# Patient Record
Sex: FEMALE | Race: WHITE | Employment: OTHER | URBAN - METROPOLITAN AREA
[De-identification: names, ages, dates, MRNs, and addresses within clinical notes are randomized per-mention and may not be internally consistent; named-entity substitution may affect disease eponyms.]

---

## 2017-01-09 ENCOUNTER — TELEPHONE (OUTPATIENT)
Dept: INTERNAL MEDICINE | Age: 67
End: 2017-01-09

## 2017-01-09 RX ORDER — AMOXICILLIN AND CLAVULANATE POTASSIUM 875; 125 MG/1; MG/1
1 TABLET, FILM COATED ORAL 2 TIMES DAILY
Qty: 14 TABLET | Refills: 0 | Status: SHIPPED | OUTPATIENT
Start: 2017-01-09 | End: 2017-01-16

## 2017-01-25 ENCOUNTER — TELEPHONE (OUTPATIENT)
Dept: INTERNAL MEDICINE CLINIC | Age: 67
End: 2017-01-25

## 2017-01-25 ENCOUNTER — TELEPHONE (OUTPATIENT)
Dept: INTERNAL MEDICINE | Age: 67
End: 2017-01-25

## 2017-01-25 DIAGNOSIS — I82.402 DEEP VEIN THROMBOSIS (DVT) OF LEFT LOWER EXTREMITY, UNSPECIFIED CHRONICITY, UNSPECIFIED VEIN (HCC): Primary | ICD-10-CM

## 2017-01-27 ENCOUNTER — TELEPHONE (OUTPATIENT)
Dept: INTERNAL MEDICINE | Age: 67
End: 2017-01-27

## 2017-01-27 RX ORDER — WARFARIN SODIUM 5 MG/1
TABLET ORAL
Qty: 30 TABLET | Refills: 3 | Status: SHIPPED | OUTPATIENT
Start: 2017-01-27 | End: 2017-08-17

## 2017-02-01 ENCOUNTER — OFFICE VISIT (OUTPATIENT)
Dept: INTERNAL MEDICINE | Age: 67
End: 2017-02-01

## 2017-02-01 ENCOUNTER — CARE COORDINATOR VISIT (OUTPATIENT)
Dept: INTERNAL MEDICINE | Age: 67
End: 2017-02-01

## 2017-02-01 VITALS
BODY MASS INDEX: 27.63 KG/M2 | SYSTOLIC BLOOD PRESSURE: 116 MMHG | DIASTOLIC BLOOD PRESSURE: 78 MMHG | WEIGHT: 193 LBS | HEIGHT: 70 IN

## 2017-02-01 DIAGNOSIS — D64.9 ANEMIA, UNSPECIFIED TYPE: ICD-10-CM

## 2017-02-01 DIAGNOSIS — I82.4Z1 DEEP VEIN THROMBOSIS (DVT) OF DISTAL VEIN OF RIGHT LOWER EXTREMITY, UNSPECIFIED CHRONICITY (HCC): ICD-10-CM

## 2017-02-01 DIAGNOSIS — D64.9 ANEMIA, UNSPECIFIED TYPE: Primary | ICD-10-CM

## 2017-02-01 DIAGNOSIS — Z96.651 STATUS POST TOTAL RIGHT KNEE REPLACEMENT: ICD-10-CM

## 2017-02-01 LAB
BASOPHILS ABSOLUTE: 0 K/UL (ref 0–0.2)
BASOPHILS RELATIVE PERCENT: 0.4 %
EOSINOPHILS ABSOLUTE: 0.1 K/UL (ref 0–0.6)
EOSINOPHILS RELATIVE PERCENT: 1.4 %
FERRITIN: 422.8 NG/ML (ref 15–150)
HCT VFR BLD CALC: 36.4 % (ref 36–48)
HEMOGLOBIN: 12.1 G/DL (ref 12–16)
INR BLD: 3.64 (ref 0.85–1.16)
IRON SATURATION: 19 % (ref 15–50)
IRON: 76 UG/DL (ref 37–145)
LYMPHOCYTES ABSOLUTE: 1.5 K/UL (ref 1–5.1)
LYMPHOCYTES RELATIVE PERCENT: 21.2 %
MCH RBC QN AUTO: 31.5 PG (ref 26–34)
MCHC RBC AUTO-ENTMCNC: 33.2 G/DL (ref 31–36)
MCV RBC AUTO: 95.1 FL (ref 80–100)
MONOCYTES ABSOLUTE: 0.6 K/UL (ref 0–1.3)
MONOCYTES RELATIVE PERCENT: 7.8 %
NEUTROPHILS ABSOLUTE: 4.9 K/UL (ref 1.7–7.7)
NEUTROPHILS RELATIVE PERCENT: 69.2 %
PDW BLD-RTO: 14.6 % (ref 12.4–15.4)
PLATELET # BLD: 671 K/UL (ref 135–450)
PMV BLD AUTO: 6.5 FL (ref 5–10.5)
PROTHROMBIN TIME: 42.6 SEC (ref 9.8–13)
RBC # BLD: 3.82 M/UL (ref 4–5.2)
TOTAL IRON BINDING CAPACITY: 402 UG/DL (ref 260–445)
WBC # BLD: 7.1 K/UL (ref 4–11)

## 2017-02-01 PROCEDURE — G8400 PT W/DXA NO RESULTS DOC: HCPCS | Performed by: INTERNAL MEDICINE

## 2017-02-01 PROCEDURE — 1036F TOBACCO NON-USER: CPT | Performed by: INTERNAL MEDICINE

## 2017-02-01 PROCEDURE — G8484 FLU IMMUNIZE NO ADMIN: HCPCS | Performed by: INTERNAL MEDICINE

## 2017-02-01 PROCEDURE — 99214 OFFICE O/P EST MOD 30 MIN: CPT | Performed by: INTERNAL MEDICINE

## 2017-02-01 PROCEDURE — 4040F PNEUMOC VAC/ADMIN/RCVD: CPT | Performed by: INTERNAL MEDICINE

## 2017-02-01 PROCEDURE — 1123F ACP DISCUSS/DSCN MKR DOCD: CPT | Performed by: INTERNAL MEDICINE

## 2017-02-01 PROCEDURE — G8428 CUR MEDS NOT DOCUMENT: HCPCS | Performed by: INTERNAL MEDICINE

## 2017-02-01 PROCEDURE — G8420 CALC BMI NORM PARAMETERS: HCPCS | Performed by: INTERNAL MEDICINE

## 2017-02-01 PROCEDURE — 3014F SCREEN MAMMO DOC REV: CPT | Performed by: INTERNAL MEDICINE

## 2017-02-01 PROCEDURE — 1090F PRES/ABSN URINE INCON ASSESS: CPT | Performed by: INTERNAL MEDICINE

## 2017-02-01 PROCEDURE — 3017F COLORECTAL CA SCREEN DOC REV: CPT | Performed by: INTERNAL MEDICINE

## 2017-02-01 RX ORDER — CELECOXIB 200 MG/1
200 CAPSULE ORAL DAILY
Qty: 30 CAPSULE | Refills: 1 | Status: SHIPPED | OUTPATIENT
Start: 2017-02-01 | End: 2017-03-06 | Stop reason: ALTCHOICE

## 2017-02-01 RX ORDER — HYDROCODONE BITARTRATE AND ACETAMINOPHEN 5; 325 MG/1; MG/1
TABLET ORAL
Qty: 60 TABLET | Refills: 0 | Status: SHIPPED | OUTPATIENT
Start: 2017-02-01 | End: 2017-03-06 | Stop reason: ALTCHOICE

## 2017-02-01 ASSESSMENT — ENCOUNTER SYMPTOMS
GASTROINTESTINAL NEGATIVE: 1
RESPIRATORY NEGATIVE: 1

## 2017-02-02 ENCOUNTER — ANTI-COAG VISIT (OUTPATIENT)
Dept: INTERNAL MEDICINE | Age: 67
End: 2017-02-02

## 2017-02-02 ENCOUNTER — CARE COORDINATION (OUTPATIENT)
Dept: INTERNAL MEDICINE | Age: 67
End: 2017-02-02

## 2017-02-02 DIAGNOSIS — I82.4Z1 DEEP VEIN THROMBOSIS (DVT) OF DISTAL VEIN OF RIGHT LOWER EXTREMITY, UNSPECIFIED CHRONICITY (HCC): Primary | ICD-10-CM

## 2017-02-07 ENCOUNTER — CARE COORDINATION (OUTPATIENT)
Dept: INTERNAL MEDICINE | Age: 67
End: 2017-02-07

## 2017-02-08 RX ORDER — MIRTAZAPINE 7.5 MG/1
7.5 TABLET, FILM COATED ORAL NIGHTLY
Qty: 30 TABLET | Refills: 0 | Status: SHIPPED | OUTPATIENT
Start: 2017-02-08 | End: 2017-05-23

## 2017-02-13 ENCOUNTER — CARE COORDINATION (OUTPATIENT)
Dept: INTERNAL MEDICINE | Age: 67
End: 2017-02-13

## 2017-02-17 ENCOUNTER — CARE COORDINATION (OUTPATIENT)
Dept: INTERNAL MEDICINE | Age: 67
End: 2017-02-17

## 2017-02-20 ENCOUNTER — CARE COORDINATION (OUTPATIENT)
Dept: INTERNAL MEDICINE | Age: 67
End: 2017-02-20

## 2017-02-24 ENCOUNTER — TELEPHONE (OUTPATIENT)
Dept: PHARMACY | Age: 67
End: 2017-02-24

## 2017-02-24 ENCOUNTER — CARE COORDINATION (OUTPATIENT)
Dept: INTERNAL MEDICINE | Age: 67
End: 2017-02-24

## 2017-02-27 ENCOUNTER — CARE COORDINATION (OUTPATIENT)
Dept: INTERNAL MEDICINE | Age: 67
End: 2017-02-27

## 2017-03-01 ENCOUNTER — HOSPITAL ENCOUNTER (OUTPATIENT)
Dept: OTHER | Age: 67
Discharge: HOME | End: 2017-03-31
Attending: INTERNAL MEDICINE | Admitting: INTERNAL MEDICINE

## 2017-03-01 ENCOUNTER — HOSPITAL ENCOUNTER (OUTPATIENT)
Dept: PHYSICAL THERAPY | Age: 67
Discharge: HOME | End: 2017-03-31
Attending: ORTHOPAEDIC SURGERY | Admitting: ORTHOPAEDIC SURGERY

## 2017-03-01 ASSESSMENT — PAIN DESCRIPTION - PAIN TYPE: TYPE: SURGICAL PAIN

## 2017-03-01 ASSESSMENT — PAIN SCALES - GENERAL: PAINLEVEL_OUTOF10: 4

## 2017-03-01 ASSESSMENT — PAIN DESCRIPTION - LOCATION: LOCATION: KNEE

## 2017-03-01 ASSESSMENT — PAIN DESCRIPTION - ORIENTATION: ORIENTATION: RIGHT

## 2017-03-06 ENCOUNTER — ANTI-COAG VISIT (OUTPATIENT)
Dept: PHARMACY | Age: 67
End: 2017-03-06

## 2017-03-06 DIAGNOSIS — I82.4Z1 DEEP VEIN THROMBOSIS (DVT) OF DISTAL VEIN OF RIGHT LOWER EXTREMITY, UNSPECIFIED CHRONICITY (HCC): Primary | ICD-10-CM

## 2017-03-06 LAB
INR BLD: 4.7
PROTIME: 56.2 SECONDS

## 2017-03-06 RX ORDER — PHENOL 1.4 %
2 AEROSOL, SPRAY (ML) MUCOUS MEMBRANE NIGHTLY
COMMUNITY
End: 2017-12-12

## 2017-03-06 RX ORDER — ACETAMINOPHEN 160 MG
1 TABLET,DISINTEGRATING ORAL DAILY
COMMUNITY
End: 2017-05-23

## 2017-03-06 RX ORDER — DIPHENHYDRAMINE HCL 25 MG
25 CAPSULE ORAL EVERY 6 HOURS PRN
COMMUNITY
End: 2017-05-23

## 2017-03-06 RX ORDER — ACETAMINOPHEN 500 MG
1000 TABLET ORAL DAILY PRN
COMMUNITY
End: 2017-05-23

## 2017-03-13 ENCOUNTER — ANTI-COAG VISIT (OUTPATIENT)
Dept: PHARMACY | Age: 67
End: 2017-03-13

## 2017-03-13 DIAGNOSIS — I82.4Z1 DEEP VEIN THROMBOSIS (DVT) OF DISTAL VEIN OF RIGHT LOWER EXTREMITY, UNSPECIFIED CHRONICITY (HCC): ICD-10-CM

## 2017-03-13 LAB
INR BLD: 2.2
PROTIME: 26.7 SECONDS

## 2017-03-21 ENCOUNTER — TELEPHONE (OUTPATIENT)
Dept: INTERNAL MEDICINE | Age: 67
End: 2017-03-21

## 2017-03-21 ENCOUNTER — TELEPHONE (OUTPATIENT)
Dept: PHARMACY | Age: 67
End: 2017-03-21

## 2017-03-21 DIAGNOSIS — I82.4Z1 DEEP VEIN THROMBOSIS (DVT) OF DISTAL VEIN OF RIGHT LOWER EXTREMITY, UNSPECIFIED CHRONICITY (HCC): Primary | ICD-10-CM

## 2017-03-22 ENCOUNTER — HOSPITAL ENCOUNTER (OUTPATIENT)
Dept: OTHER | Age: 67
Discharge: HOME | End: 2017-03-22
Attending: INTERNAL MEDICINE | Admitting: INTERNAL MEDICINE

## 2017-03-22 ENCOUNTER — CARE COORDINATION (OUTPATIENT)
Dept: INTERNAL MEDICINE | Age: 67
End: 2017-03-22

## 2017-03-22 ENCOUNTER — ANTI-COAG VISIT (OUTPATIENT)
Dept: PHARMACY | Age: 67
End: 2017-03-22

## 2017-03-22 DIAGNOSIS — I82.4Z1 DEEP VEIN THROMBOSIS (DVT) OF DISTAL VEIN OF RIGHT LOWER EXTREMITY, UNSPECIFIED CHRONICITY (HCC): ICD-10-CM

## 2017-03-22 LAB
INR BLD: 3.6
PROTIME: 42.7 SECONDS

## 2017-03-23 ENCOUNTER — CARE COORDINATION (OUTPATIENT)
Dept: INTERNAL MEDICINE | Age: 67
End: 2017-03-23

## 2017-03-27 ENCOUNTER — CARE COORDINATION (OUTPATIENT)
Dept: INTERNAL MEDICINE | Age: 67
End: 2017-03-27

## 2017-03-31 ENCOUNTER — ANTI-COAG VISIT (OUTPATIENT)
Dept: PHARMACY | Age: 67
End: 2017-03-31

## 2017-03-31 DIAGNOSIS — I82.4Z1 DEEP VEIN THROMBOSIS (DVT) OF DISTAL VEIN OF RIGHT LOWER EXTREMITY, UNSPECIFIED CHRONICITY (HCC): ICD-10-CM

## 2017-03-31 LAB — INR BLD: 2.7

## 2017-04-01 ENCOUNTER — HOSPITAL ENCOUNTER (OUTPATIENT)
Dept: OTHER | Age: 67
Discharge: HOME | End: 2017-04-30
Attending: ORTHOPAEDIC SURGERY | Admitting: ORTHOPAEDIC SURGERY

## 2017-04-07 ENCOUNTER — ANTI-COAG VISIT (OUTPATIENT)
Dept: PHARMACY | Age: 67
End: 2017-04-07

## 2017-04-07 DIAGNOSIS — I82.4Z1 DEEP VEIN THROMBOSIS (DVT) OF DISTAL VEIN OF RIGHT LOWER EXTREMITY, UNSPECIFIED CHRONICITY (HCC): ICD-10-CM

## 2017-04-07 LAB — INR BLD: 1.8

## 2017-04-17 ENCOUNTER — ANTI-COAG VISIT (OUTPATIENT)
Dept: PHARMACY | Age: 67
End: 2017-04-17

## 2017-04-17 DIAGNOSIS — I82.4Z1 DEEP VEIN THROMBOSIS (DVT) OF DISTAL VEIN OF RIGHT LOWER EXTREMITY, UNSPECIFIED CHRONICITY (HCC): ICD-10-CM

## 2017-04-17 LAB — INR BLD: 2.8

## 2017-04-19 ENCOUNTER — CARE COORDINATION (OUTPATIENT)
Dept: INTERNAL MEDICINE | Age: 67
End: 2017-04-19

## 2017-05-01 ENCOUNTER — HOSPITAL ENCOUNTER (OUTPATIENT)
Dept: OTHER | Age: 67
Discharge: HOME | End: 2017-05-31
Attending: ORTHOPAEDIC SURGERY | Admitting: ORTHOPAEDIC SURGERY

## 2017-05-05 ENCOUNTER — ANTI-COAG VISIT (OUTPATIENT)
Dept: PHARMACY | Age: 67
End: 2017-05-05

## 2017-05-05 DIAGNOSIS — I82.4Z1 DEEP VEIN THROMBOSIS (DVT) OF DISTAL VEIN OF RIGHT LOWER EXTREMITY, UNSPECIFIED CHRONICITY (HCC): ICD-10-CM

## 2017-05-05 LAB — INR BLD: 2.3

## 2017-05-08 ENCOUNTER — CARE COORDINATION (OUTPATIENT)
Dept: INTERNAL MEDICINE | Age: 67
End: 2017-05-08

## 2017-05-08 RX ORDER — CELECOXIB 200 MG/1
CAPSULE ORAL
Qty: 30 CAPSULE | Refills: 1 | Status: SHIPPED | OUTPATIENT
Start: 2017-05-08 | End: 2017-12-12

## 2017-05-16 ENCOUNTER — CARE COORDINATION (OUTPATIENT)
Dept: INTERNAL MEDICINE | Age: 67
End: 2017-05-16

## 2017-05-19 ENCOUNTER — TELEPHONE (OUTPATIENT)
Dept: INTERNAL MEDICINE | Age: 67
End: 2017-05-19

## 2017-05-23 ENCOUNTER — OFFICE VISIT (OUTPATIENT)
Dept: INTERNAL MEDICINE | Age: 67
End: 2017-05-23

## 2017-05-23 VITALS
HEIGHT: 70 IN | DIASTOLIC BLOOD PRESSURE: 80 MMHG | SYSTOLIC BLOOD PRESSURE: 148 MMHG | BODY MASS INDEX: 27.49 KG/M2 | WEIGHT: 192 LBS | HEART RATE: 88 BPM

## 2017-05-23 DIAGNOSIS — I10 ESSENTIAL HYPERTENSION: ICD-10-CM

## 2017-05-23 DIAGNOSIS — F32.A ANXIETY AND DEPRESSION: ICD-10-CM

## 2017-05-23 DIAGNOSIS — F41.9 ANXIETY AND DEPRESSION: ICD-10-CM

## 2017-05-23 PROCEDURE — 4040F PNEUMOC VAC/ADMIN/RCVD: CPT | Performed by: INTERNAL MEDICINE

## 2017-05-23 PROCEDURE — 99213 OFFICE O/P EST LOW 20 MIN: CPT | Performed by: INTERNAL MEDICINE

## 2017-05-23 PROCEDURE — G8427 DOCREV CUR MEDS BY ELIG CLIN: HCPCS | Performed by: INTERNAL MEDICINE

## 2017-05-23 PROCEDURE — 3014F SCREEN MAMMO DOC REV: CPT | Performed by: INTERNAL MEDICINE

## 2017-05-23 PROCEDURE — 1036F TOBACCO NON-USER: CPT | Performed by: INTERNAL MEDICINE

## 2017-05-23 PROCEDURE — 3017F COLORECTAL CA SCREEN DOC REV: CPT | Performed by: INTERNAL MEDICINE

## 2017-05-23 PROCEDURE — G8420 CALC BMI NORM PARAMETERS: HCPCS | Performed by: INTERNAL MEDICINE

## 2017-05-23 PROCEDURE — 1123F ACP DISCUSS/DSCN MKR DOCD: CPT | Performed by: INTERNAL MEDICINE

## 2017-05-23 PROCEDURE — G8400 PT W/DXA NO RESULTS DOC: HCPCS | Performed by: INTERNAL MEDICINE

## 2017-05-23 PROCEDURE — 1090F PRES/ABSN URINE INCON ASSESS: CPT | Performed by: INTERNAL MEDICINE

## 2017-05-23 RX ORDER — TRAZODONE HYDROCHLORIDE 100 MG/1
100 TABLET ORAL NIGHTLY
Qty: 30 TABLET | Refills: 5 | Status: SHIPPED | OUTPATIENT
Start: 2017-05-23 | End: 2017-12-12

## 2017-05-23 ASSESSMENT — ENCOUNTER SYMPTOMS: RESPIRATORY NEGATIVE: 1

## 2017-06-02 ENCOUNTER — ANTI-COAG VISIT (OUTPATIENT)
Dept: PHARMACY | Age: 67
End: 2017-06-02

## 2017-06-02 DIAGNOSIS — I82.4Z1 DEEP VEIN THROMBOSIS (DVT) OF DISTAL VEIN OF RIGHT LOWER EXTREMITY, UNSPECIFIED CHRONICITY (HCC): ICD-10-CM

## 2017-06-02 LAB — INR BLD: 2.1

## 2017-06-06 ENCOUNTER — CARE COORDINATION (OUTPATIENT)
Dept: INTERNAL MEDICINE | Age: 67
End: 2017-06-06

## 2017-06-23 ENCOUNTER — ANTI-COAG VISIT (OUTPATIENT)
Dept: PHARMACY | Age: 67
End: 2017-06-23

## 2017-06-23 DIAGNOSIS — I82.4Z1 DEEP VEIN THROMBOSIS (DVT) OF DISTAL VEIN OF RIGHT LOWER EXTREMITY, UNSPECIFIED CHRONICITY (HCC): ICD-10-CM

## 2017-06-23 LAB — INR BLD: 1.8

## 2017-06-26 ENCOUNTER — CARE COORDINATION (OUTPATIENT)
Dept: INTERNAL MEDICINE | Age: 67
End: 2017-06-26

## 2017-06-27 ENCOUNTER — TELEPHONE (OUTPATIENT)
Dept: PHARMACY | Age: 67
End: 2017-06-27

## 2017-06-27 ENCOUNTER — ANTI-COAG VISIT (OUTPATIENT)
Dept: PHARMACY | Age: 67
End: 2017-06-27

## 2017-06-27 DIAGNOSIS — Z79.899 DRUG THERAPY DISCONTINUED: Primary | ICD-10-CM

## 2017-06-27 PROBLEM — I82.4Z1 DEEP VEIN THROMBOSIS (DVT) OF DISTAL VEIN OF RIGHT LOWER EXTREMITY (HCC): Status: RESOLVED | Noted: 2017-02-01 | Resolved: 2017-06-27

## 2017-07-12 ENCOUNTER — CARE COORDINATION (OUTPATIENT)
Dept: FAMILY MEDICINE CLINIC | Age: 67
End: 2017-07-12

## 2017-07-25 ENCOUNTER — TELEPHONE (OUTPATIENT)
Dept: INTERNAL MEDICINE | Age: 67
End: 2017-07-25

## 2017-08-01 ENCOUNTER — CARE COORDINATION (OUTPATIENT)
Dept: INTERNAL MEDICINE | Age: 67
End: 2017-08-01

## 2017-08-17 ENCOUNTER — CARE COORDINATION (OUTPATIENT)
Dept: INTERNAL MEDICINE | Age: 67
End: 2017-08-17

## 2017-08-17 ENCOUNTER — OFFICE VISIT (OUTPATIENT)
Dept: DERMATOLOGY | Age: 67
End: 2017-08-17

## 2017-08-17 DIAGNOSIS — L81.4 SOLAR LENTIGO: ICD-10-CM

## 2017-08-17 DIAGNOSIS — D69.2 SOLAR PURPURA (HCC): Primary | ICD-10-CM

## 2017-08-17 DIAGNOSIS — S51.812A SKIN TEAR OF LEFT FOREARM WITHOUT COMPLICATION, INITIAL ENCOUNTER: ICD-10-CM

## 2017-08-17 DIAGNOSIS — L57.0 AK (ACTINIC KERATOSIS): ICD-10-CM

## 2017-08-17 DIAGNOSIS — L82.1 SK (SEBORRHEIC KERATOSIS): ICD-10-CM

## 2017-08-17 PROCEDURE — 3014F SCREEN MAMMO DOC REV: CPT | Performed by: DERMATOLOGY

## 2017-08-17 PROCEDURE — 99213 OFFICE O/P EST LOW 20 MIN: CPT | Performed by: DERMATOLOGY

## 2017-08-17 PROCEDURE — G8400 PT W/DXA NO RESULTS DOC: HCPCS | Performed by: DERMATOLOGY

## 2017-08-17 PROCEDURE — 1090F PRES/ABSN URINE INCON ASSESS: CPT | Performed by: DERMATOLOGY

## 2017-08-17 PROCEDURE — 1123F ACP DISCUSS/DSCN MKR DOCD: CPT | Performed by: DERMATOLOGY

## 2017-08-17 PROCEDURE — 3017F COLORECTAL CA SCREEN DOC REV: CPT | Performed by: DERMATOLOGY

## 2017-08-17 PROCEDURE — 1036F TOBACCO NON-USER: CPT | Performed by: DERMATOLOGY

## 2017-08-17 PROCEDURE — G8427 DOCREV CUR MEDS BY ELIG CLIN: HCPCS | Performed by: DERMATOLOGY

## 2017-08-17 PROCEDURE — G8419 CALC BMI OUT NRM PARAM NOF/U: HCPCS | Performed by: DERMATOLOGY

## 2017-08-17 PROCEDURE — 4040F PNEUMOC VAC/ADMIN/RCVD: CPT | Performed by: DERMATOLOGY

## 2017-08-17 RX ORDER — ACETAMINOPHEN 160 MG
TABLET,DISINTEGRATING ORAL
COMMUNITY
End: 2018-03-27

## 2017-12-12 ENCOUNTER — OFFICE VISIT (OUTPATIENT)
Dept: INTERNAL MEDICINE | Age: 67
End: 2017-12-12

## 2017-12-12 VITALS
BODY MASS INDEX: 28.06 KG/M2 | DIASTOLIC BLOOD PRESSURE: 84 MMHG | HEIGHT: 70 IN | WEIGHT: 196 LBS | TEMPERATURE: 98.5 F | HEART RATE: 84 BPM | SYSTOLIC BLOOD PRESSURE: 148 MMHG

## 2017-12-12 DIAGNOSIS — F41.9 ANXIETY AND DEPRESSION: ICD-10-CM

## 2017-12-12 DIAGNOSIS — J45.909 REACTIVE AIRWAY DISEASE WITHOUT COMPLICATION, UNSPECIFIED ASTHMA SEVERITY, UNSPECIFIED WHETHER PERSISTENT: ICD-10-CM

## 2017-12-12 DIAGNOSIS — M79.10 MYALGIA: ICD-10-CM

## 2017-12-12 DIAGNOSIS — Z23 NEED FOR INFLUENZA VACCINATION: ICD-10-CM

## 2017-12-12 DIAGNOSIS — R60.9 EDEMA, UNSPECIFIED TYPE: ICD-10-CM

## 2017-12-12 DIAGNOSIS — R91.1 PULMONARY NODULE: ICD-10-CM

## 2017-12-12 DIAGNOSIS — E78.5 HYPERLIPIDEMIA, UNSPECIFIED HYPERLIPIDEMIA TYPE: Chronic | ICD-10-CM

## 2017-12-12 DIAGNOSIS — Z00.00 PREVENTATIVE HEALTH CARE: ICD-10-CM

## 2017-12-12 DIAGNOSIS — I10 ESSENTIAL HYPERTENSION: ICD-10-CM

## 2017-12-12 DIAGNOSIS — Z00.00 PREVENTATIVE HEALTH CARE: Primary | ICD-10-CM

## 2017-12-12 DIAGNOSIS — F32.A ANXIETY AND DEPRESSION: ICD-10-CM

## 2017-12-12 PROBLEM — Z96.651 STATUS POST TOTAL RIGHT KNEE REPLACEMENT: Status: RESOLVED | Noted: 2017-02-01 | Resolved: 2017-12-12

## 2017-12-12 LAB
A/G RATIO: 1.9 (ref 1.1–2.2)
ALBUMIN SERPL-MCNC: 4.6 G/DL (ref 3.4–5)
ALP BLD-CCNC: 85 U/L (ref 40–129)
ALT SERPL-CCNC: 23 U/L (ref 10–40)
ANION GAP SERPL CALCULATED.3IONS-SCNC: 16 MMOL/L (ref 3–16)
AST SERPL-CCNC: 23 U/L (ref 15–37)
BASOPHILS ABSOLUTE: 0 K/UL (ref 0–0.2)
BASOPHILS RELATIVE PERCENT: 0.3 %
BILIRUB SERPL-MCNC: 0.5 MG/DL (ref 0–1)
BILIRUBIN URINE: NEGATIVE
BLOOD, URINE: NEGATIVE
BUN BLDV-MCNC: 12 MG/DL (ref 7–20)
CALCIUM SERPL-MCNC: 9.6 MG/DL (ref 8.3–10.6)
CHLORIDE BLD-SCNC: 100 MMOL/L (ref 99–110)
CHOLESTEROL, TOTAL: 260 MG/DL (ref 0–199)
CLARITY: CLEAR
CO2: 25 MMOL/L (ref 21–32)
COLOR: YELLOW
CREAT SERPL-MCNC: 0.6 MG/DL (ref 0.6–1.2)
EOSINOPHILS ABSOLUTE: 0 K/UL (ref 0–0.6)
EOSINOPHILS RELATIVE PERCENT: 0.6 %
GFR AFRICAN AMERICAN: >60
GFR NON-AFRICAN AMERICAN: >60
GLOBULIN: 2.4 G/DL
GLUCOSE BLD-MCNC: 103 MG/DL (ref 70–99)
GLUCOSE URINE: NEGATIVE MG/DL
HCT VFR BLD CALC: 44.2 % (ref 36–48)
HDLC SERPL-MCNC: 77 MG/DL (ref 40–60)
HEMOGLOBIN: 15 G/DL (ref 12–16)
KETONES, URINE: NEGATIVE MG/DL
LDL CHOLESTEROL CALCULATED: 151 MG/DL
LEUKOCYTE ESTERASE, URINE: NEGATIVE
LYMPHOCYTES ABSOLUTE: 1.3 K/UL (ref 1–5.1)
LYMPHOCYTES RELATIVE PERCENT: 19.1 %
MCH RBC QN AUTO: 33.5 PG (ref 26–34)
MCHC RBC AUTO-ENTMCNC: 34 G/DL (ref 31–36)
MCV RBC AUTO: 98.3 FL (ref 80–100)
MICROSCOPIC EXAMINATION: NORMAL
MONOCYTES ABSOLUTE: 0.5 K/UL (ref 0–1.3)
MONOCYTES RELATIVE PERCENT: 7.4 %
NEUTROPHILS ABSOLUTE: 5 K/UL (ref 1.7–7.7)
NEUTROPHILS RELATIVE PERCENT: 72.6 %
NITRITE, URINE: NEGATIVE
PDW BLD-RTO: 13.9 % (ref 12.4–15.4)
PH UA: 6
PLATELET # BLD: 348 K/UL (ref 135–450)
PMV BLD AUTO: 7.1 FL (ref 5–10.5)
POTASSIUM SERPL-SCNC: 4.5 MMOL/L (ref 3.5–5.1)
PROTEIN UA: NEGATIVE MG/DL
RBC # BLD: 4.5 M/UL (ref 4–5.2)
SEDIMENTATION RATE, ERYTHROCYTE: 8 MM/HR (ref 0–30)
SODIUM BLD-SCNC: 141 MMOL/L (ref 136–145)
SPECIFIC GRAVITY UA: 1.01
TOTAL CK: 55 U/L (ref 26–192)
TOTAL PROTEIN: 7 G/DL (ref 6.4–8.2)
TRIGL SERPL-MCNC: 159 MG/DL (ref 0–150)
TSH REFLEX FT4: 1.96 UIU/ML (ref 0.27–4.2)
URINE TYPE: NORMAL
UROBILINOGEN, URINE: 0.2 E.U./DL
VLDLC SERPL CALC-MCNC: 32 MG/DL
WBC # BLD: 6.8 K/UL (ref 4–11)

## 2017-12-12 PROCEDURE — G0008 ADMIN INFLUENZA VIRUS VAC: HCPCS | Performed by: INTERNAL MEDICINE

## 2017-12-12 PROCEDURE — 93000 ELECTROCARDIOGRAM COMPLETE: CPT | Performed by: INTERNAL MEDICINE

## 2017-12-12 PROCEDURE — G0439 PPPS, SUBSEQ VISIT: HCPCS | Performed by: INTERNAL MEDICINE

## 2017-12-12 PROCEDURE — 90662 IIV NO PRSV INCREASED AG IM: CPT | Performed by: INTERNAL MEDICINE

## 2017-12-12 RX ORDER — OMEPRAZOLE 20 MG/1
CAPSULE, DELAYED RELEASE ORAL
Qty: 90 CAPSULE | Refills: 3 | Status: SHIPPED | OUTPATIENT
Start: 2017-12-12 | End: 2019-01-16 | Stop reason: SDUPTHER

## 2017-12-12 RX ORDER — LOSARTAN POTASSIUM 100 MG/1
TABLET ORAL
Qty: 90 TABLET | Refills: 3 | Status: ON HOLD | OUTPATIENT
Start: 2017-12-12 | End: 2018-06-05 | Stop reason: HOSPADM

## 2017-12-12 RX ORDER — FLUTICASONE FUROATE AND VILANTEROL 100; 25 UG/1; UG/1
1 POWDER RESPIRATORY (INHALATION) DAILY
Qty: 3 EACH | Refills: 3 | Status: SHIPPED | OUTPATIENT
Start: 2017-12-12 | End: 2019-01-16 | Stop reason: SDUPTHER

## 2017-12-12 RX ORDER — FUROSEMIDE 20 MG/1
TABLET ORAL
Qty: 90 TABLET | Refills: 3 | Status: SHIPPED | OUTPATIENT
Start: 2017-12-12 | End: 2018-01-26 | Stop reason: SDUPTHER

## 2017-12-12 RX ORDER — AMLODIPINE BESYLATE 2.5 MG/1
2.5 TABLET ORAL DAILY
Qty: 90 TABLET | Refills: 3 | Status: SHIPPED | OUTPATIENT
Start: 2017-12-12 | End: 2018-02-13

## 2017-12-12 ASSESSMENT — PATIENT HEALTH QUESTIONNAIRE - PHQ9
1. LITTLE INTEREST OR PLEASURE IN DOING THINGS: 0
SUM OF ALL RESPONSES TO PHQ9 QUESTIONS 1 & 2: 0
SUM OF ALL RESPONSES TO PHQ QUESTIONS 1-9: 0
2. FEELING DOWN, DEPRESSED OR HOPELESS: 0

## 2017-12-12 ASSESSMENT — ENCOUNTER SYMPTOMS
SHORTNESS OF BREATH: 0
CHEST TIGHTNESS: 0
GASTROINTESTINAL NEGATIVE: 1

## 2017-12-12 NOTE — ASSESSMENT & PLAN NOTE
Vague muscle aches and tightness across her shoulders and legs. With or without exertion. Not exacerbated by exertion. No direct chest pain or associated dyspnea or other cardiac symptoms. Could be Lipitor. We'll hold for one month. Check sedimentation rate and CK. Consider polymyalgia.

## 2017-12-12 NOTE — ASSESSMENT & PLAN NOTE
Here for checkup. Stressors include problems within her familyhealth problems with her  and her daughter. She is still working.  See problem list.

## 2017-12-12 NOTE — PROGRESS NOTES
Vaccine Information Sheet, \"Influenza - Inactivated\"  given to [facility], or parent/legal guardian of  [facility] and verbalized understanding. Patient responses:    Have you ever had a reaction to a flu vaccine? No  Are you able to eat eggs without adverse effects? Yes  Do you have any current illness? No  Have you ever had Guillian Grand Chain Syndrome? No    Flu vaccine given per order. Please see immunization tab.

## 2017-12-12 NOTE — PROGRESS NOTES
SUBJECTIVE:    Patient ID: Kanika Murillo is an 79 y.o. female. HPI: Patient here today for the f/u of chronic problems -- see Problem List and associated comments. New issues or complaints include (also see Assessment for more details):  Here for routine checkup. See problem list. May physical issue is the soreness in her muscles. No other cardiovascular or pulmonary symptoms which all seem well controlled. Family stressors continue. Review of Systems   Constitutional: Negative. HENT: Positive for postnasal drip. Eyes: Negative for visual disturbance. Respiratory: Negative for chest tightness and shortness of breath. Cardiovascular: Negative for chest pain and palpitations. Gastrointestinal: Negative. Genitourinary: Negative. Musculoskeletal: Positive for myalgias. Negative for arthralgias. Skin: Negative for wound. Neurological: Negative for weakness and numbness. Hematological: Negative for adenopathy. Psychiatric/Behavioral: Negative for dysphoric mood, sleep disturbance and suicidal ideas. OBJECTIVE:    BP (!) 148/84 (Site: Left Arm, Position: Sitting, Cuff Size: Large Adult)   Pulse 84   Temp 98.5 °F (36.9 °C) (Oral)   Ht 5' 10\" (1.778 m)   Wt 196 lb (88.9 kg)   BMI 28.12 kg/m²      Physical Exam   Constitutional: She is oriented to person, place, and time. She appears well-developed and well-nourished. No distress. HENT:   Head: Normocephalic and atraumatic. Mouth/Throat: Oropharynx is clear and moist. No oropharyngeal exudate. Eyes: EOM are normal. Right eye exhibits no discharge. Left eye exhibits no discharge. No scleral icterus. Neck: Normal range of motion. Neck supple. No JVD present. Carotid bruit is not present. No tracheal deviation present. No thyromegaly present. Cardiovascular: Normal rate, regular rhythm, normal heart sounds and intact distal pulses. Exam reveals no gallop. No murmur heard.   Pulses:       Carotid pulses are 2+ on the With or without exertion. Not exacerbated by exertion. No direct chest pain or associated dyspnea or other cardiac symptoms. Could be Lipitor. We'll hold for one month. Check sedimentation rate and CK. Consider polymyalgia. Hyperlipidemia  Check labs at that he will hold Lipitor due to myalgias    Hypertension  Elevated. Continue losartan and add amlodipine 2.5 mg    Edema  Lasix as needed    Reactive airway disease  Controlled with inhalers    Pulmonary nodule  Recheck CT scan    Anxiety and depression  Overall doing wellmainly stressed out about health problems within her family. PLAN:  See ASSESSMENT for evaluation & PLAN    Orders Placed This Encounter   Procedures    CT Chest WO Contrast     Order Specific Question:   Reason for exam:     Answer:   f/u monitoring    CBC Auto Differential     Standing Status:   Future     Standing Expiration Date:   12/12/2018    Comprehensive Metabolic Panel     Standing Status:   Future     Standing Expiration Date:   12/12/2018    Lipid Panel     Standing Status:   Future     Standing Expiration Date:   12/12/2018     Order Specific Question:   Is Patient Fasting?/# of Hours     Answer:   yes - 8 hours    TSH WITH REFLEX TO FT4     Standing Status:   Future     Standing Expiration Date:   12/12/2018    Urinalysis     Standing Status:   Future     Standing Expiration Date:   12/12/2018     Order Specific Question:   SPECIFY(EX-CATH,MIDSTREAM,CYSTO,ETC)? Answer:   midstream    CK     Standing Status:   Future     Standing Expiration Date:   12/12/2018    Sedimentation rate, automated     Standing Status:   Future     Standing Expiration Date:   12/12/2018    EKG 12 Lead     Order Specific Question:   Reason for Exam?     Answer: Other       PSH, PMH, SH and FH reviewed and noted. Recent and past labs, tests and consults also reviewed. Recent or new meds also reviewed.

## 2017-12-26 ENCOUNTER — HOSPITAL ENCOUNTER (OUTPATIENT)
Dept: CT IMAGING | Age: 67
Discharge: HOME | End: 2017-12-26
Attending: INTERNAL MEDICINE | Admitting: INTERNAL MEDICINE

## 2017-12-26 DIAGNOSIS — R91.1 PULMONARY NODULE: ICD-10-CM

## 2017-12-26 DIAGNOSIS — R91.1 SOLITARY PULMONARY NODULE: ICD-10-CM

## 2018-01-25 ENCOUNTER — TELEPHONE (OUTPATIENT)
Dept: INTERNAL MEDICINE | Age: 68
End: 2018-01-25

## 2018-01-26 RX ORDER — FUROSEMIDE 20 MG/1
TABLET ORAL
Qty: 90 TABLET | Refills: 3 | COMMUNITY
Start: 2018-01-26 | End: 2018-03-16 | Stop reason: SDUPTHER

## 2018-02-13 ENCOUNTER — OFFICE VISIT (OUTPATIENT)
Dept: INTERNAL MEDICINE | Age: 68
End: 2018-02-13

## 2018-02-13 VITALS
DIASTOLIC BLOOD PRESSURE: 78 MMHG | OXYGEN SATURATION: 91 % | HEART RATE: 85 BPM | SYSTOLIC BLOOD PRESSURE: 128 MMHG | HEIGHT: 70 IN | BODY MASS INDEX: 28.35 KG/M2 | WEIGHT: 198 LBS

## 2018-02-13 DIAGNOSIS — E78.5 HYPERLIPIDEMIA, UNSPECIFIED HYPERLIPIDEMIA TYPE: Chronic | ICD-10-CM

## 2018-02-13 DIAGNOSIS — R60.9 EDEMA, UNSPECIFIED TYPE: ICD-10-CM

## 2018-02-13 DIAGNOSIS — R60.9 SWELLING: ICD-10-CM

## 2018-02-13 DIAGNOSIS — F41.9 ANXIETY AND DEPRESSION: ICD-10-CM

## 2018-02-13 DIAGNOSIS — I10 ESSENTIAL HYPERTENSION: ICD-10-CM

## 2018-02-13 DIAGNOSIS — F32.A ANXIETY AND DEPRESSION: ICD-10-CM

## 2018-02-13 PROCEDURE — G8400 PT W/DXA NO RESULTS DOC: HCPCS | Performed by: INTERNAL MEDICINE

## 2018-02-13 PROCEDURE — 4040F PNEUMOC VAC/ADMIN/RCVD: CPT | Performed by: INTERNAL MEDICINE

## 2018-02-13 PROCEDURE — G8419 CALC BMI OUT NRM PARAM NOF/U: HCPCS | Performed by: INTERNAL MEDICINE

## 2018-02-13 PROCEDURE — 1123F ACP DISCUSS/DSCN MKR DOCD: CPT | Performed by: INTERNAL MEDICINE

## 2018-02-13 PROCEDURE — 99214 OFFICE O/P EST MOD 30 MIN: CPT | Performed by: INTERNAL MEDICINE

## 2018-02-13 PROCEDURE — 1090F PRES/ABSN URINE INCON ASSESS: CPT | Performed by: INTERNAL MEDICINE

## 2018-02-13 PROCEDURE — 1036F TOBACCO NON-USER: CPT | Performed by: INTERNAL MEDICINE

## 2018-02-13 PROCEDURE — 3014F SCREEN MAMMO DOC REV: CPT | Performed by: INTERNAL MEDICINE

## 2018-02-13 PROCEDURE — 3017F COLORECTAL CA SCREEN DOC REV: CPT | Performed by: INTERNAL MEDICINE

## 2018-02-13 PROCEDURE — G8484 FLU IMMUNIZE NO ADMIN: HCPCS | Performed by: INTERNAL MEDICINE

## 2018-02-13 PROCEDURE — G8427 DOCREV CUR MEDS BY ELIG CLIN: HCPCS | Performed by: INTERNAL MEDICINE

## 2018-02-13 RX ORDER — ASPIRIN 81 MG/1
81 TABLET ORAL DAILY
COMMUNITY
End: 2018-03-27

## 2018-02-13 ASSESSMENT — ENCOUNTER SYMPTOMS
GASTROINTESTINAL NEGATIVE: 1
RESPIRATORY NEGATIVE: 1

## 2018-02-13 NOTE — ASSESSMENT & PLAN NOTE
Overall doing wellmost of her stress comes from the care involvement with her . 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

## 2018-02-13 NOTE — ASSESSMENT & PLAN NOTE
Approximately 2-3 weeks of noted water retention and swellingparticularly in her hands and feet. Worse as day goes on. Fingers and hands become stiff. No other signs of heart failure. Blood pressures been good. No new medications or treatments. No unusual salt intake. No NSAID use. Last lab showed good renal function. Suspect amlodipine. We'll stop amlodipine and monitor symptoms for couple weeks. Monitor BP.

## 2018-02-13 NOTE — ASSESSMENT & PLAN NOTE
Blood pressure very good but stop amlodipine due to possible water retention. Monitor BP for couple weeks.

## 2018-03-16 ENCOUNTER — OFFICE VISIT (OUTPATIENT)
Dept: INTERNAL MEDICINE | Age: 68
End: 2018-03-16

## 2018-03-16 VITALS
SYSTOLIC BLOOD PRESSURE: 158 MMHG | HEART RATE: 64 BPM | WEIGHT: 200 LBS | HEIGHT: 70 IN | BODY MASS INDEX: 28.63 KG/M2 | DIASTOLIC BLOOD PRESSURE: 82 MMHG | OXYGEN SATURATION: 98 %

## 2018-03-16 DIAGNOSIS — I10 ESSENTIAL HYPERTENSION: ICD-10-CM

## 2018-03-16 DIAGNOSIS — M25.561 CHRONIC PAIN OF RIGHT KNEE: ICD-10-CM

## 2018-03-16 DIAGNOSIS — R60.9 EDEMA, UNSPECIFIED TYPE: ICD-10-CM

## 2018-03-16 DIAGNOSIS — G89.29 CHRONIC PAIN OF RIGHT KNEE: ICD-10-CM

## 2018-03-16 PROCEDURE — G8419 CALC BMI OUT NRM PARAM NOF/U: HCPCS | Performed by: INTERNAL MEDICINE

## 2018-03-16 PROCEDURE — G8400 PT W/DXA NO RESULTS DOC: HCPCS | Performed by: INTERNAL MEDICINE

## 2018-03-16 PROCEDURE — 3017F COLORECTAL CA SCREEN DOC REV: CPT | Performed by: INTERNAL MEDICINE

## 2018-03-16 PROCEDURE — 3014F SCREEN MAMMO DOC REV: CPT | Performed by: INTERNAL MEDICINE

## 2018-03-16 PROCEDURE — G8427 DOCREV CUR MEDS BY ELIG CLIN: HCPCS | Performed by: INTERNAL MEDICINE

## 2018-03-16 PROCEDURE — 1036F TOBACCO NON-USER: CPT | Performed by: INTERNAL MEDICINE

## 2018-03-16 PROCEDURE — 1090F PRES/ABSN URINE INCON ASSESS: CPT | Performed by: INTERNAL MEDICINE

## 2018-03-16 PROCEDURE — 4040F PNEUMOC VAC/ADMIN/RCVD: CPT | Performed by: INTERNAL MEDICINE

## 2018-03-16 PROCEDURE — G8482 FLU IMMUNIZE ORDER/ADMIN: HCPCS | Performed by: INTERNAL MEDICINE

## 2018-03-16 PROCEDURE — 99214 OFFICE O/P EST MOD 30 MIN: CPT | Performed by: INTERNAL MEDICINE

## 2018-03-16 PROCEDURE — 1123F ACP DISCUSS/DSCN MKR DOCD: CPT | Performed by: INTERNAL MEDICINE

## 2018-03-16 RX ORDER — CHLORTHALIDONE 25 MG/1
25 TABLET ORAL DAILY
Qty: 30 TABLET | Refills: 3 | Status: SHIPPED | OUTPATIENT
Start: 2018-03-16 | End: 2018-05-08 | Stop reason: SDUPTHER

## 2018-03-16 RX ORDER — FUROSEMIDE 20 MG/1
TABLET ORAL
Qty: 90 TABLET | Refills: 3 | COMMUNITY
Start: 2018-03-16 | End: 2018-03-27

## 2018-03-16 ASSESSMENT — ENCOUNTER SYMPTOMS: RESPIRATORY NEGATIVE: 1

## 2018-03-16 NOTE — ASSESSMENT & PLAN NOTE
Still on losartan. Edema resolved off amlodipine. BP slightly elevated. Add chlorthalidone 25 mg at one half tablet daily. Use Lasix only when necessary. Follow-up couple weeks.

## 2018-03-27 ENCOUNTER — OFFICE VISIT (OUTPATIENT)
Dept: INTERNAL MEDICINE | Age: 68
End: 2018-03-27

## 2018-03-27 VITALS
HEART RATE: 68 BPM | HEIGHT: 70 IN | WEIGHT: 199 LBS | BODY MASS INDEX: 28.49 KG/M2 | SYSTOLIC BLOOD PRESSURE: 140 MMHG | OXYGEN SATURATION: 98 % | DIASTOLIC BLOOD PRESSURE: 80 MMHG

## 2018-03-27 DIAGNOSIS — Z01.818 PRE-OP TESTING: Primary | ICD-10-CM

## 2018-03-27 DIAGNOSIS — E78.5 HYPERLIPIDEMIA, UNSPECIFIED HYPERLIPIDEMIA TYPE: Chronic | ICD-10-CM

## 2018-03-27 DIAGNOSIS — G56.03 BILATERAL CARPAL TUNNEL SYNDROME: ICD-10-CM

## 2018-03-27 DIAGNOSIS — Z01.818 PREOP EXAMINATION: ICD-10-CM

## 2018-03-27 DIAGNOSIS — R91.1 PULMONARY NODULE: ICD-10-CM

## 2018-03-27 DIAGNOSIS — J45.909 REACTIVE AIRWAY DISEASE WITHOUT COMPLICATION, UNSPECIFIED ASTHMA SEVERITY, UNSPECIFIED WHETHER PERSISTENT: ICD-10-CM

## 2018-03-27 DIAGNOSIS — I10 ESSENTIAL HYPERTENSION: ICD-10-CM

## 2018-03-27 PROBLEM — R60.9 SWELLING: Status: RESOLVED | Noted: 2018-02-13 | Resolved: 2018-03-27

## 2018-03-27 PROCEDURE — G8482 FLU IMMUNIZE ORDER/ADMIN: HCPCS | Performed by: INTERNAL MEDICINE

## 2018-03-27 PROCEDURE — 1036F TOBACCO NON-USER: CPT | Performed by: INTERNAL MEDICINE

## 2018-03-27 PROCEDURE — 93000 ELECTROCARDIOGRAM COMPLETE: CPT | Performed by: INTERNAL MEDICINE

## 2018-03-27 PROCEDURE — 3017F COLORECTAL CA SCREEN DOC REV: CPT | Performed by: INTERNAL MEDICINE

## 2018-03-27 PROCEDURE — 4040F PNEUMOC VAC/ADMIN/RCVD: CPT | Performed by: INTERNAL MEDICINE

## 2018-03-27 PROCEDURE — G8419 CALC BMI OUT NRM PARAM NOF/U: HCPCS | Performed by: INTERNAL MEDICINE

## 2018-03-27 PROCEDURE — 1090F PRES/ABSN URINE INCON ASSESS: CPT | Performed by: INTERNAL MEDICINE

## 2018-03-27 PROCEDURE — 1123F ACP DISCUSS/DSCN MKR DOCD: CPT | Performed by: INTERNAL MEDICINE

## 2018-03-27 PROCEDURE — G8400 PT W/DXA NO RESULTS DOC: HCPCS | Performed by: INTERNAL MEDICINE

## 2018-03-27 PROCEDURE — 3014F SCREEN MAMMO DOC REV: CPT | Performed by: INTERNAL MEDICINE

## 2018-03-27 PROCEDURE — 99215 OFFICE O/P EST HI 40 MIN: CPT | Performed by: INTERNAL MEDICINE

## 2018-03-27 PROCEDURE — G8427 DOCREV CUR MEDS BY ELIG CLIN: HCPCS | Performed by: INTERNAL MEDICINE

## 2018-03-27 ASSESSMENT — ENCOUNTER SYMPTOMS
RESPIRATORY NEGATIVE: 1
TROUBLE SWALLOWING: 0
EYE DISCHARGE: 0
GASTROINTESTINAL NEGATIVE: 1

## 2018-04-02 ENCOUNTER — HOSPITAL ENCOUNTER (OUTPATIENT)
Dept: PREADMISSION TESTING | Age: 68
Discharge: HOME | End: 2018-04-02
Attending: ORTHOPAEDIC SURGERY | Admitting: ORTHOPAEDIC SURGERY

## 2018-04-02 VITALS
OXYGEN SATURATION: 95 % | TEMPERATURE: 96.7 F | DIASTOLIC BLOOD PRESSURE: 80 MMHG | RESPIRATION RATE: 16 BRPM | HEART RATE: 73 BPM | WEIGHT: 201 LBS | BODY MASS INDEX: 28.77 KG/M2 | SYSTOLIC BLOOD PRESSURE: 145 MMHG | HEIGHT: 70 IN

## 2018-04-02 LAB
A/G RATIO: 1.8 (ref 1.1–2.2)
ABO/RH: NORMAL
ALBUMIN SERPL-MCNC: 4.6 G/DL (ref 3.4–5)
ALP BLD-CCNC: 76 U/L (ref 40–129)
ALT SERPL-CCNC: 19 U/L (ref 10–40)
ANION GAP SERPL CALCULATED.3IONS-SCNC: 11 MMOL/L (ref 3–16)
ANTIBODY SCREEN: NORMAL
APTT: 30.5 SEC (ref 24.1–34.9)
AST SERPL-CCNC: 22 U/L (ref 15–37)
BASOPHILS ABSOLUTE: 0 K/UL (ref 0–0.2)
BASOPHILS RELATIVE PERCENT: 0.2 %
BILIRUB SERPL-MCNC: 0.5 MG/DL (ref 0–1)
BUN BLDV-MCNC: 18 MG/DL (ref 7–20)
CALCIUM SERPL-MCNC: 9.8 MG/DL (ref 8.3–10.6)
CHLORIDE BLD-SCNC: 101 MMOL/L (ref 99–110)
CO2: 29 MMOL/L (ref 21–32)
CREAT SERPL-MCNC: 0.6 MG/DL (ref 0.6–1.2)
EOSINOPHILS ABSOLUTE: 0 K/UL (ref 0–0.6)
EOSINOPHILS RELATIVE PERCENT: 0.8 %
GFR AFRICAN AMERICAN: >60
GFR NON-AFRICAN AMERICAN: >60
GLOBULIN: 2.6 G/DL
GLUCOSE BLD-MCNC: 108 MG/DL (ref 70–99)
HCT VFR BLD CALC: 41.1 % (ref 36–48)
HEMOGLOBIN: 13.9 G/DL (ref 12–16)
INR BLD: 0.89 (ref 0.85–1.15)
LYMPHOCYTES ABSOLUTE: 2.3 K/UL (ref 1–5.1)
LYMPHOCYTES RELATIVE PERCENT: 38.7 %
MCH RBC QN AUTO: 33 PG (ref 26–34)
MCHC RBC AUTO-ENTMCNC: 33.8 G/DL (ref 31–36)
MCV RBC AUTO: 97.5 FL (ref 80–100)
MONOCYTES ABSOLUTE: 0.5 K/UL (ref 0–1.3)
MONOCYTES RELATIVE PERCENT: 8.9 %
NEUTROPHILS ABSOLUTE: 3 K/UL (ref 1.7–7.7)
NEUTROPHILS RELATIVE PERCENT: 51.4 %
PDW BLD-RTO: 13.5 % (ref 12.4–15.4)
PLATELET # BLD: 341 K/UL (ref 135–450)
PMV BLD AUTO: 6.7 FL (ref 5–10.5)
POTASSIUM SERPL-SCNC: 4.6 MMOL/L (ref 3.5–5.1)
PROTHROMBIN TIME: 10.1 SEC (ref 9.6–13)
RBC # BLD: 4.21 M/UL (ref 4–5.2)
SODIUM BLD-SCNC: 141 MMOL/L (ref 136–145)
TOTAL PROTEIN: 7.2 G/DL (ref 6.4–8.2)
WBC # BLD: 5.9 K/UL (ref 4–11)

## 2018-04-02 RX ORDER — ACETAMINOPHEN,DIPHENHYDRAMINE HCL 500; 25 MG/1; MG/1
2 TABLET, FILM COATED ORAL NIGHTLY PRN
COMMUNITY
End: 2018-05-18 | Stop reason: CLARIF

## 2018-04-02 RX ORDER — DIPHENHYDRAMINE HCL 50 MG
50 CAPSULE ORAL EVERY 6 HOURS PRN
Status: ON HOLD | COMMUNITY
End: 2018-06-05 | Stop reason: HOSPADM

## 2018-04-02 ASSESSMENT — PAIN SCALES - GENERAL: PAINLEVEL_OUTOF10: 2

## 2018-04-02 ASSESSMENT — KOOS JR
RISING FROM SITTING: 2
HOW SEVERE IS YOUR KNEE STIFFNESS AFTER FIRST WAKING IN MORNING: 2
GOING UP OR DOWN STAIRS: 2
BENDING TO THE FLOOR TO PICK UP OBJECT: 2
TWISING OR PIVOTING ON KNEE: 3
STANDING UPRIGHT: 2
STRAIGHTENING KNEE FULLY: 3

## 2018-04-02 ASSESSMENT — PROMIS GLOBAL HEALTH SCALE
IN GENERAL, WOULD YOU SAY YOUR QUALITY OF LIFE IS...[ON A SCALE OF 1 (POOR) TO 5 (EXCELLENT)]: 3
IN GENERAL, PLEASE RATE HOW WELL YOU CARRY OUT YOUR USUAL SOCIAL ACTIVITIES (INCLUDES ACTIVITIES AT HOME, AT WORK, AND IN YOUR COMMUNITY, AND RESPONSIBILITIES AS A PARENT, CHILD, SPOUSE, EMPLOYEE, FRIEND, ETC) [ON A SCALE OF 1 (POOR) TO 5 (EXCELLENT)]?: 2
IN THE PAST 7 DAYS, HOW WOULD YOU RATE YOUR FATIGUE ON AVERAGE [ON A SCALE FROM 1 (NONE) TO 5 (VERY SEVERE)]?: 4
SUM OF RESPONSES TO QUESTIONS 3, 6, 7, & 8: 14
IN GENERAL, HOW WOULD YOU RATE YOUR MENTAL HEALTH, INCLUDING YOUR MOOD AND YOUR ABILITY TO THINK [ON A SCALE OF 1 (POOR) TO 5 (EXCELLENT)]?: 3
IN THE PAST 7 DAYS, HOW WOULD YOU RATE YOUR PAIN ON AVERAGE [ON A SCALE FROM 0 (NO PAIN) TO 10 (WORST IMAGINABLE PAIN)]?: 5
IN GENERAL, HOW WOULD YOU RATE YOUR SATISFACTION WITH YOUR SOCIAL ACTIVITIES AND RELATIONSHIPS [ON A SCALE OF 1 (POOR) TO 5 (EXCELLENT)]?: 3
SUM OF RESPONSES TO QUESTIONS 2, 4, 5, & 10: 11
IN THE PAST 7 DAYS, HOW OFTEN HAVE YOU BEEN BOTHERED BY EMOTIONAL PROBLEMS, SUCH AS FEELING ANXIOUS, DEPRESSED, OR IRRITABLE [ON A SCALE FROM 1 (NEVER) TO 5 (ALWAYS)]?: 2
TO WHAT EXTENT ARE YOU ABLE TO CARRY OUT YOUR EVERYDAY PHYSICAL ACTIVITIES SUCH AS WALKING, CLIMBING STAIRS, CARRYING GROCERIES, OR MOVING A CHAIR [ON A SCALE OF 1 (NOT AT ALL) TO 5 (COMPLETELY)]?: 2
HOW IS THE PROMIS V1.1 BEING ADMINISTERED?: 0
WHO IS THE PERSON COMPLETING THE PROMIS V1.1 SURVEY?: 0
IN GENERAL, HOW WOULD YOU RATE YOUR PHYSICAL HEALTH [ON A SCALE OF 1 (POOR) TO 5 (EXCELLENT)]?: 3
IN GENERAL, WOULD YOU SAY YOUR HEALTH IS...[ON A SCALE OF 1 (POOR) TO 5 (EXCELLENT)]: 3

## 2018-04-02 ASSESSMENT — PAIN DESCRIPTION - LOCATION: LOCATION: KNEE

## 2018-04-02 ASSESSMENT — PAIN DESCRIPTION - FREQUENCY: FREQUENCY: CONTINUOUS

## 2018-04-02 ASSESSMENT — PAIN DESCRIPTION - DESCRIPTORS: DESCRIPTORS: ACHING;TIGHTNESS

## 2018-04-02 ASSESSMENT — PAIN DESCRIPTION - ORIENTATION: ORIENTATION: RIGHT

## 2018-04-03 LAB — MRSA SCREEN RT-PCR: NORMAL

## 2018-04-11 PROBLEM — Z00.00 PREVENTATIVE HEALTH CARE: Status: RESOLVED | Noted: 2017-12-12 | Resolved: 2018-04-11

## 2018-04-16 PROBLEM — M24.661 ARTHROFIBROSIS OF KNEE JOINT, RIGHT: Status: ACTIVE | Noted: 2018-04-16

## 2018-04-20 ENCOUNTER — CARE COORDINATION (OUTPATIENT)
Dept: CASE MANAGEMENT | Age: 68
End: 2018-04-20

## 2018-04-20 DIAGNOSIS — M24.661 ARTHROFIBROSIS OF KNEE JOINT, RIGHT: Primary | ICD-10-CM

## 2018-04-20 PROCEDURE — 1111F DSCHRG MED/CURRENT MED MERGE: CPT | Performed by: INTERNAL MEDICINE

## 2018-04-26 ENCOUNTER — CARE COORDINATION (OUTPATIENT)
Dept: CASE MANAGEMENT | Age: 68
End: 2018-04-26

## 2018-04-26 PROBLEM — Z01.818 PREOP EXAMINATION: Status: RESOLVED | Noted: 2018-03-27 | Resolved: 2018-04-26

## 2018-05-08 ENCOUNTER — OFFICE VISIT (OUTPATIENT)
Dept: INTERNAL MEDICINE | Age: 68
End: 2018-05-08

## 2018-05-08 VITALS
SYSTOLIC BLOOD PRESSURE: 158 MMHG | BODY MASS INDEX: 29.18 KG/M2 | HEART RATE: 67 BPM | HEIGHT: 69 IN | DIASTOLIC BLOOD PRESSURE: 80 MMHG | WEIGHT: 197 LBS | OXYGEN SATURATION: 95 %

## 2018-05-08 DIAGNOSIS — I10 ESSENTIAL HYPERTENSION: ICD-10-CM

## 2018-05-08 DIAGNOSIS — D64.9 ANEMIA, UNSPECIFIED TYPE: ICD-10-CM

## 2018-05-08 DIAGNOSIS — M25.561 CHRONIC PAIN OF RIGHT KNEE: ICD-10-CM

## 2018-05-08 DIAGNOSIS — G56.03 BILATERAL CARPAL TUNNEL SYNDROME: ICD-10-CM

## 2018-05-08 DIAGNOSIS — R60.9 EDEMA, UNSPECIFIED TYPE: ICD-10-CM

## 2018-05-08 DIAGNOSIS — G89.29 CHRONIC PAIN OF RIGHT KNEE: ICD-10-CM

## 2018-05-08 PROCEDURE — 1111F DSCHRG MED/CURRENT MED MERGE: CPT | Performed by: INTERNAL MEDICINE

## 2018-05-08 PROCEDURE — 4040F PNEUMOC VAC/ADMIN/RCVD: CPT | Performed by: INTERNAL MEDICINE

## 2018-05-08 PROCEDURE — G8400 PT W/DXA NO RESULTS DOC: HCPCS | Performed by: INTERNAL MEDICINE

## 2018-05-08 PROCEDURE — 1090F PRES/ABSN URINE INCON ASSESS: CPT | Performed by: INTERNAL MEDICINE

## 2018-05-08 PROCEDURE — 1123F ACP DISCUSS/DSCN MKR DOCD: CPT | Performed by: INTERNAL MEDICINE

## 2018-05-08 PROCEDURE — 1036F TOBACCO NON-USER: CPT | Performed by: INTERNAL MEDICINE

## 2018-05-08 PROCEDURE — 3017F COLORECTAL CA SCREEN DOC REV: CPT | Performed by: INTERNAL MEDICINE

## 2018-05-08 PROCEDURE — G8417 CALC BMI ABV UP PARAM F/U: HCPCS | Performed by: INTERNAL MEDICINE

## 2018-05-08 PROCEDURE — 99214 OFFICE O/P EST MOD 30 MIN: CPT | Performed by: INTERNAL MEDICINE

## 2018-05-08 PROCEDURE — G8427 DOCREV CUR MEDS BY ELIG CLIN: HCPCS | Performed by: INTERNAL MEDICINE

## 2018-05-08 RX ORDER — CHLORTHALIDONE 25 MG/1
25 TABLET ORAL DAILY
Qty: 90 TABLET | Refills: 3 | Status: SHIPPED | OUTPATIENT
Start: 2018-05-08 | End: 2018-05-18 | Stop reason: CLARIF

## 2018-05-08 ASSESSMENT — ENCOUNTER SYMPTOMS
GASTROINTESTINAL NEGATIVE: 1
RESPIRATORY NEGATIVE: 1

## 2018-05-18 PROBLEM — S72.432P: Status: ACTIVE | Noted: 2018-05-18

## 2018-05-21 PROBLEM — S72.431A: Status: ACTIVE | Noted: 2018-05-21

## 2018-06-07 ENCOUNTER — TELEPHONE (OUTPATIENT)
Dept: INTERNAL MEDICINE | Age: 68
End: 2018-06-07

## 2018-06-07 ENCOUNTER — CARE COORDINATION (OUTPATIENT)
Dept: CASE MANAGEMENT | Age: 68
End: 2018-06-07

## 2018-06-07 DIAGNOSIS — S72.432P: Primary | ICD-10-CM

## 2018-06-07 PROCEDURE — 1111F DSCHRG MED/CURRENT MED MERGE: CPT | Performed by: INTERNAL MEDICINE

## 2018-06-08 ENCOUNTER — TELEPHONE (OUTPATIENT)
Dept: INTERNAL MEDICINE | Age: 68
End: 2018-06-08

## 2018-06-08 RX ORDER — LOSARTAN POTASSIUM 100 MG/1
TABLET ORAL
Qty: 90 TABLET | Refills: 3 | COMMUNITY
Start: 2018-06-08 | End: 2018-11-26 | Stop reason: SDUPTHER

## 2018-06-18 ENCOUNTER — OFFICE VISIT (OUTPATIENT)
Dept: INTERNAL MEDICINE | Age: 68
End: 2018-06-18

## 2018-06-18 ENCOUNTER — HOSPITAL ENCOUNTER (OUTPATIENT)
Dept: MRI IMAGING | Age: 68
Discharge: HOME | End: 2018-06-18
Attending: ORTHOPAEDIC SURGERY | Admitting: ORTHOPAEDIC SURGERY

## 2018-06-18 VITALS — DIASTOLIC BLOOD PRESSURE: 80 MMHG | OXYGEN SATURATION: 96 % | HEART RATE: 73 BPM | SYSTOLIC BLOOD PRESSURE: 146 MMHG

## 2018-06-18 DIAGNOSIS — S72.431G: ICD-10-CM

## 2018-06-18 DIAGNOSIS — Z96.651 PRESENCE OF RIGHT ARTIFICIAL KNEE JOINT: ICD-10-CM

## 2018-06-18 DIAGNOSIS — M25.561 RIGHT KNEE PAIN, UNSPECIFIED CHRONICITY: ICD-10-CM

## 2018-06-18 DIAGNOSIS — G56.03 BILATERAL CARPAL TUNNEL SYNDROME: ICD-10-CM

## 2018-06-18 DIAGNOSIS — M25.561 PAIN IN RIGHT KNEE: ICD-10-CM

## 2018-06-18 DIAGNOSIS — Z09 HOSPITAL DISCHARGE FOLLOW-UP: ICD-10-CM

## 2018-06-18 PROBLEM — S72.432P: Status: RESOLVED | Noted: 2018-05-18 | Resolved: 2018-06-18

## 2018-06-18 PROBLEM — G89.29 CHRONIC PAIN OF RIGHT KNEE: Status: RESOLVED | Noted: 2018-03-16 | Resolved: 2018-06-18

## 2018-06-18 PROBLEM — M24.661 ARTHROFIBROSIS OF KNEE JOINT, RIGHT: Status: RESOLVED | Noted: 2018-04-16 | Resolved: 2018-06-18

## 2018-06-18 PROCEDURE — 1036F TOBACCO NON-USER: CPT | Performed by: INTERNAL MEDICINE

## 2018-06-18 PROCEDURE — 1111F DSCHRG MED/CURRENT MED MERGE: CPT | Performed by: INTERNAL MEDICINE

## 2018-06-18 PROCEDURE — G8427 DOCREV CUR MEDS BY ELIG CLIN: HCPCS | Performed by: INTERNAL MEDICINE

## 2018-06-18 PROCEDURE — 1123F ACP DISCUSS/DSCN MKR DOCD: CPT | Performed by: INTERNAL MEDICINE

## 2018-06-18 PROCEDURE — G8400 PT W/DXA NO RESULTS DOC: HCPCS | Performed by: INTERNAL MEDICINE

## 2018-06-18 PROCEDURE — 3017F COLORECTAL CA SCREEN DOC REV: CPT | Performed by: INTERNAL MEDICINE

## 2018-06-18 PROCEDURE — 4040F PNEUMOC VAC/ADMIN/RCVD: CPT | Performed by: INTERNAL MEDICINE

## 2018-06-18 PROCEDURE — 1090F PRES/ABSN URINE INCON ASSESS: CPT | Performed by: INTERNAL MEDICINE

## 2018-06-18 PROCEDURE — G8417 CALC BMI ABV UP PARAM F/U: HCPCS | Performed by: INTERNAL MEDICINE

## 2018-06-18 PROCEDURE — 99215 OFFICE O/P EST HI 40 MIN: CPT | Performed by: INTERNAL MEDICINE

## 2018-06-18 ASSESSMENT — ENCOUNTER SYMPTOMS
RESPIRATORY NEGATIVE: 1
GASTROINTESTINAL NEGATIVE: 1
TROUBLE SWALLOWING: 0

## 2018-06-18 ASSESSMENT — PATIENT HEALTH QUESTIONNAIRE - PHQ9
SUM OF ALL RESPONSES TO PHQ9 QUESTIONS 1 & 2: 0
1. LITTLE INTEREST OR PLEASURE IN DOING THINGS: 0
2. FEELING DOWN, DEPRESSED OR HOPELESS: 0
SUM OF ALL RESPONSES TO PHQ QUESTIONS 1-9: 0

## 2018-07-09 ENCOUNTER — TELEPHONE (OUTPATIENT)
Dept: INTERNAL MEDICINE | Age: 68
End: 2018-07-09

## 2018-07-09 NOTE — TELEPHONE ENCOUNTER
Finished with meds that DR. Hilda Clay gave -  Going back on Losartan , furosemide, Chlorthalidone 25mg 1/2 (so tiny cannot split)    Is this ok to go back on these meds now?  And really cannot split the chlorthalidone acurately

## 2018-07-09 NOTE — TELEPHONE ENCOUNTER
Pt stated the following:  Pt has questions about medication. Pt stated is now off medication that Surgeon put pt on.   Pt needs to be advised on her medication  Please call

## 2018-07-18 PROBLEM — Z09 HOSPITAL DISCHARGE FOLLOW-UP: Status: RESOLVED | Noted: 2018-06-18 | Resolved: 2018-07-18

## 2018-07-25 ENCOUNTER — OFFICE VISIT (OUTPATIENT)
Dept: ORTHOPEDIC SURGERY | Age: 68
End: 2018-07-25

## 2018-07-25 ENCOUNTER — PRE-EVALUATION (OUTPATIENT)
Dept: ORTHOPEDIC SURGERY | Age: 68
End: 2018-07-25

## 2018-07-25 VITALS
HEART RATE: 88 BPM | DIASTOLIC BLOOD PRESSURE: 102 MMHG | SYSTOLIC BLOOD PRESSURE: 164 MMHG | WEIGHT: 183 LBS | HEIGHT: 69 IN | BODY MASS INDEX: 27.11 KG/M2

## 2018-07-25 DIAGNOSIS — R20.0 HAND NUMBNESS: ICD-10-CM

## 2018-07-25 DIAGNOSIS — G56.03 BILATERAL CARPAL TUNNEL SYNDROME: Primary | ICD-10-CM

## 2018-07-25 PROCEDURE — APPNB30 APP NON BILLABLE TIME 0-30 MINS: Performed by: PHYSICIAN ASSISTANT

## 2018-07-25 PROCEDURE — G8427 DOCREV CUR MEDS BY ELIG CLIN: HCPCS | Performed by: ORTHOPAEDIC SURGERY

## 2018-07-25 PROCEDURE — 3017F COLORECTAL CA SCREEN DOC REV: CPT | Performed by: ORTHOPAEDIC SURGERY

## 2018-07-25 PROCEDURE — G8417 CALC BMI ABV UP PARAM F/U: HCPCS | Performed by: ORTHOPAEDIC SURGERY

## 2018-07-25 PROCEDURE — 1101F PT FALLS ASSESS-DOCD LE1/YR: CPT | Performed by: ORTHOPAEDIC SURGERY

## 2018-07-25 PROCEDURE — 99203 OFFICE O/P NEW LOW 30 MIN: CPT | Performed by: ORTHOPAEDIC SURGERY

## 2018-07-25 PROCEDURE — 1090F PRES/ABSN URINE INCON ASSESS: CPT | Performed by: ORTHOPAEDIC SURGERY

## 2018-07-25 PROCEDURE — 20526 THER INJECTION CARP TUNNEL: CPT | Performed by: ORTHOPAEDIC SURGERY

## 2018-07-25 NOTE — Clinical Note
Dear  Denice Hernandez MD,  Thank you very much for your referral or Ms. Yousuf Patel to me for evaluation and treatment of her Hand & Wrist condition. I appreciate your confidence in me and thank you for allowing me the opportunity to care for your patients. If I can be of any further assistance to you on this or any other patient, please do not hesitate to contact me. Sincerely,  Edward Mathias.  Dariela Sanchez MD

## 2018-07-25 NOTE — PROGRESS NOTES
I have participated in Ms. Kevin Lozano's encounter. I have performed portions of the history and physical examination and have reviewed my findings with Dr. Iqra Chavez. My findings are confirmed by Dr. Iqra Chavez and are incorporated in his documentation for today's office visit.

## 2018-07-25 NOTE — PROGRESS NOTES
mildly positive on the left. The patient displays mild baseline symptoms to potentially confound the exam.  The thenar musculature is not atrophied & weakened. Examination for Stenosing Tenosynovitis demonstrates no evidence of tenderness, thickening or nodularity at the A-1 pulleys of the digits bilaterally. There no palpable triggering or any finger or thumb. Impression:  Ms. Gerald Catalan is showing clinical evidence of Carpal Tunnel Syndrome and presents requesting further treatment. Plan:  After discussion of the treatment options available for her neurologic symptoms and review of Ms. Serafin Lozano's previous treatments, we have selected to proceed with a DIAGNOSTIC INJECTION to the bilateral carpal tunnel(s). I have outlined for her the nature of the injection, and the pre, quinn and post injection considerations and the appropriate expectations for this injection. We discussed appropriate expectations for symptom resolution & likely duration of the relief. I have specifically explained to Ms. Gerald Catalan, that the intent of the injection is to determine if she gains any relief of her presenting symptoms over the next 3-4 weeks. She is advised that any symptoms arising from carpal tunnel syndrome will respond to the injection, and that she is to understand that any symptoms that do not respond to the injection are arising from an alternate etiology. She voiced an understanding of the intent of the injection and had her questions answered fully. She and did wish to proceed. Procedure: right Carpal Tunnel Injection  [first Injection]: After full discussion of the nature of this process and outlining a treatment plan with Ms. Gerald Catalan, we discussed the complications, limitations, expectations, alternatives, and risks of injection to the carpal tunnel. She understood this information well and verbally consented to this treatment.     The skin of the symptomatic extremity was prepped with Isopropyl Alcohol and under aseptic conditions the carpal tunnel was injected with a combination of 1 ml of 0.25% Bupivacaine without Epinephrine and 40 mg of Triamcinolone (40 mg/ml). There was good filling of the carpal tunnel. A  dry, sterile bandage was applied and she tolerated the injection without difficulty. I advised her of the expected response, possible reactions and the instructions for care of the hand. Procedure: left Carpal Tunnel Injection  [first Injection]: After full discussion of the nature of this process and outlining a treatment plan with Ms. Yousuf Patel, we discussed the complications, limitations, expectations, alternatives, and risks of injection to the carpal tunnel. She understood this information well and verbally consented to this treatment. The skin of the symptomatic extremity was prepped with Isopropyl Alcohol and under aseptic conditions the carpal tunnel was injected with a combination of 1 ml of 0.25% Bupivacaine without Epinephrine and 40 mg of Triamcinolone (40 mg/ml). There was good filling of the carpal tunnel. A  dry, sterile bandage was applied and she tolerated the injection without difficulty. I advised her of the expected response, possible reactions and the instructions for care of the hand. I have discussed with Ms. Yousuf Patel that I can not be certain of the diagnosis of carpal tunnel syndrome clinically nor can I exclude another site of nerve compression by my evaluation today. We have discussed the option of pursuing  Electrodiagnostic Studies to more fully evaluate her presenting symptoms and the underlying cause, and a prescription for EMG & Nerve Conduction Studies was offered and declined by the patient. Ms. Yousuf Patel has been given a full verbal list of instructions and precautions related to her present condition. I have asked her to followup with me in the office at the prescribed time.  She is also specifically requested to call or return to the office sooner if her symptoms change or worsen prior to the next scheduled appointment. Pawel Quintanilla

## 2018-09-06 ENCOUNTER — OFFICE VISIT (OUTPATIENT)
Dept: INTERNAL MEDICINE | Age: 68
End: 2018-09-06

## 2018-09-06 VITALS
HEIGHT: 69 IN | WEIGHT: 187 LBS | HEART RATE: 70 BPM | DIASTOLIC BLOOD PRESSURE: 90 MMHG | OXYGEN SATURATION: 97 % | SYSTOLIC BLOOD PRESSURE: 168 MMHG | BODY MASS INDEX: 27.7 KG/M2

## 2018-09-06 DIAGNOSIS — I10 ESSENTIAL HYPERTENSION: ICD-10-CM

## 2018-09-06 DIAGNOSIS — J45.909 REACTIVE AIRWAY DISEASE WITHOUT COMPLICATION, UNSPECIFIED ASTHMA SEVERITY, UNSPECIFIED WHETHER PERSISTENT: ICD-10-CM

## 2018-09-06 DIAGNOSIS — S80.812A ABRASION OF LEFT LOWER LEG, INITIAL ENCOUNTER: ICD-10-CM

## 2018-09-06 DIAGNOSIS — Z01.818 PREOP EXAMINATION: ICD-10-CM

## 2018-09-06 DIAGNOSIS — Z23 NEED FOR INFLUENZA VACCINATION: Primary | ICD-10-CM

## 2018-09-06 PROCEDURE — G8417 CALC BMI ABV UP PARAM F/U: HCPCS | Performed by: INTERNAL MEDICINE

## 2018-09-06 PROCEDURE — 99215 OFFICE O/P EST HI 40 MIN: CPT | Performed by: INTERNAL MEDICINE

## 2018-09-06 PROCEDURE — G0008 ADMIN INFLUENZA VIRUS VAC: HCPCS | Performed by: INTERNAL MEDICINE

## 2018-09-06 PROCEDURE — 4040F PNEUMOC VAC/ADMIN/RCVD: CPT | Performed by: INTERNAL MEDICINE

## 2018-09-06 PROCEDURE — G8400 PT W/DXA NO RESULTS DOC: HCPCS | Performed by: INTERNAL MEDICINE

## 2018-09-06 PROCEDURE — 90662 IIV NO PRSV INCREASED AG IM: CPT | Performed by: INTERNAL MEDICINE

## 2018-09-06 PROCEDURE — 1090F PRES/ABSN URINE INCON ASSESS: CPT | Performed by: INTERNAL MEDICINE

## 2018-09-06 PROCEDURE — 1101F PT FALLS ASSESS-DOCD LE1/YR: CPT | Performed by: INTERNAL MEDICINE

## 2018-09-06 PROCEDURE — G8427 DOCREV CUR MEDS BY ELIG CLIN: HCPCS | Performed by: INTERNAL MEDICINE

## 2018-09-06 PROCEDURE — 1036F TOBACCO NON-USER: CPT | Performed by: INTERNAL MEDICINE

## 2018-09-06 PROCEDURE — 1123F ACP DISCUSS/DSCN MKR DOCD: CPT | Performed by: INTERNAL MEDICINE

## 2018-09-06 PROCEDURE — 3017F COLORECTAL CA SCREEN DOC REV: CPT | Performed by: INTERNAL MEDICINE

## 2018-09-06 RX ORDER — POTASSIUM CHLORIDE 20 MEQ/1
20 TABLET, EXTENDED RELEASE ORAL DAILY
Qty: 90 TABLET | Refills: 3 | Status: SHIPPED | OUTPATIENT
Start: 2018-09-06 | End: 2019-09-30 | Stop reason: SDUPTHER

## 2018-09-06 RX ORDER — CHLORTHALIDONE 25 MG/1
25 TABLET ORAL DAILY
Qty: 90 TABLET | Refills: 3 | Status: SHIPPED | OUTPATIENT
Start: 2018-09-06 | End: 2020-01-17

## 2018-09-06 RX ORDER — FUROSEMIDE 20 MG/1
20 TABLET ORAL DAILY
COMMUNITY
End: 2019-09-04 | Stop reason: SDUPTHER

## 2018-09-06 ASSESSMENT — ENCOUNTER SYMPTOMS
WHEEZING: 0
GASTROINTESTINAL NEGATIVE: 1
TROUBLE SWALLOWING: 0
RESPIRATORY NEGATIVE: 1
EYE DISCHARGE: 0

## 2018-09-06 NOTE — ASSESSMENT & PLAN NOTE
Right knee - revision -- OK FOR SURGERY. No known anesthesia problems in patient or family. Take medications as directed.

## 2018-09-06 NOTE — PROGRESS NOTES
infection. Psychiatric: Her behavior is normal. Judgment and thought content normal.       Assessment / Plan:           Past Medical History:   Diagnosis Date    Arthritis     Compression fracture     back due to fall    Concussion     due to fall    DVT (deep venous thrombosis) ([facility] [address] 01/2017    RLE after TKR    Environmental allergies     Essential hypertension, benign 6/28/2010    GERD (gastroesophageal reflux disease)     History of peptic ulcer     Hyperlipidemia 6/28/2010    Lacunar infarction [facility])     old - per MRI 2015    Restrictive airway disease     allergy induced     Past Surgical History:   Procedure Laterality Date    CHOLECYSTECTOMY      COLONOSCOPY      HYSTERECTOMY      KNEE ARTHROPLASTY Right 04/16/2018     RIGHT REVISION TOTAL KNEE ARTHROPLASTY    KNEE ARTHROSCOPY Left 2003    Dr. Olvin Bruno Right 01/2017     Current Outpatient Prescriptions   Medication Sig Dispense Refill    furosemide (LASIX) 20 MG tablet Take 20 mg by mouth daily as needed      chlorthalidone (HYGROTON) 25 MG tablet Take 1 tablet by mouth daily 90 tablet 3    potassium chloride (KLOR-CON M) 20 MEQ extended release tablet Take 1 tablet by mouth daily 90 tablet 3    losartan (COZAAR) 100 MG tablet TAKE 1 TABLET DAILY 90 tablet 3    Multiple Vitamins-Minerals (MULTIVITAMIN PO) Take by mouth daily Women's Ultra Oscar for Menopause      fluticasone-vilanterol (BREO ELLIPTA) 100-25 MCG/INH AEPB inhaler Inhale 1 puff into the lungs daily 3 each 3    omeprazole (PRILOSEC) 20 MG delayed release capsule TAKE 1 CAPSULE DAILY 90 capsule 3    albuterol sulfate HFA (PROVENTIL HFA) 108 (90 BASE) MCG/ACT inhaler 2 PUFFS EVERY 4 HOURS AS NEEDED WHEEZING 1 Inhaler 5     No current facility-administered medications for this visit.       Allergies   Allergen Reactions    Codeine      Severe headaches    Demerol      Severe headache and over-sedation    Morphine Other (See Comments)     Makes her

## 2018-09-07 ENCOUNTER — TELEPHONE (OUTPATIENT)
Dept: INTERNAL MEDICINE | Age: 68
End: 2018-09-07

## 2018-09-07 DIAGNOSIS — S80.812A ABRASION OF LEFT LOWER LEG, INITIAL ENCOUNTER: Primary | ICD-10-CM

## 2018-09-07 RX ORDER — CEPHALEXIN 500 MG/1
500 CAPSULE ORAL 4 TIMES DAILY
Qty: 40 CAPSULE | Refills: 0 | Status: SHIPPED | OUTPATIENT
Start: 2018-09-07 | End: 2018-09-20

## 2018-09-07 NOTE — TELEPHONE ENCOUNTER
Patient had preop 9/6 and was told if sore on leg became red and warm to touch  to call provider.   Leg is sore and warm please call Abx into:    Please [id number] [place], [address] [place] [address] [phone] [Patient Preferred]   [phone]  AssociateProvidersCurrent Interactions

## 2018-09-18 NOTE — TELEPHONE ENCOUNTER
Use Polysporin. I recommend wound care clinic since the area is open and may need some specialized treatment to help with slowly recover up.

## 2018-09-20 ENCOUNTER — HOSPITAL ENCOUNTER (OUTPATIENT)
Dept: WOUND CARE | Age: 68
Discharge: HOME | End: 2018-09-20
Payer: MEDICARE

## 2018-09-20 VITALS
SYSTOLIC BLOOD PRESSURE: 160 MMHG | DIASTOLIC BLOOD PRESSURE: 89 MMHG | TEMPERATURE: 98.3 F | RESPIRATION RATE: 18 BRPM | HEART RATE: 86 BPM

## 2018-09-20 PROBLEM — S81.802A OPEN WOUND OF LEFT LOWER LEG: Status: ACTIVE | Noted: 2018-09-20

## 2018-09-20 PROCEDURE — 11042 DBRDMT SUBQ TIS 1ST 20SQCM/<: CPT

## 2018-09-20 PROCEDURE — 6370000000 HC RX 637 (ALT 250 FOR IP): Performed by: PODIATRIST

## 2018-09-20 RX ORDER — LIDOCAINE HYDROCHLORIDE 40 MG/ML
2.5 SOLUTION TOPICAL ONCE
Status: COMPLETED | OUTPATIENT
Start: 2018-09-20 | End: 2018-09-20

## 2018-09-20 RX ADMIN — LIDOCAINE HYDROCHLORIDE 2.5 ML: 40 SOLUTION TOPICAL at 11:31

## 2018-09-20 ASSESSMENT — PAIN SCALES - GENERAL: PAINLEVEL_OUTOF10: 2

## 2018-09-20 ASSESSMENT — PAIN DESCRIPTION - DESCRIPTORS: DESCRIPTORS: ACHING

## 2018-09-20 ASSESSMENT — PAIN DESCRIPTION - PAIN TYPE: TYPE: ACUTE PAIN

## 2018-09-20 ASSESSMENT — PAIN DESCRIPTION - LOCATION: LOCATION: KNEE;LEG

## 2018-09-20 ASSESSMENT — PAIN DESCRIPTION - ORIENTATION: ORIENTATION: RIGHT;LEFT

## 2018-09-20 NOTE — PROGRESS NOTES
AM   Jacqueline-wound Assessment Pink 9/20/2018 11:20 AM   Non-staged Wound Description Not applicable 3/04/2467 85:84 AM   Black%Wound Bed 100 9/20/2018 11:20 AM   Op First Treatment Date 09/20/18 9/20/2018 11:20 AM   Number of days: 0       Incision 04/16/18 Knee Right (Active)   Number of days: 156       Percent of Wound(s)/Ulcer(s) Debrided: 100%    Total Surface Area Debrided:  4.56 sq cm       Diabetic/Pressure/Non Pressure Ulcers only:  Ulcer: Non-Pressure ulcer, fat layer exposed      Estimated Blood Loss:  None    Hemostasis Achieved:  not needed    Procedural Pain:  2  / 10     Post Procedural Pain:  1 / 10     Response to treatment:  Well tolerated by patient. We talked about her L shin wound and tx plan; I debrided the ulcer L leg and will start wound care(Medihoney) and compression and see in 1 week- elevate when at rest.    Plan:       Treatment Note please see attached Discharge Instructions    New Medication(s) at this visit:   New Prescriptions    No medications on file       Smoking Cessation: Counseling given: Not Answered        In my professional opinion this patient would benefit from HBO Therapy: No       Patient is to return to wound care center in:  1 week(s)    Written patient dismissal instructions given to patient and signed by patient or POA. Discharge [facility] Physician Orders and Discharge Instructions  [facility] [id number] [place] [address]  Telephone: [phone]    NAME:  Jonnathan Garcia OF BIRTH:  1950  MEDICAL RECORD NUMBER:  [id number]  DATE:  9/20/2018    Wash hands with soap and water prior to and after every dressing change. Wound Cleansing:   · Do not scrub or use excessive force. · With each dressing change, rinse wounds with 0.9% Saline. (May use wound wash or soft contact solution. Both can be purchased at a local drug store).    · If unable to obtain saline, may use a gentle soap and water. · Keep wounds dry in the shower unless otherwise instructed by the physician. Topical Treatments:  Do not apply lotions, creams, or ointments to wound bed unless directed. [] Apply moisturizing lotion to skin surrounding the wound prior to dressing change.  [] Apply antifungal ointment to skin surrounding the wound prior to dressing change.  [] Apply thin film of moisture barrier ointment to skin immediately around wound. [] Other:      Dressings:           Wound Location: Left lower leg wound     [x] Apply Primary Dressing to wound:       [] Foam/Foam with Border(i.e Mepilex) [] Non-adherent (i.e.Mepitel)   [] Alginate with Silver (i.e. Silvercel)   [] Alginate   [] Collagen (i.e. Puracol) [] Collagen with Silver(i.e. Meghana)     [] Hydrocolloid  [] Hydrafera Blue moistened with saline   [x] MediHoney Paste/Gel [] Hydrogel   [] Endoform      [] Santyl covered with gauze moisten with saline    [] Bactroban/Mupirocin [] Polysporin/Neosporin  [] Other:      Pack wound loosely with: [] Iodoform   [] Plain Packing  [] Saline \"wet to dry\"      [x] Cover and Secure with:     [x] Dry Gauze [] ABD [] Cast padding [x] Kerlix or Park rolled gauze   [] Coban [] Ace Wrap  [] Ace Wrap from forefoot to just below the knee  [] Paper Tape  [] Medipore Tape   [] Other:    Avoid contact of tape with skin if possible. [x] Change dressing: [x] Daily    [] Every Other Day [] Three times per week   [] Once a week [] Do Not Change Dressing   [] Other:    Edema Control:  Apply: [] Compression Stocking []Right Leg []Left Leg     [x] Double Layer Medigrip/Single Layer Spandagrip       []Right Leg  [x]Left Leg        []Low compression 5-10 mm/Hg      [x]Medium compression 10-20 mm/Hg     []High compression  20-30 mm/Hg    Apply every morning immediately when getting up. They should be applied to affected leg(s) from mid foot to knee making sure to cover the heel.   Remove every night before going to bed. [x] Elevate leg(s) above the level of the heart for 30 minutes 4-5 times a day and/or when sitting. [x] Avoid prolonged standing in one place. Dietary:  Important dietary reminders:  1. Increase Protein intake (i.e. Lean meats, fish, eggs, legumes, and yogurt)  2. No added salt  3. If diabetic, follow a diabetic diet and check glucose prior to meals or as instructed by your physician. Dietary Supplements:  [] Ensure EnLive [] Sonny [] 30ml ProMod/ProStat   [] Other:   Take supplements twice a day or as directed as followed:     Smoking:  Counseling given: Not Answered    Please talk with your Primary Health Care Provider about assistance to help stop smoking. Please read the additional information attached to these instructions if included. Return Appointment:  [] Wound and dressing supply provider:   [] ECF or Home Healthcare:  [] Nurse visit:    [x] Return Appointment: With Dr. Kike Simon  in  [address] [place] [address])    [] Ordered tests:  [] Blood work [] Vascular Test(arterial/venous)   [] X-ray  [] Wound Culture     Referrals: (see attached referral)    [] Weight Management [] Diabetes Education [] Vascular Surgery      [] Endocrinology  [] Nutrition Counseling  [] Lymphedema Therapy                  [] Plastic & Rescontruction Surgery    Your nurse  is:  Praveena Cheek Dr.     Electronically signed by Shannan Coon RN on 9/20/2018 at 7:55 [address]: Should you experience any significant changes in your wound(s) or have questions about your wound care, please contact the [address] [place] [address] at [phone] Monday-Friday from 8:00 am - 4:30 pm except for Wednesdays which hours are from 8:00 am - 2:00 pm.   If you need help with your wound outside these hours and cannot wait until we are again available, contact your PCP or go to the hospital emergency room.      PLEASE NOTE: IF YOU ARE UNABLE TO OBTAIN WOUND SUPPLIES, CONTINUE TO USE THE SUPPLIES YOU HAVE AVAILABLE UNTIL YOU ARE ABLE TO REACH US. IT IS MOST IMPORTANT TO KEEP THE WOUND COVERED AT ALL TIMES. Physician orders by:     [] Dr Donnis Snellen [] Dr Eda Trinh    [x] Dr Pascual Pierson     [] Dr Alyssa Fields   [] Dr Tawana Yadav  [] Dr Brain Smith  [] Dr Bishnu Bernal       Physician Signature:__________________________________        The information contained in the After Visit Summary has been reviewed with me, the patient and/or responsible adult, by my health care provider(s). I had the opportunity to ask questions regarding this information.   I have elected to receive;      [] Patient unable to sign Discharge Instructions given to ECF/Transportation/POA            Electronically signed by Suresh Benoit DPM on 9/20/2018 at 12:32 PM

## 2018-09-27 ENCOUNTER — ANESTHESIA EVENT (OUTPATIENT)
Dept: OPERATING ROOM | Age: 68
DRG: 464 | End: 2018-09-27
Payer: MEDICARE

## 2018-09-27 ENCOUNTER — HOSPITAL ENCOUNTER (OUTPATIENT)
Dept: WOUND CARE | Age: 68
Discharge: HOME | End: 2018-09-27
Payer: MEDICARE

## 2018-09-27 ENCOUNTER — HOSPITAL ENCOUNTER (OUTPATIENT)
Dept: PREADMISSION TESTING | Age: 68
Discharge: HOME | DRG: 464 | End: 2018-10-01
Payer: MEDICARE

## 2018-09-27 VITALS
SYSTOLIC BLOOD PRESSURE: 139 MMHG | OXYGEN SATURATION: 96 % | DIASTOLIC BLOOD PRESSURE: 85 MMHG | BODY MASS INDEX: 28.14 KG/M2 | HEART RATE: 71 BPM | RESPIRATION RATE: 12 BRPM | WEIGHT: 190 LBS | TEMPERATURE: 97.6 F | HEIGHT: 69 IN

## 2018-09-27 VITALS
RESPIRATION RATE: 18 BRPM | DIASTOLIC BLOOD PRESSURE: 80 MMHG | TEMPERATURE: 98 F | SYSTOLIC BLOOD PRESSURE: 148 MMHG | HEART RATE: 106 BPM

## 2018-09-27 LAB
A/G RATIO: 2 (ref 1.1–2.2)
ABO/RH: NORMAL
ALBUMIN SERPL-MCNC: 4.7 G/DL (ref 3.4–5)
ALP BLD-CCNC: 90 U/L (ref 40–129)
ALT SERPL-CCNC: 19 U/L (ref 10–40)
ANION GAP SERPL CALCULATED.3IONS-SCNC: 12 MMOL/L (ref 3–16)
ANTIBODY SCREEN: NORMAL
APTT: 30 SEC (ref 26–36)
AST SERPL-CCNC: 16 U/L (ref 15–37)
BASOPHILS ABSOLUTE: 0 K/UL (ref 0–0.2)
BASOPHILS RELATIVE PERCENT: 0.4 %
BILIRUB SERPL-MCNC: 0.4 MG/DL (ref 0–1)
BUN BLDV-MCNC: 18 MG/DL (ref 7–20)
C-REACTIVE PROTEIN: 0.8 MG/L (ref 0–5.1)
CALCIUM SERPL-MCNC: 9.4 MG/DL (ref 8.3–10.6)
CHLORIDE BLD-SCNC: 99 MMOL/L (ref 99–110)
CO2: 29 MMOL/L (ref 21–32)
CREAT SERPL-MCNC: 0.5 MG/DL (ref 0.6–1.2)
EKG ATRIAL RATE: 78 BPM
EKG DIAGNOSIS: NORMAL
EKG P AXIS: 27 DEGREES
EKG P-R INTERVAL: 138 MS
EKG Q-T INTERVAL: 390 MS
EKG QRS DURATION: 88 MS
EKG QTC CALCULATION (BAZETT): 444 MS
EKG R AXIS: -39 DEGREES
EKG T AXIS: 26 DEGREES
EKG VENTRICULAR RATE: 78 BPM
EOSINOPHILS ABSOLUTE: 0 K/UL (ref 0–0.6)
EOSINOPHILS RELATIVE PERCENT: 0.8 %
GFR AFRICAN AMERICAN: >60
GFR NON-AFRICAN AMERICAN: >60
GLOBULIN: 2.3 G/DL
GLUCOSE BLD-MCNC: 115 MG/DL (ref 70–99)
HCT VFR BLD CALC: 41.2 % (ref 36–48)
HEMOGLOBIN: 13.9 G/DL (ref 12–16)
INR BLD: 0.94 (ref 0.86–1.14)
LYMPHOCYTES ABSOLUTE: 1.8 K/UL (ref 1–5.1)
LYMPHOCYTES RELATIVE PERCENT: 30.4 %
MCH RBC QN AUTO: 31.1 PG (ref 26–34)
MCHC RBC AUTO-ENTMCNC: 33.8 G/DL (ref 31–36)
MCV RBC AUTO: 91.8 FL (ref 80–100)
MONOCYTES ABSOLUTE: 0.5 K/UL (ref 0–1.3)
MONOCYTES RELATIVE PERCENT: 8.7 %
NEUTROPHILS ABSOLUTE: 3.5 K/UL (ref 1.7–7.7)
NEUTROPHILS RELATIVE PERCENT: 59.7 %
PDW BLD-RTO: 17.8 % (ref 12.4–15.4)
PLATELET # BLD: 415 K/UL (ref 135–450)
PMV BLD AUTO: 6.6 FL (ref 5–10.5)
POTASSIUM SERPL-SCNC: 3.9 MMOL/L (ref 3.5–5.1)
PROTHROMBIN TIME: 10.7 SEC (ref 9.8–13)
RBC # BLD: 4.48 M/UL (ref 4–5.2)
SEDIMENTATION RATE, ERYTHROCYTE: 10 MM/HR (ref 0–30)
SODIUM BLD-SCNC: 140 MMOL/L (ref 136–145)
TOTAL PROTEIN: 7 G/DL (ref 6.4–8.2)
WBC # BLD: 5.8 K/UL (ref 4–11)

## 2018-09-27 PROCEDURE — 85730 THROMBOPLASTIN TIME PARTIAL: CPT

## 2018-09-27 PROCEDURE — 87641 MR-STAPH DNA AMP PROBE: CPT

## 2018-09-27 PROCEDURE — 11042 DBRDMT SUBQ TIS 1ST 20SQCM/<: CPT

## 2018-09-27 PROCEDURE — 85610 PROTHROMBIN TIME: CPT

## 2018-09-27 PROCEDURE — 85025 COMPLETE CBC W/AUTO DIFF WBC: CPT

## 2018-09-27 PROCEDURE — 86901 BLOOD TYPING SEROLOGIC RH(D): CPT

## 2018-09-27 PROCEDURE — 86140 C-REACTIVE PROTEIN: CPT

## 2018-09-27 PROCEDURE — 93010 ELECTROCARDIOGRAM REPORT: CPT | Performed by: INTERNAL MEDICINE

## 2018-09-27 PROCEDURE — 93005 ELECTROCARDIOGRAM TRACING: CPT | Performed by: ORTHOPAEDIC SURGERY

## 2018-09-27 PROCEDURE — 86850 RBC ANTIBODY SCREEN: CPT

## 2018-09-27 PROCEDURE — 80053 COMPREHEN METABOLIC PANEL: CPT

## 2018-09-27 PROCEDURE — 86900 BLOOD TYPING SEROLOGIC ABO: CPT

## 2018-09-27 PROCEDURE — 85652 RBC SED RATE AUTOMATED: CPT

## 2018-09-27 PROCEDURE — 6370000000 HC RX 637 (ALT 250 FOR IP): Performed by: PODIATRIST

## 2018-09-27 RX ORDER — LIDOCAINE HYDROCHLORIDE 40 MG/ML
2.5 SOLUTION TOPICAL ONCE
Status: COMPLETED | OUTPATIENT
Start: 2018-09-27 | End: 2018-09-27

## 2018-09-27 RX ADMIN — LIDOCAINE HYDROCHLORIDE 2.5 ML: 40 SOLUTION TOPICAL at 10:03

## 2018-09-27 ASSESSMENT — PAIN SCALES - GENERAL: PAINLEVEL_OUTOF10: 1

## 2018-09-27 NOTE — PROGRESS NOTES
AFTER MIDNIGHT (exception would be medication instructions below only)     5. MEDICATIONS    Take the following medications with a SMALL sip of water: Take Losartan, Prilosec and meds as directed by Dr. Tha Alas.  Use your usual dose of inhalers the morning of surgery. BRING your rescue inhaler with you to hospital.    Anesthesia does NOT want you to take insulin the morning of surgery. They will control your blood sugar while you are at the hospital. Please contact your ordering physician for instructions regarding your insulin the night before your procedure. If you have an insulin pump, please keep it set on basal rate. 6. Do not swallow water when brushing teeth. No gum, candy, mints or ice chips. Refrain from smoking or at least decrease the amount. 7. Dress in loose, comfortable clothing appropriate for redressing after your procedure. Do not wear jewelry (including body piercings), make-up (especially NO eye make-up), fingernail polish (NO toenail polish if foot/leg surgery), lotion, powders or metal hairclips. 8. Dentures, glasses, or contacts will need to be removed before your procedure. Bring cases for your glasses, contacts, dentures, or hearing aids to protect them while you are in surgery. 9. If you use a CPAP, please bring it with you on the day of your procedure. 10. We recommend that valuable personal  belongings, such as credit cards, cash, cell phones, e-tablets or jewelry, be left at home during your stay. The hospital will not be responsible for valuables that are not secured in the hospital safe. However, if your insurance requires a co-pay, you may want to bring a method of payment, i.e. Check or credit card, if you wish to pay your co-pay the day of surgery. 11. If you are to stay overnight, you may bring a bag with personal items. Please have any large items you may need brought in by your family after your arrival to your hospital room.     12. If you have a Living comfort and safety, arrange to have someone at home with you for the first 24 hours after discharge. 3. You and your family will be given written instructions about your diet, activity, dressing care, medications, and return visits. 4. Once at home, should issues with nausea, pain, or bleeding occur, or should you notice any signs of infection, you should call your surgeon. 5. Always clean your hands before and after caring for your wound. Do not let your family touch your surgery site without cleaning their hands. 6. Narcotic pain medications can cause significant constipation. You may want to add a stool softener to your postoperative medication schedule or speak to your surgeon on how best to manage this SIDE EFFECT. SPECIAL INSTRUCTIONS     Thank you for allowing us to care for you. We strive to exceed your expectations in the overall delivery of care and service provided to you and your family. If you need to contact us for any reason, please call us at [phone]. Instructions reviewed and copy given to patient during preadmission testing visit. Angeli Steel. 9/27/2018 .12:12 PM      ADDITIONAL EDUCATIONAL INFORMATION REVIEWED / PROVIDED TO YOU AND YOUR FAMILY:  Yes Taking Control of Your Pain   Yes FAQs about Surgical Site Infections  Yes Hibiclens® Bathing Instructions  Yes Incentive Spirometer given to patient- PLEASE BRING THIS SPIROMETER BACK WITH YOU ON THE DAY OF YOUR SURGERY  Yes Your Guide to Knee Replacement Surgery. PLEASE BRING THIS BOOKLET BACK ON THE DAY OF YOUR SURGERY. Yes  Reviewed/Given handout for TJ Video/Class-already viewed video.

## 2018-09-28 ENCOUNTER — APPOINTMENT (OUTPATIENT)
Dept: GENERAL RADIOLOGY | Age: 68
DRG: 464 | End: 2018-09-28
Attending: ORTHOPAEDIC SURGERY
Payer: MEDICARE

## 2018-09-28 ENCOUNTER — HOSPITAL ENCOUNTER (INPATIENT)
Age: 68
LOS: 1 days | Discharge: HOME | DRG: 464 | End: 2018-09-29
Attending: ORTHOPAEDIC SURGERY | Admitting: ORTHOPAEDIC SURGERY
Payer: MEDICARE

## 2018-09-28 ENCOUNTER — ANESTHESIA (OUTPATIENT)
Dept: OPERATING ROOM | Age: 68
DRG: 464 | End: 2018-09-28
Payer: MEDICARE

## 2018-09-28 VITALS — DIASTOLIC BLOOD PRESSURE: 59 MMHG | OXYGEN SATURATION: 98 % | TEMPERATURE: 99.3 F | SYSTOLIC BLOOD PRESSURE: 143 MMHG

## 2018-09-28 DIAGNOSIS — M24.661 ARTHROFIBROSIS OF KNEE JOINT, RIGHT: Primary | ICD-10-CM

## 2018-09-28 LAB
ABO/RH: NORMAL
ANTIBODY SCREEN: NORMAL
APPEARANCE FLUID: NORMAL
CELL COUNT FLUID TYPE: NORMAL
CLOT EVALUATION: NORMAL
COLOR FLUID: NORMAL
LYMPHOCYTES, BODY FLUID: 19 %
MESOTHELIAL FLUID: 34 %
MRSA SCREEN RT-PCR: NORMAL
NEUTROPHIL, FLUID: 25 %
NUCLEATED CELLS FLUID: 275 /CUMM
NUMBER OF CELLS COUNTED FLUID: 100
RBC FLUID: NORMAL /CUMM
SYNOVIOCYTE: 14 %

## 2018-09-28 PROCEDURE — 2580000003 HC RX 258: Performed by: ORTHOPAEDIC SURGERY

## 2018-09-28 PROCEDURE — 2580000003 HC RX 258: Performed by: ANESTHESIOLOGY

## 2018-09-28 PROCEDURE — S0028 INJECTION, FAMOTIDINE, 20 MG: HCPCS | Performed by: ANESTHESIOLOGY

## 2018-09-28 PROCEDURE — 6360000002 HC RX W HCPCS: Performed by: ORTHOPAEDIC SURGERY

## 2018-09-28 PROCEDURE — 2500000003 HC RX 250 WO HCPCS: Performed by: NURSE ANESTHETIST, CERTIFIED REGISTERED

## 2018-09-28 PROCEDURE — 94640 AIRWAY INHALATION TREATMENT: CPT

## 2018-09-28 PROCEDURE — 87186 SC STD MICRODIL/AGAR DIL: CPT

## 2018-09-28 PROCEDURE — 2500000003 HC RX 250 WO HCPCS: Performed by: ORTHOPAEDIC SURGERY

## 2018-09-28 PROCEDURE — 3700000001 HC ADD 15 MINUTES (ANESTHESIA): Performed by: ORTHOPAEDIC SURGERY

## 2018-09-28 PROCEDURE — 7100000000 HC PACU RECOVERY - FIRST 15 MIN: Performed by: ORTHOPAEDIC SURGERY

## 2018-09-28 PROCEDURE — 0S9C3ZX DRAINAGE OF RIGHT KNEE JOINT, PERCUTANEOUS APPROACH, DIAGNOSTIC: ICD-10-PCS | Performed by: ORTHOPAEDIC SURGERY

## 2018-09-28 PROCEDURE — 6360000002 HC RX W HCPCS: Performed by: NURSE ANESTHETIST, CERTIFIED REGISTERED

## 2018-09-28 PROCEDURE — 87205 SMEAR GRAM STAIN: CPT

## 2018-09-28 PROCEDURE — 0SBC0ZZ EXCISION OF RIGHT KNEE JOINT, OPEN APPROACH: ICD-10-PCS | Performed by: ORTHOPAEDIC SURGERY

## 2018-09-28 PROCEDURE — 3700000000 HC ANESTHESIA ATTENDED CARE: Performed by: ORTHOPAEDIC SURGERY

## 2018-09-28 PROCEDURE — 86900 BLOOD TYPING SEROLOGIC ABO: CPT

## 2018-09-28 PROCEDURE — 2709999900 HC NON-CHARGEABLE SUPPLY: Performed by: ORTHOPAEDIC SURGERY

## 2018-09-28 PROCEDURE — 3600000015 HC SURGERY LEVEL 5 ADDTL 15MIN: Performed by: ORTHOPAEDIC SURGERY

## 2018-09-28 PROCEDURE — 73560 X-RAY EXAM OF KNEE 1 OR 2: CPT

## 2018-09-28 PROCEDURE — 0SUC09Z SUPPLEMENT RIGHT KNEE JOINT WITH LINER, OPEN APPROACH: ICD-10-PCS | Performed by: ORTHOPAEDIC SURGERY

## 2018-09-28 PROCEDURE — 89051 BODY FLUID CELL COUNT: CPT

## 2018-09-28 PROCEDURE — 3600000005 HC SURGERY LEVEL 5 BASE: Performed by: ORTHOPAEDIC SURGERY

## 2018-09-28 PROCEDURE — 2700000000 HC OXYGEN THERAPY PER DAY

## 2018-09-28 PROCEDURE — 87070 CULTURE OTHR SPECIMN AEROBIC: CPT

## 2018-09-28 PROCEDURE — 6360000002 HC RX W HCPCS

## 2018-09-28 PROCEDURE — 0SPC09Z REMOVAL OF LINER FROM RIGHT KNEE JOINT, OPEN APPROACH: ICD-10-PCS | Performed by: ORTHOPAEDIC SURGERY

## 2018-09-28 PROCEDURE — 6360000002 HC RX W HCPCS: Performed by: ANESTHESIOLOGY

## 2018-09-28 PROCEDURE — 86901 BLOOD TYPING SEROLOGIC RH(D): CPT

## 2018-09-28 PROCEDURE — 1200000000 HC SEMI PRIVATE

## 2018-09-28 PROCEDURE — C1713 ANCHOR/SCREW BN/BN,TIS/BN: HCPCS | Performed by: ORTHOPAEDIC SURGERY

## 2018-09-28 PROCEDURE — 7100000001 HC PACU RECOVERY - ADDTL 15 MIN: Performed by: ORTHOPAEDIC SURGERY

## 2018-09-28 PROCEDURE — C1776 JOINT DEVICE (IMPLANTABLE): HCPCS | Performed by: ORTHOPAEDIC SURGERY

## 2018-09-28 PROCEDURE — 87077 CULTURE AEROBIC IDENTIFY: CPT

## 2018-09-28 PROCEDURE — 94664 DEMO&/EVAL PT USE INHALER: CPT

## 2018-09-28 PROCEDURE — 86850 RBC ANTIBODY SCREEN: CPT

## 2018-09-28 PROCEDURE — 2500000003 HC RX 250 WO HCPCS: Performed by: ANESTHESIOLOGY

## 2018-09-28 PROCEDURE — 2720000010 HC SURG SUPPLY STERILE: Performed by: ORTHOPAEDIC SURGERY

## 2018-09-28 PROCEDURE — 87075 CULTR BACTERIA EXCEPT BLOOD: CPT

## 2018-09-28 DEVICE — IMPLANTABLE DEVICE: Type: IMPLANTABLE DEVICE | Site: KNEE | Status: FUNCTIONAL

## 2018-09-28 DEVICE — CEMENT BNE 20ML 41GM FULL DOSE PMMA W/ TOBRA M VISC RADPQ: Type: IMPLANTABLE DEVICE | Site: KNEE | Status: FUNCTIONAL

## 2018-09-28 RX ORDER — LABETALOL HYDROCHLORIDE 5 MG/ML
5 INJECTION, SOLUTION INTRAVENOUS EVERY 10 MIN PRN
Status: DISCONTINUED | OUTPATIENT
Start: 2018-09-28 | End: 2018-09-28 | Stop reason: HOSPADM

## 2018-09-28 RX ORDER — GLYCOPYRROLATE 1 MG/5 ML
SYRINGE (ML) INTRAVENOUS PRN
Status: DISCONTINUED | OUTPATIENT
Start: 2018-09-28 | End: 2018-09-28 | Stop reason: SDUPTHER

## 2018-09-28 RX ORDER — GLYCOPYRROLATE 0.2 MG/ML
0.1 INJECTION INTRAMUSCULAR; INTRAVENOUS ONCE
Status: COMPLETED | OUTPATIENT
Start: 2018-09-28 | End: 2018-09-28

## 2018-09-28 RX ORDER — SODIUM CHLORIDE 0.9 % (FLUSH) 0.9 %
10 SYRINGE (ML) INJECTION EVERY 12 HOURS SCHEDULED
Status: DISCONTINUED | OUTPATIENT
Start: 2018-09-28 | End: 2018-09-28 | Stop reason: HOSPADM

## 2018-09-28 RX ORDER — POTASSIUM CHLORIDE 20 MEQ/1
20 TABLET, EXTENDED RELEASE ORAL DAILY
Status: DISCONTINUED | OUTPATIENT
Start: 2018-09-28 | End: 2018-09-29 | Stop reason: HOSPADM

## 2018-09-28 RX ORDER — FENTANYL CITRATE 50 UG/ML
INJECTION, SOLUTION INTRAMUSCULAR; INTRAVENOUS PRN
Status: DISCONTINUED | OUTPATIENT
Start: 2018-09-28 | End: 2018-09-28 | Stop reason: SDUPTHER

## 2018-09-28 RX ORDER — LORAZEPAM 2 MG/ML
0.25 INJECTION INTRAMUSCULAR EVERY 6 HOURS PRN
Status: COMPLETED | OUTPATIENT
Start: 2018-09-28 | End: 2018-09-28

## 2018-09-28 RX ORDER — FUROSEMIDE 20 MG/1
20 TABLET ORAL DAILY PRN
Status: DISCONTINUED | OUTPATIENT
Start: 2018-09-28 | End: 2018-09-29 | Stop reason: HOSPADM

## 2018-09-28 RX ORDER — SODIUM CHLORIDE 0.9 % (FLUSH) 0.9 %
10 SYRINGE (ML) INJECTION PRN
Status: DISCONTINUED | OUTPATIENT
Start: 2018-09-28 | End: 2018-09-29 | Stop reason: HOSPADM

## 2018-09-28 RX ORDER — LIDOCAINE HYDROCHLORIDE 10 MG/ML
1 INJECTION, SOLUTION EPIDURAL; INFILTRATION; INTRACAUDAL; PERINEURAL
Status: DISCONTINUED | OUTPATIENT
Start: 2018-09-28 | End: 2018-09-28 | Stop reason: HOSPADM

## 2018-09-28 RX ORDER — HYDROCODONE BITARTRATE AND ACETAMINOPHEN 10; 325 MG/1; MG/1
1 TABLET ORAL EVERY 4 HOURS PRN
Status: DISCONTINUED | OUTPATIENT
Start: 2018-09-28 | End: 2018-09-29 | Stop reason: HOSPADM

## 2018-09-28 RX ORDER — LORAZEPAM 2 MG/ML
INJECTION INTRAMUSCULAR
Status: COMPLETED
Start: 2018-09-28 | End: 2018-09-28

## 2018-09-28 RX ORDER — LOSARTAN POTASSIUM 100 MG/1
100 TABLET ORAL DAILY
Status: DISCONTINUED | OUTPATIENT
Start: 2018-09-29 | End: 2018-09-29 | Stop reason: HOSPADM

## 2018-09-28 RX ORDER — HYDROCODONE BITARTRATE AND ACETAMINOPHEN 10; 325 MG/1; MG/1
1-2 TABLET ORAL
Qty: 60 TABLET | Refills: 0 | Status: SHIPPED | OUTPATIENT
Start: 2018-09-28 | End: 2018-10-05

## 2018-09-28 RX ORDER — HYDRALAZINE HYDROCHLORIDE 20 MG/ML
5 INJECTION INTRAMUSCULAR; INTRAVENOUS EVERY 10 MIN PRN
Status: DISCONTINUED | OUTPATIENT
Start: 2018-09-28 | End: 2018-09-28 | Stop reason: HOSPADM

## 2018-09-28 RX ORDER — DIPHENHYDRAMINE HYDROCHLORIDE 50 MG/ML
12.5 INJECTION INTRAMUSCULAR; INTRAVENOUS
Status: COMPLETED | OUTPATIENT
Start: 2018-09-28 | End: 2018-09-28

## 2018-09-28 RX ORDER — FENTANYL CITRATE 50 UG/ML
25 INJECTION, SOLUTION INTRAMUSCULAR; INTRAVENOUS EVERY 5 MIN PRN
Status: DISCONTINUED | OUTPATIENT
Start: 2018-09-28 | End: 2018-09-28 | Stop reason: HOSPADM

## 2018-09-28 RX ORDER — ONDANSETRON 2 MG/ML
4 INJECTION INTRAMUSCULAR; INTRAVENOUS EVERY 6 HOURS PRN
Status: DISCONTINUED | OUTPATIENT
Start: 2018-09-28 | End: 2018-09-29 | Stop reason: HOSPADM

## 2018-09-28 RX ORDER — PANTOPRAZOLE SODIUM 40 MG/1
40 TABLET, DELAYED RELEASE ORAL
Status: DISCONTINUED | OUTPATIENT
Start: 2018-09-29 | End: 2018-09-29 | Stop reason: HOSPADM

## 2018-09-28 RX ORDER — MIDAZOLAM HYDROCHLORIDE 1 MG/ML
INJECTION INTRAMUSCULAR; INTRAVENOUS PRN
Status: DISCONTINUED | OUTPATIENT
Start: 2018-09-28 | End: 2018-09-28 | Stop reason: SDUPTHER

## 2018-09-28 RX ORDER — PROMETHAZINE HYDROCHLORIDE 25 MG/ML
6.25 INJECTION, SOLUTION INTRAMUSCULAR; INTRAVENOUS
Status: COMPLETED | OUTPATIENT
Start: 2018-09-28 | End: 2018-09-28

## 2018-09-28 RX ORDER — HYDROCODONE BITARTRATE AND ACETAMINOPHEN 10; 325 MG/1; MG/1
2 TABLET ORAL EVERY 4 HOURS PRN
Status: DISCONTINUED | OUTPATIENT
Start: 2018-09-28 | End: 2018-09-29 | Stop reason: HOSPADM

## 2018-09-28 RX ORDER — SODIUM CHLORIDE, SODIUM LACTATE, POTASSIUM CHLORIDE, CALCIUM CHLORIDE 600; 310; 30; 20 MG/100ML; MG/100ML; MG/100ML; MG/100ML
INJECTION, SOLUTION INTRAVENOUS CONTINUOUS
Status: DISCONTINUED | OUTPATIENT
Start: 2018-09-28 | End: 2018-09-28

## 2018-09-28 RX ORDER — ALBUTEROL SULFATE 2.5 MG/3ML
2.5 SOLUTION RESPIRATORY (INHALATION) EVERY 6 HOURS PRN
Status: DISCONTINUED | OUTPATIENT
Start: 2018-09-28 | End: 2018-09-29 | Stop reason: HOSPADM

## 2018-09-28 RX ORDER — PROPOFOL 10 MG/ML
INJECTION, EMULSION INTRAVENOUS PRN
Status: DISCONTINUED | OUTPATIENT
Start: 2018-09-28 | End: 2018-09-28 | Stop reason: SDUPTHER

## 2018-09-28 RX ORDER — SODIUM CHLORIDE 0.9 % (FLUSH) 0.9 %
10 SYRINGE (ML) INJECTION EVERY 12 HOURS SCHEDULED
Status: DISCONTINUED | OUTPATIENT
Start: 2018-09-28 | End: 2018-09-29 | Stop reason: HOSPADM

## 2018-09-28 RX ORDER — HYDROMORPHONE HCL 110MG/55ML
PATIENT CONTROLLED ANALGESIA SYRINGE INTRAVENOUS PRN
Status: DISCONTINUED | OUTPATIENT
Start: 2018-09-28 | End: 2018-09-28 | Stop reason: SDUPTHER

## 2018-09-28 RX ORDER — ONDANSETRON 2 MG/ML
4 INJECTION INTRAMUSCULAR; INTRAVENOUS
Status: COMPLETED | OUTPATIENT
Start: 2018-09-28 | End: 2018-09-28

## 2018-09-28 RX ORDER — MIDAZOLAM HYDROCHLORIDE 1 MG/ML
2 INJECTION INTRAMUSCULAR; INTRAVENOUS ONCE
Status: COMPLETED | OUTPATIENT
Start: 2018-09-28 | End: 2018-09-28

## 2018-09-28 RX ORDER — ACETAMINOPHEN 325 MG/1
650 TABLET ORAL EVERY 4 HOURS PRN
Status: DISCONTINUED | OUTPATIENT
Start: 2018-09-28 | End: 2018-09-29 | Stop reason: HOSPADM

## 2018-09-28 RX ORDER — DOCUSATE SODIUM 100 MG/1
100 CAPSULE, LIQUID FILLED ORAL 2 TIMES DAILY
Status: DISCONTINUED | OUTPATIENT
Start: 2018-09-28 | End: 2018-09-29 | Stop reason: HOSPADM

## 2018-09-28 RX ORDER — BUDESONIDE 0.5 MG/2ML
0.5 INHALANT ORAL 2 TIMES DAILY
Status: DISCONTINUED | OUTPATIENT
Start: 2018-09-28 | End: 2018-09-29 | Stop reason: HOSPADM

## 2018-09-28 RX ORDER — MEPERIDINE HYDROCHLORIDE 25 MG/ML
12.5 INJECTION INTRAMUSCULAR; INTRAVENOUS; SUBCUTANEOUS EVERY 5 MIN PRN
Status: DISCONTINUED | OUTPATIENT
Start: 2018-09-28 | End: 2018-09-28 | Stop reason: HOSPADM

## 2018-09-28 RX ORDER — MIDAZOLAM HYDROCHLORIDE 1 MG/ML
INJECTION INTRAMUSCULAR; INTRAVENOUS
Status: DISCONTINUED
Start: 2018-09-28 | End: 2018-09-28

## 2018-09-28 RX ORDER — SODIUM CHLORIDE 0.9 % (FLUSH) 0.9 %
10 SYRINGE (ML) INJECTION PRN
Status: DISCONTINUED | OUTPATIENT
Start: 2018-09-28 | End: 2018-09-28 | Stop reason: HOSPADM

## 2018-09-28 RX ORDER — OXYCODONE HYDROCHLORIDE AND ACETAMINOPHEN 5; 325 MG/1; MG/1
1 TABLET ORAL ONCE
Status: DISCONTINUED | OUTPATIENT
Start: 2018-09-28 | End: 2018-09-28 | Stop reason: HOSPADM

## 2018-09-28 RX ORDER — HYDROMORPHONE HCL 110MG/55ML
PATIENT CONTROLLED ANALGESIA SYRINGE INTRAVENOUS PRN
Status: DISCONTINUED | OUTPATIENT
Start: 2018-09-28 | End: 2018-09-28

## 2018-09-28 RX ORDER — M-VIT,TX,IRON,MINS/CALC/FOLIC 27MG-0.4MG
TABLET ORAL DAILY
Status: DISCONTINUED | OUTPATIENT
Start: 2018-09-29 | End: 2018-09-29 | Stop reason: HOSPADM

## 2018-09-28 RX ORDER — VANCOMYCIN HYDROCHLORIDE 500 MG/10ML
INJECTION, POWDER, LYOPHILIZED, FOR SOLUTION INTRAVENOUS PRN
Status: DISCONTINUED | OUTPATIENT
Start: 2018-09-28 | End: 2018-09-28 | Stop reason: HOSPADM

## 2018-09-28 RX ORDER — ROCURONIUM BROMIDE 10 MG/ML
INJECTION, SOLUTION INTRAVENOUS PRN
Status: DISCONTINUED | OUTPATIENT
Start: 2018-09-28 | End: 2018-09-28 | Stop reason: SDUPTHER

## 2018-09-28 RX ORDER — CHLORTHALIDONE 25 MG/1
25 TABLET ORAL DAILY
Status: DISCONTINUED | OUTPATIENT
Start: 2018-09-28 | End: 2018-09-29 | Stop reason: HOSPADM

## 2018-09-28 RX ORDER — SODIUM CHLORIDE 9 MG/ML
INJECTION, SOLUTION INTRAVENOUS CONTINUOUS
Status: DISCONTINUED | OUTPATIENT
Start: 2018-09-28 | End: 2018-09-29 | Stop reason: HOSPADM

## 2018-09-28 RX ORDER — EPHEDRINE SULFATE 50 MG/ML
INJECTION, SOLUTION INTRAVENOUS PRN
Status: DISCONTINUED | OUTPATIENT
Start: 2018-09-28 | End: 2018-09-28 | Stop reason: SDUPTHER

## 2018-09-28 RX ORDER — ONDANSETRON 2 MG/ML
INJECTION INTRAMUSCULAR; INTRAVENOUS PRN
Status: DISCONTINUED | OUTPATIENT
Start: 2018-09-28 | End: 2018-09-28 | Stop reason: SDUPTHER

## 2018-09-28 RX ORDER — LIDOCAINE HYDROCHLORIDE 20 MG/ML
INJECTION, SOLUTION EPIDURAL; INFILTRATION; INTRACAUDAL; PERINEURAL PRN
Status: DISCONTINUED | OUTPATIENT
Start: 2018-09-28 | End: 2018-09-28 | Stop reason: SDUPTHER

## 2018-09-28 RX ORDER — DEXAMETHASONE SODIUM PHOSPHATE 4 MG/ML
INJECTION, SOLUTION INTRA-ARTICULAR; INTRALESIONAL; INTRAMUSCULAR; INTRAVENOUS; SOFT TISSUE PRN
Status: DISCONTINUED | OUTPATIENT
Start: 2018-09-28 | End: 2018-09-28 | Stop reason: SDUPTHER

## 2018-09-28 RX ORDER — ALBUTEROL SULFATE 2.5 MG/3ML
2.5 SOLUTION RESPIRATORY (INHALATION) 4 TIMES DAILY
Status: DISCONTINUED | OUTPATIENT
Start: 2018-09-28 | End: 2018-09-29 | Stop reason: HOSPADM

## 2018-09-28 RX ADMIN — HYDROMORPHONE HYDROCHLORIDE 0.5 MG: 1 INJECTION, SOLUTION INTRAMUSCULAR; INTRAVENOUS; SUBCUTANEOUS at 17:48

## 2018-09-28 RX ADMIN — FAMOTIDINE 20 MG: 10 INJECTION, SOLUTION INTRAVENOUS at 11:42

## 2018-09-28 RX ADMIN — ROCURONIUM BROMIDE 20 MG: 10 INJECTION, SOLUTION INTRAVENOUS at 13:52

## 2018-09-28 RX ADMIN — LORAZEPAM 0.25 MG: 2 INJECTION INTRAMUSCULAR at 18:32

## 2018-09-28 RX ADMIN — PROMETHAZINE HYDROCHLORIDE 6.25 MG: 25 INJECTION INTRAMUSCULAR; INTRAVENOUS at 18:35

## 2018-09-28 RX ADMIN — PROPOFOL 150 MG: 10 INJECTION, EMULSION INTRAVENOUS at 12:32

## 2018-09-28 RX ADMIN — SODIUM CHLORIDE, SODIUM LACTATE, POTASSIUM CHLORIDE, AND CALCIUM CHLORIDE: 600; 310; 30; 20 INJECTION, SOLUTION INTRAVENOUS at 12:25

## 2018-09-28 RX ADMIN — SUGAMMADEX 150 MG: 100 INJECTION, SOLUTION INTRAVENOUS at 15:08

## 2018-09-28 RX ADMIN — Medication 0.1 MG: at 13:07

## 2018-09-28 RX ADMIN — ROCURONIUM BROMIDE 10 MG: 10 INJECTION, SOLUTION INTRAVENOUS at 12:53

## 2018-09-28 RX ADMIN — PROPOFOL 50 MG: 10 INJECTION, EMULSION INTRAVENOUS at 15:18

## 2018-09-28 RX ADMIN — PHENYLEPHRINE HYDROCHLORIDE 100 MCG: 10 INJECTION, SOLUTION INTRAMUSCULAR; INTRAVENOUS; SUBCUTANEOUS at 12:48

## 2018-09-28 RX ADMIN — PHENYLEPHRINE HYDROCHLORIDE 100 MCG: 10 INJECTION, SOLUTION INTRAMUSCULAR; INTRAVENOUS; SUBCUTANEOUS at 13:00

## 2018-09-28 RX ADMIN — PROPOFOL 50 MG: 10 INJECTION, EMULSION INTRAVENOUS at 12:53

## 2018-09-28 RX ADMIN — SODIUM CHLORIDE, POTASSIUM CHLORIDE, SODIUM LACTATE AND CALCIUM CHLORIDE: 600; 310; 30; 20 INJECTION, SOLUTION INTRAVENOUS at 11:24

## 2018-09-28 RX ADMIN — Medication 0.2 MG: at 12:48

## 2018-09-28 RX ADMIN — MIDAZOLAM HYDROCHLORIDE 1 MG: 1 INJECTION, SOLUTION INTRAMUSCULAR; INTRAVENOUS at 11:25

## 2018-09-28 RX ADMIN — HYDROMORPHONE HYDROCHLORIDE 0.25 MG: 1 INJECTION, SOLUTION INTRAMUSCULAR; INTRAVENOUS; SUBCUTANEOUS at 18:07

## 2018-09-28 RX ADMIN — HYDROMORPHONE HYDROCHLORIDE 0.25 MG: 1 INJECTION, SOLUTION INTRAMUSCULAR; INTRAVENOUS; SUBCUTANEOUS at 18:17

## 2018-09-28 RX ADMIN — SODIUM CHLORIDE, SODIUM LACTATE, POTASSIUM CHLORIDE, AND CALCIUM CHLORIDE: 600; 310; 30; 20 INJECTION, SOLUTION INTRAVENOUS at 13:15

## 2018-09-28 RX ADMIN — ROCURONIUM BROMIDE 30 MG: 10 INJECTION, SOLUTION INTRAVENOUS at 12:33

## 2018-09-28 RX ADMIN — FENTANYL CITRATE 25 MCG: 50 INJECTION INTRAMUSCULAR; INTRAVENOUS at 12:53

## 2018-09-28 RX ADMIN — DIPHENHYDRAMINE HYDROCHLORIDE 12.5 MG: 50 INJECTION, SOLUTION INTRAMUSCULAR; INTRAVENOUS at 17:08

## 2018-09-28 RX ADMIN — FENTANYL CITRATE 25 MCG: 50 INJECTION INTRAMUSCULAR; INTRAVENOUS at 13:04

## 2018-09-28 RX ADMIN — GLYCOPYRROLATE 0.1 MG: 0.2 INJECTION, SOLUTION INTRAMUSCULAR; INTRAVENOUS at 11:42

## 2018-09-28 RX ADMIN — Medication 2 G: at 12:43

## 2018-09-28 RX ADMIN — ONDANSETRON 4 MG: 2 INJECTION INTRAMUSCULAR; INTRAVENOUS at 17:34

## 2018-09-28 RX ADMIN — TRANEXAMIC ACID 1 G: 1 INJECTION, SOLUTION INTRAVENOUS at 12:51

## 2018-09-28 RX ADMIN — CEFAZOLIN SODIUM 2 G: 10 INJECTION, POWDER, FOR SOLUTION INTRAVENOUS at 20:43

## 2018-09-28 RX ADMIN — DEXAMETHASONE SODIUM PHOSPHATE 8 MG: 4 INJECTION, SOLUTION INTRAMUSCULAR; INTRAVENOUS at 12:48

## 2018-09-28 RX ADMIN — SODIUM CHLORIDE: 9 INJECTION, SOLUTION INTRAVENOUS at 17:44

## 2018-09-28 RX ADMIN — ONDANSETRON 4 MG: 2 INJECTION INTRAMUSCULAR; INTRAVENOUS at 15:05

## 2018-09-28 RX ADMIN — FENTANYL CITRATE 50 MCG: 50 INJECTION INTRAMUSCULAR; INTRAVENOUS at 12:32

## 2018-09-28 RX ADMIN — ALBUTEROL SULFATE 2.5 MG: 2.5 SOLUTION RESPIRATORY (INHALATION) at 20:57

## 2018-09-28 RX ADMIN — BUDESONIDE 500 MCG: 0.5 SUSPENSION RESPIRATORY (INHALATION) at 20:57

## 2018-09-28 RX ADMIN — LORAZEPAM 0.25 MG: 2 INJECTION INTRAMUSCULAR; INTRAVENOUS at 18:32

## 2018-09-28 RX ADMIN — MIDAZOLAM HYDROCHLORIDE 1 MG: 1 INJECTION INTRAMUSCULAR; INTRAVENOUS at 11:25

## 2018-09-28 RX ADMIN — VANCOMYCIN HYDROCHLORIDE 1000 MG: 10 INJECTION, POWDER, LYOPHILIZED, FOR SOLUTION INTRAVENOUS at 11:39

## 2018-09-28 RX ADMIN — MIDAZOLAM HYDROCHLORIDE 1 MG: 1 INJECTION INTRAMUSCULAR; INTRAVENOUS at 12:32

## 2018-09-28 RX ADMIN — HYDROMORPHONE HYDROCHLORIDE 0.5 MG: 1 INJECTION, SOLUTION INTRAMUSCULAR; INTRAVENOUS; SUBCUTANEOUS at 17:40

## 2018-09-28 RX ADMIN — EPHEDRINE SULFATE 5 MG: 50 INJECTION, SOLUTION INTRAMUSCULAR; INTRAVENOUS; SUBCUTANEOUS at 13:26

## 2018-09-28 RX ADMIN — LIDOCAINE HYDROCHLORIDE 100 MG: 20 INJECTION, SOLUTION EPIDURAL; INFILTRATION; INTRACAUDAL; PERINEURAL at 12:32

## 2018-09-28 RX ADMIN — HYDROMORPHONE HYDROCHLORIDE 0.5 MG: 2 INJECTION, SOLUTION INTRAMUSCULAR; INTRAVENOUS; SUBCUTANEOUS at 13:51

## 2018-09-28 RX ADMIN — ROCURONIUM BROMIDE 10 MG: 10 INJECTION, SOLUTION INTRAVENOUS at 12:32

## 2018-09-28 RX ADMIN — HYDROMORPHONE HYDROCHLORIDE 0.5 MG: 2 INJECTION, SOLUTION INTRAMUSCULAR; INTRAVENOUS; SUBCUTANEOUS at 15:24

## 2018-09-28 ASSESSMENT — PULMONARY FUNCTION TESTS
PIF_VALUE: 20
PIF_VALUE: 15
PIF_VALUE: 16
PIF_VALUE: 2
PIF_VALUE: 21
PIF_VALUE: 21
PIF_VALUE: 19
PIF_VALUE: 20
PIF_VALUE: 19
PIF_VALUE: 31
PIF_VALUE: 20
PIF_VALUE: 1
PIF_VALUE: 1
PIF_VALUE: 21
PIF_VALUE: 1
PIF_VALUE: 20
PIF_VALUE: 3
PIF_VALUE: 20
PIF_VALUE: 1
PIF_VALUE: 12
PIF_VALUE: 21
PIF_VALUE: 21
PIF_VALUE: 20
PIF_VALUE: 12
PIF_VALUE: 2
PIF_VALUE: 20
PIF_VALUE: 19
PIF_VALUE: 21
PIF_VALUE: 20
PIF_VALUE: 19
PIF_VALUE: 18
PIF_VALUE: 19
PIF_VALUE: 20
PIF_VALUE: 29
PIF_VALUE: 19
PIF_VALUE: 20
PIF_VALUE: 16
PIF_VALUE: 3
PIF_VALUE: 5
PIF_VALUE: 20
PIF_VALUE: 20
PIF_VALUE: 16
PIF_VALUE: 20
PIF_VALUE: 20
PIF_VALUE: 5
PIF_VALUE: 21
PIF_VALUE: 21
PIF_VALUE: 3
PIF_VALUE: 19
PIF_VALUE: 12
PIF_VALUE: 20
PIF_VALUE: 17
PIF_VALUE: 20
PIF_VALUE: 3
PIF_VALUE: 2
PIF_VALUE: 20
PIF_VALUE: 6
PIF_VALUE: 20
PIF_VALUE: 21
PIF_VALUE: 20
PIF_VALUE: 19
PIF_VALUE: 18
PIF_VALUE: 20
PIF_VALUE: 19
PIF_VALUE: 20
PIF_VALUE: 21
PIF_VALUE: 20
PIF_VALUE: 20
PIF_VALUE: 21
PIF_VALUE: 20
PIF_VALUE: 20
PIF_VALUE: 31
PIF_VALUE: 20
PIF_VALUE: 21
PIF_VALUE: 19
PIF_VALUE: 20
PIF_VALUE: 20
PIF_VALUE: 19
PIF_VALUE: 20
PIF_VALUE: 5
PIF_VALUE: 20
PIF_VALUE: 19
PIF_VALUE: 19
PIF_VALUE: 20
PIF_VALUE: 1
PIF_VALUE: 20
PIF_VALUE: 21
PIF_VALUE: 20
PIF_VALUE: 5
PIF_VALUE: 20
PIF_VALUE: 20
PIF_VALUE: 19
PIF_VALUE: 16
PIF_VALUE: 20
PIF_VALUE: 19
PIF_VALUE: 20
PIF_VALUE: 19
PIF_VALUE: 20
PIF_VALUE: 1
PIF_VALUE: 20
PIF_VALUE: 32
PIF_VALUE: 19
PIF_VALUE: 21
PIF_VALUE: 3
PIF_VALUE: 21
PIF_VALUE: 3
PIF_VALUE: 20
PIF_VALUE: 1
PIF_VALUE: 20
PIF_VALUE: 21
PIF_VALUE: 20
PIF_VALUE: 19
PIF_VALUE: 20
PIF_VALUE: 20
PIF_VALUE: 21
PIF_VALUE: 20
PIF_VALUE: 3
PIF_VALUE: 21
PIF_VALUE: 20
PIF_VALUE: 3
PIF_VALUE: 12
PIF_VALUE: 7
PIF_VALUE: 21
PIF_VALUE: 21
PIF_VALUE: 20
PIF_VALUE: 19
PIF_VALUE: 19
PIF_VALUE: 20
PIF_VALUE: 21
PIF_VALUE: 20

## 2018-09-28 ASSESSMENT — PAIN - FUNCTIONAL ASSESSMENT: PAIN_FUNCTIONAL_ASSESSMENT: 0-10

## 2018-09-28 ASSESSMENT — PAIN SCALES - GENERAL
PAINLEVEL_OUTOF10: 0
PAINLEVEL_OUTOF10: 7
PAINLEVEL_OUTOF10: 5
PAINLEVEL_OUTOF10: 7

## 2018-09-28 ASSESSMENT — LIFESTYLE VARIABLES: SMOKING_STATUS: 0

## 2018-09-28 ASSESSMENT — PAIN DESCRIPTION - DESCRIPTORS: DESCRIPTORS: ACHING

## 2018-09-28 NOTE — ANESTHESIA POSTPROCEDURE EVALUATION
Department of Anesthesiology  Postprocedure Note    Patient: Silva Novak  MRN: [id number]  YOB: 1950  Date of evaluation: 9/28/2018  Time:  4:59 PM     Procedure Summary     Date:  09/28/18 Room / Location:  [facility] OR 11 / [facility] OR    Anesthesia Start:  7918 Anesthesia Stop:  8779    Procedure:  RIGHT REVISION ARTHROSCOPY FEMORAL COMPONENT, SYNOVECTOMY (Right ) Diagnosis:  (HISTORY OF RIGHT TOTAL KNEE ARTHROPLASTY)    Surgeon:  Olga Marino MD Responsible Provider:  Kam Paz MD    Anesthesia Type:  general ASA Status:  3          Anesthesia Type: No value filed. Daniel Phase I: Daniel Score: 8    Daniel Phase II:      Last vitals: Reviewed and per EMR flowsheets.        Anesthesia Post Evaluation    Patient location during evaluation: PACU  Patient participation: complete - patient participated  Level of consciousness: awake and alert  Pain score: 0  Airway patency: patent  Nausea & Vomiting: no nausea and no vomiting  Complications: no  Cardiovascular status: blood pressure returned to baseline  Respiratory status: acceptable  Hydration status: euvolemic

## 2018-09-28 NOTE — BRIEF OP NOTE
Brief Postoperative Note  ______________________________________________________________    Patient: Abhay Campbell  YOB: 1950  MRN: [id number]  Date of Procedure: 9/28/2018    Pre-Op Diagnosis: HISTORY OF RIGHT TOTAL KNEE ARTHROPLASTY    Post-Op Diagnosis: Same       Procedure(s):  RIGHT REVISION ARTHROSCOPY FEMORAL COMPONENT, SYNOVECTOMY    Anesthesia: Anesthesia type not filed in the log. Surgeon(s):  Apollo Crouch MD    Staff:  First Assistant: Abraham Lacy First: Anton Cornejo     Estimated Blood Loss: * No values recorded between 9/28/2018 12:27 PM and 9/28/2018  3:06 PM * 579 mL    Complications: None    Specimens:   ID Type Source Tests Collected by Time Destination   1 : 1.  SYNOVIAL FLUID FOR STAT CELL COUNT  Body Fluid Synovial BODY FLUID CELL COUNT WITH DIFFERENTIAL Apollo Crouch MD 9/28/2018 1312    2 : 2. SUPERFICIAL FLUID, RIGHT KNEE Body Fluid Fluid BODY FLUID CULTURE Apollo Crouch MD 9/28/2018 1317    3 : MEMBRANE UNDER FEMORAL IMPLANT - TISSUE Specimen Joint, Knee SURGICAL CULTURE Apollo Crouch MD 9/28/2018 1355        Implants:  * No implants in log *      Drains:      Findings: none      Apollo Crouch MD  Date: 9/28/2018  Time: 3:06 PM

## 2018-09-28 NOTE — ANESTHESIA PRE PROCEDURE
Intravenous Continuous [place] MD Brad 125 mL/hr at 09/28/18 1124      ceFAZolin (ANCEF) 2 g in dextrose 5 % 50 mL IVPB  2 g Intravenous Once Shaneka Recio MD        ortho mix (with morphine) injection   Injection On Call Shaneka Recio MD        tranexamic acid (CYKLOKAPRON) 1,000 mg in sodium chloride 0.9 % 50 mL IVPB  1,000 mg Intravenous Once Shaneka Recio MD        vancomycin [facility]) 1,000 mg in dextrose 5 % 250 mL IVPB  1,000 mg Intravenous Once Shaneka Recio  mL/hr at 09/28/18 1139 1,000 mg at 09/28/18 1139       Allergies:     Allergies   Allergen Reactions    Codeine      Severe headaches    Demerol      Severe headache and over-sedation    Morphine Other (See Comments)     Makes her crazy    Tape Bethany Ahumada Tape] Other (See Comments)     Tears skin    Cabbage Nausea And Vomiting and Swelling    Erythromycin Nausea And Vomiting and Rash       Problem List:    Patient Active Problem List   Diagnosis Code    Hyperlipidemia E78.5    Hypertension I10    Edema R60.9    CTS (carpal tunnel syndrome) G56.00    Reactive airway disease J45.909    Pulmonary nodule R91.1    Anxiety and depression F41.9, F32.9    Myalgia M79.1    Anemia D64.9    Closed displaced fracture of medial condyle of right femur ([facility] [address] S72.431A    Preop examination Z01.818    Abrasion of left lower leg S80.812A    Open wound of left lower leg S81.802A       Past Medical History:        Diagnosis Date    Arthritis     Compression fracture     back due to fall    Concussion     due to fall    DVT (deep venous thrombosis) ([facility] [address] 01/2017    RLE after TKR    Environmental allergies     Essential hypertension, benign 6/28/2010    GERD (gastroesophageal reflux disease)     History of peptic ulcer     Hyperlipidemia 6/28/2010    Lacunar infarction [facility])     old - per MRI 2015    Restrictive airway disease     allergy induced    Wound, open 09/2018    left shin area, healing well, tx in wound care [place]       Past Surgical History:        Procedure Laterality Date     SECTION      times 2    CHOLECYSTECTOMY      COLONOSCOPY      HYSTERECTOMY      KNEE ARTHROPLASTY Right 2018     RIGHT REVISION TOTAL KNEE ARTHROPLASTY    KNEE ARTHROSCOPY Left     Dr. Linda Diaz ARTHROSCOPY Right     TOTAL KNEE ARTHROPLASTY Right 2017       Social History:    Social History   Substance Use Topics    Smoking status: Former Smoker     Packs/day: 1.00     Years: 35.00     Types: Cigarettes     Quit date: 3/28/2005    Smokeless tobacco: Never Used    Alcohol use 4.2 oz/week     7 Shots of liquor per week                                Counseling given: Not Answered      Vital Signs (Current):   Vitals:    18 0847 18 1125   BP: (!) 150/76    Pulse: 84 80   Resp: 18    Temp: 97.7 °F (36.5 °C)    TempSrc: Oral    SpO2: 93% 94%   Weight: 189 lb (85.7 kg)    Height: 5' 9\" (1.753 m)                                               BP Readings from Last 3 Encounters:   18 (!) 150/76   18 139/85   18 (!) 148/80       NPO Status: Time of last liquid consumption:                         Time of last solid consumption:                         Date of last liquid consumption: 18                        Date of last solid food consumption: 18    BMI:   Wt Readings from Last 3 Encounters:   18 189 lb (85.7 kg)   18 190 lb (86.2 kg)   18 187 lb (84.8 kg)     Body mass index is 27.91 kg/m².     CBC:   Lab Results   Component Value Date    WBC 5.8 2018    RBC 4.48 2018    HGB 13.9 2018    HCT 41.2 2018    MCV 91.8 2018    RDW 17.8 2018     2018       CMP:   Lab Results   Component Value Date     2018    K 3.9 2018    K 4.2 2018    CL 99 2018    CO2 29 2018    BUN 18 2018    CREATININE 0.5 2018    GFRAA >60 2018    GFRAA >60 2013    AGRATIO

## 2018-09-29 VITALS
WEIGHT: 189 LBS | DIASTOLIC BLOOD PRESSURE: 72 MMHG | BODY MASS INDEX: 27.99 KG/M2 | HEART RATE: 89 BPM | OXYGEN SATURATION: 92 % | HEIGHT: 69 IN | TEMPERATURE: 98.4 F | SYSTOLIC BLOOD PRESSURE: 134 MMHG | RESPIRATION RATE: 16 BRPM

## 2018-09-29 LAB
HCT VFR BLD CALC: 34.5 % (ref 36–48)
HEMOGLOBIN: 11.7 G/DL (ref 12–16)

## 2018-09-29 PROCEDURE — 2580000003 HC RX 258: Performed by: ORTHOPAEDIC SURGERY

## 2018-09-29 PROCEDURE — 94150 VITAL CAPACITY TEST: CPT

## 2018-09-29 PROCEDURE — 97110 THERAPEUTIC EXERCISES: CPT

## 2018-09-29 PROCEDURE — 6360000002 HC RX W HCPCS: Performed by: ORTHOPAEDIC SURGERY

## 2018-09-29 PROCEDURE — G8978 MOBILITY CURRENT STATUS: HCPCS

## 2018-09-29 PROCEDURE — 97530 THERAPEUTIC ACTIVITIES: CPT

## 2018-09-29 PROCEDURE — 97166 OT EVAL MOD COMPLEX 45 MIN: CPT

## 2018-09-29 PROCEDURE — 6370000000 HC RX 637 (ALT 250 FOR IP): Performed by: ORTHOPAEDIC SURGERY

## 2018-09-29 PROCEDURE — 36415 COLL VENOUS BLD VENIPUNCTURE: CPT

## 2018-09-29 PROCEDURE — 94761 N-INVAS EAR/PLS OXIMETRY MLT: CPT

## 2018-09-29 PROCEDURE — G8979 MOBILITY GOAL STATUS: HCPCS

## 2018-09-29 PROCEDURE — 99231 SBSQ HOSP IP/OBS SF/LOW 25: CPT | Performed by: HOSPITALIST

## 2018-09-29 PROCEDURE — 97535 SELF CARE MNGMENT TRAINING: CPT

## 2018-09-29 PROCEDURE — 97161 PT EVAL LOW COMPLEX 20 MIN: CPT

## 2018-09-29 PROCEDURE — 51701 INSERT BLADDER CATHETER: CPT

## 2018-09-29 PROCEDURE — 85014 HEMATOCRIT: CPT

## 2018-09-29 PROCEDURE — 94640 AIRWAY INHALATION TREATMENT: CPT

## 2018-09-29 PROCEDURE — 51798 US URINE CAPACITY MEASURE: CPT

## 2018-09-29 PROCEDURE — 97116 GAIT TRAINING THERAPY: CPT

## 2018-09-29 PROCEDURE — G8988 SELF CARE GOAL STATUS: HCPCS

## 2018-09-29 PROCEDURE — 85018 HEMOGLOBIN: CPT

## 2018-09-29 PROCEDURE — G8987 SELF CARE CURRENT STATUS: HCPCS

## 2018-09-29 RX ADMIN — BUDESONIDE 500 MCG: 0.5 SUSPENSION RESPIRATORY (INHALATION) at 08:42

## 2018-09-29 RX ADMIN — LOSARTAN POTASSIUM 100 MG: 100 TABLET ORAL at 09:29

## 2018-09-29 RX ADMIN — HYDROCODONE BITARTRATE AND ACETAMINOPHEN 2 TABLET: 10; 325 TABLET ORAL at 12:50

## 2018-09-29 RX ADMIN — PANTOPRAZOLE SODIUM 40 MG: 40 TABLET, DELAYED RELEASE ORAL at 06:43

## 2018-09-29 RX ADMIN — DOCUSATE SODIUM 100 MG: 100 CAPSULE, LIQUID FILLED ORAL at 09:29

## 2018-09-29 RX ADMIN — HYDROCODONE BITARTRATE AND ACETAMINOPHEN 2 TABLET: 10; 325 TABLET ORAL at 08:05

## 2018-09-29 RX ADMIN — ACETAMINOPHEN 650 MG: 325 TABLET ORAL at 06:04

## 2018-09-29 RX ADMIN — ALBUTEROL SULFATE 2.5 MG: 2.5 SOLUTION RESPIRATORY (INHALATION) at 08:42

## 2018-09-29 RX ADMIN — POTASSIUM CHLORIDE 20 MEQ: 20 TABLET, EXTENDED RELEASE ORAL at 09:29

## 2018-09-29 RX ADMIN — CEFAZOLIN SODIUM 2 G: 10 INJECTION, POWDER, FOR SOLUTION INTRAVENOUS at 05:55

## 2018-09-29 RX ADMIN — APIXABAN 2.5 MG: 2.5 TABLET, FILM COATED ORAL at 09:29

## 2018-09-29 RX ADMIN — Medication 10 ML: at 09:29

## 2018-09-29 RX ADMIN — CHLORTHALIDONE 25 MG: 25 TABLET ORAL at 09:29

## 2018-09-29 ASSESSMENT — PAIN DESCRIPTION - PAIN TYPE
TYPE: SURGICAL PAIN
TYPE: ACUTE PAIN
TYPE: SURGICAL PAIN

## 2018-09-29 ASSESSMENT — PAIN DESCRIPTION - DESCRIPTORS
DESCRIPTORS: HEADACHE
DESCRIPTORS: THROBBING
DESCRIPTORS: THROBBING

## 2018-09-29 ASSESSMENT — PAIN DESCRIPTION - PROGRESSION
CLINICAL_PROGRESSION: NOT CHANGED
CLINICAL_PROGRESSION: GRADUALLY WORSENING
CLINICAL_PROGRESSION: NOT CHANGED

## 2018-09-29 ASSESSMENT — PAIN DESCRIPTION - LOCATION
LOCATION: HEAD
LOCATION: KNEE
LOCATION: KNEE

## 2018-09-29 ASSESSMENT — PAIN DESCRIPTION - FREQUENCY
FREQUENCY: CONTINUOUS

## 2018-09-29 ASSESSMENT — PAIN SCALES - GENERAL
PAINLEVEL_OUTOF10: 2
PAINLEVEL_OUTOF10: 2
PAINLEVEL_OUTOF10: 7
PAINLEVEL_OUTOF10: 3
PAINLEVEL_OUTOF10: 0
PAINLEVEL_OUTOF10: 7

## 2018-09-29 ASSESSMENT — PAIN DESCRIPTION - ORIENTATION
ORIENTATION: RIGHT
ORIENTATION: RIGHT

## 2018-09-29 ASSESSMENT — PAIN DESCRIPTION - ONSET
ONSET: ON-GOING
ONSET: ON-GOING
ONSET: GRADUAL

## 2018-09-29 NOTE — CONSULTS
Environmental allergies     Essential hypertension, benign 2010    GERD (gastroesophageal reflux disease)     History of peptic ulcer     Hyperlipidemia 2010    Lacunar infarction [facility])     old - per MRI     Restrictive airway disease     allergy induced    Wound, open 2018    left shin area, healing well, tx in wound care center       Past Surgical History:   Procedure Laterality Date     SECTION      times 2    CHOLECYSTECTOMY      COLONOSCOPY      HYSTERECTOMY      KNEE ARTHROPLASTY Right 2018     RIGHT REVISION TOTAL KNEE ARTHROPLASTY    KNEE ARTHROSCOPY Left     Dr. Merna Klein ARTHROSCOPY Right     TOTAL KNEE ARTHROPLASTY Right 2017       Social History     Social History    Marital status:      Spouse name: N/A    Number of children: N/A    Years of education: N/A     Occupational History    Not on file.      Social History Main Topics    Smoking status: Former Smoker     Packs/day: 1.00     Years: 35.00     Types: Cigarettes     Quit date: 3/28/2005    Smokeless tobacco: Never Used    Alcohol use 4.2 oz/week     7 Shots of liquor per week    Drug use: No    Sexual activity: Not on file     Other Topics Concern    Not on file     Social History Narrative    No narrative on file       Family History   Problem Relation Age of Onset    Cancer Other     Hypertension Other     Kidney Disease Other                                                       Meds:     Scheduled Meds:   chlorthalidone  25 mg Oral Daily    losartan  100 mg Oral Daily    therapeutic multivitamin-minerals   Oral Daily    pantoprazole  40 mg Oral QAM AC    potassium chloride  20 mEq Oral Daily    sodium chloride flush  10 mL Intravenous 2 times per day    docusate sodium  100 mg Oral BID    apixaban  2.5 mg Oral BID    budesonide  0.5 mg Nebulization BID    And    albuterol  2.5 mg Nebulization 4x daily      Continuous Infusions:   sodium chloride 125 Recent Labs      09/27/18   1134  09/29/18   0600   WBC  5.8   --    HGB  13.9  11.7*   HCT  41.2  34.5*   PLT  415   --      Lab Results   Component Value Date    TSHFT4 1.96 12/12/2017    TSH 1.33 11/03/2015     Lab Results   Component Value Date    IRON 76 02/01/2017    TIBC 402 02/01/2017    FERRITIN 422.8 (H) 02/01/2017     BMP:  Recent Labs      09/27/18   1136   NA  140   K  3.9   CL  99   CO2  29   BUN  18   CREATININE  0.5*   GLUCOSE  115*   CALCIUM  9.4     LFT's:  Recent Labs      09/27/18   1136   AST  16   ALT  19   BILITOT  0.4   ALKPHOS  90     Troponin:  No results for input(s): TROPONINI in the last 72 hours. BNP:  No results for input(s): BNP in the last 72 hours. Lipids:  Lab Results   Component Value Date    CHOL 260 (H) 12/12/2017    HDL 77 (H) 12/12/2017    LDLCALC 151 (H) 12/12/2017    TRIG 159 (H) 12/12/2017     No results found for: LABA1C  ABGs:  No results for input(s): PHART, QEY7JYV, PO2ART in the last 72 hours. INR:  Lab Results   Component Value Date    INR 0.94 09/27/2018    INR 0.96 05/19/2018    INR 0.93 04/16/2018         ASSESSMENT/PLAN:     S/p R TKA  Essential HTN  GERD  Mild acute blood loss anemia ( not unexpected for this type of orthopedic surgery)      Continue current medications; physical and occupational therapy. Continue current pain management. Continue Eliquis 2.5 mg orally twice a day for DVT prophylaxis. Continue losartan 100 mg daily for blood pressure control.       Parisa Lynn M.D.

## 2018-09-29 NOTE — OP NOTE
risks and others, she elected to proceed. OPERATIVE DETAILS:  The patient was greeted in the preoperative holding  area. Her right knee was marked with a marker. She was brought to the  operating room where general anesthesia was obtained. She was placed in  the supine position with all bony prominences well padded. The right lower  extremity was prepped and draped in normal standard sterile surgical  fashion followed by final time-out verifying correct patient, operative  site, and operative plan. The patient did receive preoperative antibiotics  and tranexamic acid prior to surgical incision. The right lower extremity was exsanguinated with an Ace bandage, and  tourniquet was inflated to 350 mmHg. I used her previous anterior based  incision and divided through the subcutaneous tissue exposing the extensor  retinaculum and extensor mechanism, divided full-thickness layers  medially and laterally. At this point, I palpated the quadriceps tendon,  and she had significant atrophy along the quadriceps tendon, however was  completely intact. There was thinning of the tendon and muscle complex. Of note, I had previously obtained an MRI scan which showed atrophy and  edema within the muscle; however, the quadriceps tendon was intact. At  this point, I did perform a medial parapatellar arthrotomy after aspirating  normal-appearing synovial fluid which was sent for stat cell count and  differential.  It returned later negative for signs of infection. After  performing my medial parapatellar arthrotomy, I performed a medial release. I did encounter significant stiffness in the suprapatellar pouch and soft  tissues were stuck down around and throughout the femur. I did elevate the scar  tissue by the patellar tendon staying out of the patellar tendon complex  and removed the anterior tissue as well as in the medial and lateral  gutters. I ran a Frost up the anterior femur freeing the anterior femur. I  took all the tissue from the suprapatellar pouch, mobilizing all this, I  performed a wide synovectomy. The patient had excellent flexion. At this  point, I did remove the polyethylene liner after hyperflexion of the knee. I had limited access to the posterior capsule given her severe extension  contracture. I then placed a retractor around the medial knee, and I can  palpate the medial condyle fracture fragment and visualized that the entire  medial epicondyle had avulsed off. There was no MCL attachment. Once  again, I did not feel this could be fixated. I went ahead and removed with  subperiosteal dissection, the epicondyle fracture fragment. I placed the  knee into flexion, placed a laminar  in the knee and there was a  large amount of gapping medially indicative of medial collateral ligament  insufficiency. At this point, I did use an osteotome on the posterior  aspect of the medial and lateral femur to disengage any adhesions on the  posterior femur. She still had significant stiffness. At this point, I  felt that she had to be converted to a hinged construct because the TC3  constrain construct would not substitute for a complete needle collateral  ligament deficiency. At this point, I used an offset osteotome and reciprocating saw to de-bond  the cement from the femoral component. On radiographs, her sleeve appeared  to be well fixed. I used a tampon and was able to remove the femoral  component en bloc; however, the sleeve was left behind. I evaluated the  sleeve. I sent multiple samples from throughout the knee including behind  the sleeve for routine cultures. The sleeve appeared to be well ingrown. I felt that removing this and trying to rebuild a hinge would likely lead  to severe femoral deficiency and possibly the need for distal femoral  replacement.   At this point, I decided that I could engage a hinged  prosthesis on the existing Panda taper and felt my tibial

## 2018-09-29 NOTE — PLAN OF CARE
Problem: Falls - Risk of:  Goal: Will remain free from falls  Will remain free from falls   Outcome: Met This Shift  Pt has remained free of falls this shift. Pt has a steady gait with a walker . Pt is A/O x4 and uses the call light appropriately for needs. Bed alarm on, wheels locked, bed in lowest position, side rails up 2/4, nonskid socks on, call light and bedside table in reach. Will continue to monitor. Problem: Urinary Retention:  Goal: Urinary elimination within specified parameters  Urinary elimination within specified parameters  Outcome: Ongoing  Pt was not able to void post op this shift and needed to be straight cathed. Will continue to monitor output.

## 2018-09-29 NOTE — PROGRESS NOTES
Occupational Therapy   Occupational Therapy Initial Assessment/Tx Note  Date: 2018   Patient Name: Janny Johns  MRN: [id number]     : 1950    Date of Service: 2018    Discharge Recommendations: Janny Johns scored a 20/24 on the AM-PAC ADL Inpatient form. Current research shows that an AM-PAC score of 18 or greater is typically associated with a discharge to the patient's home setting. Based on the patients AM-PAC score and their current ADL deficits, it is recommended that the patient have 2-3 sessions per week of Occupational Therapy at d/c to increase the patients independence. OT Equipment Recommendations  Equipment Needed: No    Treatment Diagnosis: Decreased activity tolerance, impaired ADLs and mobility      Restrictions  Position Activity Restriction  Other position/activity restrictions: FWBAT on RLE, knee immobilizer, ambulate    Subjective   General  Chart Reviewed: Yes  Patient assessed for rehabilitation services?: Yes  Additional Pertinent Hx: 79 y.o. F admitted  for RIGHT REVISION ARTHROSCOPY FEMORAL COMPONENT, SYNOVECTOMY. PMHx includes compression fx, DVT, lacunar infarction, cholecystectomy, R TKA with revision  Family / Caregiver Present: Yes ()  Referring Practitioner: Rudy Gil  Diagnosis: arthofibrosis R knee  Subjective  Subjective: Pt in bathroom with RN anastasia on entry. Pleasant and cooperative. \"I need to slow down. \"   Pain Assessment  Patient Currently in Pain: Yes (R knee, unrated, requesting pain meds, RN aware, ice packs applied)     Social/Functional History  Social/Functional History  Lives With: Spouse  Type of Home: House  Home Layout: One level  Home Access: Stairs to enter with rails  Entrance Stairs - Number of Steps: 1  Bathroom Shower/Tub: Shower chair with back, Walk-in shower  Bathroom Toilet: Handicap height  Bathroom Equipment: Grab bars in shower  Home Equipment: Rolling walker, Crutches, Cane  ADL Assistance: Needs assistance
Pt arrived calm with no CO pain report from SA of a hinged Knee replacement report received from CRNA
bronchodilator. 2. Discontinue if patient experiences worsened bronchospasm, or secretions have lessened to the point that the patient is able to clear them with a cough. Anti-inflammatory and Combination Medications:    1. If the patient lacks prior history of lung disease, is not using inhaled anti-inflammatory medication at home, and lacks wheezing by examination or by history for at least 24 hours, contact physician for possible discontinuation.
session treatment and documentation only. Hyacinth Florez, PT, DPT [id number]     Assessment   Body structures, Functions, Activity limitations: Decreased functional mobility ; Decreased ROM; Decreased strength;Decreased safe awareness;Decreased endurance;Decreased balance  Assessment: Patient tolerated session well, transferring and ambulating in room and hallways as above with steady gait using RW. Patient ambulates with step to gait pattern and is fatigued at end of ambulation. She reports not able to attempt steps at this time due to weakness attributed to not having breakfast yet. In PM session, pt able to negotiate 2 curb steps with Mod A progressing to Min A. Patient has 24 hour supervision/assistance at home and has had multiple knee surgeries in the past. Pt and family with no concerns about d/c home. Recommend continued skilled PT upon discharge. Patient planning for d/c home with outpatient PT. Will continue to follow. Treatment Diagnosis: impaired mobility associated with TKA  Prognosis: Good  Decision Making: Low Complexity  Patient Education: Educated on role of PT, safety with mobility, use of call light, d/c recommendations; patient verbalized understanding.    REQUIRES PT FOLLOW UP: Yes  Activity Tolerance  Activity Tolerance: Patient Tolerated treatment well;Patient limited by fatigue;Patient limited by endurance  Activity Tolerance: patient reporting feeling too weak to attempt step at this time         Plan   Plan  Times per week: BID  Current Treatment Recommendations: Strengthening, Balance Training, Transfer Training, Gait Training, Stair training, Endurance Training, ROM, Safety Education & Training, Patient/Caregiver Education & Training  Safety Devices  Type of devices: Nurse notified, Chair alarm in place, Call light within reach, Left in chair    G-Code  PT G-Codes  Functional Limitation: Mobility: Walking and moving around  Mobility: Walking and Moving Around Current Status ():

## 2018-10-03 NOTE — PROGRESS NOTES
ARTHROPLASTY Right 01/2017       FAMILY HISTORY    Family History   Problem Relation Age of Onset    Cancer Other     Hypertension Other     Kidney Disease Other        SOCIAL HISTORY    Social History   Substance Use Topics    Smoking status: Former Smoker     Packs/day: 1.00     Years: 35.00     Types: Cigarettes     Quit date: 3/28/2005    Smokeless tobacco: Never Used    Alcohol use 4.2 oz/week     7 Shots of liquor per week       ALLERGIES    Allergies   Allergen Reactions    Codeine      Severe headaches    Demerol      Severe headache and over-sedation    Morphine Other (See Comments)     Makes her crazy    Tape Ayanna Parlor Tape] Other (See Comments)     Tears skin    Cabbage Nausea And Vomiting and Swelling    Erythromycin Nausea And Vomiting and Rash       MEDICATIONS    Current Outpatient Prescriptions on File Prior to Encounter   Medication Sig Dispense Refill    furosemide (LASIX) 20 MG tablet Take 20 mg by mouth daily as needed      chlorthalidone (HYGROTON) 25 MG tablet Take 1 tablet by mouth daily 90 tablet 3    potassium chloride (KLOR-CON M) 20 MEQ extended release tablet Take 1 tablet by mouth daily 90 tablet 3    losartan (COZAAR) 100 MG tablet TAKE 1 TABLET DAILY 90 tablet 3    Multiple Vitamins-Minerals (MULTIVITAMIN PO) Take by mouth daily Women's Ultra Oscar for Menopause      fluticasone-vilanterol (BREO ELLIPTA) 100-25 MCG/INH AEPB inhaler Inhale 1 puff into the lungs daily 3 each 3    omeprazole (PRILOSEC) 20 MG delayed release capsule TAKE 1 CAPSULE DAILY 90 capsule 3    albuterol sulfate HFA (PROVENTIL HFA) 108 (90 BASE) MCG/ACT inhaler 2 PUFFS EVERY 4 HOURS AS NEEDED WHEEZING 1 Inhaler 5     No current facility-administered medications on file prior to encounter. REVIEW OF SYSTEMS    Pertinent items are noted in HPI.     Objective:      BP (!) 148/80   Pulse 106   Temp 98 °F (36.7 °C) (Oral)   Resp 18     Wt Readings from Last 3 Encounters:   09/27/18 190 lb (86.2 kg)   09/28/18 189 lb (85.7 kg)   09/06/18 187 lb (84.8 kg)       PHYSICAL EXAM    General Appearance: alert and oriented to person, place and time, well developed and well- nourished, in no acute distress      Assessment:      Patient Active Problem List   Diagnosis Code    Hyperlipidemia E78.5    Hypertension I10    Edema R60.9    CTS (carpal tunnel syndrome) G56.00    Reactive airway disease J45.909    Pulmonary nodule R91.1    Anxiety and depression F41.9, F32.9    Myalgia M79.1    Anemia D64.9    Closed displaced fracture of medial condyle of right femur ([address] S72.431A    Preop examination Z01.818    Abrasion of left lower leg S80.812A    Open wound of left lower leg S81.802A    Arthrofibrosis of knee joint, right M24.661    Acute blood loss anemia D62    Gastroesophageal reflux disease without esophagitis K21.9          Procedure Note  Indications:  Based on my examination of this patient's wound(s)/ulcer(s) today, debridement is required to promote healing and evaluate the wound base. Performed by: Kev Sepulveda DPM    Consent obtained:  Yes    Time out taken:  Yes    Pain Control: Anesthetic  Anesthetic: 4% Lidocaine Liquid Topical       Debridement:Excisional Debridement    Using curette the wound(s)/ulcer(s) was/were sharply debrided down through and including the removal of subcutaneous tissue. Devitalized Tissue Debrided:  necrotic/eschar    Pre Debridement Measurements:  Are located in the Wound/Ulcer Documentation Flow Sheet    Wound/Ulcer #: 1    Post Debridement Measurements:  Wound/Ulcer Descriptions are Pre Debridement except measurements:    Negative Pressure Wound Therapy Knee Right (Active)   Unit Type prevena  9/28/2018  3:33 PM   Dressing Type Other (Comment) 9/29/2018 12:20 PM   Cycle Continuous 9/29/2018 12:20 PM   Target Pressure (mmHg) 125 9/29/2018 12:20 PM   Dressing Status Clean;Dry; Intact 9/29/2018 12:20 PM   Drainage Amount None 9/29/2018 12:20 PM Puracol)   [] Collagen with Silver(i.e. Meghana)     [] Hydrocolloid    [] Hydrafera Blue moistened with saline   [x] MediHoney Paste/Gel   [] Hydrogel    [] Endoform      [] Santyl covered with gauze moisten with saline    [] Bactroban/Mupirocin   [] Polysporin/Neosporin  [] Other:      Pack wound loosely with: [] Iodoform   [] Plain Packing  [] Saline \"wet to dry\"      [x] Cover and Secure with:     [x] Dry Gauze  [] ABD [] Cast padding [x] Kerlix or Park rolled gauze   [] Coban [x] Ace Wrap  [] Ace Wrap from forefoot to just below the knee  [] Paper Tape [] Medipore Tape  [] Other:    Avoid contact of tape with skin if possible. [x] Change dressing: [x] Daily    [] Every Other Day [] Three times per week   [] Once a week [] Do Not Change Dressing   [] Other:    Dietary:  Important dietary reminders:  1. Increase Protein intake (i.e. Lean meats, fish, eggs, legumes, and yogurt)  2. No added salt  3. If diabetic, follow a diabetic diet and check glucose prior to meals or as instructed by your physician. Dietary Supplements:  [] Ensure EnLive [] Sonny [] 30ml ProMod/ProStat   [] Other:   Take supplements twice a day or as directed as followed:     Smoking:  Counseling given: Not Answered    Please talk with your Primary Health Care Provider about assistance to help stop smoking. Please read the additional information attached to these instructions if included.       Return Appointment:  [] Wound and dressing supply provider:   [] ECF or Home Healthcare:  [] Nurse visit:    [x] Return Appointment: With Dr. Destiny White  in  [facility])    [] Ordered tests:  [] Blood work [] Vascular Test(arterial/venous)   [] X-ray  [] Wound Culture   [] Other:    Referrals: (see attached referral)    [] Weight Management [] Diabetes Education [] Vascular Surgery      [] Endocrinology  [] Nutrition Counseling  [] Lymphedema Therapy                  [] Plastic & Rescontruction Surgery    Your nurse  is:  Shanice Acevedo

## 2018-10-05 LAB
BODY FLUID CULTURE, STERILE: ABNORMAL
BODY FLUID CULTURE, STERILE: ABNORMAL
GRAM STAIN RESULT: ABNORMAL
ORGANISM: ABNORMAL

## 2018-10-06 PROBLEM — Z01.818 PREOP EXAMINATION: Status: RESOLVED | Noted: 2018-09-06 | Resolved: 2018-10-06

## 2018-10-11 LAB
ANAEROBIC CULTURE: ABNORMAL
CULTURE SURGICAL: ABNORMAL
GRAM STAIN RESULT: ABNORMAL
ORGANISM: ABNORMAL

## 2018-11-15 ENCOUNTER — HOSPITAL ENCOUNTER (OUTPATIENT)
Dept: MRI IMAGING | Age: 68
Discharge: HOME | End: 2018-11-15
Payer: MEDICARE

## 2018-11-15 DIAGNOSIS — M25.561 RIGHT KNEE PAIN, UNSPECIFIED CHRONICITY: ICD-10-CM

## 2018-11-15 PROCEDURE — 73721 MRI JNT OF LWR EXTRE W/O DYE: CPT

## 2018-11-26 ENCOUNTER — OFFICE VISIT (OUTPATIENT)
Dept: INTERNAL MEDICINE CLINIC | Age: 68
End: 2018-11-26
Payer: MEDICARE

## 2018-11-26 VITALS
HEIGHT: 66 IN | WEIGHT: 204 LBS | DIASTOLIC BLOOD PRESSURE: 78 MMHG | SYSTOLIC BLOOD PRESSURE: 142 MMHG | BODY MASS INDEX: 32.78 KG/M2

## 2018-11-26 DIAGNOSIS — Z91.81 AT HIGH RISK FOR FALLS: ICD-10-CM

## 2018-11-26 DIAGNOSIS — F41.9 ANXIETY AND DEPRESSION: ICD-10-CM

## 2018-11-26 DIAGNOSIS — D62 ACUTE BLOOD LOSS ANEMIA: ICD-10-CM
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
## 2018-11-26 DIAGNOSIS — K21.9 GASTROESOPHAGEAL REFLUX DISEASE WITHOUT ESOPHAGITIS: ICD-10-CM

## 2018-11-26 DIAGNOSIS — F32.A ANXIETY AND DEPRESSION: ICD-10-CM

## 2018-11-26 DIAGNOSIS — Z12.31 ENCOUNTER FOR SCREENING MAMMOGRAM FOR BREAST CANCER: ICD-10-CM

## 2018-11-26 DIAGNOSIS — Z78.0 POST-MENOPAUSAL: ICD-10-CM

## 2018-11-26 DIAGNOSIS — J45.909 REACTIVE AIRWAY DISEASE WITHOUT COMPLICATION, UNSPECIFIED ASTHMA SEVERITY, UNSPECIFIED WHETHER PERSISTENT: ICD-10-CM

## 2018-11-26 DIAGNOSIS — D64.9 ANEMIA, UNSPECIFIED TYPE: ICD-10-CM

## 2018-11-26 DIAGNOSIS — Z13.1 ENCOUNTER FOR SCREENING FOR DIABETES MELLITUS: ICD-10-CM

## 2018-11-26 DIAGNOSIS — I10 ESSENTIAL HYPERTENSION: ICD-10-CM

## 2018-11-26 DIAGNOSIS — I10 ESSENTIAL HYPERTENSION: Primary | ICD-10-CM

## 2018-11-26 DIAGNOSIS — R60.9 EDEMA, UNSPECIFIED TYPE: ICD-10-CM

## 2018-11-26 PROBLEM — R29.6 FALLS: Status: ACTIVE | Noted: 2018-11-26

## 2018-11-26 PROBLEM — M79.10 MYALGIA: Status: RESOLVED | Noted: 2017-12-12 | Resolved: 2018-11-26

## 2018-11-26 PROBLEM — S80.812A ABRASION OF LEFT LOWER LEG: Status: RESOLVED | Noted: 2018-09-06 | Resolved: 2018-11-26

## 2018-11-26 PROBLEM — S81.802A OPEN WOUND OF LEFT LOWER LEG: Status: RESOLVED | Noted: 2018-09-20 | Resolved: 2018-11-26
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
## 2018-11-26 LAB
A/G RATIO: 2.1 (ref 1.1–2.2)
ALBUMIN SERPL-MCNC: 4.9 G/DL (ref 3.4–5)
ALP BLD-CCNC: 105 U/L (ref 40–129)
ALT SERPL-CCNC: 18 U/L (ref 10–40)
ANION GAP SERPL CALCULATED.3IONS-SCNC: 13 MMOL/L (ref 3–16)
AST SERPL-CCNC: 16 U/L (ref 15–37)
BASOPHILS ABSOLUTE: 0 K/UL (ref 0–0.2)
BASOPHILS RELATIVE PERCENT: 0.3 %
BILIRUB SERPL-MCNC: 0.3 MG/DL (ref 0–1)
BUN BLDV-MCNC: 18 MG/DL (ref 7–20)
CALCIUM SERPL-MCNC: 10 MG/DL (ref 8.3–10.6)
CHLORIDE BLD-SCNC: 98 MMOL/L (ref 99–110)
CO2: 27 MMOL/L (ref 21–32)
CREAT SERPL-MCNC: 0.7 MG/DL (ref 0.6–1.2)
EOSINOPHILS ABSOLUTE: 0.1 K/UL (ref 0–0.6)
EOSINOPHILS RELATIVE PERCENT: 1 %
GFR AFRICAN AMERICAN: >60
GFR NON-AFRICAN AMERICAN: >60
GLOBULIN: 2.3 G/DL
GLUCOSE BLD-MCNC: 100 MG/DL (ref 70–99)
HCT VFR BLD CALC: 45.3 % (ref 36–48)
HEMOGLOBIN: 15.2 G/DL (ref 12–16)
LYMPHOCYTES ABSOLUTE: 2.3 K/UL (ref 1–5.1)
LYMPHOCYTES RELATIVE PERCENT: 35.5 %
MCH RBC QN AUTO: 32.4 PG (ref 26–34)
MCHC RBC AUTO-ENTMCNC: 33.6 G/DL (ref 31–36)
MCV RBC AUTO: 96.2 FL (ref 80–100)
MONOCYTES ABSOLUTE: 0.5 K/UL (ref 0–1.3)
MONOCYTES RELATIVE PERCENT: 8.4 %
NEUTROPHILS ABSOLUTE: 3.5 K/UL (ref 1.7–7.7)
NEUTROPHILS RELATIVE PERCENT: 54.8 %
PDW BLD-RTO: 13.4 % (ref 12.4–15.4)
PLATELET # BLD: 428 K/UL (ref 135–450)
PMV BLD AUTO: 7 FL (ref 5–10.5)
POTASSIUM SERPL-SCNC: 4.5 MMOL/L (ref 3.5–5.1)
RBC # BLD: 4.71 M/UL (ref 4–5.2)
SODIUM BLD-SCNC: 138 MMOL/L (ref 136–145)
TOTAL PROTEIN: 7.2 G/DL (ref 6.4–8.2)
TSH REFLEX FT4: 2.29 UIU/ML (ref 0.27–4.2)
WBC # BLD: 6.4 K/UL (ref 4–11)

## 2018-11-26 PROCEDURE — 3017F COLORECTAL CA SCREEN DOC REV: CPT | Performed by: INTERNAL MEDICINE

## 2018-11-26 PROCEDURE — G8417 CALC BMI ABV UP PARAM F/U: HCPCS | Performed by: INTERNAL MEDICINE

## 2018-11-26 PROCEDURE — 1036F TOBACCO NON-USER: CPT | Performed by: INTERNAL MEDICINE

## 2018-11-26 PROCEDURE — 1101F PT FALLS ASSESS-DOCD LE1/YR: CPT | Performed by: INTERNAL MEDICINE

## 2018-11-26 PROCEDURE — 99215 OFFICE O/P EST HI 40 MIN: CPT | Performed by: INTERNAL MEDICINE

## 2018-11-26 PROCEDURE — 1090F PRES/ABSN URINE INCON ASSESS: CPT | Performed by: INTERNAL MEDICINE

## 2018-11-26 PROCEDURE — 1123F ACP DISCUSS/DSCN MKR DOCD: CPT | Performed by: INTERNAL MEDICINE

## 2018-11-26 PROCEDURE — 4040F PNEUMOC VAC/ADMIN/RCVD: CPT | Performed by: INTERNAL MEDICINE

## 2018-11-26 PROCEDURE — G8482 FLU IMMUNIZE ORDER/ADMIN: HCPCS | Performed by: INTERNAL MEDICINE

## 2018-11-26 PROCEDURE — G8427 DOCREV CUR MEDS BY ELIG CLIN: HCPCS | Performed by: INTERNAL MEDICINE

## 2018-11-26 PROCEDURE — G8400 PT W/DXA NO RESULTS DOC: HCPCS | Performed by: INTERNAL MEDICINE

## 2018-11-26 RX ORDER — CYCLOBENZAPRINE HCL 10 MG
10 TABLET ORAL 3 TIMES DAILY PRN
Qty: 30 TABLET | Refills: 0 | Status: ON HOLD | COMMUNITY
Start: 2018-11-26 | End: 2018-12-08 | Stop reason: HOSPADM

## 2018-11-26 RX ORDER — LOSARTAN POTASSIUM 100 MG/1
50 TABLET ORAL DAILY
Qty: 90 TABLET | Refills: 3 | COMMUNITY
Start: 2018-11-26 | End: 2019-04-01

## 2018-11-26 ASSESSMENT — ENCOUNTER SYMPTOMS
GASTROINTESTINAL NEGATIVE: 1
RESPIRATORY NEGATIVE: 1

## 2018-11-26 NOTE — ASSESSMENT & PLAN NOTE
Clinically she is having some reactive depression symptoms to her prolonged situation with her right knee. No Rx at this time.

## 2018-11-27 ENCOUNTER — HOSPITAL ENCOUNTER (OUTPATIENT)
Dept: ULTRASOUND IMAGING | Age: 68
Discharge: HOME | End: 2018-11-27
Payer: MEDICARE

## 2018-11-27 DIAGNOSIS — S62.524A NONDISPLACED FRACTURE OF DISTAL PHALANX OF RIGHT THUMB, INITIAL ENCOUNTER FOR CLOSED FRACTURE: ICD-10-CM

## 2018-11-27 PROCEDURE — 76882 US LMTD JT/FCL EVL NVASC XTR: CPT

## 2018-12-04 NOTE — PROGRESS NOTES
[facility] [employer] / [facility] ([facility]) [address] [facility], [address]    Acknowledgment of Informed Consent for Surgical or Medical Procedure and Sedation  I agree to allow doctor(s) [id number] and his/her associates or assistants, including residents and/or other qualified medical practitioner to perform the following medical treatment or procedure and to administer or direct the administration of sedation as necessary:  Procedure(s): RIGHT KNEE QUADRICEP [address]  My doctor has explained the following regarding the proposed procedure:   the explanation of the procedure   the benefits of the procedure   the potential problems that might occur during recuperation   the risks and side effects of the procedure which could include but are not limited to severe blood loss, infection, stroke or death   the benefits, risks and side effect of alternative procedures including the consequences of declining this procedure or any alternative procedures   the likelihood of achieving satisfactory results. I acknowledge no guarantee or assurance has been made to me regarding the results. I understand that during the course of this treatment/procedure, unforeseen conditions can occur which require an additional or different procedure. I agree to allow my physician or assistants to perform such extension of the original procedure as they may find necessary. I understand that sedation will often result in temporary impairment of memory and fine motor skills and that sedation can occasionally progress to a state of deep sedation or general anesthesia. I understand the risks of anesthesia for surgery include, but are not limited to, sore throat, hoarseness, injury to face, mouth, or teeth; nausea; headache; injury to blood vessels or nerves; death, brain damage, or paralysis.     I understand that if I have a Limitation of Treatment order in effect during my hospitalization, the order may or may not be in effect during this procedure. I give my doctor permission to give me blood or blood products. I understand that there are risks with receiving blood such as hepatitis, AIDS, fever, or allergic reaction. I acknowledge that the risks, benefits, and alternatives of this treatment have been explained to me and that no express or implied warranty has been given by the hospital, any blood bank, or any person or entity as to the blood or blood components transfused. At the discretion of my doctor, I agree to allow observers, equipment/product representatives and allow photographing, and/or televising of the procedure, provided my name or identity is maintained confidentially. I agree the hospital may dispose of or use for scientific or educational purposes any tissue, fluid, or body parts which may be removed.     ________________________________Date________Time______ am/pm  ([facility] [place] [facility])  Patient or Signature of Closest Relative or Legal Guardian    ________________________________Date________Time______am/pm      Page 1 of  1  Witness

## 2018-12-05 ENCOUNTER — HOSPITAL ENCOUNTER (OUTPATIENT)
Dept: PREADMISSION TESTING | Age: 68
Discharge: HOME | DRG: 501 | End: 2018-12-09
Payer: MEDICARE

## 2018-12-05 VITALS
DIASTOLIC BLOOD PRESSURE: 77 MMHG | TEMPERATURE: 97.8 F | SYSTOLIC BLOOD PRESSURE: 145 MMHG | WEIGHT: 192 LBS | OXYGEN SATURATION: 96 % | BODY MASS INDEX: 27.49 KG/M2 | HEIGHT: 70 IN | HEART RATE: 96 BPM | RESPIRATION RATE: 18 BRPM

## 2018-12-05 LAB
ABO/RH: NORMAL
ANTIBODY SCREEN: NORMAL
APTT: 31.4 SEC (ref 26–36)
C-REACTIVE PROTEIN: 300.9 MG/L (ref 0–5.1)
INR BLD: 0.9 (ref 0.86–1.14)
MRSA SCREEN RT-PCR: NORMAL
PROTHROMBIN TIME: 10.3 SEC (ref 9.8–13)
SEDIMENTATION RATE, ERYTHROCYTE: 9 MM/HR (ref 0–30)

## 2018-12-05 PROCEDURE — 85652 RBC SED RATE AUTOMATED: CPT

## 2018-12-05 PROCEDURE — 86140 C-REACTIVE PROTEIN: CPT

## 2018-12-05 PROCEDURE — 86901 BLOOD TYPING SEROLOGIC RH(D): CPT

## 2018-12-05 PROCEDURE — 85610 PROTHROMBIN TIME: CPT

## 2018-12-05 PROCEDURE — 86900 BLOOD TYPING SEROLOGIC ABO: CPT

## 2018-12-05 PROCEDURE — 86850 RBC ANTIBODY SCREEN: CPT

## 2018-12-05 PROCEDURE — 87641 MR-STAPH DNA AMP PROBE: CPT

## 2018-12-05 PROCEDURE — 85730 THROMBOPLASTIN TIME PARTIAL: CPT

## 2018-12-05 RX ORDER — CEFAZOLIN SODIUM 2 G/50ML
2 SOLUTION INTRAVENOUS ONCE
Status: CANCELLED | OUTPATIENT
Start: 2018-12-07 | End: 2018-12-05

## 2018-12-05 RX ORDER — HYDROCODONE BITARTRATE AND ACETAMINOPHEN 5; 325 MG/1; MG/1
1 TABLET ORAL EVERY 6 HOURS PRN
Status: ON HOLD | COMMUNITY
End: 2018-12-07 | Stop reason: HOSPADM

## 2018-12-05 RX ORDER — ASPIRIN 81 MG/1
81 TABLET ORAL DAILY
Status: ON HOLD | COMMUNITY
End: 2018-12-07 | Stop reason: HOSPADM

## 2018-12-05 ASSESSMENT — PAIN SCALES - GENERAL: PAINLEVEL_OUTOF10: 3

## 2018-12-05 ASSESSMENT — PAIN DESCRIPTION - LOCATION: LOCATION: KNEE

## 2018-12-05 ASSESSMENT — PAIN DESCRIPTION - PAIN TYPE: TYPE: CHRONIC PAIN

## 2018-12-05 ASSESSMENT — PAIN DESCRIPTION - DESCRIPTORS: DESCRIPTORS: ACHING

## 2018-12-05 ASSESSMENT — PAIN DESCRIPTION - FREQUENCY: FREQUENCY: CONTINUOUS

## 2018-12-05 ASSESSMENT — PAIN DESCRIPTION - ORIENTATION: ORIENTATION: RIGHT

## 2018-12-05 NOTE — PROGRESS NOTES
Snoring? Do you snore loudly (loud enough to be heard through closed doors, or your bed partner elbows you for snoring at night)? No    Tired? Do you often feel tired, fatigued, or sleepy during the daytime (such as falling asleep during driving)? No    Observed? Has anyone observed you stop breathing or choking/gasping during your sleep? No    Pressure? Do you have or are being treated for high blood pressure? Yes    Neck Size? (measured around Joses apple)  For male, is your shirt collar 17 inches or larger? For female, is your shirt collar 16 inches or larger? Yes    Age older than 48years old? Yes    Gender = Male  No    Body Mass Index more than 35 kg/m2?   No    Risk of KOREY Scoring criteria:    [] Low risk:  Yes to 0 - 2 questions    [x] Intermediate risk:  Yes to 3 - 4 questions    [] High risk:  Yes to 5 - 8 questions

## 2018-12-05 NOTE — PROGRESS NOTES
[address] [facility]                          Date of Procedure 12-7 Time of Procedure 1315    PRIOR TO PROCEDURE DATE:  1. Please follow any guidelines/instructions prior to your procedure as advised by your surgeon. 2. Arrange for someone to drive you home and be with you for the first 24 hours after discharge for your safety after your procedure for which you received sedation. Ensure it is someone we can share information with regarding your discharge. 3. You must contact your surgeon for instructions IF:   You are taking any blood thinners, aspirin, anti-inflammatory or vitamin E.   There is a change in your physical condition such as a cold, fever, rash, cuts, sores or any other infection, especially near your surgical site. 4. Do not drink alcohol the day before or day of your procedure. 5. A Pre-op History and Physical for surgery MUST be completed by your Physician or Urgent Care within 30 days of your procedure date. Please bring a copy with you on the day of your procedure and along with any other testing performed. THE DAY OF YOUR PROCEDURE:  1. Follow instructions for ARRIVAL TIME as DIRECTED BY YOUR SURGEON. If your surgeon does not give you a specific arrival time, please arrive at per Dr. Doyle Moreno. 2. Enter the MAIN entrance from [address] [place] [address] and follow the signs to the free [employer] or "[employer] parking (offered free of charge 6am-5pm). 3. Enter the Main Entrance of the hospital (do NOT enter from the lower level of the parking garage). Upon entrance, check in with the  at the main desk on your left. If no one is available at the desk, proceed into the [facility] Waiting Room and go through the door directly into the [facility]. There is a Check-in desk ACROSS from Room 5 (marked with a sign hanging from the ceiling).  The phone number for the surgery center is [phone].    4. DO NOT EAT ANYTHING eight hours prior to surgery. May have 8 ounces of water 4 hours prior to surgery (exception would be medication instructions below only)     5. MEDICATIONS    Take the following medications with a SMALL sip of water: Losartan, Omeprazole. Use Breo and bring Albuterol   Use your usual dose of inhalers the morning of surgery. BRING your rescue inhaler with you to hospital.    Anesthesia does NOT want you to take insulin the morning of surgery. They will control your blood sugar while you are at the hospital. Please contact your ordering physician for instructions regarding your insulin the night before your procedure. If you have an insulin pump, please keep it set on basal rate. 6. Do not swallow water when brushing teeth. No gum, candy, mints or ice chips. Refrain from smoking or at least decrease the amount. 7. Dress in loose, comfortable clothing appropriate for redressing after your procedure. Do not wear jewelry (including body piercings), make-up (especially NO eye make-up), fingernail polish (NO toenail polish if foot/leg surgery), lotion, powders or metal hairclips. 8. Dentures, glasses, or contacts will need to be removed before your procedure. Bring cases for your glasses, contacts, dentures, or hearing aids to protect them while you are in surgery. 9. If you use a CPAP, please bring it with you on the day of your procedure. 10. We recommend that valuable personal  belongings, such as credit cards, cash, cell phones, e-tablets or jewelry, be left at home during your stay. The hospital will not be responsible for valuables that are not secured in the hospital safe. However, if your insurance requires a co-pay, you may want to bring a method of payment, i.e. Check or credit card, if you wish to pay your co-pay the day of surgery. 11. If you are to stay overnight, you may bring a bag with personal items.  Please have any large items you may need brought in by your family after your arrival to your hospital room. 12. If you have a Living Will or Durable Power of , please bring a copy on the day of your procedure. 15.  With your permission, one family member may accompany you while you are being prepared for surgery. Once you are ready, additional family members may join you. HOW WE KEEP YOU SAFE and WORK TO PREVENT SURGICAL SITE INFECTIONS:  1. Health care workers should always check your ID bracelet to verify your name and birth date. You will be asked many times to state your name, date of birth, and allergies. 2. Health care workers should always clean their hands with soap or alcohol gel before providing care to you. It is okay to ask anyone if they cleaned their hands before they touch you. 3. You will be actively involved in verifying the type of procedure you are having and ensuring the correct surgical site. This will be confirmed multiple times prior to your procedure. Do NOT calli your surgery site UNLESS instructed to by your surgeon. 4. Do not shave or wax for 72 hours prior to procedure near your operative site. Shaving with a razor can irritate your skin and make it easier to develop an infection. On the day of your procedure, any hair that needs to be removed near the surgical site will be clipped by a healthcare worker using a special clippers designed to avoid skin irritation. 5. When you are in the operating room, your surgical site will be cleansed with a special soap, and in most cases, you will be given an antibiotic before the surgery begins. What to expect AFTER YOUR PROCEDURE:  1. Immediately following your procedure, your will be taken to the PACU for the first phase of your recovery. Your nurse will help you recover from any potential side effects of anesthesia, such as extreme drowsiness, changes in your vital signs or breathing patterns. Nausea, headache, muscle aches, or sore throat may also occur after anesthesia.   Your nurse will help you

## 2018-12-06 ENCOUNTER — ANESTHESIA EVENT (OUTPATIENT)
Dept: OPERATING ROOM | Age: 68
DRG: 501 | End: 2018-12-06
Payer: MEDICARE

## 2018-12-07 ENCOUNTER — ANESTHESIA (OUTPATIENT)
Dept: OPERATING ROOM | Age: 68
DRG: 501 | End: 2018-12-07
Payer: MEDICARE

## 2018-12-07 ENCOUNTER — HOSPITAL ENCOUNTER (INPATIENT)
Age: 68
LOS: 4 days | Discharge: HOME HEALTH | DRG: 501 | End: 2018-12-11
Attending: ORTHOPAEDIC SURGERY | Admitting: ORTHOPAEDIC SURGERY
Payer: MEDICARE

## 2018-12-07 VITALS — DIASTOLIC BLOOD PRESSURE: 71 MMHG | OXYGEN SATURATION: 96 % | TEMPERATURE: 57.2 F | SYSTOLIC BLOOD PRESSURE: 146 MMHG

## 2018-12-07 DIAGNOSIS — S76.111S QUADRICEPS TENDON RUPTURE, RIGHT, SEQUELA: Primary | ICD-10-CM

## 2018-12-07 LAB
ABO/RH: NORMAL
ABO/RH: NORMAL
ANTIBODY SCREEN: NORMAL
APPEARANCE FLUID: NORMAL
CELL COUNT FLUID TYPE: NORMAL
CLOT EVALUATION: NORMAL
COLOR FLUID: NORMAL
LYMPHOCYTES, BODY FLUID: 26 %
MACROPHAGE FLUID: 13 %
MONOCYTE, FLUID: 21 %
NEUTROPHIL, FLUID: 40 %
NUCLEATED CELLS FLUID: 236 /CUMM
NUMBER OF CELLS COUNTED FLUID: 100
RBC FLUID: NORMAL /CUMM

## 2018-12-07 PROCEDURE — 87205 SMEAR GRAM STAIN: CPT

## 2018-12-07 PROCEDURE — 94664 DEMO&/EVAL PT USE INHALER: CPT

## 2018-12-07 PROCEDURE — 6360000002 HC RX W HCPCS

## 2018-12-07 PROCEDURE — 86900 BLOOD TYPING SEROLOGIC ABO: CPT

## 2018-12-07 PROCEDURE — 6360000002 HC RX W HCPCS: Performed by: ORTHOPAEDIC SURGERY

## 2018-12-07 PROCEDURE — 3600000004 HC SURGERY LEVEL 4 BASE: Performed by: ORTHOPAEDIC SURGERY

## 2018-12-07 PROCEDURE — 7100000000 HC PACU RECOVERY - FIRST 15 MIN: Performed by: ORTHOPAEDIC SURGERY

## 2018-12-07 PROCEDURE — 87186 SC STD MICRODIL/AGAR DIL: CPT

## 2018-12-07 PROCEDURE — 3700000000 HC ANESTHESIA ATTENDED CARE: Performed by: ORTHOPAEDIC SURGERY

## 2018-12-07 PROCEDURE — 7100000001 HC PACU RECOVERY - ADDTL 15 MIN: Performed by: ORTHOPAEDIC SURGERY

## 2018-12-07 PROCEDURE — 86901 BLOOD TYPING SEROLOGIC RH(D): CPT

## 2018-12-07 PROCEDURE — 87070 CULTURE OTHR SPECIMN AEROBIC: CPT

## 2018-12-07 PROCEDURE — 2580000003 HC RX 258: Performed by: ORTHOPAEDIC SURGERY

## 2018-12-07 PROCEDURE — 3E0T3BZ INTRODUCTION OF ANESTHETIC AGENT INTO PERIPHERAL NERVES AND PLEXI, PERCUTANEOUS APPROACH: ICD-10-PCS | Performed by: ANESTHESIOLOGY

## 2018-12-07 PROCEDURE — 6360000002 HC RX W HCPCS: Performed by: NURSE ANESTHETIST, CERTIFIED REGISTERED

## 2018-12-07 PROCEDURE — 94761 N-INVAS EAR/PLS OXIMETRY MLT: CPT

## 2018-12-07 PROCEDURE — 6370000000 HC RX 637 (ALT 250 FOR IP): Performed by: ORTHOPAEDIC SURGERY

## 2018-12-07 PROCEDURE — 2580000003 HC RX 258: Performed by: NURSE ANESTHETIST, CERTIFIED REGISTERED

## 2018-12-07 PROCEDURE — 87077 CULTURE AEROBIC IDENTIFY: CPT

## 2018-12-07 PROCEDURE — 2720000010 HC SURG SUPPLY STERILE: Performed by: ORTHOPAEDIC SURGERY

## 2018-12-07 PROCEDURE — 89051 BODY FLUID CELL COUNT: CPT

## 2018-12-07 PROCEDURE — 6360000002 HC RX W HCPCS: Performed by: ANESTHESIOLOGY

## 2018-12-07 PROCEDURE — 2580000003 HC RX 258: Performed by: ANESTHESIOLOGY

## 2018-12-07 PROCEDURE — 0LUL07Z SUPPLEMENT RIGHT UPPER LEG TENDON WITH AUTOLOGOUS TISSUE SUBSTITUTE, OPEN APPROACH: ICD-10-PCS | Performed by: ORTHOPAEDIC SURGERY

## 2018-12-07 PROCEDURE — 2700000000 HC OXYGEN THERAPY PER DAY

## 2018-12-07 PROCEDURE — 1200000000 HC SEMI PRIVATE

## 2018-12-07 PROCEDURE — 51798 US URINE CAPACITY MEASURE: CPT

## 2018-12-07 PROCEDURE — 87075 CULTR BACTERIA EXCEPT BLOOD: CPT

## 2018-12-07 PROCEDURE — 94640 AIRWAY INHALATION TREATMENT: CPT

## 2018-12-07 PROCEDURE — 51701 INSERT BLADDER CATHETER: CPT

## 2018-12-07 PROCEDURE — 3600000014 HC SURGERY LEVEL 4 ADDTL 15MIN: Performed by: ORTHOPAEDIC SURGERY

## 2018-12-07 PROCEDURE — 2500000003 HC RX 250 WO HCPCS: Performed by: NURSE ANESTHETIST, CERTIFIED REGISTERED

## 2018-12-07 PROCEDURE — 3700000001 HC ADD 15 MINUTES (ANESTHESIA): Performed by: ORTHOPAEDIC SURGERY

## 2018-12-07 PROCEDURE — C1713 ANCHOR/SCREW BN/BN,TIS/BN: HCPCS | Performed by: ORTHOPAEDIC SURGERY

## 2018-12-07 PROCEDURE — 2709999900 HC NON-CHARGEABLE SUPPLY: Performed by: ORTHOPAEDIC SURGERY

## 2018-12-07 PROCEDURE — 94150 VITAL CAPACITY TEST: CPT

## 2018-12-07 PROCEDURE — 64447 NJX AA&/STRD FEMORAL NRV IMG: CPT | Performed by: ANESTHESIOLOGY

## 2018-12-07 PROCEDURE — 86850 RBC ANTIBODY SCREEN: CPT

## 2018-12-07 DEVICE — GRAFT HUM TISS W10-20MMXL19.5-20CM ACHILLES TEND FRZN: Type: IMPLANTABLE DEVICE | Site: KNEE | Status: FUNCTIONAL

## 2018-12-07 RX ORDER — FUROSEMIDE 20 MG/1
20 TABLET ORAL DAILY PRN
Status: DISCONTINUED | OUTPATIENT
Start: 2018-12-07 | End: 2018-12-11 | Stop reason: HOSPADM

## 2018-12-07 RX ORDER — OXYCODONE HYDROCHLORIDE AND ACETAMINOPHEN 5; 325 MG/1; MG/1
2 TABLET ORAL EVERY 4 HOURS PRN
Status: DISCONTINUED | OUTPATIENT
Start: 2018-12-07 | End: 2018-12-11 | Stop reason: HOSPADM

## 2018-12-07 RX ORDER — ROPIVACAINE HYDROCHLORIDE 5 MG/ML
INJECTION, SOLUTION EPIDURAL; INFILTRATION; PERINEURAL
Status: COMPLETED
Start: 2018-12-07 | End: 2018-12-07

## 2018-12-07 RX ORDER — FENTANYL CITRATE 50 UG/ML
50 INJECTION, SOLUTION INTRAMUSCULAR; INTRAVENOUS ONCE
Status: COMPLETED | OUTPATIENT
Start: 2018-12-07 | End: 2018-12-07

## 2018-12-07 RX ORDER — PROPOFOL 10 MG/ML
INJECTION, EMULSION INTRAVENOUS PRN
Status: DISCONTINUED | OUTPATIENT
Start: 2018-12-07 | End: 2018-12-07 | Stop reason: SDUPTHER

## 2018-12-07 RX ORDER — PANTOPRAZOLE SODIUM 40 MG/1
40 TABLET, DELAYED RELEASE ORAL
Status: DISCONTINUED | OUTPATIENT
Start: 2018-12-08 | End: 2018-12-11 | Stop reason: HOSPADM

## 2018-12-07 RX ORDER — HYDROMORPHONE HCL 110MG/55ML
PATIENT CONTROLLED ANALGESIA SYRINGE INTRAVENOUS PRN
Status: DISCONTINUED | OUTPATIENT
Start: 2018-12-07 | End: 2018-12-07 | Stop reason: SDUPTHER

## 2018-12-07 RX ORDER — POTASSIUM CHLORIDE 20 MEQ/1
20 TABLET, EXTENDED RELEASE ORAL DAILY
Status: DISCONTINUED | OUTPATIENT
Start: 2018-12-07 | End: 2018-12-11 | Stop reason: HOSPADM

## 2018-12-07 RX ORDER — OXYCODONE HYDROCHLORIDE AND ACETAMINOPHEN 5; 325 MG/1; MG/1
1 TABLET ORAL ONCE
Status: DISCONTINUED | OUTPATIENT
Start: 2018-12-07 | End: 2018-12-07 | Stop reason: HOSPADM

## 2018-12-07 RX ORDER — SODIUM CHLORIDE, SODIUM LACTATE, POTASSIUM CHLORIDE, CALCIUM CHLORIDE 600; 310; 30; 20 MG/100ML; MG/100ML; MG/100ML; MG/100ML
INJECTION, SOLUTION INTRAVENOUS CONTINUOUS
Status: DISCONTINUED | OUTPATIENT
Start: 2018-12-07 | End: 2018-12-07

## 2018-12-07 RX ORDER — LABETALOL HYDROCHLORIDE 5 MG/ML
5 INJECTION, SOLUTION INTRAVENOUS EVERY 10 MIN PRN
Status: DISCONTINUED | OUTPATIENT
Start: 2018-12-07 | End: 2018-12-07 | Stop reason: HOSPADM

## 2018-12-07 RX ORDER — ONDANSETRON 2 MG/ML
INJECTION INTRAMUSCULAR; INTRAVENOUS PRN
Status: DISCONTINUED | OUTPATIENT
Start: 2018-12-07 | End: 2018-12-07 | Stop reason: SDUPTHER

## 2018-12-07 RX ORDER — ONDANSETRON 2 MG/ML
4 INJECTION INTRAMUSCULAR; INTRAVENOUS EVERY 6 HOURS PRN
Status: DISCONTINUED | OUTPATIENT
Start: 2018-12-07 | End: 2018-12-11 | Stop reason: HOSPADM

## 2018-12-07 RX ORDER — LIDOCAINE HYDROCHLORIDE 10 MG/ML
1 INJECTION, SOLUTION EPIDURAL; INFILTRATION; INTRACAUDAL; PERINEURAL
Status: DISCONTINUED | OUTPATIENT
Start: 2018-12-07 | End: 2018-12-07 | Stop reason: HOSPADM

## 2018-12-07 RX ORDER — ROPIVACAINE HYDROCHLORIDE 5 MG/ML
30 INJECTION, SOLUTION EPIDURAL; INFILTRATION; PERINEURAL ONCE
Status: DISCONTINUED | OUTPATIENT
Start: 2018-12-07 | End: 2018-12-07 | Stop reason: HOSPADM

## 2018-12-07 RX ORDER — MIDAZOLAM HYDROCHLORIDE 1 MG/ML
INJECTION INTRAMUSCULAR; INTRAVENOUS PRN
Status: DISCONTINUED | OUTPATIENT
Start: 2018-12-07 | End: 2018-12-07 | Stop reason: SDUPTHER

## 2018-12-07 RX ORDER — ROCURONIUM BROMIDE 10 MG/ML
INJECTION, SOLUTION INTRAVENOUS PRN
Status: DISCONTINUED | OUTPATIENT
Start: 2018-12-07 | End: 2018-12-07 | Stop reason: SDUPTHER

## 2018-12-07 RX ORDER — CHLORTHALIDONE 25 MG/1
25 TABLET ORAL DAILY
Status: DISCONTINUED | OUTPATIENT
Start: 2018-12-07 | End: 2018-12-11 | Stop reason: HOSPADM

## 2018-12-07 RX ORDER — KETOROLAC TROMETHAMINE 30 MG/ML
30 INJECTION, SOLUTION INTRAMUSCULAR; INTRAVENOUS EVERY 6 HOURS
Status: COMPLETED | OUTPATIENT
Start: 2018-12-07 | End: 2018-12-09

## 2018-12-07 RX ORDER — BUDESONIDE 0.25 MG/2ML
0.25 INHALANT ORAL 2 TIMES DAILY
Status: DISCONTINUED | OUTPATIENT
Start: 2018-12-07 | End: 2018-12-11 | Stop reason: HOSPADM

## 2018-12-07 RX ORDER — LOSARTAN POTASSIUM 25 MG/1
50 TABLET ORAL DAILY
Status: DISCONTINUED | OUTPATIENT
Start: 2018-12-08 | End: 2018-12-11 | Stop reason: HOSPADM

## 2018-12-07 RX ORDER — ONDANSETRON 2 MG/ML
INJECTION INTRAMUSCULAR; INTRAVENOUS
Status: COMPLETED
Start: 2018-12-07 | End: 2018-12-07

## 2018-12-07 RX ORDER — ROPIVACAINE HYDROCHLORIDE 5 MG/ML
INJECTION, SOLUTION EPIDURAL; INFILTRATION; PERINEURAL PRN
Status: DISCONTINUED | OUTPATIENT
Start: 2018-12-07 | End: 2018-12-07 | Stop reason: HOSPADM

## 2018-12-07 RX ORDER — DIPHENHYDRAMINE HYDROCHLORIDE 50 MG/ML
12.5 INJECTION INTRAMUSCULAR; INTRAVENOUS
Status: DISCONTINUED | OUTPATIENT
Start: 2018-12-07 | End: 2018-12-07 | Stop reason: HOSPADM

## 2018-12-07 RX ORDER — OXYCODONE HYDROCHLORIDE AND ACETAMINOPHEN 5; 325 MG/1; MG/1
1 TABLET ORAL EVERY 6 HOURS PRN
Qty: 28 TABLET | Refills: 0 | Status: SHIPPED | OUTPATIENT
Start: 2018-12-07 | End: 2018-12-14

## 2018-12-07 RX ORDER — SODIUM CHLORIDE 9 MG/ML
INJECTION, SOLUTION INTRAVENOUS CONTINUOUS PRN
Status: DISCONTINUED | OUTPATIENT
Start: 2018-12-07 | End: 2018-12-07 | Stop reason: SDUPTHER

## 2018-12-07 RX ORDER — HYDRALAZINE HYDROCHLORIDE 20 MG/ML
5 INJECTION INTRAMUSCULAR; INTRAVENOUS EVERY 10 MIN PRN
Status: DISCONTINUED | OUTPATIENT
Start: 2018-12-07 | End: 2018-12-07 | Stop reason: HOSPADM

## 2018-12-07 RX ORDER — FENTANYL CITRATE 50 UG/ML
INJECTION, SOLUTION INTRAMUSCULAR; INTRAVENOUS
Status: COMPLETED
Start: 2018-12-07 | End: 2018-12-07

## 2018-12-07 RX ORDER — ALBUTEROL SULFATE 2.5 MG/3ML
2.5 SOLUTION RESPIRATORY (INHALATION) 4 TIMES DAILY
Status: DISCONTINUED | OUTPATIENT
Start: 2018-12-07 | End: 2018-12-11 | Stop reason: HOSPADM

## 2018-12-07 RX ORDER — VANCOMYCIN HYDROCHLORIDE 500 MG/10ML
INJECTION, POWDER, LYOPHILIZED, FOR SOLUTION INTRAVENOUS PRN
Status: DISCONTINUED | OUTPATIENT
Start: 2018-12-07 | End: 2018-12-07 | Stop reason: HOSPADM

## 2018-12-07 RX ORDER — ONDANSETRON 2 MG/ML
4 INJECTION INTRAMUSCULAR; INTRAVENOUS
Status: COMPLETED | OUTPATIENT
Start: 2018-12-07 | End: 2018-12-07

## 2018-12-07 RX ORDER — ACETAMINOPHEN 325 MG/1
650 TABLET ORAL EVERY 4 HOURS PRN
Status: DISCONTINUED | OUTPATIENT
Start: 2018-12-07 | End: 2018-12-11 | Stop reason: HOSPADM

## 2018-12-07 RX ORDER — SUCCINYLCHOLINE/SOD CL,ISO/PF 100 MG/5ML
SYRINGE (ML) INTRAVENOUS PRN
Status: DISCONTINUED | OUTPATIENT
Start: 2018-12-07 | End: 2018-12-07 | Stop reason: SDUPTHER

## 2018-12-07 RX ORDER — SODIUM CHLORIDE 0.9 % (FLUSH) 0.9 %
10 SYRINGE (ML) INJECTION EVERY 12 HOURS SCHEDULED
Status: DISCONTINUED | OUTPATIENT
Start: 2018-12-07 | End: 2018-12-11 | Stop reason: HOSPADM

## 2018-12-07 RX ORDER — SODIUM CHLORIDE 0.9 % (FLUSH) 0.9 %
10 SYRINGE (ML) INJECTION PRN
Status: DISCONTINUED | OUTPATIENT
Start: 2018-12-07 | End: 2018-12-07 | Stop reason: HOSPADM

## 2018-12-07 RX ORDER — FENTANYL CITRATE 50 UG/ML
25 INJECTION, SOLUTION INTRAMUSCULAR; INTRAVENOUS EVERY 5 MIN PRN
Status: DISCONTINUED | OUTPATIENT
Start: 2018-12-07 | End: 2018-12-07 | Stop reason: HOSPADM

## 2018-12-07 RX ORDER — ALBUTEROL SULFATE 2.5 MG/3ML
2.5 SOLUTION RESPIRATORY (INHALATION) EVERY 4 HOURS PRN
Status: DISCONTINUED | OUTPATIENT
Start: 2018-12-07 | End: 2018-12-08

## 2018-12-07 RX ORDER — HYDRALAZINE HYDROCHLORIDE 20 MG/ML
INJECTION INTRAMUSCULAR; INTRAVENOUS PRN
Status: DISCONTINUED | OUTPATIENT
Start: 2018-12-07 | End: 2018-12-07 | Stop reason: SDUPTHER

## 2018-12-07 RX ORDER — FENTANYL CITRATE 50 UG/ML
INJECTION, SOLUTION INTRAMUSCULAR; INTRAVENOUS PRN
Status: DISCONTINUED | OUTPATIENT
Start: 2018-12-07 | End: 2018-12-07 | Stop reason: SDUPTHER

## 2018-12-07 RX ORDER — NEOSTIGMINE METHYLSULFATE 5 MG/5 ML
SYRINGE (ML) INTRAVENOUS PRN
Status: DISCONTINUED | OUTPATIENT
Start: 2018-12-07 | End: 2018-12-07

## 2018-12-07 RX ORDER — CYCLOBENZAPRINE HCL 10 MG
10 TABLET ORAL 3 TIMES DAILY PRN
Status: DISCONTINUED | OUTPATIENT
Start: 2018-12-07 | End: 2018-12-10

## 2018-12-07 RX ORDER — ROPIVACAINE HYDROCHLORIDE 5 MG/ML
INJECTION, SOLUTION EPIDURAL; INFILTRATION; PERINEURAL PRN
Status: DISCONTINUED | OUTPATIENT
Start: 2018-12-07 | End: 2018-12-07 | Stop reason: SDUPTHER

## 2018-12-07 RX ORDER — SODIUM CHLORIDE 9 MG/ML
INJECTION, SOLUTION INTRAVENOUS CONTINUOUS
Status: DISCONTINUED | OUTPATIENT
Start: 2018-12-07 | End: 2018-12-08

## 2018-12-07 RX ORDER — SODIUM CHLORIDE 0.9 % (FLUSH) 0.9 %
10 SYRINGE (ML) INJECTION EVERY 12 HOURS SCHEDULED
Status: DISCONTINUED | OUTPATIENT
Start: 2018-12-07 | End: 2018-12-07 | Stop reason: HOSPADM

## 2018-12-07 RX ORDER — DOCUSATE SODIUM 100 MG/1
100 CAPSULE, LIQUID FILLED ORAL 2 TIMES DAILY
Status: DISCONTINUED | OUTPATIENT
Start: 2018-12-07 | End: 2018-12-11 | Stop reason: HOSPADM

## 2018-12-07 RX ORDER — OXYCODONE HYDROCHLORIDE AND ACETAMINOPHEN 5; 325 MG/1; MG/1
1 TABLET ORAL EVERY 4 HOURS PRN
Status: DISCONTINUED | OUTPATIENT
Start: 2018-12-07 | End: 2018-12-11 | Stop reason: HOSPADM

## 2018-12-07 RX ORDER — PROMETHAZINE HYDROCHLORIDE 25 MG/ML
6.25 INJECTION, SOLUTION INTRAMUSCULAR; INTRAVENOUS
Status: DISCONTINUED | OUTPATIENT
Start: 2018-12-07 | End: 2018-12-07 | Stop reason: HOSPADM

## 2018-12-07 RX ORDER — CEFAZOLIN SODIUM 2 G/50ML
2 SOLUTION INTRAVENOUS ONCE
Status: COMPLETED | OUTPATIENT
Start: 2018-12-07 | End: 2018-12-07

## 2018-12-07 RX ORDER — MIDAZOLAM HYDROCHLORIDE 1 MG/ML
2 INJECTION INTRAMUSCULAR; INTRAVENOUS ONCE
Status: COMPLETED | OUTPATIENT
Start: 2018-12-07 | End: 2018-12-07

## 2018-12-07 RX ORDER — DEXAMETHASONE SODIUM PHOSPHATE 4 MG/ML
INJECTION, SOLUTION INTRA-ARTICULAR; INTRALESIONAL; INTRAMUSCULAR; INTRAVENOUS; SOFT TISSUE PRN
Status: DISCONTINUED | OUTPATIENT
Start: 2018-12-07 | End: 2018-12-07 | Stop reason: SDUPTHER

## 2018-12-07 RX ORDER — GLYCOPYRROLATE 1 MG/5 ML
SYRINGE (ML) INTRAVENOUS PRN
Status: DISCONTINUED | OUTPATIENT
Start: 2018-12-07 | End: 2018-12-07

## 2018-12-07 RX ORDER — SODIUM CHLORIDE 0.9 % (FLUSH) 0.9 %
10 SYRINGE (ML) INJECTION PRN
Status: DISCONTINUED | OUTPATIENT
Start: 2018-12-07 | End: 2018-12-11 | Stop reason: HOSPADM

## 2018-12-07 RX ADMIN — BUDESONIDE 250 MCG: 0.25 SUSPENSION RESPIRATORY (INHALATION) at 21:07

## 2018-12-07 RX ADMIN — CEFAZOLIN SODIUM 2 G: 10 POWDER, FOR SOLUTION INTRAVENOUS at 21:55

## 2018-12-07 RX ADMIN — FENTANYL CITRATE 25 MCG: 50 INJECTION INTRAMUSCULAR; INTRAVENOUS at 16:50

## 2018-12-07 RX ADMIN — ONDANSETRON 4 MG: 2 INJECTION INTRAMUSCULAR; INTRAVENOUS at 16:50

## 2018-12-07 RX ADMIN — SUGAMMADEX 100 MG: 100 INJECTION, SOLUTION INTRAVENOUS at 15:51

## 2018-12-07 RX ADMIN — FENTANYL CITRATE 50 MCG: 50 INJECTION INTRAMUSCULAR; INTRAVENOUS at 15:00

## 2018-12-07 RX ADMIN — CHLORTHALIDONE 25 MG: 25 TABLET ORAL at 19:52

## 2018-12-07 RX ADMIN — HYDROMORPHONE HYDROCHLORIDE 0.8 MG: 2 INJECTION, SOLUTION INTRAMUSCULAR; INTRAVENOUS; SUBCUTANEOUS at 15:53

## 2018-12-07 RX ADMIN — FENTANYL CITRATE 25 MCG: 50 INJECTION INTRAMUSCULAR; INTRAVENOUS at 16:56

## 2018-12-07 RX ADMIN — POTASSIUM CHLORIDE 20 MEQ: 20 TABLET, EXTENDED RELEASE ORAL at 19:53

## 2018-12-07 RX ADMIN — FENTANYL CITRATE: 50 INJECTION INTRAMUSCULAR; INTRAVENOUS at 11:12

## 2018-12-07 RX ADMIN — ROPIVACAINE HYDROCHLORIDE 45 ML: 5 INJECTION, SOLUTION EPIDURAL; INFILTRATION; PERINEURAL at 16:41

## 2018-12-07 RX ADMIN — FENTANYL CITRATE 50 MCG: 50 INJECTION INTRAMUSCULAR; INTRAVENOUS at 14:44

## 2018-12-07 RX ADMIN — MIDAZOLAM 2 MG: 1 INJECTION INTRAMUSCULAR; INTRAVENOUS at 13:24

## 2018-12-07 RX ADMIN — SODIUM CHLORIDE, SODIUM LACTATE, POTASSIUM CHLORIDE, AND CALCIUM CHLORIDE: 600; 310; 30; 20 INJECTION, SOLUTION INTRAVENOUS at 15:30

## 2018-12-07 RX ADMIN — DEXAMETHASONE SODIUM PHOSPHATE 4 MG: 4 INJECTION, SOLUTION INTRAMUSCULAR; INTRAVENOUS at 15:32

## 2018-12-07 RX ADMIN — HYDROMORPHONE HYDROCHLORIDE 0.8 MG: 2 INJECTION, SOLUTION INTRAMUSCULAR; INTRAVENOUS; SUBCUTANEOUS at 16:06

## 2018-12-07 RX ADMIN — HYDRALAZINE HYDROCHLORIDE 10 MG: 20 INJECTION, SOLUTION INTRAMUSCULAR; INTRAVENOUS at 16:07

## 2018-12-07 RX ADMIN — SODIUM CHLORIDE, SODIUM LACTATE, POTASSIUM CHLORIDE, AND CALCIUM CHLORIDE: 600; 310; 30; 20 INJECTION, SOLUTION INTRAVENOUS at 14:35

## 2018-12-07 RX ADMIN — ALBUTEROL SULFATE 2.5 MG: 2.5 SOLUTION RESPIRATORY (INHALATION) at 21:07

## 2018-12-07 RX ADMIN — ROCURONIUM BROMIDE 40 MG: 10 INJECTION, SOLUTION INTRAVENOUS at 14:05

## 2018-12-07 RX ADMIN — FENTANYL CITRATE 100 MCG: 50 INJECTION INTRAMUSCULAR; INTRAVENOUS at 13:47

## 2018-12-07 RX ADMIN — MIDAZOLAM HYDROCHLORIDE 2 MG: 1 INJECTION INTRAMUSCULAR; INTRAVENOUS at 16:16

## 2018-12-07 RX ADMIN — ONDANSETRON 4 MG: 2 INJECTION INTRAMUSCULAR; INTRAVENOUS at 15:32

## 2018-12-07 RX ADMIN — MIDAZOLAM HYDROCHLORIDE 2 MG: 1 INJECTION INTRAMUSCULAR; INTRAVENOUS at 13:39

## 2018-12-07 RX ADMIN — CEFAZOLIN SODIUM 2 G: 2 SOLUTION INTRAVENOUS at 13:47

## 2018-12-07 RX ADMIN — SODIUM CHLORIDE: 9 INJECTION, SOLUTION INTRAVENOUS at 18:33

## 2018-12-07 RX ADMIN — HYDROMORPHONE HYDROCHLORIDE 0.4 MG: 2 INJECTION, SOLUTION INTRAMUSCULAR; INTRAVENOUS; SUBCUTANEOUS at 16:00

## 2018-12-07 RX ADMIN — CYCLOBENZAPRINE HYDROCHLORIDE 10 MG: 10 TABLET, FILM COATED ORAL at 19:52

## 2018-12-07 RX ADMIN — OXYCODONE AND ACETAMINOPHEN 2 TABLET: 5; 325 TABLET ORAL at 21:54

## 2018-12-07 RX ADMIN — Medication 120 MG: at 13:47

## 2018-12-07 RX ADMIN — FENTANYL CITRATE: 50 INJECTION, SOLUTION INTRAMUSCULAR; INTRAVENOUS at 11:12

## 2018-12-07 RX ADMIN — FENTANYL CITRATE 25 MCG: 50 INJECTION INTRAMUSCULAR; INTRAVENOUS at 18:10

## 2018-12-07 RX ADMIN — KETOROLAC TROMETHAMINE 30 MG: 30 INJECTION, SOLUTION INTRAMUSCULAR at 19:52

## 2018-12-07 RX ADMIN — FENTANYL CITRATE 100 MCG: 50 INJECTION INTRAMUSCULAR; INTRAVENOUS at 15:24

## 2018-12-07 RX ADMIN — SODIUM CHLORIDE: 900 INJECTION, SOLUTION INTRAVENOUS at 15:30

## 2018-12-07 RX ADMIN — SODIUM CHLORIDE, SODIUM LACTATE, POTASSIUM CHLORIDE, AND CALCIUM CHLORIDE: 600; 310; 30; 20 INJECTION, SOLUTION INTRAVENOUS at 10:56

## 2018-12-07 RX ADMIN — DOCUSATE SODIUM 100 MG: 100 CAPSULE, LIQUID FILLED ORAL at 19:53

## 2018-12-07 RX ADMIN — PROPOFOL 200 MG: 10 INJECTION, EMULSION INTRAVENOUS at 13:47

## 2018-12-07 RX ADMIN — PHENYLEPHRINE HYDROCHLORIDE 150 MCG: 10 INJECTION, SOLUTION INTRAMUSCULAR; INTRAVENOUS; SUBCUTANEOUS at 13:52

## 2018-12-07 RX ADMIN — SODIUM CHLORIDE, SODIUM LACTATE, POTASSIUM CHLORIDE, AND CALCIUM CHLORIDE: 600; 310; 30; 20 INJECTION, SOLUTION INTRAVENOUS at 13:39

## 2018-12-07 RX ADMIN — VANCOMYCIN HYDROCHLORIDE 1250 MG: 10 INJECTION, POWDER, LYOPHILIZED, FOR SOLUTION INTRAVENOUS at 10:57

## 2018-12-07 ASSESSMENT — PULMONARY FUNCTION TESTS
PIF_VALUE: 15
PIF_VALUE: 18
PIF_VALUE: 18
PIF_VALUE: 21
PIF_VALUE: 19
PIF_VALUE: 20
PIF_VALUE: 1
PIF_VALUE: 15
PIF_VALUE: 19
PIF_VALUE: 19
PIF_VALUE: 20
PIF_VALUE: 17
PIF_VALUE: 21
PIF_VALUE: 17
PIF_VALUE: 15
PIF_VALUE: 18
PIF_VALUE: 3
PIF_VALUE: 15
PIF_VALUE: 21
PIF_VALUE: 20
PIF_VALUE: 1
PIF_VALUE: 19
PIF_VALUE: 22
PIF_VALUE: 19
PIF_VALUE: 20
PIF_VALUE: 20
PIF_VALUE: 19
PIF_VALUE: 13
PIF_VALUE: 20
PIF_VALUE: 19
PIF_VALUE: 19
PIF_VALUE: 20
PIF_VALUE: 0
PIF_VALUE: 7
PIF_VALUE: 18
PIF_VALUE: 21
PIF_VALUE: 21
PIF_VALUE: 19
PIF_VALUE: 20
PIF_VALUE: 15
PIF_VALUE: 19
PIF_VALUE: 17
PIF_VALUE: 21
PIF_VALUE: 15
PIF_VALUE: 19
PIF_VALUE: 15
PIF_VALUE: 11
PIF_VALUE: 15
PIF_VALUE: 20
PIF_VALUE: 1
PIF_VALUE: 16
PIF_VALUE: 18
PIF_VALUE: 17
PIF_VALUE: 18
PIF_VALUE: 19
PIF_VALUE: 18
PIF_VALUE: 19
PIF_VALUE: 20
PIF_VALUE: 19
PIF_VALUE: 17
PIF_VALUE: 15
PIF_VALUE: 6
PIF_VALUE: 19
PIF_VALUE: 15
PIF_VALUE: 16
PIF_VALUE: 17
PIF_VALUE: 19
PIF_VALUE: 20
PIF_VALUE: 11
PIF_VALUE: 15
PIF_VALUE: 20
PIF_VALUE: 19
PIF_VALUE: 20
PIF_VALUE: 17
PIF_VALUE: 20
PIF_VALUE: 17
PIF_VALUE: 19
PIF_VALUE: 15
PIF_VALUE: 17
PIF_VALUE: 18
PIF_VALUE: 20
PIF_VALUE: 1
PIF_VALUE: 16
PIF_VALUE: 19
PIF_VALUE: 1
PIF_VALUE: 13
PIF_VALUE: 17
PIF_VALUE: 20
PIF_VALUE: 1
PIF_VALUE: 19
PIF_VALUE: 19
PIF_VALUE: 20
PIF_VALUE: 20
PIF_VALUE: 19
PIF_VALUE: 15
PIF_VALUE: 16
PIF_VALUE: 19
PIF_VALUE: 22
PIF_VALUE: 19
PIF_VALUE: 18
PIF_VALUE: 19
PIF_VALUE: 21
PIF_VALUE: 19
PIF_VALUE: 0
PIF_VALUE: 17
PIF_VALUE: 19
PIF_VALUE: 20
PIF_VALUE: 0
PIF_VALUE: 19
PIF_VALUE: 18
PIF_VALUE: 18
PIF_VALUE: 20
PIF_VALUE: 26
PIF_VALUE: 18
PIF_VALUE: 18
PIF_VALUE: 21
PIF_VALUE: 19
PIF_VALUE: 17
PIF_VALUE: 20
PIF_VALUE: 15
PIF_VALUE: 18
PIF_VALUE: 19
PIF_VALUE: 21
PIF_VALUE: 19
PIF_VALUE: 20
PIF_VALUE: 15
PIF_VALUE: 20
PIF_VALUE: 15
PIF_VALUE: 25
PIF_VALUE: 19
PIF_VALUE: 18
PIF_VALUE: 8
PIF_VALUE: 16
PIF_VALUE: 16
PIF_VALUE: 19
PIF_VALUE: 13
PIF_VALUE: 18
PIF_VALUE: 19
PIF_VALUE: 20
PIF_VALUE: 19
PIF_VALUE: 18
PIF_VALUE: 19
PIF_VALUE: 21

## 2018-12-07 ASSESSMENT — PAIN SCALES - GENERAL
PAINLEVEL_OUTOF10: 5
PAINLEVEL_OUTOF10: 6
PAINLEVEL_OUTOF10: 8
PAINLEVEL_OUTOF10: 6
PAINLEVEL_OUTOF10: 8
PAINLEVEL_OUTOF10: 5
PAINLEVEL_OUTOF10: 7
PAINLEVEL_OUTOF10: 6
PAINLEVEL_OUTOF10: 10

## 2018-12-07 ASSESSMENT — PAIN DESCRIPTION - ONSET
ONSET: ON-GOING
ONSET: ON-GOING

## 2018-12-07 ASSESSMENT — PAIN DESCRIPTION - ORIENTATION
ORIENTATION: RIGHT
ORIENTATION: RIGHT

## 2018-12-07 ASSESSMENT — PAIN DESCRIPTION - PROGRESSION
CLINICAL_PROGRESSION: GRADUALLY WORSENING
CLINICAL_PROGRESSION: GRADUALLY WORSENING

## 2018-12-07 ASSESSMENT — PAIN DESCRIPTION - DESCRIPTORS
DESCRIPTORS: SPASM;SHARP
DESCRIPTORS: SPASM;SHARP
DESCRIPTORS: CRAMPING;SHARP

## 2018-12-07 ASSESSMENT — PAIN DESCRIPTION - FREQUENCY
FREQUENCY: INTERMITTENT
FREQUENCY: CONTINUOUS

## 2018-12-07 ASSESSMENT — PAIN DESCRIPTION - PAIN TYPE
TYPE: SURGICAL PAIN
TYPE: SURGICAL PAIN

## 2018-12-07 ASSESSMENT — PAIN DESCRIPTION - LOCATION
LOCATION: LEG;KNEE
LOCATION: KNEE

## 2018-12-07 ASSESSMENT — PAIN - FUNCTIONAL ASSESSMENT: PAIN_FUNCTIONAL_ASSESSMENT: 0-10

## 2018-12-07 NOTE — PROGRESS NOTES
RIGHT ANTERIOR SHIN 2CM X 4CM SKIN SHEAR TEAR OBSERVED UPON REMOVAL OF STOCKINETTE AND Aisha [place]. DRESSED WITH ADAPTIC. DR Eloise Howell.

## 2018-12-08 LAB
HCT VFR BLD CALC: 34.6 % (ref 36–48)
HEMOGLOBIN: 11.4 G/DL (ref 12–16)

## 2018-12-08 PROCEDURE — 51701 INSERT BLADDER CATHETER: CPT

## 2018-12-08 PROCEDURE — 2580000003 HC RX 258: Performed by: ORTHOPAEDIC SURGERY

## 2018-12-08 PROCEDURE — 85018 HEMOGLOBIN: CPT

## 2018-12-08 PROCEDURE — 94640 AIRWAY INHALATION TREATMENT: CPT

## 2018-12-08 PROCEDURE — 1200000000 HC SEMI PRIVATE

## 2018-12-08 PROCEDURE — 6360000002 HC RX W HCPCS: Performed by: ORTHOPAEDIC SURGERY

## 2018-12-08 PROCEDURE — 85014 HEMATOCRIT: CPT

## 2018-12-08 PROCEDURE — 51798 US URINE CAPACITY MEASURE: CPT

## 2018-12-08 PROCEDURE — 99221 1ST HOSP IP/OBS SF/LOW 40: CPT | Performed by: HOSPITALIST

## 2018-12-08 PROCEDURE — 6360000002 HC RX W HCPCS: Performed by: PHYSICIAN ASSISTANT

## 2018-12-08 PROCEDURE — 51702 INSERT TEMP BLADDER CATH: CPT

## 2018-12-08 PROCEDURE — 94761 N-INVAS EAR/PLS OXIMETRY MLT: CPT

## 2018-12-08 PROCEDURE — 6370000000 HC RX 637 (ALT 250 FOR IP): Performed by: HOSPITALIST

## 2018-12-08 PROCEDURE — 36415 COLL VENOUS BLD VENIPUNCTURE: CPT

## 2018-12-08 PROCEDURE — 6370000000 HC RX 637 (ALT 250 FOR IP): Performed by: ORTHOPAEDIC SURGERY

## 2018-12-08 PROCEDURE — 6370000000 HC RX 637 (ALT 250 FOR IP): Performed by: PHYSICIAN ASSISTANT

## 2018-12-08 PROCEDURE — 94664 DEMO&/EVAL PT USE INHALER: CPT

## 2018-12-08 RX ORDER — TAMSULOSIN HYDROCHLORIDE 0.4 MG/1
0.4 CAPSULE ORAL DAILY
Status: DISCONTINUED | OUTPATIENT
Start: 2018-12-08 | End: 2018-12-11 | Stop reason: HOSPADM

## 2018-12-08 RX ORDER — ALBUTEROL SULFATE 2.5 MG/3ML
2.5 SOLUTION RESPIRATORY (INHALATION) PRN
Status: DISCONTINUED | OUTPATIENT
Start: 2018-12-08 | End: 2018-12-11 | Stop reason: HOSPADM

## 2018-12-08 RX ORDER — METHOCARBAMOL 750 MG/1
750 TABLET, FILM COATED ORAL EVERY 6 HOURS PRN
Qty: 60 TABLET | Refills: 2 | Status: SHIPPED | OUTPATIENT
Start: 2018-12-08 | End: 2018-12-10 | Stop reason: HOSPADM

## 2018-12-08 RX ORDER — METHOCARBAMOL 750 MG/1
750 TABLET, FILM COATED ORAL 4 TIMES DAILY
Status: DISCONTINUED | OUTPATIENT
Start: 2018-12-08 | End: 2018-12-10

## 2018-12-08 RX ADMIN — ALBUTEROL SULFATE 2.5 MG: 2.5 SOLUTION RESPIRATORY (INHALATION) at 20:43

## 2018-12-08 RX ADMIN — OXYCODONE AND ACETAMINOPHEN 1 TABLET: 5; 325 TABLET ORAL at 07:22

## 2018-12-08 RX ADMIN — DOCUSATE SODIUM 100 MG: 100 CAPSULE, LIQUID FILLED ORAL at 20:25

## 2018-12-08 RX ADMIN — TAMSULOSIN HYDROCHLORIDE 0.4 MG: 0.4 CAPSULE ORAL at 09:50

## 2018-12-08 RX ADMIN — METHOCARBAMOL 750 MG: 750 TABLET ORAL at 08:46

## 2018-12-08 RX ADMIN — KETOROLAC TROMETHAMINE 30 MG: 30 INJECTION, SOLUTION INTRAMUSCULAR at 01:08

## 2018-12-08 RX ADMIN — POTASSIUM CHLORIDE 20 MEQ: 20 TABLET, EXTENDED RELEASE ORAL at 08:46

## 2018-12-08 RX ADMIN — SODIUM CHLORIDE: 9 INJECTION, SOLUTION INTRAVENOUS at 03:04

## 2018-12-08 RX ADMIN — METHOCARBAMOL 750 MG: 750 TABLET ORAL at 20:25

## 2018-12-08 RX ADMIN — CEFAZOLIN SODIUM 2 G: 10 POWDER, FOR SOLUTION INTRAVENOUS at 05:47

## 2018-12-08 RX ADMIN — Medication 10 ML: at 20:26

## 2018-12-08 RX ADMIN — APIXABAN 2.5 MG: 2.5 TABLET, FILM COATED ORAL at 20:25

## 2018-12-08 RX ADMIN — METHOCARBAMOL 750 MG: 750 TABLET ORAL at 16:14

## 2018-12-08 RX ADMIN — HYDROMORPHONE HYDROCHLORIDE 0.5 MG: 1 INJECTION, SOLUTION INTRAMUSCULAR; INTRAVENOUS; SUBCUTANEOUS at 01:16

## 2018-12-08 RX ADMIN — KETOROLAC TROMETHAMINE 30 MG: 30 INJECTION, SOLUTION INTRAMUSCULAR at 17:45

## 2018-12-08 RX ADMIN — CYCLOBENZAPRINE HYDROCHLORIDE 10 MG: 10 TABLET, FILM COATED ORAL at 03:55

## 2018-12-08 RX ADMIN — BUDESONIDE 250 MCG: 0.25 SUSPENSION RESPIRATORY (INHALATION) at 20:45

## 2018-12-08 RX ADMIN — CYCLOBENZAPRINE HYDROCHLORIDE 10 MG: 10 TABLET, FILM COATED ORAL at 14:30

## 2018-12-08 RX ADMIN — PANTOPRAZOLE SODIUM 40 MG: 40 TABLET, DELAYED RELEASE ORAL at 06:37

## 2018-12-08 RX ADMIN — OXYCODONE AND ACETAMINOPHEN 2 TABLET: 5; 325 TABLET ORAL at 11:31

## 2018-12-08 RX ADMIN — DOCUSATE SODIUM 100 MG: 100 CAPSULE, LIQUID FILLED ORAL at 08:46

## 2018-12-08 RX ADMIN — OXYCODONE AND ACETAMINOPHEN 2 TABLET: 5; 325 TABLET ORAL at 15:26

## 2018-12-08 RX ADMIN — Medication 10 ML: at 08:45

## 2018-12-08 RX ADMIN — METHOCARBAMOL 750 MG: 750 TABLET ORAL at 12:00

## 2018-12-08 RX ADMIN — ALBUTEROL SULFATE 2.5 MG: 2.5 SOLUTION RESPIRATORY (INHALATION) at 14:39

## 2018-12-08 RX ADMIN — KETOROLAC TROMETHAMINE 30 MG: 30 INJECTION, SOLUTION INTRAMUSCULAR at 06:37

## 2018-12-08 RX ADMIN — OXYCODONE AND ACETAMINOPHEN 2 TABLET: 5; 325 TABLET ORAL at 20:25

## 2018-12-08 RX ADMIN — APIXABAN 2.5 MG: 2.5 TABLET, FILM COATED ORAL at 08:46

## 2018-12-08 RX ADMIN — KETOROLAC TROMETHAMINE 30 MG: 30 INJECTION, SOLUTION INTRAMUSCULAR at 12:00

## 2018-12-08 ASSESSMENT — PAIN DESCRIPTION - PROGRESSION
CLINICAL_PROGRESSION: GRADUALLY WORSENING
CLINICAL_PROGRESSION: NOT CHANGED

## 2018-12-08 ASSESSMENT — PAIN DESCRIPTION - DESCRIPTORS
DESCRIPTORS: ACHING
DESCRIPTORS: ACHING;CONSTANT
DESCRIPTORS: ACHING
DESCRIPTORS: SHARP;SPASM;SHOOTING
DESCRIPTORS: SPASM;SHARP;SHOOTING

## 2018-12-08 ASSESSMENT — PAIN DESCRIPTION - FREQUENCY
FREQUENCY: CONTINUOUS

## 2018-12-08 ASSESSMENT — PAIN DESCRIPTION - PAIN TYPE
TYPE: SURGICAL PAIN
TYPE: ACUTE PAIN;SURGICAL PAIN
TYPE: SURGICAL PAIN

## 2018-12-08 ASSESSMENT — PAIN DESCRIPTION - ORIENTATION
ORIENTATION: RIGHT

## 2018-12-08 ASSESSMENT — PAIN DESCRIPTION - ONSET
ONSET: ON-GOING

## 2018-12-08 ASSESSMENT — PAIN SCALES - GENERAL
PAINLEVEL_OUTOF10: 10
PAINLEVEL_OUTOF10: 9
PAINLEVEL_OUTOF10: 10
PAINLEVEL_OUTOF10: 5
PAINLEVEL_OUTOF10: 6
PAINLEVEL_OUTOF10: 9
PAINLEVEL_OUTOF10: 8
PAINLEVEL_OUTOF10: 0
PAINLEVEL_OUTOF10: 7
PAINLEVEL_OUTOF10: 6
PAINLEVEL_OUTOF10: 3
PAINLEVEL_OUTOF10: 6
PAINLEVEL_OUTOF10: 7

## 2018-12-08 ASSESSMENT — PAIN DESCRIPTION - LOCATION
LOCATION: KNEE;HIP
LOCATION: LEG;KNEE
LOCATION: LEG;KNEE
LOCATION: KNEE
LOCATION: KNEE

## 2018-12-08 NOTE — PROGRESS NOTES
Patient is a/o x4. Patient denies c/o nausea. Patient has c/o pain, Prn pain medication given. BP soft, otherwise VSS. Non-skid socks on, SCD'S remain in place. Patient x 2 SBA w/gait belt and walker Stand/pivot to BSC, tolerates fairly well. At this time patient has yet to void on own. Fall precautions in place. Bed locked and in lowest position, bedside table and nurse call light within reach. Instructed patient to call out for assistance. Patient remains free from falls at this time, will continue to monitor.

## 2018-12-08 NOTE — OP NOTE
she  re-arthrofibrosed and eventually, I offered her revision to a hinged  prosthesis given the medial collateral ligament attachment. I brought  her back to the operating room once again and was able to convert her to  a hinged prosthesis. With an extensive synovectomy, I was able to  obtain near full motion. Once again, she was recovering well until  another fall. She had an inability to obtain full active extension. I  ordered an MRI, which was equivocal.  I then had an ultrasound performed  by Radiology, Dr. Mandy valentine at [facility] which demonstrated a  rupture of the quadriceps tendon. I recommended surgery. We discussed  the possibility and need for arthrodesis in the future or potentially  amputation given the ongoing status of her knee and her multiple falls. We discussed the standard risk of surgery including but not limited to  scar, pain, bleeding, infection, need for further surgery, fracture,  failure of the quadriceps tendon repair or even loss of life and limb. Despite these risks and others, she did elect to proceed. OPERATIVE PROCEDURE:  The patient was greeted in the preoperative  holding area. Her right knee was marked with a marker. She was brought  to the operating room where general anesthesia was obtained. She was  placed in the supine position with all bony prominences well padded and  tourniquet applied to the thigh. The right lower extremity was prepped  and draped in normal standard sterile surgical fashion followed by a  final time-out, verifying correct patient, operative site, and operative  plan. She did receive preoperative antibiotics prior to surgical  incision. After exsanguination of the leg and inflation of the tourniquet to 300  mmHg, I used our anterior incision, extended it proximally and distally. I immediately encountered a quadriceps defect to the subcutaneous  tissue.   I aspirated synovial fluid, sent it for stat cell count both sides of the Achilles and into the  extensor retinaculum. I then irrigated the wound copiously and then  proceeded with closure and closing the subcutaneous tissue with 0  Vicryl, the subdermal tissue with 2-0 Vicryl and then skin staples, a  negative pressure wound therapy device and then the leg was wrapped with  an Ace bandage. The tourniquet was deflated. The patient was placed in  a knee immobilizer. She will be held in extension for six weeks. The  patient tolerated the procedure well without complication.         Merced Mccollum MD    D: 12/07/2018 15:59:33       T: 12/08/2018 1:00:51     [email or web address]  Job#: [id number]     Doc#: [id number]    CC:

## 2018-12-08 NOTE — PROGRESS NOTES
4 Eyes Admission Assessment     I agree as the admission nurse that 2 RN's have performed a thorough Head to Toe Skin Assessment on the patient. ALL assessment sites listed below have been assessed on admission. Areas assessed by both nurses:   [x]   Head, Face, and Ears   [x]   Shoulders, Back, and Chest  [x]   Arms, Elbows, and Hands   [x]   Coccyx, Sacrum, and Ischum  [x]   Legs, Feet, and Heels        Does the Patient have Skin Breakdown?   No         [place] Prevention initiated:  NA   Wound Care Orders initiated:  NA      WOC nurse consulted for Pressure Injury (Stage 3,4, Unstageable, DTI, NWPT, and Complex wounds):  NA      Nurse 1 eSignature: Electronically signed by Oracio Alanis RN on 12/7/18 at 9:15 PM    **SHARE this note so that the co-signing nurse is able to place an eSignature**    Nurse 2 eSignature: Electronically signed by Martha Nunes RN on 12/8/18 at 6:21 AM

## 2018-12-08 NOTE — PROGRESS NOTES
RESPIRATORY THERAPY ASSESSMENT    [facility] [address] Record Number:  [id number]  Age: 76 y.o. Gender: female  : 1950  Today's Date:  2018  Room:  [facility]5505-    Assessment     Is the patient being admitted for a COPD or Asthma exacerbation? No   (If yes the patient will be seen every 4 hours for the first 24 hours and then reassessed)    Patient Admission Diagnosis: rupture of R quadriceps tendon      Allergies  Allergies   Allergen Reactions    Codeine      Severe headaches    Demerol      Severe headache and over-sedation    Morphine Other (See Comments)     Makes her crazy    Tape Sharonda Gregory Tape] Other (See Comments)     Tears skin    Cabbage Nausea And Vomiting and Swelling    Erythromycin Nausea And Vomiting and Rash       Minimum Predicted Vital Capacity:     986           Actual Vital Capacity:      1000          Pulmonary History:Asthma  Home Oxygen Therapy:  room air  Home Respiratory Therapy:albuterol MDI PRN, Breo Ellipta daily   Current Respiratory Therapy:  HHN albuterol QID and PRN/dyspnea, HHN Pulmicort BID (as substituted by Pharmacy for Charol Iron)  Treatment Type: HHN  Medications: Budesonide    Respiratory Severity Index(RSI)   Patients with orders for inhalation medications, oxygen, or any therapeutic treatment modality will be placed on Respiratory Protocol. They will be assessed with the first treatment and at least every 72 hours thereafter. The following severity scale will be used to determine frequency of treatment intervention.     Smoking History: Pulmonary Disease or Smoking History, Greater than 15 pack year = 2    Social History  Social History   Substance Use Topics    Smoking status: Former Smoker     Packs/day: 1.00     Years: 35.00     Types: Cigarettes     Quit date: 3/28/2005    Smokeless tobacco: Never Used    Alcohol use 4.2 oz/week     7 Shots of liquor per week       Recent Surgical History: Surgery of Extremities = 1  Past Surgical albuterol QID and PRN/dyspnea, HHN Pulmicort BID, O2 protocol. Electronically signed by Jana Yu RCP on 12/8/2018 at 5:19 AM    Respiratory Protocol Guidelines     1. Assessment and treatment by Respiratory Therapy will be initiated for medication and therapeutic interventions upon initiation of aerosolized medication. 2. Physician will be contacted for respiratory rate (RR) greater than 35 breaths per minute. Therapy will be held for heart rate (HR) greater than 140 beats per minute, pending direction from physician. 3. Bronchodilators will be administered via Metered Dose Inhaler (MDI) with spacer when the following criteria are met:  a. Alert and cooperative     b. HR < 140 bpm  c. RR < 30 bpm                d. Can demonstrate a 2-3 second inspiratory hold  4. Bronchodilators will be administered via Hand Held Nebulizer [facility]) to patients when ANY of the following criteria are met  a. Incognizant or uncooperative          b. Patients treated with HHN at Home        c. Unable to demonstrate proper use of MDI with spacer     d. RR > 30 bpm   5. Bronchodilators will be delivered via Metered Dose Inhaler (MDI), HHN, Aerogen to intubated patients on mechanical ventilation. 6. Inhalation medication orders will be delivered and/or substituted as outlined below. Aerosolized Medications Ordering and Administration Guidelines:    1. All Medications will be ordered by a physician, and their frequency and/or modality will be adjusted as defined by the patients Respiratory Severity Index (RSI) score. 2. If the patient does not have documented COPD, consider discontinuing anticholinergics when RSI is less than 9.  3. If the bronchospasm worsens (increased RSI), then the bronchodilator frequency can be increased to a maximum of every 4 hours. If greater than every 4 hours is required, the physician will be contacted.   4. If the bronchospasm improves, the frequency of the bronchodilator can be decreased, based on

## 2018-12-08 NOTE — CONSULTS
Internal Medicine Consult Note    Subjective:     Reason for consult: Post-operative management of medical problems  Requesting physician: Rubi Rodrigues 15 Day: 2  Admit Date: 12/7/2018  PCP: Lorie Oscar MD                                Code:Full Code    HPI: Roger Mercado is a 76 y.o. 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 The patient underwent an original right total knee arthroplasty in January, 2017. Her postoperative period was complicated by distal DVT in the R calf veins. She continued to have pain despite conservative treatment and physical therapy, and underwent revision of her right total knee arthroplasty with extensive synovectomy in April 2018. Unfortunately, she failed in May 2018 and developed mild displaced periprostetic right medial condyle. The decision was made to proceed with conservative management; she was treated for 2 weeks in the acute rehab unit of [facility], [facility]. where she underwent an extensive physical and occupational therapy. Because she continued to have right knee pain and was not able to extend her knee completely decision was made to proceed with an additional surgery. Was undergoing extensive physical therapy after her last right knee surgery. Was able to ambulate and lift her R leg. She developed significant weakness in her right lower extremity about 3 weeks ago. She was unable to ambulate or bear weight on her right lower extremity, had several falls because of the right thigh weakness. MRI of the right knee was preformed on 11/15/18:  Arthroplasty       Resolution of previously described fracture and bone marrow edema of the fibular head       Cortical irregularity at the site of previously described medial epicondylar fracture.  No definite bone marrow edema at this site.       Large joint effusion/hemarthrosis     Right knee ultrasound on

## 2018-12-09 LAB
ALBUMIN SERPL-MCNC: 3.4 G/DL (ref 3.4–5)
ANION GAP SERPL CALCULATED.3IONS-SCNC: 11 MMOL/L (ref 3–16)
BUN BLDV-MCNC: 11 MG/DL (ref 7–20)
CALCIUM SERPL-MCNC: 8.4 MG/DL (ref 8.3–10.6)
CHLORIDE BLD-SCNC: 105 MMOL/L (ref 99–110)
CO2: 25 MMOL/L (ref 21–32)
CREAT SERPL-MCNC: 0.5 MG/DL (ref 0.6–1.2)
GFR AFRICAN AMERICAN: >60
GFR NON-AFRICAN AMERICAN: >60
GLUCOSE BLD-MCNC: 109 MG/DL (ref 70–99)
HCT VFR BLD CALC: 32.7 % (ref 36–48)
HEMOGLOBIN: 11 G/DL (ref 12–16)
MAGNESIUM: 1.9 MG/DL (ref 1.8–2.4)
PHOSPHORUS: 3.1 MG/DL (ref 2.5–4.9)
POTASSIUM SERPL-SCNC: 3.4 MMOL/L (ref 3.5–5.1)
SODIUM BLD-SCNC: 141 MMOL/L (ref 136–145)

## 2018-12-09 PROCEDURE — G8987 SELF CARE CURRENT STATUS: HCPCS

## 2018-12-09 PROCEDURE — 6370000000 HC RX 637 (ALT 250 FOR IP): Performed by: PHYSICIAN ASSISTANT

## 2018-12-09 PROCEDURE — 1200000000 HC SEMI PRIVATE

## 2018-12-09 PROCEDURE — 94664 DEMO&/EVAL PT USE INHALER: CPT

## 2018-12-09 PROCEDURE — 97530 THERAPEUTIC ACTIVITIES: CPT

## 2018-12-09 PROCEDURE — 6370000000 HC RX 637 (ALT 250 FOR IP): Performed by: ORTHOPAEDIC SURGERY

## 2018-12-09 PROCEDURE — G8988 SELF CARE GOAL STATUS: HCPCS

## 2018-12-09 PROCEDURE — 80069 RENAL FUNCTION PANEL: CPT

## 2018-12-09 PROCEDURE — 6370000000 HC RX 637 (ALT 250 FOR IP): Performed by: HOSPITALIST

## 2018-12-09 PROCEDURE — 85018 HEMOGLOBIN: CPT

## 2018-12-09 PROCEDURE — 6360000002 HC RX W HCPCS: Performed by: ORTHOPAEDIC SURGERY

## 2018-12-09 PROCEDURE — 83735 ASSAY OF MAGNESIUM: CPT

## 2018-12-09 PROCEDURE — 97116 GAIT TRAINING THERAPY: CPT

## 2018-12-09 PROCEDURE — 2580000003 HC RX 258: Performed by: ORTHOPAEDIC SURGERY

## 2018-12-09 PROCEDURE — G8978 MOBILITY CURRENT STATUS: HCPCS

## 2018-12-09 PROCEDURE — 94640 AIRWAY INHALATION TREATMENT: CPT

## 2018-12-09 PROCEDURE — 99232 SBSQ HOSP IP/OBS MODERATE 35: CPT | Performed by: HOSPITALIST

## 2018-12-09 PROCEDURE — 6360000002 HC RX W HCPCS: Performed by: PHYSICIAN ASSISTANT

## 2018-12-09 PROCEDURE — 97535 SELF CARE MNGMENT TRAINING: CPT

## 2018-12-09 PROCEDURE — 97161 PT EVAL LOW COMPLEX 20 MIN: CPT

## 2018-12-09 PROCEDURE — 97165 OT EVAL LOW COMPLEX 30 MIN: CPT

## 2018-12-09 PROCEDURE — 94761 N-INVAS EAR/PLS OXIMETRY MLT: CPT

## 2018-12-09 PROCEDURE — 85014 HEMATOCRIT: CPT

## 2018-12-09 PROCEDURE — G8979 MOBILITY GOAL STATUS: HCPCS

## 2018-12-09 PROCEDURE — 36415 COLL VENOUS BLD VENIPUNCTURE: CPT

## 2018-12-09 RX ORDER — DEXAMETHASONE SODIUM PHOSPHATE 4 MG/ML
10 INJECTION, SOLUTION INTRA-ARTICULAR; INTRALESIONAL; INTRAMUSCULAR; INTRAVENOUS; SOFT TISSUE EVERY 6 HOURS
Status: COMPLETED | OUTPATIENT
Start: 2018-12-09 | End: 2018-12-09

## 2018-12-09 RX ORDER — POTASSIUM CHLORIDE 20 MEQ/1
20 TABLET, EXTENDED RELEASE ORAL ONCE
Status: COMPLETED | OUTPATIENT
Start: 2018-12-09 | End: 2018-12-09

## 2018-12-09 RX ORDER — DEXAMETHASONE SODIUM PHOSPHATE 4 MG/ML
5 INJECTION, SOLUTION INTRA-ARTICULAR; INTRALESIONAL; INTRAMUSCULAR; INTRAVENOUS; SOFT TISSUE EVERY 6 HOURS
Status: COMPLETED | OUTPATIENT
Start: 2018-12-09 | End: 2018-12-10

## 2018-12-09 RX ORDER — DIPHENHYDRAMINE HCL 25 MG
50 TABLET ORAL EVERY 8 HOURS PRN
Status: DISCONTINUED | OUTPATIENT
Start: 2018-12-09 | End: 2018-12-11 | Stop reason: HOSPADM

## 2018-12-09 RX ADMIN — OXYCODONE AND ACETAMINOPHEN 2 TABLET: 5; 325 TABLET ORAL at 17:09

## 2018-12-09 RX ADMIN — METHOCARBAMOL 750 MG: 750 TABLET ORAL at 09:04

## 2018-12-09 RX ADMIN — POTASSIUM CHLORIDE 20 MEQ: 20 TABLET, EXTENDED RELEASE ORAL at 09:03

## 2018-12-09 RX ADMIN — DOCUSATE SODIUM 100 MG: 100 CAPSULE, LIQUID FILLED ORAL at 20:32

## 2018-12-09 RX ADMIN — KETOROLAC TROMETHAMINE 30 MG: 30 INJECTION, SOLUTION INTRAMUSCULAR at 07:12

## 2018-12-09 RX ADMIN — Medication 10 ML: at 09:05

## 2018-12-09 RX ADMIN — DOCUSATE SODIUM 100 MG: 100 CAPSULE, LIQUID FILLED ORAL at 09:04

## 2018-12-09 RX ADMIN — POTASSIUM CHLORIDE 20 MEQ: 20 TABLET, EXTENDED RELEASE ORAL at 12:27

## 2018-12-09 RX ADMIN — OXYCODONE AND ACETAMINOPHEN 2 TABLET: 5; 325 TABLET ORAL at 01:10

## 2018-12-09 RX ADMIN — TAMSULOSIN HYDROCHLORIDE 0.4 MG: 0.4 CAPSULE ORAL at 09:03

## 2018-12-09 RX ADMIN — DEXAMETHASONE SODIUM PHOSPHATE 10 MG: 4 INJECTION, SOLUTION INTRAMUSCULAR; INTRAVENOUS at 12:28

## 2018-12-09 RX ADMIN — METHOCARBAMOL 750 MG: 750 TABLET ORAL at 12:27

## 2018-12-09 RX ADMIN — METHOCARBAMOL 750 MG: 750 TABLET ORAL at 20:33

## 2018-12-09 RX ADMIN — METHOCARBAMOL 750 MG: 750 TABLET ORAL at 17:08

## 2018-12-09 RX ADMIN — APIXABAN 2.5 MG: 2.5 TABLET, FILM COATED ORAL at 09:06

## 2018-12-09 RX ADMIN — ALBUTEROL SULFATE 2.5 MG: 2.5 SOLUTION RESPIRATORY (INHALATION) at 19:59

## 2018-12-09 RX ADMIN — ALBUTEROL SULFATE 2.5 MG: 2.5 SOLUTION RESPIRATORY (INHALATION) at 11:07

## 2018-12-09 RX ADMIN — OXYCODONE AND ACETAMINOPHEN 2 TABLET: 5; 325 TABLET ORAL at 05:55

## 2018-12-09 RX ADMIN — CYCLOBENZAPRINE HYDROCHLORIDE 10 MG: 10 TABLET, FILM COATED ORAL at 22:55

## 2018-12-09 RX ADMIN — BUDESONIDE 250 MCG: 0.25 SUSPENSION RESPIRATORY (INHALATION) at 19:59

## 2018-12-09 RX ADMIN — KETOROLAC TROMETHAMINE 30 MG: 30 INJECTION, SOLUTION INTRAMUSCULAR at 12:27

## 2018-12-09 RX ADMIN — DIPHENHYDRAMINE HCL 50 MG: 25 TABLET ORAL at 20:32

## 2018-12-09 RX ADMIN — DEXAMETHASONE SODIUM PHOSPHATE 5 MG: 4 INJECTION, SOLUTION INTRAMUSCULAR; INTRAVENOUS at 20:33

## 2018-12-09 RX ADMIN — DEXAMETHASONE SODIUM PHOSPHATE 10 MG: 4 INJECTION, SOLUTION INTRAMUSCULAR; INTRAVENOUS at 09:04

## 2018-12-09 RX ADMIN — Medication 10 ML: at 20:36

## 2018-12-09 RX ADMIN — LOSARTAN POTASSIUM 50 MG: 25 TABLET ORAL at 09:04

## 2018-12-09 RX ADMIN — DIPHENHYDRAMINE HCL 50 MG: 25 TABLET ORAL at 09:04

## 2018-12-09 RX ADMIN — PANTOPRAZOLE SODIUM 40 MG: 40 TABLET, DELAYED RELEASE ORAL at 07:11

## 2018-12-09 RX ADMIN — APIXABAN 2.5 MG: 2.5 TABLET, FILM COATED ORAL at 20:33

## 2018-12-09 RX ADMIN — KETOROLAC TROMETHAMINE 30 MG: 30 INJECTION, SOLUTION INTRAMUSCULAR at 00:07

## 2018-12-09 RX ADMIN — CHLORTHALIDONE 25 MG: 25 TABLET ORAL at 09:03

## 2018-12-09 ASSESSMENT — PAIN DESCRIPTION - DESCRIPTORS
DESCRIPTORS: ACHING;CONSTANT
DESCRIPTORS: ACHING

## 2018-12-09 ASSESSMENT — PAIN DESCRIPTION - PROGRESSION
CLINICAL_PROGRESSION: GRADUALLY WORSENING
CLINICAL_PROGRESSION: NOT CHANGED

## 2018-12-09 ASSESSMENT — PAIN SCALES - GENERAL
PAINLEVEL_OUTOF10: 10
PAINLEVEL_OUTOF10: 10
PAINLEVEL_OUTOF10: 7
PAINLEVEL_OUTOF10: 6
PAINLEVEL_OUTOF10: 0
PAINLEVEL_OUTOF10: 10
PAINLEVEL_OUTOF10: 0

## 2018-12-09 ASSESSMENT — PAIN DESCRIPTION - ORIENTATION
ORIENTATION: RIGHT
ORIENTATION: RIGHT

## 2018-12-09 ASSESSMENT — PAIN DESCRIPTION - ONSET
ONSET: ON-GOING
ONSET: ON-GOING

## 2018-12-09 ASSESSMENT — PAIN DESCRIPTION - LOCATION
LOCATION: KNEE
LOCATION: KNEE

## 2018-12-09 ASSESSMENT — PAIN DESCRIPTION - PAIN TYPE
TYPE: ACUTE PAIN;SURGICAL PAIN
TYPE: SURGICAL PAIN

## 2018-12-09 ASSESSMENT — PAIN DESCRIPTION - FREQUENCY
FREQUENCY: CONTINUOUS
FREQUENCY: CONTINUOUS

## 2018-12-09 NOTE — CARE COORDINATION
SW following Pt today and aware of tentative DC home later today. [facility] has referral from yesterday and will began start of care tomorrow.      [facility] [place]  Case Management  [phone]

## 2018-12-09 NOTE — PROGRESS NOTES
Reconstructive Orthopedics & Sports Medicine          Progress Note    POST OP DAY: 2  PROCEDURE: R TKA QUAD TENDON REPAIR    Subjective:  Lynn Overall is doing ok. Spasms in hip have slightly improved. Still have sharp pain in the hip that radiates all the way down the leg to the foot. No numbness or tingling. Patient Vitals for the past 24 hrs:   BP Temp Temp src Pulse Resp SpO2   12/09/18 0330 118/70 98.3 °F (36.8 °C) Oral 85 16 -   12/08/18 2245 119/61 98.3 °F (36.8 °C) Oral 84 16 92 %   12/08/18 2043 - - - - 16 92 %   12/08/18 1945 113/67 98.2 °F (36.8 °C) Oral 85 16 92 %   12/08/18 1439 - - - - 16 94 %   12/08/18 1430 137/64 98.1 °F (36.7 °C) Oral 78 16 94 %   12/08/18 1131 131/64 98.5 °F (36.9 °C) Oral 93 16 94 %   12/08/18 0805 - - - - 15 94 %       Physical exam:   Pt is alert and orientated. Dressing Clean, dry, and intact  Calf soft and nontender  Tolerates gentle ROM.  + SLR    Data Review:   CBC: Lab Results   Component Value Date    WBC 6.4 11/26/2018    RBC 4.71 11/26/2018    HGB 11.0 12/09/2018    HCT 32.7 12/09/2018     11/26/2018       Plan:   Continue quad tendon repair PT protocol - PT was ordered yesterday as it was not initially ordered.    TTWB in brace locked at 0  Ordered decadron to help radicular pain patient is having  Ordered benadryl for congestion per patients request  Cont to work to get up and hopefully discharge    Ana Linda, Shahab Ayala MD

## 2018-12-09 NOTE — PROGRESS NOTES
assessed for rehabilitation services?: Yes  Additional Pertinent Hx: Patient is a 75 y/o female admitted 12/7 for Right knee quadriceps tendon repair. Patient with multiple R knee surgeries with most recent being R knee revision in 9/2018. Post operative recovery complicated by fall leading to quad tendon rupture. PMH significant for compression fracture, concussion, DVT, HTN, GERD, HLD, L knee arthroscopy (2003), right shoulder arthroscopy, multiple right knee surgeries. Response To Previous Treatment: Not applicable  Family / Caregiver Present: No  Referring Practitioner: Ivette Chery PA-C  Referral Date : 12/08/18  Diagnosis: rupture of quadricep tendon, right  Follows Commands: Within Functional Limits  General Comment  Comments: Patient supine in bed upon arrival.  Subjective  Subjective: Patient agreeable to PT evaluation, reporting improvement in muscle spasms from yesterday. Pain Screening  Patient Currently in Pain: Yes (pain RLE with mobility, not rated, ice applied at end of session Simultaneous filing.  User may not have seen previous data.)          Orientation  Orientation  Overall Orientation Status: Within Normal Limits  Social/Functional History  Social/Functional History  Lives With: Spouse  Type of Home: House  Home Layout: One level  Home Access: Stairs to enter with rails  Entrance Stairs - Number of Steps: 1  Bathroom Shower/Tub: Shower chair with back, Walk-in shower  Bathroom Toilet: Handicap height  Bathroom Equipment: Grab bars in [address]: Rolling walker, Cane, Crutches  ADL Assistance: Needs assistance (spvn for shower transfer)  Homemaking Assistance: Needs assistance (shares with spouse, assistance with heavy cleaning)  Ambulation Assistance: Independent (with FWW)  Transfer Assistance: Independent  Active : No  Occupation: Full time employment  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

## 2018-12-09 NOTE — PROGRESS NOTES
rupture of quad tendon. Wearing soft brace R hand d/t reported broken wrist       Objective   Orientation  Overall Orientation Status: Within Normal Limits  Balance  Standing Balance: Contact guard assistance  Standing Balance  Sit to stand: Moderate assistance (mod from EOB, min first trial from chair, improving to CGA for second trial)  Stand to sit: Contact guard assistance  Functional Mobility  Functional - Mobility Device: Rolling Walker  Assist Level: Contact guard assistance  Functional Mobility Comments: bed<>chair transfer, good adherence to [facility] Transfers  Toilet Transfers Comments: Simulated with bed<>chair transfer as above; recommend use of BSC transfer with RN until patient able to tolerate distance needed to reach bathroom  ADL  Grooming: Independent (seated at chair level)  LE Dressing: Contact guard assistance (use of reacher to thread RLE, cues for sequencing, steadying assist to pull up over hips. Socks not assessed this date; familiar with use of sock aide from past admissions)  Toileting:  (declined need)  Bed mobility  Supine to Sit: Minimal assistance (assist RLE, HOB elevated, increased time to complete)  Transfers  Stand Step Transfers: Contact guard assistance (from bed<>w/c)  Sit to stand: Moderate assistance (mod from EOB, min first trial from chair, improving to CGA for second trial)  Stand to sit: Contact guard assistance  Transfer Comments: patient declined to complete mobility into bathroom d/t increased fatigue at this time. Cognition  Overall Cognitive Status: WNL    Assessment   Performance deficits / Impairments: Decreased functional mobility ; Decreased ADL status; Decreased safe awareness;Decreased endurance;Decreased balance  Assessment: Patient demonstrate a decline from functional baseline, currently requiring min assist for functional transfers and LB dressing.  Limited by decreased activity tolerance as needed for mobility to reach bathroom, steadying assist for

## 2018-12-10 LAB
ALBUMIN SERPL-MCNC: 3.5 G/DL (ref 3.4–5)
ANION GAP SERPL CALCULATED.3IONS-SCNC: 13 MMOL/L (ref 3–16)
BUN BLDV-MCNC: 16 MG/DL (ref 7–20)
CALCIUM SERPL-MCNC: 8.9 MG/DL (ref 8.3–10.6)
CHLORIDE BLD-SCNC: 101 MMOL/L (ref 99–110)
CO2: 24 MMOL/L (ref 21–32)
CREAT SERPL-MCNC: <0.5 MG/DL (ref 0.6–1.2)
GFR AFRICAN AMERICAN: >60
GFR NON-AFRICAN AMERICAN: >60
GLUCOSE BLD-MCNC: 118 MG/DL (ref 70–99)
PHOSPHORUS: 3.8 MG/DL (ref 2.5–4.9)
POTASSIUM SERPL-SCNC: 3.9 MMOL/L (ref 3.5–5.1)
SODIUM BLD-SCNC: 138 MMOL/L (ref 136–145)

## 2018-12-10 PROCEDURE — 6360000002 HC RX W HCPCS: Performed by: ORTHOPAEDIC SURGERY

## 2018-12-10 PROCEDURE — 80069 RENAL FUNCTION PANEL: CPT

## 2018-12-10 PROCEDURE — 6360000002 HC RX W HCPCS: Performed by: PHYSICIAN ASSISTANT

## 2018-12-10 PROCEDURE — 6360000002 HC RX W HCPCS: Performed by: HOSPITALIST

## 2018-12-10 PROCEDURE — 6370000000 HC RX 637 (ALT 250 FOR IP): Performed by: ORTHOPAEDIC SURGERY

## 2018-12-10 PROCEDURE — 97535 SELF CARE MNGMENT TRAINING: CPT

## 2018-12-10 PROCEDURE — 94761 N-INVAS EAR/PLS OXIMETRY MLT: CPT

## 2018-12-10 PROCEDURE — 97530 THERAPEUTIC ACTIVITIES: CPT

## 2018-12-10 PROCEDURE — 6370000000 HC RX 637 (ALT 250 FOR IP): Performed by: PHYSICIAN ASSISTANT

## 2018-12-10 PROCEDURE — 1200000000 HC SEMI PRIVATE

## 2018-12-10 PROCEDURE — 6370000000 HC RX 637 (ALT 250 FOR IP): Performed by: HOSPITALIST

## 2018-12-10 PROCEDURE — 99232 SBSQ HOSP IP/OBS MODERATE 35: CPT | Performed by: HOSPITALIST

## 2018-12-10 PROCEDURE — 94640 AIRWAY INHALATION TREATMENT: CPT

## 2018-12-10 PROCEDURE — 2580000003 HC RX 258: Performed by: ORTHOPAEDIC SURGERY

## 2018-12-10 PROCEDURE — 36415 COLL VENOUS BLD VENIPUNCTURE: CPT

## 2018-12-10 RX ORDER — CARISOPRODOL 350 MG/1
350 TABLET ORAL 3 TIMES DAILY PRN
Qty: 90 TABLET | Refills: 0 | Status: SHIPPED | OUTPATIENT
Start: 2018-12-10 | End: 2018-12-20

## 2018-12-10 RX ORDER — MAGNESIUM SULFATE 1 G/100ML
1 INJECTION INTRAVENOUS ONCE
Status: COMPLETED | OUTPATIENT
Start: 2018-12-10 | End: 2018-12-11

## 2018-12-10 RX ORDER — CARISOPRODOL 350 MG/1
350 TABLET ORAL 4 TIMES DAILY
Status: DISCONTINUED | OUTPATIENT
Start: 2018-12-10 | End: 2018-12-11 | Stop reason: HOSPADM

## 2018-12-10 RX ORDER — POTASSIUM CHLORIDE 20 MEQ/1
20 TABLET, EXTENDED RELEASE ORAL ONCE
Status: COMPLETED | OUTPATIENT
Start: 2018-12-10 | End: 2018-12-10

## 2018-12-10 RX ADMIN — CARISOPRODOL 350 MG: 350 TABLET ORAL at 11:17

## 2018-12-10 RX ADMIN — Medication 10 ML: at 08:15

## 2018-12-10 RX ADMIN — CARISOPRODOL 350 MG: 350 TABLET ORAL at 16:40

## 2018-12-10 RX ADMIN — ALBUTEROL SULFATE 2.5 MG: 2.5 SOLUTION RESPIRATORY (INHALATION) at 08:31

## 2018-12-10 RX ADMIN — APIXABAN 2.5 MG: 2.5 TABLET, FILM COATED ORAL at 20:55

## 2018-12-10 RX ADMIN — DEXAMETHASONE SODIUM PHOSPHATE 5 MG: 4 INJECTION, SOLUTION INTRAMUSCULAR; INTRAVENOUS at 03:54

## 2018-12-10 RX ADMIN — OXYCODONE AND ACETAMINOPHEN 2 TABLET: 5; 325 TABLET ORAL at 23:11

## 2018-12-10 RX ADMIN — OXYCODONE AND ACETAMINOPHEN 2 TABLET: 5; 325 TABLET ORAL at 04:03

## 2018-12-10 RX ADMIN — OXYCODONE AND ACETAMINOPHEN 2 TABLET: 5; 325 TABLET ORAL at 19:11

## 2018-12-10 RX ADMIN — BUDESONIDE 250 MCG: 0.25 SUSPENSION RESPIRATORY (INHALATION) at 09:00

## 2018-12-10 RX ADMIN — HYDROMORPHONE HYDROCHLORIDE 0.5 MG: 1 INJECTION, SOLUTION INTRAMUSCULAR; INTRAVENOUS; SUBCUTANEOUS at 13:55

## 2018-12-10 RX ADMIN — POTASSIUM CHLORIDE 20 MEQ: 20 TABLET, EXTENDED RELEASE ORAL at 08:12

## 2018-12-10 RX ADMIN — CYCLOBENZAPRINE HYDROCHLORIDE 10 MG: 10 TABLET, FILM COATED ORAL at 08:14

## 2018-12-10 RX ADMIN — TAMSULOSIN HYDROCHLORIDE 0.4 MG: 0.4 CAPSULE ORAL at 08:13

## 2018-12-10 RX ADMIN — APIXABAN 2.5 MG: 2.5 TABLET, FILM COATED ORAL at 08:13

## 2018-12-10 RX ADMIN — METHOCARBAMOL 750 MG: 750 TABLET ORAL at 08:13

## 2018-12-10 RX ADMIN — DOCUSATE SODIUM 100 MG: 100 CAPSULE, LIQUID FILLED ORAL at 20:55

## 2018-12-10 RX ADMIN — OXYCODONE AND ACETAMINOPHEN 2 TABLET: 5; 325 TABLET ORAL at 15:21

## 2018-12-10 RX ADMIN — LOSARTAN POTASSIUM 50 MG: 25 TABLET ORAL at 08:13

## 2018-12-10 RX ADMIN — OXYCODONE AND ACETAMINOPHEN 2 TABLET: 5; 325 TABLET ORAL at 00:02

## 2018-12-10 RX ADMIN — DOCUSATE SODIUM 100 MG: 100 CAPSULE, LIQUID FILLED ORAL at 08:14

## 2018-12-10 RX ADMIN — OXYCODONE AND ACETAMINOPHEN 2 TABLET: 5; 325 TABLET ORAL at 11:18

## 2018-12-10 RX ADMIN — PANTOPRAZOLE SODIUM 40 MG: 40 TABLET, DELAYED RELEASE ORAL at 06:14

## 2018-12-10 RX ADMIN — HYDROMORPHONE HYDROCHLORIDE 0.5 MG: 1 INJECTION, SOLUTION INTRAMUSCULAR; INTRAVENOUS; SUBCUTANEOUS at 02:20

## 2018-12-10 RX ADMIN — CHLORTHALIDONE 25 MG: 25 TABLET ORAL at 08:14

## 2018-12-10 RX ADMIN — POTASSIUM CHLORIDE 20 MEQ: 20 TABLET, EXTENDED RELEASE ORAL at 20:55

## 2018-12-10 RX ADMIN — CARISOPRODOL 350 MG: 350 TABLET ORAL at 20:55

## 2018-12-10 RX ADMIN — MAGNESIUM SULFATE HEPTAHYDRATE 1 G: 1 INJECTION, SOLUTION INTRAVENOUS at 23:37

## 2018-12-10 ASSESSMENT — PAIN SCALES - GENERAL
PAINLEVEL_OUTOF10: 10
PAINLEVEL_OUTOF10: 5
PAINLEVEL_OUTOF10: 6
PAINLEVEL_OUTOF10: 7
PAINLEVEL_OUTOF10: 4
PAINLEVEL_OUTOF10: 7
PAINLEVEL_OUTOF10: 10
PAINLEVEL_OUTOF10: 7
PAINLEVEL_OUTOF10: 7
PAINLEVEL_OUTOF10: 8

## 2018-12-10 ASSESSMENT — PAIN DESCRIPTION - LOCATION
LOCATION: KNEE

## 2018-12-10 ASSESSMENT — PAIN DESCRIPTION - PROGRESSION
CLINICAL_PROGRESSION: NOT CHANGED
CLINICAL_PROGRESSION: NOT CHANGED
CLINICAL_PROGRESSION: GRADUALLY WORSENING
CLINICAL_PROGRESSION: GRADUALLY IMPROVING
CLINICAL_PROGRESSION: NOT CHANGED

## 2018-12-10 ASSESSMENT — PAIN DESCRIPTION - ONSET
ONSET: ON-GOING

## 2018-12-10 ASSESSMENT — PAIN DESCRIPTION - FREQUENCY
FREQUENCY: CONTINUOUS

## 2018-12-10 ASSESSMENT — PAIN DESCRIPTION - ORIENTATION
ORIENTATION: RIGHT

## 2018-12-10 ASSESSMENT — PAIN DESCRIPTION - PAIN TYPE
TYPE: ACUTE PAIN;SURGICAL PAIN
TYPE: SURGICAL PAIN
TYPE: ACUTE PAIN
TYPE: SURGICAL PAIN

## 2018-12-10 ASSESSMENT — PAIN DESCRIPTION - DESCRIPTORS
DESCRIPTORS: ACHING;CONSTANT
DESCRIPTORS: ACHING;CONSTANT;CRAMPING;DISCOMFORT
DESCRIPTORS: ACHING;CONSTANT;CRAMPING;DISCOMFORT
DESCRIPTORS: ACHING;CONSTANT
DESCRIPTORS: ACHING;CONSTANT;SPASM

## 2018-12-10 NOTE — PROGRESS NOTES
Occupational Therapy  Facility/Department: [facility] 5T ORTHO/NEURO  Daily Treatment Note  NAME: Wilfredo Ha  : 1950  MRN: [id number]    Date of Service: 12/10/2018    Discharge Recommendations:  Wilfredo Ha scored a 18/24 on the AM-PAC ADL Inpatient form. Current research shows that an AM-PAC score of 18 or greater is typically associated with a discharge to the patient's home setting. Based on the patients AM-PAC score and their current ADL deficits, it is recommended that the patient have 2-3 sessions per week of Occupational Therapy at d/c to increase the patients independence. HOME HEALTH CARE: LEVEL 1 STANDARD    - Initial home health evaluation to occur within 24-48 hours, in patient home   - Therapy to evaluate with goal of regaining prior level of functioning   - Therapy to evaluate if patient has [address] needs for personal care       Assessment: Pt able to demonstrate functional mobility with RW and CGA with very slow painful gait maintaining TTWB on RLE with min VCs. Pt is limited by fatigue and pain secondary to TTWB precaution. Pt will need 24 hr heavy assist in order to return home safely and continued OT per POC. Pending progress, if adequate assist is not available at home, may need continued inpt therapies before returning home. Restrictions  Position Activity Restriction  Other position/activity restrictions: TTWB RLE, progress ambulation  Subjective   General  Chart Reviewed: Yes  Patient assessed for rehabilitation services?: Yes  Additional Pertinent Hx: 76 y.o. admit  and undergoing R knee quadricep tendon repair and revision R TKA.  PMHx:  R TKA 2018 and revision 2018; R sh arthroscopy, arthritis, HLD, HTN  Response to previous treatment: Patient with no complaints from previous session  Family / Caregiver Present: Yes (family)  Referring Practitioner: Chilo Winters PA-C  Diagnosis: R quadriceps tendon rupture, s/p repair  Subjective  Subjective: Pt met supine

## 2018-12-10 NOTE — PROGRESS NOTES
Pt is alert and oriented x 4. Vitals are stable, BP elevated into the 043'G systolic after ambulating to the commode. Scheduled morning BP medications given. Pt is tolerating general diet with a good appetite, pt denies nausea/vomiting. Silver dressing to R knee is CDI with a moderate amount of swelling to R knee. Immobilizer is in place per orders. Neuro check is WNL. Pt reports R knee pain improved this morning but still experiencing muscle spasms, PRN Flexeril and scheduled Robaxin given. Will continue to monitor.

## 2018-12-10 NOTE — PLAN OF CARE
Problem: Falls - Risk of:  Goal: Will remain free from falls  Will remain free from falls   Pt is free from falls. Fall precautions are in place: bed is locked and in lowest position, side rails are up x 2, bed alarm is on, nonskid socks are on, bedside table and call light are within reach. Will continue to monitor. Problem: Mobility - Impaired:  Goal: Mobility will improve  Mobility will improve   Pt up to the commode with walker and x 2 contact guard assist.   Gait is slightly unsteady. Will continue to monitor.

## 2018-12-10 NOTE — CARE COORDINATION
[address] Transitions Interview     12/10/2018    Patient: Lynn Overall Patient : 1950   MRN: [id number]  Reason for Admission: Quadricepts tendon repair  RARS: Readmission Risk Score: 21       Spoke with: Lynn Overall    Readmission Risk  Patient Active Problem List   Diagnosis    Hyperlipidemia    Hypertension    Edema    CTS (carpal tunnel syndrome)    Reactive airway disease    Pulmonary nodule    Anxiety and depression    Acute pain of right knee    Anemia    Closed displaced fracture of medial condyle of right femur (HCC)    Arthrofibrosis of knee joint, right    Acute blood loss anemia    Gastroesophageal reflux disease without esophagitis    Falls    Quadriceps tendon rupture, right, sequela    Acute urinary retention    Hypokalemia       Inpatient Assessment  Care Transitions Summary    Care Transitions Inpatient Review  Medication Review  Do you have all of your prescriptions and are they filled?:  Yes   Are you able to afford your medications?:  Yes  How often do you have difficulty taking your medications?:  I always take them as prescribed. Housing Review  Who do you live with?:  Partner/Spouse/SO  Are you an active caregiver in your home?:  No  Social Support  Do you have a ?:  No  Do you have a [address] [place] [address]?:  No  Durable Medical Equipment  Patient DME:  Ana tucker  Functional Review  Ability to seek help/take action for Emergent/Urgent situations i.e. fire, crime, inclement weather or health crisis. :  Independent  Ability handle personal hygiene needs (bathing/dressing/grooming): Independent  Ability to manage medications: Independent  Ability to prepare food:  Independent  Ability to maintain home (clean home, laundry):  Needs Assistance  Ability to drive and/or has transportation:  Dependent  Ability to do shopping:  Needs Assistance  Ability to manage finances:   Independent  Is patient able to live

## 2018-12-11 VITALS
RESPIRATION RATE: 17 BRPM | DIASTOLIC BLOOD PRESSURE: 82 MMHG | WEIGHT: 192 LBS | OXYGEN SATURATION: 94 % | HEART RATE: 91 BPM | TEMPERATURE: 97.6 F | BODY MASS INDEX: 27.49 KG/M2 | SYSTOLIC BLOOD PRESSURE: 131 MMHG | HEIGHT: 70 IN

## 2018-12-11 PROCEDURE — 6360000002 HC RX W HCPCS: Performed by: PHYSICIAN ASSISTANT

## 2018-12-11 PROCEDURE — 97116 GAIT TRAINING THERAPY: CPT

## 2018-12-11 PROCEDURE — 6360000002 HC RX W HCPCS: Performed by: ORTHOPAEDIC SURGERY

## 2018-12-11 PROCEDURE — 97530 THERAPEUTIC ACTIVITIES: CPT

## 2018-12-11 PROCEDURE — 6370000000 HC RX 637 (ALT 250 FOR IP): Performed by: PHYSICIAN ASSISTANT

## 2018-12-11 PROCEDURE — 99406 BEHAV CHNG SMOKING 3-10 MIN: CPT

## 2018-12-11 PROCEDURE — 2580000003 HC RX 258: Performed by: ORTHOPAEDIC SURGERY

## 2018-12-11 PROCEDURE — 6370000000 HC RX 637 (ALT 250 FOR IP): Performed by: ORTHOPAEDIC SURGERY

## 2018-12-11 PROCEDURE — 94761 N-INVAS EAR/PLS OXIMETRY MLT: CPT

## 2018-12-11 PROCEDURE — 6370000000 HC RX 637 (ALT 250 FOR IP): Performed by: HOSPITALIST

## 2018-12-11 PROCEDURE — 94640 AIRWAY INHALATION TREATMENT: CPT

## 2018-12-11 RX ADMIN — PANTOPRAZOLE SODIUM 40 MG: 40 TABLET, DELAYED RELEASE ORAL at 06:50

## 2018-12-11 RX ADMIN — CHLORTHALIDONE 25 MG: 25 TABLET ORAL at 08:24

## 2018-12-11 RX ADMIN — HYDROMORPHONE HYDROCHLORIDE 0.5 MG: 1 INJECTION, SOLUTION INTRAMUSCULAR; INTRAVENOUS; SUBCUTANEOUS at 02:15

## 2018-12-11 RX ADMIN — OXYCODONE AND ACETAMINOPHEN 1 TABLET: 5; 325 TABLET ORAL at 06:50

## 2018-12-11 RX ADMIN — Medication 10 ML: at 08:25

## 2018-12-11 RX ADMIN — LOSARTAN POTASSIUM 50 MG: 25 TABLET ORAL at 08:24

## 2018-12-11 RX ADMIN — BUDESONIDE 250 MCG: 0.25 SUSPENSION RESPIRATORY (INHALATION) at 09:47

## 2018-12-11 RX ADMIN — CARISOPRODOL 350 MG: 350 TABLET ORAL at 08:24

## 2018-12-11 RX ADMIN — DOCUSATE SODIUM 100 MG: 100 CAPSULE, LIQUID FILLED ORAL at 08:24

## 2018-12-11 RX ADMIN — TAMSULOSIN HYDROCHLORIDE 0.4 MG: 0.4 CAPSULE ORAL at 08:24

## 2018-12-11 RX ADMIN — POTASSIUM CHLORIDE 20 MEQ: 20 TABLET, EXTENDED RELEASE ORAL at 08:24

## 2018-12-11 RX ADMIN — ALBUTEROL SULFATE 2.5 MG: 2.5 SOLUTION RESPIRATORY (INHALATION) at 09:47

## 2018-12-11 RX ADMIN — APIXABAN 2.5 MG: 2.5 TABLET, FILM COATED ORAL at 08:24

## 2018-12-11 RX ADMIN — OXYCODONE AND ACETAMINOPHEN 2 TABLET: 5; 325 TABLET ORAL at 10:47

## 2018-12-11 ASSESSMENT — PAIN SCALES - GENERAL
PAINLEVEL_OUTOF10: 4
PAINLEVEL_OUTOF10: 7
PAINLEVEL_OUTOF10: 6
PAINLEVEL_OUTOF10: 7

## 2018-12-11 ASSESSMENT — PAIN DESCRIPTION - LOCATION: LOCATION: KNEE

## 2018-12-11 ASSESSMENT — PAIN DESCRIPTION - PAIN TYPE: TYPE: SURGICAL PAIN

## 2018-12-11 NOTE — PROGRESS NOTES
normocephalic, atraumatic. Neck: no adenopathy, no carotid bruit, no JVD, supple, symmetrical, trachea midline and thyroid not enlarged, symmetric, no tenderness/mass/nodules  Lungs: clear to auscultation bilaterally  Heart: regular rate and rhythm, S1, S2 normal, no murmur, click, rub or gallop  Abdomen: soft, non-tender; bowel sounds normal; no masses,  no organomegaly  Extremities: + Surgical dressing over the right knee. No signs of lower extremity DVT. Lymphatic: No significant lymph node enlargement papable  Neurologic: Mental status: Alert, oriented, thought content appropriate      Assessment:   Patient Active Problem List:     Quadriceps tendon rupture, right, sequela     Acute urinary retention, largely resolved     Essential HTN     GERD     Mild acute blood loss anemia ( not unexpected for this type of orthopedic surgery)     S/p R Quadriceps tendon rupture, status post revision  right total knee arthroplasty for arthrofibrosis. Severe muscle spasms    Plan:   1. Continue current management  2. Will give 1 g of Magnesium sulfate IV  3. Will give an additional KCL 2 [place] PO x 1 dose  4. Local dry heat applications  5. See orders  6.  Disposition: inpatient    Discussed the patient's condition with her daughter and MARCIANO Wallace

## 2018-12-11 NOTE — PROGRESS NOTES
morning. \"I have to get better. This is too hard on my . \" Pt tearful at times. Feels ready for D/C. Will have 24 hour assist of family initially. Pain: 7/10 at end of session. RN aware. Ice packs to R knee/thigh after therapy. Objective:    Bed mobility  Supine to sit: SBA, bed flat  Sit to Supine: SBA, bed flat. Effortful and slow. Transfers  Sit to stand: SBA from chair (x 3 trials); SBA from commode with grab bar  Stand to sit: SBA    Ambulation  Assistance Level: CGA initially, progressing to SBA  Assistive device: Wheeled walker  Distance: 20 ft, 4 ft x 2, 12 ft x 2, 15 ft. Quality of gait: Slow, but steady with walker. Able to maintain TTWB R LE. Decreased step length/height. Other: Needing seated rest breaks due to fatigue. Stairs  Up/down curb step x 2 with walker CGA. Able to maintain TTWB R LE. Balance  Sat EOB independently  Sat on commode independently  Static stance with walker Supervision  Ambulation with walker SBA    Patient Education  Reviewed TTWB R LE. Expressed and demonstrated understanding. Curb step with walker. Demonstrated safely. Safety Devices  Pt left with needs in reach. In chair (reclined) with chair alarm on. RN updated.     AM-PAC score  AM-PAC Inpatient Mobility Raw Score : 18  AM-PAC Inpatient T-Scale Score : 43.63  Mobility Inpatient CMS 0-100% Score: 46.58  Mobility Inpatient CMS G-Code Modifier : CK    Goals: (as determined and assessed by primary PT)  Time Frame for Short term goals: discharge  Short term goal 1: patient will perform bed mobility with supervision   Short term goal 2: patient will perform transfers sit<>stand with supervision   Short term goal 3: patient will ambulate 48' with FWW and supervision while maintaining TTWB on RLE  Short term goal 4: patient will ascend/descend curb step x 1 with CGA and FWW     Plan:  Times per week: 7; Times per day: Daily  Current Treatment Recommendations: Strengthening, Balance Training,

## 2018-12-12 ENCOUNTER — CARE COORDINATION (OUTPATIENT)
Dept: CASE MANAGEMENT | Age: 68
End: 2018-12-12

## 2018-12-12 NOTE — CARE COORDINATION
woke during call and rates pain a 11/10 to left leg. She states she last took Percocet at 11 am and Soma at 3 pm.  Pt declined to review meds. [facility] asked if they have Pt's leg elevated above her heart and they stated yes. [facility] offered to contact Dr Nanette Treviño  office for the Pt and Pt is agreeable. [facility] called Dr Nanette Treviño office and spoke with Umberto the PA.  [facility] advised that Pt has left two messages today and is waiting for a call back d/t uncontrolled pain. Umberto stated she has not received any messages from the Pt and then stated a message has just come through. She states Pt has been prescribed pain medication and a muscle relaxer. [facility] advised that per Pt she is taking both and pain is still not controlled rating it a 11/10. Umberto states she will speak with Dr Armaan Lira about the Pt's concerns and have someone give her a call back. [facility] called and spoke with Pt and advised that [facility] has spoke with someone with surgeon's office and advised of Pt c/o uncontrolled pain and that someone from his office should be calling her back today. Pt thanked [facility] for her assistance. Follow Up  No future appointments.     Nya Barr RN

## 2018-12-13 ENCOUNTER — CARE COORDINATION (OUTPATIENT)
Dept: CASE MANAGEMENT | Age: 68
End: 2018-12-13

## 2018-12-31 LAB
CULTURE, JOINT AEROBIC: NORMAL
CULTURE, JOINT ANAEROBIC: NORMAL
GRAM STAIN RESULT: NORMAL

## 2019-01-01 LAB
CULTURE, JOINT AEROBIC: ABNORMAL
CULTURE, JOINT ANAEROBIC: ABNORMAL
GRAM STAIN RESULT: ABNORMAL
ORGANISM: ABNORMAL

## 2019-01-16 RX ORDER — OMEPRAZOLE 20 MG/1
CAPSULE, DELAYED RELEASE ORAL
Qty: 90 CAPSULE | Refills: 3 | Status: SHIPPED | OUTPATIENT
Start: 2019-01-16 | End: 2020-03-17

## 2019-01-16 RX ORDER — FLUTICASONE FUROATE AND VILANTEROL 100; 25 UG/1; UG/1
1 POWDER RESPIRATORY (INHALATION) DAILY
Qty: 3 EACH | Refills: 3 | Status: SHIPPED | OUTPATIENT
Start: 2019-01-16 | End: 2020-03-11

## 2019-01-16 RX ORDER — LOSARTAN POTASSIUM 100 MG/1
TABLET ORAL
Qty: 90 TABLET | Refills: 3 | Status: SHIPPED | OUTPATIENT
Start: 2019-01-16 | End: 2019-03-05

## 2019-03-04 ENCOUNTER — TELEPHONE (OUTPATIENT)
Dept: INTERNAL MEDICINE CLINIC | Age: 69
End: 2019-03-04

## 2019-03-05 RX ORDER — TELMISARTAN 40 MG/1
40 TABLET ORAL DAILY
Qty: 30 TABLET | Refills: 3 | Status: SHIPPED | OUTPATIENT
Start: 2019-03-05 | End: 2019-03-07 | Stop reason: SDUPTHER

## 2019-03-07 RX ORDER — TELMISARTAN 40 MG/1
40 TABLET ORAL DAILY
Qty: 90 TABLET | Refills: 0 | Status: ON HOLD | OUTPATIENT
Start: 2019-03-07 | End: 2019-04-02 | Stop reason: SDUPTHER

## 2019-04-01 ENCOUNTER — APPOINTMENT (OUTPATIENT)
Dept: MRI IMAGING | Age: 69
DRG: 544 | End: 2019-04-01
Payer: MEDICARE

## 2019-04-01 ENCOUNTER — HOSPITAL ENCOUNTER (INPATIENT)
Age: 69
LOS: 4 days | Discharge: NURSING, REHAB OR LONG-TERM CARE | DRG: 544 | End: 2019-04-05
Attending: EMERGENCY MEDICINE | Admitting: HOSPITALIST
Payer: MEDICARE

## 2019-04-01 DIAGNOSIS — M25.552 LEFT HIP PAIN: Primary | ICD-10-CM

## 2019-04-01 DIAGNOSIS — R26.2 INABILITY TO WALK: ICD-10-CM

## 2019-04-01 DIAGNOSIS — R10.32 LEFT GROIN PAIN: ICD-10-CM

## 2019-04-01 LAB
ANION GAP SERPL CALCULATED.3IONS-SCNC: 15 MMOL/L (ref 3–16)
ANION GAP SERPL CALCULATED.3IONS-SCNC: 15 MMOL/L (ref 3–16)
BASOPHILS ABSOLUTE: 0 K/UL (ref 0–0.2)
BASOPHILS RELATIVE PERCENT: 0.4 %
BUN BLDV-MCNC: 23 MG/DL (ref 7–20)
BUN BLDV-MCNC: 29 MG/DL (ref 7–20)
CALCIUM SERPL-MCNC: 9 MG/DL (ref 8.3–10.6)
CALCIUM SERPL-MCNC: 9.4 MG/DL (ref 8.3–10.6)
CHLORIDE BLD-SCNC: 97 MMOL/L (ref 99–110)
CHLORIDE BLD-SCNC: 99 MMOL/L (ref 99–110)
CO2: 23 MMOL/L (ref 21–32)
CO2: 24 MMOL/L (ref 21–32)
CREAT SERPL-MCNC: 0.6 MG/DL (ref 0.6–1.2)
CREAT SERPL-MCNC: 0.7 MG/DL (ref 0.6–1.2)
EOSINOPHILS ABSOLUTE: 0 K/UL (ref 0–0.6)
EOSINOPHILS RELATIVE PERCENT: 0.5 %
GFR AFRICAN AMERICAN: >60
GFR AFRICAN AMERICAN: >60
GFR NON-AFRICAN AMERICAN: >60
GFR NON-AFRICAN AMERICAN: >60
GLUCOSE BLD-MCNC: 100 MG/DL (ref 70–99)
GLUCOSE BLD-MCNC: 110 MG/DL (ref 70–99)
HCT VFR BLD CALC: 41.8 % (ref 36–48)
HEMOGLOBIN: 13.8 G/DL (ref 12–16)
INR BLD: 1 (ref 0.86–1.14)
LYMPHOCYTES ABSOLUTE: 2.6 K/UL (ref 1–5.1)
LYMPHOCYTES RELATIVE PERCENT: 28.7 %
MCH RBC QN AUTO: 30.7 PG (ref 26–34)
MCHC RBC AUTO-ENTMCNC: 33 G/DL (ref 31–36)
MCV RBC AUTO: 93 FL (ref 80–100)
MONOCYTES ABSOLUTE: 0.6 K/UL (ref 0–1.3)
MONOCYTES RELATIVE PERCENT: 6.9 %
NEUTROPHILS ABSOLUTE: 5.7 K/UL (ref 1.7–7.7)
NEUTROPHILS RELATIVE PERCENT: 63.5 %
PDW BLD-RTO: 14.4 % (ref 12.4–15.4)
PLATELET # BLD: 516 K/UL (ref 135–450)
PMV BLD AUTO: 6.7 FL (ref 5–10.5)
POTASSIUM REFLEX MAGNESIUM: 3.7 MMOL/L (ref 3.5–5.1)
POTASSIUM REFLEX MAGNESIUM: 3.9 MMOL/L (ref 3.5–5.1)
PROTHROMBIN TIME: 11.4 SEC (ref 9.8–13)
RBC # BLD: 4.5 M/UL (ref 4–5.2)
SODIUM BLD-SCNC: 136 MMOL/L (ref 136–145)
SODIUM BLD-SCNC: 137 MMOL/L (ref 136–145)
WBC # BLD: 8.9 K/UL (ref 4–11)

## 2019-04-01 PROCEDURE — 6370000000 HC RX 637 (ALT 250 FOR IP): Performed by: EMERGENCY MEDICINE

## 2019-04-01 PROCEDURE — 93005 ELECTROCARDIOGRAM TRACING: CPT | Performed by: STUDENT IN AN ORGANIZED HEALTH CARE EDUCATION/TRAINING PROGRAM

## 2019-04-01 PROCEDURE — 6360000002 HC RX W HCPCS: Performed by: STUDENT IN AN ORGANIZED HEALTH CARE EDUCATION/TRAINING PROGRAM

## 2019-04-01 PROCEDURE — 6360000002 HC RX W HCPCS: Performed by: EMERGENCY MEDICINE

## 2019-04-01 PROCEDURE — 6370000000 HC RX 637 (ALT 250 FOR IP): Performed by: STUDENT IN AN ORGANIZED HEALTH CARE EDUCATION/TRAINING PROGRAM

## 2019-04-01 PROCEDURE — 99222 1ST HOSP IP/OBS MODERATE 55: CPT | Performed by: HOSPITALIST

## 2019-04-01 PROCEDURE — 80048 BASIC METABOLIC PNL TOTAL CA: CPT

## 2019-04-01 PROCEDURE — 85610 PROTHROMBIN TIME: CPT

## 2019-04-01 PROCEDURE — 1200000000 HC SEMI PRIVATE

## 2019-04-01 PROCEDURE — 6360000002 HC RX W HCPCS: Performed by: INTERNAL MEDICINE

## 2019-04-01 PROCEDURE — 94640 AIRWAY INHALATION TREATMENT: CPT

## 2019-04-01 PROCEDURE — 2580000003 HC RX 258: Performed by: STUDENT IN AN ORGANIZED HEALTH CARE EDUCATION/TRAINING PROGRAM

## 2019-04-01 PROCEDURE — 94664 DEMO&/EVAL PT USE INHALER: CPT

## 2019-04-01 PROCEDURE — 85025 COMPLETE CBC W/AUTO DIFF WBC: CPT

## 2019-04-01 PROCEDURE — 96374 THER/PROPH/DIAG INJ IV PUSH: CPT

## 2019-04-01 PROCEDURE — 36415 COLL VENOUS BLD VENIPUNCTURE: CPT

## 2019-04-01 PROCEDURE — 99285 EMERGENCY DEPT VISIT HI MDM: CPT

## 2019-04-01 PROCEDURE — 94761 N-INVAS EAR/PLS OXIMETRY MLT: CPT

## 2019-04-01 RX ORDER — SODIUM CHLORIDE 0.9 % (FLUSH) 0.9 %
10 SYRINGE (ML) INJECTION PRN
Status: DISCONTINUED | OUTPATIENT
Start: 2019-04-01 | End: 2019-04-05 | Stop reason: HOSPADM

## 2019-04-01 RX ORDER — OXYCODONE HYDROCHLORIDE AND ACETAMINOPHEN 5; 325 MG/1; MG/1
2 TABLET ORAL EVERY 4 HOURS PRN
Status: DISCONTINUED | OUTPATIENT
Start: 2019-04-01 | End: 2019-04-05 | Stop reason: HOSPADM

## 2019-04-01 RX ORDER — DEXTROSE MONOHYDRATE 25 G/50ML
12.5 INJECTION, SOLUTION INTRAVENOUS PRN
Status: DISCONTINUED | OUTPATIENT
Start: 2019-04-01 | End: 2019-04-05 | Stop reason: HOSPADM

## 2019-04-01 RX ORDER — DICLOFENAC SODIUM 75 MG/1
TABLET, DELAYED RELEASE ORAL
Refills: 2 | COMMUNITY
Start: 2019-03-28 | End: 2019-07-01 | Stop reason: SDUPTHER

## 2019-04-01 RX ORDER — OXYCODONE HYDROCHLORIDE AND ACETAMINOPHEN 5; 325 MG/1; MG/1
1 TABLET ORAL ONCE
Status: COMPLETED | OUTPATIENT
Start: 2019-04-01 | End: 2019-04-01

## 2019-04-01 RX ORDER — TIZANIDINE 4 MG/1
TABLET ORAL
Refills: 0 | COMMUNITY
Start: 2019-03-28 | End: 2020-01-17

## 2019-04-01 RX ORDER — OXYCODONE HYDROCHLORIDE AND ACETAMINOPHEN 5; 325 MG/1; MG/1
1 TABLET ORAL EVERY 4 HOURS PRN
Status: DISCONTINUED | OUTPATIENT
Start: 2019-04-01 | End: 2019-04-05 | Stop reason: HOSPADM

## 2019-04-01 RX ORDER — DIAZEPAM 5 MG/1
5 TABLET ORAL ONCE
Status: COMPLETED | OUTPATIENT
Start: 2019-04-01 | End: 2019-04-01

## 2019-04-01 RX ORDER — OXYCODONE HYDROCHLORIDE AND ACETAMINOPHEN 5; 325 MG/1; MG/1
TABLET ORAL
Refills: 0 | COMMUNITY
Start: 2019-03-28 | End: 2019-04-01

## 2019-04-01 RX ORDER — PANTOPRAZOLE SODIUM 40 MG/1
40 TABLET, DELAYED RELEASE ORAL
Status: DISCONTINUED | OUTPATIENT
Start: 2019-04-02 | End: 2019-04-05 | Stop reason: HOSPADM

## 2019-04-01 RX ORDER — TIZANIDINE 4 MG/1
4 TABLET ORAL 3 TIMES DAILY PRN
Status: DISCONTINUED | OUTPATIENT
Start: 2019-04-01 | End: 2019-04-05 | Stop reason: HOSPADM

## 2019-04-01 RX ORDER — SODIUM CHLORIDE 0.9 % (FLUSH) 0.9 %
10 SYRINGE (ML) INJECTION EVERY 12 HOURS SCHEDULED
Status: DISCONTINUED | OUTPATIENT
Start: 2019-04-01 | End: 2019-04-05 | Stop reason: HOSPADM

## 2019-04-01 RX ORDER — BUDESONIDE 0.5 MG/2ML
0.5 INHALANT ORAL 2 TIMES DAILY
Status: DISCONTINUED | OUTPATIENT
Start: 2019-04-01 | End: 2019-04-05 | Stop reason: HOSPADM

## 2019-04-01 RX ORDER — FORMOTEROL FUMARATE 20 UG/2ML
20 SOLUTION RESPIRATORY (INHALATION) 2 TIMES DAILY
Status: DISCONTINUED | OUTPATIENT
Start: 2019-04-01 | End: 2019-04-05 | Stop reason: HOSPADM

## 2019-04-01 RX ORDER — KETOROLAC TROMETHAMINE 30 MG/ML
15 INJECTION, SOLUTION INTRAMUSCULAR; INTRAVENOUS EVERY 6 HOURS PRN
Status: DISCONTINUED | OUTPATIENT
Start: 2019-04-01 | End: 2019-04-05 | Stop reason: HOSPADM

## 2019-04-01 RX ORDER — CHLORTHALIDONE 25 MG/1
25 TABLET ORAL DAILY
Status: DISCONTINUED | OUTPATIENT
Start: 2019-04-01 | End: 2019-04-05 | Stop reason: HOSPADM

## 2019-04-01 RX ORDER — DEXTROSE MONOHYDRATE 50 MG/ML
100 INJECTION, SOLUTION INTRAVENOUS PRN
Status: DISCONTINUED | OUTPATIENT
Start: 2019-04-01 | End: 2019-04-05 | Stop reason: HOSPADM

## 2019-04-01 RX ORDER — ALBUTEROL SULFATE 2.5 MG/3ML
2.5 SOLUTION RESPIRATORY (INHALATION) EVERY 4 HOURS PRN
Status: DISCONTINUED | OUTPATIENT
Start: 2019-04-01 | End: 2019-04-05 | Stop reason: HOSPADM

## 2019-04-01 RX ORDER — LOSARTAN POTASSIUM 50 MG/1
50 TABLET ORAL DAILY
Status: DISCONTINUED | OUTPATIENT
Start: 2019-04-02 | End: 2019-04-03

## 2019-04-01 RX ORDER — METHYLPREDNISOLONE 4 MG/1
TABLET ORAL
Refills: 0 | COMMUNITY
Start: 2019-03-28 | End: 2019-04-29

## 2019-04-01 RX ORDER — ONDANSETRON 2 MG/ML
4 INJECTION INTRAMUSCULAR; INTRAVENOUS ONCE
Status: COMPLETED | OUTPATIENT
Start: 2019-04-01 | End: 2019-04-01

## 2019-04-01 RX ORDER — ACETAMINOPHEN 325 MG/1
650 TABLET ORAL EVERY 4 HOURS PRN
Status: DISCONTINUED | OUTPATIENT
Start: 2019-04-01 | End: 2019-04-05 | Stop reason: HOSPADM

## 2019-04-01 RX ORDER — GABAPENTIN 300 MG/1
300 CAPSULE ORAL 3 TIMES DAILY
Status: DISCONTINUED | OUTPATIENT
Start: 2019-04-02 | End: 2019-04-05 | Stop reason: HOSPADM

## 2019-04-01 RX ORDER — GABAPENTIN 300 MG/1
CAPSULE ORAL
Refills: 3 | COMMUNITY
Start: 2019-03-28 | End: 2019-07-01 | Stop reason: SDUPTHER

## 2019-04-01 RX ORDER — NICOTINE POLACRILEX 4 MG
15 LOZENGE BUCCAL PRN
Status: DISCONTINUED | OUTPATIENT
Start: 2019-04-01 | End: 2019-04-05 | Stop reason: HOSPADM

## 2019-04-01 RX ORDER — FUROSEMIDE 20 MG/1
20 TABLET ORAL DAILY
Status: DISCONTINUED | OUTPATIENT
Start: 2019-04-01 | End: 2019-04-05 | Stop reason: HOSPADM

## 2019-04-01 RX ORDER — OXYCODONE HYDROCHLORIDE AND ACETAMINOPHEN 5; 325 MG/1; MG/1
1 TABLET ORAL EVERY 4 HOURS PRN
Status: ON HOLD | COMMUNITY
End: 2019-04-05 | Stop reason: HOSPADM

## 2019-04-01 RX ORDER — ONDANSETRON 2 MG/ML
4 INJECTION INTRAMUSCULAR; INTRAVENOUS EVERY 6 HOURS PRN
Status: DISCONTINUED | OUTPATIENT
Start: 2019-04-01 | End: 2019-04-05 | Stop reason: HOSPADM

## 2019-04-01 RX ADMIN — TIZANIDINE 4 MG: 4 TABLET ORAL at 18:44

## 2019-04-01 RX ADMIN — OXYCODONE HYDROCHLORIDE AND ACETAMINOPHEN 2 TABLET: 5; 325 TABLET ORAL at 22:57

## 2019-04-01 RX ADMIN — DIAZEPAM 5 MG: 5 TABLET ORAL at 13:38

## 2019-04-01 RX ADMIN — OXYCODONE HYDROCHLORIDE AND ACETAMINOPHEN 2 TABLET: 5; 325 TABLET ORAL at 18:44

## 2019-04-01 RX ADMIN — Medication 10 ML: at 20:30

## 2019-04-01 RX ADMIN — FORMOTEROL FUMARATE DIHYDRATE 20 MCG: 20 SOLUTION RESPIRATORY (INHALATION) at 20:19

## 2019-04-01 RX ADMIN — ONDANSETRON 4 MG: 2 INJECTION INTRAMUSCULAR; INTRAVENOUS at 13:38

## 2019-04-01 RX ADMIN — CHLORTHALIDONE 25 MG: 25 TABLET ORAL at 20:30

## 2019-04-01 RX ADMIN — ENOXAPARIN SODIUM 40 MG: 40 INJECTION SUBCUTANEOUS at 20:30

## 2019-04-01 RX ADMIN — KETOROLAC TROMETHAMINE: 30 INJECTION, SOLUTION INTRAMUSCULAR; INTRAVENOUS at 16:58

## 2019-04-01 RX ADMIN — BUDESONIDE 500 MCG: 0.5 SUSPENSION RESPIRATORY (INHALATION) at 20:21

## 2019-04-01 RX ADMIN — OXYCODONE HYDROCHLORIDE AND ACETAMINOPHEN 1 TABLET: 5; 325 TABLET ORAL at 13:38

## 2019-04-01 ASSESSMENT — PAIN DESCRIPTION - DESCRIPTORS
DESCRIPTORS: DISCOMFORT;ACHING;HEAVINESS
DESCRIPTORS: DISCOMFORT;ACHING
DESCRIPTORS: ACHING

## 2019-04-01 ASSESSMENT — PAIN DESCRIPTION - FREQUENCY
FREQUENCY: CONTINUOUS

## 2019-04-01 ASSESSMENT — PAIN DESCRIPTION - ONSET: ONSET: ON-GOING

## 2019-04-01 ASSESSMENT — PAIN SCALES - GENERAL
PAINLEVEL_OUTOF10: 8
PAINLEVEL_OUTOF10: 10
PAINLEVEL_OUTOF10: 8
PAINLEVEL_OUTOF10: 9
PAINLEVEL_OUTOF10: 0

## 2019-04-01 ASSESSMENT — ENCOUNTER SYMPTOMS
CONSTIPATION: 1
NAUSEA: 1
ABDOMINAL PAIN: 0
CHEST TIGHTNESS: 0
SHORTNESS OF BREATH: 0
VOMITING: 0
DIARRHEA: 0

## 2019-04-01 ASSESSMENT — PAIN DESCRIPTION - ORIENTATION
ORIENTATION: LEFT
ORIENTATION: LEFT;RIGHT
ORIENTATION: LEFT;RIGHT

## 2019-04-01 ASSESSMENT — PAIN DESCRIPTION - LOCATION
LOCATION: HIP;KNEE
LOCATION: LEG
LOCATION: HIP;KNEE

## 2019-04-01 ASSESSMENT — PAIN DESCRIPTION - PAIN TYPE: TYPE: ACUTE PAIN

## 2019-04-01 ASSESSMENT — PAIN DESCRIPTION - PROGRESSION: CLINICAL_PROGRESSION: NOT CHANGED

## 2019-04-01 NOTE — PROGRESS NOTES
COLONOSCOPY      HYSTERECTOMY      KNEE ARTHROPLASTY Right 04/16/2018     RIGHT REVISION TOTAL KNEE ARTHROPLASTY    KNEE ARTHROSCOPY Left 2003    Dr. Yeni Sumner Right 9/28/2018    RIGHT REVISION ARTHROSCOPY FEMORAL COMPONENT, SYNOVECTOMY performed by Masoud Morales MD at [address] Right 12/7/2018    RIGHT KNEE QUADRICEP TENDON REPAIR WITH ALLOGRAFT AUGMENTATION performed by Masoud Morales MD at [facility] ARTHROSCOPY Right     TOTAL KNEE ARTHROPLASTY Right 01/2017       Level of Consciousness: Alert, Oriented, and Cooperative = 0    Level of Activity: Walking unassisted = 0    Respiratory Pattern: Regular Pattern; RR 8-20 = 0    Breath Sounds: Clear = 0    Sputum   ,  ,    Cough: Strong, spontaneous, non-productive = 0    Vital Signs   BP (!) 145/69   Pulse 73   Temp 97.6 °F (36.4 °C) (Oral)   Resp 16   SpO2 93%   SPO2 (COPD values may differ): Greater than or equal to 92% on room air = 0    Peak Flow (asthma only): not applicable = 0    RSI: 0-4 = See once and convert to home regimen. Plan       Goals: Medication delivery    Patient/caregiver was educated on the proper method of use for Respiratory Care Devices:  Yes      Level of patient/caregiver understanding able to:   x Verbalize understanding   ? Demonstrate understanding       ? Teach back        ? Needs reinforcement       ? No available caregiver               ? Other:     Response to education:  Excellent     Is patient being placed on Home Treatment Regimen? Yes     Does the patient have everything they need prior to discharge? Yes     Comments: Chart reviewed    Plan of Care: Albuterol, pulmicort and perforomist       Electronically signed by Loyal Aschoff, RCP on 4/1/2019 at 6:57 PM    Respiratory Protocol Guidelines     1.  Assessment and treatment by Respiratory Therapy will be initiated for medication and therapeutic interventions upon initiation of aerosolized medication. 2. Physician will be contacted for respiratory rate (RR) greater than 35 breaths per minute. Therapy will be held for heart rate (HR) greater than 140 beats per minute, pending direction from physician. 3. Bronchodilators will be administered via Metered Dose Inhaler (MDI) with spacer when the following criteria are met:  a. Alert and cooperative     b. HR < 140 bpm  c. RR < 30 bpm                d. Can demonstrate a 2-3 second inspiratory hold  4. Bronchodilators will be administered via Hand Held Nebulizer [facility]) to patients when ANY of the following criteria are met  a. Incognizant or uncooperative          b. Patients treated with HHN at Home        c. Unable to demonstrate proper use of MDI with spacer     d. RR > 30 bpm   5. Bronchodilators will be delivered via Metered Dose Inhaler (MDI), HHN, Aerogen to intubated patients on mechanical ventilation. 6. Inhalation medication orders will be delivered and/or substituted as outlined below. Aerosolized Medications Ordering and Administration Guidelines:    1. All Medications will be ordered by a physician, and their frequency and/or modality will be adjusted as defined by the patients Respiratory Severity Index (RSI) score. 2. If the patient does not have documented COPD, consider discontinuing anticholinergics when RSI is less than 9.  3. If the bronchospasm worsens (increased RSI), then the bronchodilator frequency can be increased to a maximum of every 4 hours. If greater than every 4 hours is required, the physician will be contacted. 4. If the bronchospasm improves, the frequency of the bronchodilator can be decreased, based on the patient's RSI, but not less than home treatment regimen frequency. 5. Bronchodilator(s) will be discontinued if patient has a RSI less than 9 and has received no scheduled or as needed treatment for 72  Hrs. Patients Ordered on a Mucolytic Agent:    1.  Must always be administered with a bronchodilator. 2. Discontinue if patient experiences worsened bronchospasm, or secretions have lessened to the point that the patient is able to clear them with a cough. Anti-inflammatory and Combination Medications:    1. If the patient lacks prior history of lung disease, is not using inhaled anti-inflammatory medication at home, and lacks wheezing by examination or by history for at least 24 hours, contact physician for possible discontinuation.

## 2019-04-01 NOTE — ED TRIAGE NOTES
Pt c/o left knee and hip pain. Pt has had numerous surgeries on the right side, states \"I wonder if I over did it\". Pt seen PCP last week, was prescribed medications, however isn't getting any relief. Does have some nausea on and off.

## 2019-04-01 NOTE — H&P
Internal Medicine PGY-1 Resident History & Physical      PCP: Yobani Albright MD    Date of Admission: 4/1/2019    Date of Service: Pt seen/examined on 4/1/2019 and Admitted to Inpatient with expected LOS greaterthan two midnights due to medical therapy. Chief Complaint:  L groin pain       History Of Present Illness:      76 y.o. female who presented to [facility], [facility]. with PMHx multiple R leg surgeries (most recent 12/2018 for R quads rupture with R knee total arthroplasty), HTN, GERD presents with L groin pain. Started a few weeks ago with gradual onset, constant, sharp and throbbing pain that radiates the the back L hip area. Worsened with any extension, flexion or weight bearing of the L hip. Denies any pain going down her L leg. Has been participating in PT 3x weekly as prescribed without any obvious trauma to the area. Denies fevers, chills, abd pain, urinary or bowel incontinence. Went to see Dr. Alcides Shepard (ortho) on Thursday who performed x-rays of the area that showed only mild arthritis (per ED note) and prescribed Voltaren, Medrol dose pack, Tizanidine, Percocet and Gabapentin. After no relief, patient brought herself to ED. In ED, patient was slightly hypertensive to SBPs of 150s. Was given Percocet, Valium and Zofran. PCP contacted and concerned for need for further imaging so patient was admitted for further workup.      Past Medical History:          Diagnosis Date    Arthritis     Compression fracture     back due to fall    Concussion     due to fall    DVT (deep venous thrombosis) ([facility] [address] 01/2017    RLE after TKR    Environmental allergies     Essential hypertension, benign 6/28/2010    GERD (gastroesophageal reflux disease)     History of peptic ulcer     Hx of blood clots     Hyperlipidemia 6/28/2010    Lacunar infarction     old - per MRI 2015    MRSA (methicillin resistant staph aureus) culture positive 12/07/2018    knee    Restrictive airway disease     allergy induced    Retention of urine     Wound, open 2018    left shin area, healing well, tx in wound care center       Past Surgical History:          Procedure Laterality Date     SECTION      times 2    CHOLECYSTECTOMY      COLONOSCOPY      HYSTERECTOMY      KNEE ARTHROPLASTY Right 2018     RIGHT REVISION TOTAL KNEE ARTHROPLASTY    KNEE ARTHROSCOPY Left     Dr. Payam Bosch Right 2018    RIGHT REVISION ARTHROSCOPY FEMORAL COMPONENT, SYNOVECTOMY performed by Shai Solorio MD at [address] Right 2018    RIGHT KNEE QUADRICEP TENDON REPAIR WITH ALLOGRAFT AUGMENTATION performed by Shai Solorio MD at [facility] ARTHROSCOPY Right     TOTAL KNEE ARTHROPLASTY Right 2017       Medications Prior to Admission:      Prior to Admission medications    Medication Sig Start Date End Date Taking? Authorizing Provider   diclofenac (VOLTAREN) 75 MG EC tablet TK 1 T PO BID PC 3/28/19  Yes Historical Provider, MD   methylPREDNISolone (MEDROL DOSEPACK) 4 MG tablet TK UTD 3/28/19  Yes Historical Provider, MD   tiZANidine (ZANAFLEX) 4 MG tablet TK 1 T PO TID PRF SPASMS 3/28/19  Yes Historical Provider, MD   oxyCODONE-acetaminophen (PERCOCET) 5-325 MG per tablet Take 1 tablet by mouth every 4 hours as needed for Pain.    Yes Historical Provider, MD   telmisartan (MICARDIS) 40 MG tablet TAKE 1 TABLET BY MOUTH DAILY 3/7/19  Yes Marija Denise MD   omeprazole (PRILOSEC) 20 MG delayed release capsule TAKE 1 CAPSULE DAILY 19  Yes Marija Denise MD   fluticasone-vilanterol (BREO ELLIPTA) 100-25 MCG/INH AEPB inhaler Inhale 1 puff into the lungs daily 19  Yes Marija Denise MD   furosemide (LASIX) 20 MG tablet Take 20 mg by mouth daily    Yes Historical Provider, MD   chlorthalidone (HYGROTON) 25 MG tablet Take 1 tablet by mouth daily 18  Yes Marija Denise MD   potassium chloride (KLOR-CON M) 20 MEQ extended release tablet Take 1 tablet by mouth daily 9/6/18  Yes Lyla Ochoa MD   Multiple Vitamins-Minerals (MULTIVITAMIN PO) Take by mouth daily Women's Ultra Oscar for Menopause   Yes Historical Provider, MD   gabapentin (NEURONTIN) 300 MG capsule TK 1 C PO TID 3/28/19   Historical Provider, MD   albuterol sulfate HFA (PROVENTIL HFA) 108 (90 BASE) MCG/ACT inhaler 2 PUFFS EVERY 4 HOURS AS NEEDED WHEEZING 12/19/16   Lyla Ochoa MD       Allergies:  Codeine; Demerol; Morphine; Tape [adhesive tape]; Cabbage; and Erythromycin    Social History:      The patient currently lives at home with . TOBACCO:   reports that she quit smoking about 14 years ago. Her smoking use included cigarettes. She has a 35.00 pack-year smoking history. She has never used smokeless tobacco.  ETOH:   reports that she drank alcohol. Family History:      Reviewed in detail and negative for DM, CAD, Cancer, CVA. Positive as follows:        Problem Relation Age of Onset    Cancer Other     Hypertension Other     Kidney Disease Other        REVIEW OF SYSTEMS: Pertinent positives as noted in the HPI. All other systems reviewed and negative. ROS: Review of Systems   Constitutional: Positive for diaphoresis. Negative for chills and fever. Respiratory: Negative for chest tightness and shortness of breath. Cardiovascular: Positive for leg swelling. Negative for chest pain. Gastrointestinal: Positive for constipation and nausea. Negative for abdominal pain, diarrhea and vomiting. Genitourinary: Negative for difficulty urinating, dysuria, frequency and urgency. Musculoskeletal: Positive for gait problem. Neurological: Negative for dizziness, weakness, light-headedness, numbness and headaches. PHYSICAL EXAM PERFORMED:    BP (!) 145/69   Pulse 73   Temp 97.6 °F (36.4 °C) (Oral)   Resp 16   SpO2 93%     General appearance:  Overweight  F, mod apparent distress, appears stated age and cooperative.   HEENT:  Normal cephalic,atraumatic without obvious deformity. Pupils equal, round, and reactive to light. Extra ocular muscles intact. Conjunctivae/corneas clear. Neck: Supple, with full range of motion. No jugular venous distention. Trachea midline. Respiratory:  Normal respiratory effort. Clear to auscultation, bilaterally without Rales/Wheezes/Rhonchi. Cardiovascular:  Regular rate and rhythm with normal S1/S2 without murmurs, rubs or gallops. Abdomen: Soft, non-tender, non-distended with normal bowel sounds. L groin area is exquisitely TTP with TTP along L iliac crest with some tenderness in the sacral area. No TTP in the lumbar spine or paraspinal area. + bulge in L groin area with Valsalva, no obvious skin changes or ecchymosis in the area or L flank area. Musculoskeletal:  No clubbing, cyanosis. 2+ pitting edema in the BL LE L>R. ROM limited by pain, cannot lift L leg more than 15 degrees  Skin: Skin color, texture, turgor normal.  Norashes or lesions. Neurologic:  Neurovascularly intact without any focal sensory/motor deficits. Cranialnerves: II-XII intact, grossly non-focal.  Psychiatric:  Alert and oriented, thought content appropriate,normal insight  Capillary Refill: Brisk,< 3 seconds   Peripheral Pulses: +2 palpable, equal bilaterally       Labs:     Recent Labs     04/01/19  1334      K 3.9   CL 97*   CO2 24   BUN 23*   CREATININE 0.6   CALCIUM 9.4     Urinalysis:      Lab Results   Component Value Date    NITRU Negative 05/28/2018    WBCUA 3-5 05/28/2018    BACTERIA Rare 05/28/2018    RBCUA 0-2 05/28/2018    BLOODU Negative 05/28/2018    SPECGRAV <=1.005 05/28/2018    GLUCOSEU Negative 05/28/2018       Radiology:     MRI HIP LEFT WO CONTRAST    (Results Pending)   VL Extremity Venous Left    (Results Pending)       ASSESSMENT & PLAN:  Avanell Bence is a 76 y.o. female w/  PMH of HTN, GERD, multiple R leg surgeries (most recent 12/2018). Admitted for intractable L groin pain.      Active Hospital Problems    Diagnosis Date Noted    Left groin pain [R10.32] 04/01/2019     L groin pain   Has had DVT in the past (2017) was on [facility] in 12/2018 but has not been on it since. + bulge w/ Valsalva maneuver   -Ortho consulted  -Doppler LE ordered  -MRI L hip ordered   -Percocet and Toradol ordered   -Tizanidine and Gabapentin ordered    HTN  -cont Chlorthalidone  -cont Cozaar (in place of ARB)    GERD  -cont Protonix     DVT Prophylaxis: Lovenox   Diet: DIET LOW SODIUM 2 GM;  Code Status: Full Code    PT/OT Eval Status: pending     Dispo - [facility]        Omero Soni MD    I will discuss the patient with the senior resident and MD Omero Pimentel M.D. Internal Medicine PGY-1  Pager: [phone]     Addendum to Resident H& P/Progress note:  I have personally seen,examined and evaluated the patient. I have reviewed the current history, physical findings, labs and assessment and plan and agree with note as documented by resident MD ( )  Discussed the patient's condition with her daughter.     Joaquim Dodson MD, FACP

## 2019-04-01 NOTE — ED PROVIDER NOTES
[facility]          ATTENDING PHYSICIAN NOTE       Date of evaluation: 4/1/2019    Chief Complaint     Knee Pain and Hip Pain      History of Present Illness     William Garcia is a 76 y.o. female who presents with complaints of left upper leg pain. The patient has undergone multiple surgeries on her right lower extremity and has been in therapy for some time to strengthen her right leg. She seems to favor the use of her left leg. Now for the last 2-3 weeks the patient has been having increasing pain in the left upper leg both anteriorly and posteriorly just below the inguinal ligament. She states that she is at the point where she can no longer walk because the pain is too severe. She is now dependent on a wheelchair and having difficulties with transfers even to the [facility] at her bedside. She went to see her orthopedic surgeon 5 days ago and he prescribed her oxycodone, tizanidine, steroids, another muscle relaxant, and Voltaren. She has not gotten any better with this medication. She denies any bowel or bladder incontinence or retention. She does not really report back pain. Raising her leg up off the bed as well as weightbearing produces extreme pain for this patient. Review of Systems     Negative for bowel or bladder incontinence, or urinary retention. The patient developed some nausea this morning. She seems to attribute this to the fact that the pain gets very severe and this is what causes her to get nauseous. Negative for paresthesias or weakness. See HPI for further details. Review of systems otherwise negative.     Past Medical, Surgical, Family, and Social History     She has a past medical history of Arthritis, Compression fracture, Concussion, DVT (deep venous thrombosis) ([facility] [address] Environmental allergies, Essential hypertension, benign, GERD (gastroesophageal reflux disease), History of peptic ulcer, Hx of blood clots, Hyperlipidemia, Lacunar infarction, MRSA (methicillin resistant staph aureus) culture positive, Restrictive airway disease, Retention of urine, and Wound, open. She has a past surgical history that includes Knee arthroscopy (Left, ); Cholecystectomy; Hysterectomy; Total knee arthroplasty (Right, 2017); Colonoscopy; Knee Arthroplasty (Right, 2018); Shoulder arthroscopy (Right);  section; pr revise knee joint replace,all parts (Right, 2018); and quadricepsplasty (Right, 2018). Her family history includes Cancer in an other family member; Hypertension in an other family member; Kidney Disease in an other family member. She reports that she quit smoking about 14 years ago. Her smoking use included cigarettes. She has a 35.00 pack-year smoking history. She has never used smokeless tobacco. She reports that she drank alcohol. She reports that she does not use drugs.     Medications     Current Discharge Medication List      CONTINUE these medications which have NOT CHANGED    Details   oxyCODONE-acetaminophen (PERCOCET) 5-325 MG per tablet TK 0.5-1 T PO Q 4-6 H PRN P  Refills: 0      gabapentin (NEURONTIN) 300 MG capsule TK 1 C PO TID  Refills: 3      diclofenac (VOLTAREN) 75 MG EC tablet TK 1 T PO BID PC  Refills: 2      methylPREDNISolone (MEDROL DOSEPACK) 4 MG tablet TK UTD  Refills: 0      tiZANidine (ZANAFLEX) 4 MG tablet TK 1 T PO TID PRF SPASMS  Refills: 0      telmisartan (MICARDIS) 40 MG tablet TAKE 1 TABLET BY MOUTH DAILY  Qty: 90 tablet, Refills: 0    Comments: **Patient requests 90 days supply**      omeprazole (PRILOSEC) 20 MG delayed release capsule TAKE 1 CAPSULE DAILY  Qty: 90 capsule, Refills: 3      fluticasone-vilanterol (BREO ELLIPTA) 100-25 MCG/INH AEPB inhaler Inhale 1 puff into the lungs daily  Qty: 3 each, Refills: 3      apixaban (ELIQUIS) 2.5 MG TABS tablet Take 1 tablet by mouth 2 times daily  Qty: 60 tablet, Refills: 0    Associated Diagnoses: Quadriceps tendon rupture, right, sequela      losartan (COZAAR) 100 MG tablet Take 0.5 tablets by mouth daily TAKE 1 TABLET DAILY  Qty: 90 tablet, Refills: 3      furosemide (LASIX) 20 MG tablet Take 20 mg by mouth daily as needed      chlorthalidone (HYGROTON) 25 MG tablet Take 1 tablet by mouth daily  Qty: 90 tablet, Refills: 3      potassium chloride (KLOR-CON M) 20 MEQ extended release tablet Take 1 tablet by mouth daily  Qty: 90 tablet, Refills: 3      Multiple Vitamins-Minerals (MULTIVITAMIN PO) Take by mouth daily Women's Ultra Oscar for Menopause      albuterol sulfate HFA (PROVENTIL HFA) 108 (90 BASE) MCG/ACT inhaler 2 PUFFS EVERY 4 HOURS AS NEEDED WHEEZING  Qty: 1 Inhaler, Refills: 5             Allergies     She is allergic to codeine; demerol; morphine; tape [adhesive tape]; cabbage; and erythromycin. Physical Exam     INITIAL VITALS: BP: (!) 153/82, Temp: 97.9 °F (36.6 °C), Pulse: 73, Resp: 20, SpO2: 93 %   Constitutional:  Well developed, well nourished, no acute distress, non-toxic appearance  :  No costovertebral angle tenderness   Musculoskeletal:  No edema, no tenderness, no deformities. Straight leg raise positive on the left. The patient has pain with palpation of the left groin area as well as with palpation of the left buttock.   Back:  No midline spine tenderness  Integument:  Well hydrated, no rash, no abrasion or bruising  Neurologic:  Motor, sensory, and reflex exams normal in both lower extremities      Diagnostic Results     RADIOLOGY:  No orders to display       LABS:   Results for orders placed or performed during the hospital encounter of 03/49/26   Basic Metabolic Panel w/ Reflex to MG   Result Value Ref Range    Sodium 136 136 - 145 mmol/L    Potassium reflex Magnesium 3.9 3.5 - 5.1 mmol/L    Chloride 97 (L) 99 - 110 mmol/L    CO2 24 21 - 32 mmol/L    Anion Gap 15 3 - 16    Glucose 100 (H) 70 - 99 mg/dL    BUN 23 (H) 7 - 20 mg/dL    CREATININE 0.6 0.6 - 1.2 mg/dL    GFR Non-African American >60 >60    GFR African American >60 >60    Calcium 9.4 8.3 - 10.6 mg/dL       ED Course     Nursing Notes, Past Medical Hx, Past Surgical Hx, Social Hx, Allergies, and Family Hx were reviewed. The patient was given the following medications:  Orders Placed This Encounter   Medications    diazepam (VALIUM) tablet 5 mg    oxyCODONE-acetaminophen (PERCOCET) 5-325 MG per tablet 1 tablet    ondansetron (ZOFRAN) injection 4 mg       MEDICAL DECISION MAKING / ASSESSMENT / Eduardo Fausto is a 76 y.o. female presenting with left leg and hip pain and inability to walk. The patient had x-rays performed by her with [facility] surgeon last week for this pain and she was noted to have minimal arthritis but not enough that would make the orthopedic surgeon think that arthritis was the cause of her pain. She continues to have worsening pain despite 5 medications he prescribed and now cannot walk and is having difficulties with transfers even to the commode. I spoke with the patient's primary care doctor about potential further workup of this discomfort and at this point we have made the decision to bring the patient into the hospital for advanced imaging in the form of MRI. She will need MRI of the left hip as well as potentially the left lower back is there may be a radicular component to this. He advised speaking with the medical residents for admission to Dr. Danielle Tapia. Clinical Impression     1. Left hip pain    2. Inability to walk        Disposition     PATIENT REFERRED TO:  No follow-up provider specified.     DISCHARGE MEDICATIONS:  Current Discharge Medication List          DISPOSITION Decision To Tracy Balderrama MD  04/01/19 [id number]

## 2019-04-02 ENCOUNTER — APPOINTMENT (OUTPATIENT)
Dept: MRI IMAGING | Age: 69
DRG: 544 | End: 2019-04-02
Payer: MEDICARE

## 2019-04-02 ENCOUNTER — APPOINTMENT (OUTPATIENT)
Dept: VASCULAR LAB | Age: 69
DRG: 544 | End: 2019-04-02
Payer: MEDICARE

## 2019-04-02 LAB
EKG ATRIAL RATE: 80 BPM
EKG DIAGNOSIS: NORMAL
EKG P AXIS: 42 DEGREES
EKG P-R INTERVAL: 144 MS
EKG Q-T INTERVAL: 374 MS
EKG QRS DURATION: 94 MS
EKG QTC CALCULATION (BAZETT): 431 MS
EKG R AXIS: -58 DEGREES
EKG T AXIS: 60 DEGREES
EKG VENTRICULAR RATE: 80 BPM
GLUCOSE BLD-MCNC: 101 MG/DL (ref 70–99)
GLUCOSE BLD-MCNC: 94 MG/DL (ref 70–99)
PERFORMED ON: ABNORMAL
PERFORMED ON: NORMAL

## 2019-04-02 PROCEDURE — 6370000000 HC RX 637 (ALT 250 FOR IP): Performed by: STUDENT IN AN ORGANIZED HEALTH CARE EDUCATION/TRAINING PROGRAM

## 2019-04-02 PROCEDURE — 6360000002 HC RX W HCPCS: Performed by: STUDENT IN AN ORGANIZED HEALTH CARE EDUCATION/TRAINING PROGRAM

## 2019-04-02 PROCEDURE — 94761 N-INVAS EAR/PLS OXIMETRY MLT: CPT

## 2019-04-02 PROCEDURE — 93971 EXTREMITY STUDY: CPT

## 2019-04-02 PROCEDURE — 2580000003 HC RX 258: Performed by: STUDENT IN AN ORGANIZED HEALTH CARE EDUCATION/TRAINING PROGRAM

## 2019-04-02 PROCEDURE — 94640 AIRWAY INHALATION TREATMENT: CPT

## 2019-04-02 PROCEDURE — 93010 ELECTROCARDIOGRAM REPORT: CPT | Performed by: INTERNAL MEDICINE

## 2019-04-02 PROCEDURE — 6360000002 HC RX W HCPCS: Performed by: INTERNAL MEDICINE

## 2019-04-02 PROCEDURE — 6360000002 HC RX W HCPCS: Performed by: HOSPITALIST

## 2019-04-02 PROCEDURE — 90670 PCV13 VACCINE IM: CPT | Performed by: HOSPITALIST

## 2019-04-02 PROCEDURE — 99232 SBSQ HOSP IP/OBS MODERATE 35: CPT | Performed by: HOSPITALIST

## 2019-04-02 PROCEDURE — 73721 MRI JNT OF LWR EXTRE W/O DYE: CPT

## 2019-04-02 PROCEDURE — G0009 ADMIN PNEUMOCOCCAL VACCINE: HCPCS | Performed by: HOSPITALIST

## 2019-04-02 PROCEDURE — 1200000000 HC SEMI PRIVATE

## 2019-04-02 RX ORDER — TELMISARTAN 40 MG/1
40 TABLET ORAL DAILY
Qty: 90 TABLET | Refills: 3 | Status: SHIPPED | OUTPATIENT
Start: 2019-04-02 | End: 2020-01-17

## 2019-04-02 RX ORDER — CYCLOBENZAPRINE HCL 10 MG
10 TABLET ORAL ONCE
Status: COMPLETED | OUTPATIENT
Start: 2019-04-02 | End: 2019-04-02

## 2019-04-02 RX ORDER — LORAZEPAM 2 MG/ML
1 INJECTION INTRAMUSCULAR ONCE
Status: COMPLETED | OUTPATIENT
Start: 2019-04-02 | End: 2019-04-02

## 2019-04-02 RX ADMIN — TIZANIDINE 4 MG: 4 TABLET ORAL at 13:31

## 2019-04-02 RX ADMIN — ENOXAPARIN SODIUM 40 MG: 40 INJECTION SUBCUTANEOUS at 08:28

## 2019-04-02 RX ADMIN — OXYCODONE HYDROCHLORIDE AND ACETAMINOPHEN 2 TABLET: 5; 325 TABLET ORAL at 22:11

## 2019-04-02 RX ADMIN — Medication 10 ML: at 08:28

## 2019-04-02 RX ADMIN — GABAPENTIN 300 MG: 300 CAPSULE ORAL at 08:29

## 2019-04-02 RX ADMIN — GABAPENTIN 300 MG: 300 CAPSULE ORAL at 22:11

## 2019-04-02 RX ADMIN — KETOROLAC TROMETHAMINE 15 MG: 30 INJECTION, SOLUTION INTRAMUSCULAR; INTRAVENOUS at 23:48

## 2019-04-02 RX ADMIN — PNEUMOCOCCAL 13-VALENT CONJUGATE VACCINE 0.5 ML: 2.2; 2.2; 2.2; 2.2; 2.2; 4.4; 2.2; 2.2; 2.2; 2.2; 2.2; 2.2; 2.2 INJECTION, SUSPENSION INTRAMUSCULAR at 08:30

## 2019-04-02 RX ADMIN — PANTOPRAZOLE SODIUM 40 MG: 40 TABLET, DELAYED RELEASE ORAL at 06:35

## 2019-04-02 RX ADMIN — LORAZEPAM 1 MG: 2 INJECTION INTRAMUSCULAR; INTRAVENOUS at 10:43

## 2019-04-02 RX ADMIN — CHLORTHALIDONE 25 MG: 25 TABLET ORAL at 08:29

## 2019-04-02 RX ADMIN — FORMOTEROL FUMARATE DIHYDRATE 20 MCG: 20 SOLUTION RESPIRATORY (INHALATION) at 07:59

## 2019-04-02 RX ADMIN — OXYCODONE HYDROCHLORIDE AND ACETAMINOPHEN 2 TABLET: 5; 325 TABLET ORAL at 06:35

## 2019-04-02 RX ADMIN — KETOROLAC TROMETHAMINE 15 MG: 30 INJECTION, SOLUTION INTRAMUSCULAR; INTRAVENOUS at 04:16

## 2019-04-02 RX ADMIN — CYCLOBENZAPRINE HYDROCHLORIDE 10 MG: 10 TABLET, FILM COATED ORAL at 00:16

## 2019-04-02 RX ADMIN — OXYCODONE HYDROCHLORIDE AND ACETAMINOPHEN 1 TABLET: 5; 325 TABLET ORAL at 10:53

## 2019-04-02 RX ADMIN — GABAPENTIN 300 MG: 300 CAPSULE ORAL at 13:31

## 2019-04-02 RX ADMIN — OXYCODONE HYDROCHLORIDE AND ACETAMINOPHEN 1 TABLET: 5; 325 TABLET ORAL at 16:30

## 2019-04-02 RX ADMIN — Medication 10 ML: at 22:12

## 2019-04-02 RX ADMIN — BUDESONIDE 500 MCG: 0.5 SUSPENSION RESPIRATORY (INHALATION) at 20:18

## 2019-04-02 RX ADMIN — KETOROLAC TROMETHAMINE 15 MG: 30 INJECTION, SOLUTION INTRAMUSCULAR; INTRAVENOUS at 13:31

## 2019-04-02 RX ADMIN — FORMOTEROL FUMARATE DIHYDRATE 20 MCG: 20 SOLUTION RESPIRATORY (INHALATION) at 20:19

## 2019-04-02 RX ADMIN — TIZANIDINE 4 MG: 4 TABLET ORAL at 08:29

## 2019-04-02 RX ADMIN — TIZANIDINE 4 MG: 4 TABLET ORAL at 04:13

## 2019-04-02 RX ADMIN — BUDESONIDE 500 MCG: 0.5 SUSPENSION RESPIRATORY (INHALATION) at 07:59

## 2019-04-02 RX ADMIN — FUROSEMIDE 20 MG: 20 TABLET ORAL at 08:29

## 2019-04-02 ASSESSMENT — PAIN SCALES - GENERAL
PAINLEVEL_OUTOF10: 6
PAINLEVEL_OUTOF10: 8
PAINLEVEL_OUTOF10: 4
PAINLEVEL_OUTOF10: 6
PAINLEVEL_OUTOF10: 2
PAINLEVEL_OUTOF10: 7
PAINLEVEL_OUTOF10: 7
PAINLEVEL_OUTOF10: 3
PAINLEVEL_OUTOF10: 8
PAINLEVEL_OUTOF10: 3
PAINLEVEL_OUTOF10: 10
PAINLEVEL_OUTOF10: 10

## 2019-04-02 ASSESSMENT — PAIN DESCRIPTION - ORIENTATION
ORIENTATION: LEFT
ORIENTATION: LEFT

## 2019-04-02 ASSESSMENT — PAIN DESCRIPTION - LOCATION
LOCATION: LEG

## 2019-04-02 ASSESSMENT — PAIN DESCRIPTION - FREQUENCY: FREQUENCY: CONTINUOUS

## 2019-04-02 ASSESSMENT — PAIN DESCRIPTION - PAIN TYPE
TYPE: ACUTE PAIN;CHRONIC PAIN
TYPE: ACUTE PAIN;CHRONIC PAIN

## 2019-04-02 ASSESSMENT — PAIN DESCRIPTION - PROGRESSION: CLINICAL_PROGRESSION: NOT CHANGED

## 2019-04-02 ASSESSMENT — PAIN DESCRIPTION - ONSET: ONSET: ON-GOING

## 2019-04-02 ASSESSMENT — PAIN DESCRIPTION - DESCRIPTORS: DESCRIPTORS: ACHING;DISCOMFORT

## 2019-04-02 NOTE — PROGRESS NOTES
Occupational Therapy  HOLD  Orders received and chart reviewed. MRI imaging and ortho consult pending. Spoke with RN who agrees to hold off on PT/OT until results come back and reviewed.  Will follow-up again once results come back.      Lynn Owen, OTR/L

## 2019-04-02 NOTE — PROGRESS NOTES
Physical Therapy  Hold  Orders received and chart reviewed. MRI imaging and ortho consult pending. Spoke with RN who agrees to hold off on PT/OT until results come back and reviewed. Will follow-up again once results come back. 1500: MRI L-hip results came back this afternoon -  extensive bone marrow edema involves both sacral alae (suspect underlying insufficiency fractures), both pubic bodies, and distal superior pubic rami - consistent with stress response; a nondisplaced fracture of the left pubic body. Still awaiting Ortho consult note for further instructions. Follow-up tomorrow 4/3.        Barbara Sanches, Student Physical Therapist  Stan Lennon, PT [id number]

## 2019-04-02 NOTE — PROGRESS NOTES
Patient is alert and oriented. Vital signs are stable. Patient refused to get out of bed due to pain. Patient has muscle spasms often in left leg. Bed is in the lowest position. Bed alarm is activated. Call light is within reach. Will continue to monitor and reassess.

## 2019-04-02 NOTE — PLAN OF CARE
Problem: Pain:  Goal: Pain level will decrease  Description  Pain level will decrease  Outcome: Ongoing   RN assesses pain using 0-10 scale. Patient understands how to rate pain using 0-10 scale. Patient is given medication for pain per MAR. RN encourages patient to call out for breakthrough pain. Will continue to monitor and reassess. Problem: Falls - Risk of:  Goal: Will remain free from falls  Description  Will remain free from falls  Outcome: Ongoing    Patient remains free from falls during this shift. Bed is in the lowest position and the bed and chair alarm is activated. Anti-slip socks are on. Call light is within reach. Will continue to monitor and reassess.

## 2019-04-03 LAB
ANION GAP SERPL CALCULATED.3IONS-SCNC: 13 MMOL/L (ref 3–16)
BUN BLDV-MCNC: 27 MG/DL (ref 7–20)
CALCIUM SERPL-MCNC: 9.1 MG/DL (ref 8.3–10.6)
CHLORIDE BLD-SCNC: 93 MMOL/L (ref 99–110)
CO2: 28 MMOL/L (ref 21–32)
CREAT SERPL-MCNC: 0.7 MG/DL (ref 0.6–1.2)
GFR AFRICAN AMERICAN: >60
GFR NON-AFRICAN AMERICAN: >60
GLUCOSE BLD-MCNC: 125 MG/DL (ref 70–99)
GLUCOSE BLD-MCNC: 128 MG/DL (ref 70–99)
GLUCOSE BLD-MCNC: 97 MG/DL (ref 70–99)
PERFORMED ON: ABNORMAL
PERFORMED ON: NORMAL
POTASSIUM REFLEX MAGNESIUM: 4 MMOL/L (ref 3.5–5.1)
SODIUM BLD-SCNC: 134 MMOL/L (ref 136–145)

## 2019-04-03 PROCEDURE — 6370000000 HC RX 637 (ALT 250 FOR IP): Performed by: STUDENT IN AN ORGANIZED HEALTH CARE EDUCATION/TRAINING PROGRAM

## 2019-04-03 PROCEDURE — 97162 PT EVAL MOD COMPLEX 30 MIN: CPT

## 2019-04-03 PROCEDURE — 97535 SELF CARE MNGMENT TRAINING: CPT

## 2019-04-03 PROCEDURE — 97167 OT EVAL HIGH COMPLEX 60 MIN: CPT

## 2019-04-03 PROCEDURE — 6360000002 HC RX W HCPCS: Performed by: STUDENT IN AN ORGANIZED HEALTH CARE EDUCATION/TRAINING PROGRAM

## 2019-04-03 PROCEDURE — 99232 SBSQ HOSP IP/OBS MODERATE 35: CPT | Performed by: HOSPITALIST

## 2019-04-03 PROCEDURE — 2580000003 HC RX 258: Performed by: STUDENT IN AN ORGANIZED HEALTH CARE EDUCATION/TRAINING PROGRAM

## 2019-04-03 PROCEDURE — 36415 COLL VENOUS BLD VENIPUNCTURE: CPT

## 2019-04-03 PROCEDURE — 80048 BASIC METABOLIC PNL TOTAL CA: CPT

## 2019-04-03 PROCEDURE — 97530 THERAPEUTIC ACTIVITIES: CPT

## 2019-04-03 PROCEDURE — 94640 AIRWAY INHALATION TREATMENT: CPT

## 2019-04-03 PROCEDURE — 1200000000 HC SEMI PRIVATE

## 2019-04-03 RX ORDER — LIDOCAINE 4 G/G
1 PATCH TOPICAL DAILY
Status: DISCONTINUED | OUTPATIENT
Start: 2019-04-03 | End: 2019-04-05 | Stop reason: HOSPADM

## 2019-04-03 RX ORDER — TELMISARTAN 40 MG/1
40 TABLET ORAL DAILY
Status: DISCONTINUED | OUTPATIENT
Start: 2019-04-03 | End: 2019-04-05 | Stop reason: HOSPADM

## 2019-04-03 RX ADMIN — GABAPENTIN 300 MG: 300 CAPSULE ORAL at 14:53

## 2019-04-03 RX ADMIN — OXYCODONE HYDROCHLORIDE AND ACETAMINOPHEN 2 TABLET: 5; 325 TABLET ORAL at 15:54

## 2019-04-03 RX ADMIN — Medication 10 ML: at 10:08

## 2019-04-03 RX ADMIN — OXYCODONE HYDROCHLORIDE AND ACETAMINOPHEN 2 TABLET: 5; 325 TABLET ORAL at 04:19

## 2019-04-03 RX ADMIN — GABAPENTIN 300 MG: 300 CAPSULE ORAL at 21:23

## 2019-04-03 RX ADMIN — CHLORTHALIDONE 25 MG: 25 TABLET ORAL at 10:09

## 2019-04-03 RX ADMIN — TIZANIDINE 4 MG: 4 TABLET ORAL at 04:20

## 2019-04-03 RX ADMIN — OXYCODONE HYDROCHLORIDE AND ACETAMINOPHEN 2 TABLET: 5; 325 TABLET ORAL at 19:50

## 2019-04-03 RX ADMIN — FORMOTEROL FUMARATE DIHYDRATE 20 MCG: 20 SOLUTION RESPIRATORY (INHALATION) at 21:37

## 2019-04-03 RX ADMIN — PANTOPRAZOLE SODIUM 40 MG: 40 TABLET, DELAYED RELEASE ORAL at 05:25

## 2019-04-03 RX ADMIN — GABAPENTIN 300 MG: 300 CAPSULE ORAL at 10:06

## 2019-04-03 RX ADMIN — FUROSEMIDE 20 MG: 20 TABLET ORAL at 10:09

## 2019-04-03 RX ADMIN — OXYCODONE HYDROCHLORIDE AND ACETAMINOPHEN 2 TABLET: 5; 325 TABLET ORAL at 11:45

## 2019-04-03 RX ADMIN — BUDESONIDE 500 MCG: 0.5 SUSPENSION RESPIRATORY (INHALATION) at 21:37

## 2019-04-03 RX ADMIN — ENOXAPARIN SODIUM 40 MG: 40 INJECTION SUBCUTANEOUS at 10:10

## 2019-04-03 ASSESSMENT — PAIN DESCRIPTION - ONSET: ONSET: ON-GOING

## 2019-04-03 ASSESSMENT — PAIN SCALES - GENERAL
PAINLEVEL_OUTOF10: 6
PAINLEVEL_OUTOF10: 9
PAINLEVEL_OUTOF10: 8
PAINLEVEL_OUTOF10: 9
PAINLEVEL_OUTOF10: 6
PAINLEVEL_OUTOF10: 6
PAINLEVEL_OUTOF10: 9
PAINLEVEL_OUTOF10: 9
PAINLEVEL_OUTOF10: 10
PAINLEVEL_OUTOF10: 7

## 2019-04-03 ASSESSMENT — PAIN DESCRIPTION - PAIN TYPE: TYPE: ACUTE PAIN

## 2019-04-03 ASSESSMENT — PAIN DESCRIPTION - PROGRESSION: CLINICAL_PROGRESSION: NOT CHANGED

## 2019-04-03 ASSESSMENT — PAIN DESCRIPTION - FREQUENCY: FREQUENCY: CONTINUOUS

## 2019-04-03 ASSESSMENT — PAIN DESCRIPTION - ORIENTATION
ORIENTATION: LEFT
ORIENTATION: LEFT

## 2019-04-03 ASSESSMENT — PAIN DESCRIPTION - LOCATION
LOCATION: HIP
LOCATION: HIP

## 2019-04-03 ASSESSMENT — PAIN DESCRIPTION - DIRECTION: RADIATING_TOWARDS: DOWN LEFT LEG

## 2019-04-03 ASSESSMENT — PAIN DESCRIPTION - DESCRIPTORS: DESCRIPTORS: ACHING

## 2019-04-03 NOTE — PROGRESS NOTES
Patient has been A&Ox4 during shift. Vital signs stable, higher blood pressures in the a.m., resident notified and patient's home BP medication ordered. Patient states her  will bring in BP medication on 4/4 as Pharmacy does not stock the medication. Patient states she takes 40 mg of Telmisartan for her BP at home. Neurochecks indicate weak plantar flexion and dorsi flexion of left leg, Right leg moderate. Pain being managed with PRN pain medications. Will continue to monitor.

## 2019-04-03 NOTE — PROGRESS NOTES
Occupational Therapy   Occupational Therapy Initial Assessment/Tx Note  Date: 4/3/2019   Patient Name: Amanda Land  MRN: [id number]     : 1950    Date of Service: 4/3/2019  Assessment: Functional independence is decreased from baseline. Pt is significantly limited by pain which is located at L buttock, hip, groin but also radiates distally down to foot at times during functional mobility nearly causing falls. Pt requires assist for all mobility and most ADLs due to pain. She will benefit from continued rehabilitation at d/c when pain is better controlled. Discharge Recommendations: Amanda Land scored a 15/24 on the AM-PAC ADL Inpatient form. Current research shows that an AM-PAC score of 17 or less is typically not associated with a discharge to the patient's home setting. Based on the patients AM-PAC score and their current ADL deficits, it is recommended that the patient have 3-5 sessions per week of Occupational Therapy at d/c to increase the patients independence. OT Equipment Recommendations  Equipment Needed: No    Assessment   Performance deficits / Impairments: Decreased functional mobility ; Decreased ADL status; Decreased endurance;Decreased balance;Decreased high-level IADLs  Assessment: Functional independence is decreased from baseline. Pt is significantly limited by pain which is located at L buttock, hip, groin but also radiates distally down to foot at times during functional mobility. Pt requires assist for all mobility and most ADLs. She will benefit from continued rehabilitation at d/c when pain is better controlled.     Treatment Diagnosis: Decreased activity tolerance, impaired ADLs and mobility  Decision Making: High Complexity  Patient Education: Role of OT, d/c planning, activity promotion, log roll, ice, transfers, plan of care - verb understanding  REQUIRES OT FOLLOW UP: Yes  Activity Tolerance  Activity Tolerance: Patient limited by pain  Activity Tolerance: - Technique: Stand step  Equipment Used: Standard bedside commode  Toilet Transfer: Moderate assistance  ADL  Feeding: Independent; Beverage management  Grooming: Independent  LE Dressing: Dependent/Total  Toileting: Moderate assistance(via BSC; ext cath being used in bed due to frequency/inability to get up quickly)        Bed mobility  Rolling to Left: Stand by assistance  Rolling to Right: Stand by assistance  Supine to Sit: Moderate assistance(min cues for log roll technique with HOB partially raised, rail used; increased pain)  Sit to Supine: Maximum assistance(via log roll, assist primarily for LEs)  Scooting: Contact guard assistance(significantly increased pain)  Transfers  Sit to stand: Moderate assistance(from bed, min assist from chair)  Stand to sit: Moderate assistance     Cognition  Overall Cognitive Status: WFL  Arousal/Alertness: Appropriate responses to stimuli  Attention Span: Appears intact  Memory: Appears intact  Safety Judgement: Decreased awareness of need for assistance  Insights: Fully aware of deficits        Sensation  Overall Sensation Status: WFL      LUE Strength  Gross LUE Strength: WFL  RUE Strength  Gross RUE Strength: WFL        Plan  If pt discharges prior to next tx, this note will serve as d/c summary. Continue per POC if pt does not d/c.     Plan  Times per week: 5-7x  Times per day: Daily      AM-PAC Score  AM-PAC Inpatient Daily Activity Raw Score: 15  AM-PAC Inpatient ADL T-Scale Score : 34.69  ADL Inpatient CMS 0-100% Score: 56.46  ADL Inpatient CMS G-Code Modifier : CK    Goals  Short term goals  Time Frame for Short term goals: by D/C  Short term goal 1:  Increase standing tolerance to 4 min with CGA for participation in ADLs - Not met  Short term goal 2: Transfer to/from [facility] with CGA - Not met  Short term goal 3: Complete toileting with CGA - Not met  Patient Goals   Patient goals : decreased pain, increased function       Therapy Time   Individual Concurrent Group Co-treatment   Time In 1440         Time Out 1533         Minutes 53          Timed Code Tx Min: 38  Total Tx Time: [address] [facility], OT

## 2019-04-03 NOTE — CARE COORDINATION
4/3/2019  [facility])  Clinical Case Management Department    Consult received to assist with discharge planning. Pt will need a short SNF stay upon discharge and has asked about [address], [facility] and [facility]. No precert is needed once a facility has been chosen. Pt's daughter Reba Oneill is going to tour the facilities tomorrow and will let me know which one she prefers. [facility] has a private room avail  [facility] has a private room avail    Patient: Yusra Santiago  MRN: [id number] / : 1950  ACCT: [de-identified]          Admission Documentation  Attending Provider: Octavia Castillo MD  Admit date/time: 2019 12:50 PM  Status: Inpatient [101]  Diagnosis: Left groin pain     Readmission within last 30 days:  no     Living Situation  Discharge Planning  Living Arrangements: Spouse/Significant Other  Support Systems: Spouse/Significant Other, Children, Family Members  Potential Assistance Needed: N/A  Type of Home Care Services: None  Patient expects to be discharged to[de-identified] home  Expected Discharge Date: 19    Service Assessment       Values / Beliefs  Do you have any ethnic, cultural, sacramental, or spiritual Yazidi needs you would like us to be aware of while you are in the hospital?: No    Advance Directives (For Healthcare)  Pre-existing DNR Comfort Care/DNR Arrest/DNI Order: No  Healthcare Directive: Yes, patient has an advance directive for healthcare treatment  Type of Healthcare Directive: Durable power of  for health care, Living will  Copy in Chart: No, copy requested from family                        [facility]   deferred    Home Health/Skilled Nursing  Services at Discharge: SNF Physical Therapy, Occupational Therapy and Nursing daily  Home care at home?  No Provider: GERARD Provider Phone: GERARD    Therapy Consults  PT evaluation needed?: Yes (Comment)  OT Evalulation Needed?: Yes

## 2019-04-03 NOTE — PROGRESS NOTES
Within Functional Limits  Orientation Level: Oriented X4  Social/Functional History  Social/Functional History  Lives With: Spouse  Type of Home: House  Home Layout: One level  Home Access: Stairs to enter with rails(1 JOSE with rail on L)  Bathroom Shower/Tub: Walk-in shower  Bathroom Toilet: Handicap height  Bathroom Equipment: Grab bars in shower, Shower chair, Hand-held shower, 3-in-1 commode(x2 in shower)  Home Equipment: Rolling walker, Cane  ADL Assistance: Needs assistance( assists with helping pt in/out of shower, getting dressed, and sometimes going on the toilet. )  Homemaking Assistance: Needs assistance( performs housekeeping duties)  Ambulation Assistance: Independent(with RW in the home; \"I don't like not being able to do things\")  Transfer Assistance: Independent(with RW to [facility])  Active : No(Pt had been trying to drive about 3 weeks ago.)  Additional Comments: Pt denies falling in last 6 months  Cognition   Cognition  Overall Cognitive Status: Exceptions  Arousal/Alertness: Appropriate responses to stimuli  Attention Span: Appears intact  Memory: Appears intact  Safety Judgement: Decreased awareness of need for assistance  Insights: Fully aware of deficits    Objective     Observation/Palpation  Observation: Pt in visible pain and fearful of movement. AROM RLE (degrees)  RLE General AROM: Limited 2/2 pain and discomfort. At least able to move partially through available range. AROM LLE (degrees)  LLE General AROM: Limited 2/2 pain and discomfort. At least able to move partially through available range. Strength RLE  Comment: unable to formally assess. Appears to be at least 3/5  Strength LLE  Comment: unable to formally assess. Appears to be at least 3/5        Bed mobility  Rolling to Left: Minimal assistance(Min Ax1 to take bed pan out; increased pain and visible distress when returning to on back.)  Scooting: Dependent/Total(Max Ax2; 2 trials.  Pt able to assist some with RLE)  Comment: Pt able to move legs in order for purewick to be place but required assistance to move BLE into correct postition. Treatment included: bed mobility, pt education      Plan   Plan  Times per week: 5-7  Times per day: Daily  Current Treatment Recommendations: Strengthening, ADL/Self-care Training, Gait Training, ROM, Stair training, Functional Mobility Training, Transfer Training, Patient/Caregiver Education & Training, Safety Education & Training  Safety Devices  Type of devices: Bed alarm in place, Call light within reach, Gait belt, Left in bed, Nurse notified(All needs met and within reach. RN in room)      AM-PAC Score  AM-PAC Inpatient Mobility Raw Score : 9  AM-PAC Inpatient T-Scale Score : 30.55  Mobility Inpatient CMS 0-100% Score: 81.38  Mobility Inpatient CMS G-Code Modifier : CM          Goals  Short term goals  Time Frame for Short term goals: By DC  Short term goal 1: bed mobility supervision  Short term goal 2: supine <> sit Min assist   Short term goal 3: Sit <> stand Moderate assist       Therapy Time   Individual Concurrent Group Co-treatment   Time In  1126         Time Out  1202         Minutes  36           Timed Code Treatment Minutes: 21       Total Treatment Minutes:  36      If pt d/c'd prior to next treatment, this note serves as a discharge note.       China Luna, Student Physical Therapist

## 2019-04-03 NOTE — CONSULTS
[facility] Consult Note        CHIEF COMPLAINT:  Diffuse pain about the pelvis    Reason for Consult:  Multiple areas of stress reaction seen on MRI of pelvis    HISTORY OF PRESENT ILLNESS:                The patient is a 76 y.o. female who presents due to significant left greater than right lateral hip and buttock pain. She was recently seen in the office last week as an outpatient where x-rays of the hips showed no acute process. Due to the continued severe pain she presented to the ER for further evaluation. The pain is severe and constant. It radiates down the lateral left leg intermittently. It is exacerbated by standing and walking and relieved by rest. It is worsening with time.      Past Medical History:    Past Medical History:   Diagnosis Date    Arthritis     Compression fracture     back due to fall    Concussion     due to fall    DVT (deep venous thrombosis) ([facility] [address] 2017    RLE after TKR    Environmental allergies     Essential hypertension, benign 2010    GERD (gastroesophageal reflux disease)     History of peptic ulcer     Hx of blood clots     Hyperlipidemia 2010    Lacunar infarction     old - per MRI     MRSA (methicillin resistant staph aureus) culture positive 2018    knee    Restrictive airway disease     allergy induced    Retention of urine     Wound, open 2018    left shin area, healing well, tx in wound care center       Past Surgical History:    Past Surgical History:   Procedure Laterality Date     SECTION      times 2    CHOLECYSTECTOMY      COLONOSCOPY      HYSTERECTOMY      KNEE ARTHROPLASTY Right 2018     RIGHT REVISION TOTAL KNEE ARTHROPLASTY    KNEE ARTHROSCOPY Left     Dr. Zahira Kerr Right 2018    RIGHT REVISION ARTHROSCOPY FEMORAL COMPONENT, SYNOVECTOMY performed by Gustavo Benites MD at [address] Right 2018    RIGHT KNEE QUADRICEP TENDON REPAIR WITH ALLOGRAFT AUGMENTATION performed by Sammy Vidal MD at [address] [place] [address] ARTHROSCOPY Right     TOTAL KNEE ARTHROPLASTY Right 2017       Current Medications:   Scheduled Meds:   chlorthalidone  25 mg Oral Daily    furosemide  20 mg Oral Daily    gabapentin  300 mg Oral TID    pantoprazole  40 mg Oral QAM AC    losartan  50 mg Oral Daily    sodium chloride flush  10 mL Intravenous 2 times per day    enoxaparin  40 mg Subcutaneous Daily    budesonide  0.5 mg Nebulization BID    And    formoterol  20 mcg Nebulization BID     Continuous Infusions:   dextrose       PRN Meds:.albuterol, tiZANidine, sodium chloride flush, magnesium hydroxide, ondansetron, acetaminophen, oxyCODONE-acetaminophen **OR** oxyCODONE-acetaminophen, glucose, dextrose, glucagon (rDNA), dextrose, ketorolac    Allergies:  Codeine; Demerol; Morphine; Tape [adhesive tape];  Cabbage; and Erythromycin    Social History:   Social History     Socioeconomic History    Marital status:      Spouse name: Not on file    Number of children: Not on file    Years of education: Not on file    Highest education level: Not on file   Occupational History    Not on file   Social Needs    Financial resource strain: Not on file    Food insecurity:     Worry: Not on file     Inability: Not on file    Transportation needs:     Medical: Not on file     Non-medical: Not on file   Tobacco Use    Smoking status: Former Smoker     Packs/day: 1.00     Years: 35.00     Pack years: 35.00     Types: Cigarettes     Last attempt to quit: 3/28/2005     Years since quittin.0    Smokeless tobacco: Never Used   Substance and Sexual Activity    Alcohol use: Not Currently    Drug use: No    Sexual activity: Not on file   Lifestyle    Physical activity:     Days per week: Not on file     Minutes per session: Not on file    Stress: Not on file   Relationships    Social connections:     Talks on phone: Not on file     Gets together: Not on file likely due to a lumbar radiculopathy. The MRI of the pelvis would not explain radiation of pain past the knee and weakness in the lower lumbar myotomes. Thus, would suggest consideration advanced imaging of lumbar spine per primary team / spine team. She may be a candidate for some type of image guided injection into the lumbar spine if neural stenosis is found on lumbar MRI, but will defer to primary team and spine team regarding further workup in this regard. Cong [facility].  Oziel Moya MD

## 2019-04-04 ENCOUNTER — APPOINTMENT (OUTPATIENT)
Dept: MRI IMAGING | Age: 69
DRG: 544 | End: 2019-04-04
Payer: MEDICARE

## 2019-04-04 PROCEDURE — 97530 THERAPEUTIC ACTIVITIES: CPT

## 2019-04-04 PROCEDURE — 1200000000 HC SEMI PRIVATE

## 2019-04-04 PROCEDURE — 94761 N-INVAS EAR/PLS OXIMETRY MLT: CPT

## 2019-04-04 PROCEDURE — 6370000000 HC RX 637 (ALT 250 FOR IP): Performed by: STUDENT IN AN ORGANIZED HEALTH CARE EDUCATION/TRAINING PROGRAM

## 2019-04-04 PROCEDURE — 97110 THERAPEUTIC EXERCISES: CPT

## 2019-04-04 PROCEDURE — 2580000003 HC RX 258: Performed by: STUDENT IN AN ORGANIZED HEALTH CARE EDUCATION/TRAINING PROGRAM

## 2019-04-04 PROCEDURE — 99232 SBSQ HOSP IP/OBS MODERATE 35: CPT | Performed by: HOSPITALIST

## 2019-04-04 PROCEDURE — 6360000002 HC RX W HCPCS: Performed by: STUDENT IN AN ORGANIZED HEALTH CARE EDUCATION/TRAINING PROGRAM

## 2019-04-04 PROCEDURE — 94640 AIRWAY INHALATION TREATMENT: CPT

## 2019-04-04 PROCEDURE — 97535 SELF CARE MNGMENT TRAINING: CPT

## 2019-04-04 RX ORDER — LORAZEPAM 2 MG/ML
0.5 INJECTION INTRAMUSCULAR ONCE
Status: COMPLETED | OUTPATIENT
Start: 2019-04-04 | End: 2019-04-05

## 2019-04-04 RX ADMIN — OXYCODONE HYDROCHLORIDE AND ACETAMINOPHEN 2 TABLET: 5; 325 TABLET ORAL at 22:08

## 2019-04-04 RX ADMIN — TELMISARTAN 40 MG: 40 TABLET ORAL at 11:45

## 2019-04-04 RX ADMIN — FUROSEMIDE 20 MG: 20 TABLET ORAL at 07:56

## 2019-04-04 RX ADMIN — OXYCODONE HYDROCHLORIDE AND ACETAMINOPHEN 2 TABLET: 5; 325 TABLET ORAL at 07:55

## 2019-04-04 RX ADMIN — OXYCODONE HYDROCHLORIDE AND ACETAMINOPHEN 2 TABLET: 5; 325 TABLET ORAL at 16:59

## 2019-04-04 RX ADMIN — PANTOPRAZOLE SODIUM 40 MG: 40 TABLET, DELAYED RELEASE ORAL at 06:37

## 2019-04-04 RX ADMIN — ENOXAPARIN SODIUM 40 MG: 40 INJECTION SUBCUTANEOUS at 07:57

## 2019-04-04 RX ADMIN — OXYCODONE HYDROCHLORIDE AND ACETAMINOPHEN 2 TABLET: 5; 325 TABLET ORAL at 04:00

## 2019-04-04 RX ADMIN — FORMOTEROL FUMARATE DIHYDRATE 20 MCG: 20 SOLUTION RESPIRATORY (INHALATION) at 09:43

## 2019-04-04 RX ADMIN — OXYCODONE HYDROCHLORIDE AND ACETAMINOPHEN 2 TABLET: 5; 325 TABLET ORAL at 13:04

## 2019-04-04 RX ADMIN — Medication 10 ML: at 10:22

## 2019-04-04 RX ADMIN — CHLORTHALIDONE 25 MG: 25 TABLET ORAL at 07:54

## 2019-04-04 RX ADMIN — Medication 10 ML: at 22:08

## 2019-04-04 RX ADMIN — BUDESONIDE 500 MCG: 0.5 SUSPENSION RESPIRATORY (INHALATION) at 21:23

## 2019-04-04 RX ADMIN — BUDESONIDE 500 MCG: 0.5 SUSPENSION RESPIRATORY (INHALATION) at 09:44

## 2019-04-04 RX ADMIN — GABAPENTIN 300 MG: 300 CAPSULE ORAL at 07:57

## 2019-04-04 RX ADMIN — GABAPENTIN 300 MG: 300 CAPSULE ORAL at 13:04

## 2019-04-04 RX ADMIN — GABAPENTIN 300 MG: 300 CAPSULE ORAL at 22:08

## 2019-04-04 RX ADMIN — TIZANIDINE 4 MG: 4 TABLET ORAL at 13:04

## 2019-04-04 RX ADMIN — TIZANIDINE 4 MG: 4 TABLET ORAL at 22:08

## 2019-04-04 RX ADMIN — FORMOTEROL FUMARATE DIHYDRATE 20 MCG: 20 SOLUTION RESPIRATORY (INHALATION) at 21:23

## 2019-04-04 RX ADMIN — OXYCODONE HYDROCHLORIDE AND ACETAMINOPHEN 2 TABLET: 5; 325 TABLET ORAL at 00:04

## 2019-04-04 ASSESSMENT — PAIN DESCRIPTION - PAIN TYPE
TYPE: ACUTE PAIN

## 2019-04-04 ASSESSMENT — PAIN DESCRIPTION - ONSET
ONSET: ON-GOING

## 2019-04-04 ASSESSMENT — PAIN DESCRIPTION - PROGRESSION
CLINICAL_PROGRESSION: NOT CHANGED
CLINICAL_PROGRESSION: GRADUALLY WORSENING
CLINICAL_PROGRESSION: NOT CHANGED
CLINICAL_PROGRESSION: NOT CHANGED

## 2019-04-04 ASSESSMENT — PAIN DESCRIPTION - FREQUENCY
FREQUENCY: CONTINUOUS

## 2019-04-04 ASSESSMENT — PAIN SCALES - GENERAL
PAINLEVEL_OUTOF10: 0
PAINLEVEL_OUTOF10: 7
PAINLEVEL_OUTOF10: 9
PAINLEVEL_OUTOF10: 10
PAINLEVEL_OUTOF10: 5
PAINLEVEL_OUTOF10: 8
PAINLEVEL_OUTOF10: 7
PAINLEVEL_OUTOF10: 7
PAINLEVEL_OUTOF10: 8

## 2019-04-04 ASSESSMENT — PAIN DESCRIPTION - DESCRIPTORS
DESCRIPTORS: ACHING

## 2019-04-04 ASSESSMENT — PAIN DESCRIPTION - ORIENTATION
ORIENTATION: LEFT
ORIENTATION: RIGHT
ORIENTATION: LEFT
ORIENTATION: LEFT

## 2019-04-04 ASSESSMENT — PAIN DESCRIPTION - LOCATION
LOCATION: HIP
LOCATION: HIP;KNEE

## 2019-04-04 NOTE — PROGRESS NOTES
Physical Therapy  Facility/Department: [facility] 5T ORTHO/NEURO  Daily Treatment Note  NAME: Sharron Espinoza  : 1950  MRN: [id number]    Date of Service: 2019    Discharge Recommendations:    Sharron Espinoza scored a  on the AM-PAC short mobility form. Current research shows that an AM-PAC score of 17 or less is typically not associated with a discharge to the patient's home setting. Based on the patients AM-PAC score and their current functional mobility deficits, it is recommended that the patient have 3-5 sessions per week of Physical Therapy at d/c to increase the patients independence. Assessment: Pt continues to be limited with functional mobility 2/2 increased pain. Moderate to maximal assistance x2 required for ambulation and transfers, however SBA for mobility in bed. Pt is not safe to return home and is a fall risk as she demonstrates decreased stability with ambulation as well as decreased safety awareness with transfers. Rec continued IP PT upon DC and continue to follow. PT Equipment Recommendations  Equipment Needed: No    Patient Diagnosis(es): The primary encounter diagnosis was Left hip pain. A diagnosis of Inability to walk was also pertinent to this visit. has a past medical history of Arthritis, Compression fracture, Concussion, DVT (deep venous thrombosis) ([facility] [address] Environmental allergies, Essential hypertension, benign, GERD (gastroesophageal reflux disease), History of peptic ulcer, Hx of blood clots, Hyperlipidemia, Lacunar infarction, MRSA (methicillin resistant staph aureus) culture positive, Restrictive airway disease, Retention of urine, and Wound, open. has a past surgical history that includes Knee arthroscopy (Left, ); Cholecystectomy; Hysterectomy; Total knee arthroplasty (Right, 2017); Colonoscopy; Knee Arthroplasty (Right, 2018); Shoulder arthroscopy (Right);   section; pr revise knee joint replace,all parts (Right, 2018); and 1  Surface: level tile  Device: Rolling Walker  Assistance: Moderate assistance(Mod Ax2)  Quality of Gait: slowed, antalgic gait. Increased WB through UEs. Decreased stability throughout with legs sometimes buckling. Limited by pain and required several rest breaks with cues to breath. Distance: 3 steps from chair to [facility]; 3 steps bkwd BS to bed        Exercises  Straight Leg Raise: x10 RLE; mod Ax1  Hamstring Sets: x10 RLE; mod Ax1  Quad Sets: x10 RLE; min Ax1  Gluteal Sets: x10 RLE; Independent  Ankle Pumps: x10 RLE; Independent  Comments: All exercises performed in supine                        Assessment   Body structures, Functions, Activity limitations: Decreased functional mobility ; Decreased endurance;Decreased ROM; Decreased strength;Decreased safe awareness; Increased Pain;Decreased balance  Assessment: Pt continues to be limited with functional mobility 2/2 increased pain. Moderate to maximal assistance x2 required for ambulation and transfers, however SBA for mobility in bed. Pt is not safe to return home and is a fall risk as she demonstrates decreased stability with ambulation as well as decreased safety awareness with transfers. Rec continued IP PT upon DC and continue to follow. Treatment Diagnosis: impaired functional mobility 2/2 increased pain and endurance. Patient Education: role of PT, use of call light, dc recs, safety concerns, pain management. Pt and family verbalize understanding.    REQUIRES PT FOLLOW UP: Yes       AM-PAC Score  AM-PAC Inpatient Mobility Raw Score : 12  AM-PAC Inpatient T-Scale Score : 35.33  Mobility Inpatient CMS 0-100% Score: 68.66  Mobility Inpatient CMS G-Code Modifier : CL          Goals  Short term goals  Time Frame for Short term goals: By DC  Short term goal 1: bed mobility supervision; ongoing  Short term goal 2: supine <> sit Min assist; ongoing  Short term goal 3: Sit <> stand Moderate assist.  Ongoing  Short term goal 4:  Pt will amb 10' with LRAD min assist. Plan    Plan  Times per week: 5-7  Times per day: Daily  Current Treatment Recommendations: Strengthening, ADL/Self-care Training, Gait Training, ROM, Stair training, Functional Mobility Training, Transfer Training, Patient/Caregiver Education & Training, Safety Education & Training  Safety Devices  Type of devices: Bed alarm in place, Call light within reach, Gait belt, Left in bed, Nurse notified(All needs met and within reach. RN in room)     Therapy Time   Individual Concurrent Group Co-treatment   Time In 1323         Time Out 1420         Minutes 57           Timed Code Treatment Minutes: 57      Total Treatment Minutes:  57      If pt d/c'd prior to next treatment, this note serves as a discharge note. Brijesh Matute, Student Physical Therapist     Therapist was present, directed the patient's care, made skilled judgement, and was responsible for assessment and treatment of the patient.   Renee Reyes, [address] [place] [address]

## 2019-04-04 NOTE — PLAN OF CARE
Problem: Pain:  Goal: Pain level will decrease  Description  Pain level will decrease  4/4/2019 1407 by Vineet Carranza RN  Outcome: Ongoing  4/4/2019 0030 by Leslee Encarnacion RN  Outcome: Ongoing   Pt. States the lowest her pain has gone down to with addition of pain medications is 4/10. Pt. Taking prn pain meds frequently Q4h. Problem: Falls - Risk of:  Goal: Will remain free from falls  Description  Will remain free from falls  4/4/2019 1407 by Vineet Carranza RN  Outcome: Ongoing  4/4/2019 0030 by Leslee Encarnacion RN  Outcome: Ongoing   Pt. Transfers with assist X2 with aaron. Pt. Is limited d/t pain in left hip. Calls out appropriately. Fall precautions in place.

## 2019-04-04 NOTE — PROGRESS NOTES
Occupational Therapy  Daily Treatment Note  Patient Name: Deangelo Burgos  MRN: [id number]  Assessment: Pt tolerating increased mobility/activity today with use of [place]y [place]boa with nursing staff, but still limited by severe pain not only to pelvis/hip but also radiating down L leg. Pt tolerates transfers and short stands only. ADL performance limited by pain. Pt will need continued inpt therapy at d/c once pain is controlled. Discharge Recommendations: Deangelo Burgos scored a 15/24 on the AM-PAC ADL Inpatient form. Current research shows that an AM-PAC score of 17 or less is typically not associated with a discharge to the patient's home setting. Based on the patients AM-PAC score and their current ADL deficits, it is recommended that the patient have 3-5 sessions per week of Occupational Therapy at d/c to increase the patients independence. Equipment Needs:  No  Chart Reviewed: Yes     Other position/activity restrictions: Up with assist; WBAT   Additional Pertinent Hx: Pt is a 76 y.o. female admitted to [facility] for left groin pain. VL Extremity Venous Left - neg. MRI Hip - Extensive bone marrow edema involving both sacral alae with underlying sacral insufficiency fractures, Stress response involving both pubic bodies and distal superior pubic rami, Left adductor muscle strain. Pertinent PMH: Compression fx, Concussion, DVT (RLE after TKR), Essential HTN, Hyperlipidemia, Lacunar infarction, MRSA, Knee arthroplasty (R 2018, L 2003), Quadricepsplasty (R 2018), Shoulder arthroplasty (R), TKA (R 2017)    Diagnosis: pelvic fx  Treatment Diagnosis: Decreased activity tolerance, impaired ADLs and mobility    Subjective: Pt in chair on entry. Pleasant and cooperative. Reports she has been more mobile today which is an improvement but pain is still very severe.      Pain: Yes, hip/pelvic pain with mobility and at rest + pain down L leg to foot during mobility, RN aware, positioned to comfort after session with ice packs to L hip    Objective:    Cognition/Orientation: WFL    Bed mobility   Rolling: CGA  Sit to Supine: Mod assist (max assist for LEs, positions trunk independently)    Functional Mobility   Sit to Stand: Mod assist x2, min cues (difficulty transitioning hands to walker)  Stand to Sit: Mod assist x1-2, min cues  Commode Transfer (chair to [facility], BSC to bed): Mod assist x2, min cues, walker, primarily min assist 1-2 during transfer but limited during periods of severe pain down L leg  Static stance: CGA to min assist at walker, heavy reliance on walker    ADLs   Grooming: Mod I, set up  Toileting: Mod assist via BSC    Activity Tolerance: Fair/Poor - Functional mobility/standing activity limited by severe pelvic and L leg pain    Patient Education: Activity promotion, transfers, d/c planning - verb understanding     Safety Devices in Place: left in bed, alarm activated, needs in reach, ice packs to L hip, RN aware    Goals:  Short term goals  Time Frame for Short term goals: by D/C  Short term goal 1: Increase standing tolerance to 4 min with CGA for participation in ADLs - Not met  Short term goal 2: Transfer to/from [facility] with CGA - Not met  Short term goal 3: Complete toileting with CGA - Not met         Plan:      Times per week: 5-7x   Times per day: Daily    If patient is discharged prior to next treatment, this note will serve as the discharge summary.     Therapy Time   Individual Concurrent Group Co-treatment   Time In 1335         Time Out 1417         Minutes 42           Timed Code Treatment Minutes: 42  Total Treatment Time: [address] [facility], OT

## 2019-04-04 NOTE — PLAN OF CARE
Problem: Pain:  Goal: Pain level will decrease  Description  Pain level will decrease. Pain is controlled with oral percocet. VSS. Will continue to monitor. Outcome: Ongoing     Problem: Falls - Risk of:  Goal: Will remain free from falls  Description  Will remain free from falls. Patient is a high fall risk. All fall precautions in place: bed alarm on, nonskid socks on pt, call light within reach, bed locked in lowest position. Will continue to monitor pt safety.    4/4/2019 0030 by Bonnie Grover RN  Outcome: Ongoing

## 2019-04-04 NOTE — PROGRESS NOTES
Internal Medicine PGY-1 Resident Progress Note        PCP: Lalo Mathew MD    Date of Admission: 4/1/2019    Chief Complaint: left hip pain    Subjective: Yissel Parmar    Met pt at her chair. She reports the pain medications and lidoderm patch help - but nothing takes the pain away. Last night wasn't a good night. She couldn't get comfortable. This morning she became dizzy when she got out of bed and required a wet washcloth. She also has buzzing in her ears. No fever or chills. No chest pain or chest tightness. No diarrhea. No dysuria (no itching or burning). Overnight hypertensive w/ SBP up to 152 but otherwise VW wnl     LE dopplers negative. MRI L-hip: extensive bone marrow edema involves both sacral alae (suspect underlying insufficiency fractures), both pubic bodies, and distal superior pubic rami - consistent with stress response; a nondisplaced fracture of the left pubic body. SW for SNF placement tomorrow  PT: 9/24   Lidoderm patch  MRI for lumbar spine pending    Hospital Course: 75 yo F w/ PMH multiple R leg surgeries, HTN, GERD presents w few week history of L groin pain radiating to the back L hip reporting worsening w/ movement and wt bearing. No radiation down the leg. No recent trauma. Has been attending PT 3x/weekly. No fevers or chills. No abd pain. No incontinence (urinary or bowel). Recent ortho consult w/ Dr Tan Huerta, Ortho, on 2/28 found mild arthritis on X-ray. Pain refractory to voltaren, medrol, tizanidine, percocet and gabapentin.     In the ED: hypertensive w/ SBP up to 150; VS otherwise wnl; admitted for further workup    Medications:  Reviewed    Infusion Medications    dextrose       Scheduled Medications    LORazepam  0.5 mg Intravenous Once    lidocaine  1 patch Transdermal Daily    telmisartan  40 mg Oral Daily    chlorthalidone  25 mg Oral Daily    furosemide  20 mg Oral Daily    gabapentin  300 mg Oral TID    pantoprazole  40 mg Oral QAM AC    sodium chloride flush  10 mL Intravenous 2 times per day    enoxaparin  40 mg Subcutaneous Daily    budesonide  0.5 mg Nebulization BID    And    formoterol  20 mcg Nebulization BID     PRN Meds: albuterol, tiZANidine, sodium chloride flush, magnesium hydroxide, ondansetron, acetaminophen, oxyCODONE-acetaminophen **OR** oxyCODONE-acetaminophen, glucose, dextrose, glucagon (rDNA), dextrose, ketorolac      Intake/Output Summary (Last 24 hours) at 4/4/2019 1805  Last data filed at 4/4/2019 0028  Gross per 24 hour   Intake --   Output 600 ml   Net -600 ml       Physical Exam Performed:    /71   Pulse 78   Temp 98 °F (36.7 °C) (Oral)   Resp 16   Ht 5' 9\" (1.753 m)   Wt 216 lb 14.9 oz (98.4 kg)   SpO2 92%   BMI 32.04 kg/m²     General appearance:  No apparent distress. HEENT:  Normal cephalic, atraumatic without obvious deformity. PERRL. Extra ocular muscles intact. Conjunctivae/corneas clear. Neck: Supple. No JVD. Trachea midline. Respiratory:  Normal respiratory effort. CTAB w/o r/r/w. Cardiovascular:  RRR; nl S1&S2; w/o m/r/g. Abdomen: Soft, nt/nd w/ normal bowel sounds. Mild tenderness at sacral area L-lateral to the spine and Left groin. LLQ pain on palpation. Musculoskeletal:  No clubbing, cyanosis or edema bilaterally. Flexion at R hip at 15%  Skin: Skin color, texture, turgor normal.  No rashes or lesions. Neurologic:  Neurovascularly intact without any focal sensory/motor deficits. Cranial nerves: II-XII intact, grossly non-focal.  Psychiatric:  Alert and oriented, thought content appropriate, normal insight  Capillary Refill: Brisk,< 3 seconds   Peripheral Pulses: +2 palpable, equal bilaterally     Labs:   No results for input(s): WBC, HGB, HCT, PLT in the last 72 hours.   Recent Labs     04/01/19 2244 04/03/19 2011    134*   K 3.7 4.0   CL 99 93*   CO2 23 28   BUN 29* 27*   CREATININE 0.7 0.7   CALCIUM 9.0 9.1     No results for input(s): AST, ALT, BILIDIR, BILITOT, ALKPHOS in the last 72

## 2019-04-04 NOTE — PROGRESS NOTES
Pt is alert and oriented times 4. VSS. Pain is controlled with Percocet. Pt reports sleeping better this shift. Pt voiding via pure wick for comfort. Denies any needs at this time. Will continue to monitor.

## 2019-04-04 NOTE — CARE COORDINATION
[address] Transitions Interview     2019    Patient: Annette Marroquin Patient : 1950   MRN: [id number]   Reason for Admission: L Groin Pain  RARS: Readmission Risk Score: 21         Spoke with: Annette Marroquin    Readmission Risk  Patient Active Problem List   Diagnosis    Hyperlipidemia    Hypertension    Edema    CTS (carpal tunnel syndrome)    Reactive airway disease    Pulmonary nodule    Anxiety and depression    Acute pain of right knee    Anemia    Closed displaced fracture of medial condyle of right femur (HCC)    Arthrofibrosis of knee joint, right    Acute blood loss anemia    Gastroesophageal reflux disease without esophagitis    Falls    Quadriceps tendon rupture, right, sequela    Acute urinary retention    Hypokalemia    Muscle cramps    Left groin pain    Left hip pain    Multiple closed fractures of pelvis with stable disruption of pelvic [facility])       Inpatient Assessment  Care Transitions Summary    Care Transitions Inpatient Review  Medication Review  Do you have all of your prescriptions and are they filled?:  Yes   Are you able to afford your medications?:  Yes  How often do you have difficulty taking your medications?:  I always take them as prescribed. Housing Review  Who do you live with?:  Partner/Spouse/SO  Are you an active caregiver in your home?:  No  Social Support  Do you have a ?:  No  Do you have a [facility]?:  No  Durable Medical Equipment  Patient DME:  [employer]ienven[employer] cane, Other, Shower chair  Other Patient DME:  [facility], [address]  Functional Review  Ability to seek help/take action for Emergent/Urgent situations i.e. fire, crime, inclement weather or health crisis. :  Independent  Ability handle personal hygiene needs (bathing/dressing/grooming): Independent  Ability to manage medications:   Independent  Ability to prepare food:  Needs Assistance  Ability to maintain home (clean home, laundry): Needs Assistance  Ability to drive and/or has transportation:  Dependent  Ability to do shopping:  Needs Assistance  Ability to manage finances: Independent  Is patient able to live independently?:  Yes  Hearing and Vision  Visual Impairment:  Visual impairment (Glasses/contacts)  Hearing Impairment:  None  Care Transitions Interventions  No Identified Needs       Summary  CTC spoke with patient this afternoon for initial face to face interview. Patient states she lives in 1 level home with spouse, home has 1 step to enter. Patient states prior to admission patient was able to perform all ADL's and IADL's independently. Patient did state she has been unable to ambulate for the past week, using a walker with ambulation up until a day or so. Patient states she is planning to discharge to [facility] once medically stable, CTC explained ongoing follow up CTC calls once discharged, patient is agreeable at this time. Follow Up  No future appointments.     Health Maintenance  Health Maintenance Due   Topic Date Due    DEXA (modify frequency per FRAX score)  11/17/2015    Breast cancer screen  01/20/2017       Angelo Ellis RN

## 2019-04-04 NOTE — CARE COORDINATION
Spoke with YAA, pt's daughter this am to inform her of facilities that have private rooms but they have already chosen [facility]. I called and they hadn't received the referral so this was faxed to them to review her available SNF days. BETSY submitted and arranged for transport via [employer] for 1500.     Electronically signed by Ashley Francois RN on 4/4/2019 at 10:15 AM  [phone]

## 2019-04-04 NOTE — CARE COORDINATION
Cancelled transport to [facility] since pt is not going to be discharged today. [facility] is able to accept tomorrow as that is her potential discharge date. HENS submitted. Will need to arranged transport.      Electronically signed by Bud Reynoso RN on 4/4/2019 at 5:20 PM  [phone]

## 2019-04-05 ENCOUNTER — APPOINTMENT (OUTPATIENT)
Dept: MRI IMAGING | Age: 69
DRG: 544 | End: 2019-04-05
Payer: MEDICARE

## 2019-04-05 VITALS
WEIGHT: 216.93 LBS | HEIGHT: 69 IN | HEART RATE: 83 BPM | DIASTOLIC BLOOD PRESSURE: 70 MMHG | OXYGEN SATURATION: 95 % | SYSTOLIC BLOOD PRESSURE: 134 MMHG | BODY MASS INDEX: 32.13 KG/M2 | RESPIRATION RATE: 16 BRPM | TEMPERATURE: 98.3 F

## 2019-04-05 PROCEDURE — 6360000002 HC RX W HCPCS: Performed by: STUDENT IN AN ORGANIZED HEALTH CARE EDUCATION/TRAINING PROGRAM

## 2019-04-05 PROCEDURE — 99238 HOSP IP/OBS DSCHRG MGMT 30/<: CPT | Performed by: HOSPITALIST

## 2019-04-05 PROCEDURE — 2580000003 HC RX 258: Performed by: STUDENT IN AN ORGANIZED HEALTH CARE EDUCATION/TRAINING PROGRAM

## 2019-04-05 PROCEDURE — 94761 N-INVAS EAR/PLS OXIMETRY MLT: CPT

## 2019-04-05 PROCEDURE — 6370000000 HC RX 637 (ALT 250 FOR IP): Performed by: STUDENT IN AN ORGANIZED HEALTH CARE EDUCATION/TRAINING PROGRAM

## 2019-04-05 PROCEDURE — 94640 AIRWAY INHALATION TREATMENT: CPT

## 2019-04-05 PROCEDURE — 72148 MRI LUMBAR SPINE W/O DYE: CPT

## 2019-04-05 RX ORDER — OXYCODONE HYDROCHLORIDE AND ACETAMINOPHEN 5; 325 MG/1; MG/1
2 TABLET ORAL EVERY 6 HOURS PRN
Qty: 40 TABLET | Refills: 0 | Status: SHIPPED | OUTPATIENT
Start: 2019-04-05 | End: 2019-04-10

## 2019-04-05 RX ADMIN — GABAPENTIN 300 MG: 300 CAPSULE ORAL at 08:05

## 2019-04-05 RX ADMIN — ENOXAPARIN SODIUM 40 MG: 40 INJECTION SUBCUTANEOUS at 10:49

## 2019-04-05 RX ADMIN — OXYCODONE HYDROCHLORIDE AND ACETAMINOPHEN 2 TABLET: 5; 325 TABLET ORAL at 13:06

## 2019-04-05 RX ADMIN — BUDESONIDE 500 MCG: 0.5 SUSPENSION RESPIRATORY (INHALATION) at 07:59

## 2019-04-05 RX ADMIN — OXYCODONE HYDROCHLORIDE AND ACETAMINOPHEN 2 TABLET: 5; 325 TABLET ORAL at 17:06

## 2019-04-05 RX ADMIN — FORMOTEROL FUMARATE DIHYDRATE 20 MCG: 20 SOLUTION RESPIRATORY (INHALATION) at 07:59

## 2019-04-05 RX ADMIN — Medication 10 ML: at 08:08

## 2019-04-05 RX ADMIN — OXYCODONE HYDROCHLORIDE AND ACETAMINOPHEN 2 TABLET: 5; 325 TABLET ORAL at 08:04

## 2019-04-05 RX ADMIN — OXYCODONE HYDROCHLORIDE AND ACETAMINOPHEN 2 TABLET: 5; 325 TABLET ORAL at 03:08

## 2019-04-05 RX ADMIN — GABAPENTIN 300 MG: 300 CAPSULE ORAL at 15:21

## 2019-04-05 RX ADMIN — PANTOPRAZOLE SODIUM 40 MG: 40 TABLET, DELAYED RELEASE ORAL at 08:05

## 2019-04-05 RX ADMIN — FUROSEMIDE 20 MG: 20 TABLET ORAL at 08:05

## 2019-04-05 RX ADMIN — TELMISARTAN 40 MG: 40 TABLET ORAL at 10:55

## 2019-04-05 RX ADMIN — CHLORTHALIDONE 25 MG: 25 TABLET ORAL at 08:06

## 2019-04-05 RX ADMIN — LORAZEPAM 0.5 MG: 2 INJECTION INTRAMUSCULAR; INTRAVENOUS at 08:06

## 2019-04-05 ASSESSMENT — PAIN DESCRIPTION - LOCATION
LOCATION: HIP
LOCATION: HIP

## 2019-04-05 ASSESSMENT — PAIN SCALES - GENERAL
PAINLEVEL_OUTOF10: 4
PAINLEVEL_OUTOF10: 8
PAINLEVEL_OUTOF10: 9
PAINLEVEL_OUTOF10: 8
PAINLEVEL_OUTOF10: 7
PAINLEVEL_OUTOF10: 10
PAINLEVEL_OUTOF10: 0

## 2019-04-05 ASSESSMENT — PAIN DESCRIPTION - ONSET: ONSET: AWAKENED FROM SLEEP

## 2019-04-05 ASSESSMENT — PAIN DESCRIPTION - PAIN TYPE
TYPE: ACUTE PAIN
TYPE: ACUTE PAIN

## 2019-04-05 ASSESSMENT — PAIN DESCRIPTION - PROGRESSION: CLINICAL_PROGRESSION: GRADUALLY WORSENING

## 2019-04-05 ASSESSMENT — PAIN DESCRIPTION - ORIENTATION
ORIENTATION: RIGHT
ORIENTATION: RIGHT

## 2019-04-05 ASSESSMENT — PAIN DESCRIPTION - DESCRIPTORS: DESCRIPTORS: ACHING;SHARP;SHOOTING

## 2019-04-05 ASSESSMENT — PAIN DESCRIPTION - FREQUENCY: FREQUENCY: CONTINUOUS

## 2019-04-05 ASSESSMENT — PAIN DESCRIPTION - DIRECTION: RADIATING_TOWARDS: DOWN RLE

## 2019-04-05 NOTE — PLAN OF CARE
Problem: Falls - Risk of:  Goal: Will remain free from falls  Description  Will remain free from falls  Outcome: Met This Shift  Note:   Patient will remain free from falls throughout the shift. X1 assist with DIEGO steady. Gait is weak and slow. Fall precautions in place. Non-skid socks on. Bed locked and in lowest position with alarm on and 2/4 side rails up. Bedside table and call light within reach. Patient appropriately calls out when needing assistance. Hourly rounding in anticipation of patient's needs. Room door open. Will continue to monitor. Problem: Risk for Impaired Skin Integrity  Goal: Tissue integrity - skin and mucous membranes  Description  Structural intactness and normal physiological function of skin and  mucous membranes. Outcome: Met This Shift  Note:   Patient's tissue integrity will remain at optimum level throughout this shift. Patient is able to turn and reposition self with pillow support but needs reminders every 2 hours. Heels elevated off of foot of bed. Will continue to monitor.

## 2019-04-05 NOTE — CARE COORDINATION
Case Management Discharge Assessment    2019  [facility])  Clinical Case Management Department    Discharge order noted and faxed orders to [facility]. Claudia Goldstein RN is able to call report to [phone] and ask for [facility] unit. Patient: Alfredo Ellington  MRN: [id number] / : 1950  ACCT: [de-identified]          Admission Documentation  Attending Provider: Carmen Denise MD  Admit date/time: 2019 12:50 PM  Status: Inpatient [101]  Diagnosis: Left groin pain     Readmission within last 30 days:  no     Living Situation  Discharge Planning  Living Arrangements: Spouse/Significant Other  Support Systems: Spouse/Significant Other, Children, Family Members  Potential Assistance Needed: N/A  Type of Home Care Services: None  Patient expects to be discharged to[de-identified] home  Expected Discharge Date: 19    Service Assessment:       Values / Beliefs  Do you have any ethnic, cultural, sacramental, or spiritual Mandaen needs you would like us to be aware of while you are in the hospital?: No    Advance Directives (For Healthcare)  Pre-existing DNR Comfort Care/DNR Arrest/DNI Order: No  Healthcare Directive: Yes, patient has an advance directive for healthcare treatment  Type of Healthcare Directive: Durable power of  for health care, Living will  Copy in Chart: No, copy requested from family                        Pharmacy: [facility] Medications:  No  Does patient want to participate in local refill/meds to beds program?:      Notification completed in HENS/PAS? Yes     IMM  Yes       Discharge disposition: SNF:Critical access hospital Phone: [phone] [place] Fax: [phone]  Discharge Event  Discharged with Documented Belongings: Yes  Departure Mode:  In ambulance([facility] )  Mobility at Departure: Wheelchair(uses a [address] steady )  Discharged to: Assisted living  [address], [facility] [address]    Name of [address]  Phone of Facility [phone]  Fax of Facility [phone]    Mode of Transport medical transport  Name of Transport [facility]  Number of Transport [phone]  Time of Transport [place] Luis and her family were provided with choice of provider; she and her family are in agreement with the discharge plan.       Care Transitions patient: Regina Greer RN  [facility]  Case Management Department  Ph: [phone] Fax: [phone]

## 2019-04-05 NOTE — CARE COORDINATION
Potential discharge expected today and arranged for transport for [phone] with [facility]. If pt is not discharged today will need to cancel this transport.      Electronically signed by Steve Lamar RN on 4/5/2019 at 11:35 AM  [phone]

## 2019-04-05 NOTE — DISCHARGE SUMMARY
IV removed. Patient dressed in hospital gown because  took clothes home. Discussed discharge instructions with patient and answered any questions. Patient to [place] via [employer] with belongings and home medications. Called [facility] and gave report to Odell. Call back number is [phone].

## 2019-04-05 NOTE — PLAN OF CARE
Problem: Pain:  Goal: Pain level will decrease  Description  Pain level will decrease  4/5/2019 0053 by Flores Khan RN  Outcome: Ongoing  Note:   Patient has c/o pain, Prn pain medication given, upon reassessment patient verbalized satisfaction. Will continue to monitor. Problem: Falls - Risk of:  Goal: Will remain free from falls  Description  Will remain free from falls  4/5/2019 0053 by Flores Khan RN  Outcome: Ongoing  Note:   Hourly rounding on patient for needs. Non-skid socks on, bed in lowest position and locked. Bedside table, personal belongs, and nurse call light within reach. Instructed patient to use call light for assistance. Bed alarm on. Floor clear of clutter. Patient remains free of falls at this time. Will continue to monitor.

## 2019-04-05 NOTE — DISCHARGE SUMMARY
pain radiating to the back L hip reporting worsening w/ movement and wt bearing. Refractory to PT, voltaren, medrol, tizanidine, percocet and gabapentin. - Per Ortho: appears likely 2/2 lumbar radiculopathy; rec MRI lumbar spine  - LE dopplers negative. - MRI L-hip: extensive bone marrow edema involves both sacral alae (suspect underlying insufficiency fractures), both pubic bodies, and distal superior pubic rami - consistent with stress response; a nondisplaced fracture of the left pubic body. - PRN acetaminophen, ketorolac, percocet, tizanidine, lidoderm patch  - MRI lumbar spine: 1. Bilateral sacral insufficiency fractures and S2 vertebral body fracture. These are amenable to percutaneous sacral plasty/vertebroplasty. 2. L5-S1 left extraforaminal disc herniation w/ compressive Sx of L-L5 exited nerve root & subarticular impingement of the traversing L-S1 nerve root.     HTN  - cont chlorthalidone, telmisartan     GERD  - cont Protonix     Disposition  - SW for SNF placement  - PT/OT for eval and treat    On the day of her discharge, met pt at bedside. She was not complaining of much pain - saying it comes and goes. No complaints of dizziness today. She denied HA or vision changes. No fever or chills. No chest pain or chest tightness. No abd pain. No diarrhea. No dysuria (no itching or burning). Disposition:  Skilled Facility    Physical Exam Performed:     /70   Pulse 83   Temp 98.3 °F (36.8 °C) (Oral)   Resp 16   Ht 5' 9\" (1.753 m)   Wt 216 lb 14.9 oz (98.4 kg)   SpO2 95%   BMI 32.04 kg/m²       General appearance:  No apparent distress, appears stated age and cooperative. HEENT:  Normal cephalic, atraumatic without obvious deformity. Pupils equal, round, and reactive to light. Extra ocular muscles intact. Conjunctivae/corneas clear. Neck: Supple, with full range of motion. No jugular venous distention. Trachea midline. Respiratory:  Normal respiratory effort.  Clear to auscultation, Final Result             Consults:     IP CONSULT TO PRIMARY CARE PROVIDER  IP CONSULT TO SOCIAL WORK  IP CONSULT TO ORTHOPEDIC SURGERY  IP CONSULT TO SOCIAL WORK    Disposition:  SNF     Condition at Discharge: Stable    Discharge Instructions/Follow-up:  Follow up with PCP    Code Status:  Full Code     Activity: activity as tolerated    Diet: low sodium 2 GM      Discharge Medications:     Current Discharge Medication List           Details   oxyCODONE-acetaminophen (PERCOCET) 5-325 MG per tablet Take 2 tablets by mouth every 6 hours as needed for Pain for up to 5 days.   Qty: 40 tablet, Refills: 0    Comments: Reduce doses taken as pain becomes manageable  Associated Diagnoses: Left hip pain; Left groin pain              Details   diclofenac (VOLTAREN) 75 MG EC tablet TK 1 T PO BID PC  Refills: 2      methylPREDNISolone (MEDROL DOSEPACK) 4 MG tablet TK UTD  Refills: 0      tiZANidine (ZANAFLEX) 4 MG tablet TK 1 T PO TID PRF SPASMS  Refills: 0      omeprazole (PRILOSEC) 20 MG delayed release capsule TAKE 1 CAPSULE DAILY  Qty: 90 capsule, Refills: 3      fluticasone-vilanterol (BREO ELLIPTA) 100-25 MCG/INH AEPB inhaler Inhale 1 puff into the lungs daily  Qty: 3 each, Refills: 3      furosemide (LASIX) 20 MG tablet Take 20 mg by mouth daily       chlorthalidone (HYGROTON) 25 MG tablet Take 1 tablet by mouth daily  Qty: 90 tablet, Refills: 3      potassium chloride (KLOR-CON M) 20 MEQ extended release tablet Take 1 tablet by mouth daily  Qty: 90 tablet, Refills: 3      Multiple Vitamins-Minerals (MULTIVITAMIN PO) Take by mouth daily Women's Ultra Oscar for Menopause      telmisartan (MICARDIS) 40 MG tablet Take 1 tablet by mouth daily  Qty: 90 tablet, Refills: 3    Comments: **Patient requests 90 days supply**      gabapentin (NEURONTIN) 300 MG capsule TK 1 C PO TID  Refills: 3      albuterol sulfate HFA (PROVENTIL HFA) 108 (90 BASE) MCG/ACT inhaler 2 PUFFS EVERY 4 HOURS AS NEEDED WHEEZING  Qty: 1 Inhaler, Refills:

## 2019-04-05 NOTE — DISCHARGE INSTR - COC
Continuity of Care Form    Patient Name: Deangelo Burgos   :  1950  MRN:  [id number]    Admit date:  2019  Discharge date:  2019    Code Status Order: Full Code   Advance Directives:   Advance Care Flowsheet Documentation     Date/Time Healthcare Directive Type of Healthcare Directive Copy in [address] Agent's Name Healthcare Agent's Phone Number    19 1631  Yes, patient has an advance directive for healthcare treatment  Durable power of  for health care;Living will  No, copy requested from family  --  --  --          Admitting Physician:  Cole Fischer MD  PCP: Robbin Red MD    Discharging Nurse: [facility] Unit/Room#: 1672/9371-31  Discharging Unit Phone Number: ***    Emergency Contact:   Extended Emergency Contact Information  Primary Emergency Contact: Semaj Shay  Address: [address] [place] [address], [address] [place] [address] Phone: [phone]  Work Phone: [phone]  Mobile Phone: [phone]  Relation: Spouse  Secondary Emergency Contact: Nigel Kan [address] Phone: [phone]  Relation: Child    Past Surgical History:  Past Surgical History:   Procedure Laterality Date     SECTION      times 2    CHOLECYSTECTOMY      COLONOSCOPY      HYSTERECTOMY      KNEE ARTHROPLASTY Right 2018     RIGHT REVISION TOTAL KNEE ARTHROPLASTY    KNEE ARTHROSCOPY Left     Dr. Ed Reynolds Right 2018    RIGHT REVISION ARTHROSCOPY FEMORAL COMPONENT, SYNOVECTOMY performed by Yazmin Zarate MD at [facility] Right 2018    RIGHT KNEE QUADRICEP TENDON REPAIR WITH ALLOGRAFT AUGMENTATION performed by Yazmin Zarate MD at [address] [place] [address] ARTHROSCOPY Right     TOTAL KNEE ARTHROPLASTY Right 2017       Immunization History:   Immunization History   Administered Date(s) Administered   [employer] Influenza Vaccine, unspecified formulation 10/01/2018    Influenza Virus Vaccine 12/09/2011, 01/20/2015    Influenza, High Dose (Fluzone 65 yrs and older) 12/12/2017, 09/06/2018    Influenza, Intradermal, Preservative free 11/09/2015    Pneumococcal 13-valent Conjugate (Uvanvjw29) 04/02/2019    Pneumococcal Polysaccharide (Porsjurgq32) 12/09/2011    Tdap (Boostrix, Adacel) 04/14/2016       Active Problems:  Patient Active Problem List   Diagnosis Code    Hyperlipidemia E78.5    Hypertension I10    Edema R60.9    CTS (carpal tunnel syndrome) G56.00    Reactive airway disease J45.909    Pulmonary nodule R91.1    Anxiety and depression F41.9, F32.9    Acute pain of right knee M25.561    Anemia D64.9    Closed displaced fracture of medial condyle of right femur (HCC) S72.431A    Arthrofibrosis of knee joint, right M24.661    Acute blood loss anemia D62    Gastroesophageal reflux disease without esophagitis K21.9    Falls W19. XXXA    Quadriceps tendon rupture, right, sequela S76.111S    Acute urinary retention R33.8    Hypokalemia E87.6    Muscle cramps R25.2    Left groin pain R10.32    Left hip pain M25.552    Multiple closed fractures of pelvis with stable disruption of pelvic [place] (HCC) S32.810A       Isolation/Infection:   Isolation          Contact        Patient Infection Status     Infection Encounter Level?  Onset Date Added Added By Resolved Resolved By Review Date    MRSA No 12/07/18 01/01/19 Joint Culture             Nurse Assessment:  Last Vital Signs: /86   Pulse 83   Temp 97.9 °F (36.6 °C) (Oral)   Resp 16   Ht 5' 9\" (1.753 m)   Wt 216 lb 14.9 oz (98.4 kg)   SpO2 95%   BMI 32.04 kg/m²     Last documented pain score (0-10 scale): Pain Level: 9  Last Weight:   Wt Readings from Last 1 Encounters:   04/04/19 216 lb 14.9 oz (98.4 kg)     Mental Status:  oriented    IV Access:  - None    Nursing Mobility/ADLs:  Walking   Dependent  Transfer  Assisted  Bathing Assisted  Dressing  Assisted  Toileting  Assisted  Feeding  Independent  Med Admin  Independent  Med Delivery   whole    Wound Care Documentation and Therapy:  Incision 12/07/18 Knee Right (Active)   Number of days: 118       Wound 12/07/18 Leg Anterior; Lower 2CM X 4CM (Active)   Number of days: 118        Elimination:  Continence:   · Bowel: Yes  · Bladder: Yes  Urinary Catheter: None   Colostomy/Ileostomy/Ileal Conduit: No       Date of Last BM: ***    Intake/Output Summary (Last 24 hours) at 4/5/2019 1332  Last data filed at 4/5/2019 0808  Gross per 24 hour   Intake 20 ml   Output --   Net 20 ml     I/O last 3 completed shifts: In: 10 [I.V.:10]  Out: -     Safety Concerns:     History of Falls (last 30 days) and At Risk for Falls    Impairments/Disabilities:      Vision    Nutrition Therapy:  Current Nutrition Therapy:   - Oral Diet:  Low Sodium (2gm)    Routes of Feeding: Oral  Liquids: No Restrictions  Daily Fluid Restriction: no  Last Modified Barium Swallow with Video (Video Swallowing Test): not done    Treatments at the Time of Hospital Discharge:   Respiratory Treatments: RA  Oxygen Therapy:  is not on home oxygen therapy.   Ventilator:    - No ventilator support    Rehab Therapies: Physical Therapy and Occupational Therapy  Weight Bearing Status/Restrictions: No weight bearing restirctions  Other Medical Equipment (for information only, NOT a DME order):  bedside commode  Other Treatments: NA    Patient's personal belongings (please select all that are sent with patient):  Glasses    RN SIGNATURE:  Electronically signed by Aiyana Beach RN on 4/5/19 at 2:59 PM    CASE MANAGEMENT/SOCIAL WORK SECTION    Inpatient Status Date: 04/01/2019    Readmission Risk Assessment Score:  Readmission Risk              Risk of Unplanned Readmission:        20           Discharging to Facility/ Agency   · Name: [facility]  · Address:  · Phone:report- [phone] and ask for the [address]

## 2019-04-08 ENCOUNTER — TELEPHONE (OUTPATIENT)
Dept: INTERNAL MEDICINE CLINIC | Age: 69
End: 2019-04-08

## 2019-04-08 NOTE — TELEPHONE ENCOUNTER
Please call Marilee alfonso daughter. Concerned about the multiple fractures patient has in her hip and what her recovery will be like as well as how to prevent fractures in the future.

## 2019-04-08 NOTE — CARE COORDINATION
[facility] Predict tool recommending patient discharge to SNF, plan is for patient to discharge to [facility] at discharge. [facility] will mail copy of [address] tool to [facility] [employer] [facility].     Note placed on 04/08/2019 at 12:02    Thank Michael Zendejas RN  Care Transition Coordinator  Contact CCZZDX:[phone]

## 2019-04-29 ENCOUNTER — TELEPHONE (OUTPATIENT)
Dept: INTERNAL MEDICINE CLINIC | Age: 69
End: 2019-04-29

## 2019-04-29 ENCOUNTER — OFFICE VISIT (OUTPATIENT)
Dept: INTERNAL MEDICINE CLINIC | Age: 69
End: 2019-04-29
Payer: MEDICARE

## 2019-04-29 VITALS
BODY MASS INDEX: 31.7 KG/M2 | OXYGEN SATURATION: 95 % | HEART RATE: 90 BPM | HEIGHT: 69 IN | WEIGHT: 214 LBS | DIASTOLIC BLOOD PRESSURE: 88 MMHG | SYSTOLIC BLOOD PRESSURE: 130 MMHG

## 2019-04-29 DIAGNOSIS — M80.00XG OSTEOPOROSIS WITH CURRENT PATHOLOGICAL FRACTURE WITH DELAYED HEALING, UNSPECIFIED OSTEOPOROSIS TYPE, SUBSEQUENT ENCOUNTER: ICD-10-CM

## 2019-04-29 DIAGNOSIS — F32.4 MAJOR DEPRESSIVE DISORDER WITH SINGLE EPISODE, IN PARTIAL REMISSION (HCC): ICD-10-CM

## 2019-04-29 DIAGNOSIS — R25.2 MUSCLE CRAMPS: ICD-10-CM

## 2019-04-29 DIAGNOSIS — F32.A ANXIETY AND DEPRESSION: ICD-10-CM

## 2019-04-29 DIAGNOSIS — R60.9 EDEMA, UNSPECIFIED TYPE: ICD-10-CM

## 2019-04-29 DIAGNOSIS — S32.10XG CLOSED FRACTURE OF SACRUM WITH DELAYED HEALING, UNSPECIFIED PORTION OF SACRUM, SUBSEQUENT ENCOUNTER: Primary | ICD-10-CM

## 2019-04-29 DIAGNOSIS — S32.810G MULTIPLE CLOSED FRACTURES OF PELVIS WITH STABLE DISRUPTION OF PELVIC RING WITH DELAYED HEALING, SUBSEQUENT ENCOUNTER: ICD-10-CM

## 2019-04-29 DIAGNOSIS — I10 ESSENTIAL HYPERTENSION: ICD-10-CM

## 2019-04-29 DIAGNOSIS — F41.9 ANXIETY AND DEPRESSION: ICD-10-CM

## 2019-04-29 DIAGNOSIS — S76.111S QUADRICEPS TENDON RUPTURE, RIGHT, SEQUELA: ICD-10-CM

## 2019-04-29 DIAGNOSIS — K21.9 GASTROESOPHAGEAL REFLUX DISEASE WITHOUT ESOPHAGITIS: ICD-10-CM

## 2019-04-29 PROBLEM — S72.431A: Status: RESOLVED | Noted: 2018-05-21 | Resolved: 2019-04-29

## 2019-04-29 PROBLEM — M25.561 ACUTE PAIN OF RIGHT KNEE: Status: RESOLVED | Noted: 2018-03-16 | Resolved: 2019-04-29

## 2019-04-29 PROBLEM — S32.10XA FRACTURE OF SACRUM (HCC): Status: ACTIVE | Noted: 2019-04-29

## 2019-04-29 PROBLEM — M81.0 OSTEOPOROSIS: Status: ACTIVE | Noted: 2019-04-29

## 2019-04-29 PROBLEM — R29.6 FALLS: Status: RESOLVED | Noted: 2018-11-26 | Resolved: 2019-04-29

## 2019-04-29 PROBLEM — M24.661 ARTHROFIBROSIS OF KNEE JOINT, RIGHT: Status: RESOLVED | Noted: 2018-09-28 | Resolved: 2019-04-29

## 2019-04-29 PROBLEM — R10.32 LEFT GROIN PAIN: Status: RESOLVED | Noted: 2019-04-01 | Resolved: 2019-04-29
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
## 2019-04-29 PROCEDURE — 1036F TOBACCO NON-USER: CPT | Performed by: INTERNAL MEDICINE

## 2019-04-29 PROCEDURE — G8427 DOCREV CUR MEDS BY ELIG CLIN: HCPCS | Performed by: INTERNAL MEDICINE

## 2019-04-29 PROCEDURE — 1111F DSCHRG MED/CURRENT MED MERGE: CPT | Performed by: INTERNAL MEDICINE

## 2019-04-29 PROCEDURE — 4040F PNEUMOC VAC/ADMIN/RCVD: CPT | Performed by: INTERNAL MEDICINE

## 2019-04-29 PROCEDURE — G8400 PT W/DXA NO RESULTS DOC: HCPCS | Performed by: INTERNAL MEDICINE

## 2019-04-29 PROCEDURE — G8417 CALC BMI ABV UP PARAM F/U: HCPCS | Performed by: INTERNAL MEDICINE

## 2019-04-29 PROCEDURE — 99215 OFFICE O/P EST HI 40 MIN: CPT | Performed by: INTERNAL MEDICINE

## 2019-04-29 PROCEDURE — 1090F PRES/ABSN URINE INCON ASSESS: CPT | Performed by: INTERNAL MEDICINE

## 2019-04-29 PROCEDURE — 1123F ACP DISCUSS/DSCN MKR DOCD: CPT | Performed by: INTERNAL MEDICINE

## 2019-04-29 PROCEDURE — 3017F COLORECTAL CA SCREEN DOC REV: CPT | Performed by: INTERNAL MEDICINE

## 2019-04-29 RX ORDER — OXYCODONE HYDROCHLORIDE AND ACETAMINOPHEN 5; 325 MG/1; MG/1
TABLET ORAL
COMMUNITY
Start: 2019-04-25 | End: 2019-04-29 | Stop reason: SDUPTHER

## 2019-04-29 RX ORDER — RISEDRONATE SODIUM 35 MG/1
TABLET, FILM COATED ORAL
Qty: 12 TABLET | Refills: 3 | Status: SHIPPED | OUTPATIENT
Start: 2019-04-29 | End: 2020-01-17

## 2019-04-29 RX ORDER — OXYCODONE HYDROCHLORIDE AND ACETAMINOPHEN 5; 325 MG/1; MG/1
1 TABLET ORAL 2 TIMES DAILY PRN
Qty: 60 TABLET | Refills: 0 | Status: SHIPPED | OUTPATIENT
Start: 2019-04-29 | End: 2019-05-17

## 2019-04-29 ASSESSMENT — PATIENT HEALTH QUESTIONNAIRE - PHQ9
SUM OF ALL RESPONSES TO PHQ QUESTIONS 1-9: 0
2. FEELING DOWN, DEPRESSED OR HOPELESS: 0
1. LITTLE INTEREST OR PLEASURE IN DOING THINGS: 0
SUM OF ALL RESPONSES TO PHQ QUESTIONS 1-9: 0
SUM OF ALL RESPONSES TO PHQ9 QUESTIONS 1 & 2: 0

## 2019-04-29 ASSESSMENT — ENCOUNTER SYMPTOMS
SHORTNESS OF BREATH: 0
TROUBLE SWALLOWING: 0
BACK PAIN: 1
ABDOMINAL PAIN: 0

## 2019-04-29 NOTE — PROGRESS NOTES
regular rhythm. Exam reveals no gallop. Pulmonary/Chest: Effort normal and breath sounds normal. No respiratory distress. Abdominal: Soft. Bowel sounds are normal.   Musculoskeletal: She exhibits edema (nonpitting edemabilateral lower extremities below the knee). Neurological: She is alert and oriented to person, place, and time. No cranial nerve deficit. Gait abnormal.   Skin: She is not diaphoretic. No pallor. Psychiatric: Her speech is normal and behavior is normal. Judgment and thought content normal. Her mood appears not anxious. She is not withdrawn. Cognition and memory are normal. She exhibits a depressed mood. ASSESSMENT:       Encounter Diagnoses   Name Primary?  Closed fracture of sacrum with delayed healing, unspecified portion of sacrum, subsequent encounter Yes    Major depressive disorder with single episode, in partial remission ([address]     Osteoporosis with current pathological fracture with delayed healing, unspecified osteoporosis type, subsequent encounter     Multiple closed fractures of pelvis with stable disruption of pelvic ring with delayed healing, subsequent encounter     Muscle cramps     Quadriceps tendon rupture, right, sequela     Gastroesophageal reflux disease without esophagitis     Anxiety and depression     Essential hypertension     Edema, unspecified type        Fracture of sacrum ([address]  Status post hospitalization. Slow improvement with conservative treatment. Still requires pain medication. Monitor report done and refill of her oxycodone today. Multiple closed fractures of pelvis with stable disruption of pelvic [place] (HCC)  Status post hospitalization. Slow improvement with conservative treatment. Still requires pain medication. Monitor report done and refill of her oxycodone today.     Muscle cramps  Okay to use muscle relaxant    Quadriceps tendon rupture, right, sequela  The healing of her right knee including the knee replacement surgery, quadriceps rupture, and arthrofibrosis of the joint have all been delayed because of the recent setback with her pelvic and sacral fractures. She is able to very gingerly weight-bear with a walker. Gastroesophageal reflux disease without esophagitis  Continue omeprazole    Anxiety and depression  More of a reactive depression to her 2 years of orthopedic issues. Hopefully these depression symptoms resolve as her orthopedic issues become more tolerable. Hypertension  BP okay    Edema  Swelling of bilateral lower extremities below the knees. Nonpitting. Mostly exacerbated since she has been limited by any activities since her pelvic fracture. No sign heart failure. Osteoporosis  Noted on recent MRI. There is no previous mention of osteoporosis with her previous orthopedic surgeries. Suspect due to her prolonged illness and immobility that the osteoporosis may be has accelerated. She is now on Actonel. Get DEXA in near future for baseline quantification. Gave patient order. PLAN:See ASSESSMENT for evaluation & PLAN     Orders Placed This Encounter   Procedures    DEXA Bone Density Axial Skeleton     Standing Status:   Future     Standing Expiration Date:   4/29/2020     Order Specific Question:   Reason for exam:     Answer:   osteoporosis       PSH, PMH, SH and FH reviewed and noted. Recent and past labs, tests and consultsalso reviewed. Recent or new meds also reviewed. Lengthy and thorough review of patient's current problem and other potential comorbid issues affecting patient - discussion w/ patient and accompanying family members. Questions and concerns were addressed, as well as any potential treatment options. - . 45 minutes spent with patient and family/caregivers discussing diagnoses and plan; at least 50% of which was for care coordination.

## 2019-04-29 NOTE — ASSESSMENT & PLAN NOTE
Noted on recent MRI. There is no previous mention of osteoporosis with her previous orthopedic surgeries. Suspect due to her prolonged illness and immobility that the osteoporosis may be has accelerated. She is now on Actonel. Get DEXA in near future for baseline quantification. Gave patient order.

## 2019-04-29 NOTE — ASSESSMENT & PLAN NOTE
Status post hospitalization. Slow improvement with conservative treatment. Still requires pain medication. Monitor report done and refill of her oxycodone today.

## 2019-04-29 NOTE — ASSESSMENT & PLAN NOTE
The healing of her right knee including the knee replacement surgery, quadriceps rupture, and arthrofibrosis of the joint have all been delayed because of the recent setback with her pelvic and sacral fractures. She is able to very gingerly weight-bear with a walker.

## 2019-04-29 NOTE — ASSESSMENT & PLAN NOTE
Swelling of bilateral lower extremities below the knees. Nonpitting. Mostly exacerbated since she has been limited by any activities since her pelvic fracture. No sign heart failure.

## 2019-04-29 NOTE — ASSESSMENT & PLAN NOTE
More of a reactive depression to her 2 years of orthopedic issues. Hopefully these depression symptoms resolve as her orthopedic issues become more tolerable.

## 2019-05-01 ENCOUNTER — TELEPHONE (OUTPATIENT)
Dept: INTERNAL MEDICINE CLINIC | Age: 69
End: 2019-05-01

## 2019-05-01 NOTE — TELEPHONE ENCOUNTER
Pharmacy called in stating that Actonel 35 mg is not covered under the patient's insurance.  PA required

## 2019-05-03 ENCOUNTER — TELEPHONE (OUTPATIENT)
Dept: INTERNAL MEDICINE CLINIC | Age: 69
End: 2019-05-03

## 2019-05-03 NOTE — TELEPHONE ENCOUNTER
Pt states she cannot afford the Actonel its over $700. Pt wants to know any alternatives. Please advise.

## 2019-05-06 ENCOUNTER — TELEPHONE (OUTPATIENT)
Dept: INTERNAL MEDICINE CLINIC | Age: 69
End: 2019-05-06

## 2019-05-06 RX ORDER — ALENDRONATE SODIUM 70 MG/1
TABLET ORAL
Qty: 12 TABLET | Refills: 3 | Status: SHIPPED | OUTPATIENT
Start: 2019-05-06 | End: 2019-07-01 | Stop reason: SDUPTHER

## 2019-05-06 NOTE — TELEPHONE ENCOUNTER
Received APPROVAL for Actonel 35MG OR TABS through 05/02/2020. Approval letter attached. Per 5/1/19 Telephone Encounter, doctor changed medication to Fosamax.

## 2019-05-11 ENCOUNTER — HOSPITAL ENCOUNTER (INPATIENT)
Age: 69
LOS: 2 days | Discharge: HOME HEALTH | DRG: 536 | End: 2019-05-13
Attending: EMERGENCY MEDICINE | Admitting: HOSPITALIST
Payer: MEDICARE

## 2019-05-11 ENCOUNTER — APPOINTMENT (OUTPATIENT)
Dept: CT IMAGING | Age: 69
DRG: 536 | End: 2019-05-11
Payer: MEDICARE

## 2019-05-11 ENCOUNTER — APPOINTMENT (OUTPATIENT)
Dept: GENERAL RADIOLOGY | Age: 69
DRG: 536 | End: 2019-05-11
Payer: MEDICARE

## 2019-05-11 DIAGNOSIS — S32.810G: ICD-10-CM

## 2019-05-11 DIAGNOSIS — S32.592A: ICD-10-CM

## 2019-05-11 DIAGNOSIS — R26.2 INABILITY TO AMBULATE DUE TO HIP: ICD-10-CM

## 2019-05-11 DIAGNOSIS — E86.0 DEHYDRATION: Primary | ICD-10-CM

## 2019-05-11 DIAGNOSIS — N12 PYELONEPHRITIS: ICD-10-CM

## 2019-05-11 LAB
ALBUMIN SERPL-MCNC: 4.1 G/DL (ref 3.4–5)
ANION GAP SERPL CALCULATED.3IONS-SCNC: 14 MMOL/L (ref 3–16)
ANION GAP SERPL CALCULATED.3IONS-SCNC: 16 MMOL/L (ref 3–16)
BACTERIA: ABNORMAL /HPF
BASOPHILS ABSOLUTE: 0 K/UL (ref 0–0.2)
BASOPHILS RELATIVE PERCENT: 0.2 %
BILIRUBIN URINE: NEGATIVE
BLOOD, URINE: NEGATIVE
BUN BLDV-MCNC: 23 MG/DL (ref 7–20)
BUN BLDV-MCNC: 24 MG/DL (ref 7–20)
CALCIUM SERPL-MCNC: 9.1 MG/DL (ref 8.3–10.6)
CALCIUM SERPL-MCNC: 9.8 MG/DL (ref 8.3–10.6)
CHLORIDE BLD-SCNC: 100 MMOL/L (ref 99–110)
CHLORIDE BLD-SCNC: 103 MMOL/L (ref 99–110)
CLARITY: CLEAR
CO2: 23 MMOL/L (ref 21–32)
CO2: 24 MMOL/L (ref 21–32)
COLOR: YELLOW
CREAT SERPL-MCNC: 0.5 MG/DL (ref 0.6–1.2)
CREAT SERPL-MCNC: 0.7 MG/DL (ref 0.6–1.2)
EKG ATRIAL RATE: 79 BPM
EKG DIAGNOSIS: NORMAL
EKG P AXIS: 30 DEGREES
EKG P-R INTERVAL: 154 MS
EKG Q-T INTERVAL: 390 MS
EKG QRS DURATION: 88 MS
EKG QTC CALCULATION (BAZETT): 447 MS
EKG R AXIS: -45 DEGREES
EKG T AXIS: 32 DEGREES
EKG VENTRICULAR RATE: 79 BPM
EOSINOPHILS ABSOLUTE: 0 K/UL (ref 0–0.6)
EOSINOPHILS RELATIVE PERCENT: 0.3 %
EPITHELIAL CELLS, UA: ABNORMAL /HPF
GFR AFRICAN AMERICAN: >60
GFR AFRICAN AMERICAN: >60
GFR NON-AFRICAN AMERICAN: >60
GFR NON-AFRICAN AMERICAN: >60
GLUCOSE BLD-MCNC: 101 MG/DL (ref 70–99)
GLUCOSE BLD-MCNC: 116 MG/DL (ref 70–99)
GLUCOSE URINE: NEGATIVE MG/DL
HCT VFR BLD CALC: 43.3 % (ref 36–48)
HEMOGLOBIN: 14.3 G/DL (ref 12–16)
KETONES, URINE: 40 MG/DL
LEUKOCYTE ESTERASE, URINE: NEGATIVE
LYMPHOCYTES ABSOLUTE: 1.7 K/UL (ref 1–5.1)
LYMPHOCYTES RELATIVE PERCENT: 23.5 %
MCH RBC QN AUTO: 30.5 PG (ref 26–34)
MCHC RBC AUTO-ENTMCNC: 33 G/DL (ref 31–36)
MCV RBC AUTO: 92.5 FL (ref 80–100)
MICROSCOPIC EXAMINATION: YES
MONOCYTES ABSOLUTE: 0.4 K/UL (ref 0–1.3)
MONOCYTES RELATIVE PERCENT: 5.5 %
NEUTROPHILS ABSOLUTE: 5 K/UL (ref 1.7–7.7)
NEUTROPHILS RELATIVE PERCENT: 70.5 %
NITRITE, URINE: NEGATIVE
PDW BLD-RTO: 14.2 % (ref 12.4–15.4)
PH UA: 5.5 (ref 5–8)
PHOSPHORUS: 3.8 MG/DL (ref 2.5–4.9)
PLATELET # BLD: 483 K/UL (ref 135–450)
PMV BLD AUTO: 7.3 FL (ref 5–10.5)
POTASSIUM REFLEX MAGNESIUM: 4.3 MMOL/L (ref 3.5–5.1)
POTASSIUM SERPL-SCNC: 4 MMOL/L (ref 3.5–5.1)
PROTEIN UA: ABNORMAL MG/DL
RBC # BLD: 4.68 M/UL (ref 4–5.2)
RBC UA: ABNORMAL /HPF (ref 0–2)
SODIUM BLD-SCNC: 140 MMOL/L (ref 136–145)
SODIUM BLD-SCNC: 140 MMOL/L (ref 136–145)
SPECIFIC GRAVITY UA: >=1.03 (ref 1–1.03)
TROPONIN: <0.01 NG/ML
TROPONIN: <0.01 NG/ML
URINE REFLEX TO CULTURE: YES
URINE TYPE: ABNORMAL
UROBILINOGEN, URINE: 0.2 E.U./DL
WBC # BLD: 7.1 K/UL (ref 4–11)
WBC UA: ABNORMAL /HPF (ref 0–5)

## 2019-05-11 PROCEDURE — 2580000003 HC RX 258: Performed by: NURSE PRACTITIONER

## 2019-05-11 PROCEDURE — 87086 URINE CULTURE/COLONY COUNT: CPT

## 2019-05-11 PROCEDURE — 99285 EMERGENCY DEPT VISIT HI MDM: CPT

## 2019-05-11 PROCEDURE — 87186 SC STD MICRODIL/AGAR DIL: CPT

## 2019-05-11 PROCEDURE — 94664 DEMO&/EVAL PT USE INHALER: CPT

## 2019-05-11 PROCEDURE — 96368 THER/DIAG CONCURRENT INF: CPT

## 2019-05-11 PROCEDURE — 84484 ASSAY OF TROPONIN QUANT: CPT

## 2019-05-11 PROCEDURE — 6360000002 HC RX W HCPCS: Performed by: NURSE PRACTITIONER

## 2019-05-11 PROCEDURE — 6370000000 HC RX 637 (ALT 250 FOR IP): Performed by: STUDENT IN AN ORGANIZED HEALTH CARE EDUCATION/TRAINING PROGRAM

## 2019-05-11 PROCEDURE — 80069 RENAL FUNCTION PANEL: CPT

## 2019-05-11 PROCEDURE — 94640 AIRWAY INHALATION TREATMENT: CPT

## 2019-05-11 PROCEDURE — 81001 URINALYSIS AUTO W/SCOPE: CPT

## 2019-05-11 PROCEDURE — 6360000002 HC RX W HCPCS: Performed by: STUDENT IN AN ORGANIZED HEALTH CARE EDUCATION/TRAINING PROGRAM

## 2019-05-11 PROCEDURE — 87077 CULTURE AEROBIC IDENTIFY: CPT

## 2019-05-11 PROCEDURE — 96366 THER/PROPH/DIAG IV INF ADDON: CPT

## 2019-05-11 PROCEDURE — 72192 CT PELVIS W/O DYE: CPT

## 2019-05-11 PROCEDURE — 2580000003 HC RX 258: Performed by: EMERGENCY MEDICINE

## 2019-05-11 PROCEDURE — 96365 THER/PROPH/DIAG IV INF INIT: CPT

## 2019-05-11 PROCEDURE — 96376 TX/PRO/DX INJ SAME DRUG ADON: CPT

## 2019-05-11 PROCEDURE — 72100 X-RAY EXAM L-S SPINE 2/3 VWS: CPT

## 2019-05-11 PROCEDURE — 96375 TX/PRO/DX INJ NEW DRUG ADDON: CPT

## 2019-05-11 PROCEDURE — 2580000003 HC RX 258: Performed by: STUDENT IN AN ORGANIZED HEALTH CARE EDUCATION/TRAINING PROGRAM

## 2019-05-11 PROCEDURE — 85025 COMPLETE CBC W/AUTO DIFF WBC: CPT

## 2019-05-11 PROCEDURE — 36415 COLL VENOUS BLD VENIPUNCTURE: CPT

## 2019-05-11 PROCEDURE — 1200000000 HC SEMI PRIVATE

## 2019-05-11 PROCEDURE — 6360000002 HC RX W HCPCS: Performed by: EMERGENCY MEDICINE

## 2019-05-11 PROCEDURE — 93005 ELECTROCARDIOGRAM TRACING: CPT | Performed by: EMERGENCY MEDICINE

## 2019-05-11 RX ORDER — CEPHALEXIN 500 MG/1
500 CAPSULE ORAL 4 TIMES DAILY
Qty: 40 CAPSULE | Refills: 0 | Status: SHIPPED | OUTPATIENT
Start: 2019-05-11 | End: 2019-05-11

## 2019-05-11 RX ORDER — HEPARIN SODIUM 5000 [USP'U]/ML
5000 INJECTION, SOLUTION INTRAVENOUS; SUBCUTANEOUS EVERY 8 HOURS SCHEDULED
Status: DISCONTINUED | OUTPATIENT
Start: 2019-05-11 | End: 2019-05-13 | Stop reason: HOSPADM

## 2019-05-11 RX ORDER — SODIUM CHLORIDE 0.9 % (FLUSH) 0.9 %
10 SYRINGE (ML) INJECTION PRN
Status: DISCONTINUED | OUTPATIENT
Start: 2019-05-11 | End: 2019-05-13 | Stop reason: HOSPADM

## 2019-05-11 RX ORDER — ONDANSETRON 2 MG/ML
4 INJECTION INTRAMUSCULAR; INTRAVENOUS EVERY 6 HOURS PRN
Status: DISCONTINUED | OUTPATIENT
Start: 2019-05-11 | End: 2019-05-11 | Stop reason: HOSPADM

## 2019-05-11 RX ORDER — SODIUM CHLORIDE 0.9 % (FLUSH) 0.9 %
10 SYRINGE (ML) INJECTION EVERY 12 HOURS SCHEDULED
Status: DISCONTINUED | OUTPATIENT
Start: 2019-05-11 | End: 2019-05-13 | Stop reason: HOSPADM

## 2019-05-11 RX ORDER — FORMOTEROL FUMARATE 20 UG/2ML
20 SOLUTION RESPIRATORY (INHALATION) 2 TIMES DAILY
Status: DISCONTINUED | OUTPATIENT
Start: 2019-05-11 | End: 2019-05-13 | Stop reason: HOSPADM

## 2019-05-11 RX ORDER — SODIUM CHLORIDE, SODIUM LACTATE, POTASSIUM CHLORIDE, CALCIUM CHLORIDE 600; 310; 30; 20 MG/100ML; MG/100ML; MG/100ML; MG/100ML
1000 INJECTION, SOLUTION INTRAVENOUS ONCE
Status: COMPLETED | OUTPATIENT
Start: 2019-05-11 | End: 2019-05-11

## 2019-05-11 RX ORDER — SODIUM CHLORIDE 9 MG/ML
INJECTION, SOLUTION INTRAVENOUS CONTINUOUS
Status: DISCONTINUED | OUTPATIENT
Start: 2019-05-11 | End: 2019-05-11 | Stop reason: SDUPTHER

## 2019-05-11 RX ORDER — CHLORTHALIDONE 25 MG/1
25 TABLET ORAL DAILY
Status: DISCONTINUED | OUTPATIENT
Start: 2019-05-11 | End: 2019-05-12

## 2019-05-11 RX ORDER — OYSTER SHELL CALCIUM WITH VITAMIN D 500; 200 MG/1; [IU]/1
1 TABLET, FILM COATED ORAL DAILY
Status: DISCONTINUED | OUTPATIENT
Start: 2019-05-11 | End: 2019-05-13 | Stop reason: HOSPADM

## 2019-05-11 RX ORDER — DIPHENHYDRAMINE HYDROCHLORIDE 50 MG/ML
12.5 INJECTION INTRAMUSCULAR; INTRAVENOUS ONCE
Status: COMPLETED | OUTPATIENT
Start: 2019-05-11 | End: 2019-05-11

## 2019-05-11 RX ORDER — KETOROLAC TROMETHAMINE 30 MG/ML
15 INJECTION, SOLUTION INTRAMUSCULAR; INTRAVENOUS ONCE
Status: COMPLETED | OUTPATIENT
Start: 2019-05-11 | End: 2019-05-11

## 2019-05-11 RX ORDER — OXYCODONE HYDROCHLORIDE AND ACETAMINOPHEN 5; 325 MG/1; MG/1
1 TABLET ORAL EVERY 4 HOURS PRN
Status: DISCONTINUED | OUTPATIENT
Start: 2019-05-11 | End: 2019-05-12

## 2019-05-11 RX ORDER — RISEDRONATE SODIUM 35 MG/1
35 TABLET, FILM COATED ORAL WEEKLY
Status: DISCONTINUED | OUTPATIENT
Start: 2019-05-11 | End: 2019-05-11 | Stop reason: SINTOL

## 2019-05-11 RX ORDER — GABAPENTIN 300 MG/1
300 CAPSULE ORAL 3 TIMES DAILY
Status: DISCONTINUED | OUTPATIENT
Start: 2019-05-11 | End: 2019-05-13 | Stop reason: HOSPADM

## 2019-05-11 RX ORDER — ALBUTEROL SULFATE 2.5 MG/3ML
2.5 SOLUTION RESPIRATORY (INHALATION) EVERY 4 HOURS PRN
Status: DISCONTINUED | OUTPATIENT
Start: 2019-05-11 | End: 2019-05-13 | Stop reason: HOSPADM

## 2019-05-11 RX ORDER — ONDANSETRON 2 MG/ML
4 INJECTION INTRAMUSCULAR; INTRAVENOUS EVERY 6 HOURS PRN
Status: DISCONTINUED | OUTPATIENT
Start: 2019-05-11 | End: 2019-05-13 | Stop reason: HOSPADM

## 2019-05-11 RX ORDER — PANTOPRAZOLE SODIUM 40 MG/1
40 TABLET, DELAYED RELEASE ORAL
Status: DISCONTINUED | OUTPATIENT
Start: 2019-05-12 | End: 2019-05-13 | Stop reason: HOSPADM

## 2019-05-11 RX ORDER — DIPHENHYDRAMINE HCL 25 MG
25 TABLET ORAL EVERY 4 HOURS PRN
Status: DISCONTINUED | OUTPATIENT
Start: 2019-05-11 | End: 2019-05-13 | Stop reason: HOSPADM

## 2019-05-11 RX ORDER — ALENDRONATE SODIUM 70 MG/1
70 TABLET ORAL WEEKLY
Status: DISCONTINUED | OUTPATIENT
Start: 2019-05-11 | End: 2019-05-11 | Stop reason: SINTOL

## 2019-05-11 RX ORDER — ACETAMINOPHEN 325 MG/1
650 TABLET ORAL EVERY 4 HOURS PRN
Status: DISCONTINUED | OUTPATIENT
Start: 2019-05-11 | End: 2019-05-13 | Stop reason: HOSPADM

## 2019-05-11 RX ORDER — SODIUM CHLORIDE 9 MG/ML
INJECTION, SOLUTION INTRAVENOUS CONTINUOUS
Status: DISCONTINUED | OUTPATIENT
Start: 2019-05-11 | End: 2019-05-12

## 2019-05-11 RX ORDER — BUDESONIDE 0.25 MG/2ML
0.25 INHALANT ORAL 2 TIMES DAILY
Status: DISCONTINUED | OUTPATIENT
Start: 2019-05-11 | End: 2019-05-13 | Stop reason: HOSPADM

## 2019-05-11 RX ORDER — LOSARTAN POTASSIUM 25 MG/1
50 TABLET ORAL DAILY
Status: DISCONTINUED | OUTPATIENT
Start: 2019-05-11 | End: 2019-05-13 | Stop reason: HOSPADM

## 2019-05-11 RX ORDER — ONDANSETRON 2 MG/ML
4 INJECTION INTRAMUSCULAR; INTRAVENOUS ONCE
Status: COMPLETED | OUTPATIENT
Start: 2019-05-11 | End: 2019-05-11

## 2019-05-11 RX ADMIN — BUDESONIDE 250 MCG: 0.25 SUSPENSION RESPIRATORY (INHALATION) at 22:11

## 2019-05-11 RX ADMIN — OXYCODONE HYDROCHLORIDE AND ACETAMINOPHEN 1 TABLET: 5; 325 TABLET ORAL at 17:30

## 2019-05-11 RX ADMIN — HYDROMORPHONE HYDROCHLORIDE 1 MG: 1 INJECTION, SOLUTION INTRAMUSCULAR; INTRAVENOUS; SUBCUTANEOUS at 11:12

## 2019-05-11 RX ADMIN — FORMOTEROL FUMARATE DIHYDRATE 20 MCG: 20 SOLUTION RESPIRATORY (INHALATION) at 22:06

## 2019-05-11 RX ADMIN — GABAPENTIN 300 MG: 300 CAPSULE ORAL at 21:52

## 2019-05-11 RX ADMIN — HYDROMORPHONE HYDROCHLORIDE 1 MG: 1 INJECTION, SOLUTION INTRAMUSCULAR; INTRAVENOUS; SUBCUTANEOUS at 15:06

## 2019-05-11 RX ADMIN — HEPARIN SODIUM 5000 UNITS: 5000 INJECTION INTRAVENOUS; SUBCUTANEOUS at 21:57

## 2019-05-11 RX ADMIN — HYDROMORPHONE HYDROCHLORIDE 0.5 MG: 1 INJECTION, SOLUTION INTRAMUSCULAR; INTRAVENOUS; SUBCUTANEOUS at 13:30

## 2019-05-11 RX ADMIN — METHOCARBAMOL 500 MG: 100 INJECTION, SOLUTION INTRAMUSCULAR; INTRAVENOUS at 15:59

## 2019-05-11 RX ADMIN — CEFTRIAXONE 1 G: 1 INJECTION, POWDER, FOR SOLUTION INTRAMUSCULAR; INTRAVENOUS at 13:50

## 2019-05-11 RX ADMIN — KETOROLAC TROMETHAMINE 15 MG: 30 INJECTION, SOLUTION INTRAMUSCULAR; INTRAVENOUS at 14:22

## 2019-05-11 RX ADMIN — SODIUM CHLORIDE, POTASSIUM CHLORIDE, SODIUM LACTATE AND CALCIUM CHLORIDE 1000 ML: 600; 310; 30; 20 INJECTION, SOLUTION INTRAVENOUS at 13:29

## 2019-05-11 RX ADMIN — METHOCARBAMOL 500 MG: 100 INJECTION, SOLUTION INTRAMUSCULAR; INTRAVENOUS at 22:20

## 2019-05-11 RX ADMIN — DIPHENHYDRAMINE HYDROCHLORIDE 12.5 MG: 50 INJECTION, SOLUTION INTRAMUSCULAR; INTRAVENOUS at 16:04

## 2019-05-11 RX ADMIN — Medication 10 ML: at 23:58

## 2019-05-11 RX ADMIN — OXYCODONE HYDROCHLORIDE AND ACETAMINOPHEN 1 TABLET: 5; 325 TABLET ORAL at 21:52

## 2019-05-11 RX ADMIN — HYDROMORPHONE HYDROCHLORIDE 0.5 MG: 1 INJECTION, SOLUTION INTRAMUSCULAR; INTRAVENOUS; SUBCUTANEOUS at 23:58

## 2019-05-11 RX ADMIN — ONDANSETRON 4 MG: 2 INJECTION INTRAMUSCULAR; INTRAVENOUS at 13:29

## 2019-05-11 RX ADMIN — Medication 10 ML: at 21:54

## 2019-05-11 RX ADMIN — SODIUM CHLORIDE: 9 INJECTION, SOLUTION INTRAVENOUS at 17:30

## 2019-05-11 RX ADMIN — DIPHENHYDRAMINE HCL 25 MG: 25 TABLET ORAL at 21:52

## 2019-05-11 RX ADMIN — ONDANSETRON 4 MG: 2 INJECTION INTRAMUSCULAR; INTRAVENOUS at 11:12

## 2019-05-11 ASSESSMENT — PAIN SCALES - GENERAL
PAINLEVEL_OUTOF10: 6
PAINLEVEL_OUTOF10: 8
PAINLEVEL_OUTOF10: 7
PAINLEVEL_OUTOF10: 9
PAINLEVEL_OUTOF10: 8
PAINLEVEL_OUTOF10: 9
PAINLEVEL_OUTOF10: 7
PAINLEVEL_OUTOF10: 8
PAINLEVEL_OUTOF10: 9
PAINLEVEL_OUTOF10: 9

## 2019-05-11 ASSESSMENT — PAIN DESCRIPTION - LOCATION
LOCATION: PELVIS
LOCATION: HIP
LOCATION: PELVIS

## 2019-05-11 ASSESSMENT — PAIN DESCRIPTION - ORIENTATION
ORIENTATION: LEFT
ORIENTATION: RIGHT;LEFT

## 2019-05-11 ASSESSMENT — PAIN DESCRIPTION - PAIN TYPE
TYPE: ACUTE PAIN
TYPE: ACUTE PAIN

## 2019-05-11 NOTE — ED PROVIDER NOTES
1 [facility]  EMERGENCY DEPARTMENT ENCOUNTER          NURSE PRACTITIONER NOTE       Date of evaluation: 5/11/2019    Chief Complaint     Hip Pain (L hip pain into pelvis, episode started 2 dyas ago) and Nausea (since last night)      History of Present Illness     Jhonny Brandt is a 76 y.o. female who presents to emergency room complaints of bilateral hip pain with nausea and dry heaving. Patient states she has had 4 surgeries to her right knee over the past 18 months. Last surgery was December 2018. Patient states after going through rehabilitation for her right knee she started developing bilateral hip pain. She had a MRI of her lumbar spine which revealed bilateral sacral insufficiency fractures and S2 vertebral body fracture. MRI of the left hip revealed stress fracture of left pubic body. She had gone to rehabilitation and was discharged about 2 weeks ago. Patient states she has had physical therapy come to her home. Over the past 3 days she is having difficulty using her walker and now to the point she cannot bear weight. Patient is supposed to follow up with orthopedics and eventually get a DEXA scan. She denies new injury or fall. Denies low back pain. Denies fever. Denies urinary signs or symptoms. She does have some tenderness over the lower abd. Review of Systems     As noted above, otherwise negative. Past Medical, Surgical, Family, and Social History     She has a past medical history of Arthritis, Compression fracture, Concussion, DVT (deep venous thrombosis) ([address] Environmental allergies, Essential hypertension, benign, GERD (gastroesophageal reflux disease), History of peptic ulcer, Hx of blood clots, Hyperlipidemia, Lacunar infarction, MRSA (methicillin resistant staph aureus) culture positive, Restrictive airway disease, Retention of urine, and Wound, open. She has a past surgical history that includes Knee arthroscopy (Left, 2003);  Cholecystectomy; Hysterectomy; Total knee arthroplasty (Right, 2017); Colonoscopy; Knee Arthroplasty (Right, 2018); Shoulder arthroscopy (Right);  section; pr revise knee joint replace,all parts (Right, 2018); and quadricepsplasty (Right, 2018). Her family history includes Cancer in an other family member; Hypertension in an other family member; Kidney Disease in an other family member. She reports that she quit smoking about 14 years ago. Her smoking use included cigarettes. She has a 35.00 pack-year smoking history. She has never used smokeless tobacco. She reports that she drank alcohol. She reports that she does not use drugs. Medications     Current Discharge Medication List      CONTINUE these medications which have NOT CHANGED    Details   alendronate (FOSAMAX) 70 MG tablet Take 1 every week  Qty: 12 tablet, Refills: 3      oxyCODONE-acetaminophen (PERCOCET) 5-325 MG per tablet Take 1 tablet by mouth 2 times daily as needed for Pain for up to 30 days. Qty: 60 tablet, Refills: 0    Comments: Reduce doses taken as pain becomes manageable  Associated Diagnoses: Closed fracture of sacrum with delayed healing, unspecified portion of sacrum, subsequent encounter      risedronate (ACTONEL) 35 MG tablet Take once per week in the morning with a full glass of water, on an empty stomach, and do not take anything else by mouth or lie down for the next 30 minutes.   Qty: 12 tablet, Refills: 3      telmisartan (MICARDIS) 40 MG tablet Take 1 tablet by mouth daily  Qty: 90 tablet, Refills: 3    Comments: **Patient requests 90 days supply**      gabapentin (NEURONTIN) 300 MG capsule TK 1 C PO TID  Refills: 3      diclofenac (VOLTAREN) 75 MG EC tablet TK 1 T PO BID PC  Refills: 2      tiZANidine (ZANAFLEX) 4 MG tablet TK 1 T PO TID PRF SPASMS  Refills: 0      omeprazole (PRILOSEC) 20 MG delayed release capsule TAKE 1 CAPSULE DAILY  Qty: 90 capsule, Refills: 3      fluticasone-vilanterol (BREO ELLIPTA) 100-25 MCG/INH AEPB inhaler Inhale 1 puff into the lungs daily  Qty: 3 each, Refills: 3      furosemide (LASIX) 20 MG tablet Take 20 mg by mouth daily       chlorthalidone (HYGROTON) 25 MG tablet Take 1 tablet by mouth daily  Qty: 90 tablet, Refills: 3      potassium chloride (KLOR-CON M) 20 MEQ extended release tablet Take 1 tablet by mouth daily  Qty: 90 tablet, Refills: 3      Multiple Vitamins-Minerals (MULTIVITAMIN PO) Take by mouth daily Women's Ultra Oscar for Menopause      albuterol sulfate HFA (PROVENTIL HFA) 108 (90 BASE) MCG/ACT inhaler 2 PUFFS EVERY 4 HOURS AS NEEDED WHEEZING  Qty: 1 Inhaler, Refills: 5             Allergies     She is allergic to codeine; demerol; morphine; tape [adhesive tape]; cabbage; and erythromycin. Physical Exam     INITIAL VITALS: BP: (!) 150/95, Temp: 97.9 °F (36.6 °C),Pulse: 79, Resp: 18, SpO2: 96 %   Physical Exam    Constitutional: Welldeveloped, Well nourished, uncomfortable appearing, Non-toxic appearance. Cardiovascular: Normal heart rate, Normal rhythm, No murmurs, No rubs, No gallops. Thorax & Lungs: No respiratory distress, Normal breath sounds, No wheezing, No chest tenderness. Abdomen: Large, round, soft. Positive bowel sounds times 4. Tenderness over the suprapubic region. No guarding. Skin: Warm, Dry, No erythema, No rash. Extremities: Intact distal pulses. Musculoskeletal: There is no back pain noted on exam.  Patient does have mild tenderness to palpate left lateral hip. No shortening or external rotation noted of the lower extremities. Patient very apprehensive on hip exam.  Unable to get a good exam secondary to pain. Right knee does appear swollen. Patient states is his normal swelling. There is no calf tenderness or swelling. Peripheral pulses intact. Neurologic: Alert & oriented x 3, Normal motor function, Normal sensory function, No focal deficits noted.      Diagnostic Results     EKG   None    RADIOLOGY:  CT PELVIS WO CONTRAST Additional Glucose 101 (H) 70 - 99 mg/dL    BUN 23 (H) 7 - 20 mg/dL    CREATININE 0.5 (L) 0.6 - 1.2 mg/dL    GFR Non-African American >60 >60    GFR African American >60 >60    Calcium 9.8 8.3 - 10.6 mg/dL   Troponin   Result Value Ref Range    Troponin <0.01 <0.01 ng/mL   Microscopic Urinalysis   Result Value Ref Range    WBC, UA 6-10 (A) 0 - 5 /HPF    RBC, UA 0-2 0 - 2 /HPF    Epi Cells 10-20 /HPF    Bacteria, UA 3+ (A) /HPF   EKG 12 Lead   Result Value Ref Range    Ventricular Rate 79 BPM    Atrial Rate 79 BPM    P-R Interval 154 ms    QRS Duration 88 ms    Q-T Interval 390 ms    QTc Calculation (Bazett) 447 ms    P Axis 30 degrees    R Axis -45 degrees    T Axis 32 degrees    Diagnosis       EKG performed in ER and to be interpreted by ER physician. Confirmed by [facility], [address] [place] [address],Ground Floor ([id number]),  Kirill Bass ([id number]) on 5/11/2019 12:41:09 PM       ED BEDSIDE ULTRASOUND:  None    RECENT VITALS:  BP: 107/78, Temp: 97.9 °F (36.6 °C), Pulse: 77, Resp: 18, SpO2: 93 %     Procedures     None    ED Course     Nursing Notes, Past Medical Hx, Past Surgical Hx, Social Hx, Allergies, and Family Hx were reviewed.     The patient was given the following medications:  Orders Placed This Encounter   Medications    HYDROmorphone (DILAUDID) injection 1 mg    ondansetron (ZOFRAN) injection 4 mg    DISCONTD: cephALEXin (KEFLEX) 500 MG capsule     Sig: Take 1 capsule by mouth 4 times daily for 10 days     Dispense:  40 capsule     Refill:  0    ondansetron (ZOFRAN) injection 4 mg    HYDROmorphone (DILAUDID) injection 0.5 mg    lactated ringers infusion 1,000 mL    cefTRIAXone (ROCEPHIN) 1 g IVPB in 50 mL D5W minibag    ketorolac (TORADOL) injection 15 mg    methocarbamol (ROBAXIN) 500 mg in dextrose 5 % 100 mL IVPB    HYDROmorphone (DILAUDID) injection 1 mg    methocarbamol (ROBAXIN) 500 mg in dextrose 5 % 100 mL IVPB    diphenhydrAMINE (BENADRYL) injection 12.5 mg            CONSULTS:  IP CONSULT TO PRIMARY CARE PROVIDER  IP CONSULT TO [address] [facility] / ASSESSMENT / Carlita Harriet is a 76 y.o. female presents emergency room with complaints of inability to ambulate. Patient had recent MRI of her lumbar spine and left hip which revealed stress fracture of the left pubic body and S2 vertebral body fractures. She denies new injury. She had physical therapy coming to her home. Patient states she was transitioning over to obtain over the past couple days. Today she is unable to ambulate secondary to the pain in her hips. Also complaining of nausea secondary to the pain. Patient does have an appointment with orthopedics this week coming up. She is also supposed to get scheduled for a DEXA scan. On exam alert and oriented ×3. Appears uncomfortable secondary to pain. Patient has moderate tenderness of palpate left hip laterally. Her pelvis is stable. She was given Dilaudid 1 mg IV and Zofran 4 mg IV. With some initial relief. Patient was given repeat Dilaudid and Zofran. CBC and troponin unremarkable. CMP reveals BUN 29, rested BMP unremarkable. Urinalysis reveals 6-10 RBCs microscopically with +3 bacteria. Urine culture is pending. CT pelvis without contrast reveals worsening fracture/displacement of the left word pubic symphysis compared to prior MR on April 2019. .  Lumbar spine x-ray reveals degenerative disc disease. No acute fracture. Patient will be treated for pyelonephritis. Rocephin 1 g was ordered. Plasma patient the hospital for dehydration, pyelonephritis, pelvis fracture, and inability to ambulate. Call has been placed the medicine team. I spoke to the PMD whom asked me to call AOD. I spoke to AOD and they will admit. Patient family aware of admission to the hospital have no questions. Patient has remained hemodynamically stable. This patient was also evaluated by the attending physician.  Complete history and physical was performed by me and Salinas Palomino MD  .Nursing notes, past medical history, surgical history, family history and social history were reviewed and addressed in the HPI. All care plans were discussedand agreed upon. Clinical Impression     1. Dehydration    2. Pyelonephritis    3. Inability to ambulate due to hip    4.  Closed fracture dislocation of left side of symphysis pubis, initial encounter [facility])            Disposition     PATIENT REFERRED TO:  Jonathan Mathias MD  [address]  [place] [id number] [address]            DISCHARGE MEDICATIONS:  Current Discharge Medication List          [address], APRN - CNP  05/11/19 [id number]

## 2019-05-11 NOTE — ED TRIAGE NOTES
Pt. Comes to Ed with c/o increasing pain in L hip pain into pelvis. She has history of osteoporosis and had a flare up starting 2 days ago. She states that the pain has frank so bad that she started dry heaving last night and into today. She states she took an oxy 5/325 this am at 0730.

## 2019-05-11 NOTE — PROGRESS NOTES
Pt arrived to the unit in great discomfort. She is complaining of an insane itching of her feet. Perfectserve sent to MD. Anita Stanley to complete skin assessment at this time due to pain and discomfort level of the patient. She is AO x4.

## 2019-05-11 NOTE — PROGRESS NOTES
Orders for \"Fosamax\" and \"Actonel\" were rejected by pharmacy. Per [facility], oral bisphosphonates are not to be used in the hospital due to risk of adverse events related to adherence to administration guidelines. Patient may resume dosing after discharge. Please call pharmacy with any questions. Thanks!

## 2019-05-11 NOTE — ED PROVIDER NOTES
ED Attending Attestation Note     Date of evaluation: 5/11/2019    Chief Complaint     Hip Pain (L hip pain into pelvis, episode started 2 dyas ago) and Nausea (since last night)        Medical Decision Making      This patient was seen by the advanced practice provider. I have seen and examined the patient, agree with the workup, evaluation, management and diagnosis. The care plan has been discussed. My assessment reveals  40-year-old female history of pelvic and hip fractures with knee surgeries previously presents for evaluation of bilateral hip pain which started a few days ago. Patient also reports nausea which started last night with no vomiting or diarrhea. Patient is afebrile hemodynamically stable though does appear somewhat pain on examination. Limited range of motion secondary to pain. No erythema warmth and drainage. Patient with abdominal tenderness to suprapubic region concerning for cystitis. We'll obtain laboratory studies looking for metabolic derangement or evidence of urinary tract infectious disease. Patient will also need have pain control. We'll obtain imaging to rule out new fracture. Low suspicion for septic joint, atypical ACS. Reevaluation 1343  Workup suggestive of possible pyelonephritis with urinary tract infectious disease especially given patient's persistent nausea despite antiemetic therapy and patient also has evidence of ketosis and urine concerning for possible dehydration  We'll continue fluids antiemetic therapy will begin antibiotics and plan admission for further management      Diagnostic Results     EKG   Performed 1216 interpreted by ER physician  Normal sinus rhythm rate 79  QRS 88 QTc 447 left ventricular hypertrophy  No acute ST change      RADIOLOGY:  CT PELVIS WO CONTRAST Additional Contrast? None   Final Result      Sclerosis of bilateral sacral ala as well as the rightward aspect of the pubic symphysis consistent with insufficiency fractures. Worsening fracture/displacement of the leftward pubic symphysis compared with prior MR from April 2019. Diverticulosis. XR LUMBAR SPINE (2-3 VIEWS)   Final Result      Degenerative disc disease and facet arthropathy. LABS:   Labs Reviewed   URINE RT REFLEX TO CULTURE - Abnormal; Notable for the following components:       Result Value    Ketones, Urine 40 (*)     Protein, UA TRACE (*)     All other components within normal limits    Narrative:     Performed at: [facility] - [place] [address]   Phone [phone]   CBC WITH AUTO DIFFERENTIAL - Abnormal; Notable for the following components:    Platelets 026 (*)     All other components within normal limits    Narrative:     Performed at: [facility] - [place] [address]   Phone [phone]   BASIC METABOLIC PANEL W/ REFLEX TO MG FOR LOW K - Abnormal; Notable for the following components:    Glucose 101 (*)     BUN 23 (*)     CREATININE 0.5 (*)     All other components within normal limits    Narrative:     Performed at: [facility] - [place] [address] [place] [address] [address]   Phone [phone]   MICROSCOPIC URINALYSIS - Abnormal; Notable for the following components:    WBC, UA 6-10 (*)     Bacteria, UA 3+ (*)     All other components within normal limits    Narrative:     Performed at: [facility] - [place] [address] [place] [address] [address]   Phone [phone]   URINE CULTURE   TROPONIN    Narrative:     Performed at: [facility] - [place] [address] [address]   Phone [phone]       RECENT VITALS:    BP: 132/72, Temp: 97.9 °F (36.6 °C), Pulse: 68, Resp: 18       Disposition     CLINICAL IMPRESSION:  1. Dehydration    2. Pyelonephritis    3.  Inability to ambulate due to hip    4.  Closed fracture dislocation of left side of symphysis pubis, initial encounter ([facility] [address]          DISPOSITION:     Admit  DISPOSITION  05/11/2019 02:02:56 PM        (Please note that portions of this note were completed with a voice recognition program.  Efforts were made to edit the dictations but occasionally words are mis-transcribed.)           Enedina Felty, MD  05/11/19 5230

## 2019-05-11 NOTE — PROGRESS NOTES
RESPIRATORY THERAPY ASSESSMENT    Name:  Yoel [address] Record Number:  [id number]  Age: 76 y.o. Gender: female  : 1950  Today's Date:  2019  Room:  5505506-01    Assessment     Is the patient being admitted for a COPD or Asthma exacerbation? No   (If yes the patient will be seen every 4 hours for the first 24 hours and then reassessed)    Patient Admission Diagnosis      Allergies  Allergies   Allergen Reactions    Codeine      Severe headaches    Demerol      Severe headache and over-sedation    Morphine Other (See Comments)     Makes her crazy    Tape Tereasa Pluck Tape] Other (See Comments)     Tears skin    Cabbage Nausea And Vomiting and Swelling    Erythromycin Nausea And Vomiting and Rash           Pulmonary History:COPD  Home Oxygen Therapy:  room air  Home Respiratory Therapy:Vilanterol/Fluticasone Furoate   Current Respiratory Therapy:  Albuterol Q4 PRN, mometasone-formoterol BID              Respiratory Severity Index(RSI)   Patients with orders for inhalation medications, oxygen, or any therapeutic treatment modality will be placed on Respiratory Protocol. They will be assessed with the first treatment and at least every 72 hours thereafter. The following severity scale will be used to determine frequency of treatment intervention.     Smoking History: Pulmonary Disease or Smoking History, Greater than 15 pack year = 2    Social History  Social History     Tobacco Use    Smoking status: Former Smoker     Packs/day: 1.00     Years: 35.00     Pack years: 35.00     Types: Cigarettes     Last attempt to quit: 3/28/2005     Years since quittin.1    Smokeless tobacco: Never Used   Substance Use Topics    Alcohol use: Not Currently    Drug use: No       Recent Surgical History: None = 0  Past Surgical History  Past Surgical History:   Procedure Laterality Date     SECTION      times 2    CHOLECYSTECTOMY      COLONOSCOPY      HYSTERECTOMY      KNEE ARTHROPLASTY Right 04/16/2018     RIGHT REVISION TOTAL KNEE ARTHROPLASTY    KNEE ARTHROSCOPY Left 2003    Dr. Adriana Connor Right 9/28/2018    RIGHT REVISION ARTHROSCOPY FEMORAL COMPONENT, SYNOVECTOMY performed by Venita Zhong MD at [address] Right 12/7/2018    RIGHT KNEE QUADRICEP TENDON REPAIR WITH ALLOGRAFT AUGMENTATION performed by Venita Zhong MD at [facility] [facility] ARTHROSCOPY Right     TOTAL KNEE ARTHROPLASTY Right 01/2017       Level of Consciousness: Alert, Oriented, and Cooperative = 0    Level of Activity: Mostly sedentary, minimal walking = 2    Respiratory Pattern: Regular Pattern; RR 8-20 = 0    Breath Sounds: Clear = 0    Sputum   ,  ,    Cough: Strong, spontaneous, non-productive = 0    Vital Signs   /73   Pulse 65   Temp 98.3 °F (36.8 °C) (Oral)   Resp 18   Ht 5' 9\" (1.753 m)   Wt 185 lb (83.9 kg)   SpO2 95%   BMI 27.32 kg/m²   SPO2 (COPD values may differ): Greater than or equal to 92% on room air = 0    Peak Flow (asthma only): not applicable = 0    RSI: 0-4 = See once and convert to home regimen or discontinue        Plan       Goals: medication delivery    Patient/caregiver was educated on the proper method of use for Respiratory Care Devices:  Yes      Level of patient/caregiver understanding able to:   ? Verbalize understanding   ? Demonstrate understanding       ? Teach back        ? Needs reinforcement       ? No available caregiver               ? Other:     Response to education:  Good     Is patient being placed on Home Treatment Regimen? Yes     Does the patient have everything they need prior to discharge? NA     Comments:     Plan of Care: HHN w Albuterol Q4 W/A, mometasone-formoterol BID    Electronically signed by Mukesh Denise RCP on 5/11/2019 at 6:12 PM    Respiratory Protocol Guidelines     1.  Assessment and treatment by Respiratory Therapy will be initiated for medication and therapeutic interventions upon initiation of aerosolized medication. 2. Physician will be contacted for respiratory rate (RR) greater than 35 breaths per minute. Therapy will be held for heart rate (HR) greater than 140 beats per minute, pending direction from physician. 3. Bronchodilators will be administered via Metered Dose Inhaler (MDI) with spacer when the following criteria are met:  a. Alert and cooperative     b. HR < 140 bpm  c. RR < 30 bpm                d. Can demonstrate a 2-3 second inspiratory hold  4. Bronchodilators will be administered via Hand Held Nebulizer [facility]) to patients when ANY of the following criteria are met  a. Incognizant or uncooperative          b. Patients treated with HHN at Home        c. Unable to demonstrate proper use of MDI with spacer     d. RR > 30 bpm   5. Bronchodilators will be delivered via Metered Dose Inhaler (MDI), HHN, Aerogen to intubated patients on mechanical ventilation. 6. Inhalation medication orders will be delivered and/or substituted as outlined below. Aerosolized Medications Ordering and Administration Guidelines:    1. All Medications will be ordered by a physician, and their frequency and/or modality will be adjusted as defined by the patients Respiratory Severity Index (RSI) score. 2. If the patient does not have documented COPD, consider discontinuing anticholinergics when RSI is less than 9.  3. If the bronchospasm worsens (increased RSI), then the bronchodilator frequency can be increased to a maximum of every 4 hours. If greater than every 4 hours is required, the physician will be contacted. 4. If the bronchospasm improves, the frequency of the bronchodilator can be decreased, based on the patient's RSI, but not less than home treatment regimen frequency. 5. Bronchodilator(s) will be discontinued if patient has a RSI less than 9 and has received no scheduled or as needed treatment for 72  Hrs. Patients Ordered on a Mucolytic Agent:    1.  Must always

## 2019-05-11 NOTE — H&P
History and Physical  PGY1    Patient's PCP: Aleida Cole MD    CC: pelvic pain    HISTORY OF PRESENT ILLNESS:    Patient is a 76 y.o. female with a history of osteoporosis (has not had DEXA scan as of yet), COPD, HTN,  presenting with pelvic pain that started two days ago. MRI late April with sacral insufficiency fracture, MRI left pubic body frax. Pt has been having difficulty with ambulation but has been working with her home PT/OT to help. 2 days ago, woke up from sleep, had excruciating pain to the point that she wasn't tolerating PO and was having significant nausea. No fevers, +chills, +urinary frequency, no hematuria, no hematochezia, no dysuria, no chest pain, no palpitations. In ED:   CT showed worsening frax displacement  Urine showed ketones, neg leuk, neg nitrites, 6-10 WBC, 10-20 epi. Received 1g rocephin, dilaudid in ED as well as 1x dose of IV robaxin.          Past Medical / Surgical History:    Past Medical History:   Diagnosis Date    Arthritis     Compression fracture     back due to fall    Concussion     due to fall    DVT (deep venous thrombosis) ([facility] [address] 2017    RLE after TKR    Environmental allergies     Essential hypertension, benign 2010    GERD (gastroesophageal reflux disease)     History of peptic ulcer     Hx of blood clots     Hyperlipidemia 2010    Lacunar infarction     old - per MRI     MRSA (methicillin resistant staph aureus) culture positive 2018    knee    Restrictive airway disease     allergy induced    Retention of urine     Wound, open 2018    left shin area, healing well, tx in wound care center     Past Surgical History:   Procedure Laterality Date     SECTION      times 2    CHOLECYSTECTOMY      COLONOSCOPY      HYSTERECTOMY      KNEE ARTHROPLASTY Right 2018     RIGHT REVISION TOTAL KNEE ARTHROPLASTY    KNEE ARTHROSCOPY Left     Dr. Okeefe Pain Right 2018 Demerol; Morphine; Tape [adhesive tape]; Cabbage; and Erythromycin    Social History:   TOBACCO:   reports that she quit smoking about 14 years ago. Her smoking use included cigarettes. She has a 35.00 pack-year smoking history. She has never used smokeless tobacco.     ETOH:   reports that she drank alcohol. Patient currently lives home with home health     Family History:       Problem Relation Age of Onset    Cancer Other     Hypertension Other     Kidney Disease Other        ROS: Review of Systems - as per HPI     All other systems reviewed and are negative. PHYSICAL EXAM:  /72   Pulse 68   Temp 97.9 °F (36.6 °C) (Oral)   Resp 18   Ht 5' 9\" (1.753 m)   Wt 185 lb (83.9 kg)   SpO2 94%   BMI 27.32 kg/m²   No results for input(s): POCGLU in the last 72 hours. Physical Exam   Constitutional: She appears well-developed and well-nourished. She appears distressed. HENT:   Head: Normocephalic and atraumatic. Eyes: Pupils are equal, round, and reactive to light. EOM are normal.   Neck: Neck supple. Cardiovascular: Normal rate and regular rhythm. No murmur heard. Pulmonary/Chest: Effort normal and breath sounds normal. She has no wheezes. She has no rales. Abdominal: Soft. Bowel sounds are normal. She exhibits no distension. There is no tenderness. There is no guarding. Musculoskeletal: She exhibits edema. Right hip: She exhibits tenderness. Left hip: She exhibits tenderness. Trace ankle edema    Skin: Skin is warm and dry. Decreased skin turgor   Psychiatric: She has a normal mood and affect. LABS:  Recent Labs     05/11/19  1209   WBC 7.1   HGB 14.3   HCT 43.3   *                                                                  Recent Labs     05/11/19  1209      K 4.3      CO2 24   BUN 23*   CREATININE 0.5*   GLUCOSE 101*     No results for input(s): AST, ALT, ALB, BILITOT, ALKPHOS in the last 72 hours.   Recent Labs     05/11/19  1209 TROPONINI <0.01     No results for input(s): BNP in the last 72 hours. No results found for: PHART, ZIM3LDF, PO2ART  No results for input(s): INR in the last 72 hours. Recent Labs     05/11/19  1236   COLORU Yellow   PHUR 5.5   WBCUA 6-10*   RBCUA 0-2   BACTERIA 3+*   CLARITYU Clear   SPECGRAV >=1.030   LEUKOCYTESUR Negative   UROBILINOGEN 0.2   BILIRUBINUR Negative   BLOODU Negative   GLUCOSEU Negative        IMAGING:  CT PELVIS WO CONTRAST Additional Contrast? None   Final Result      Sclerosis of bilateral sacral ala as well as the rightward aspect of the pubic symphysis consistent with insufficiency fractures. Worsening fracture/displacement of the leftward pubic symphysis compared with prior MR from April 2019. Diverticulosis. XR LUMBAR SPINE (2-3 VIEWS)   Final Result      Degenerative disc disease and facet arthropathy. Assessment & Plan:    iWn Lofton is a 76 y.o. female with PMH of HTN who presented with pelvic pain for last 2 days. Also having increased urinary frequency concerning for UTI. Displaced Pelvic Fracture  Prior admission, pt did not need surgery per ortho rec's, however with displacement and pain, may need surgical intervention  -ortho consult  -dilaudid and percocet for breakthrough pain  -robaxin IV for 3days  -gabapentin 300 TID  -IVF 75 ml/hr    ? Possible UTI  -repeat UA after hydration   -UCX from ED pending   -rocephin 1g q24h    Chronic Medical Problems:  Osteoporosis  -hold home fosamax  -vit D supplement   -calcium supplement  -hold home actonel     HTN  -chlorthalidone  -cozaar (substitute for telmisartan)       COPD  -albuterol   -dulera       FEN: Diet NPO, After Midnight  DIET LOW SODIUM 2 GM;  PPx: heparin sub qh  CODE: Full Code  Dispo: GMF, home with home health   PT/OT: consulted     The patient and / or the family were informed of the results of any tests, a time was given to answer questions, a plan was proposed and they agreed with plan.  Patient discussed with attending Ania Little MD and plan was agreed upon. Murray Eldridge, PGY-1  [employer]   5/11/2019  2:40 PM    Addendum to Resident H& P/Progress note:  I have personally seen,examined and evaluated the patient.  I have reviewed the current history, physical findings, labs and assessment and plan and agree with note as documented by resident MD ( Radha Amaya)      Ania Little MD, [address]

## 2019-05-12 LAB
ANION GAP SERPL CALCULATED.3IONS-SCNC: 10 MMOL/L (ref 3–16)
APTT: 29.6 SEC (ref 26–36)
BASOPHILS ABSOLUTE: 0 K/UL (ref 0–0.2)
BASOPHILS RELATIVE PERCENT: 0.5 %
BILIRUBIN URINE: NEGATIVE
BLOOD, URINE: NEGATIVE
BUN BLDV-MCNC: 22 MG/DL (ref 7–20)
CALCIUM SERPL-MCNC: 8.5 MG/DL (ref 8.3–10.6)
CHLORIDE BLD-SCNC: 105 MMOL/L (ref 99–110)
CLARITY: CLEAR
CO2: 26 MMOL/L (ref 21–32)
COLOR: YELLOW
CREAT SERPL-MCNC: 0.8 MG/DL (ref 0.6–1.2)
EOSINOPHILS ABSOLUTE: 0.1 K/UL (ref 0–0.6)
EOSINOPHILS RELATIVE PERCENT: 1.4 %
EPITHELIAL CELLS, UA: ABNORMAL /HPF
GFR AFRICAN AMERICAN: >60
GFR NON-AFRICAN AMERICAN: >60
GLUCOSE BLD-MCNC: 107 MG/DL (ref 70–99)
GLUCOSE URINE: NEGATIVE MG/DL
HCT VFR BLD CALC: 35.6 % (ref 36–48)
HEMOGLOBIN: 11.9 G/DL (ref 12–16)
INR BLD: 1.03 (ref 0.86–1.14)
KETONES, URINE: NEGATIVE MG/DL
LEUKOCYTE ESTERASE, URINE: NEGATIVE
LYMPHOCYTES ABSOLUTE: 2 K/UL (ref 1–5.1)
LYMPHOCYTES RELATIVE PERCENT: 39.1 %
MCH RBC QN AUTO: 30.9 PG (ref 26–34)
MCHC RBC AUTO-ENTMCNC: 33.3 G/DL (ref 31–36)
MCV RBC AUTO: 92.7 FL (ref 80–100)
MICROSCOPIC EXAMINATION: ABNORMAL
MONOCYTES ABSOLUTE: 0.4 K/UL (ref 0–1.3)
MONOCYTES RELATIVE PERCENT: 8.7 %
NEUTROPHILS ABSOLUTE: 2.6 K/UL (ref 1.7–7.7)
NEUTROPHILS RELATIVE PERCENT: 50.3 %
NITRITE, URINE: NEGATIVE
PDW BLD-RTO: 14 % (ref 12.4–15.4)
PH UA: 5.5 (ref 5–8)
PLATELET # BLD: 375 K/UL (ref 135–450)
PMV BLD AUTO: 6.6 FL (ref 5–10.5)
POTASSIUM REFLEX MAGNESIUM: 3.7 MMOL/L (ref 3.5–5.1)
PROTEIN UA: NEGATIVE MG/DL
PROTHROMBIN TIME: 11.7 SEC (ref 9.8–13)
RBC # BLD: 3.84 M/UL (ref 4–5.2)
RBC UA: ABNORMAL /HPF (ref 0–2)
RENAL EPITHELIAL, UA: ABNORMAL /HPF
SODIUM BLD-SCNC: 141 MMOL/L (ref 136–145)
SPECIFIC GRAVITY UA: >=1.03 (ref 1–1.03)
UROBILINOGEN, URINE: 0.2 E.U./DL
WBC # BLD: 5.1 K/UL (ref 4–11)
WBC UA: ABNORMAL /HPF (ref 0–5)

## 2019-05-12 PROCEDURE — 6360000002 HC RX W HCPCS: Performed by: STUDENT IN AN ORGANIZED HEALTH CARE EDUCATION/TRAINING PROGRAM

## 2019-05-12 PROCEDURE — 94761 N-INVAS EAR/PLS OXIMETRY MLT: CPT

## 2019-05-12 PROCEDURE — 94640 AIRWAY INHALATION TREATMENT: CPT

## 2019-05-12 PROCEDURE — 85025 COMPLETE CBC W/AUTO DIFF WBC: CPT

## 2019-05-12 PROCEDURE — 99222 1ST HOSP IP/OBS MODERATE 55: CPT | Performed by: HOSPITALIST

## 2019-05-12 PROCEDURE — 36415 COLL VENOUS BLD VENIPUNCTURE: CPT

## 2019-05-12 PROCEDURE — 85610 PROTHROMBIN TIME: CPT

## 2019-05-12 PROCEDURE — 87086 URINE CULTURE/COLONY COUNT: CPT

## 2019-05-12 PROCEDURE — 81001 URINALYSIS AUTO W/SCOPE: CPT

## 2019-05-12 PROCEDURE — 6370000000 HC RX 637 (ALT 250 FOR IP): Performed by: STUDENT IN AN ORGANIZED HEALTH CARE EDUCATION/TRAINING PROGRAM

## 2019-05-12 PROCEDURE — 93005 ELECTROCARDIOGRAM TRACING: CPT | Performed by: STUDENT IN AN ORGANIZED HEALTH CARE EDUCATION/TRAINING PROGRAM

## 2019-05-12 PROCEDURE — 1200000000 HC SEMI PRIVATE

## 2019-05-12 PROCEDURE — 94664 DEMO&/EVAL PT USE INHALER: CPT

## 2019-05-12 PROCEDURE — 85730 THROMBOPLASTIN TIME PARTIAL: CPT

## 2019-05-12 PROCEDURE — 80048 BASIC METABOLIC PNL TOTAL CA: CPT

## 2019-05-12 PROCEDURE — 2580000003 HC RX 258: Performed by: STUDENT IN AN ORGANIZED HEALTH CARE EDUCATION/TRAINING PROGRAM

## 2019-05-12 RX ORDER — OXYCODONE HYDROCHLORIDE AND ACETAMINOPHEN 5; 325 MG/1; MG/1
1 TABLET ORAL EVERY 4 HOURS PRN
Status: DISCONTINUED | OUTPATIENT
Start: 2019-05-12 | End: 2019-05-13 | Stop reason: HOSPADM

## 2019-05-12 RX ORDER — PROMETHAZINE HYDROCHLORIDE 25 MG/ML
6.25 INJECTION, SOLUTION INTRAMUSCULAR; INTRAVENOUS ONCE
Status: DISCONTINUED | OUTPATIENT
Start: 2019-05-12 | End: 2019-05-13 | Stop reason: HOSPADM

## 2019-05-12 RX ORDER — OXYCODONE HYDROCHLORIDE AND ACETAMINOPHEN 5; 325 MG/1; MG/1
2 TABLET ORAL EVERY 4 HOURS PRN
Status: DISCONTINUED | OUTPATIENT
Start: 2019-05-12 | End: 2019-05-13 | Stop reason: HOSPADM

## 2019-05-12 RX ADMIN — OXYCODONE HYDROCHLORIDE AND ACETAMINOPHEN 2 TABLET: 5; 325 TABLET ORAL at 06:33

## 2019-05-12 RX ADMIN — CALCIUM CARBONATE-VITAMIN D TAB 500 MG-200 UNIT 1 TABLET: 500-200 TAB at 08:19

## 2019-05-12 RX ADMIN — OXYCODONE HYDROCHLORIDE AND ACETAMINOPHEN 2 TABLET: 5; 325 TABLET ORAL at 21:04

## 2019-05-12 RX ADMIN — OXYCODONE HYDROCHLORIDE AND ACETAMINOPHEN 2 TABLET: 5; 325 TABLET ORAL at 10:50

## 2019-05-12 RX ADMIN — HYDROMORPHONE HYDROCHLORIDE 0.5 MG: 1 INJECTION, SOLUTION INTRAMUSCULAR; INTRAVENOUS; SUBCUTANEOUS at 12:19

## 2019-05-12 RX ADMIN — HYDROMORPHONE HYDROCHLORIDE 0.5 MG: 1 INJECTION, SOLUTION INTRAMUSCULAR; INTRAVENOUS; SUBCUTANEOUS at 14:23

## 2019-05-12 RX ADMIN — Medication 10 ML: at 08:18

## 2019-05-12 RX ADMIN — OXYCODONE HYDROCHLORIDE AND ACETAMINOPHEN 2 TABLET: 5; 325 TABLET ORAL at 15:51

## 2019-05-12 RX ADMIN — GABAPENTIN 300 MG: 300 CAPSULE ORAL at 15:51

## 2019-05-12 RX ADMIN — GABAPENTIN 300 MG: 300 CAPSULE ORAL at 08:19

## 2019-05-12 RX ADMIN — HEPARIN SODIUM 5000 UNITS: 5000 INJECTION INTRAVENOUS; SUBCUTANEOUS at 15:51

## 2019-05-12 RX ADMIN — METHOCARBAMOL 500 MG: 100 INJECTION, SOLUTION INTRAMUSCULAR; INTRAVENOUS at 18:29

## 2019-05-12 RX ADMIN — HYDROMORPHONE HYDROCHLORIDE 0.5 MG: 1 INJECTION, SOLUTION INTRAMUSCULAR; INTRAVENOUS; SUBCUTANEOUS at 18:29

## 2019-05-12 RX ADMIN — METHOCARBAMOL 500 MG: 100 INJECTION, SOLUTION INTRAMUSCULAR; INTRAVENOUS at 22:30

## 2019-05-12 RX ADMIN — FORMOTEROL FUMARATE DIHYDRATE 20 MCG: 20 SOLUTION RESPIRATORY (INHALATION) at 20:10

## 2019-05-12 RX ADMIN — GABAPENTIN 300 MG: 300 CAPSULE ORAL at 21:04

## 2019-05-12 RX ADMIN — METHOCARBAMOL 500 MG: 100 INJECTION, SOLUTION INTRAMUSCULAR; INTRAVENOUS at 05:01

## 2019-05-12 RX ADMIN — LOSARTAN POTASSIUM 50 MG: 25 TABLET ORAL at 08:20

## 2019-05-12 RX ADMIN — HEPARIN SODIUM 5000 UNITS: 5000 INJECTION INTRAVENOUS; SUBCUTANEOUS at 06:35

## 2019-05-12 RX ADMIN — CHLORTHALIDONE 25 MG: 25 TABLET ORAL at 08:19

## 2019-05-12 RX ADMIN — FORMOTEROL FUMARATE DIHYDRATE 20 MCG: 20 SOLUTION RESPIRATORY (INHALATION) at 08:30

## 2019-05-12 RX ADMIN — OXYCODONE HYDROCHLORIDE AND ACETAMINOPHEN 2 TABLET: 5; 325 TABLET ORAL at 02:21

## 2019-05-12 RX ADMIN — METHOCARBAMOL 500 MG: 100 INJECTION, SOLUTION INTRAMUSCULAR; INTRAVENOUS at 12:19

## 2019-05-12 RX ADMIN — BUDESONIDE 250 MCG: 0.25 SUSPENSION RESPIRATORY (INHALATION) at 20:10

## 2019-05-12 RX ADMIN — Medication 10 ML: at 22:30

## 2019-05-12 RX ADMIN — PANTOPRAZOLE SODIUM 40 MG: 40 TABLET, DELAYED RELEASE ORAL at 06:33

## 2019-05-12 RX ADMIN — SODIUM CHLORIDE: 9 INJECTION, SOLUTION INTRAVENOUS at 06:33

## 2019-05-12 RX ADMIN — BUDESONIDE 250 MCG: 0.25 SUSPENSION RESPIRATORY (INHALATION) at 08:31

## 2019-05-12 RX ADMIN — HYDROMORPHONE HYDROCHLORIDE 0.5 MG: 1 INJECTION, SOLUTION INTRAMUSCULAR; INTRAVENOUS; SUBCUTANEOUS at 08:17

## 2019-05-12 RX ADMIN — ONDANSETRON 4 MG: 2 INJECTION INTRAMUSCULAR; INTRAVENOUS at 14:49

## 2019-05-12 RX ADMIN — HEPARIN SODIUM 5000 UNITS: 5000 INJECTION INTRAVENOUS; SUBCUTANEOUS at 22:41

## 2019-05-12 RX ADMIN — HYDROMORPHONE HYDROCHLORIDE 0.5 MG: 1 INJECTION, SOLUTION INTRAMUSCULAR; INTRAVENOUS; SUBCUTANEOUS at 03:57

## 2019-05-12 RX ADMIN — HYDROMORPHONE HYDROCHLORIDE 0.5 MG: 1 INJECTION, SOLUTION INTRAMUSCULAR; INTRAVENOUS; SUBCUTANEOUS at 22:41

## 2019-05-12 ASSESSMENT — PAIN DESCRIPTION - ORIENTATION
ORIENTATION: RIGHT;LEFT

## 2019-05-12 ASSESSMENT — PAIN SCALES - GENERAL
PAINLEVEL_OUTOF10: 7
PAINLEVEL_OUTOF10: 5
PAINLEVEL_OUTOF10: 7
PAINLEVEL_OUTOF10: 4
PAINLEVEL_OUTOF10: 7
PAINLEVEL_OUTOF10: 4
PAINLEVEL_OUTOF10: 7

## 2019-05-12 ASSESSMENT — PAIN DESCRIPTION - PAIN TYPE
TYPE: ACUTE PAIN

## 2019-05-12 ASSESSMENT — PAIN DESCRIPTION - LOCATION
LOCATION: BACK
LOCATION: PELVIS
LOCATION: BACK
LOCATION: BACK;HIP
LOCATION: PELVIS

## 2019-05-12 ASSESSMENT — PAIN DESCRIPTION - DESCRIPTORS
DESCRIPTORS: RADIATING
DESCRIPTORS: RADIATING

## 2019-05-12 NOTE — PROGRESS NOTES
Pt is complaining that she has severe shooting pain down both legs with certain movements. She has stated that this radiating pain is when her pain is the worse and that she feels this also could be contributing the nausea.

## 2019-05-12 NOTE — PROGRESS NOTES
Physical Therapy/Occupational Therapy      Orders received and chart reviewed. Ortho consult pending. Will await ortho clearance to initiate PT/OT services. Pt and nurse notified.     Brooke Shook, [address] [facility],, OTR/L 9060

## 2019-05-12 NOTE — PLAN OF CARE
Problem: Falls - Risk of:  Goal: Will remain free from falls  Description  Will remain free from falls  Outcome: Ongoing    Pt is a high fall risk. High fall precautions are in place: nonskid socks, yellow blanket, fall wristband, call light in reach, bed is locked and in lowest position, bed alarm engaged. Problem: Pain:  Goal: Pain level will decrease  Description  Pain level will decrease  Outcome: Ongoing     Pt reports pain more controlled with increase in pain scale for percocet. Will continue to assess pain on 0-10 scale for appropriate pain management.

## 2019-05-12 NOTE — CONSULTS
Smiley Lewis MD at [address] [place] [address] ARTHROSCOPY Right      TOTAL KNEE ARTHROPLASTY Right 01/2017            Medications Prior to Admission:    No current facility-administered medications on file prior to encounter.              Current Outpatient Medications on File Prior to Encounter   Medication Sig Dispense Refill    alendronate (FOSAMAX) 70 MG tablet Take 1 every week 12 tablet 3    oxyCODONE-acetaminophen (PERCOCET) 5-325 MG per tablet Take 1 tablet by mouth 2 times daily as needed for Pain for up to 30 days. 60 tablet 0    risedronate (ACTONEL) 35 MG tablet Take once per week in the morning with a full glass of water, on an empty stomach, and do not take anything else by mouth or lie down for the next 30 minutes. 12 tablet 3    telmisartan (MICARDIS) 40 MG tablet Take 1 tablet by mouth daily 90 tablet 3    gabapentin (NEURONTIN) 300 MG capsule TK 1 C PO TID   3    diclofenac (VOLTAREN) 75 MG EC tablet TK 1 T PO BID PC   2    tiZANidine (ZANAFLEX) 4 MG tablet TK 1 T PO TID PRF SPASMS   0    omeprazole (PRILOSEC) 20 MG delayed release capsule TAKE 1 CAPSULE DAILY 90 capsule 3    fluticasone-vilanterol (BREO ELLIPTA) 100-25 MCG/INH AEPB inhaler Inhale 1 puff into the lungs daily 3 each 3    furosemide (LASIX) 20 MG tablet Take 20 mg by mouth daily         chlorthalidone (HYGROTON) 25 MG tablet Take 1 tablet by mouth daily 90 tablet 3    potassium chloride (KLOR-CON M) 20 MEQ extended release tablet Take 1 tablet by mouth daily 90 tablet 3    Multiple Vitamins-Minerals (MULTIVITAMIN PO) Take by mouth daily Women's Ultra Oscar for Menopause        albuterol sulfate HFA (PROVENTIL HFA) 108 (90 BASE) MCG/ACT inhaler 2 PUFFS EVERY 4 HOURS AS NEEDED WHEEZING 1 Inhaler 5         Allergies:  Codeine; Demerol; Morphine; Tape [adhesive tape]; Cabbage; and Erythromycin     Social History:   TOBACCO:   reports that she quit smoking about 14 years ago. Her smoking use included cigarettes.  She has a 35.00 pack-year smoking history. She has never used smokeless tobacco.     ETOH:   reports that she drank alcohol. Patient currently lives home with home health      Family History:   Family History             Problem Relation Age of Onset    Cancer Other      Hypertension Other      Kidney Disease Other              ROS: Review of Systems - as per HPI      All other systems reviewed and are negative.     PHYSICAL EXAM:  /72   Pulse 68   Temp 97.9 °F (36.6 °C) (Oral)   Resp 18   Ht 5' 9\" (1.753 m)   Wt 185 lb (83.9 kg)   SpO2 94%   BMI 27.32 kg/m²   No results for input(s): POCGLU in the last 72 hours.     Physical Exam   Constitutional: She appears well-developed and well-nourished. She does not appear distressed. Head: Normocephalic and atraumatic. Musculoskeletal:     Examination of the patient's bilateral shoulders, elbows, wrists, and hands reveals normal range of motion. There is normal strength and tone. Examination of the patient's bilateral knees and ankles reveals normal range of motion. There is normal strength and tone. There is a well-healed surgical incision on the right knee. Examination of the patient's bilateral hips was performed. This reveals pain with attempted range of motion of the hip with pain being more severe on the left than right. There is a negative log roll sign bilaterally. There is mild pain with palpation of the sacrum. There is no tenderness to palpation of the sacroiliac joints. Skin: Skin is warm and dry. Psychiatric: She has a normal mood and affect.        LABS:      Recent Labs     05/11/19  1209   WBC 7.1   HGB 14.3   HCT 43.3   *                                                                      Recent Labs     05/11/19  1209      K 4.3      CO2 24   BUN 23*   CREATININE 0.5*   GLUCOSE 101*      No results for input(s): AST, ALT, ALB, BILITOT, ALKPHOS in the last 72 hours.       Recent Labs     05/11/19  1209   TROPONINI <0.01      No results for input(s): BNP in the last 72 hours. No results found for: PHART, RKU3GWC, PO2ART  No results for input(s): INR in the last 72 hours. Recent Labs     05/11/19  1236   COLORU Yellow   PHUR 5.5   WBCUA 6-10*   RBCUA 0-2   BACTERIA 3+*   CLARITYU Clear   SPECGRAV >=1.030   LEUKOCYTESUR Negative   UROBILINOGEN 0.2   BILIRUBINUR Negative   BLOODU Negative   GLUCOSEU Negative         IMAGING:  CT PELVIS WO CONTRAST Additional Contrast? None   Final Result       Sclerosis of bilateral sacral ala as well as the rightward aspect of the pubic symphysis consistent with insufficiency fractures.       Worsening fracture/displacement of the leftward pubic symphysis compared with prior MR from April 2019.       Diverticulosis.       XR LUMBAR SPINE (2-3 VIEWS)   Final Result       Degenerative disc disease and facet arthropathy.             Assessment & Plan:      Ashish Massey is a 76 y.o. female with PMH of HTN who has new pubic symphysis insufficiency fractures. She may weight bear as tolerated. Begin PT/OT for mobility. We reviewed that she may need to go to a SNF if she is unable to weight bear without assistance. She will need to follow up with either Dr. Kirsten Diaz or Dr. Darvin Durbin in the office in 2 weeks for repeat radiographs.

## 2019-05-12 NOTE — PROGRESS NOTES
Resident Progress Note      Admit Date: 2019    PCP: Eduardo Rodrigez MD                  : 1950  MRN: [id number]    Chief Complaint:  Left Hip Pain    Subjective:   Seen and examined at bedside  No acute events overnight, no apparent distress  Awaiting on Orthopedic Evaluation  Left hip pain 7/10, has pain medication ordered  Discontinued home Chlorthalidone for now, BP is in 857'V systolic  2 BM yesterday  Denied chest pain, SOB, or palpitations  She is complaining of increased urgency, no dysuria/frequency     Diet: Diet NPO, After Midnight      Data:   Scheduled Meds:   gabapentin  300 mg Oral TID    pantoprazole  40 mg Oral QAM AC    losartan  50 mg Oral Daily    sodium chloride flush  10 mL Intravenous 2 times per day    heparin (porcine)  5,000 Units Subcutaneous 3 times per day    methocarbamol IVPB  500 mg Intravenous Q6H    cefTRIAXone (ROCEPHIN) IV  1 g Intravenous Q24H    calcium-vitamin D  1 tablet Oral Daily    budesonide  0.25 mg Nebulization BID    And    formoterol  20 mcg Nebulization BID     Continuous Infusions:   sodium chloride 75 mL/hr at 19 0633     PRN Meds:oxyCODONE-acetaminophen **OR** oxyCODONE-acetaminophen, albuterol, sodium chloride flush, magnesium hydroxide, ondansetron, HYDROmorphone, acetaminophen, diphenhydrAMINE  I/O last 3 completed shifts: In: 1320 [P.O.:1320]  Out: 900 [Urine:900]  No intake/output data recorded.     Intake/Output Summary (Last 24 hours) at 2019 0916  Last data filed at 2019 0651  Gross per 24 hour   Intake 1320 ml   Output 900 ml   Net 420 ml           Objective:     Vitals: /68   Pulse 62   Temp 97.8 °F (36.6 °C) (Oral)   Resp 18   Ht 5' 9\" (1.753 m)   Wt 185 lb (83.9 kg)   SpO2 96%   BMI 27.32 kg/m²     Physical Exam  General appearance: Resting comfortably, no apparent distress  HEENT EOMI, PERRL, no LAD, no oropharyngeal erythema, MMM  Lungs: CTAB, no wheezes, rhonchi or rales  Heart: RRR +S1/S2 without murmurs, rubs or gallops  Abdomen: Soft, NTND, without rigidity or guarding, normal active bowel sounds  Extremities: Right knee swollen compared to left, trochanteric pain, left, on palpation  Skin: No skin rashes  Neurologic: AAOx4, CNs II-XII grossly intact      LABS:    CBC:   Recent Labs     05/11/19  1209 05/12/19  0505   WBC 7.1 5.1   HGB 14.3 11.9*   HCT 43.3 35.6*   MCV 92.5 92.7   * 375                                                                BMP:    Recent Labs     05/11/19  1209 05/11/19  1600 05/12/19  0505    140 141   K 4.3 4.0 3.7    103 105   CO2 24 23 26   BUN 23* 24* 22*   CREATININE 0.5* 0.7 0.8   GLUCOSE 101* 116* 107*       LFT's: No results for input(s): AST, ALT, ALB, BILITOT, ALKPHOS in the last 72 hours. Troponin:   Recent Labs     05/11/19  1209 05/11/19  1600   TROPONINI <0.01 <0.01       BNP: No results for input(s): BNP in the last 72 hours. Lipids: No results for input(s): CHOL, HDL in the last 72 hours. Invalid input(s): LDLCALCU    ABGs: No results found for: PHART, DXN5XPG, PO2ART    INR:   Recent Labs     05/12/19  0505   INR 1.03       U/A:  Recent Labs     05/11/19  1236   COLORU Yellow   PHUR 5.5   WBCUA 6-10*   RBCUA 0-2   BACTERIA 3+*   CLARITYU Clear   SPECGRAV >=1.030   LEUKOCYTESUR Negative   UROBILINOGEN 0.2   BILIRUBINUR Negative   BLOODU Negative   GLUCOSEU Negative      -----------------------------------------------------------------  RAD:   CT PELVIS WO CONTRAST Additional Contrast? None   Final Result      Sclerosis of bilateral sacral ala as well as the rightward aspect of the pubic symphysis consistent with insufficiency fractures. Worsening fracture/displacement of the leftward pubic symphysis compared with prior MR from April 2019. Diverticulosis. XR LUMBAR SPINE (2-3 VIEWS)   Final Result      Degenerative disc disease and facet arthropathy.         Assessment/Plan:     # Displaced Pelvic Pubic Symphysis  Prior MRI (4/2/19)   Stress response involving both pubic bodies and distal superior pubic rami consistent with stress response. Coexisting stress fracture of the left pubic body       Current CT Pelvis (5/12/19)  Worsening fracture/displacement of the leftward pubic symphysis compared with prior MR from April 2019.  - Ortho consulted  - Pain medications provided (Dilaudid and Percocet), unable to take Morphine  - Strict Bed Rest for now      # Increased Urgency, No dysuria/Frequency   Complaining of increased urgency, no dysuria. Most likely atrophic vaginitis. - Will get repeat UA   - Discontinued Ceftriaxone    # Osteoporosis  - Calcium and Vitamin D supplements  - Holding home Fosamax and Actonel     # Essential HTN (Controlled)  - Holding home Chlorthalidone for now, BP well controlled this AM  - Cozaar (substitute for telmisartan)        # COPD (Stable)  - Budesonide and Formterol    Code Status: Full Code  FEN: NPO  PPx: Heparin  Dispo: Trinity Winters MD  5/12/2019  9:16 AM  Pager:  [phone]    Addendum to Resident H& P/Progress note:  I have personally seen,examined and evaluated the patient. I have reviewed the current history, physical findings, labs and assessment and plan and agree with note as documented by resident MD [facility])  Volume depletion has improved with IV fluids. Was evaluated by Dr Nina Desir; no surgery needed.     Octavia Castillo MD, FACP

## 2019-05-12 NOTE — PROGRESS NOTES
MD placed order for one time dose of phenergan, pt has stated nausea is better but not gone, she is requesting the phenergan be held for a few hours to see if the nausea gets worse or better before she uses the dose ordered.

## 2019-05-12 NOTE — PROGRESS NOTES
Pt reported pain more controlled with adjustments and increase in pain scale for percocet this shift. She denies other needs at this time. Call light is in reach. Bed alarm is engaged.

## 2019-05-13 ENCOUNTER — TELEPHONE (OUTPATIENT)
Dept: INTERNAL MEDICINE CLINIC | Age: 69
End: 2019-05-13

## 2019-05-13 VITALS
BODY MASS INDEX: 27.4 KG/M2 | SYSTOLIC BLOOD PRESSURE: 107 MMHG | HEIGHT: 69 IN | HEART RATE: 73 BPM | DIASTOLIC BLOOD PRESSURE: 71 MMHG | WEIGHT: 185 LBS | RESPIRATION RATE: 18 BRPM | OXYGEN SATURATION: 95 % | TEMPERATURE: 98.1 F

## 2019-05-13 LAB
ANION GAP SERPL CALCULATED.3IONS-SCNC: 10 MMOL/L (ref 3–16)
BASOPHILS ABSOLUTE: 0 K/UL (ref 0–0.2)
BASOPHILS RELATIVE PERCENT: 0.5 %
BUN BLDV-MCNC: 18 MG/DL (ref 7–20)
CALCIUM SERPL-MCNC: 8.9 MG/DL (ref 8.3–10.6)
CHLORIDE BLD-SCNC: 105 MMOL/L (ref 99–110)
CO2: 26 MMOL/L (ref 21–32)
CREAT SERPL-MCNC: 0.7 MG/DL (ref 0.6–1.2)
EKG ATRIAL RATE: 65 BPM
EKG DIAGNOSIS: NORMAL
EKG P AXIS: 29 DEGREES
EKG P-R INTERVAL: 150 MS
EKG Q-T INTERVAL: 416 MS
EKG QRS DURATION: 90 MS
EKG QTC CALCULATION (BAZETT): 432 MS
EKG R AXIS: -37 DEGREES
EKG T AXIS: 5 DEGREES
EKG VENTRICULAR RATE: 65 BPM
EOSINOPHILS ABSOLUTE: 0.1 K/UL (ref 0–0.6)
EOSINOPHILS RELATIVE PERCENT: 1.6 %
GFR AFRICAN AMERICAN: >60
GFR NON-AFRICAN AMERICAN: >60
GLUCOSE BLD-MCNC: 115 MG/DL (ref 70–99)
HCT VFR BLD CALC: 35.6 % (ref 36–48)
HEMOGLOBIN: 11.8 G/DL (ref 12–16)
LYMPHOCYTES ABSOLUTE: 1.7 K/UL (ref 1–5.1)
LYMPHOCYTES RELATIVE PERCENT: 35 %
MCH RBC QN AUTO: 31.1 PG (ref 26–34)
MCHC RBC AUTO-ENTMCNC: 33.3 G/DL (ref 31–36)
MCV RBC AUTO: 93.5 FL (ref 80–100)
MONOCYTES ABSOLUTE: 0.4 K/UL (ref 0–1.3)
MONOCYTES RELATIVE PERCENT: 8.9 %
NEUTROPHILS ABSOLUTE: 2.6 K/UL (ref 1.7–7.7)
NEUTROPHILS RELATIVE PERCENT: 54 %
ORGANISM: ABNORMAL
PDW BLD-RTO: 14.1 % (ref 12.4–15.4)
PLATELET # BLD: 364 K/UL (ref 135–450)
PMV BLD AUTO: 6.8 FL (ref 5–10.5)
POTASSIUM REFLEX MAGNESIUM: 4.2 MMOL/L (ref 3.5–5.1)
RBC # BLD: 3.8 M/UL (ref 4–5.2)
SODIUM BLD-SCNC: 141 MMOL/L (ref 136–145)
URINE CULTURE, ROUTINE: ABNORMAL
URINE CULTURE, ROUTINE: ABNORMAL
URINE CULTURE, ROUTINE: NORMAL
WBC # BLD: 4.9 K/UL (ref 4–11)

## 2019-05-13 PROCEDURE — 85025 COMPLETE CBC W/AUTO DIFF WBC: CPT

## 2019-05-13 PROCEDURE — 6360000002 HC RX W HCPCS: Performed by: STUDENT IN AN ORGANIZED HEALTH CARE EDUCATION/TRAINING PROGRAM

## 2019-05-13 PROCEDURE — 93010 ELECTROCARDIOGRAM REPORT: CPT | Performed by: INTERNAL MEDICINE

## 2019-05-13 PROCEDURE — 36415 COLL VENOUS BLD VENIPUNCTURE: CPT

## 2019-05-13 PROCEDURE — 2580000003 HC RX 258: Performed by: STUDENT IN AN ORGANIZED HEALTH CARE EDUCATION/TRAINING PROGRAM

## 2019-05-13 PROCEDURE — 6370000000 HC RX 637 (ALT 250 FOR IP): Performed by: STUDENT IN AN ORGANIZED HEALTH CARE EDUCATION/TRAINING PROGRAM

## 2019-05-13 PROCEDURE — 94640 AIRWAY INHALATION TREATMENT: CPT

## 2019-05-13 PROCEDURE — 80048 BASIC METABOLIC PNL TOTAL CA: CPT

## 2019-05-13 PROCEDURE — 99238 HOSP IP/OBS DSCHRG MGMT 30/<: CPT | Performed by: HOSPITALIST

## 2019-05-13 PROCEDURE — 94761 N-INVAS EAR/PLS OXIMETRY MLT: CPT

## 2019-05-13 RX ORDER — OXYCODONE HYDROCHLORIDE AND ACETAMINOPHEN 5; 325 MG/1; MG/1
1 TABLET ORAL EVERY 6 HOURS PRN
Qty: 20 TABLET | Refills: 0 | Status: SHIPPED | OUTPATIENT
Start: 2019-05-13 | End: 2019-11-26 | Stop reason: SDUPTHER

## 2019-05-13 RX ORDER — BACLOFEN 10 MG/1
10 TABLET ORAL 3 TIMES DAILY
Qty: 15 TABLET | Refills: 0 | Status: CANCELLED | OUTPATIENT
Start: 2019-05-13 | End: 2019-05-18

## 2019-05-13 RX ADMIN — Medication 10 ML: at 08:15

## 2019-05-13 RX ADMIN — PANTOPRAZOLE SODIUM 40 MG: 40 TABLET, DELAYED RELEASE ORAL at 06:26

## 2019-05-13 RX ADMIN — GABAPENTIN 300 MG: 300 CAPSULE ORAL at 08:03

## 2019-05-13 RX ADMIN — FORMOTEROL FUMARATE DIHYDRATE 20 MCG: 20 SOLUTION RESPIRATORY (INHALATION) at 09:38

## 2019-05-13 RX ADMIN — HEPARIN SODIUM 5000 UNITS: 5000 INJECTION INTRAVENOUS; SUBCUTANEOUS at 06:26

## 2019-05-13 RX ADMIN — OXYCODONE HYDROCHLORIDE AND ACETAMINOPHEN 2 TABLET: 5; 325 TABLET ORAL at 08:03

## 2019-05-13 RX ADMIN — OXYCODONE HYDROCHLORIDE AND ACETAMINOPHEN 2 TABLET: 5; 325 TABLET ORAL at 04:13

## 2019-05-13 RX ADMIN — CALCIUM CARBONATE-VITAMIN D TAB 500 MG-200 UNIT 1 TABLET: 500-200 TAB at 08:03

## 2019-05-13 RX ADMIN — METHOCARBAMOL 500 MG: 100 INJECTION, SOLUTION INTRAMUSCULAR; INTRAVENOUS at 04:34

## 2019-05-13 RX ADMIN — BUDESONIDE 250 MCG: 0.25 SUSPENSION RESPIRATORY (INHALATION) at 09:37

## 2019-05-13 RX ADMIN — LOSARTAN POTASSIUM 50 MG: 25 TABLET ORAL at 08:03

## 2019-05-13 RX ADMIN — OXYCODONE HYDROCHLORIDE AND ACETAMINOPHEN 2 TABLET: 5; 325 TABLET ORAL at 12:17

## 2019-05-13 RX ADMIN — HYDROMORPHONE HYDROCHLORIDE 0.5 MG: 1 INJECTION, SOLUTION INTRAMUSCULAR; INTRAVENOUS; SUBCUTANEOUS at 06:30

## 2019-05-13 ASSESSMENT — PAIN SCALES - GENERAL
PAINLEVEL_OUTOF10: 4
PAINLEVEL_OUTOF10: 7
PAINLEVEL_OUTOF10: 7
PAINLEVEL_OUTOF10: 10
PAINLEVEL_OUTOF10: 7

## 2019-05-13 ASSESSMENT — PAIN - FUNCTIONAL ASSESSMENT: PAIN_FUNCTIONAL_ASSESSMENT: PREVENTS OR INTERFERES WITH ALL ACTIVE AND SOME PASSIVE ACTIVITIES

## 2019-05-13 ASSESSMENT — PAIN DESCRIPTION - PAIN TYPE
TYPE: ACUTE PAIN
TYPE: ACUTE PAIN

## 2019-05-13 ASSESSMENT — PAIN DESCRIPTION - FREQUENCY: FREQUENCY: INTERMITTENT

## 2019-05-13 ASSESSMENT — PAIN DESCRIPTION - LOCATION
LOCATION: HIP;BACK
LOCATION: BACK;HIP

## 2019-05-13 ASSESSMENT — PAIN DESCRIPTION - DIRECTION: RADIATING_TOWARDS: DOWN LEGS

## 2019-05-13 ASSESSMENT — PAIN DESCRIPTION - ORIENTATION
ORIENTATION: RIGHT;LEFT
ORIENTATION: RIGHT;LEFT

## 2019-05-13 ASSESSMENT — PAIN DESCRIPTION - ONSET: ONSET: PROGRESSIVE

## 2019-05-13 ASSESSMENT — PAIN DESCRIPTION - PROGRESSION: CLINICAL_PROGRESSION: NOT CHANGED

## 2019-05-13 ASSESSMENT — PAIN DESCRIPTION - DESCRIPTORS: DESCRIPTORS: RADIATING

## 2019-05-13 NOTE — PLAN OF CARE
Problem: Falls - Risk of:  Goal: Will remain free from falls  Description  Will remain free from falls  5/12/2019 2311 by Estefanía Smith RN  Outcome: Ongoing  Fall precautions reviewed with pt. Fall precautions in place: nonskid socks, bed is locked and in lowest position, call light is in reach. Remains free of falls. Problem: Pain:  Goal: Pain level will decrease  Description  Pain level will decrease  5/12/2019 2311 by Estefanía Smith RN  Outcome: Ongoing   Pt reports pain controlled with current pain interventions: PRN dilaudid and PRN percocet. Will continue to assess pain on 0-10 scale for appropriate pain management.

## 2019-05-13 NOTE — DISCHARGE SUMMARY
Hospital Medicine Discharge Summary    Patient: Haroon Chacon     Gender: female  : 1950   Age: 76 y.o. MRN: [id number]    Code Status: Full Code     Primary Care Provider: Lalo Mathew MD    Admit Date: 2019   Discharge Date:   19    Admitting Physician: Jesus Huggins MD  Discharge Physician: Carina Goldsmith     Discharge Diagnoses: Active Hospital Problems    Diagnosis Date Noted    Dehydration [E86.0]     Multiple fractures of pelvis with stable disruption of pelvic ring, subsequent encounter for fracture with delayed healing [S32.810G] 2019   Essential hypertension    HPI on Admission  Patient is a 76 y.o. female with a history of osteoporosis (has not had DEXA scan as of yet), COPD, HTN,  presenting with pelvic pain that started two days ago. MRI late April with sacral insufficiency fracture, MRI left pubic body frax. Pt has been having difficulty with ambulation but has been working with her home PT/OT to help. 2 days ago, woke up from sleep, had excruciating pain to the point that she wasn't tolerating PO and was having significant nausea. No fevers, +chills, +urinary frequency, no hematuria, no hematochezia, no dysuria, no chest pain, no palpitations.      In ED:   CT showed worsening frax displacement  Urine showed ketones, neg leuk, neg nitrites, 6-10 WBC, 10-20 epi. Received 1g rocephin, dilaudid in ED as well as 1x dose of IV robaxin. Hospital Course:     Displaced Pelvic Pubic Symphysis  Prior MRI (19)   Stress response involving both pubic bodies and distal superior pubic rami consistent with stress response.  Coexisting stress fracture of the left pubic body       Current CT Pelvis (19)  Worsening fracture/displacement of the leftward pubic symphysis compared with prior MR from 2019.  - Ortho stated no surgical intervention necessary  - Home PT 3x per week at home  - Percocet 5-325 every 6 hours PRN x 5 days  - Follow up with PCP 5/17/19     Increased Urgency, No dysuria/Frequency   Complaining of increased urgency, no dysuria. Most likely atrophic vaginitis.       Osteoporosis  - Calcium and Vitamin D supplements  - Continue with home Fosamax  - Patient does not take Actonel anymore due to cost issue     Essential HTN (Controlled)  - Continue homoe Chlorthalidone and Telmisartan     COPD (Stable)  - Continue home inhalers          Disposition:  Home    Exam:     /71   Pulse 73   Temp 98.1 °F (36.7 °C) (Oral)   Resp 18   Ht 5' 9\" (1.753 m)   Wt 185 lb (83.9 kg)   SpO2 95%   BMI 27.32 kg/m²     General appearance: Resting comfortably, no apparent distress  HEENT EOMI, PERRL, no LAD, no oropharyngeal erythema, MMM  Lungs: CTAB, no wheezes, rhonchi or rales  Heart: RRR +S1/S2 without murmurs, rubs or gallops  Abdomen: Soft, NTND, without rigidity or guarding, normal active bowel sounds  Extremities: Right knee swollen compared to left, trochanteric pain, left, on palpation  Skin: No skin rashes  Neurologic: AAOx4, CNs II-XII grossly intact    Consults:     IP CONSULT TO PRIMARY CARE PROVIDER  IP CONSULT TO ORTHOPEDIC SURGERY  IP CONSULT TO SOCIAL WORK    Labs:  For convenience and continuity at follow-up the following most recent labs are provided:    Lab Results   Component Value Date    WBC 4.9 05/13/2019    HGB 11.8 05/13/2019    HCT 35.6 05/13/2019    MCV 93.5 05/13/2019     05/13/2019     05/13/2019    K 4.2 05/13/2019     05/13/2019    CO2 26 05/13/2019    BUN 18 05/13/2019    CREATININE 0.7 05/13/2019    CALCIUM 8.9 05/13/2019    PHOS 3.8 05/11/2019    ALKPHOS 105 11/26/2018    ALT 18 11/26/2018    AST 16 11/26/2018    BILITOT 0.3 11/26/2018    LABALBU 4.1 05/11/2019    LDLCALC 151 12/12/2017    TRIG 159 12/12/2017     Lab Results   Component Value Date    INR 1.03 05/12/2019    INR 1.00 04/01/2019    INR 0.90 12/05/2018       Radiology:  CT PELVIS WO CONTRAST Additional Contrast? None   Final Result documented by resident MD [facility])      Carmen Denise MD, [address]

## 2019-05-13 NOTE — TELEPHONE ENCOUNTER
Spoke to pt tried to get what the questions are but she stated just wants to talk to Dr. Nydia Metzger about all the mixed answers she is getting at the hospital. Would like to speak to him before discharge today .

## 2019-05-13 NOTE — PROGRESS NOTES
Physical Therapy/Occupational therapy  Discharge    Orders received, chart reviewed. Pt observed ambulating in halls earlier this AM with walker and steady gait in her own clothes. Spoke with pt who states her plan is to return home. Pt denies concerns regarding functional mobility. \"I know I just need to slow down. \"  Pt states she has 1 JOSE home and does not have concerns with negotiating the step. Will sign off 2* no acute therapy needs.     Pervis Breath, PT, DPT  [id number]  [facility] OTR/L  [id number]

## 2019-05-13 NOTE — PLAN OF CARE
January 21, 2019         Dietra Client, DO  [address] [facility] [address]      Patient: Trinh Casas   YOB: 1964   Date of Visit: 1/21/2019       Dear Dr. Magno Lamas,    I saw your patient, Trinh Casas, on 1/21/ Problem: Falls - Risk of:  Goal: Will remain free from falls  Description  Will remain free from falls  5/13/2019 4647 by Chiqui Esquivel RN  Outcome: Ongoing  5/12/2019 2311 by Karron Galeazzi, RN  Outcome: Ongoing     Problem: Falls - Risk of:  Goal: Absence of physical injury  Description  Absence of physical injury  Outcome: Ongoing     Problem: Pain:  Goal: Pain level will decrease  Description  Pain level will decrease  5/13/2019 0812 by Chiqui Esquivel RN  Outcome: Ongoing  5/12/2019 2311 by Karron Galeazzi, RN  Outcome: Ongoing     Problem: Pain:  Goal: Control of chronic pain  Description  Control of chronic pain  Outcome: Ongoing

## 2019-05-13 NOTE — CARE COORDINATION
Replaced by [facility]    DC order noted, all docs needed have been faxed to [facility] for home care services.       Josh Roberts  Work mobile: [phone]  [facility] office: [phone]

## 2019-05-13 NOTE — PROGRESS NOTES
Discharge instructions reviewed with pt, and family with patients consent, all questions addressed at time, IV removed and dressing applied, copies of discharge instructions provided with follow up instructions, pt was transported to private vehicle by transport in a wheelchair to return to private residence.

## 2019-05-13 NOTE — TELEPHONE ENCOUNTER
Pt is in the hospital on has questions for Dr Nandini Langston about a bladder infection getting different answers

## 2019-05-13 NOTE — PROGRESS NOTES
Anne was discontinued on the Avatar at discharge but pt had not had a anne since this nurse had patient on 5/11/19.

## 2019-05-13 NOTE — CARE COORDINATION
2019  [facility])  Clinical Case Management Department    Patient: Alfredo Ellington  MRN: [id number] / : 1950  ACCT: [de-identified]          Admission Documentation  Attending Provider: Carmen Denise MD  Admit date/time: 2019 10:43 AM  Status: Inpatient [101]  Diagnosis: Multiple fractures of pelvis with stable disruption of pelvic ring, subsequent encounter for fracture with delayed healing     Readmission within last 30 days:  no     Living Situation  Discharge Planning  Living Arrangements: Friends, Family Members  Support Systems: None  Potential Assistance Needed: N/A  Type of Home Care Services: None  Patient expects to be discharged to[de-identified] private residence  Expected Discharge Date: 19    Service Assessment       Values / Beliefs  Do you have any ethnic, cultural, sacramental, or spiritual Druze needs you would like us to be aware of while you are in the hospital?: No    Advance Directives (For Healthcare)  Pre-existing DNR Comfort Care/DNR Arrest/DNI Order: No  Healthcare Directive: No, patient does not have an advance directive for healthcare treatment                        [facility]   No needs    Home Health/Skilled [address] [place] [address] Rd at Discharge: [address] and Nursing 2x week  Home care at home?  Yes Provider: [facility] Provider Phone: [phone]    Therapy Consults  PT evaluation needed?: Yes (Comment)  OT Evalulation Needed?: Yes (Comment)  SLP evaluation needed?: Yes (Comment)    Home Medical Care  PT/SN  Pharmacy: [facility]  Potential Assistance Purchasing Medications:  No  Does patient want to participate in local refill/meds to beds program?: Not Assessed    Goals of Care  Patient expects to be discharged to[de-identified] private residence           Mode of transport from hospital:  will transport to home    Factors facilitating achievement of predicted outcomes: Family support, Cooperative, Pleasant and Sense of humor    Barriers to discharge: none noted     Steve Lamar RN  [facility]  Case Management Department  Ph: [phone] Fax: [phone]

## 2019-05-14 ENCOUNTER — HOSPITAL ENCOUNTER (OUTPATIENT)
Dept: GENERAL RADIOLOGY | Age: 69
Discharge: HOME | End: 2019-05-14
Payer: MEDICARE

## 2019-05-14 ENCOUNTER — CARE COORDINATION (OUTPATIENT)
Dept: CASE MANAGEMENT | Age: 69
End: 2019-05-14

## 2019-05-14 DIAGNOSIS — Z78.0 POST-MENOPAUSAL: ICD-10-CM

## 2019-05-14 PROCEDURE — 77080 DXA BONE DENSITY AXIAL: CPT

## 2019-05-14 RX ORDER — ONDANSETRON 4 MG/1
4 TABLET, FILM COATED ORAL EVERY 6 HOURS PRN
Qty: 30 TABLET | Refills: 0 | Status: SHIPPED | OUTPATIENT
Start: 2019-05-14 | End: 2020-01-17

## 2019-05-14 NOTE — TELEPHONE ENCOUNTER
Pt called in stating she was on medication for nausea    [id number] [place], [address] [address]

## 2019-05-17 ENCOUNTER — OFFICE VISIT (OUTPATIENT)
Dept: INTERNAL MEDICINE CLINIC | Age: 69
End: 2019-05-17
Payer: MEDICARE

## 2019-05-17 VITALS
WEIGHT: 214 LBS | BODY MASS INDEX: 31.7 KG/M2 | HEART RATE: 72 BPM | DIASTOLIC BLOOD PRESSURE: 70 MMHG | HEIGHT: 69 IN | SYSTOLIC BLOOD PRESSURE: 138 MMHG | OXYGEN SATURATION: 98 %

## 2019-05-17 DIAGNOSIS — S32.810G MULTIPLE CLOSED FRACTURES OF PELVIS WITH STABLE DISRUPTION OF PELVIC RING WITH DELAYED HEALING, SUBSEQUENT ENCOUNTER: ICD-10-CM

## 2019-05-17 DIAGNOSIS — S32.10XG CLOSED FRACTURE OF SACRUM WITH DELAYED HEALING, UNSPECIFIED PORTION OF SACRUM, SUBSEQUENT ENCOUNTER: ICD-10-CM

## 2019-05-17 DIAGNOSIS — M51.9 LUMBAR DISC DISEASE: ICD-10-CM

## 2019-05-17 DIAGNOSIS — M80.00XG OSTEOPOROSIS WITH CURRENT PATHOLOGICAL FRACTURE WITH DELAYED HEALING, UNSPECIFIED OSTEOPOROSIS TYPE, SUBSEQUENT ENCOUNTER: ICD-10-CM

## 2019-05-17 DIAGNOSIS — Z09 HOSPITAL DISCHARGE FOLLOW-UP: ICD-10-CM

## 2019-05-17 PROCEDURE — G8417 CALC BMI ABV UP PARAM F/U: HCPCS | Performed by: INTERNAL MEDICINE

## 2019-05-17 PROCEDURE — 1123F ACP DISCUSS/DSCN MKR DOCD: CPT | Performed by: INTERNAL MEDICINE

## 2019-05-17 PROCEDURE — 1090F PRES/ABSN URINE INCON ASSESS: CPT | Performed by: INTERNAL MEDICINE

## 2019-05-17 PROCEDURE — 4040F PNEUMOC VAC/ADMIN/RCVD: CPT | Performed by: INTERNAL MEDICINE

## 2019-05-17 PROCEDURE — 1111F DSCHRG MED/CURRENT MED MERGE: CPT | Performed by: INTERNAL MEDICINE

## 2019-05-17 PROCEDURE — 99215 OFFICE O/P EST HI 40 MIN: CPT | Performed by: INTERNAL MEDICINE

## 2019-05-17 PROCEDURE — 3017F COLORECTAL CA SCREEN DOC REV: CPT | Performed by: INTERNAL MEDICINE

## 2019-05-17 PROCEDURE — 1036F TOBACCO NON-USER: CPT | Performed by: INTERNAL MEDICINE

## 2019-05-17 PROCEDURE — G8399 PT W/DXA RESULTS DOCUMENT: HCPCS | Performed by: INTERNAL MEDICINE

## 2019-05-17 PROCEDURE — G8427 DOCREV CUR MEDS BY ELIG CLIN: HCPCS | Performed by: INTERNAL MEDICINE

## 2019-05-17 ASSESSMENT — PATIENT HEALTH QUESTIONNAIRE - PHQ9
1. LITTLE INTEREST OR PLEASURE IN DOING THINGS: 0
SUM OF ALL RESPONSES TO PHQ QUESTIONS 1-9: 0
SUM OF ALL RESPONSES TO PHQ9 QUESTIONS 1 & 2: 0
2. FEELING DOWN, DEPRESSED OR HOPELESS: 0
SUM OF ALL RESPONSES TO PHQ QUESTIONS 1-9: 0

## 2019-05-17 ASSESSMENT — ENCOUNTER SYMPTOMS: BACK PAIN: 1

## 2019-05-17 NOTE — ASSESSMENT & PLAN NOTE
DEXA compatible with osteopenia. Hold Fosamax for now and will discuss with orthopedist regarding continuation of therapy.

## 2019-05-17 NOTE — PROGRESS NOTES
SUBJECTIVE:  Patient ID: Alfredo Ellington is an 76 y.o. female. HPI: Patient here today for the f/u of chronic problems-- see Problem List and associated comments. New issues or complaints include (alsosee Assessment for more details):  Patient here for follow-up on her hospitalization. Hospital records reviewed. She has sacral insufficiency fractures bilaterally, lumbar disc disease which is pressing on the left L5 nerve root, and left pubic ramus fractures. She still has the healing right knee/femur surgery. She is in considerable pain require significant amounts of pain medication. Recent DEXA scan showed osteopenia. She is able to get by with a walker but her endurance is minimal now. Review of Systems   Constitutional: Positive for activity change. Negative for fever. Genitourinary: Negative for difficulty urinating and dysuria. Musculoskeletal: Positive for arthralgias and back pain. Neurological: Positive for weakness. Negative for numbness. Psychiatric/Behavioral:        Depressed regarding her current situation       OBJECTIVE:    /70 (Site: Left Upper Arm, Position: Sitting, Cuff Size: Large Adult)   Pulse 72   Ht 5' 9\" (1.753 m)   Wt 214 lb (97.1 kg)   SpO2 98%   BMI 31.60 kg/m²      Physical Exam   Constitutional: She is oriented to person, place, and time. Non-toxic appearance. She appears distressed. Walker-difficulty getting out of chair and difficulty ambulating   Eyes: No scleral icterus. Cardiovascular: Normal rate. Pulmonary/Chest: Effort normal. No respiratory distress. Neurological: She is alert and oriented to person, place, and time. No cranial nerve deficit. Skin: No pallor. Psychiatric: Her speech is normal and behavior is normal. Cognition and memory are normal. She exhibits a depressed mood. She expresses no suicidal ideation. ASSESSMENT:       Encounter Diagnoses   Name Primary?    [facility] discharge follow-up     Closed fracture of sacrum with delayed healing, unspecified portion of sacrum, subsequent encounter     Multiple closed fractures of pelvis with stable disruption of pelvic ring with delayed healing, subsequent encounter     Lumbar disc disease     Osteoporosis with current pathological fracture with delayed healing, unspecified osteoporosis type, subsequent encounter        Hospital discharge follow-up  Lower lumbar disc disease, sacral fractures and left pubic ramus fracture. Significant pain bilaterally. Patient follow-up with spine orthopedist next week for opinion. She is on pain medication. Fracture of sacrum (HCC)  Bilateral sacral insufficiency fractures. She has an appointment with spine orthopedist next week. Multiple closed fractures of pelvis with stable disruption of pelvic [place] (HCC)  Left pubic ramus fracture-delayed healing. Pain control. Lumbar disc disease  L5-S1 on left. May be contributing to a significant amount of her back and groin pain. However her pain is bilateral.  MRI is 10weeks old. She is to see spine orthopedist next week. Discussed new MRI but she will wait for consult. Osteoporosis  DEXA compatible with osteopenia. Hold Fosamax for now and will discuss with orthopedist regarding continuation of therapy. PLAN:See ASSESSMENT for evaluation & PLAN     No orders of the defined types were placed in this encounter. PSH, PMH, SH and FH reviewed and noted. Recent and past labs, tests and consultsalso reviewed. Recent or new meds also reviewed. 45 minutes spent with patient and family/caregivers discussing diagnoses and plan; at least 50% of which was for care coordination. Lengthy and thorough review of patient's current problem and other potential comorbid issues affecting patient - discussion w/ patient and accompanying family members. Questions and concerns were addressed, as well as any potential treatment options.

## 2019-05-17 NOTE — ASSESSMENT & PLAN NOTE
Lower lumbar disc disease, sacral fractures and left pubic ramus fracture. Significant pain bilaterally. Patient follow-up with spine orthopedist next week for opinion. She is on pain medication.

## 2019-05-20 ENCOUNTER — CARE COORDINATION (OUTPATIENT)
Dept: CASE MANAGEMENT | Age: 69
End: 2019-05-20

## 2019-05-22 NOTE — CARE COORDINATION
Cathleen 45 Transitions Initial Follow Up Call    Call within 2 business days of discharge: Yes    Patient:  Shauna Hughes   Patient :  1950  MRN:  [id number]     Reason for Admission:  Multi Pelvic Fractures   Discharge Date:  19    RARS:  Edward      3RD  CTC attempt to reach Pt regarding recent hospital discharge. CTC left voice recording with call back number requesting a call back. CTC will close out case. Follow up appointments:    No future appointments.     Thank Ayanna Mcdonald RN  Care Transition Coordinator  Contact [phone]

## 2019-06-16 PROBLEM — Z09 HOSPITAL DISCHARGE FOLLOW-UP: Status: RESOLVED | Noted: 2018-06-18 | Resolved: 2019-06-16

## 2019-07-01 RX ORDER — DICLOFENAC SODIUM 75 MG/1
TABLET, DELAYED RELEASE ORAL
Qty: 180 TABLET | Refills: 3 | Status: SHIPPED | OUTPATIENT
Start: 2019-07-01 | End: 2020-01-17

## 2019-07-01 RX ORDER — GABAPENTIN 300 MG/1
CAPSULE ORAL
Qty: 90 CAPSULE | Refills: 3 | Status: SHIPPED | OUTPATIENT
Start: 2019-07-01 | End: 2020-12-31 | Stop reason: ALTCHOICE

## 2019-07-01 RX ORDER — ALENDRONATE SODIUM 70 MG/1
TABLET ORAL
Qty: 12 TABLET | Refills: 3 | Status: SHIPPED | OUTPATIENT
Start: 2019-07-01 | End: 2020-01-17

## 2019-07-01 NOTE — TELEPHONE ENCOUNTER
90 day supply    alendronate (FOSAMAX) 70 MG tablet     gabapentin (NEURONTIN) 300 MG capsule     diclofenac (VOLTAREN) 75 MG EC tablet

## 2019-08-12 ENCOUNTER — TELEPHONE (OUTPATIENT)
Dept: INTERNAL MEDICINE CLINIC | Age: 69
End: 2019-08-12

## 2019-08-12 RX ORDER — ESCITALOPRAM OXALATE 10 MG/1
10 TABLET ORAL DAILY
Qty: 30 TABLET | Refills: 5 | Status: SHIPPED | OUTPATIENT
Start: 2019-08-12 | End: 2019-08-12 | Stop reason: SDUPTHER

## 2019-08-12 NOTE — TELEPHONE ENCOUNTER
Not sleeping , difficult to eat as sick to stomach all the time. Knows back will take time but  knee not doing good -   frustrated - depressed, cannot see grandchildren at their events, not used to not doing anything. Told patient I would send message back to MD for something for her depression if he agrees. Also asked if CBD Oil would be good for patient?

## 2019-08-13 RX ORDER — ESCITALOPRAM OXALATE 10 MG/1
10 TABLET ORAL DAILY
Qty: 90 TABLET | Refills: 5 | Status: SHIPPED | OUTPATIENT
Start: 2019-08-13 | End: 2020-08-31

## 2019-09-04 RX ORDER — FUROSEMIDE 20 MG/1
20 TABLET ORAL DAILY
Qty: 90 TABLET | Refills: 3 | Status: ON HOLD | OUTPATIENT
Start: 2019-09-04 | End: 2020-11-23 | Stop reason: HOSPADM

## 2019-09-10 ENCOUNTER — OFFICE VISIT (OUTPATIENT)
Dept: PSYCHOLOGY | Age: 69
End: 2019-09-10
Payer: MEDICARE

## 2019-09-10 DIAGNOSIS — F43.23 ADJUSTMENT DISORDER WITH MIXED ANXIETY AND DEPRESSED MOOD: Primary | ICD-10-CM

## 2019-09-10 PROCEDURE — 90791 PSYCH DIAGNOSTIC EVALUATION: CPT | Performed by: PSYCHOLOGIST

## 2019-09-10 ASSESSMENT — PATIENT HEALTH QUESTIONNAIRE - PHQ9
SUM OF ALL RESPONSES TO PHQ9 QUESTIONS 1 & 2: 3
4. FEELING TIRED OR HAVING LITTLE ENERGY: 1
10. IF YOU CHECKED OFF ANY PROBLEMS, HOW DIFFICULT HAVE THESE PROBLEMS MADE IT FOR YOU TO DO YOUR WORK, TAKE CARE OF THINGS AT HOME, OR GET ALONG WITH OTHER PEOPLE: 1
2. FEELING DOWN, DEPRESSED OR HOPELESS: 1
SUM OF ALL RESPONSES TO PHQ QUESTIONS 1-9: 11
9. THOUGHTS THAT YOU WOULD BE BETTER OFF DEAD, OR OF HURTING YOURSELF: 0
8. MOVING OR SPEAKING SO SLOWLY THAT OTHER PEOPLE COULD HAVE NOTICED. OR THE OPPOSITE, BEING SO FIGETY OR RESTLESS THAT YOU HAVE BEEN MOVING AROUND A LOT MORE THAN USUAL: 0
1. LITTLE INTEREST OR PLEASURE IN DOING THINGS: 2
6. FEELING BAD ABOUT YOURSELF - OR THAT YOU ARE A FAILURE OR HAVE LET YOURSELF OR YOUR FAMILY DOWN: 2
SUM OF ALL RESPONSES TO PHQ QUESTIONS 1-9: 11
5. POOR APPETITE OR OVEREATING: 1
7. TROUBLE CONCENTRATING ON THINGS, SUCH AS READING THE NEWSPAPER OR WATCHING TELEVISION: 2
3. TROUBLE FALLING OR STAYING ASLEEP: 2

## 2019-09-10 ASSESSMENT — ANXIETY QUESTIONNAIRES
1. FEELING NERVOUS, ANXIOUS, OR ON EDGE: 2-OVER HALF THE DAYS
2. NOT BEING ABLE TO STOP OR CONTROL WORRYING: 2-OVER HALF THE DAYS
3. WORRYING TOO MUCH ABOUT DIFFERENT THINGS: 2-OVER HALF THE DAYS
5. BEING SO RESTLESS THAT IT IS HARD TO SIT STILL: 0-NOT AT ALL SURE
4. TROUBLE RELAXING: 2-OVER HALF THE DAYS
7. FEELING AFRAID AS IF SOMETHING AWFUL MIGHT HAPPEN: 1-SEVERAL DAYS
GAD7 TOTAL SCORE: 11
6. BECOMING EASILY ANNOYED OR IRRITABLE: 2-OVER HALF THE DAYS

## 2019-09-10 NOTE — PROGRESS NOTES
understand instructions Yes  Ability to respond meaningfully Yes  Suicide Assessment     Denies SI      History:    Medications:   Current Outpatient Medications   Medication Sig Dispense Refill    furosemide (LASIX) 20 MG tablet Take 1 tablet by mouth daily 90 tablet 3    escitalopram (LEXAPRO) 10 MG tablet TAKE 1 TABLET BY MOUTH DAILY 90 tablet 5    alendronate (FOSAMAX) 70 MG tablet Take 1 every week 12 tablet 3    gabapentin (NEURONTIN) 300 MG capsule TK 1 C PO TID 90 capsule 3    diclofenac (VOLTAREN) 75 MG EC tablet TK 1 T PO BID  tablet 3    ondansetron (ZOFRAN) 4 MG tablet Take 1 tablet by mouth every 6 hours as needed for Nausea or Vomiting 30 tablet 0    risedronate (ACTONEL) 35 MG tablet Take once per week in the morning with a full glass of water, on an empty stomach, and do not take anything else by mouth or lie down for the next 30 minutes. 12 tablet 3    telmisartan (MICARDIS) 40 MG tablet Take 1 tablet by mouth daily 90 tablet 3    tiZANidine (ZANAFLEX) 4 MG tablet TK 1 T PO TID PRF SPASMS  0    omeprazole (PRILOSEC) 20 MG delayed release capsule TAKE 1 CAPSULE DAILY 90 capsule 3    fluticasone-vilanterol (BREO ELLIPTA) 100-25 MCG/INH AEPB inhaler Inhale 1 puff into the lungs daily 3 each 3    chlorthalidone (HYGROTON) 25 MG tablet Take 1 tablet by mouth daily 90 tablet 3    potassium chloride (KLOR-CON M) 20 MEQ extended release tablet Take 1 tablet by mouth daily 90 tablet 3    Multiple Vitamins-Minerals (MULTIVITAMIN PO) Take by mouth daily Women's Ultra Oscar for Menopause      albuterol sulfate HFA (PROVENTIL HFA) 108 (90 BASE) MCG/ACT inhaler 2 PUFFS EVERY 4 HOURS AS NEEDED WHEEZING 1 Inhaler 5     No current facility-administered medications for this visit.         Social History:   Social History     Socioeconomic History    Marital status:      Spouse name: Not on file    Number of children: Not on file    Years of education: Not on file    Highest education

## 2019-09-17 ENCOUNTER — OFFICE VISIT (OUTPATIENT)
Dept: PSYCHOLOGY | Age: 69
End: 2019-09-17
Payer: MEDICARE

## 2019-09-17 DIAGNOSIS — F43.23 ADJUSTMENT DISORDER WITH MIXED ANXIETY AND DEPRESSED MOOD: Primary | ICD-10-CM

## 2019-09-17 PROCEDURE — 90832 PSYTX W PT 30 MINUTES: CPT | Performed by: PSYCHOLOGIST

## 2019-09-17 ASSESSMENT — ANXIETY QUESTIONNAIRES
3. WORRYING TOO MUCH ABOUT DIFFERENT THINGS: 2-OVER HALF THE DAYS
4. TROUBLE RELAXING: 2-OVER HALF THE DAYS
6. BECOMING EASILY ANNOYED OR IRRITABLE: 2-OVER HALF THE DAYS
GAD7 TOTAL SCORE: 10
5. BEING SO RESTLESS THAT IT IS HARD TO SIT STILL: 1-SEVERAL DAYS
2. NOT BEING ABLE TO STOP OR CONTROL WORRYING: 1-SEVERAL DAYS
7. FEELING AFRAID AS IF SOMETHING AWFUL MIGHT HAPPEN: 1-SEVERAL DAYS
1. FEELING NERVOUS, ANXIOUS, OR ON EDGE: 1-SEVERAL DAYS

## 2019-09-17 ASSESSMENT — PATIENT HEALTH QUESTIONNAIRE - PHQ9
6. FEELING BAD ABOUT YOURSELF - OR THAT YOU ARE A FAILURE OR HAVE LET YOURSELF OR YOUR FAMILY DOWN: 1
3. TROUBLE FALLING OR STAYING ASLEEP: 1
8. MOVING OR SPEAKING SO SLOWLY THAT OTHER PEOPLE COULD HAVE NOTICED. OR THE OPPOSITE, BEING SO FIGETY OR RESTLESS THAT YOU HAVE BEEN MOVING AROUND A LOT MORE THAN USUAL: 0
1. LITTLE INTEREST OR PLEASURE IN DOING THINGS: 1
10. IF YOU CHECKED OFF ANY PROBLEMS, HOW DIFFICULT HAVE THESE PROBLEMS MADE IT FOR YOU TO DO YOUR WORK, TAKE CARE OF THINGS AT HOME, OR GET ALONG WITH OTHER PEOPLE: 1
4. FEELING TIRED OR HAVING LITTLE ENERGY: 1
SUM OF ALL RESPONSES TO PHQ9 QUESTIONS 1 & 2: 2
SUM OF ALL RESPONSES TO PHQ QUESTIONS 1-9: 6
9. THOUGHTS THAT YOU WOULD BE BETTER OFF DEAD, OR OF HURTING YOURSELF: 0
5. POOR APPETITE OR OVEREATING: 0
2. FEELING DOWN, DEPRESSED OR HOPELESS: 1
7. TROUBLE CONCENTRATING ON THINGS, SUCH AS READING THE NEWSPAPER OR WATCHING TELEVISION: 1
SUM OF ALL RESPONSES TO PHQ QUESTIONS 1-9: 6

## 2019-09-17 NOTE — PROGRESS NOTES
Behavioral Health Consultation  [address] PsyD  Psychologist  9/17/2019  1:05 PM      Time spent with Patient: 30 minutes  This is patient's second [facility] appointment. Reason for Consult:  depression  Referring Provider: Beth Parsons MD  [address], [address]        S:  During the last visit pt set goals to 1) [address] to practice progressive muscle relaxation exercise 2) practice diaphragmatic breathing 2x/day for 10 min when not anxious to work on skill mastery before skill generalization (download the free derrell, [id number], to practice if needed) 3) return for f/u in one week    Pt reports she has had a much better week. Still struggling with diaphragmatic breathing but is working on it. Uses the derrell. Has also tried to do PMR but having a hard time due to the knee and back pain. Feels \"calmer\" the past week. Not snapping as much and getting as frustrated with . Does struggle with  ranting about tings and wants her agreement. Finds it hard and rationalizes his feelings. Wants to respond differently - sometimes gets fed up and ends up leaving.         O:  MSE:    Appearance    alert, cooperative  Sleep disturbance Yes  Fatigue Yes  Loss of pleasure Yes  Impulsive behavior No  Speech    normal rate and normal volume  Mood    \"ok- calmer\"  Affect     Congruent to thought content and mood  Thought Content    intact  Thought Process    linear and goal directed  Associations    logical connections  Insight    Good  Judgment    Intact  Orientation    oriented to person, place, time, and general circumstances  Memory    recent and remote memory intact  Attention/Concentration    intact  Ability to understand instructions Yes  Ability to respond meaningfully Yes  Suicide Assessment     Denies SI      History:    Medications:   Current Outpatient Medications   Medication Sig Dispense Refill    furosemide (LASIX) 20 MG tablet Take 1 tablet by health for scores 10 or greater.     PHQ Scores 9/17/2019 9/10/2019 5/17/2019 4/29/2019 6/18/2018 12/12/2017 12/2/2016   PHQ2 Score 2 3 0 0 0 0 0   PHQ9 Score 6 11 0 0 0 0 0     Interpretation of Total Score Depression Severity: 1-4 = Minimal depression, 5-9 = Mild depression, 10-14 = Moderate depression, 15-19 = Moderately severe depression, 20-27 = Severe depression      Diagnosis:    Adjustment Disorder with Mixed Anxious and Depressed Mood    Plan:  Pt interventions:  [place]-setting to identify pt's primary goals for [facility] visit / overall health and Supportive techniques  taught interpersonal effectiveness skills, reinforced pt's skill use and discussed skill mastery vs skill generalization, treatment planning    Pt Behavioral Change Plan:  Pt set goals to 1) practice making \"when _______, I feel _______\" statements when engaging with  2) practice making validation statements when he is escalated  3) continue to practice diaphragmatic breathing 4) return for f/u in 4 weeks, sooner if needed

## 2019-10-02 RX ORDER — POTASSIUM CHLORIDE 1500 MG/1
TABLET, EXTENDED RELEASE ORAL
Qty: 90 TABLET | Refills: 3 | Status: SHIPPED | OUTPATIENT
Start: 2019-10-02 | End: 2020-10-16

## 2019-11-25 ENCOUNTER — TELEPHONE (OUTPATIENT)
Dept: INTERNAL MEDICINE CLINIC | Age: 69
End: 2019-11-25

## 2019-11-26 DIAGNOSIS — S32.810G: ICD-10-CM

## 2019-11-26 RX ORDER — OXYCODONE HYDROCHLORIDE AND ACETAMINOPHEN 5; 325 MG/1; MG/1
1 TABLET ORAL EVERY 6 HOURS PRN
Qty: 56 TABLET | Refills: 0 | Status: SHIPPED | OUTPATIENT
Start: 2019-11-26 | End: 2019-12-04

## 2019-12-04 DIAGNOSIS — S32.10XG CLOSED FRACTURE OF SACRUM WITH DELAYED HEALING, UNSPECIFIED PORTION OF SACRUM, SUBSEQUENT ENCOUNTER: ICD-10-CM

## 2019-12-04 RX ORDER — OXYCODONE HYDROCHLORIDE AND ACETAMINOPHEN 5; 325 MG/1; MG/1
1 TABLET ORAL 2 TIMES DAILY PRN
Qty: 60 TABLET | Refills: 0 | Status: SHIPPED | OUTPATIENT
Start: 2019-12-04 | End: 2020-06-25 | Stop reason: SDUPTHER

## 2019-12-05 ENCOUNTER — TELEPHONE (OUTPATIENT)
Dept: INTERNAL MEDICINE CLINIC | Age: 69
End: 2019-12-05

## 2020-01-07 ENCOUNTER — TELEPHONE (OUTPATIENT)
Dept: INTERNAL MEDICINE CLINIC | Age: 70
End: 2020-01-07

## 2020-01-17 ENCOUNTER — OFFICE VISIT (OUTPATIENT)
Dept: INTERNAL MEDICINE CLINIC | Age: 70
End: 2020-01-17
Payer: MEDICARE

## 2020-01-17 VITALS — SYSTOLIC BLOOD PRESSURE: 158 MMHG | HEART RATE: 81 BPM | DIASTOLIC BLOOD PRESSURE: 70 MMHG | OXYGEN SATURATION: 98 %

## 2020-01-17 PROBLEM — R35.0 URINARY FREQUENCY: Status: ACTIVE | Noted: 2020-01-17

## 2020-01-17 PROBLEM — R10.9 RIGHT SIDED ABDOMINAL PAIN: Status: ACTIVE | Noted: 2020-01-17

## 2020-01-17 PROCEDURE — 1123F ACP DISCUSS/DSCN MKR DOCD: CPT | Performed by: INTERNAL MEDICINE

## 2020-01-17 PROCEDURE — G8399 PT W/DXA RESULTS DOCUMENT: HCPCS | Performed by: INTERNAL MEDICINE

## 2020-01-17 PROCEDURE — 3017F COLORECTAL CA SCREEN DOC REV: CPT | Performed by: INTERNAL MEDICINE

## 2020-01-17 PROCEDURE — 1090F PRES/ABSN URINE INCON ASSESS: CPT | Performed by: INTERNAL MEDICINE

## 2020-01-17 PROCEDURE — G8427 DOCREV CUR MEDS BY ELIG CLIN: HCPCS | Performed by: INTERNAL MEDICINE

## 2020-01-17 PROCEDURE — 4040F PNEUMOC VAC/ADMIN/RCVD: CPT | Performed by: INTERNAL MEDICINE

## 2020-01-17 PROCEDURE — 1036F TOBACCO NON-USER: CPT | Performed by: INTERNAL MEDICINE

## 2020-01-17 PROCEDURE — 99215 OFFICE O/P EST HI 40 MIN: CPT | Performed by: INTERNAL MEDICINE

## 2020-01-17 PROCEDURE — G8484 FLU IMMUNIZE NO ADMIN: HCPCS | Performed by: INTERNAL MEDICINE

## 2020-01-17 PROCEDURE — G8417 CALC BMI ABV UP PARAM F/U: HCPCS | Performed by: INTERNAL MEDICINE

## 2020-01-17 RX ORDER — QUINAPRIL 20 MG/1
20 TABLET ORAL DAILY
Qty: 90 TABLET | Refills: 3 | Status: SHIPPED | OUTPATIENT
Start: 2020-01-17 | End: 2020-04-16

## 2020-01-17 ASSESSMENT — PATIENT HEALTH QUESTIONNAIRE - PHQ9
SUM OF ALL RESPONSES TO PHQ9 QUESTIONS 1 & 2: 0
SUM OF ALL RESPONSES TO PHQ QUESTIONS 1-9: 0
SUM OF ALL RESPONSES TO PHQ QUESTIONS 1-9: 0
1. LITTLE INTEREST OR PLEASURE IN DOING THINGS: 0
2. FEELING DOWN, DEPRESSED OR HOPELESS: 0

## 2020-01-17 ASSESSMENT — ENCOUNTER SYMPTOMS
BACK PAIN: 1
CONSTIPATION: 0
SHORTNESS OF BREATH: 0

## 2020-01-17 NOTE — ASSESSMENT & PLAN NOTE
Pushpa Griffith was evaluated today and a DME order was entered for a lightweight foldable wheelchair because she requires this to successfully complete daily living tasks of ambulating. A lightweight wheelchair is necessary due to the patient's impaired ambulation and mobility restrictions. The patient would not be able to resolve these daily living tasks using a cane or walker. The patient can self-propel a lightweight wheelchair safely in their home and can maneuver within their home with adequate access. The patient cannot self-propel in a standard wheelchair. The need for this equipment was discussed with the patient and she understands, is in agreement, and has not expressed an unwillingness to use the wheelchair. Wheelchair should be foldable. Patient cannot safely use walker, cane or crutches without significant risk of falling and sustaining further injury.

## 2020-01-17 NOTE — PROGRESS NOTES
these daily living tasks using a cane or walker. The patient can self-propel a lightweight wheelchair safely in their home and can maneuver within their home with adequate access. The patient cannot self-propel in a standard wheelchair. The need for this equipment was discussed with the patient and she understands, is in agreement, and has not expressed an unwillingness to use the wheelchair. Wheelchair should be foldable. Patient cannot safely use walker, cane or crutches without significant risk of falling and sustaining further injury. Quadriceps tendon rupture, right, sequela  Valma Render was evaluated today and a DME order was entered for a lightweight foldable wheelchair because she requires this to successfully complete daily living tasks of ambulating. A lightweight wheelchair is necessary due to the patient's impaired ambulation and mobility restrictions. The patient would not be able to resolve these daily living tasks using a cane or walker. The patient can self-propel a lightweight wheelchair safely in their home and can maneuver within their home with adequate access. The patient cannot self-propel in a standard wheelchair. The need for this equipment was discussed with the patient and she understands, is in agreement, and has not expressed an unwillingness to use the wheelchair. Wheelchair should be foldable. Patient cannot safely use walker, cane or crutches without significant risk of falling and sustaining further injury. Hypertension  Stop chlorthalidone. Monitor BP. Telmisartan very expensive. We will try prescription for Accupril. Right sided abdominal pain  Status post trauma after a fall in the bathroom. Suspect soft tissue injury right flank versus possible rib fracture. Patient willing to monitor for continued progress. Osteoporosis  Now on Forteo daily- at $3400 per month-she gets assistance.     Urinary frequency  Multiple times per day-occasionally every

## 2020-01-17 NOTE — ASSESSMENT & PLAN NOTE
Multiple times per day-occasionally every hour. The volume is appropriate. Discussed options including trial of antimuscarinic agents, or referral to urology. At this point time patient chooses the option to just monitor her symptoms for the time being. If she wants to try a medication such as oxybutynin she will call here.

## 2020-01-17 NOTE — ASSESSMENT & PLAN NOTE
Orville Salazar was evaluated today and a DME order was entered for a lightweight foldable wheelchair because she requires this to successfully complete daily living tasks of ambulating. A lightweight wheelchair is necessary due to the patient's impaired ambulation and mobility restrictions. The patient would not be able to resolve these daily living tasks using a cane or walker. The patient can self-propel a lightweight wheelchair safely in their home and can maneuver within their home with adequate access. The patient cannot self-propel in a standard wheelchair. The need for this equipment was discussed with the patient and she understands, is in agreement, and has not expressed an unwillingness to use the wheelchair. Wheelchair should be foldable. Patient cannot safely use walker, cane or crutches without significant risk of falling and sustaining further injury.

## 2020-01-17 NOTE — ASSESSMENT & PLAN NOTE
Status post trauma after a fall in the bathroom. Suspect soft tissue injury right flank versus possible rib fracture. Patient willing to monitor for continued progress.

## 2020-01-20 ENCOUNTER — TELEPHONE (OUTPATIENT)
Dept: INTERNAL MEDICINE CLINIC | Age: 70
End: 2020-01-20

## 2020-01-20 RX ORDER — OXYBUTYNIN CHLORIDE 5 MG/1
TABLET, EXTENDED RELEASE ORAL
Qty: 30 TABLET | Refills: 0 | Status: SHIPPED | OUTPATIENT
Start: 2020-01-20 | End: 2020-01-21

## 2020-01-21 RX ORDER — OXYBUTYNIN CHLORIDE 5 MG/1
TABLET, EXTENDED RELEASE ORAL
Qty: 90 TABLET | Refills: 1 | Status: SHIPPED | OUTPATIENT
Start: 2020-01-21 | End: 2020-02-10

## 2020-02-10 ENCOUNTER — TELEPHONE (OUTPATIENT)
Dept: INTERNAL MEDICINE CLINIC | Age: 70
End: 2020-02-10

## 2020-02-10 NOTE — TELEPHONE ENCOUNTER
Pelvic ultrasound  Diagnosis-urinary frequency    I wanted her on oxybutynin 5 mg at 1/2 tablet twice daily. I have no idea how the Ditropan XL 5 mg got called in. Get the ultrasound.

## 2020-02-24 ENCOUNTER — TELEPHONE (OUTPATIENT)
Dept: INTERNAL MEDICINE CLINIC | Age: 70
End: 2020-02-24

## 2020-02-24 NOTE — TELEPHONE ENCOUNTER
Per patient fell again.   Therapist feels Aquatic Therapy would help the patient    Order faxed to [phone]

## 2020-02-26 ENCOUNTER — HOSPITAL ENCOUNTER (OUTPATIENT)
Dept: PHYSICAL THERAPY | Age: 70
Setting detail: THERAPIES SERIES
Discharge: HOME | End: 2020-02-26
Payer: MEDICARE

## 2020-02-26 PROCEDURE — 97116 GAIT TRAINING THERAPY: CPT

## 2020-02-26 PROCEDURE — 97162 PT EVAL MOD COMPLEX 30 MIN: CPT

## 2020-02-26 PROCEDURE — 97110 THERAPEUTIC EXERCISES: CPT

## 2020-02-26 NOTE — PLAN OF CARE
and then she had surgery to have scar tissue removed. In December 2018 she ruptured her R quad tendon. She started with PT at [facility] last spring and was then diagnosed with osteopenia. She subsequently fractured her pelvis and vertebrae and she has had cement placed in her pelvis. She can get out of her wheelchair and walk small distances around her house using a cane or rolling walker. She has been using the w/c for the last few weeks mostly for community socialization and MD appointments. Her back pain is severe (9/10) when she walks too much. She has been working on her balance at home at recommendations of last PT but has not been working on steps since last fall. She had previously been working on stairs, she started with a 4 inch step and progressed to a 6 inch step. Her most recent fall was about a week ago, she did not go to get Xrays. Pt states her fall before the most recent one she broke a few ribs. 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 Home set up: [facility], laundry room on main floor. 1.5 steps from garage into house with 1 HR. No HR when going in through front door. Does have a pool at home that she can use during the summer. OBJECTIVE:     Functional Mobility/Transfers: Heavy use of UEs    Prolonged sitting tolerance: reduced to <10 minutes.  Pt had to stand to alleviate back pain and then requested a pillow behind her back while in the wheelchair. Posture: posterior pelvic tilt while sitting in wheelchair. Bandages/Dressings/Incisions: Scarring noted along R knee     Gait: with RW within PT clinic, able to ambulate 23 ft x 2 trials with CGA (gait belt used) and w/c follow. Pt ambulates with R knee flexed, decreased ability to put R heel on ground. Dermatomes Normal Abnormal Comments   Top of head (C1)   *Pt reports no sensation deficits in LEs    Posterior occipital region (C2)      Side of neck (C3)      Top of shoulder (C4)      Lateral deltoid (C5)      Tip of thumb (C6)      Distal middle finger (C7)      Distal fifth finger (C8)      Medial forearm (T1)      inguinal area (L1)       anterior mid-thigh (L2)      distal ant thigh/med knee (L3)      medial lower leg and foot (L4)      lateral lower leg and foot (L5)      posterior calf (S1)      medial calcaneus (S2)           PROM AROM    L R L R          Knee extension  Lacking approximately 20-30 deg   Unable to complete LAQ while in w/c        Joint mobility: not assessed    []Normal    []Hypo   []Hyper    Strength (0-5) Left Right   Hip flexion - seated 3 3+   Hip abd - seated 4+ 4-        Knee flexion 5 4   Knee extension 5 Unable to complete LAQ   Ankle DF 4+ 4+ - pain in knee                               [x] Patient history, allergies, meds reviewed. Medical chart reviewed. See intake form. Review Of Systems (ROS):  [x]Performed Review of systems (Integumentary, CardioPulmonary, Neurological) by intake and observation. Intake form has been scanned into medical record. Patient has been instructed to contact their primary care physician regarding ROS issues if not already being addressed at this time.       Co-morbidities/Complexities (which will affect course of rehabilitation):   []None           Arthritic conditions   []Rheumatoid arthritis (M05.9)  []Osteoarthritis (M19.91)   Cardiovascular conditions   [x]Hypertension (I10)  [x]Hyperlipidemia (E78.5)  []Angina pectoris (I20)  []Atherosclerosis (I70)   Musculoskeletal conditions   []Disc pathology   []Congenital spine pathologies   [x]Prior surgical intervention  [x]Osteoporosis (M81.8)  []Osteopenia (M85.8)   Endocrine conditions   []Hypothyroid (E03.9)  []Hyperthyroid Gastrointestinal conditions   []Constipation (A55.82)   Metabolic conditions   []Morbid obesity (E66.01)  []Diabetes type 1(E10.65) or 2 (E11.65)   []Neuropathy (G60.9)     Pulmonary conditions   []Asthma (J45)  []Coughing   []COPD (J44.9)   Psychological Disorders  []Anxiety (F41.9)  []Depression (F32.9)   []Other:   [x]Other:  Hx of DVT, compression fracture, restrictive airway disease (allergy induced)        Barriers to/and or personal factors that will affect rehab potential:              []Age  []Sex   []Smoker              []Motivation/Lack of Motivation                        [x]Co-Morbidities              []Cognitive Function, education/learning barriers              []Environmental, home barriers              []profession/work barriers  []past PT/medical experience  []other:  Justification: pt with extensive surgical history and co-morbidities     Falls Risk Assessment (30 days): Pt falls frequently   [] Falls Risk assessed and no intervention required. [x] Falls Risk assessed and Patient requires intervention due to being higher risk   TUG score (>12s at risk):     [x] Falls education provided, including using AD,         ASSESSMENT: Pt is a 71year old female referred to physical therapy, specifically aquatic therapy, s/p several R knee surgeries as well as low back pain due to lumbar DDD among other co-morbidities. Pt is presenting today with the above listed deficits in LE strength and ROM, which are impaired her gait and stair climbing ability. Pt also limited in her walking, standing, and sitting tolerance due to high pain levels. Pt to benefit from skilled therapy services to address these deficits and improve functional mobility. tests and measures addressing any of the following: body structures and functions (impairments), activity limitations, and/or participation restrictions;:  [] a total of 1-2 or more elements   [x] a total of 3 or more elements   [] a total of 4 or more elements   [x] A clinical presentation with:  [] stable and/or uncomplicated characteristics   [x] evolving clinical presentation with changing characteristics  [] unstable and unpredictable characteristics;   [x] Clinical decision making of [] low, [x] moderate, [] high complexity using standardized patient assessment instrument and/or measurable assessment of functional outcome. [] EVAL (LOW) 86923 (typically 20 minutes face-to-face)  [x] EVAL (MOD) 41906 (typically 30 minutes face-to-face)  [] EVAL (HIGH) 24306 (typically 45 minutes face-to-face)  [] RE-EVAL     PLAN:   Frequency/Duration:  2 days per week for 6 Weeks:  Interventions:  [x]  Therapeutic exercise including: strength training, ROM, including postural re-education. [x]  NMR activation and proprioception, including postural re-education. [x]  Manual therapy as indicated to include: Dry Needling/IASTM, STM, PROM, Gr I-IV mobilizations, manipulation. [x] Modalities as needed that may include: thermal agents, E-stim, Biofeedback, US, iontophoresis as indicated  [x] Patient education on joint protection, postural re-education, activity modification, progression of HEP. HEP instruction: Written HEP instructions provided and reviewed    GOALS:  Patient stated goal: return to walking and driving    Therapist goals for Patient:   Short Term Goals: To be achieved in: 2 weeks  1. Independent in HEP and progression per patient tolerance, in order to prevent re-injury. 2. Patient will have a decrease in pain to facilitate improvement in movement, function, and ADLs as indicated by Functional Deficits. Long Term Goals: To be achieved in: 6 weeks  1.  Disability index score of 40% or less for the SARIKA to assist with reaching prior level of function. 2. Patient will demonstrate increased AROM to [facility] to allow for proper joint functioning as indicated by patients Functional Deficits. 3. Patient will demonstrate an increase in Strength to at least 4/5 for hip flexion and ability to complete LAQ on R LE  to allow for proper functional mobility as indicated by patients Functional Deficits. 4. Patient will return to functional activities including ambulating up 2-3 steps with 1 HR and AD with supervision. 5. Patient will be able to ambulate at least 75 ft with RW and with improved ability to complete heel strike and leave heel down during stance phase for more normal gait pattern.       Electronically signed by:  TALIA ThompsonT

## 2020-02-26 NOTE — FLOWSHEET NOTE
AquaticTherapy Dates of Service:   Aquatic Visits Exercises/Activities: pt to be 1:1 with therapist, may need belt around waist  61968 and / or 70378   Transfers:          % Immersion:            Ambulation:   UE Exercises:       Forwards    Shoulder Shrugs      Lateral    Shoulder Circles      Retro    Scapular Retraction       Marching   Push Downs       Cariocas   Punching     Jog    Rowing     Multifidi walkouts with paddle   Elbow Flex/Ext       Shldr Flex/Ext       [employer] aBd/aDd    LE Exercises:  Shldr Horiz aBd/aDd    HR/TR  Shldr IR/ER    Marches  Arm Circles    Squats   PNF Diagonals    Hamstring Curls  Wall Push Ups    Hip Flexion (SLR)  Figure 8's    Hip aBduction (SLR)  Bilateral Pull Downs    Hip Extension (SLR)       Hip aDduction (SLR)      Hip Circles  Functional:    Hip IR/Er  Step up forward    TrA Set   Step up lateral     Pelvic Tilts   Step down     Fig 8's   Lunges Forward    LE PNF  Lunges Retro      Lunges Lateral     Balance:   Lower ab curl with noodle     SLS        Tandem Stance       NBOS eyes open  Seated:     NBOS eyes closed  Ankle pumps     Hand to Opposite Knee  Ankle Circles     Fwd Step ups to SLS  Knee Flex/Ext    Lateral Step ups to SLS  Hip aBd/aDd    Stop/Go Gait   Bicycle       Ankle DF/PF      Ankle Inv/Ev    Stretching:       Gastroc/Soleus      Hamstring   Noodle:     Knee Flex Stretch  Leg Press    Piriformis   Noodle Hang at [employer]    Hip Flexor  Noodle Hang Deep Water    SKTC  Noodle Bicycle     DKTC       ITB      Quad  Deep Water:    Mid Back   Jog    UT  Jumping Jacks    Post Shoulder  Heel to Toe    Ladder Pull  Hand to Opposite Knee    Pec Stretch  Rocking Horse      [employer] Skier          Cervical:   Other:     AROM Flexion      AROM Extension      AROM Side Bending      AROM Rotation      Chin Tucks      Chin Nods        Aquatic Abbreviation Key  B= Belt DB= Dumbells T= Theratube   H= Hydrotone N= Noodles W= Weights   P= Paddles S= Speedo equipment update on progress.

## 2020-03-02 ENCOUNTER — HOSPITAL ENCOUNTER (OUTPATIENT)
Dept: PHYSICAL THERAPY | Age: 70
Setting detail: THERAPIES SERIES
Discharge: HOME | End: 2020-03-02
Payer: MEDICARE

## 2020-03-02 PROCEDURE — 97113 AQUATIC THERAPY/EXERCISES: CPT

## 2020-03-02 NOTE — FLOWSHEET NOTE
(82462)                                   Neuromuscular Re-ed (53049)                                                 Manual Intervention (99934)                                                     AquaticTherapy Dates of Service: 3/2,    Aquatic Visits Exercises/Activities: pt to be 1:1 with therapist, may need belt around waist, 2 noodles used when not holding onto wall. [id number] and / or [id number]   Transfers:          % Immersion:            Ambulation:   UE Exercises:       Forwards   Attempted  Shoulder Shrugs      Lateral    Shoulder Circles      Retro    Scapular Retraction       Marching   Push Downs       Cariocas   Punching     Jog    Rowing     Multifidi walkouts with paddle   Elbow Flex/Ext       Shldr Flex/Ext       [employer] aBd/aDd    LE Exercises:  Shldr Horiz aBd/aDd    HR/TR  Shldr IR/ER    Marches  Arm Circles    Squats   PNF Diagonals    Hamstring Curls  Wall Push Ups    Hip Flexion (SLR)  Figure 8's    Hip aBduction (SLR)  Bilateral Pull Downs    Hip Extension (SLR)       Hip aDduction (SLR)      Hip Circles  Functional:    Hip IR/Er  Step up forward    TrA Set   Step up lateral     Pelvic Tilts   Step down     Fig 8's   Lunges Forward    LE PNF  Lunges Retro    TKE with PT assist  X 15 total - see below                    Lunges Lateral     Balance:   Lower ab curl with noodle     SLS        Tandem Stance       NBOS eyes open  Seated:     NBOS eyes closed  Ankle pumps 2 x 10   Hand to Opposite Knee  Ankle Circles     Fwd Step ups to SLS  Knee Flex/Ext 2 x 10    Lateral Step ups to SLS  Hip aBd/aDd    Stop/Go Gait   Bicycle       Ankle DF/PF      Ankle Inv/Ev    Stretching:   Marching  2 x 10    Gastroc/Soleus      Hamstring   Noodle:     Knee Flex Stretch  Leg Press    Piriformis   Noodle Hang at [employer]    Hip Flexor  Noodle Hang Deep Water    SKTC  Noodle Bicycle     DKTC       ITB      Quad  Deep Water:    Mid Back   Jog    UT  Jumping Jacks    Post Shoulder  Heel to Toe    Ladder Pull  Hand to Opposite Knee    Pec Stretch  Rocking Horse      [employer]          Cervical:   Other:     AROM Flexion      AROM Extension      AROM Side Bending      AROM Rotation      Chin Tucks      Chin Nods        Aquatic Abbreviation Key  B= Belt DB= Dumbells T= Theratube   H= Hydrotone N= Noodles W= Weights   P= Paddles S= Speedo equipment K= Kickboard       Pt. Education:  -pt educated on diagnosis, prognosis and expectations for rehab  -all pt questions were answered  -Gave pt tour of pool, explained how to use lockers, what to wear, that it would be in group setting, and showed pt aquatic equipment. HEP instruction:  Pt completing HEP from [address] and [facility]  [] (45120) Provided verbal/tactile cueing for activities related to strengthening, flexibility, endurance, ROM for improvements in  [] LE / Lumbar: LE, proximal hip, and core control with self care, mobility, lifting, ambulation. [] UE / Cervical: cervical, postural, scapular, scapulothoracic and UE control with self care, reaching, carrying, lifting, house/yardwork, driving, computer work.  [] (47765) Provided verbal/tactile cueing for activities related to improving balance, coordination, kinesthetic sense, posture, motor skill, proprioception to assist with   [] LE / lumbar: LE, proximal hip, and core control in self care, mobility, lifting, ambulation and eccentric single leg control. [] UE / cervical: cervical, scapular, scapulothoracic and UE control with self care, reaching, carrying, lifting, house/yardwork, driving, computer work. [x] (29273) Aquatic therapy with therapeutic exercise. Provided verbal and tactile cueing for activities related to strengthening, flexibility, endurance, ROM for improvements in  [x] LE / lumbar: LE, proximal hip, and core control in self care, mobility, lifting, ambulation and eccentric single leg control.    [] UE / cervical: cervical, scapular, scapulothoracic and UE control with self care, index score of 40% or less for the SARIKA to assist with reaching prior level of function. 2. Patient will demonstrate [facility] to allow for proper joint functioning as indicated by patients Functional Deficits. 3. Patient will demonstrate an increase in Strength to at least 4/5 for hip flexion and ability to complete LAQ on R LE  to allow for proper functional mobility as indicated by patients Functional Deficits. 4. Patient will return to functional activities including ambulating up 2-3 steps with 1 HR and AD with supervision. 5. Patient will be able to ambulate at least 75 ft with RW and with improved ability to complete heel strike and leave heel down during stance phase for more normal gait pattern. Overall Progression Towards Functional goals/ Treatment Progress Update:  [] Patient is progressing as expected towards functional goals listed. [] Progression is slowed due to complexities/Impairments listed. [] Progression has been slowed due to co-morbidities. [x] Plan just implemented, too soon to assess goals progression <30days   [] Goals require adjustment due to lack of progress  [] Patient is not progressing as expected and requires additional follow up with physician  [] Other    Persisting Functional Limitations/Impairments:  [x]Sleeping [x]Sitting               [x]Standing []Transfers        [x]Walking []Kneeling               [x]Stairs []Squatting / bending   []ADLs []Reaching  []Lifting  []Housework  []Driving []Job related tasks  []Sports/Recreation []Other:      ASSESSMENT: Significant portion of session spent this date on TKE and trying to get pt's heel on ground while standing upright. Pt unable to ambulate in pool this date. Will progress as able.    Treatment/Activity Tolerance:  [] Patient able to complete tx  [] Patient limited by fatique  [x] Patient limited by pain  [] Patient limited by other medical complications  [] Other:     Prognosis: [] Good [x] Fair  [] Poor    Patient Requires Follow-up: [x] Yes  [] No         Plan for next treatment session: aquatics    PLAN: See eval. PT 2x / week for 6 weeks. [x] Continue per plan of care [] Alter current plan (see comments)  [] Plan of care initiated [] Hold pending MD visit [] Discharge    Electronically signed by: Lindsey Corley PT, DPT    Note: If patient does not return for scheduled/ recommended follow up visits, his note will serve as a discharge from care along with most recent update on progress.

## 2020-03-03 ENCOUNTER — HOSPITAL ENCOUNTER (OUTPATIENT)
Dept: ULTRASOUND IMAGING | Age: 70
Discharge: HOME | End: 2020-03-03
Payer: MEDICARE

## 2020-03-03 ENCOUNTER — TELEPHONE (OUTPATIENT)
Dept: INTERNAL MEDICINE CLINIC | Age: 70
End: 2020-03-03

## 2020-03-03 PROCEDURE — 76775 US EXAM ABDO BACK WALL LIM: CPT

## 2020-03-04 ENCOUNTER — HOSPITAL ENCOUNTER (OUTPATIENT)
Dept: PHYSICAL THERAPY | Age: 70
Setting detail: THERAPIES SERIES
Discharge: HOME | End: 2020-03-04
Payer: MEDICARE

## 2020-03-04 PROCEDURE — 97150 GROUP THERAPEUTIC PROCEDURES: CPT

## 2020-03-04 PROCEDURE — 97113 AQUATIC THERAPY/EXERCISES: CPT

## 2020-03-04 NOTE — FLOWSHEET NOTE
[address] [facility] Physical Therapy  Phone: [phone]   Fax: [phone]    Physical Therapy Daily Treatment Note  Date:  3/4/2020    Patient Name:  Radha Wooten    :  1950  MRN: [id number]  Medical/Treatment Diagnosis Information:  Diagnosis: Lumbar disc disease M51.9  Treatment Diagnosis: Increased low back pain with prolonged positions, decreased LE strength and R knee ROM, impaired gait and balance, difficulty with stairs, impaired balance leading to increased fall risk  Insurance/Certification information:  PT Insurance Information: Primary: Medicare; Secondary: Medical [place] [employer]   Physician Information:  Referring Practitioner: Ruth Ann Kerns MD (MUC:[phone])  Plan of care signed (Y/N): []  Yes [x]  No     Date of Patient follow up with Physician:      Progress Report: []  Yes  [x]  No     Date Range for reporting period:  Beginning 2020  Ending    Progress report due (10 Rx/or 30 days whichever is less):      Recertification due (POC duration/ or 90 days whichever is less):2020     Visit # Insurance Allowable Auth required? Date Range   3/12 Med nec []  Yes  []  No n/a     Latex Allergy:  [x]NO      []YES  Preferred Language for Healthcare:   [x]English       []other:    Functional Scale:        Date assessed:  Oswestry raw score = 32; dysfunction = 64%    2020    Pain level:  3/10     SUBJECTIVE:  Reports being tired from last session.         OBJECTIVE: 3/2, 3/4: Pt arrived in w/c with assistance of        RESTRICTIONS/PRECAUTIONS: extensive surgical history, osteoporosis, high fall risk    Land Based Treatment Dates:   Exercises/Interventions:     Therapeutic Exercises (75833) Resistance / level Sets/sec Reps Notes                                                                         Therapeutic Activities (74454)                                   Neuromuscular Re-ed (55429) Manual Intervention (25194)                                                     AquaticTherapy Dates of Service: 3/2, 3/4   Aquatic Visits Exercises/Activities: pt to be 1:1 with therapist, may need belt around waist, 2 noodles used when not holding onto wall. [id number] and / or 02540   Transfers:          % Immersion:            Ambulation:   UE Exercises:       Forwards   Attempted  Shoulder Shrugs      Lateral    Shoulder Circles      Retro    Scapular Retraction       Marching   Push Downs       Cariocas   Punching     Jog    Rowing     Multifidi walkouts with paddle   Elbow Flex/Ext       Shldr Flex/Ext       [employer] aBd/aDd    LE Exercises:  Shldr Horiz aBd/aDd    HR/TR  Shldr IR/ER    Marches  Arm Circles    Squats   PNF Diagonals    Hamstring Curls  Wall Push Ups    Hip Flexion (SLR)  Figure 8's    Hip aBduction (SLR)  Bilateral Pull Downs    Hip Extension (SLR)       Hip aDduction (SLR)      Hip Circles  Functional:    Hip IR/Er  Step up forward    TrA Set   Step up lateral     Pelvic Tilts   Step down     Fig 8's   Lunges Forward    LE PNF  Lunges Retro    TKE with PT assist  2 X 15 total - see below      TKE - started with pt in a lunge with R LE fwd, and then standing into extension Held for stretch 30 sec with ea rep , 2 sets of 6 reps              Lunges Lateral     Balance:   Lower ab curl with noodle     SLS        Tandem Stance       NBOS eyes open  Seated:     NBOS eyes closed  Ankle pumps 2 x 10 B   Hand to Opposite Knee  Ankle Circles     Fwd Step ups to SLS  Knee Flex/Ext 2 x 10 B   Lateral Step ups to SLS  Hip aBd/aDd    Stop/Go Gait   Bicycle       Ankle DF/PF      Ankle Inv/Ev      Marching  2 x 10 B     Manual OP into knee extension 6 x 30 sec    Stretching:       Gastroc/Soleus      Hamstring   Noodle:     Knee Flex Stretch  Leg Press 2 x 10 R   Piriformis   Noodle Hang at [place]    Hip Flexor  Noodle Hang Deep [place]    SKTC  Noodle Bicycle     DKTC       ITB      Quad  [place]:    Mid Back   Jog    UT  Jumping Jacks    Post Shoulder  Heel to Toe    Ladder Pull  Hand to Opposite Knee    Pec Stretch  Rocking Horse      [employer] Skier          Cervical:   Other:     AROM Flexion      AROM Extension      AROM Side Bending      AROM Rotation      Chin Tucks      Chin Nods        Aquatic Abbreviation Key  B= Belt DB= Dumbells T= Theratube   H= Hydrotone N= Noodles W= Weights   P= Paddles S= Speedo equipment K= Kickboard       Pt. Education:  -pt educated on diagnosis, prognosis and expectations for rehab  -all pt questions were answered  -Gave pt tour of pool, explained how to use lockers, what to wear, that it would be in group setting, and showed pt aquatic equipment. HEP instruction:  Pt completing HEP from [address] and [facility]  [] (29023) Provided verbal/tactile cueing for activities related to strengthening, flexibility, endurance, ROM for improvements in  [] LE / Lumbar: LE, proximal hip, and core control with self care, mobility, lifting, ambulation. [] UE / Cervical: cervical, postural, scapular, scapulothoracic and UE control with self care, reaching, carrying, lifting, house/yardwork, driving, computer work.  [] (84519) Provided verbal/tactile cueing for activities related to improving balance, coordination, kinesthetic sense, posture, motor skill, proprioception to assist with   [] LE / lumbar: LE, proximal hip, and core control in self care, mobility, lifting, ambulation and eccentric single leg control. [] UE / cervical: cervical, scapular, scapulothoracic and UE control with self care, reaching, carrying, lifting, house/yardwork, driving, computer work. [x] (08653) Aquatic therapy with therapeutic exercise.  Provided verbal and tactile cueing for activities related to strengthening, flexibility, endurance, ROM for improvements in  [x] LE / lumbar: LE, proximal hip, and core control in self care, mobility, lifting, ambulation and eccentric single leg indicated by Functional Deficits.     Long Term Goals: To be achieved in: 6 weeks  1. Disability index score of 40% or less for the SARIKA to assist with reaching prior level of function. 2. Patient will demonstrate increased AROM to [facility] to allow for proper joint functioning as indicated by patients Functional Deficits. 3. Patient will demonstrate an increase in Strength to at least 4/5 for hip flexion and ability to complete LAQ on R LE  to allow for proper functional mobility as indicated by patients Functional Deficits. 4. Patient will return to functional activities including ambulating up 2-3 steps with 1 HR and AD with supervision. 5. Patient will be able to ambulate at least 75 ft with RW and with improved ability to complete heel strike and leave heel down during stance phase for more normal gait pattern. Overall Progression Towards Functional goals/ Treatment Progress Update:  [] Patient is progressing as expected towards functional goals listed. [] Progression is slowed due to complexities/Impairments listed. [] Progression has been slowed due to co-morbidities. [x] Plan just implemented, too soon to assess goals progression <30days   [] Goals require adjustment due to lack of progress  [] Patient is not progressing as expected and requires additional follow up with physician  [] Other    Persisting Functional Limitations/Impairments:  [x]Sleeping [x]Sitting               [x]Standing []Transfers        [x]Walking []Kneeling               [x]Stairs []Squatting / bending   []ADLs []Reaching  []Lifting  []Housework  []Driving []Job related tasks  []Sports/Recreation []Other:      ASSESSMENT: Pt again unable to ambulate in the pool this date, but did tolerate TKE slightly better.    Treatment/Activity Tolerance:  [] Patient able to complete tx  [] Patient limited by fatique  [x] Patient limited by pain  [] Patient limited by other medical complications  [] Other:     Prognosis: [] Good [x] Fair  [] Poor    Patient Requires Follow-up: [x] Yes  [] No         Plan for next treatment session: aquatics    PLAN: See eval. PT 2x / week for 6 weeks. [x] Continue per plan of care [] Alter current plan (see comments)  [] Plan of care initiated [] Hold pending MD visit [] Discharge    Electronically signed by: Vivienne Cox PT, DPT    Note: If patient does not return for scheduled/ recommended follow up visits, his note will serve as a discharge from care along with most recent update on progress.

## 2020-03-11 ENCOUNTER — HOSPITAL ENCOUNTER (OUTPATIENT)
Dept: PHYSICAL THERAPY | Age: 70
Setting detail: THERAPIES SERIES
Discharge: HOME | End: 2020-03-11
Payer: MEDICARE

## 2020-03-11 ENCOUNTER — TELEPHONE (OUTPATIENT)
Dept: INTERNAL MEDICINE CLINIC | Age: 70
End: 2020-03-11

## 2020-03-11 PROCEDURE — 97113 AQUATIC THERAPY/EXERCISES: CPT

## 2020-03-11 RX ORDER — DOXYCYCLINE HYCLATE 100 MG
TABLET ORAL
Qty: 20 TABLET | Refills: 0 | Status: SHIPPED | OUTPATIENT
Start: 2020-03-11 | End: 2020-04-08

## 2020-03-11 RX ORDER — PREDNISONE 10 MG/1
TABLET ORAL
Qty: 15 TABLET | Refills: 0 | Status: SHIPPED | OUTPATIENT
Start: 2020-03-11 | End: 2020-04-08

## 2020-03-11 RX ORDER — FLUTICASONE FUROATE AND VILANTEROL TRIFENATATE 100; 25 UG/1; UG/1
POWDER RESPIRATORY (INHALATION)
Qty: 1 EACH | Refills: 3 | Status: SHIPPED | OUTPATIENT
Start: 2020-03-11 | End: 2020-04-02 | Stop reason: SDUPTHER

## 2020-03-11 NOTE — TELEPHONE ENCOUNTER
Pt stated the following  Had a cold for 3 weeks  Clear Mucus  At night cough so bad sometimes will throw up.   No fever  Taking everything over the counter and nothing is working  Pharmacy on file verified  Please call

## 2020-03-11 NOTE — FLOWSHEET NOTE
[address] [facility] Physical Therapy  Phone: [phone]   Fax: [phone]    Physical Therapy Daily Treatment Note  Date:  3/11/2020    Patient Name:  Janna Shepherd    :  1950  MRN: [id number]  Medical/Treatment Diagnosis Information:  Diagnosis: Lumbar disc disease M51.9  Treatment Diagnosis: Increased low back pain with prolonged positions, decreased LE strength and R knee ROM, impaired gait and balance, difficulty with stairs, impaired balance leading to increased fall risk  Insurance/Certification information:  PT Insurance Information: Primary: Medicare; Secondary: Medical [place] [employer]   Physician Information:  Referring Practitioner: Katty Alas MD (PQJ:[phone])  Plan of care signed (Y/N): []  Yes [x]  No     Date of Patient follow up with Physician:      Progress Report: []  Yes  [x]  No     Date Range for reporting period:  Beginning 2020  Ending    Progress report due (10 Rx/or 30 days whichever is less):      Recertification due (POC duration/ or 90 days whichever is less):2020     Visit # Insurance Allowable Auth required? Date Range    Med nec []  Yes  []  No n/a     Latex Allergy:  [x]NO      []YES  Preferred Language for Healthcare:   [x]English       []other:    Functional Scale:        Date assessed:  Oswestry raw score = 32; dysfunction = 64%    2020    Pain level:  2/10 B Knees, 8/10 with weight bearing and transfer     SUBJECTIVE:  Reports being very motivated this date and wants to keep progressing exercises as able. Patient wants to attend [facility] graduation.         OBJECTIVE: 3/2, 3/4: Pt arrived in w/c with assistance of        RESTRICTIONS/PRECAUTIONS: extensive surgical history, osteoporosis, high fall risk    Land Based Treatment Dates:   Exercises/Interventions:     Therapeutic Exercises (96313) Resistance / level Sets/sec Reps Notes Therapeutic Activities (08547)                                   Neuromuscular Re-ed (69102)                                                 Manual Intervention (14724)                                                     AquaticTherapy Dates of Service: 3/2, 3/4, 3/11  Aquatic Visits Exercises/Activities: pt to be 1:1 with therapist, may need belt around waist, 2 noodles used when not holding onto wall. [id number] and / or 52570   Transfers:          % Immersion:            Ambulation:   UE Exercises:       Forwards   Attempted and unable Shoulder Shrugs      Lateral    Shoulder Circles      Retro    Scapular Retraction       Marching   Push Downs       Cariocas   Punching     Jog    Rowing     Multifidi walkouts with paddle   Elbow Flex/Ext       Shldr Flex/Ext       [employer] aBd/aDd    LE Exercises:  Shldr Horiz aBd/aDd    HR/TR  Shldr IR/ER    Marches  Arm Circles    Squats   PNF Diagonals    Hamstring Curls  Wall Push Ups    Hip Flexion (SLR)  Figure 8's    Hip aBduction (SLR)  Bilateral Pull Downs    Hip Extension (SLR)       Hip aDduction (SLR)      Hip Circles  Functional:    Hip IR/Er  Step up forward    TrA Set   Step up lateral     Pelvic Tilts   Step down     Fig 8's   Lunges Forward    LE PNF  Lunges Retro    TKE with PT assist  2 X 15 total - see below      TKE - started with pt in a lunge with R LE fwd, and then standing into extension Held for stretch 30 sec with ea rep , 2 sets of 6 reps (performed at start and end of session)             Lunges Lateral     Balance:   Lower ab curl with noodle     SLS        Tandem Stance       NBOS eyes open  Seated:     NBOS eyes closed  Ankle pumps 2 x 10 B   Hand to Opposite Knee  Ankle Circles     Fwd Step ups to SLS  Knee Flex/Ext 2 x 10 B (AAROM with L LE)   Lateral Step ups to SLS  Hip aBd/aDd    Stop/Go Gait   Bicycle       Ankle DF/PF      Ankle Inv/Ev      Marching  2 x 10 B     Manual OP into knee extension 6 x 30 sec    Stretching:   Gentle carrying, lifting, house/yardwork, driving, computer work. [x] (78079) Aquatic therapy with therapeutic exercise. Provided verbal and tactile cueing for activities related to strengthening, flexibility, endurance, ROM for improvements in  [x] LE / lumbar: LE, proximal hip, and core control in self care, mobility, lifting, ambulation and eccentric single leg control. [] UE / cervical: cervical, scapular, scapulothoracic and UE control with self care, reaching, carrying, lifting, house/yardwork, driving, computer work.   [] (37154) Therapist is in constant attendance of 2 or more patients providing skilled therapy interventions, but not providing any significant amount of measurable one-on-one time to either patient, for improvements in  [] LE / lumbar: LE, proximal hip, and core control in self care, mobility, lifting, ambulation and eccentric single leg control. [] UE / cervical: cervical, scapular, scapulothoracic and UE control with self care, reaching, carrying, lifting, house/yardwork, driving, computer work.      NMR and Therapeutic Activities:    [] (01985 or 66084) Provided verbal/tactile cueing for activities related to improving balance, coordination, kinesthetic sense, posture, motor skill, proprioception and motor activation to allow for proper function of   [] LE: / Lumbar core, proximal hip and LE with self care and ADLs  [] UE / Cervical: cervical, postural, scapular, scapulothoracic and UE control with self care, carrying, lifting, driving, computer work.   [] (89473) Gait Re-education- Provided training and instruction to the patient for proper LE, core and proximal hip recruitment and positioning and eccentric body weight control with ambulation re-education including up and down stairs     Home Exercise Program:    [] (63631) Reviewed/Progressed HEP activities related to strengthening, flexibility, endurance, ROM of   [] LE / Lumbar: core, proximal hip and LE for functional self-care, x 3  [] Other:   [] Group:     GOALS:   Patient stated goal: return to walking and driving     Therapist goals for Patient:   Short Term Goals: To be achieved in: 2 weeks  1. Independent in HEP and progression per patient tolerance, in order to prevent re-injury. 2. Patient will have a decrease in pain to facilitate improvement in movement, function, and ADLs as indicated by Functional Deficits.     Long Term Goals: To be achieved in: 6 weeks  1. Disability index score of 40% or less for the SARIKA to assist with reaching prior level of function. 2. Patient will demonstrate increased AROM to [facility] to allow for proper joint functioning as indicated by patients Functional Deficits. 3. Patient will demonstrate an increase in Strength to at least 4/5 for hip flexion and ability to complete LAQ on R LE  to allow for proper functional mobility as indicated by patients Functional Deficits. 4. Patient will return to functional activities including ambulating up 2-3 steps with 1 HR and AD with supervision. 5. Patient will be able to ambulate at least 75 ft with RW and with improved ability to complete heel strike and leave heel down during stance phase for more normal gait pattern. Overall Progression Towards Functional goals/ Treatment Progress Update:  [] Patient is progressing as expected towards functional goals listed. [] Progression is slowed due to complexities/Impairments listed. [] Progression has been slowed due to co-morbidities.   [x] Plan just implemented, too soon to assess goals progression <30days   [] Goals require adjustment due to lack of progress  [] Patient is not progressing as expected and requires additional follow up with physician  [] Other    Persisting Functional Limitations/Impairments:  [x]Sleeping [x]Sitting               [x]Standing []Transfers        [x]Walking []Kneeling               [x]Stairs []Squatting / bending   []ADLs []Reaching  []Lifting

## 2020-03-13 ENCOUNTER — HOSPITAL ENCOUNTER (OUTPATIENT)
Dept: PHYSICAL THERAPY | Age: 70
Setting detail: THERAPIES SERIES
Discharge: HOME | End: 2020-03-13
Payer: MEDICARE

## 2020-03-13 PROCEDURE — 97113 AQUATIC THERAPY/EXERCISES: CPT

## 2020-03-17 RX ORDER — OMEPRAZOLE 20 MG/1
CAPSULE, DELAYED RELEASE ORAL
Qty: 90 CAPSULE | Refills: 3 | Status: ON HOLD | OUTPATIENT
Start: 2020-03-17 | End: 2020-11-23 | Stop reason: HOSPADM

## 2020-03-18 ENCOUNTER — APPOINTMENT (OUTPATIENT)
Dept: PHYSICAL THERAPY | Age: 70
End: 2020-03-18
Payer: MEDICARE

## 2020-03-19 ENCOUNTER — TELEPHONE (OUTPATIENT)
Dept: INTERNAL MEDICINE CLINIC | Age: 70
End: 2020-03-19

## 2020-03-19 RX ORDER — DOXYCYCLINE HYCLATE 100 MG
100 TABLET ORAL 2 TIMES DAILY
Qty: 14 TABLET | Refills: 0 | Status: SHIPPED | OUTPATIENT
Start: 2020-03-19 | End: 2020-03-26

## 2020-03-19 NOTE — TELEPHONE ENCOUNTER
Given her mother's overall condition I would probably put her on an antibiotic to be safe. Doxycycline 100 mg #14 1 twice daily      If her temperature did go to 101 and she is having respiratory difficulty that she needs to be evaluated at an ER. As long she is not having shortness of breath and she is having a low temperature and there is no immediate reason to send her for coronavirus testing.

## 2020-03-20 ENCOUNTER — HOSPITAL ENCOUNTER (OUTPATIENT)
Dept: PHYSICAL THERAPY | Age: 70
Setting detail: THERAPIES SERIES
Discharge: HOME | End: 2020-03-20
Payer: MEDICARE

## 2020-03-25 ENCOUNTER — APPOINTMENT (OUTPATIENT)
Dept: PHYSICAL THERAPY | Age: 70
End: 2020-03-25
Payer: MEDICARE

## 2020-03-27 ENCOUNTER — APPOINTMENT (OUTPATIENT)
Dept: PHYSICAL THERAPY | Age: 70
End: 2020-03-27
Payer: MEDICARE

## 2020-03-30 ENCOUNTER — APPOINTMENT (OUTPATIENT)
Dept: PHYSICAL THERAPY | Age: 70
End: 2020-03-30
Payer: MEDICARE

## 2020-04-01 ENCOUNTER — APPOINTMENT (OUTPATIENT)
Dept: PHYSICAL THERAPY | Age: 70
End: 2020-04-01
Payer: MEDICARE

## 2020-04-02 RX ORDER — FLUTICASONE FUROATE AND VILANTEROL TRIFENATATE 100; 25 UG/1; UG/1
POWDER RESPIRATORY (INHALATION)
Qty: 3 EACH | Refills: 3 | Status: SHIPPED | OUTPATIENT
Start: 2020-04-02 | End: 2021-05-11

## 2020-04-03 ENCOUNTER — APPOINTMENT (OUTPATIENT)
Dept: PHYSICAL THERAPY | Age: 70
End: 2020-04-03
Payer: MEDICARE

## 2020-04-08 ENCOUNTER — TELEPHONE (OUTPATIENT)
Dept: INTERNAL MEDICINE CLINIC | Age: 70
End: 2020-04-08

## 2020-04-08 RX ORDER — ALBUTEROL SULFATE 90 UG/1
AEROSOL, METERED RESPIRATORY (INHALATION)
Qty: 1 INHALER | Refills: 5 | Status: SHIPPED | OUTPATIENT
Start: 2020-04-08 | End: 2020-04-08 | Stop reason: SDUPTHER

## 2020-04-08 RX ORDER — ALBUTEROL SULFATE 90 UG/1
AEROSOL, METERED RESPIRATORY (INHALATION)
Qty: 1 INHALER | Refills: 5 | Status: CANCELLED | OUTPATIENT
Start: 2020-04-08

## 2020-04-08 RX ORDER — ALBUTEROL SULFATE 90 UG/1
AEROSOL, METERED RESPIRATORY (INHALATION)
Qty: 1 INHALER | Refills: 5 | Status: SHIPPED | OUTPATIENT
Start: 2020-04-08 | End: 2021-05-25 | Stop reason: ALTCHOICE

## 2020-04-08 NOTE — TELEPHONE ENCOUNTER
Patient called stating the medication that she is taking,  HYDROcodone-homatropine (HYDROMET) 5-1.5 MG/5ML syrup [[id number]    She is better, but its not all goneand not having as many severe coughing spells. She also wants to know if you can put her back on rescue inhaler ?       Please call to advise

## 2020-04-14 ENCOUNTER — HOSPITAL ENCOUNTER (OUTPATIENT)
Dept: PHYSICAL THERAPY | Age: 70
Setting detail: THERAPIES SERIES
Discharge: HOME | End: 2020-04-14
Payer: MEDICARE

## 2020-04-14 PROCEDURE — 97110 THERAPEUTIC EXERCISES: CPT

## 2020-04-14 NOTE — FLOWSHEET NOTE
[address] [facility] Physical Therapy  Phone: [phone]   Fax: 01-08-13-99 Visit   Physical Therapy [address] Note/ Remote Session:       Date:  2020    Patient Name:  Gabrielle Angel    :  1950  MRN: [id number]  Medical/Treatment Diagnosis Information:  · Diagnosis: Lumbar disc disease M51.9  · Treatment Diagnosis: Increased low back pain with prolonged positions, decreased LE strength and R knee ROM, impaired gait and balance, difficulty with stairs, impaired balance leading to increased fall risk  Insurance/Certification information:  PT Insurance Information: Primary: Medicare; Secondary: Medical [place] [employer]  Physician Information:  Referring Practitioner: Christiano Ardon MD (PER:[phone])    Justification for [facility] Visit:   Due to the [facility] Emergency patients undergoing [address] were offered non - traditional Tele-Therapy follow up visits with their treating clinician to maintain continuity of care. Pursuant to the emergency declaration under the [address]  and [address] waiver authority and the [employer] and [employer] Act, this Virtual Visit was conducted, with patient's consent, to reduce the patient's risk of exposure to COVID-19 and provide continuity of care for a patient. Services were provided through a video synchronous discussion virtually to substitute for in-person appointment.      Distant Site/ place-of-service:  Patient home    Communication: [] Audio via telephone  [] Patient verbally agreed to telemedicine Informed Consent for Telephonic Rehab Follow-up via telephone        [] Video via Facetime   [x] Patient verbally agreed to telemedicine Informed Consent for Tele-Health Rehab Follow-up via Facetime      Tele Visit #   1     Current Pain Level Reported:  6/10 - B knees     SUBJECTIVE:  Pt reports she was exercising were answered and pt verbalized understanding of HEP. Exercises were practiced by the patient. Will plan to follow up in 1 week. Tele Health:  [x] Patient Progressing with HEP and utilizing [address] without issue   [] Patient noncompliant with [address] [] Patient requires in-person therapy due to not progressing    [] Other:     Virtual Visit: Overall Progression with At Home Instructions:  [x] Review of current status and compliance with recommended home program  [] Review of functional activities and status with ADL   [] Review of patient understanding of injury, goals and expected outcomes   [] Other:    Patient requires further Tele Health intervention to maintain current progress:   [x] Yes [] No    PLAN: Re-check with Patient via Tele health to ensure proper HEP compliance so impairments do not worsen. [x] Continue Tele Health visits [] Discontinue Tele health  [] Patient should resume in-person therapy due to skill required.     [] Hold pending MD visit     Electronically signed by: Federico Zamora, PT DPT

## 2020-04-16 ENCOUNTER — VIRTUAL VISIT (OUTPATIENT)
Dept: INTERNAL MEDICINE CLINIC | Age: 70
End: 2020-04-16
Payer: MEDICARE

## 2020-04-16 VITALS — TEMPERATURE: 95.3 F | BODY MASS INDEX: 31.6 KG/M2 | WEIGHT: 214 LBS

## 2020-04-16 PROBLEM — R05.9 COUGH: Status: ACTIVE | Noted: 2020-04-16

## 2020-04-16 PROCEDURE — G8399 PT W/DXA RESULTS DOCUMENT: HCPCS | Performed by: INTERNAL MEDICINE

## 2020-04-16 PROCEDURE — 99214 OFFICE O/P EST MOD 30 MIN: CPT | Performed by: INTERNAL MEDICINE

## 2020-04-16 PROCEDURE — 4040F PNEUMOC VAC/ADMIN/RCVD: CPT | Performed by: INTERNAL MEDICINE

## 2020-04-16 PROCEDURE — 1123F ACP DISCUSS/DSCN MKR DOCD: CPT | Performed by: INTERNAL MEDICINE

## 2020-04-16 PROCEDURE — 3017F COLORECTAL CA SCREEN DOC REV: CPT | Performed by: INTERNAL MEDICINE

## 2020-04-16 PROCEDURE — 1090F PRES/ABSN URINE INCON ASSESS: CPT | Performed by: INTERNAL MEDICINE

## 2020-04-16 PROCEDURE — G8427 DOCREV CUR MEDS BY ELIG CLIN: HCPCS | Performed by: INTERNAL MEDICINE

## 2020-04-16 RX ORDER — LOSARTAN POTASSIUM 100 MG/1
100 TABLET ORAL DAILY
Qty: 30 TABLET | Refills: 5 | Status: SHIPPED | OUTPATIENT
Start: 2020-04-16 | End: 2020-04-16

## 2020-04-16 RX ORDER — PREDNISONE 10 MG/1
10 TABLET ORAL DAILY
Qty: 10 TABLET | Refills: 0 | Status: SHIPPED | OUTPATIENT
Start: 2020-04-16 | End: 2020-05-15

## 2020-04-16 ASSESSMENT — ENCOUNTER SYMPTOMS
WHEEZING: 0
STRIDOR: 0
RHINORRHEA: 1
SHORTNESS OF BREATH: 1
COUGH: 1

## 2020-04-16 NOTE — ASSESSMENT & PLAN NOTE
Unknown etiology but suspect quinapril. Will stop quinapril. Okay to use inhalers. Give short course of prednisone. Will need further follow-up if no better.

## 2020-04-21 ENCOUNTER — HOSPITAL ENCOUNTER (OUTPATIENT)
Dept: PHYSICAL THERAPY | Age: 70
Setting detail: THERAPIES SERIES
Discharge: HOME | End: 2020-04-21
Payer: MEDICARE

## 2020-04-21 PROCEDURE — 97110 THERAPEUTIC EXERCISES: CPT

## 2020-04-21 NOTE — FLOWSHEET NOTE
able to get into the shower by herself today. Her  was there to hold her walker so it would not move. She has not been able to do the sideways walking. She was also able to use her walker and walk up her ramp at home with her daughter close by. She has been working on that weekly when her daughter visits. Her left leg feels like it is getting worse (incr. Pain). She stated HEP from last week was a little hard but she was able to complete. OBJECTIVE:    Observation via FaceTime:     Reviewed Interventions: Ther Ex (04883-98)  sets/sec reps notes         See below - HEP 4/21                              NMR (45251-24)                         TA (59088-31)                      Patient education:      Home Exercise Program (Darwin Zuleta): emailed to patient  Access Code: [email or web address]   URL: [email or web address]   Date: 04/14/2020   Prepared by: Nicole Forrester     Exercises   Standing Hip Flexion AROM - 10 reps - 3 sets - 1x daily - 7x weekly   Standing Hip Abduction - 10 reps - 3 sets - 1x daily - 7x weekly   Standing Hip Extension - 10 reps - 3 sets - 1x daily - 7x weekly   Standing Hip Flexion - 10 reps - 3 sets - 1x daily - 7x weekly   Sideways Walking - 10 reps - 3 sets - 1x daily - 7x weekly       Access Code: [email or web address]   URL: CodeStreet/   Date: 04/21/2020   Prepared by: Nicole Forrester     Exercises   Gastroc Stretch on Wall - 3 reps - 1 sets - 30 seconds hold - 2x daily - 7x weekly   Standing Hamstring Curl with Chair Support - 10 reps - 2 sets - 1x daily - 3-4x weekly   Seated Heel Slide - 10 reps - 2 sets - 1x daily - 3-4x weekly   Seated Hip External Rotation AROM - 10 reps - 2 sets - 1x daily - 3-4x weekly   Seated Hip Internal Rotation AROM - 10 reps - 2 sets - 1x daily - 3-4x weekly   Clamshell at [address] 10 reps - 2 sets - 1x daily - 3-4x weekly       Home Exercise Program Instruction and Review Completed Remotely:    [x] (55482-93) Reviewed/Progressed HEP activities related

## 2020-05-05 ENCOUNTER — HOSPITAL ENCOUNTER (OUTPATIENT)
Dept: PHYSICAL THERAPY | Age: 70
Setting detail: THERAPIES SERIES
Discharge: HOME | End: 2020-05-05
Payer: MEDICARE

## 2020-05-05 PROCEDURE — 97110 THERAPEUTIC EXERCISES: CPT

## 2020-05-05 NOTE — FLOWSHEET NOTE
daily - 3-4x weekly   Seated Hip External Rotation AROM - 10 reps - 2 sets - 1x daily - 3-4x weekly   Seated Hip Internal Rotation AROM - 10 reps - 2 sets - 1x daily - 3-4x weekly   Clamshell at Wall - 10 reps - 2 sets - 1x daily - 3-4x weekly       Home Exercise Program Instruction and Review Completed Remotely:    [x] (34981-60) Reviewed/Progressed HEP activities related to strengthening, flexibility, endurance, ROM of core, proximal hip and LE for functional self-care, mobility, lifting and ambulation   [] (20115-32) Reviewed/Progressed HEP activities related to improving balance, coordination, kinesthetic sense, posture, motor skill, proprioception of core, proximal hip and LE for self-care, mobility, lifting, and ambulation    [] (53521-63) Reviewed/Progressed HEP functional activities related to improving bending, lifting, carrying, reaching catching and overhead activities to help improve and maintain functional performance needed for work and home related activities. Charges for Tele Visit:  [address] [place] [address] Treatment Minutes: 25         Completed Remotely:   [x] TE 05388-(02) x  2     [] NMR 55162-(02) x        [] TA 73686-(02) x         [] Other:      ASSESSMENT: Pt continues to report difficulty with side stepping, so today's telehealth session was spent focusing on strengthening the glutes and small muscles at the hip. The pt plans to work on these exercises for 1-2 weeks, and then PT and pt will decided need for further telehealth visits at that point.       Tele Health:  [x] Patient Progressing with HEP and utilizing Elephanti without issue   [] Patient noncompliant with Elephanti [] Patient requires in-person therapy due to not progressing    [] Other:     Virtual Visit: Overall Progression with At Home Instructions:  [x] Review of current status and compliance with recommended home program  [] Review of functional activities and status with ADL   [] Review of patient understanding of injury, goals and

## 2020-05-15 ENCOUNTER — VIRTUAL VISIT (OUTPATIENT)
Dept: INTERNAL MEDICINE CLINIC | Age: 70
End: 2020-05-15
Payer: MEDICARE

## 2020-05-15 ENCOUNTER — TELEPHONE (OUTPATIENT)
Dept: INTERNAL MEDICINE CLINIC | Age: 70
End: 2020-05-15

## 2020-05-15 VITALS — TEMPERATURE: 98.5 F | HEART RATE: 82 BPM | SYSTOLIC BLOOD PRESSURE: 154 MMHG | DIASTOLIC BLOOD PRESSURE: 88 MMHG

## 2020-05-15 PROCEDURE — 1123F ACP DISCUSS/DSCN MKR DOCD: CPT | Performed by: INTERNAL MEDICINE

## 2020-05-15 PROCEDURE — 1090F PRES/ABSN URINE INCON ASSESS: CPT | Performed by: INTERNAL MEDICINE

## 2020-05-15 PROCEDURE — G8427 DOCREV CUR MEDS BY ELIG CLIN: HCPCS | Performed by: INTERNAL MEDICINE

## 2020-05-15 PROCEDURE — 3017F COLORECTAL CA SCREEN DOC REV: CPT | Performed by: INTERNAL MEDICINE

## 2020-05-15 PROCEDURE — 99214 OFFICE O/P EST MOD 30 MIN: CPT | Performed by: INTERNAL MEDICINE

## 2020-05-15 PROCEDURE — 4040F PNEUMOC VAC/ADMIN/RCVD: CPT | Performed by: INTERNAL MEDICINE

## 2020-05-15 PROCEDURE — G8399 PT W/DXA RESULTS DOCUMENT: HCPCS | Performed by: INTERNAL MEDICINE

## 2020-05-15 RX ORDER — AMLODIPINE BESYLATE 5 MG/1
TABLET ORAL
Qty: 90 TABLET | Refills: 1 | Status: SHIPPED | OUTPATIENT
Start: 2020-05-15 | End: 2020-10-19

## 2020-05-15 RX ORDER — AMLODIPINE BESYLATE 5 MG/1
5 TABLET ORAL NIGHTLY
Qty: 30 TABLET | Refills: 5 | Status: SHIPPED | OUTPATIENT
Start: 2020-05-15 | End: 2020-05-15

## 2020-05-15 ASSESSMENT — ENCOUNTER SYMPTOMS
SHORTNESS OF BREATH: 0
STRIDOR: 0
GASTROINTESTINAL NEGATIVE: 1
WHEEZING: 0
TROUBLE SWALLOWING: 0
COUGH: 1

## 2020-05-15 NOTE — TELEPHONE ENCOUNTER
The patient is reporting sinus and chest congestion with cough for the past 2 months.   Please advise

## 2020-05-16 PROBLEM — R05.9 COUGH: Status: RESOLVED | Noted: 2020-04-16 | Resolved: 2020-05-16

## 2020-06-02 ENCOUNTER — TELEPHONE (OUTPATIENT)
Dept: INTERNAL MEDICINE CLINIC | Age: 70
End: 2020-06-02

## 2020-06-25 ENCOUNTER — TELEPHONE (OUTPATIENT)
Dept: INTERNAL MEDICINE CLINIC | Age: 70
End: 2020-06-25

## 2020-06-25 RX ORDER — OXYCODONE HYDROCHLORIDE AND ACETAMINOPHEN 5; 325 MG/1; MG/1
1 TABLET ORAL EVERY 6 HOURS PRN
Qty: 20 TABLET | Refills: 0 | Status: SHIPPED | OUTPATIENT
Start: 2020-06-25 | End: 2020-12-31 | Stop reason: SDUPTHER

## 2020-07-13 RX ORDER — DICLOFENAC SODIUM 75 MG/1
TABLET, DELAYED RELEASE ORAL
Qty: 180 TABLET | Refills: 3 | Status: SHIPPED | OUTPATIENT
Start: 2020-07-13 | End: 2021-08-26

## 2020-08-18 ENCOUNTER — TELEPHONE (OUTPATIENT)
Dept: INTERNAL MEDICINE CLINIC | Age: 70
End: 2020-08-18

## 2020-08-18 NOTE — TELEPHONE ENCOUNTER
Okay to send in [facility] for motorized scooter. This usually requires a follow-up appointment in the office called a face-to-face evaluation, just for the scooter alone. These are Medicare guidelines.

## 2020-08-18 NOTE — TELEPHONE ENCOUNTER
The patient is requesting a order be sent to    [facility] on [address]    [phone]  [phone]  Att:Momo    For motorized scooter which she already has on hold.  She is reporting that after her  has sx he will not be able to get her back in the house with out a motorized scooter

## 2020-08-18 NOTE — TELEPHONE ENCOUNTER
Pt requesting to speak to Dr. Evans. Pt has a few questions. Pt states her  is having surgery next week should pt get a covid test. Pt requesting a prescription for a motorized scooter. Pt will be paying out of pocket for the scooter.     Please be advised

## 2020-08-31 RX ORDER — ESCITALOPRAM OXALATE 10 MG/1
TABLET ORAL
Qty: 90 TABLET | Refills: 0 | Status: SHIPPED | OUTPATIENT
Start: 2020-08-31 | End: 2020-10-20 | Stop reason: SDUPTHER

## 2020-10-16 RX ORDER — POTASSIUM CHLORIDE 1500 MG/1
TABLET, EXTENDED RELEASE ORAL
Qty: 90 TABLET | Refills: 3 | Status: SHIPPED | OUTPATIENT
Start: 2020-10-16 | End: 2022-01-17

## 2020-10-19 RX ORDER — AMLODIPINE BESYLATE 5 MG/1
TABLET ORAL
Qty: 90 TABLET | Refills: 1 | Status: SHIPPED | OUTPATIENT
Start: 2020-10-19 | End: 2020-10-20 | Stop reason: SDUPTHER

## 2020-10-20 RX ORDER — AMLODIPINE BESYLATE 5 MG/1
TABLET ORAL
Qty: 90 TABLET | Refills: 1 | Status: ON HOLD | OUTPATIENT
Start: 2020-10-20 | End: 2020-11-23 | Stop reason: HOSPADM

## 2020-10-20 RX ORDER — ESCITALOPRAM OXALATE 10 MG/1
TABLET ORAL
Qty: 90 TABLET | Refills: 1 | Status: SHIPPED | OUTPATIENT
Start: 2020-10-20 | End: 2021-01-12

## 2020-11-20 ENCOUNTER — APPOINTMENT (OUTPATIENT)
Dept: GENERAL RADIOLOGY | Age: 70
DRG: 065 | End: 2020-11-20
Payer: MEDICARE

## 2020-11-20 ENCOUNTER — APPOINTMENT (OUTPATIENT)
Dept: CT IMAGING | Age: 70
DRG: 065 | End: 2020-11-20
Payer: MEDICARE

## 2020-11-20 ENCOUNTER — VIRTUAL VISIT (OUTPATIENT)
Dept: INTERNAL MEDICINE CLINIC | Age: 70
End: 2020-11-20
Payer: MEDICARE

## 2020-11-20 ENCOUNTER — HOSPITAL ENCOUNTER (INPATIENT)
Age: 70
LOS: 4 days | Discharge: NURSING, REHAB OR LONG-TERM CARE | DRG: 065 | End: 2020-11-24
Attending: EMERGENCY MEDICINE | Admitting: INTERNAL MEDICINE
Payer: MEDICARE

## 2020-11-20 PROBLEM — R35.0 URINARY FREQUENCY: Status: RESOLVED | Noted: 2020-01-17 | Resolved: 2020-11-20

## 2020-11-20 PROBLEM — S32.10XA FRACTURE OF SACRUM (HCC): Status: RESOLVED | Noted: 2019-04-29 | Resolved: 2020-11-20

## 2020-11-20 PROBLEM — R10.9 RIGHT SIDED ABDOMINAL PAIN: Status: RESOLVED | Noted: 2020-01-17 | Resolved: 2020-11-20

## 2020-11-20 PROBLEM — R53.1 LEFT-SIDED WEAKNESS: Status: ACTIVE | Noted: 2020-11-20

## 2020-11-20 PROBLEM — R47.1 DYSARTHRIA: Status: ACTIVE | Noted: 2020-11-20

## 2020-11-20 PROBLEM — I63.9 CVA (CEREBRAL VASCULAR ACCIDENT) (HCC): Status: ACTIVE | Noted: 2020-11-20

## 2020-11-20 LAB
A/G RATIO: 1.5 (ref 1.1–2.2)
ALBUMIN SERPL-MCNC: 4.8 G/DL (ref 3.4–5)
ALP BLD-CCNC: 127 U/L (ref 40–129)
ALT SERPL-CCNC: 26 U/L (ref 10–40)
ANION GAP SERPL CALCULATED.3IONS-SCNC: 12 MMOL/L (ref 3–16)
AST SERPL-CCNC: 21 U/L (ref 15–37)
BASOPHILS ABSOLUTE: 0 K/UL (ref 0–0.2)
BASOPHILS RELATIVE PERCENT: 0.5 %
BILIRUB SERPL-MCNC: 0.4 MG/DL (ref 0–1)
BILIRUBIN URINE: NEGATIVE
BLOOD, URINE: NEGATIVE
BUN BLDV-MCNC: 19 MG/DL (ref 7–20)
CALCIUM SERPL-MCNC: 10 MG/DL (ref 8.3–10.6)
CHLORIDE BLD-SCNC: 101 MMOL/L (ref 99–110)
CLARITY: CLEAR
CO2: 25 MMOL/L (ref 21–32)
COLOR: YELLOW
CREAT SERPL-MCNC: 0.6 MG/DL (ref 0.6–1.2)
EKG ATRIAL RATE: 75 BPM
EKG DIAGNOSIS: NORMAL
EKG P AXIS: 23 DEGREES
EKG P-R INTERVAL: 154 MS
EKG Q-T INTERVAL: 402 MS
EKG QRS DURATION: 96 MS
EKG QTC CALCULATION (BAZETT): 448 MS
EKG R AXIS: -45 DEGREES
EKG T AXIS: 27 DEGREES
EKG VENTRICULAR RATE: 75 BPM
EOSINOPHILS ABSOLUTE: 0 K/UL (ref 0–0.6)
EOSINOPHILS RELATIVE PERCENT: 0.6 %
GFR AFRICAN AMERICAN: >60
GFR NON-AFRICAN AMERICAN: >60
GLOBULIN: 3.1 G/DL
GLUCOSE BLD-MCNC: 114 MG/DL (ref 70–99)
GLUCOSE URINE: NEGATIVE MG/DL
HCT VFR BLD CALC: 41.4 % (ref 36–48)
HEMOGLOBIN: 14 G/DL (ref 12–16)
INR BLD: 0.91 (ref 0.86–1.14)
KETONES, URINE: NEGATIVE MG/DL
LEUKOCYTE ESTERASE, URINE: NEGATIVE
LYMPHOCYTES ABSOLUTE: 1.7 K/UL (ref 1–5.1)
LYMPHOCYTES RELATIVE PERCENT: 27.9 %
MCH RBC QN AUTO: 31.3 PG (ref 26–34)
MCHC RBC AUTO-ENTMCNC: 33.8 G/DL (ref 31–36)
MCV RBC AUTO: 92.7 FL (ref 80–100)
MICROSCOPIC EXAMINATION: NORMAL
MONOCYTES ABSOLUTE: 0.3 K/UL (ref 0–1.3)
MONOCYTES RELATIVE PERCENT: 5.9 %
NEUTROPHILS ABSOLUTE: 3.9 K/UL (ref 1.7–7.7)
NEUTROPHILS RELATIVE PERCENT: 65.1 %
NITRITE, URINE: NEGATIVE
PDW BLD-RTO: 13.6 % (ref 12.4–15.4)
PH UA: 6 (ref 5–8)
PLATELET # BLD: 334 K/UL (ref 135–450)
PMV BLD AUTO: 6.9 FL (ref 5–10.5)
POTASSIUM REFLEX MAGNESIUM: 4 MMOL/L (ref 3.5–5.1)
PROTEIN UA: NEGATIVE MG/DL
PROTHROMBIN TIME: 10.5 SEC (ref 10–13.2)
RBC # BLD: 4.47 M/UL (ref 4–5.2)
SODIUM BLD-SCNC: 138 MMOL/L (ref 136–145)
SPECIFIC GRAVITY UA: 1.01 (ref 1–1.03)
TOTAL PROTEIN: 7.9 G/DL (ref 6.4–8.2)
URINE REFLEX TO CULTURE: NORMAL
URINE TYPE: NORMAL
UROBILINOGEN, URINE: 0.2 E.U./DL
WBC # BLD: 5.9 K/UL (ref 4–11)

## 2020-11-20 PROCEDURE — 6370000000 HC RX 637 (ALT 250 FOR IP): Performed by: INTERNAL MEDICINE

## 2020-11-20 PROCEDURE — 2580000003 HC RX 258: Performed by: INTERNAL MEDICINE

## 2020-11-20 PROCEDURE — 4040F PNEUMOC VAC/ADMIN/RCVD: CPT | Performed by: INTERNAL MEDICINE

## 2020-11-20 PROCEDURE — 94761 N-INVAS EAR/PLS OXIMETRY MLT: CPT

## 2020-11-20 PROCEDURE — 3017F COLORECTAL CA SCREEN DOC REV: CPT | Performed by: INTERNAL MEDICINE

## 2020-11-20 PROCEDURE — 71045 X-RAY EXAM CHEST 1 VIEW: CPT

## 2020-11-20 PROCEDURE — 70496 CT ANGIOGRAPHY HEAD: CPT

## 2020-11-20 PROCEDURE — 85025 COMPLETE CBC W/AUTO DIFF WBC: CPT

## 2020-11-20 PROCEDURE — 70450 CT HEAD/BRAIN W/O DYE: CPT

## 2020-11-20 PROCEDURE — 80053 COMPREHEN METABOLIC PANEL: CPT

## 2020-11-20 PROCEDURE — 99283 EMERGENCY DEPT VISIT LOW MDM: CPT

## 2020-11-20 PROCEDURE — 1090F PRES/ABSN URINE INCON ASSESS: CPT | Performed by: INTERNAL MEDICINE

## 2020-11-20 PROCEDURE — 81003 URINALYSIS AUTO W/O SCOPE: CPT

## 2020-11-20 PROCEDURE — 93010 ELECTROCARDIOGRAM REPORT: CPT | Performed by: INTERNAL MEDICINE

## 2020-11-20 PROCEDURE — 1123F ACP DISCUSS/DSCN MKR DOCD: CPT | Performed by: INTERNAL MEDICINE

## 2020-11-20 PROCEDURE — 2060000000 HC ICU INTERMEDIATE R&B

## 2020-11-20 PROCEDURE — 99214 OFFICE O/P EST MOD 30 MIN: CPT | Performed by: INTERNAL MEDICINE

## 2020-11-20 PROCEDURE — 94760 N-INVAS EAR/PLS OXIMETRY 1: CPT

## 2020-11-20 PROCEDURE — 6360000002 HC RX W HCPCS: Performed by: INTERNAL MEDICINE

## 2020-11-20 PROCEDURE — 93005 ELECTROCARDIOGRAM TRACING: CPT | Performed by: EMERGENCY MEDICINE

## 2020-11-20 PROCEDURE — 96374 THER/PROPH/DIAG INJ IV PUSH: CPT

## 2020-11-20 PROCEDURE — 94640 AIRWAY INHALATION TREATMENT: CPT

## 2020-11-20 PROCEDURE — 85610 PROTHROMBIN TIME: CPT

## 2020-11-20 PROCEDURE — G8427 DOCREV CUR MEDS BY ELIG CLIN: HCPCS | Performed by: INTERNAL MEDICINE

## 2020-11-20 PROCEDURE — 2500000003 HC RX 250 WO HCPCS: Performed by: EMERGENCY MEDICINE

## 2020-11-20 PROCEDURE — 6360000004 HC RX CONTRAST MEDICATION: Performed by: EMERGENCY MEDICINE

## 2020-11-20 PROCEDURE — 36415 COLL VENOUS BLD VENIPUNCTURE: CPT

## 2020-11-20 PROCEDURE — G8399 PT W/DXA RESULTS DOCUMENT: HCPCS | Performed by: INTERNAL MEDICINE

## 2020-11-20 RX ORDER — AMLODIPINE BESYLATE 5 MG/1
5 TABLET ORAL NIGHTLY
Status: DISCONTINUED | OUTPATIENT
Start: 2020-11-20 | End: 2020-11-22

## 2020-11-20 RX ORDER — SODIUM CHLORIDE 0.9 % (FLUSH) 0.9 %
10 SYRINGE (ML) INJECTION PRN
Status: DISCONTINUED | OUTPATIENT
Start: 2020-11-20 | End: 2020-11-24 | Stop reason: HOSPADM

## 2020-11-20 RX ORDER — PROMETHAZINE HYDROCHLORIDE 25 MG/ML
12.5 INJECTION, SOLUTION INTRAMUSCULAR; INTRAVENOUS ONCE
Status: COMPLETED | OUTPATIENT
Start: 2020-11-20 | End: 2020-11-20

## 2020-11-20 RX ORDER — SODIUM CHLORIDE 9 MG/ML
INJECTION, SOLUTION INTRAVENOUS CONTINUOUS
Status: DISCONTINUED | OUTPATIENT
Start: 2020-11-20 | End: 2020-11-24 | Stop reason: HOSPADM

## 2020-11-20 RX ORDER — ASPIRIN 81 MG/1
81 TABLET ORAL DAILY
Status: DISCONTINUED | OUTPATIENT
Start: 2020-11-21 | End: 2020-11-24 | Stop reason: HOSPADM

## 2020-11-20 RX ORDER — POLYETHYLENE GLYCOL 3350 17 G/17G
17 POWDER, FOR SOLUTION ORAL DAILY PRN
Status: DISCONTINUED | OUTPATIENT
Start: 2020-11-20 | End: 2020-11-24 | Stop reason: HOSPADM

## 2020-11-20 RX ORDER — PROMETHAZINE HYDROCHLORIDE 25 MG/ML
12.5 INJECTION, SOLUTION INTRAMUSCULAR; INTRAVENOUS ONCE
Status: DISCONTINUED | OUTPATIENT
Start: 2020-11-20 | End: 2020-11-20

## 2020-11-20 RX ORDER — ONDANSETRON 2 MG/ML
4 INJECTION INTRAMUSCULAR; INTRAVENOUS EVERY 6 HOURS PRN
Status: DISCONTINUED | OUTPATIENT
Start: 2020-11-20 | End: 2020-11-20 | Stop reason: SDUPTHER

## 2020-11-20 RX ORDER — LORAZEPAM 2 MG/ML
1 INJECTION INTRAMUSCULAR ONCE
Status: COMPLETED | OUTPATIENT
Start: 2020-11-20 | End: 2020-11-21

## 2020-11-20 RX ORDER — FUROSEMIDE 20 MG/1
20 TABLET ORAL DAILY
Status: DISCONTINUED | OUTPATIENT
Start: 2020-11-21 | End: 2020-11-22

## 2020-11-20 RX ORDER — ALBUTEROL SULFATE 2.5 MG/3ML
2.5 SOLUTION RESPIRATORY (INHALATION) EVERY 4 HOURS PRN
Status: DISCONTINUED | OUTPATIENT
Start: 2020-11-20 | End: 2020-11-24 | Stop reason: HOSPADM

## 2020-11-20 RX ORDER — ACETAMINOPHEN 650 MG/1
650 SUPPOSITORY RECTAL EVERY 6 HOURS PRN
Status: DISCONTINUED | OUTPATIENT
Start: 2020-11-20 | End: 2020-11-24 | Stop reason: HOSPADM

## 2020-11-20 RX ORDER — BUDESONIDE AND FORMOTEROL FUMARATE DIHYDRATE 80; 4.5 UG/1; UG/1
2 AEROSOL RESPIRATORY (INHALATION) 2 TIMES DAILY
Status: DISCONTINUED | OUTPATIENT
Start: 2020-11-20 | End: 2020-11-21 | Stop reason: RX

## 2020-11-20 RX ORDER — POTASSIUM CHLORIDE 7.45 MG/ML
10 INJECTION INTRAVENOUS PRN
Status: DISCONTINUED | OUTPATIENT
Start: 2020-11-20 | End: 2020-11-24 | Stop reason: HOSPADM

## 2020-11-20 RX ORDER — LABETALOL HYDROCHLORIDE 5 MG/ML
10 INJECTION, SOLUTION INTRAVENOUS EVERY 10 MIN PRN
Status: DISCONTINUED | OUTPATIENT
Start: 2020-11-20 | End: 2020-11-24 | Stop reason: HOSPADM

## 2020-11-20 RX ORDER — PROMETHAZINE HYDROCHLORIDE 25 MG/1
12.5 TABLET ORAL EVERY 6 HOURS PRN
Status: DISCONTINUED | OUTPATIENT
Start: 2020-11-20 | End: 2020-11-24 | Stop reason: HOSPADM

## 2020-11-20 RX ORDER — PROMETHAZINE HYDROCHLORIDE 25 MG/ML
12.5 INJECTION, SOLUTION INTRAMUSCULAR; INTRAVENOUS EVERY 4 HOURS PRN
Status: DISCONTINUED | OUTPATIENT
Start: 2020-11-20 | End: 2020-11-24 | Stop reason: HOSPADM

## 2020-11-20 RX ORDER — LABETALOL HYDROCHLORIDE 5 MG/ML
10 INJECTION, SOLUTION INTRAVENOUS ONCE
Status: COMPLETED | OUTPATIENT
Start: 2020-11-20 | End: 2020-11-20

## 2020-11-20 RX ORDER — FLUTICASONE FUROATE AND VILANTEROL 100; 25 UG/1; UG/1
1 POWDER RESPIRATORY (INHALATION) DAILY
Status: DISCONTINUED | OUTPATIENT
Start: 2020-11-20 | End: 2020-11-20 | Stop reason: CLARIF

## 2020-11-20 RX ORDER — ESCITALOPRAM OXALATE 10 MG/1
10 TABLET ORAL DAILY
Status: DISCONTINUED | OUTPATIENT
Start: 2020-11-21 | End: 2020-11-24 | Stop reason: HOSPADM

## 2020-11-20 RX ORDER — POTASSIUM CHLORIDE 20 MEQ/1
20 TABLET, EXTENDED RELEASE ORAL
Status: DISCONTINUED | OUTPATIENT
Start: 2020-11-21 | End: 2020-11-24 | Stop reason: HOSPADM

## 2020-11-20 RX ORDER — PROMETHAZINE HYDROCHLORIDE 25 MG/1
12.5 TABLET ORAL EVERY 6 HOURS PRN
Status: DISCONTINUED | OUTPATIENT
Start: 2020-11-20 | End: 2020-11-20 | Stop reason: SDUPTHER

## 2020-11-20 RX ORDER — SODIUM CHLORIDE 0.9 % (FLUSH) 0.9 %
10 SYRINGE (ML) INJECTION EVERY 12 HOURS SCHEDULED
Status: DISCONTINUED | OUTPATIENT
Start: 2020-11-20 | End: 2020-11-24 | Stop reason: HOSPADM

## 2020-11-20 RX ORDER — ATORVASTATIN CALCIUM 80 MG/1
80 TABLET, FILM COATED ORAL NIGHTLY
Status: DISCONTINUED | OUTPATIENT
Start: 2020-11-20 | End: 2020-11-24 | Stop reason: HOSPADM

## 2020-11-20 RX ORDER — ACETAMINOPHEN 325 MG/1
650 TABLET ORAL EVERY 6 HOURS PRN
Status: DISCONTINUED | OUTPATIENT
Start: 2020-11-20 | End: 2020-11-24 | Stop reason: HOSPADM

## 2020-11-20 RX ORDER — ONDANSETRON 2 MG/ML
4 INJECTION INTRAMUSCULAR; INTRAVENOUS EVERY 6 HOURS PRN
Status: DISCONTINUED | OUTPATIENT
Start: 2020-11-20 | End: 2020-11-24 | Stop reason: HOSPADM

## 2020-11-20 RX ORDER — POTASSIUM CHLORIDE 20 MEQ/1
40 TABLET, EXTENDED RELEASE ORAL PRN
Status: DISCONTINUED | OUTPATIENT
Start: 2020-11-20 | End: 2020-11-24 | Stop reason: HOSPADM

## 2020-11-20 RX ORDER — ASPIRIN 300 MG/1
300 SUPPOSITORY RECTAL DAILY
Status: DISCONTINUED | OUTPATIENT
Start: 2020-11-21 | End: 2020-11-24 | Stop reason: HOSPADM

## 2020-11-20 RX ADMIN — ATORVASTATIN CALCIUM 80 MG: 80 TABLET, FILM COATED ORAL at 20:40

## 2020-11-20 RX ADMIN — PROMETHAZINE HYDROCHLORIDE 12.5 MG: 25 INJECTION, SOLUTION INTRAMUSCULAR; INTRAVENOUS at 15:10

## 2020-11-20 RX ADMIN — LABETALOL HYDROCHLORIDE 10 MG: 5 INJECTION INTRAVENOUS at 09:50

## 2020-11-20 RX ADMIN — Medication 2 PUFF: at 21:48

## 2020-11-20 RX ADMIN — AMLODIPINE BESYLATE 5 MG: 5 TABLET ORAL at 20:40

## 2020-11-20 RX ADMIN — Medication 10 ML: at 20:40

## 2020-11-20 RX ADMIN — ENOXAPARIN SODIUM 40 MG: 40 INJECTION SUBCUTANEOUS at 20:40

## 2020-11-20 RX ADMIN — IOPAMIDOL 75 ML: 755 INJECTION, SOLUTION INTRAVENOUS at 10:52

## 2020-11-20 RX ADMIN — SODIUM CHLORIDE: 9 INJECTION, SOLUTION INTRAVENOUS at 17:30

## 2020-11-20 ASSESSMENT — ENCOUNTER SYMPTOMS
TROUBLE SWALLOWING: 0
SHORTNESS OF BREATH: 0

## 2020-11-20 NOTE — PROGRESS NOTES
Pt transported to the floor via stretcher. Pt came in with Lt sided weakness and dysarthria that began this morning. Pt has Lt facial droop and Lt arm is flaccid. Pt passed swallow screen with no difficulty. Diet has been advanced. Pt had multiple surgery on her Rt knee and presents with a scar. No other skin issues. Pt is A&O X4. Daughter at bedside. Admission completed. Oriented pt to the room. Call light within reach. No other needs at this moment.

## 2020-11-20 NOTE — ED PROVIDER NOTES
[address] DEPARTMENTENCOUNTER      Pt Name: Reji Roldan  MRN: [id number]  [place] 1950  Date ofevaluation: 11/20/2020  Provider: John Jimenez MD    CHIEF COMPLAINT       Chief Complaint   Patient presents with    Facial Droop     PT arrived after virtual visit with PCP, PT with Left facial droop and left arm droop and slurred speech. HPI    HISTORY OF PRESENT ILLNESS   (Location/Symptom, Timing/Onset,Context/Setting, Quality, Duration, Modifying Factors, Severity)  Note limiting factors. Reji Roldan is a 79 y.o. female who presents to the emergency department with left arm weakness. This is a 77-year-old female who states that she started to have some left arm weakness yesterday morning which was over 24 hours prior to arrival.  The patient states the weakness continued and worsened throughout the day and apparently when she woke at 5:00 AM this morning the family noticed some left mouth drooping and some mildly garbled speech as well. The last time the daughter talk to the patient where she sounded normal on the phone was around 8 or 9 PM last evening. She denies any history of strokes in the past.  I immediately contacted the  stroke team upon arrival who did not feel the patient was a candidate for intervention. NursingNotes were reviewed. Review of Systems    REVIEW OF SYSTEMS    (2-9 systems for level 4, 10 or more for level 5)     Review of Systems   Constitutional: Negative for fever. HENT: Negative for rhinorrhea and sore throat. Eyes: Negative for redness. Respiratory: Negative for shortness of breath. Cardiovascular: Negative for chest pain. Gastrointestinal: Negative for abdominal pain. Genitourinary: Negative for flank pain. Neurological: Negative for headaches. Positive for left arm weakness. Hematological: Negative for adenopathy.    Psychiatric/Behavioral: Negative for ELLIPTA) 100-25 MCG/INH AEPB INHALER    USE 1 INHALATION ORALLY    DAILY    FUROSEMIDE (LASIX) 20 MG TABLET    Take 1 tablet by mouth daily    GABAPENTIN (NEURONTIN) 300 MG CAPSULE    TK 1 C PO TID    HYDROCODONE-HOMATROPINE (HYDROMET) 5-1.5 MG/5ML SYRUP    Take 5 mLs by mouth every 6 hours as needed (cough). KLOR-CON M20 20 MEQ EXTENDED RELEASE TABLET    TAKE 1 TABLET DAILY    MULTIPLE VITAMINS-MINERALS (MULTIVITAMIN PO)    Take by mouth daily Women's Ultra Oscar for Menopause    OMEPRAZOLE (PRILOSEC) 20 MG DELAYED RELEASE CAPSULE    TAKE 1 CAPSULE DAILY    OXYCODONE-ACETAMINOPHEN (PERCOCET) 5-325 MG PER TABLET    Take 1 tablet by mouth every 6 hours as needed for Pain. TERIPARATIDE, RECOMBINANT, (FORTEO) 600 MCG/2.4ML SOLN INJECTION    Inject 20 mcg into the skin daily       ALLERGIES     Codeine; Demerol; Morphine; Tape [adhesive tape];  Cabbage; and Erythromycin    FAMILY HISTORY       Family History   Problem Relation Age of Onset    Cancer Other     Hypertension Other     Kidney Disease Other           SOCIAL HISTORY       Social History     Socioeconomic History    Marital status:      Spouse name: None    Number of children: None    Years of education: None    Highest education level: None   Occupational History    None   Social Needs    Financial resource strain: None    Food insecurity     Worry: None     Inability: None    Transportation needs     Medical: None     Non-medical: None   Tobacco Use    Smoking status: Former Smoker     Packs/day: 1.00     Years: 35.00     Pack years: 35.00     Types: Cigarettes     Last attempt to quit: 3/28/2005     Years since quitting: 15.6    Smokeless tobacco: Never Used   Substance and Sexual Activity    Alcohol use: Not Currently    Drug use: No    Sexual activity: None   Lifestyle    Physical activity     Days per week: None     Minutes per session: None    Stress: None   Relationships    Social connections     Talks on phone: None Gets together: None     Attends Sabianist service: None     Active member of club or organization: None     Attends meetings of clubs or organizations: None     Relationship status: None    Intimate partner violence     Fear of current or ex partner: None     Emotionally abused: None     Physically abused: None     Forced sexual activity: None   Other Topics Concern    None   Social History Narrative    None       SCREENINGS   NIH Stroke Scale  NIH Stroke Scale Assessed: Yes  Interval: Baseline  Level of Consciousness (1a. ): Alert  LOC Questions (1b. ): Answers both correctly  LOC Commands (1c. ): Performs both tasks correctly  Best Gaze (2. ): Normal  Visual (3. ): No visual loss  Facial Palsy (4. ): (!) Minor paralysis  Motor Arm, Left (5a. ): Some effort against gravity  Motor Arm, Right (5b. ): No drift  Motor Leg, Left (6a. ): No drift  Motor Leg, Right (6b. ): No drift  Limb Ataxia (7. ): Absent  Sensory (8. ): Normal  Best Language (9. ): No aphasia  Dysarthria (10. ): Mild to moderate dysarthria  Extinction and Inattention (11): No abnormality  Total: 4         PHYSICAL EXAM    (up to 7 for level 4, 8 or more for level 5)     ED Triage Vitals [11/20/20 0907]   BP Temp Temp Source Pulse Resp SpO2 Height Weight   (!) 221/91 98.4 °F (36.9 °C) Infrared 85 18 99 % 5' 7\" (1.702 m) 168 lb (76.2 kg)       Physical Exam:      General Appearance:  Alert, cooperative, appears stated age. Head:  Normocephalic, without obvious abnormality, atraumatic. Eyes:  conjunctiva/corneas clear, EOM's intact. Sclera anicteric. ENT:  Mucous remains moist and pink   Neck: Supple, symmetrical, trachea midline, no adenopathy. No jugular venous distention. Lungs:    Clear to auscultation bilaterally   Chest Wall:   No pain to palpation   Heart:   Genitourinary:  Regular rate and rhythm with no murmurs or gallops  Deferred   Abdomen:    Soft and benign. No guarding or rebound.    Extremities:  No clubbing cyanosis or edema   Pulses:  Good throughout   Skin:  No rashes or lesions to exposed skin. Neurologic: Alert and oriented X 3. She had some left-sided arm weakness. Patient had some mild drooping of the left side of her face. She slurred her speech slightly. DIAGNOSTIC RESULTS     EKG: All EKG's are interpreted by the Emergency Department Physician who either signs or Co-signsthis chart in the absence of a cardiologist.    Sinus rhythm at a rate of 75 beats a minute with no acute ST elevations or depressions or pathologic Q waves. RADIOLOGY:   Non-plain filmimages such as CT, Ultrasound and MRI are read by the radiologist. Plain radiographic images are visualized and preliminarily interpreted by the emergency physician with the below findings:    See below    Interpretation per the Radiologist below, if available at the time ofthis note: All incidental findings were discussed with the patient. CTA HEAD NECK W CONTRAST   Preliminary Result   1. Approximately 40% left cervical internal carotid artery stenosis by NASCET   criteria. 2. Otherwise, no hemodynamically significant stenosis or branch occlusion in   the cervical arterial circulation. 3. No hemodynamically significant stenosis or branch occlusion in the   intracranial arterial circulation. CT Head WO Contrast   Final Result   Slightly asymmetric hypodensity in the right external capsule is nonspecific. Although this is likely related to chronic small vessel ischemic change   superimposed acute ischemia cannot be excluded. If there is high clinical   concern for ischemia in this region further evaluation with MRI would be   helpful.          XR CHEST PORTABLE   Final Result   No active cardiopulmonary disease               ED BEDSIDE ULTRASOUND:   Performed by ED Physician - none    LABS:  Labs Reviewed   COMPREHENSIVE METABOLIC PANEL W/ REFLEX TO MG FOR LOW K - Abnormal; Notable for the following components:       Result Value Glucose 114 (*)     All other components within normal limits    Narrative:     Performed at:  [facility]  [address], [address]   Phone [phone]   CBC WITH AUTO DIFFERENTIAL    Narrative:     Performed at:  [facility]  [address],  [place], [address]   Phone [phone]   URINE RT REFLEX TO CULTURE    Narrative:     Performed at:  [facility]  [address],  [place], [address]   Phone [phone]   PROTIME-INR    Narrative:     Performed at:  [facility]  [address],  [place], [address]   Phone [phone]       All other labs were within normal range or not returned as of this dictation. EMERGENCY DEPARTMENT COURSE and DIFFERENTIAL DIAGNOSIS/MDM:   Vitals:    Vitals:    11/20/20 1150 11/20/20 1200 11/20/20 1210 11/20/20 1220   BP: (!) 172/82 (!) 173/85 (!) 174/90    Pulse: 62 65 72 65   Resp: 15 15 18 17   Temp:       TempSrc:       SpO2: 96% 96% 96% 96%   Weight:       Height:               MDM    The patient has remained stable throughout her hospital course. Initially she was very hypertensive and labetalol was given with good results. Patient had obvious weakness of the left side of her body and arm. Her leg was spared. The patient's work-up today included a CT that shows the possibility of an acute or subacute right external capsule lesion. The patient will be admitted to our hospitalist for further care. She is in stable condition. Her exam has not changed. REASSESSMENT          CRITICAL CARE TIME   Total Critical Care time was 70 minutes, excluding separatelyreportable procedures. There was a high probability ofclinically significant/life threatening deterioration in the patient's condition which required my urgent intervention.       CONSULTS:  IP CONSULT TO HOSPITALIST    PROCEDURES:  Unless otherwise noted below,

## 2020-11-20 NOTE — ASSESSMENT & PLAN NOTE
Highly suspicious for symptoms starting approximately 1 day ago. Left perioral droop, slurring, left arm weakness, and possibly left lower extremity symptoms. Discussed with patient and daughter -they will take patient to the ER right now. [facility] ER called and discussed with PA.

## 2020-11-20 NOTE — H&P
HOSPITALISTS HISTORY AND PHYSICAL    2020 12:39 PM    Patient Information:  Barbie Rivera is a 79 y.o. female [id number]  PCP:  Payam Javed MD (Tel: [phone] )    Chief complaint:    Chief Complaint   Patient presents with    Facial Droop     PT arrived after virtual visit with PCP, PT with Left facial droop and left arm droop and slurred speech. History of Present Illness:  Jewell Wright is a 79 y.o. female with history of HTN, hyperlipidemia, GERD, osteoporosis, wheel chair bound who came to ER with L sided weakness and dysarthria since morning. Had virtual visit with PCP and advised to come to ER. Woke up with symptoms. Has been nauseated. No CP, SOB, HA or fevers. No abdominal pain or diarrhea. Has L facial droop. Daughter at bedside. Found to have SBP > 200 in ED. Otherwise complete ROS is negative unless listed above. REVIEW OF SYSTEMS:   Pertinent positives as noted in HPI. All other systems were reviewed and are negative. Past Medical History:   has a past medical history of Arthritis, Compression fracture, Concussion, DVT (deep venous thrombosis) ([address] Environmental allergies, Essential hypertension, benign, GERD (gastroesophageal reflux disease), History of peptic ulcer, Hx of blood clots, Hyperlipidemia, Lacunar infarction ([address] MRSA (methicillin resistant staph aureus) culture positive, Restrictive airway disease, Retention of urine, and Wound, open. Past Surgical History:   has a past surgical history that includes Knee arthroscopy (Left, ); Cholecystectomy; Hysterectomy; Total knee arthroplasty (Right, 2017); Colonoscopy; Knee Arthroplasty (Right, 2018); Shoulder arthroscopy (Right);  section; pr revise knee joint replace,all parts (Right, 2018); and quadricepsplasty (Right, 2018).      Medications:  No current facility-administered medications on file prior to encounter. Current Outpatient Medications on File Prior to Encounter   Medication Sig Dispense Refill    escitalopram (LEXAPRO) 10 MG tablet TAKE 1 TABLET BY MOUTH EVERY DAY 90 tablet 1    amLODIPine (NORVASC) 5 MG tablet TAKE 1 TABLET BY MOUTH EVERY NIGHT 90 tablet 1    KLOR-CON M20 20 MEQ extended release tablet TAKE 1 TABLET DAILY 90 tablet 3    diclofenac (VOLTAREN) 75 MG EC tablet TAKE 1 TABLET TWICE A DAY  AFTER MEALS 180 tablet 3    oxyCODONE-acetaminophen (PERCOCET) 5-325 MG per tablet Take 1 tablet by mouth every 6 hours as needed for Pain. 20 tablet 0    HYDROcodone-homatropine (HYDROMET) 5-1.5 MG/5ML syrup Take 5 mLs by mouth every 6 hours as needed (cough). 180 mL 0    albuterol sulfate HFA (PROVENTIL HFA) 108 (90 Base) MCG/ACT inhaler 2 PUFFS EVERY 4 HOURS AS NEEDED WHEEZING 1 Inhaler 5    fluticasone-vilanterol (BREO ELLIPTA) 100-25 MCG/INH AEPB inhaler USE 1 INHALATION ORALLY    DAILY 3 each 3    omeprazole (PRILOSEC) 20 MG delayed release capsule TAKE 1 CAPSULE DAILY 90 capsule 3    teriparatide, recombinant, (FORTEO) 600 MCG/2.4ML SOLN injection Inject 20 mcg into the skin daily      furosemide (LASIX) 20 MG tablet Take 1 tablet by mouth daily 90 tablet 3    gabapentin (NEURONTIN) 300 MG capsule TK 1 C PO TID 90 capsule 3    Multiple Vitamins-Minerals (MULTIVITAMIN PO) Take by mouth daily Women's Ultra Oscar for Menopause         Allergies: Allergies   Allergen Reactions    Codeine      Severe headaches    Demerol      Severe headache and over-sedation    Morphine Other (See Comments)     Makes her crazy    Tape Renata  Tape] Other (See Comments)     Tears skin    Cabbage Nausea And Vomiting and Swelling    Erythromycin Nausea And Vomiting and Rash        Social History:  Patient Lives with , uses wheelchair due to R knee OA   reports that she quit smoking about 15 years ago. Her smoking use included cigarettes.  She has a 35.00 pack-year smoking history. She has never used smokeless tobacco. She reports previous alcohol use. She reports that she does not use drugs. Family History:  family history includes Cancer in an other family member; Hypertension in an other family member; Kidney Disease in an other family member. Physical Exam:  BP (!) 174/90   Pulse 65   Temp 98.4 °F (36.9 °C) (Infrared)   Resp 17   Ht 5' 7\" (1.702 m)   Wt 168 lb (76.2 kg)   SpO2 96%   BMI 26.31 kg/m²     General appearance:  Appears comfortable. Well nourished  Eyes: Sclera clear, pupils equal  ENT: Moist mucus membranes, no thrush. Trachea midline. Cardiovascular: Regular rhythm, normal S1, S2. No murmur, gallop, rub. No edema in lower extremities  Respiratory: Clear to auscultation bilaterally, no wheeze, good inspiratory effort  Gastrointestinal: Abdomen soft, non-tender, not distended, normal bowel sounds  Musculoskeletal: No cyanosis in digits, neck supple  Neurology: L nasolabial flattening. Dysarthria. LUE 3+/5, LLE 3/5, RUE/RLE 5/5  Psychiatry: Appropriate affect. Not agitated  Skin: Warm, dry, normal turgor, no rash  Brisk capillary refill, peripheral pulses palpable   Labs:  CBC:   Lab Results   Component Value Date    WBC 5.9 11/20/2020    RBC 4.47 11/20/2020    HGB 14.0 11/20/2020    HCT 41.4 11/20/2020    MCV 92.7 11/20/2020    MCH 31.3 11/20/2020    MCHC 33.8 11/20/2020    RDW 13.6 11/20/2020     11/20/2020    MPV 6.9 11/20/2020     BMP:    Lab Results   Component Value Date     11/20/2020    K 4.0 11/20/2020     11/20/2020    CO2 25 11/20/2020    BUN 19 11/20/2020    CREATININE 0.6 11/20/2020    CALCIUM 10.0 11/20/2020    GFRAA >60 11/20/2020    GFRAA >60 05/06/2013    LABGLOM >60 11/20/2020    LABGLOM 83 07/20/2017    GLUCOSE 114 11/20/2020    GLUCOSE 100 12/22/2011     CTA HEAD NECK W CONTRAST   Preliminary Result   1.  Approximately 40% left cervical internal carotid artery stenosis by NASCET criteria. 2. Otherwise, no hemodynamically significant stenosis or branch occlusion in   the cervical arterial circulation. 3. No hemodynamically significant stenosis or branch occlusion in the   intracranial arterial circulation. CT Head WO Contrast   Final Result   Slightly asymmetric hypodensity in the right external capsule is nonspecific. Although this is likely related to chronic small vessel ischemic change   superimposed acute ischemia cannot be excluded. If there is high clinical   concern for ischemia in this region further evaluation with MRI would be   helpful. XR CHEST PORTABLE   Final Result   No active cardiopulmonary disease             Problem List  Principal Problem:    Acute CVA (cerebrovascular accident) ([address]  Active Problems:    Hyperlipidemia    Hypertension    Chronic pain of right knee    Gastroesophageal reflux disease without esophagitis    Dysarthria    Left-sided weakness  Resolved Problems:    * No resolved hospital problems. *        Assessment/Plan:   1. ASA and Lipitor  2. NPO until SLP eval  3. IVF  4. Check UA  5. Check HgA1C, Lipids  6. Telemetry  7. Neuro checks  8. PT/OT/SLP eval  9. Neuro consult for acute CVA  10. Labetalol PRN, allow permissive HTN  11. Check Echo with bubble  12. Phenergan IV PRN nausea  13. Protonix IV for GERD  14. Ativan IV prior to MRI      DVT prophylaxis Lovenox  Code status Full code  Diet NPO  IV access Peripheral  Meneses Catheter No    Admit as inpatient. I anticipate hospitalization spanning more than two midnights for investigation and treatment of the above medically necessary diagnoses. Discussed with patient and daughter. This looks like acute stroke. Will benefit from ARU if acute stroke.       Deonna Beach MD    11/20/2020 12:39 PM

## 2020-11-20 NOTE — ACP (ADVANCE CARE PLANNING)
Advanced Care Planning Note. Purpose of Encounter: Advanced care planning in light of acute CVA  Parties In Attendance: Patient, daughter  Decisional Capacity: Yes  Subjective: Patient with L weakness  Objective: Cr 0.6  Goals of Care Determination: Patient wants full support (CPR, vent, surgery, HD, trach, PEG)  Plan:  MRI Brain, Echo, Neuro consult, PT/Ot/SLP eval  Code Status: Full code   Time spent on Advanced care Plannin minutes  Advanced Care Planning Documents: Completed advanced directives on chart, daughter is the POA.     Samuel Meyer MD  2020 12:39 PM

## 2020-11-20 NOTE — PROGRESS NOTES
SUBJECTIVE:  Patient ID: Leslye Mckeon is an 79 y.o. female. HPI: Patient here today for the f/u of chronic problems-- see Problem List and associated comments. New issues or complaints include (also see Assessment for more details):  TELEHEALTH EVALUATION -- Audio/Visual (During [id number]-[id number] public health emergency)    Pursuant to the emergency declaration under the [address] [place] [address] authority and the [employer] Act, this Virtual  Visit was conducted, with patient's consent, to reduce the patient's risk of exposure to COVID-19 and provide continuity of care for an established patient. Services were provided through a video synchronous discussion virtually to substitute for in-person clinic visit. Patient on a video visit today with the assistance of her daughter, Nam Nixon. She states that for approximately 24 hours now she has had a weakness to the left upper extremity, some cramping in her left lower extremity, possibly some slurring of words and a left facial droop. She is not able to hold anything in her left arm. She is requiring more assistance. She denies any headache, chest pain or unusual shortness of breath. She does have her chronic arthritic problems, especially in her right lower extremity. There is been no recent change in medications or therapies. She has not had any signs or symptoms of Covid during the pandemic. Review of Systems   Constitutional: Negative for fever. HENT: Negative for trouble swallowing. Respiratory: Negative for shortness of breath. Cardiovascular: Negative for chest pain. Musculoskeletal: Positive for arthralgias. Neurological: Positive for speech difficulty and weakness. Negative for seizures and headaches. Psychiatric/Behavioral: Negative for confusion. OBJECTIVE:    There were no vitals taken for this visit.      Physical Exam  Constitutional: General: She is in acute distress. Eyes:      Extraocular Movements: Extraocular movements intact. Pulmonary:      Effort: No respiratory distress. Neurological:      Mental Status: She is alert. Comments: Difficulty lifting left arm-can only move a couple inches. Left perioral droop noted. Slight slurring of speech on a occasion. Psychiatric:         Attention and Perception: Attention normal.         Speech: Speech is slurred (Few words slurred). Thought Content: Thought content is not delusional.         Cognition and Memory: Cognition normal.         ASSESSMENT:       Encounter Diagnoses   Name Primary?  Essential hypertension     Reactive airway disease without complication, unspecified asthma severity, unspecified whether persistent        CVA (cerebral vascular accident) ([address]  Highly suspicious for symptoms starting approximately 1 day ago. Left perioral droop, slurring, left arm weakness, and possibly left lower extremity symptoms. Discussed with patient and daughter -they will take patient to the ER right now. [facility] called and discussed with PA. Hypertension  He has been doing okay per family    Reactive airway disease  No apparent wheezing or respiratory difficulties        PLAN:See ASSESSMENT for evaluation & PLAN     No orders of the defined types were placed in this encounter.    , PMH, SH and FH reviewed and noted. Recent and past labs, tests and consultsalso reviewed. Recent or new meds also reviewed. Lengthy and thorough review of patient's current problem and other potential comorbid issues affecting patient - discussion w/ patient and accompanying family members. Questions and concerns were addressed, as well as any potential treatment options. Emergency room called and discussed with PA.

## 2020-11-21 ENCOUNTER — APPOINTMENT (OUTPATIENT)
Dept: MRI IMAGING | Age: 70
DRG: 065 | End: 2020-11-21
Payer: MEDICARE

## 2020-11-21 LAB
A/G RATIO: 1.9 (ref 1.1–2.2)
ALBUMIN SERPL-MCNC: 4.1 G/DL (ref 3.4–5)
ALP BLD-CCNC: 107 U/L (ref 40–129)
ALT SERPL-CCNC: 20 U/L (ref 10–40)
ANION GAP SERPL CALCULATED.3IONS-SCNC: 10 MMOL/L (ref 3–16)
AST SERPL-CCNC: 16 U/L (ref 15–37)
BILIRUB SERPL-MCNC: 0.4 MG/DL (ref 0–1)
BUN BLDV-MCNC: 17 MG/DL (ref 7–20)
CALCIUM SERPL-MCNC: 9 MG/DL (ref 8.3–10.6)
CHLORIDE BLD-SCNC: 105 MMOL/L (ref 99–110)
CHOLESTEROL, TOTAL: 248 MG/DL (ref 0–199)
CO2: 26 MMOL/L (ref 21–32)
CREAT SERPL-MCNC: 0.6 MG/DL (ref 0.6–1.2)
ESTIMATED AVERAGE GLUCOSE: 105.4 MG/DL
GFR AFRICAN AMERICAN: >60
GFR NON-AFRICAN AMERICAN: >60
GLOBULIN: 2.2 G/DL
GLUCOSE BLD-MCNC: 105 MG/DL (ref 70–99)
HBA1C MFR BLD: 5.3 %
HCT VFR BLD CALC: 37.5 % (ref 36–48)
HDLC SERPL-MCNC: 41 MG/DL (ref 40–60)
HEMOGLOBIN: 12.7 G/DL (ref 12–16)
LDL CHOLESTEROL CALCULATED: 159 MG/DL
LV EF: 63 %
LVEF MODALITY: NORMAL
MAGNESIUM: 2 MG/DL (ref 1.8–2.4)
MCH RBC QN AUTO: 31.7 PG (ref 26–34)
MCHC RBC AUTO-ENTMCNC: 34 G/DL (ref 31–36)
MCV RBC AUTO: 93.2 FL (ref 80–100)
PDW BLD-RTO: 13.4 % (ref 12.4–15.4)
PLATELET # BLD: 293 K/UL (ref 135–450)
PMV BLD AUTO: 7.1 FL (ref 5–10.5)
POTASSIUM SERPL-SCNC: 3.9 MMOL/L (ref 3.5–5.1)
RBC # BLD: 4.02 M/UL (ref 4–5.2)
SODIUM BLD-SCNC: 141 MMOL/L (ref 136–145)
TOTAL PROTEIN: 6.3 G/DL (ref 6.4–8.2)
TRIGL SERPL-MCNC: 239 MG/DL (ref 0–150)
VLDLC SERPL CALC-MCNC: 48 MG/DL
WBC # BLD: 5.2 K/UL (ref 4–11)

## 2020-11-21 PROCEDURE — 36415 COLL VENOUS BLD VENIPUNCTURE: CPT

## 2020-11-21 PROCEDURE — 97167 OT EVAL HIGH COMPLEX 60 MIN: CPT

## 2020-11-21 PROCEDURE — 97162 PT EVAL MOD COMPLEX 30 MIN: CPT

## 2020-11-21 PROCEDURE — 99223 1ST HOSP IP/OBS HIGH 75: CPT | Performed by: PSYCHIATRY & NEUROLOGY

## 2020-11-21 PROCEDURE — 92526 ORAL FUNCTION THERAPY: CPT

## 2020-11-21 PROCEDURE — 85027 COMPLETE CBC AUTOMATED: CPT

## 2020-11-21 PROCEDURE — 97530 THERAPEUTIC ACTIVITIES: CPT

## 2020-11-21 PROCEDURE — 2580000003 HC RX 258: Performed by: INTERNAL MEDICINE

## 2020-11-21 PROCEDURE — 6360000002 HC RX W HCPCS: Performed by: INTERNAL MEDICINE

## 2020-11-21 PROCEDURE — 93306 TTE W/DOPPLER COMPLETE: CPT

## 2020-11-21 PROCEDURE — 80053 COMPREHEN METABOLIC PANEL: CPT

## 2020-11-21 PROCEDURE — 83735 ASSAY OF MAGNESIUM: CPT

## 2020-11-21 PROCEDURE — 83036 HEMOGLOBIN GLYCOSYLATED A1C: CPT

## 2020-11-21 PROCEDURE — 92610 EVALUATE SWALLOWING FUNCTION: CPT

## 2020-11-21 PROCEDURE — 80061 LIPID PANEL: CPT

## 2020-11-21 PROCEDURE — 2060000000 HC ICU INTERMEDIATE R&B

## 2020-11-21 PROCEDURE — 6370000000 HC RX 637 (ALT 250 FOR IP): Performed by: INTERNAL MEDICINE

## 2020-11-21 PROCEDURE — 70551 MRI BRAIN STEM W/O DYE: CPT

## 2020-11-21 PROCEDURE — 92523 SPEECH SOUND LANG COMPREHEN: CPT

## 2020-11-21 RX ORDER — SUCRALFATE 1 G/1
1 TABLET ORAL EVERY 6 HOURS SCHEDULED
Status: DISCONTINUED | OUTPATIENT
Start: 2020-11-21 | End: 2020-11-24 | Stop reason: HOSPADM

## 2020-11-21 RX ORDER — MAGNESIUM HYDROXIDE/ALUMINUM HYDROXICE/SIMETHICONE 120; 1200; 1200 MG/30ML; MG/30ML; MG/30ML
30 SUSPENSION ORAL EVERY 6 HOURS PRN
Status: DISCONTINUED | OUTPATIENT
Start: 2020-11-21 | End: 2020-11-24 | Stop reason: HOSPADM

## 2020-11-21 RX ORDER — OXYCODONE HYDROCHLORIDE AND ACETAMINOPHEN 5; 325 MG/1; MG/1
1 TABLET ORAL EVERY 4 HOURS PRN
Status: DISCONTINUED | OUTPATIENT
Start: 2020-11-21 | End: 2020-11-24 | Stop reason: HOSPADM

## 2020-11-21 RX ORDER — ORPHENADRINE CITRATE 30 MG/ML
60 INJECTION INTRAMUSCULAR; INTRAVENOUS EVERY 12 HOURS
Status: COMPLETED | OUTPATIENT
Start: 2020-11-21 | End: 2020-11-22

## 2020-11-21 RX ORDER — PANTOPRAZOLE SODIUM 40 MG/1
40 TABLET, DELAYED RELEASE ORAL
Status: DISCONTINUED | OUTPATIENT
Start: 2020-11-21 | End: 2020-11-24 | Stop reason: HOSPADM

## 2020-11-21 RX ORDER — KETOROLAC TROMETHAMINE 30 MG/ML
15 INJECTION, SOLUTION INTRAMUSCULAR; INTRAVENOUS EVERY 6 HOURS
Status: COMPLETED | OUTPATIENT
Start: 2020-11-21 | End: 2020-11-23

## 2020-11-21 RX ORDER — FLUTICASONE FUROATE AND VILANTEROL 100; 25 UG/1; UG/1
1 POWDER RESPIRATORY (INHALATION) DAILY
Status: DISCONTINUED | OUTPATIENT
Start: 2020-11-21 | End: 2020-11-24 | Stop reason: HOSPADM

## 2020-11-21 RX ADMIN — SUCRALFATE 1 G: 1 TABLET ORAL at 18:10

## 2020-11-21 RX ADMIN — KETOROLAC TROMETHAMINE 15 MG: 30 INJECTION, SOLUTION INTRAMUSCULAR at 14:14

## 2020-11-21 RX ADMIN — POTASSIUM CHLORIDE 20 MEQ: 1500 TABLET, EXTENDED RELEASE ORAL at 07:58

## 2020-11-21 RX ADMIN — ATORVASTATIN CALCIUM 80 MG: 80 TABLET, FILM COATED ORAL at 20:35

## 2020-11-21 RX ADMIN — AMLODIPINE BESYLATE 5 MG: 5 TABLET ORAL at 20:35

## 2020-11-21 RX ADMIN — PANTOPRAZOLE SODIUM 40 MG: 40 TABLET, DELAYED RELEASE ORAL at 16:27

## 2020-11-21 RX ADMIN — ESCITALOPRAM OXALATE 10 MG: 10 TABLET ORAL at 08:00

## 2020-11-21 RX ADMIN — PROMETHAZINE HYDROCHLORIDE 12.5 MG: 25 INJECTION INTRAMUSCULAR; INTRAVENOUS at 04:57

## 2020-11-21 RX ADMIN — ONDANSETRON 4 MG: 2 INJECTION INTRAMUSCULAR; INTRAVENOUS at 01:00

## 2020-11-21 RX ADMIN — ORPHENADRINE CITRATE 60 MG: 30 INJECTION INTRAMUSCULAR; INTRAVENOUS at 20:41

## 2020-11-21 RX ADMIN — SUCRALFATE 1 G: 1 TABLET ORAL at 14:14

## 2020-11-21 RX ADMIN — ASPIRIN 81 MG: 81 TABLET, COATED ORAL at 08:01

## 2020-11-21 RX ADMIN — LORAZEPAM 1 MG: 2 INJECTION INTRAMUSCULAR; INTRAVENOUS at 10:11

## 2020-11-21 RX ADMIN — Medication 10 ML: at 07:59

## 2020-11-21 RX ADMIN — ENOXAPARIN SODIUM 40 MG: 40 INJECTION SUBCUTANEOUS at 20:36

## 2020-11-21 RX ADMIN — KETOROLAC TROMETHAMINE 15 MG: 30 INJECTION, SOLUTION INTRAMUSCULAR at 20:35

## 2020-11-21 RX ADMIN — Medication 10 ML: at 20:36

## 2020-11-21 ASSESSMENT — PAIN SCALES - GENERAL: PAINLEVEL_OUTOF10: 8

## 2020-11-21 NOTE — PROGRESS NOTES
Physical Therapy    Facility/Department: [address]  Initial Assessment    NAME: Nelson Bello  : 1950  MRN: [id number]    Date of Service: 2020    Discharge Recommendations:Maryann Lozano scored a  on the AM-PAC short mobility form. Current research shows that an AM-PAC score of 17 or less is typically not associated with a discharge to the patient's home setting. Based on the patient's AM-PAC score and their current functional mobility deficits, it is recommended that the patient have 5-7 sessions per week of Physical Therapy at d/c to increase the patient's independence. At this time, this patient demonstrates the endurance, and/or tolerance for 3 hours of therapy each day, with a treatment frequency of 5-7x/wk. Please see assessment section for further patient specific details. If patient discharges prior to next session this note will serve as a discharge summary. Please see below for the latest assessment towards goals. PT Equipment Recommendations  Equipment Needed: No(has WC)    Assessment   Body structures, Functions, Activity limitations: Decreased functional mobility ; Decreased ADL status; Decreased ROM; Decreased strength;Decreased safe awareness;Decreased endurance;Decreased balance;Decreased posture  Assessment: Pt with decreased mobility, balance, strength, endurance. Pt is functioning well below her normal level following CVA. Pt needing skilled pt services to address these issues. Treatment Diagnosis: weakness  Prognosis: Good  Decision Making: High Complexity  PT Education: PT Role;Plan of Care;Transfer Training; Injury Prevention;General Safety;Equipment; Family Education  Patient Education: Pt verbalized good understanding, needing reinforcement.   REQUIRES PT FOLLOW UP: Yes  Activity Tolerance  Activity Tolerance: Patient Tolerated treatment well       Patient Diagnosis(es): The encounter diagnosis was Cerebrovascular accident (CVA), unspecified mechanism ([facility] [address] has a past medical history of Arthritis, Compression fracture, Concussion, DVT (deep venous thrombosis) ([facility] [address] Environmental allergies, Essential hypertension, benign, GERD (gastroesophageal reflux disease), History of peptic ulcer, Hx of blood clots, Hyperlipidemia, Lacunar infarction ([address] [address] [address] MRSA (methicillin resistant staph aureus) culture positive, Restrictive airway disease, Retention of urine, and Wound, open. has a past surgical history that includes Knee arthroscopy (Left, ); Cholecystectomy; Hysterectomy; Total knee arthroplasty (Right, 2017); Colonoscopy; Knee Arthroplasty (Right, 2018); Shoulder arthroscopy (Right);  section; pr revise knee joint replace,all parts (Right, 2018); and quadricepsplasty (Right, 2018). Restrictions  Restrictions/Precautions  Restrictions/Precautions: Fall Risk(high fall risk)  Required Braces or Orthoses?: No  Position Activity Restriction  Other position/activity restrictions: Estefani Cornejo is a 79 y.o. female who presents to the emergency department with left arm weakness. This is a 51-year-old female who states that she started to have some left arm weakness yesterday morning which was over 24 hours prior to arrival.  The patient states the weakness continued and worsened throughout the day and apparently when she woke at 5:00 AM this morning the family noticed some left mouth drooping and some mildly garbled speech as well. The last time the daughter talk to the patient where she sounded normal on the phone was around 8 or 9 PM last evening.   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  Subjective  Subjective: Pt denies pain but reports \"very uncomfortable\" in bed. Pt restless in bed. Pain Screening  Patient Currently in Pain: Denies  Vital Signs  Patient Currently in Pain: Denies       Orientation  Orientation  Overall Orientation Status: Within Functional Limits  Social/Functional History  Social/Functional History  Lives With: Spouse  Type of Home: House  Home Layout: One level(has basement but does not use.)  Home Access: Ramped entrance  Bathroom Shower/Tub: Walk-in shower  Bathroom Toilet: Bedside commode(BSC over toilet)  Bathroom Equipment: Shower chair  Bathroom Accessibility: Kathrine Bile accessible  Home Equipment: 4 wheeled walker, Electric scooter, [address] Help From: Family  ADL Assistance: Independent  Homemaking Assistance: Needs assistance  Homemaking Responsibilities: No  Ambulation Assistance: Needs assistance  Transfer Assistance: Needs assistance  Active : No  Additional Comments: Pt several times falls in the past 6 mo. Uses WC at baseline.  not in good health, daugther in to help recently. Pt doesnt have WC accessible bathroom and uses 4ww in and out of bathroom \"but it's not pretty\". Many falls recently per dtr. Cognition        Objective     Observation/Palpation  Posture: Poor  Observation: flaccid L UE, contracted R knee at baseline  Edema: L hand swelling. PROM RLE (degrees)  RLE General PROM:  deg R knee ext to flex. TKR dysfunction and contracture. \"DrPipo's say there is nothing they can do for me\". AROM LLE (degrees)  LLE AROM : WFL  Strength RLE  Comment: grossly -3/5 within available ROM. Strength LLE  Comment: grossly -4/5     Sensation  Overall Sensation Status: WFL(LEs WFL)  Bed mobility  Bridging: Contact guard assistance  Rolling to Right: Moderate assistance  Supine to Sit: 2 Person assistance; Moderate assistance  Scootin Person assistance; Moderate assistance  Comment: Pt sat EOB for 5 min with CGA for balance. Pt with post lean. Pt left seated in chair at end of treatment. Transfers  Sit to Stand: (attempted partial sit to stand and only able to push up part way, limited by R knee)  Lateral Transfers: Dependent/Total;Maximum Assistance(pt did 3-4 scoot transfers to drop arm recliner chair.)  Comment: limited by R knee dysfunction  Ambulation  Ambulation?: No     Balance  Posture: Fair  Sitting - Static: Fair  Sitting - Dynamic: Fair  Comments: CGA sitting balance  Exercises  Comments: cervical ROM rotation bilat. Pt and daugther instructed in ther ex for this. Plan   Plan  Times per week: 5-7x/week  Times per day: Daily  Current Treatment Recommendations: Strengthening, ROM, Balance Training, Functional Mobility Training, Transfer Training, ADL/Self-care Training, Endurance Training, Safety Education & Training, Home Exercise Program, Pain Management, Patient/Caregiver Education & Training, Equipment Evaluation, Education, & procurement  Safety Devices  Type of devices: All fall risk precautions in place, Call light within reach, Chair alarm in place, Gait belt, Patient at risk for falls, Left in chair, Nurse notified  Restraints  Initially in place: No    G-Code       OutComes Score                                                  AM-PAC Score  AM-PAC Inpatient Mobility Raw Score : 8 (11/21/20 1348)  AM-PAC Inpatient T-Scale Score : 28.52 (11/21/20 1348)  Mobility Inpatient CMS 0-100% Score: 86.62 (11/21/20 1348)  Mobility Inpatient CMS G-Code Modifier : CM (11/21/20 1348)          Goals  Short term goals  Time Frame for Short term goals: to be met by DC. Short term goal 1: Pt to perform bed mob with min A. Short term goal 2: Pt to perform transfers bed to chair with max A. Short term goal 3: Pt to sit EOB for ADLs with SBA for 5 min .   Long term goals  Time Frame for Long term goals : LTGs=sTGs  Patient Goals   Patient goals : did not state.        Therapy Time   Individual Concurrent Group Co-treatment   Time In 0219         Time Out 1328         Minutes 43         Timed Code Treatment Minutes: [address]  Andrea berg, [id number]

## 2020-11-21 NOTE — CONSULTS
NEUROLOGY CONSULTATION     Patient's Name :   Harmony Scales        YOB: 1950                    Date of Consultation : 11/21/2020 12:47 PM    Referring Physician :  Gordon Spencer MD    Reason for Consultation :  Left-sided weakness    HISTORY OF PRESENT ILLNESS      Bahman Vincent is a 79 y.o. female   History was obtained from patient and from dictations in the chart. Additional history was obtained from the patient's daughter at the bedside. Patient has known hypertension hyperlipidemia and gastroesophageal reflux disease who was admitted with sudden onset of left-sided weakness and speech difficulty. Patient has been wheelchair-bound because of severe arthritis and inability to walk. Patient symptoms were acute in onset and moderately severe without any other aggravating or relieving factors. Patient has never had any similar symptoms in the past.  Since admission her symptoms have remained about the same. REVIEW OF SYSTEMS     Denies any chest pain palpitation breathlessness abdominal pain fever or weight loss. Rest of the 14 system review was reviewed and was negative except for the symptoms noted above.     Past Medical History:   Diagnosis Date    Arthritis     Compression fracture     back due to fall    Concussion     due to fall    DVT (deep venous thrombosis) ([facility] [facility] 01/2017    RLE after TKR    Environmental allergies     Essential hypertension, benign 6/28/2010    GERD (gastroesophageal reflux disease)     History of peptic ulcer     Hx of blood clots     Hyperlipidemia 6/28/2010    Lacunar infarction [facility])     old - per MRI 2015    MRSA (methicillin resistant staph aureus) culture positive 12/07/2018    knee    Restrictive airway disease     allergy induced    Retention of urine     Wound, open 09/2018    left shin area, healing well, tx in wound care center     Family History   Problem Relation Age of Onset    Cancer Other     Hypertension Other     Kidney Disease Other      Social History     Socioeconomic History    Marital status:      Spouse name: None    Number of children: None    Years of education: None    Highest education level: None   Occupational History    None   Social Needs    Financial resource strain: None    Food insecurity     Worry: None     Inability: None    Transportation needs     Medical: None     Non-medical: None   Tobacco Use    Smoking status: Former Smoker     Packs/day: 1.00     Years: 35.00     Pack years: 35.00     Types: Cigarettes     Last attempt to quit: 3/28/2005     Years since quitting: 15.6    Smokeless tobacco: Never Used   Substance and Sexual Activity    Alcohol use: Not Currently    Drug use: No    Sexual activity: None   Lifestyle    Physical activity     Days per week: None     Minutes per session: None    Stress: None   Relationships    Social connections     Talks on phone: None     Gets together: None     Attends [employer] service: None     Active member of club or organization: None     Attends meetings of clubs or organizations: None     Relationship status: None    Intimate partner violence     Fear of current or ex partner: None     Emotionally abused: None     Physically abused: None     Forced sexual activity: None   Other Topics Concern    None   Social History Narrative    None     Current Facility-Administered Medications   Medication Dose Route Frequency Provider Last Rate Last Dose    fluticasone-vilanterol (BREO ELLIPTA) 100-25 MCG/INH inhaler PATIENT SUPPLIED  1 puff Inhalation Daily Josey Medeiros MD        Teriparatide (Recombinant) (FORTEO) injection 20 mcg PATIENT SUPPLIED  20 mcg Subcutaneous Daily Josey Medeiros MD        mirabegron [facility]) extended release tablet 50 mg PATIENT SUPPLIED  50 mg Oral Daily Josey Medeiros MD   50 mg at 11/21/20 1023    albuterol (PROVENTIL) nebulizer solution 2.5 mg  2.5 mg Nebulization Q4H PRN Royal Whitfield MD        amLODIPine (NORVASC) tablet 5 mg  5 mg Oral Nightly Royal Whitfield MD   5 mg at 11/20/20 2040    escitalopram (LEXAPRO) tablet 10 mg  10 mg Oral Daily Royal Whitfield MD   10 mg at 11/21/20 0800    furosemide (LASIX) tablet 20 mg  20 mg Oral Daily Royal Whitfield MD   Stopped at 11/21/20 0801    potassium chloride (KLOR-CON M) extended release tablet 20 mEq  20 mEq Oral Daily with breakfast Royal Whitfield MD   20 mEq at 11/21/20 0758    acetaminophen (TYLENOL) tablet 650 mg  650 mg Oral Q6H PRN Royal Whitfield MD        Or   [place] Self acetaminophen (TYLENOL) suppository 650 mg  650 mg Rectal Q6H PRN Royal Whitfield MD        polyethylene glycol (GLYCOLAX) packet 17 g  17 g Oral Daily PRN Royal Whitfield MD        promethazine (PHENERGAN) tablet 12.5 mg  12.5 mg Oral Q6H PRN Royal Whitfield MD        Or    ondansetron (ZOFRAN) injection 4 mg  4 mg Intravenous Q6H PRN Royal Whitfield MD   4 mg at 11/21/20 0100    enoxaparin (LOVENOX) injection 40 mg  40 mg Subcutaneous Nightly Royal Whitfield MD   40 mg at 11/20/20 2040    aspirin EC tablet 81 mg  81 mg Oral Daily Royal Whitfield MD   81 mg at 11/21/20 0801    Or    aspirin suppository 300 mg  300 mg Rectal Daily Royal Whitfield MD        0.9 % sodium chloride infusion   Intravenous Continuous Royal Whitfield MD 50 mL/hr at 11/20/20 1730      sodium chloride flush 0.9 % injection 10 mL  10 mL Intravenous 2 times per day Royal Whitfield MD   10 mL at 11/21/20 0759    sodium chloride flush 0.9 % injection 10 mL  10 mL Intravenous PRN Royal Whitfield MD        potassium chloride (KLOR-CON M) extended release tablet 40 mEq  40 mEq Oral PRN Royal Whitfield MD        Or   [place] Self potassium bicarb-citric acid (EFFER-K) effervescent tablet 40 mEq  40 mEq Oral PRN Royal Whitfield MD        Or   [place] Self potassium chloride 10 mEq/100 mL IVPB (Peripheral Line)  10 mEq Intravenous PRN Royal Whitfield MD        atorvastatin (LIPITOR) tablet 80 mg  80 mg Oral Nightly Royal Whitfield MD   80 mg at 11/20/20 2040    labetalol (NORMODYNE;TRANDATE) injection 10 mg  10 mg Intravenous Q10 Min PRN Tamea Loss, MD        perflutren lipid microspheres (DEFINITY) injection 1.65 mg  1.5 mL Intravenous ONCE PRN Tamea Loss, MD        promethazine (PHENERGAN) injection 12.5 mg  12.5 mg Intramuscular Q4H PRN Tamea Loss, MD   12.5 mg at 11/21/20 0457    budesonide-formoterol (SYMBICORT) 80-4.5 MCG/ACT inhaler 2 puff  2 puff Inhalation BID Tamea Loss, MD   2 puff at 11/20/20 6141     Allergies   Allergen Reactions    Codeine      Severe headaches    Demerol      Severe headache and over-sedation    Morphine Other (See Comments)     Makes her crazy    Tape Marian Rice Tape] Other (See Comments)     Tears skin    Cabbage Nausea And Vomiting and Swelling    Erythromycin Nausea And Vomiting and Rash       PHYSICAL EXAMINATION:  BP (!) 159/85   Pulse 68   Temp 98.1 °F (36.7 °C) (Oral)   Resp 17   Ht 5' 7\" (1.702 m)   Wt 168 lb (76.2 kg)   SpO2 92%   BMI 26.31 kg/m²   Appearance: Well appearing, obese and in no distress  Mental Status Exam: Patient is alert, oriented to person, place and time. Recent and remote memory is normal  Fund of Knowledge is normal  Attention/concentration is normal.   Speech : Mild dysarthria  Language : No aphasia  Funduscopic Exam: sharp disc margins  Cranial Nerves:   II: Visual fields:  Full to confrontation  III: Pupils:  equal, round, reactive to light  III,IV,VI: Extra Ocular Movements are intact.  No nystagmus  V: Facial sensation is intact to pin prick and light touch  VII: Mild left facial droop  VIII: Hearing:  Intact to finger rub bilaterally  IX: Palate  elevation is symmetric  XI: Shoulder shrug is intact  XII: Tongue movements are normal  Motor:  Muscle tone is decreased on the left side and normal on the right side  Strength is 5/5 on the right side and 3/5 on the left side  Sensory: Intact to light touch and  pin prick in all four extremities  Coordination:  Normal Finger to Nose and Heel to Shin bilaterally    . Reflexes:  DTR 1 and symmetric bilaterally in the upper extremities and absent in the lower extremities  Plantar response: Withdrawal response bilaterally  Gait: Unable to ambulate. Romberg: Could not be tested. Vascular: No carotid bruit bilaterally        DATA:  LABS:  General Labs:    CBC:   Lab Results   Component Value Date    WBC 5.2 11/21/2020    RBC 4.02 11/21/2020    HGB 12.7 11/21/2020    HCT 37.5 11/21/2020    MCV 93.2 11/21/2020    MCH 31.7 11/21/2020    MCHC 34.0 11/21/2020    RDW 13.4 11/21/2020     11/21/2020    MPV 7.1 11/21/2020     BMP:    Lab Results   Component Value Date     11/21/2020    K 3.9 11/21/2020    K 4.0 11/20/2020     11/21/2020    CO2 26 11/21/2020    BUN 17 11/21/2020    LABALBU 4.1 11/21/2020    CREATININE 0.6 11/21/2020    CALCIUM 9.0 11/21/2020    GFRAA >60 11/21/2020    GFRAA >60 05/06/2013    LABGLOM >60 11/21/2020    LABGLOM 83 07/20/2017    GLUCOSE 105 11/21/2020    GLUCOSE 100 12/22/2011     RADIOLOGY REVIEW:  I have reviewed radiology image(s) and reports(s) of: MRI brain and CT angiogram    IMPRESSION :  Acute right hemispheric infarction  MRI images were independently reviewed with the patient and her family at the bedside today  This is most likely a thrombotic infarction from poorly controlled hypertension and other cardiovascular risk factors  Cerebral embolism seems unlikely at this point  Risk factors include hypertension and hyperlipidemia as well as obesity  Left hemiparesis  Dysarthria  Dysphagia  CT angiogram showed only 40% stenosis in the left internal carotid artery.   Echocardiogram is pending at this time  Patient Active Problem List   Diagnosis    Hyperlipidemia    Hypertension    Edema    CTS (carpal tunnel syndrome)    Reactive airway disease    Pulmonary nodule    Chronic pain of right knee    Anxiety and depression    Anemia    Gastroesophageal reflux disease without esophagitis    Quadriceps tendon rupture, right, sequela    Osteoporosis    Multiple fractures of pelvis with stable disruption of pelvic ring, subsequent encounter for fracture with delayed healing    Lumbar disc disease    Acute CVA (cerebrovascular accident) ([address]    Dysarthria    Left-sided weakness     RECOMMENDATIONS ;  Discussed at length with patient and her daughter  Reviewed MRI brain findings  Discussed with Dr. Denise Power antiplatelet therapy with aspirin  Statin therapy  Physical therapy occupational therapy and speech therapy evaluations  Try to lower blood pressure to around 140/90 for the next a couple of days and then we can be more aggressive in controlling her hypertension. Thank you for this consultation. Please note a portion of this chart was generated using dragon dictation software. Although every effort was made to ensure the accuracy of this automated transcription, some errors in transcription may have occurred.

## 2020-11-21 NOTE — PROGRESS NOTES
Pt c/o of muscle cramps. Attempted repositioning pt with pillows and wedges. Pt denies getting any relief. Message sent to MD via [employer]. New orders received.

## 2020-11-21 NOTE — PROGRESS NOTES
Assessment complete, see doc flowsheet. Patient resting in bed, call light in reach, bed in lowest position, brake set. Patient denies any needs at this time. Patient encouraged to call if needs arise.   Jacinda Xiong RN

## 2020-11-21 NOTE — PROGRESS NOTES
Speech Language/Pathology   SPEECH LANGUAGE AND CLINICAL BEDSIDE SWALLOWING EVALUATION   Speech Therapy Department     Patient Name:  Joanna Wallis  :  1950  Pain level: Body hurts all over; I cannot get comfortable. SLP alerted RN  Medical Diagnosis:  Acute CVA (cerebrovascular accident) ([facility] [address] [I63.9]  Acute CVA (cerebrovascular accident) ([facility] [address] [I63.9]      Chart Review  · MD history and Physical documentation revealed:   History of Present Illness:  Joanna Wallis is a 79 y.o. female with history of HTN, hyperlipidemia, GERD, osteoporosis, wheel chair bound who came to ER with L sided weakness and dysarthria since morning. Had virtual visit with PCP and advised to come to ER. Woke up with symptoms. Has been nauseated. No CP, SOB, HA or fevers. No abdominal pain or diarrhea. Has L facial droop. Daughter at bedside. Found to have SBP > 200 in ED. Otherwise complete ROS is negative unless listed above. · 2020 :MRI Brain: Awaiting documented findings  · 2020 CT Head Neck W Contrast noted  · 2020: chest: \"no active cardiopulmonary disease\"    ·  has a past medical history of Arthritis, Compression fracture, Concussion, DVT (deep venous thrombosis) ([facility] [address] (2017), Environmental allergies, Essential hypertension, benign (2010), GERD (gastroesophageal reflux disease), History of peptic ulcer, blood clots, Hyperlipidemia (2010), Lacunar infarction ([facility] [address] MRSA (methicillin resistant staph aureus) culture positive (2018), Restrictive airway disease, Retention of urine, and Wound, open (2018). · Baseline diet: Per pt self report regular but on GERD medicine and avoids certain foods/liquids due to acid reflux    CLINICAL SWALLOW EVALUATION:  Dysphagia Treatment Diagnosis: Oropharyngeal Dysphagia     Impressions:   1. Pt was alert and oriented x 3 but physically restless and distractible due to discomfort.   SLP and RN repositioned pt for assessment; but again physically restless and distractible  · Pre feeding Oral Motor Mechanism Exam revealed: left labial/buccal weakness/reduced rom and sensory deficits; tongue deviates on protrusion/elevation but can achieve moderate rom with reduced coordination; oral reflexes present but diminished; volitional cough was strong/dry; volitional swallow delayed; voice: audible/strong/dysarthric. 2. Oropharyngeal Dysphagia characterized by left oral motor muscular weakness which does impact bolus control; reduced lingual coordination for bolus control/bolus formation/cohesion; delayed initiation of swallow and potential reduced laryngeal elevation  · Pt with inconsistent immediate s/s with thin liquids with concern for penetration/aspiration  · Pt with left anterior loss which is most significant with cup>tsp and straw presentations  · Pt with left oral pocketing with reduced sensation  · Pt appeared to be most optimal with mildly thick liquids; and puree  · Pt with oral pocketing of textured food with reduced sensation; external cues to re-direct and clear oral pocketing  Recommend temporary mildly thick liquids with puree food consistencies. Recommend continued therapy to include oral motor ex; sensory stimulation and laryngeal ex; pt training in compensatory strategies with gradual diet upgrade. Pt self reports GERD history and self reports on meds. 3. Pt was physically restless during this assessment that pt distractible which impacted concern for pt insight/awareness and clearing oral pocketing. This is rationale for further diet downgrade this date/time.  I anticipate prognosis for diet upgrade is good and anticipate ability to upgrade as pt physically is more comfortable    Dietary Recommendations:   · Mildly thick liquids  · Puree  · meds with puree    Strategies: 90 degree positioning with all p.o. intake; small bites/sips; alternate textures through meal; reduce rate of intake; check for oral pocketing; may use straw droop  Left oral motor muscular weakness with moderate reduced volitional rom  Tongue deviates on protrusion/elevation  Reduced lingual coordination and overall rom  Voice audible/dry in quality ; Dysarthria  Volitional cough: strong/dry  volitional swallow: delayed  Oral reflexes (gag and palatal): diminished    Verbal Expression:   [place] language skills appear intact for biographical identifiers; simple questions for concept/event descriptions at multiple word utterance level    Auditory and Visual Language Comprehension:   Pt was able to follow 1-2 step simple commands  Breakdown with 3 step commands but appeared related to distractibility  R/L discrimination with inconsistent left inattention  Reading: mild left inattention which impacted functional reading . Pt was responsive to left visual attention cues for improved functional reading    Cognitive-Linguistic:   Oriented to self; place; date and president. Partially oriented to situations  Attention: mild to marked deficits. Further exacerbated by pt complaints of discomfort and general body aches  Left visual inattentions  Working memory and STM: mild to moderate deficits  Insight and PS/VPS    Plan: Speech therapy 3-5 times a weeks for speech-language treatment, and dysphagia treatment     Discharge Recommendations:  Pt will benefit from continued skilled Speech Therapy for Speech and Dysphagia services, prior to returning home. Prior Status:   Pt reports IND prior to admit  Pt reports on GERD medicine and does avoid certain foods  Pt reports IND communication and participates in home/health management    Speech Language Short-term goals: 1. Pt will improve speech intelligibility in connected speech via graded tasks to 80%   2. Pt will improve orientation and short term recall via graded tasks to 80%  3. Pt will demonstrate improved left visual attention during varied body scheme; visual language tasks with set up and <mild cues  4.  Pt will demonstrate improved VPS/PS and insight for safety while on a structured unit with set up and mild cues    Patient/Family Education:Education, results and recommendations given to the Pt and nurse, who verbalized understanding    Timed Code Treatment: 0    Total Treatment Time: 40     If patient discharges prior to next session this note will serve as a discharge summary. Isha ElderMS,CCC,SLP [id number]  Speech and Language Pathologist  Speech-Language Pathologist

## 2020-11-21 NOTE — ACP (ADVANCE CARE PLANNING)
Advanced Care Planning Note. Purpose of Encounter: Advanced care planning in light of acute CVA  Parties In Attendance: Patient, daughter  Decisional Capacity: Yes  Subjective: Patient with L weakness, dysarthria and dysphagia  Objective: Cr 0.6  Goals of Care Determination: Patient wants full support (CPR, vent, surgery, HD, trach, PEG)  Plan:  PMR consult for ARU  Code Status: Full code   Time spent on Advanced care Plannin minutes  Advanced Care Planning Documents: Completed advanced directives on chart, daughter is the POA.     Dominic Brennan MD  2020 2:11 PM

## 2020-11-21 NOTE — PROGRESS NOTES
tablet 20 mg, Daily  potassium chloride (KLOR-CON M) extended release tablet 20 mEq, Daily with breakfast  acetaminophen (TYLENOL) tablet 650 mg, Q6H PRN    Or  acetaminophen (TYLENOL) suppository 650 mg, Q6H PRN  polyethylene glycol (GLYCOLAX) packet 17 g, Daily PRN  promethazine (PHENERGAN) tablet 12.5 mg, Q6H PRN    Or  ondansetron (ZOFRAN) injection 4 mg, Q6H PRN  enoxaparin (LOVENOX) injection 40 mg, Nightly  aspirin EC tablet 81 mg, Daily    Or  aspirin suppository 300 mg, Daily  0.9 % sodium chloride infusion, Continuous  sodium chloride flush 0.9 % injection 10 mL, 2 times per day  sodium chloride flush 0.9 % injection 10 mL, PRN  potassium chloride (KLOR-CON M) extended release tablet 40 mEq, PRN    Or  potassium bicarb-citric acid (EFFER-K) effervescent tablet 40 mEq, PRN    Or  potassium chloride 10 mEq/100 mL IVPB (Peripheral Line), PRN  atorvastatin (LIPITOR) tablet 80 mg, Nightly  labetalol (NORMODYNE;TRANDATE) injection 10 mg, Q10 Min PRN  perflutren lipid microspheres (DEFINITY) injection 1.65 mg, ONCE PRN  promethazine (PHENERGAN) injection 12.5 mg, Q4H PRN  budesonide-formoterol (SYMBICORT) 80-4.5 MCG/ACT inhaler 2 puff, BID        Objective:  BP (!) 164/61   Pulse 68   Temp 98.8 °F (37.1 °C) (Oral)   Resp 17   Ht 5' 7\" (1.702 m)   Wt 168 lb (76.2 kg)   SpO2 93%   BMI 26.31 kg/m²     Intake/Output Summary (Last 24 hours) at 11/21/2020 1404  Last data filed at 11/21/2020 0508  Gross per 24 hour   Intake 950 ml   Output 800 ml   Net 150 ml      Wt Readings from Last 3 Encounters:   11/20/20 168 lb (76.2 kg)   04/16/20 214 lb (97.1 kg)   05/17/19 214 lb (97.1 kg)       General appearance:  Appears comfortable. Well nourished  Eyes: Sclera clear, pupils equal  ENT: Moist mucus membranes, no thrush. Trachea midline. Cardiovascular: Regular rhythm, normal S1, S2. No murmur, gallop, rub.  No edema in lower extremities  Respiratory: Clear to auscultation bilaterally, no wheeze, good inspiratory effort  Gastrointestinal: Abdomen soft, non-tender, not distended, normal bowel sounds  Musculoskeletal: No cyanosis in digits, neck supple  Neurology: L nasolabial flattening. Dysarthria. LUE 3+/5, LLE 3/5, RUE/RLE 5/5  Psychiatry: Appropriate affect. Not agitated  Skin: Warm, dry, normal turgor, no rash  Brisk capillary refill, peripheral pulses palpable     Labs and Tests:  CBC:   Recent Labs     11/20/20  0954 11/21/20  0516   WBC 5.9 5.2   HGB 14.0 12.7    293     BMP:    Recent Labs     11/20/20  0954 11/21/20  0513    141   K 4.0 3.9    105   CO2 25 26   BUN 19 17   CREATININE 0.6 0.6   GLUCOSE 114* 105*     Hepatic:   Recent Labs     11/20/20  0954 11/21/20  0513   AST 21 16   ALT 26 20   BILITOT 0.4 0.4   ALKPHOS 127 107     CTA HEAD NECK W CONTRAST   Preliminary Result   1. Approximately 40% left cervical internal carotid artery stenosis by NASCET   criteria. 2. Otherwise, no hemodynamically significant stenosis or branch occlusion in   the cervical arterial circulation. 3. No hemodynamically significant stenosis or branch occlusion in the   intracranial arterial circulation. CT Head WO Contrast   Final Result   Slightly asymmetric hypodensity in the right external capsule is nonspecific. Although this is likely related to chronic small vessel ischemic change   superimposed acute ischemia cannot be excluded. If there is high clinical   concern for ischemia in this region further evaluation with MRI would be   helpful. XR CHEST PORTABLE   Final Result   No active cardiopulmonary disease         MRI brain without contrast    (Results Pending)       Problem List  Principal Problem:    Acute CVA (cerebrovascular accident) ([facility] [address]  Active Problems:    Hyperlipidemia    Hypertension    Chronic pain of right knee    Gastroesophageal reflux disease without esophagitis    Dysarthria    Left-sided weakness  Resolved Problems:    * No resolved hospital problems.  * Assessment & Plan:   1. ASA and Lipitor  2. PMR consult for acute stroke, needs ARU  3. Cont IVF  4. Neg UA  5. Normal HgA1C, Lipids abnormal and noted  6. Cont Telemetry  7. Neuro checks completed  8. PT/OT/SLP eval recommend ARU  9.         Neuro consult for acute CVA appreciated  10. Labetalol PRN, allow permissive HTN  11. Protonix PO bid, Carafate qid, Maalox PRN  12. Toradol IV X 8 doses  13. Percocet PRN       IV Access: Peripheral  [place]es[place]: No  Diet: DIET DYSPHAGIA PUREED; Dysphagia Pureed; Mildly Thick (Nectar)  Code:Full Code  DVT PPX Lovenox  Disposition ARU    Discussed with patient, Dr Mark Manning (Neuro), nursing, PT, OT and SLP. D/W daughter. Needs ARU. Medically stable for DC in next 24 hrs.       Lady Mejia MD   11/21/2020 2:04 PM

## 2020-11-21 NOTE — PROGRESS NOTES
well;Treatment limited secondary to agitation  Activity Tolerance: Pt writhing in bed and while seated in chair. Pt appears mildly agitated but also fatigued. RN aware. Safety Devices  Safety Devices in place: Yes  Type of devices: All fall risk precautions in place; Left in chair;Call light within reach;Nurse notified; Chair alarm in place;Gait belt;Patient at risk for falls  Restraints  Initially in place: No           Patient Diagnosis(es): The encounter diagnosis was Cerebrovascular accident (CVA), unspecified mechanism ([facility] [address] has a past medical history of Arthritis, Compression fracture, Concussion, DVT (deep venous thrombosis) ([facility] [address] Environmental allergies, Essential hypertension, benign, GERD (gastroesophageal reflux disease), History of peptic ulcer, Hx of blood clots, Hyperlipidemia, Lacunar infarction ([facility] [address] MRSA (methicillin resistant staph aureus) culture positive, Restrictive airway disease, Retention of urine, and Wound, open. has a past surgical history that includes Knee arthroscopy (Left, ); Cholecystectomy; Hysterectomy; Total knee arthroplasty (Right, 2017); Colonoscopy; Knee Arthroplasty (Right, 2018); Shoulder arthroscopy (Right);  section; pr revise knee joint replace,all parts (Right, 2018); and quadricepsplasty (Right, 2018). Treatment Diagnosis: Decreased mobility, ADL status, ADL transfers, endurance, coordination, posture, AROM and strength in LUE, safety awareness, high level IADL's, cognition and fine motor control associated with s/p CVA      Restrictions  Restrictions/Precautions  Restrictions/Precautions: Fall Risk(high fall risk)  Required Braces or Orthoses?: No  Position Activity Restriction  Other position/activity restrictions: Moises Isaacs is a 79 y.o. female who presents to the emergency department with left arm weakness.   This is a 66-year-old female who states that she started to have some left arm weakness yesterday morning which was over 24 hours prior to arrival.  The patient states the weakness continued and worsened throughout the day and apparently when she woke at 5:00 AM this morning the family noticed some left mouth drooping and some mildly garbled speech as well. The last time the daughter talk to the patient where she sounded normal on the phone was around 8 or 9 PM last evening. Subjective   General  Chart Reviewed: Yes  Patient assessed for rehabilitation services?: Yes  Family / Caregiver Present: Yes(daughter present)  Diagnosis: CVA  Subjective  Subjective: Pt supine in bed on arrival and agreeable for session. Daughter present. Patient Currently in Pain: Denies    Social/Functional History  Social/Functional History  Lives With: Spouse  Type of Home: House  Home Layout: One level(has basement but does not use.)  Home Access: Ramped entrance  Bathroom Shower/Tub: Walk-in shower  Bathroom Toilet: Bedside commode(BSC over toilet)  Bathroom Equipment: Shower chair  Bathroom Accessibility: Walker accessible  Home Equipment: 4 wheeled walker, Electric scooter, [address] Help From: Family  ADL Assistance: Independent  Homemaking Assistance: Needs assistance  Homemaking Responsibilities: No  Ambulation Assistance: Needs assistance  Transfer Assistance: Needs assistance  Active : No  Additional Comments: Pt several times falls in the past 6 mo. Uses WC at baseline.  not in good health, daugther in to help recently. Pt doesnt have WC accessible bathroom and uses 4ww in and out of bathroom \"but it's not pretty\". Many falls recently per dtr. Objective        Orientation  Overall Orientation Status: Within Functional Limits  Observation/Palpation  Posture: Poor  Observation: flaccid L UE, contracted R knee at baseline  Edema: L hand swelling.   Balance  Sitting Balance: Minimal assistance  Standing Balance: Dependent/Total  ADL  Feeding: NPO  Toileting: Dependent/Total(Pure Maida Killer in place)  Tone RUE  RUE Tone: Normotonic  Tone LUE  LUE Tone: Hypotonic  Coordination  Movements Are Fluid And Coordinated: No  Coordination and Movement description: Left UE;Fine motor impairments;Gross motor impairments;Tremors  Quality of Movement Other  Comment: While performing MMT on DARVIN, noted tremors in R hand. Pt reporting this is new onset. Transfers  Sit Pivot Transfers: Dependent/Total  Sit to stand: Dependent/Total  Stand to sit: Dependent/Total  Transfer Comments: Max Assist of 2 scoot/slide pivot transfer from bed to chair towards R direction. Pt performed 1 partial stand from chair to RW with Max Assist of 2 with vc's for hand placement. Pt tolerated stand x >1 minute--chair pad repositioned. Pt able to scoot self backwards in chair with Minimal assistance. Vision - Basic Assessment  Prior Vision: Wears glasses all the time  Visual History: No significant visual history  Patient Visual Report: No visual complaint reported. Vision Comments: VC's required for pt to turn head towards L direction. [facility]. Pt encouraged to continue turning heard R/L. Pt c/o neck stiffness--heated blanket provided.   Cognition  Overall Cognitive Status: Exceptions  Arousal/Alertness: Appropriate responses to stimuli  Following Commands: Inconsistently follows commands  Attention Span: Attends with cues to redirect  Memory: Decreased recall of precautions  Safety Judgement: Decreased awareness of need for safety  Problem Solving: Decreased awareness of errors  Insights: Decreased awareness of deficits  Initiation: Requires cues for some  Sequencing: Requires cues for some  Perception  Overall Perceptual Status: Impaired  Unilateral Attention: Cues to maintain midline in sitting;Cues to attend left visual field;Cues to attend to left side of body  Initiation: Cues to initiate tasks  Motor Planning: Appears intact     Sensation  Overall Sensation Status: WFL(LEs WFL)        LUE PROM (degrees)  LUE PROM: [facility]  Left Hand PROM (degrees)  Left Hand PROM: WFL  RUE AROM (degrees)  RUE AROM : WFL  Right Hand AROM (degrees)  Right Hand AROM: WFL  LUE Strength  Gross LUE Strength: Exceptions to [facility]  L Hand General: 1/5  LUE Strength Comment: Limited strength noted throughout LUE from shoulder to fingers. Full sensation and no edema noted. Trace muscle noted with shoulder extension, elbow extension and hand . No c/o pain or discomfort.   RUE Strength  Gross RUE Strength: WFL  RUE Strength Comment: 4/5 strength through extremity     Hand Dominance  Hand Dominance: Right         Plan   Plan  Times per week: 5-7x/week  Times per day: Daily  Specific instructions for Next Treatment: Cotx with PT to maximize function and safety  Current Treatment Recommendations: Strengthening, Patient/Caregiver Education & Training, Equipment Evaluation, Education, & procurement, Balance Training, Neuromuscular Re-education, Functional Mobility Training, Positioning, Endurance Training, Cognitive/Perceptual Training, Safety Education & Training, Self-Care / ADL      AM-[facility] Score        [facility] Inpatient Daily Activity Raw Score: 12 (11/21/20 [facility])  AM-PAC Inpatient ADL T-Scale Score : 30.6 (11/21/20 [facility])  ADL Inpatient CMS 0-100% Score: 66.57 (11/21/20 [facility])  ADL Inpatient CMS G-Code Modifier : CL (11/21/20 [facility])    Goals  Short term goals  Time Frame for Short term goals: Discharge  Short term goal 1: Mod Assist bed mobility  Short term goal 2: Mod Assist ADL transfers to/fro bed, chair and toilet  Short term goal 3: Mod Assist toileting  Short term goal 4: Mod Assist UB/LB ADL's  Short term goal 5: Supervision sit balance EOB (static and dynamic) for ADL's/functional activity  Long term goals  Time Frame for Long term goals : LTG=STG       Therapy Time   Individual Concurrent Group Co-treatment   Time In 1255         Time Out 1340         Minutes 45               Timed Code Treatment Minutes:  30 minutes  Total Treatment Minutes:  39

## 2020-11-21 NOTE — PROGRESS NOTES
Pt started coughing after taking sips of cranberry juice. Allowed pt to take sips of water and pt started coughing again. Pt passed the swallow screen yesterday and was advanced to a general diet. Pt is now NPO, waiting for SLP to evaluate.

## 2020-11-22 LAB
A/G RATIO: 1.7 (ref 1.1–2.2)
ALBUMIN SERPL-MCNC: 4.1 G/DL (ref 3.4–5)
ALP BLD-CCNC: 109 U/L (ref 40–129)
ALT SERPL-CCNC: 20 U/L (ref 10–40)
ANION GAP SERPL CALCULATED.3IONS-SCNC: 7 MMOL/L (ref 3–16)
AST SERPL-CCNC: 15 U/L (ref 15–37)
BILIRUB SERPL-MCNC: 0.5 MG/DL (ref 0–1)
BUN BLDV-MCNC: 20 MG/DL (ref 7–20)
CALCIUM SERPL-MCNC: 9.7 MG/DL (ref 8.3–10.6)
CHLORIDE BLD-SCNC: 106 MMOL/L (ref 99–110)
CO2: 28 MMOL/L (ref 21–32)
CREAT SERPL-MCNC: 0.7 MG/DL (ref 0.6–1.2)
GFR AFRICAN AMERICAN: >60
GFR NON-AFRICAN AMERICAN: >60
GLOBULIN: 2.4 G/DL
GLUCOSE BLD-MCNC: 103 MG/DL (ref 70–99)
POTASSIUM SERPL-SCNC: 4.1 MMOL/L (ref 3.5–5.1)
SODIUM BLD-SCNC: 141 MMOL/L (ref 136–145)
TOTAL PROTEIN: 6.5 G/DL (ref 6.4–8.2)

## 2020-11-22 PROCEDURE — 6370000000 HC RX 637 (ALT 250 FOR IP): Performed by: INTERNAL MEDICINE

## 2020-11-22 PROCEDURE — 36415 COLL VENOUS BLD VENIPUNCTURE: CPT

## 2020-11-22 PROCEDURE — 97112 NEUROMUSCULAR REEDUCATION: CPT

## 2020-11-22 PROCEDURE — 2580000003 HC RX 258: Performed by: INTERNAL MEDICINE

## 2020-11-22 PROCEDURE — 97530 THERAPEUTIC ACTIVITIES: CPT

## 2020-11-22 PROCEDURE — 6360000002 HC RX W HCPCS: Performed by: INTERNAL MEDICINE

## 2020-11-22 PROCEDURE — 94640 AIRWAY INHALATION TREATMENT: CPT

## 2020-11-22 PROCEDURE — 2060000000 HC ICU INTERMEDIATE R&B

## 2020-11-22 PROCEDURE — 92507 TX SP LANG VOICE COMM INDIV: CPT

## 2020-11-22 PROCEDURE — 92526 ORAL FUNCTION THERAPY: CPT

## 2020-11-22 PROCEDURE — 97535 SELF CARE MNGMENT TRAINING: CPT

## 2020-11-22 PROCEDURE — 94761 N-INVAS EAR/PLS OXIMETRY MLT: CPT

## 2020-11-22 PROCEDURE — 80053 COMPREHEN METABOLIC PANEL: CPT

## 2020-11-22 RX ORDER — LISINOPRIL 10 MG/1
10 TABLET ORAL DAILY
Status: DISCONTINUED | OUTPATIENT
Start: 2020-11-22 | End: 2020-11-24

## 2020-11-22 RX ORDER — AMLODIPINE BESYLATE 5 MG/1
10 TABLET ORAL DAILY
Status: DISCONTINUED | OUTPATIENT
Start: 2020-11-22 | End: 2020-11-23

## 2020-11-22 RX ORDER — CYCLOBENZAPRINE HCL 10 MG
10 TABLET ORAL 3 TIMES DAILY PRN
Status: DISCONTINUED | OUTPATIENT
Start: 2020-11-22 | End: 2020-11-24 | Stop reason: HOSPADM

## 2020-11-22 RX ORDER — CYCLOBENZAPRINE HCL 10 MG
10 TABLET ORAL 3 TIMES DAILY PRN
Status: DISCONTINUED | OUTPATIENT
Start: 2020-11-22 | End: 2020-11-22

## 2020-11-22 RX ADMIN — POTASSIUM CHLORIDE 20 MEQ: 1500 TABLET, EXTENDED RELEASE ORAL at 07:57

## 2020-11-22 RX ADMIN — SUCRALFATE 1 G: 1 TABLET ORAL at 12:57

## 2020-11-22 RX ADMIN — ORPHENADRINE CITRATE 60 MG: 30 INJECTION INTRAMUSCULAR; INTRAVENOUS at 06:33

## 2020-11-22 RX ADMIN — AMLODIPINE BESYLATE 10 MG: 5 TABLET ORAL at 15:58

## 2020-11-22 RX ADMIN — KETOROLAC TROMETHAMINE 15 MG: 30 INJECTION, SOLUTION INTRAMUSCULAR at 02:34

## 2020-11-22 RX ADMIN — ATORVASTATIN CALCIUM 80 MG: 80 TABLET, FILM COATED ORAL at 20:11

## 2020-11-22 RX ADMIN — Medication 10 ML: at 07:59

## 2020-11-22 RX ADMIN — OXYCODONE HYDROCHLORIDE AND ACETAMINOPHEN 1 TABLET: 5; 325 TABLET ORAL at 00:24

## 2020-11-22 RX ADMIN — ASPIRIN 81 MG: 81 TABLET, COATED ORAL at 07:57

## 2020-11-22 RX ADMIN — SUCRALFATE 1 G: 1 TABLET ORAL at 06:33

## 2020-11-22 RX ADMIN — KETOROLAC TROMETHAMINE 15 MG: 30 INJECTION, SOLUTION INTRAMUSCULAR at 07:57

## 2020-11-22 RX ADMIN — PANTOPRAZOLE SODIUM 40 MG: 40 TABLET, DELAYED RELEASE ORAL at 15:59

## 2020-11-22 RX ADMIN — ORPHENADRINE CITRATE 60 MG: 30 INJECTION INTRAMUSCULAR; INTRAVENOUS at 20:11

## 2020-11-22 RX ADMIN — SUCRALFATE 1 G: 1 TABLET ORAL at 17:38

## 2020-11-22 RX ADMIN — SUCRALFATE 1 G: 1 TABLET ORAL at 00:24

## 2020-11-22 RX ADMIN — SODIUM CHLORIDE: 9 INJECTION, SOLUTION INTRAVENOUS at 00:12

## 2020-11-22 RX ADMIN — PANTOPRAZOLE SODIUM 40 MG: 40 TABLET, DELAYED RELEASE ORAL at 06:33

## 2020-11-22 RX ADMIN — ENOXAPARIN SODIUM 40 MG: 40 INJECTION SUBCUTANEOUS at 20:12

## 2020-11-22 RX ADMIN — LISINOPRIL 10 MG: 10 TABLET ORAL at 15:58

## 2020-11-22 RX ADMIN — ESCITALOPRAM OXALATE 10 MG: 10 TABLET ORAL at 07:57

## 2020-11-22 RX ADMIN — KETOROLAC TROMETHAMINE 15 MG: 30 INJECTION, SOLUTION INTRAMUSCULAR at 13:30

## 2020-11-22 RX ADMIN — ONDANSETRON 4 MG: 2 INJECTION INTRAMUSCULAR; INTRAVENOUS at 13:25

## 2020-11-22 RX ADMIN — Medication 10 ML: at 20:11

## 2020-11-22 RX ADMIN — FLUTICASONE FUROATE AND VILANTEROL 1 PUFF: 100; 25 POWDER RESPIRATORY (INHALATION) at 09:45

## 2020-11-22 RX ADMIN — KETOROLAC TROMETHAMINE 15 MG: 30 INJECTION, SOLUTION INTRAMUSCULAR at 20:11

## 2020-11-22 ASSESSMENT — PAIN DESCRIPTION - LOCATION
LOCATION: KNEE;BACK
LOCATION: KNEE
LOCATION: LEG
LOCATION: SHOULDER

## 2020-11-22 ASSESSMENT — PAIN SCALES - GENERAL
PAINLEVEL_OUTOF10: 5
PAINLEVEL_OUTOF10: 2
PAINLEVEL_OUTOF10: 5
PAINLEVEL_OUTOF10: 5
PAINLEVEL_OUTOF10: 4
PAINLEVEL_OUTOF10: 5
PAINLEVEL_OUTOF10: 5

## 2020-11-22 ASSESSMENT — PAIN DESCRIPTION - PAIN TYPE
TYPE: CHRONIC PAIN
TYPE: CHRONIC PAIN
TYPE: ACUTE PAIN
TYPE: CHRONIC PAIN

## 2020-11-22 ASSESSMENT — PAIN DESCRIPTION - ORIENTATION
ORIENTATION: RIGHT
ORIENTATION: LEFT;RIGHT
ORIENTATION: LEFT

## 2020-11-22 ASSESSMENT — PAIN DESCRIPTION - PROGRESSION
CLINICAL_PROGRESSION: NOT CHANGED

## 2020-11-22 ASSESSMENT — PAIN - FUNCTIONAL ASSESSMENT: PAIN_FUNCTIONAL_ASSESSMENT: ACTIVITIES ARE NOT PREVENTED

## 2020-11-22 ASSESSMENT — PAIN SCALES - WONG BAKER: WONGBAKER_NUMERICALRESPONSE: 2

## 2020-11-22 ASSESSMENT — PAIN DESCRIPTION - DESCRIPTORS
DESCRIPTORS: ACHING
DESCRIPTORS: CRAMPING
DESCRIPTORS: ACHING

## 2020-11-22 ASSESSMENT — PAIN DESCRIPTION - ONSET
ONSET: ON-GOING

## 2020-11-22 ASSESSMENT — PAIN DESCRIPTION - FREQUENCY
FREQUENCY: CONTINUOUS
FREQUENCY: INTERMITTENT
FREQUENCY: INTERMITTENT

## 2020-11-22 NOTE — PROGRESS NOTES
Physical Therapy  Facility/Department: [address]  Daily Treatment Note  NAME: Karan Negro  : 1950  MRN: [id number]    Date of Service: 2020    Discharge Recommendations:  Karan Negro scored a 9/24 on the AM-PAC short mobility form. Current research shows that an AM-PAC score of 17 or less is typically not associated with a discharge to the patient's home setting. Based on the patient's AM-PAC score and their current functional mobility deficits, it is recommended that the patient have 5-7 sessions per week of Physical Therapy at d/c to increase the patient's independence. At this time, this patient demonstrates the endurance, and/or tolerance for 3 hours of therapy each day, with a treatment frequency of 5-7x/wk. Please see assessment section for further patient specific details. If patient discharges prior to next session this note will serve as a discharge summary. Please see below for the latest assessment towards goals. 24 hour supervision or assist, 5-7 sessions per week   PT Equipment Recommendations  Equipment Needed: No    Assessment   Body structures, Functions, Activity limitations: Decreased functional mobility ; Decreased ADL status; Decreased ROM; Decreased strength;Decreased safe awareness;Decreased endurance;Decreased balance;Decreased posture  Assessment: Patient demonstrates LUE weakness, poor trunk control/balance, severely limited right knee extension and strength all leading to patient's inability to stand or ambulate and relegated to lateral transfer. Recommend 24 hour assist and continued therapy at d/c. Treatment Diagnosis: weakness  Prognosis: Good  Decision Making: High Complexity  PT Education: PT Role;Plan of Care;Transfer Training; Injury Prevention;General Safety;Equipment; Family Education; Functional Mobility Training  Patient Education: Pt verbalized good understanding, needing reinforcement.   REQUIRES PT FOLLOW UP: Yes  Activity Tolerance  Activity Tolerance: Patient Tolerated treatment well     Patient Diagnosis(es): The encounter diagnosis was Cerebrovascular accident (CVA), unspecified mechanism ([facility] [address] has a past medical history of Arthritis, Compression fracture, Concussion, DVT (deep venous thrombosis) ([facility] [address] Environmental allergies, Essential hypertension, benign, GERD (gastroesophageal reflux disease), History of peptic ulcer, Hx of blood clots, Hyperlipidemia, Lacunar infarction ([facility] [address] MRSA (methicillin resistant staph aureus) culture positive, Restrictive airway disease, Retention of urine, and Wound, open. has a past surgical history that includes Knee arthroscopy (Left, ); Cholecystectomy; Hysterectomy; Total knee arthroplasty (Right, 2017); Colonoscopy; Knee Arthroplasty (Right, 2018); Shoulder arthroscopy (Right);  section; pr revise knee joint replace,all parts (Right, 2018); and quadricepsplasty (Right, 2018). Restrictions  Restrictions/Precautions  Restrictions/Precautions: Fall Risk  Required Braces or Orthoses?: No  Position Activity Restriction  Other position/activity restrictions: Antonio Traylor is a 79 y.o. female who presents to the emergency department with left arm weakness. This is a 77-year-old female who states that she started to have some left arm weakness yesterday morning which was over 24 hours prior to arrival.  The patient states the weakness continued and worsened throughout the day and apparently when she woke at 5:00 AM this morning the family noticed some left mouth drooping and some mildly garbled speech as well. The last time the daughter talk to the patient where she sounded normal on the phone was around 8 or 9 PM last evening. 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 General Comment  Comments: Patient is long sitting in bed w/ IFC on left knee. Pain Screening  Patient Currently in Pain: Yes(Simultaneous filing. User may not have seen previous data.)  Pain Assessment  Pain Assessment: Faces(Simultaneous filing. User may not have seen previous data.)  Mensah-Vazquez Pain Rating: Hurts a little bit(Simultaneous filing. User may not have seen previous data.)  Pain Type: Acute pain(Simultaneous filing. User may not have seen previous data.)  Pain Location: Shoulder(Simultaneous filing. User may not have seen previous data.)  Pain Orientation: Left(Simultaneous filing. User may not have seen previous data.)  Pain Descriptors: Aching  Pain Frequency: Intermittent  Pain Onset: On-going  Clinical Progression: Not changed  Vital Signs  Patient Currently in Pain: Yes(Simultaneous filing. User may not have seen previous data.)       Orientation  Orientation  Overall Orientation Status: Within Functional Limits     Objective   Bed mobility  Supine to Sit: Moderate assistance  Scooting: Maximal assistance  Comment: Patient performed w/ elevated HOBand RUE hand hold on bed rail. Transfers  Lateral Transfers: Moderate Assistance;2 Person Assistance  Comment: Right lateral slide into recliner chair. Ambulation  Ambulation?: No  Stairs/Curb  Stairs?: No     Balance  Posture: Fair  Sitting - Static: Fair  Sitting - Dynamic: Fair      PROM RLE (degrees)  RLE General PROM: Unable to extend knee past 90 degrees flexion. Patient able to flex knee from 90 degrees to approximately 110 degrees. She verbalizes she is looking into an allograft extensor mechanism surgery. AROM LLE (degrees)  LLE AROM : WFL  Strength RLE  Comment: grossly -3/5 within available ROM.   Strength LLE  Comment: grossly -4/5    AM-PAC Score  AM-PAC Inpatient Mobility Raw Score : 9 (11/22/20 1119)  AM-PAC Inpatient T-Scale Score : 30.55 (11/22/20 1119)  Mobility Inpatient CMS 0-100% Score: 81.38 (11/22/20 1119)  Mobility Inpatient CMS G-Code Modifier : CM (11/22/20 1119)     Goals  Short term goals  Time Frame for Short term goals: to be met by DC. Short term goal 1: Pt to perform bed mob with min A.  (Not met)  Short term goal 2: Pt to perform transfers bed to chair with max A.  (Not met)  Short term goal 3: Pt to sit EOB for ADLs with SBA for 5 min. (Not met)  Long term goals  Time Frame for Long term goals : LTGs=sTGs  Patient Goals   Patient goals : did not state. Plan    Plan  Times per week: 5-7x/week  Times per day: Daily  Current Treatment Recommendations: Strengthening, ROM, Balance Training, Functional Mobility Training, Transfer Training, ADL/Self-care Training, Endurance Training, Safety Education & Training, Home Exercise Program, Pain Management, Patient/Caregiver Education & Training, Equipment Evaluation, Education, & procurement  Safety Devices  Type of devices:  All fall risk precautions in place, Call light within reach, Chair alarm in place, Gait belt, Patient at risk for falls, Left in chair, Nurse notified  Restraints  Initially in place: No     Therapy Time   Individual Concurrent Group Co-treatment   Time In       1025   Time Out       1118   Minutes       53   Timed Code Treatment Minutes: [facility], [facility], [facility] [address], ATC-R [id number]

## 2020-11-22 NOTE — PROGRESS NOTES
Occupational Therapy  Facility/Department: [address]  Daily Treatment Note  NAME: Patricia Rocha  : 1950  MRN: [id number]    Date of Service: 2020    Discharge Recommendations:Maryann Lozano scored a 13/24 on the AM-PAC ADL Inpatient form. Current research shows that an AM-PAC score of 17 or less is typically not associated with a discharge to the patient's home setting. Based on the patient's AM-PAC score and their current ADL deficits, it is recommended that the patient have 5-7 sessions per week of Occupational Therapy at d/c to increase the patient's independence. At this time, this patient demonstrates the endurance, and/or tolerance for 3 hours of therapy each day, with a treatment frequency of 5-7x/wk. Please see assessment section for further patient specific details. If patient discharges prior to next session this note will serve as a discharge summary. Please see below for the latest assessment towards goals. OT Equipment Recommendations  Equipment Needed: No    Assessment   Performance deficits / Impairments: Decreased functional mobility ; Decreased endurance;Decreased coordination;Decreased ADL status; Decreased posture;Decreased cognition;Decreased fine motor control;Decreased high-level IADLs;Decreased safe awareness;Decreased strength;Decreased ROM  Assessment: Pt presents with the above deficits impacting daily occupational performance and would benefit from continued skilled OT services.   Treatment Diagnosis: Decreased mobility, ADL status, ADL transfers, endurance, coordination, posture, AROM and strength in LUE, safety awareness, high level IADL's, cognition and fine motor control associated with s/p CVA  Prognosis: Good;Fair  OT Education: Plan of Care;OT Role;Family Education;Equipment;Precautions;Transfer Training  REQUIRES OT FOLLOW UP: Yes  Activity Tolerance  Activity Tolerance: Patient Tolerated treatment well  Safety Devices  Safety Devices in place: Yes  Type of devices: All fall risk precautions in place; Left in chair;Call light within reach;Nurse notified; Chair alarm in place;Gait belt;Patient at risk for falls         Patient Diagnosis(es): The encounter diagnosis was Cerebrovascular accident (CVA), unspecified mechanism ([facility] [address] has a past medical history of Arthritis, Compression fracture, Concussion, DVT (deep venous thrombosis) ([facility] [address] Environmental allergies, Essential hypertension, benign, GERD (gastroesophageal reflux disease), History of peptic ulcer, Hx of blood clots, Hyperlipidemia, Lacunar infarction ([facility] [address] MRSA (methicillin resistant staph aureus) culture positive, Restrictive airway disease, Retention of urine, and Wound, open. has a past surgical history that includes Knee arthroscopy (Left, ); Cholecystectomy; Hysterectomy; Total knee arthroplasty (Right, 2017); Colonoscopy; Knee Arthroplasty (Right, 2018); Shoulder arthroscopy (Right);  section; pr revise knee joint replace,all parts (Right, 2018); and quadricepsplasty (Right, 2018). Restrictions  Restrictions/Precautions  Restrictions/Precautions: Fall Risk  Required Braces or Orthoses?: No  Position Activity Restriction  Other position/activity restrictions: Estefani Cornejo is a 79 y.o. female who presents to the emergency department with left arm weakness. This is a 51-year-old female who states that she started to have some left arm weakness yesterday morning which was over 24 hours prior to arrival.  The patient states the weakness continued and worsened throughout the day and apparently when she woke at 5:00 AM this morning the family noticed some left mouth drooping and some mildly garbled speech as well. The last time the daughter talk to the patient where she sounded normal on the phone was around 8 or 9 PM last evening.   Subjective   General  Chart Reviewed: Yes  Patient assessed for rehabilitation services?: Yes  Family / Caregiver Present: Yes( and daughter)  Diagnosis: CVA  Subjective  Subjective: Pt supine in bed on arrival and agreeable for session. Family present. Orientation  Orientation  Overall Orientation Status: Within Functional Limits  Objective    ADL  Feeding: Setup  Grooming: Setup;Minimal assistance(assist for oral care)  UE Bathing: Maximum assistance  LE Bathing: Maximum assistance  UE Dressing: Moderate assistance  Toileting: Dependent/Total(Pure Dariela Quest in place)  Additional Comments: ADL's completed while pt seated in supported chair with assistance as needed. Balance  Sitting Balance: Minimal assistance  Standing Balance: Unable to assess(comment)     Transfers  Sit Pivot Transfers: Dependent/Total(Mod Assist of 2)  Sit to stand: Unable to assess  Stand to sit: Unable to assess     Vision  Patient Visual Report: No visual complaint reported.   Cognition  Overall Cognitive Status: WFL     Perception  Overall Perceptual Status: Impaired  Unilateral Attention: Cues to maintain midline in sitting;Cues to attend left visual field;Cues to attend to left side of body  Initiation: Cues to initiate tasks  Motor Planning: Appears intact              Plan   Plan  Times per week: 5-7x/week  Times per day: Daily  Specific instructions for Next Treatment: Cotx with PT to maximize function and safety  Current Treatment Recommendations: Strengthening, Patient/Caregiver Education & Training, Equipment Evaluation, Education, & procurement, Balance Training, Neuromuscular Re-education, Functional Mobility Training, Positioning, Endurance Training, Cognitive/Perceptual Training, Safety Education & Training, Self-Care / ADL    AM-PAC Score        AM-[facility] Inpatient Daily Activity Raw Score: 13 (11/22/20 1130)  AM-PAC Inpatient ADL T-Scale Score : 32.03 (11/22/20 1130)  ADL Inpatient CMS 0-100% Score: 63.03 (11/22/20 1130)  ADL Inpatient CMS G-Code Modifier : CL (11/22/20 1130)    Goals  Short term goals  Time Frame for Short term

## 2020-11-22 NOTE — PROGRESS NOTES
[facility] PROGRESS NOTE    11/22/2020 1:56 PM        Name: Marylin Chavez . Admitted: 11/20/2020  Primary Care Provider: Alcides Peter MD (Tel: [phone])    Brief Course:   79 y.o. female with history of HTN, hyperlipidemia, GERD, osteoporosis, wheel chair bound who came to ER with L sided weakness and dysarthria since morning. Admitted as inpatient for acute CVA with L sided weakness, dysarthria and dysphagia. MRI Brain done on 11/21 with \"preliminary report\" of:  Acute infarcts in the right basal ganglia and anterior temporal lobe. Echo with   A bubble study was performed and fails to show evidence of shunting. Left ventricular systolic function is normal with ejection fraction   estimated at 60-65 %. No regional wall motion abnormalities are noted. There is mild concentric left ventricular hypertrophy. There is reversal of E/A inflow velocities across the mitral valve. The ascending aorta is mildly dilated. Aortic valve appears sclerotic but opens adequately. Systolic pulmonary artery pressure (SPAP) is normal and estimated at 19 mmHg   (right atrial pressure 3 mmHg). IVC is normal in size (< 2.1 cm) and collapses > 50% with respiration   consistent with normal RA pressure (3 mmHg). Norvasc increased to 10 mg daily and started on Lisinopril 10 mg daily for HTN. PMR consulted for ARU. CC:  L sided weakness and dysarthrioa    Subjective:  . Patient with LUE>>LLE weakness. Slept last night. Ongoing dysarthria. R knee pain better. No CP, SOB, HA or fevers. Improving GERD.     Reviewed interval ancillary notes    Current Medications  amLODIPine (NORVASC) tablet 10 mg, Daily  lisinopril (PRINIVIL;ZESTRIL) tablet 10 mg, Daily  fluticasone-vilanterol (BREO ELLIPTA) 100-25 MCG/INH inhaler PATIENT SUPPLIED, Daily  Teriparatide (Recombinant) (FORTEO) injection 20 mcg PATIENT SUPPLIED, Daily  mirabegron (MYRBETRIQ) extended release tablet 50 mg PATIENT SUPPLIED, Daily  pantoprazole (PROTONIX) tablet 40 mg, BID AC  sucralfate (CARAFATE) tablet 1 g, 4 times per day  aluminum & magnesium hydroxide-simethicone (MAALOX) 200-200-20 MG/5ML suspension 30 mL, Q6H PRN  ketorolac (TORADOL) injection 15 mg, Q6H  oxyCODONE-acetaminophen (PERCOCET) 5-325 MG per tablet 1 tablet, Q4H PRN  orphenadrine (NORFLEX) injection 60 mg, Q12H  albuterol (PROVENTIL) nebulizer solution 2.5 mg, Q4H PRN  escitalopram (LEXAPRO) tablet 10 mg, Daily  potassium chloride (KLOR-CON M) extended release tablet 20 mEq, Daily with breakfast  acetaminophen (TYLENOL) tablet 650 mg, Q6H PRN    Or  acetaminophen (TYLENOL) suppository 650 mg, Q6H PRN  polyethylene glycol (GLYCOLAX) packet 17 g, Daily PRN  promethazine (PHENERGAN) tablet 12.5 mg, Q6H PRN    Or  ondansetron (ZOFRAN) injection 4 mg, Q6H PRN  enoxaparin (LOVENOX) injection 40 mg, Nightly  aspirin EC tablet 81 mg, Daily    Or  aspirin suppository 300 mg, Daily  0.9 % sodium chloride infusion, Continuous  sodium chloride flush 0.9 % injection 10 mL, 2 times per day  sodium chloride flush 0.9 % injection 10 mL, PRN  potassium chloride (KLOR-CON M) extended release tablet 40 mEq, PRN    Or  potassium bicarb-citric acid (EFFER-K) effervescent tablet 40 mEq, PRN    Or  potassium chloride 10 mEq/100 mL IVPB (Peripheral Line), PRN  atorvastatin (LIPITOR) tablet 80 mg, Nightly  labetalol (NORMODYNE;TRANDATE) injection 10 mg, Q10 Min PRN  perflutren lipid microspheres (DEFINITY) injection 1.65 mg, ONCE PRN  promethazine (PHENERGAN) injection 12.5 mg, Q4H PRN        Objective:  BP (!) 157/80   Pulse 70   Temp 97.6 °F (36.4 °C) (Oral)   Resp 16   Ht 5' 7\" (1.702 m)   Wt 168 lb 3.2 oz (76.3 kg)   SpO2 90%   BMI 26.34 kg/m²     Intake/Output Summary (Last 24 hours) at 11/22/2020 1356  Last data filed at 11/22/2020 0945  Gross per 24 hour   Intake 240 ml   Output 700 ml   Net -460 ml      Wt Readings from Last 3 Encounters:   11/22/20 168 lb 3.2 oz (76.3 kg)   04/16/20 214 lb (97.1 kg)   05/17/19 214 lb (97.1 kg)       General appearance:  Appears comfortable. Well nourished  Eyes: Sclera clear, pupils equal  ENT: Moist mucus membranes, no thrush. Trachea midline. Cardiovascular: Regular rhythm, normal S1, S2. No murmur, gallop, rub. No edema in lower extremities  Respiratory: Clear to auscultation bilaterally, no wheeze, good inspiratory effort  Gastrointestinal: Abdomen soft, non-tender, not distended, normal bowel sounds  Musculoskeletal: No cyanosis in digits, neck supple  Neurology: L nasolabial flattening. Dysarthria. LUE 3+/5, LLE 4+/5, RUE/RLE 5/5  Psychiatry: Appropriate affect. Not agitated  Skin: Warm, dry, normal turgor, no rash  Brisk capillary refill, peripheral pulses palpable     Labs and Tests:  CBC:   Recent Labs     11/20/20  0954 11/21/20  0516   WBC 5.9 5.2   HGB 14.0 12.7    293     BMP:    Recent Labs     11/20/20  0954 11/21/20  0513 11/22/20  0457    141 141   K 4.0 3.9 4.1    105 106   CO2 25 26 28   BUN 19 17 20   CREATININE 0.6 0.6 0.7   GLUCOSE 114* 105* 103*     Hepatic:   Recent Labs     11/20/20  0954 11/21/20  0513 11/22/20  0457   AST 21 16 15   ALT 26 20 20   BILITOT 0.4 0.4 0.5   ALKPHOS 127 107 109     MRI brain without contrast   Preliminary Result   Acute infarcts in the right basal ganglia and anterior temporal lobe. CTA HEAD NECK W CONTRAST   Final Result   1. Approximately 40% left cervical internal carotid artery stenosis by NASCET   criteria. 2. Otherwise, no hemodynamically significant stenosis or branch occlusion in   the cervical arterial circulation. 3. No hemodynamically significant stenosis or branch occlusion in the   intracranial arterial circulation. CT Head WO Contrast   Final Result   Slightly asymmetric hypodensity in the right external capsule is nonspecific.    Although this is likely related to chronic small vessel ischemic change   superimposed acute ischemia cannot be excluded. If there is high clinical   concern for ischemia in this region further evaluation with MRI would be   helpful. XR CHEST PORTABLE   Final Result   No active cardiopulmonary disease             Problem List  Principal Problem:    Acute CVA (cerebrovascular accident) ([facility] [address]  Active Problems:    Hyperlipidemia    Hypertension    Chronic pain of right knee    Gastroesophageal reflux disease without esophagitis    Dysarthria    Left-sided weakness  Resolved Problems:    * No resolved hospital problems. *       Assessment & Plan:   1. ASA and Lipitor  2. PMR consult for acute stroke  3. Stop IVF  4. Neg UA  5. Normal HgA1C, Lipids abnormal and noted  6. Cont Telemetry  7. Neuro checks completed  8. PT/OT/SLP eval recommend ARU  9.         Neuro consult for acute CVA appreciated  10. Increase Norvasc 10 mg daily  11. Protonix PO bid, Carafate qid, Maalox PRN  12. Toradol IV X 8 doses  13. Percocet PRN  14. Add Lisinopril 10 mg daily  15. Flexeril PRN after Norflex completes       IV Access: Peripheral  Meneses: No  Diet: DIET DYSPHAGIA SOFT AND BITE-SIZED; No Drinking Straw  Code:Full Code  DVT PPX Lovenox  Disposition ARU    Discussed with patient and nursing. D/W daughter and . .    Await PMR consult for ARU. Medically stable for DC as soon IP Rehab arranged.       Dario Morton MD   11/22/2020 1:56 PM

## 2020-11-22 NOTE — PLAN OF CARE
Problem: COMMUNICATION IMPAIRMENT  Goal: Ability to express needs and understand communication  Outcome: Ongoing     Problem: Falls - Risk of:  Goal: Will remain free from falls  Description: Will remain free from falls  Outcome: Ongoing     Problem: Skin Integrity:  Goal: Will show no infection signs and symptoms  Description: Will show no infection signs and symptoms  Outcome: Ongoing     Problem: Pain:  Goal: Pain level will decrease  Description: Pain level will decrease  Outcome: Ongoing

## 2020-11-22 NOTE — PROGRESS NOTES
Speech  Language And Dysphagia Treatment Note    Name: Anurag Elon  : 1950  Medical Diagnosis: Acute CVA (cerebrovascular accident) [facility]) [I63.9]  Acute CVA (cerebrovascular accident) ([facility] [I63.9]  Treatment Diagnosis:  Oropharyngeal Dysphagia, Dysarthria  Pain: Pt did not report pain    Patient's response to therapy:  Pt awake, alert and more verbally responsive this date. Dysphagia Treatment:  Current Diet Level:  Dysphagia I Pureed with Mildly Thick (Nectar) Liquids, no straws, meds in puree   Tolerance of Current Diet Level: Per RN report Pt is tolerating current diet with no overt difficulty     Assessment of Texture Tolerance:  -Impressions: Pt was seen sitting upright in bed, she reported dislike of current diet. Pt also verbalized fear of \"choking. \" Trials of thin liquids, nectar thick liquids, purees, and regular solids were provided. Thin liquids via cup revealed suspected premature bolus loss to pharynx with delayed swallow initiation, intermittent \"audible\" swallow was noted. No overt clinical s/s of aspiration/penetration were assessed however silent aspiration cannot be ruled out. Given regular solids, use of small bites was noted with prolonged mastication, adequate oral clearing was achieved given extra time. No anterior bolus loss was noted this date.  Recommend diet advance with close monitoring and a Modified Barium Swallow Study to further assess the pharyngeal phase of swallowing and to assess for aspiration     Diet and Treatment Recommendations:  1.) Advance to Dysphagia III Soft and Bite-Sized with thin liquids, no straws, meds in puree  2.) Recommend Modified Barium Swallow Study to further assess the pharyngeal phase of swallowing and to assess for aspiration     Dysphagia Goals, addressed this date:  1.) Pt will tolerate dysphagia puree with mildly thick liquid diet without s/s of aspiration (ongoing 2020)   2.) If clinical symptoms of penetration/aspiration continue to be noted,Pt will tolerate MBS to r/o aspiration and determine appropriate diet/liquid level. (ongoing 11/22/2020)   3.) Pt will tolerate diet advance to least restricted diet, as clinically indicated, with no signs of aspiration (ongoing 11/22/2020)   4.) Pt will improve oral motor function via bolus control exercises 5/5 (ongoing 11/22/2020)   5.) Pt will demonstrate improved sensation and laryngeal function for improved rate of swallow initiation and improved swallow via sensory stimulation and laryngeal ex 10/10 (ongoing 11/22/2020)     Speech Language Treatment:  Impressions: Pt sitting upright in bed. She continues to present with dysarthric speech, Pt stated at times it seems worse. Education was provided regarding increased dysarthria with fatigue. Speech was 100% intelligible at the conversation level, however reduced articulatory precision is noted. Pt demonstrated good recall of detailed daily events. Recommend ongoing speech therapy. Speech Language STG, addressed this date: 1. Pt will improve speech intelligibility in connected speech via graded tasks to 80% (ongoing 11/22/2020)   2. Pt will improve orientation and short term recall via graded tasks to 80% (ongoing 11/22/2020)   3. Pt will demonstrate improved left visual attention during varied body scheme; visual language tasks with set up and <mild cues (ongoing 11/22/2020)   4. Pt will demonstrate improved VPS/PS and insight for safety while on a structured unit with set up and mild cues (ongoing 11/22/2020)     Patient/Family Education:Education given to the Pt and nurse, who verbalized understanding    Assessment: Patient progressing toward goals    Plan: Continue as per plan of care    Discharge Recommendations: Pt will benefit from continued skilled Speech Therapy for Speech and Dysphagia services, prior to returning home.     Treatment time  Timed Code Treatment Minutes: 0 minutes  Total Treatment time: 24 minutes     If patient discharges prior to next session this note will serve as a discharge summary.       Mann Orozco M.A., [facility]  Speech-Language Pathologist

## 2020-11-22 NOTE — ACP (ADVANCE CARE PLANNING)
Advanced Care Planning Note. Purpose of Encounter: Advanced care planning in light of acute CVA  Parties In Attendance: Patient, daughter,   Decisional Capacity: Yes  Subjective: Patient with LUE weakness and dysarthria  Objective: Cr 0.7  Goals of Care Determination: Patient wants full support (CPR, vent, surgery, HD, trach, PEG)  Plan:  PMR consult for ARU. Neuro consult. Code Status: Full code   Time spent on Advanced care Plannin minutes  Advanced Care Planning Documents: Completed advanced directives on chart, daughter is the POA.     Vandana Valentin MD  2020 2:00 PM

## 2020-11-23 ENCOUNTER — APPOINTMENT (OUTPATIENT)
Dept: CT IMAGING | Age: 70
DRG: 065 | End: 2020-11-23
Payer: MEDICARE

## 2020-11-23 LAB
A/G RATIO: 1.6 (ref 1.1–2.2)
ALBUMIN SERPL-MCNC: 4.1 G/DL (ref 3.4–5)
ALP BLD-CCNC: 108 U/L (ref 40–129)
ALT SERPL-CCNC: 19 U/L (ref 10–40)
ANION GAP SERPL CALCULATED.3IONS-SCNC: 11 MMOL/L (ref 3–16)
AST SERPL-CCNC: 17 U/L (ref 15–37)
BILIRUB SERPL-MCNC: 0.4 MG/DL (ref 0–1)
BUN BLDV-MCNC: 17 MG/DL (ref 7–20)
CALCIUM SERPL-MCNC: 9.7 MG/DL (ref 8.3–10.6)
CHLORIDE BLD-SCNC: 105 MMOL/L (ref 99–110)
CO2: 25 MMOL/L (ref 21–32)
CREAT SERPL-MCNC: 0.6 MG/DL (ref 0.6–1.2)
GFR AFRICAN AMERICAN: >60
GFR NON-AFRICAN AMERICAN: >60
GLOBULIN: 2.5 G/DL
GLUCOSE BLD-MCNC: 112 MG/DL (ref 70–99)
POTASSIUM SERPL-SCNC: 4.1 MMOL/L (ref 3.5–5.1)
SARS-COV-2 ANTIBODY, TOTAL: NEGATIVE
SARS-COV-2, NAAT: NOT DETECTED
SODIUM BLD-SCNC: 141 MMOL/L (ref 136–145)
TOTAL PROTEIN: 6.6 G/DL (ref 6.4–8.2)

## 2020-11-23 PROCEDURE — 97530 THERAPEUTIC ACTIVITIES: CPT

## 2020-11-23 PROCEDURE — APPNB30 APP NON BILLABLE TIME 0-30 MINS: Performed by: NURSE PRACTITIONER

## 2020-11-23 PROCEDURE — 97129 THER IVNTJ 1ST 15 MIN: CPT

## 2020-11-23 PROCEDURE — 6360000004 HC RX CONTRAST MEDICATION

## 2020-11-23 PROCEDURE — 99222 1ST HOSP IP/OBS MODERATE 55: CPT | Performed by: SURGERY

## 2020-11-23 PROCEDURE — 74177 CT ABD & PELVIS W/CONTRAST: CPT

## 2020-11-23 PROCEDURE — 86769 SARS-COV-2 COVID-19 ANTIBODY: CPT

## 2020-11-23 PROCEDURE — U0002 COVID-19 LAB TEST NON-CDC: HCPCS

## 2020-11-23 PROCEDURE — 6360000004 HC RX CONTRAST MEDICATION: Performed by: SURGERY

## 2020-11-23 PROCEDURE — 92526 ORAL FUNCTION THERAPY: CPT

## 2020-11-23 PROCEDURE — 36415 COLL VENOUS BLD VENIPUNCTURE: CPT

## 2020-11-23 PROCEDURE — 80053 COMPREHEN METABOLIC PANEL: CPT

## 2020-11-23 PROCEDURE — 2580000003 HC RX 258: Performed by: INTERNAL MEDICINE

## 2020-11-23 PROCEDURE — 6370000000 HC RX 637 (ALT 250 FOR IP): Performed by: INTERNAL MEDICINE

## 2020-11-23 PROCEDURE — 6360000004 HC RX CONTRAST MEDICATION: Performed by: PHYSICAL MEDICINE & REHABILITATION

## 2020-11-23 PROCEDURE — 6360000002 HC RX W HCPCS: Performed by: INTERNAL MEDICINE

## 2020-11-23 PROCEDURE — 97112 NEUROMUSCULAR REEDUCATION: CPT

## 2020-11-23 PROCEDURE — 99232 SBSQ HOSP IP/OBS MODERATE 35: CPT | Performed by: PSYCHIATRY & NEUROLOGY

## 2020-11-23 PROCEDURE — 2060000000 HC ICU INTERMEDIATE R&B

## 2020-11-23 RX ORDER — CYCLOBENZAPRINE HCL 10 MG
10 TABLET ORAL 3 TIMES DAILY PRN
Qty: 10 TABLET | Refills: 0
Start: 2020-11-23 | End: 2020-11-24

## 2020-11-23 RX ORDER — ATORVASTATIN CALCIUM 80 MG/1
80 TABLET, FILM COATED ORAL NIGHTLY
Qty: 30 TABLET | Refills: 0
Start: 2020-11-23 | End: 2021-01-11 | Stop reason: SDUPTHER

## 2020-11-23 RX ORDER — PANTOPRAZOLE SODIUM 40 MG/1
40 TABLET, DELAYED RELEASE ORAL
Qty: 30 TABLET | Refills: 0
Start: 2020-11-23 | End: 2020-11-24

## 2020-11-23 RX ORDER — ASPIRIN 81 MG/1
81 TABLET ORAL DAILY
Qty: 30 TABLET | Refills: 0
Start: 2020-11-23 | End: 2022-01-04 | Stop reason: ALTCHOICE

## 2020-11-23 RX ORDER — SUCRALFATE 1 G/1
1 TABLET ORAL 4 TIMES DAILY
Qty: 28 TABLET | Refills: 0
Start: 2020-11-23 | End: 2020-11-24

## 2020-11-23 RX ORDER — NIFEDIPINE 30 MG/1
30 TABLET, EXTENDED RELEASE ORAL 2 TIMES DAILY
Status: DISCONTINUED | OUTPATIENT
Start: 2020-11-23 | End: 2020-11-24 | Stop reason: HOSPADM

## 2020-11-23 RX ORDER — LISINOPRIL 10 MG/1
10 TABLET ORAL DAILY
Qty: 30 TABLET | Refills: 0
Start: 2020-11-23 | End: 2020-11-24

## 2020-11-23 RX ORDER — NIFEDIPINE 30 MG/1
30 TABLET, FILM COATED, EXTENDED RELEASE ORAL 2 TIMES DAILY
Qty: 30 TABLET | Refills: 0
Start: 2020-11-23 | End: 2020-11-24

## 2020-11-23 RX ADMIN — KETOROLAC TROMETHAMINE 15 MG: 30 INJECTION, SOLUTION INTRAMUSCULAR at 09:18

## 2020-11-23 RX ADMIN — PANTOPRAZOLE SODIUM 40 MG: 40 TABLET, DELAYED RELEASE ORAL at 06:18

## 2020-11-23 RX ADMIN — NIFEDIPINE 30 MG: 30 TABLET, FILM COATED, EXTENDED RELEASE ORAL at 21:53

## 2020-11-23 RX ADMIN — CYCLOBENZAPRINE 10 MG: 10 TABLET, FILM COATED ORAL at 17:40

## 2020-11-23 RX ADMIN — OXYCODONE HYDROCHLORIDE AND ACETAMINOPHEN 1 TABLET: 5; 325 TABLET ORAL at 17:40

## 2020-11-23 RX ADMIN — NIFEDIPINE 30 MG: 30 TABLET, FILM COATED, EXTENDED RELEASE ORAL at 09:18

## 2020-11-23 RX ADMIN — Medication 10 ML: at 21:55

## 2020-11-23 RX ADMIN — SUCRALFATE 1 G: 1 TABLET ORAL at 06:18

## 2020-11-23 RX ADMIN — ESCITALOPRAM OXALATE 10 MG: 10 TABLET ORAL at 09:18

## 2020-11-23 RX ADMIN — IOHEXOL 50 ML: 240 INJECTION, SOLUTION INTRATHECAL; INTRAVASCULAR; INTRAVENOUS; ORAL at 12:29

## 2020-11-23 RX ADMIN — OXYCODONE HYDROCHLORIDE AND ACETAMINOPHEN 1 TABLET: 5; 325 TABLET ORAL at 06:18

## 2020-11-23 RX ADMIN — ATORVASTATIN CALCIUM 80 MG: 80 TABLET, FILM COATED ORAL at 21:53

## 2020-11-23 RX ADMIN — SUCRALFATE 1 G: 1 TABLET ORAL at 12:29

## 2020-11-23 RX ADMIN — ONDANSETRON 4 MG: 2 INJECTION INTRAMUSCULAR; INTRAVENOUS at 11:26

## 2020-11-23 RX ADMIN — PROMETHAZINE HYDROCHLORIDE 12.5 MG: 25 TABLET ORAL at 14:00

## 2020-11-23 RX ADMIN — PANTOPRAZOLE SODIUM 40 MG: 40 TABLET, DELAYED RELEASE ORAL at 17:42

## 2020-11-23 RX ADMIN — ENOXAPARIN SODIUM 40 MG: 40 INJECTION SUBCUTANEOUS at 21:53

## 2020-11-23 RX ADMIN — LISINOPRIL 10 MG: 10 TABLET ORAL at 09:18

## 2020-11-23 RX ADMIN — OXYCODONE HYDROCHLORIDE AND ACETAMINOPHEN 1 TABLET: 5; 325 TABLET ORAL at 21:53

## 2020-11-23 RX ADMIN — SUCRALFATE 1 G: 1 TABLET ORAL at 00:08

## 2020-11-23 RX ADMIN — POTASSIUM CHLORIDE 20 MEQ: 1500 TABLET, EXTENDED RELEASE ORAL at 06:18

## 2020-11-23 RX ADMIN — Medication 10 ML: at 09:21

## 2020-11-23 RX ADMIN — KETOROLAC TROMETHAMINE 15 MG: 30 INJECTION, SOLUTION INTRAMUSCULAR at 02:34

## 2020-11-23 RX ADMIN — FLUTICASONE FUROATE AND VILANTEROL 1 PUFF: 100; 25 POWDER RESPIRATORY (INHALATION) at 08:54

## 2020-11-23 RX ADMIN — IOHEXOL: 240 INJECTION, SOLUTION INTRATHECAL; INTRAVASCULAR; INTRAVENOUS; ORAL at 15:48

## 2020-11-23 RX ADMIN — ASPIRIN 81 MG: 81 TABLET, COATED ORAL at 09:18

## 2020-11-23 RX ADMIN — IOPAMIDOL 75 ML: 755 INJECTION, SOLUTION INTRAVENOUS at 18:31

## 2020-11-23 ASSESSMENT — PAIN SCALES - GENERAL
PAINLEVEL_OUTOF10: 6
PAINLEVEL_OUTOF10: 5
PAINLEVEL_OUTOF10: 7
PAINLEVEL_OUTOF10: 7
PAINLEVEL_OUTOF10: 4

## 2020-11-23 ASSESSMENT — PAIN DESCRIPTION - ORIENTATION: ORIENTATION: LEFT

## 2020-11-23 ASSESSMENT — PAIN DESCRIPTION - LOCATION: LOCATION: LEG

## 2020-11-23 NOTE — PROGRESS NOTES
Marah Joseph  Neurology Follow-up  [facility] Neurology    Date of Service: 11/23/2020    Subjective:   CC: Follow up today regarding: Acute left-sided weakness and new right ischemic stroke. This is my first encounter with the patient. The patient was seen by Dr. Carol Fabian. I was able to review the patient's record, imaging, notes from different physicians and recent events. The patient is a 79y.o.  years old female with multiple medical problems including known history of left cerebellar hemangioblastoma who was admitted to [facility] with acute left-sided weakness. Degree was severe and persistent before admission. No other triggers. Further work-up with MRI of the brain showed right basal ganglia and temporal stroke. Echocardiogram from 2 days ago normal EF. The patient denies any new symptoms today. Same left-sided weakness. Other review of system was unremarkable. ROS:   A 10-12 system obtained and updated today and is unremarkable except as mentioned in my HPI    family history includes Cancer in an other family member; Hypertension in an other family member; Kidney Disease in an other family member.   Past Medical History:   Diagnosis Date    Arthritis     Compression fracture     back due to fall    Concussion     due to fall    DVT (deep venous thrombosis) ([facility] 01/2017    RLE after TKR    Environmental allergies     Essential hypertension, benign 6/28/2010    GERD (gastroesophageal reflux disease)     History of peptic ulcer     Hx of blood clots     Hyperlipidemia 6/28/2010    Lacunar infarction [facility])     old - per MRI 2015    MRSA (methicillin resistant staph aureus) culture positive 12/07/2018    knee    Restrictive airway disease     allergy induced    Retention of urine     Wound, open 09/2018    left shin area, healing well, tx in wound care center     Current Facility-Administered Medications   Medication Dose Route Frequency Provider Last Rate Last Dose  amLODIPine (NORVASC) tablet 10 mg  10 mg Oral Daily Gordon Spencer MD   10 mg at 11/22/20 1558    lisinopril (PRINIVIL;ZESTRIL) tablet 10 mg  10 mg Oral Daily Gordon Spencer MD   10 mg at 11/22/20 1558    cyclobenzaprine (FLEXERIL) tablet 10 mg  10 mg Oral TID PRN Gordon Spencer MD        fluticasone-vilanterol (BREO ELLIPTA) 100-25 MCG/INH inhaler PATIENT SUPPLIED  1 puff Inhalation Daily Gordon Spencer MD   1 puff at 11/22/20 0945    Teriparatide (Recombinant) (FORTEO) injection 20 mcg PATIENT SUPPLIED  20 mcg Subcutaneous Daily Gordon Spencer MD   20 mcg at 11/22/20 2010    mirabegron (MYRBETRIQ) extended release tablet 50 mg PATIENT SUPPLIED  50 mg Oral Daily Gordon Spencer MD   50 mg at 11/22/20 0759    pantoprazole (PROTONIX) tablet 40 mg  40 mg Oral BID AC Gordon Spencer MD   40 mg at 11/23/20 0618    sucralfate (CARAFATE) tablet 1 g  1 g Oral 4 times per day Gordon Spencer MD   1 g at 11/23/20 0618    aluminum & magnesium hydroxide-simethicone (MAALOX) 200-200-20 MG/5ML suspension 30 mL  30 mL Oral Q6H PRN Gordon Spencer MD        ketorolac (TORADOL) injection 15 mg  15 mg Intravenous Q6H Gordon Spencer MD   15 mg at 11/23/20 0234    oxyCODONE-acetaminophen (PERCOCET) 5-325 MG per tablet 1 tablet  1 tablet Oral Q4H PRN Gordon Spencer MD   1 tablet at 11/23/20 0618    albuterol (PROVENTIL) nebulizer solution 2.5 mg  2.5 mg Nebulization Q4H PRN Gordon Spencer MD        escitalopram (LEXAPRO) tablet 10 mg  10 mg Oral Daily Gordon Spencer MD   10 mg at 11/22/20 0757    potassium chloride (KLOR-CON M) extended release tablet 20 mEq  20 mEq Oral Daily with breakfast Gordon Spencer MD   20 mEq at 11/23/20 0618    acetaminophen (TYLENOL) tablet 650 mg  650 mg Oral Q6H PRN Gordon Spencer MD        Or   [address] [place] [address] acetaminophen (TYLENOL) suppository 650 mg  650 mg Rectal Q6H PRN Gordon Spencer MD        polyethylene glycol (GLYCOLAX) packet 17 g  17 g Oral Daily PRN Gordon Spencer MD        promethazine (PHENERGAN) tablet 12.5 mg  12.5 mg Oral Q6H PRN Dario Morton MD        Or    ondansetron [facility]) injection 4 mg  4 mg Intravenous Q6H PRN Dario Morton MD   4 mg at 11/22/20 1325    enoxaparin (LOVENOX) injection 40 mg  40 mg Subcutaneous Nightly Dario Morton MD   40 mg at 11/22/20 2012    aspirin EC tablet 81 mg  81 mg Oral Daily Dario Morton MD   81 mg at 11/22/20 3217    Or    aspirin suppository 300 mg  300 mg Rectal Daily Dario Morton MD        0.9 % sodium chloride infusion   Intravenous Continuous Dario Morton MD   Stopped at 11/22/20 1450    sodium chloride flush 0.9 % injection 10 mL  10 mL Intravenous 2 times per day Dario Morton MD   10 mL at 11/22/20 2011    sodium chloride flush 0.9 % injection 10 mL  10 mL Intravenous PRN Dario Morton MD        potassium chloride (KLOR-CON M) extended release tablet 40 mEq  40 mEq Oral PRN Dario Morton MD        Or   [address] [place] [address] potassium bicarb-citric acid (EFFER-K) effervescent tablet 40 mEq  40 mEq Oral PRN Dario Morton MD        Or   [address] [place] [address] potassium chloride 10 mEq/100 mL IVPB (Peripheral Line)  10 mEq Intravenous PRN Dario Morton MD        atorvastatin (LIPITOR) tablet 80 mg  80 mg Oral Nightly Dario Morton MD   80 mg at 11/22/20 2011    labetalol (NORMODYNE;TRANDATE) injection 10 mg  10 mg Intravenous Q10 Min PRN Dario Morton MD        perflutren lipid microspheres (DEFINITY) injection 1.65 mg  1.5 mL Intravenous ONCE PRN Dairo Morton MD        promethazine (PHENERGAN) injection 12.5 mg  12.5 mg Intramuscular Q4H PRN Dario Morton MD   12.5 mg at 11/21/20 2517     Allergies   Allergen Reactions    Codeine      Severe headaches    Demerol Other (See Comments)     Severe headache and over-sedation    Morphine Other (See Comments)     Makes her crazy    Tape [facility] Tape] Other (See Comments)     Tears skin    Cabbage Nausea And Vomiting and Swelling    Erythromycin Nausea And Vomiting and Rash      reports that she quit smoking about 15 years ago. Her smoking use included cigarettes.  She has a 35.00 pack-year smoking history. She has never used smokeless tobacco. She reports previous alcohol use. She reports that she does not use drugs. Objective:  Exam:  Constitutional:   Vitals:    11/23/20 0230 11/23/20 0444 11/23/20 0615 11/23/20 0728   BP: (!) 161/76 (!) 145/75 (!) 167/70    Pulse: 61 60 59    Resp: 18 18 19    Temp:  97.3 °F (36.3 °C) 98.1 °F (36.7 °C)    TempSrc:  Oral Oral    SpO2: 92% 92% 92%    Weight:    211 lb 12.8 oz (96.1 kg)   Height:         General appearance:  Normal development and appear in no acute distress. Eye: No icterus. Fundus: Cannot be examined due to recent COVID-19 restrictions  Neck: supple  Cardiovascular:  No lower leg edema with good pulsation. Mental Status:   Oriented to person, place, problem, and time. Memory: Good immediate recall. Intact remote memory  Normal attention span and concentration. Language: intact naming, repeating and fluency   Good fund of Knowledge. Cranial Nerves:   II: Visual fields: Full. Pupils: equal, round, reactive to light  III,IV,VI: Extra Ocular Movements are intact. No nystagmus  V: Facial sensation is intact  VII: Facial strength and movements: intact and symmetric  IX: Palate elevation is symmetric  XI: Shoulder shrug is intact  XII: Tongue movements are normal  Musculoskeletal: 5/5 in the right side. Left side is 3/5 which is about the same as 2 days ago. Tone: Normal tone. Reflexes: Symmetric 2+ in both arms and legs  Coordination: no pronator drift, no dysmetria with FNF. Normal REM on the right. Cannot be tested on the left due to weakness. Sensation: normal to all modalities in both arms and legs. Gait/Posture: Not tested due to weakness.       Data:  LABS:   Lab Results   Component Value Date     11/23/2020    K 4.1 11/23/2020    K 4.0 11/20/2020     11/23/2020    CO2 25 11/23/2020    BUN 17 11/23/2020    CREATININE 0.6 11/23/2020    GFRAA >60 11/23/2020    GFRAA >60 05/06/2013    LABGLOM >60 11/23/2020    LABGLOM 83 07/20/2017    GLUCOSE 112 11/23/2020    GLUCOSE 100 12/22/2011    PHOS 3.8 05/11/2019    MG 2.00 11/21/2020    CALCIUM 9.7 11/23/2020     Lab Results   Component Value Date    WBC 5.2 11/21/2020    RBC 4.02 11/21/2020    HGB 12.7 11/21/2020    HCT 37.5 11/21/2020    MCV 93.2 11/21/2020    RDW 13.4 11/21/2020     11/21/2020     Lab Results   Component Value Date    INR 0.91 11/20/2020    PROTIME 10.5 11/20/2020     Neuroimaging was independently reviewed by me and discussed results with the patient   I reviewed blood testing and other test results and discussed results with the patient  Reviewed notes from different physicians. Impression:  Acute left-sided weakness secondary to new right ischemic MCA stroke which could be thromboembolic from poorly controlled hypertension versus cardioembolic. Hypertension, not controlled  Hyperlipidemia  Carotid artery stenosis, not significant. Recommendation  Aspirin  Statin  DVT and GI prophylaxis  Telemetry  Continue current blood pressure medications  Blood pressure control  PT OT  Speech evaluation  Inpatient rehab consultation  Stroke prevention was discussed with the patient today including risk of recurrence  DC planning when medically stable  No further recommendation from neurology  We will sign off. She Jordan MD   [phone]      This dictation was generated by voice recognition computer software. Although all attempts are made to edit the dictation for accuracy, there may be errors in the transcription that are not intended.

## 2020-11-23 NOTE — CARE COORDINATION
Per Ej Myers RN  ARU:  ARU recommending a SNF. SW met with patient. Patient agreeable to a SNF placement. The Plan for Transition of Care is related to the following treatment goals: To increase patient's independence. The Patient was provided with a choice of provider and agrees   with the discharge plan. [x] Yes [] No    Freedom of choice list was provided with basic dialogue that supports the patient's individualized plan of care/goals, treatment preferences and shares the quality data associated with the providers. [x] Yes [] No    Patient referred to [facility] and [address] [place] [address]. SW/CM will continue to follow progress and update patient's discharge plan as needed.     Electronically signed by GERMAN Gama on 11/23/2020 at 11:35 AM

## 2020-11-23 NOTE — PROGRESS NOTES
Speech  Language And Dysphagia Treatment Note    Name: Laura Schmidt  : 1950  Medical Diagnosis: Acute CVA (cerebrovascular accident) [facility]) [I63.9]  Acute CVA (cerebrovascular accident) ([facility] [I63.9]  Treatment Diagnosis:  Oropharyngeal Dysphagia, Dysarthria  Pain: Pt did not report pain    Patient's response to therapy:  Pt awake, alert and more verbally responsive this date. Dysphagia Treatment:  Current Diet Level:  Dysphagia III Soft and Bite-Sized with thin liquids, no straws, meds in puree  Tolerance of Current Diet Level: Per RN report Pt is tolerating current diet with no overt difficulty     Assessment of Texture Tolerance:  -Impressions: Pt seen sitting upright in bed, alert and cooperative, with spouse at bedside. Pt reported she has dry cough but no increased coughing with food/liquids. Pt reported she has bit her L cheek a few times and occasionally notices liquids spilling out the L side of her mouth. Pt willing to accept bites of regular texture bayron crackers and thin liquids via cup. Pt took small bites independently. Mildly prolonged, but functional, mastication noted. Mildly reduced bolus manipulation and delayed AP propulsion, but pt was able to achieve oral clearance independently. Mildly reduced bolus control with thin liquids. No anterior spillage. Suspect premature spillage to pharynx with mildly delayed swallow initiation, but no overt s/s aspiration. No change in vocal quality. However, due to suspected reduced sensation (pt reports biting on L cheek), silent aspiration cannot conclusively be ruled out at the bedside. Pt may continue to benefit from completion of Modified Barium Swallow to rule out aspiration. Pt declined completion of test this date however, indicating that she has had so many tests and just needs a break. Discussed benefits of assessment; pt v/u and indicated she would be willing to do this test in coming days.     Diet and Treatment

## 2020-11-23 NOTE — CONSULTS
[facility] GENERAL AND LAPAROSCOPIC SURGERY                       PATIENT NAME: Sami Banegas     ADMISSION DATE: 2020  9:11 AM      TODAY'S DATE: 2020    Reason for Consult:  Lower abdominal pain    Requesting Physician:  Dr. Shey Reis:              The patient is a 79 y.o. female who presents with CVA. Pt admitted per [facility] undergoing care in that regard. Issue has come up of lower abd intermittent bulging and pain per the pt. More left than right. No known hernia or GI disorder in the area. Mild, episodic, focal. She has had C sec X 2, a hysterectomy, and cholecystectomy. Stroke has involved left arm, and some speech defect, pt able to communicate well at this time. Pt has hypertension, being treated inpt, and considering therapy options.     Past Medical History:        Diagnosis Date    Arthritis     Compression fracture     back due to fall    Concussion     due to fall    DVT (deep venous thrombosis) ([facility] [address] 2017    RLE after TKR    Environmental allergies     Essential hypertension, benign 2010    GERD (gastroesophageal reflux disease)     History of peptic ulcer     Hx of blood clots     Hyperlipidemia 2010    Lacunar infarction [facility])     old - per MRI     MRSA (methicillin resistant staph aureus) culture positive 2018    knee    Restrictive airway disease     allergy induced    Retention of urine     Wound, open 2018    left shin area, healing well, tx in wound care center       Past Surgical History:        Procedure Laterality Date     SECTION      times 2    CHOLECYSTECTOMY      COLONOSCOPY      HYSTERECTOMY      KNEE ARTHROPLASTY Right 2018     RIGHT REVISION TOTAL KNEE ARTHROPLASTY    KNEE ARTHROSCOPY Left     Dr. Dede Jolly Right 2018    RIGHT REVISION ARTHROSCOPY FEMORAL COMPONENT, SYNOVECTOMY performed by Barb Moran MD at [address] [facility] QUADRICEPSPLASTY Right 12/7/2018    RIGHT KNEE QUADRICEP TENDON REPAIR WITH ALLOGRAFT AUGMENTATION performed by Malcom Ambrosio MD at [address] [place] [address] ARTHROSCOPY Right     TOTAL KNEE ARTHROPLASTY Right 01/2017       Current Medications:   Current Facility-Administered Medications: NIFEdipine (PROCARDIA XL) extended release tablet 30 mg, 30 mg, Oral, BID  lisinopril (PRINIVIL;ZESTRIL) tablet 10 mg, 10 mg, Oral, Daily  cyclobenzaprine (FLEXERIL) tablet 10 mg, 10 mg, Oral, TID PRN  fluticasone-vilanterol (BREO ELLIPTA) 100-25 MCG/INH inhaler PATIENT SUPPLIED, 1 puff, Inhalation, Daily  Teriparatide (Recombinant) (FORTEO) injection 20 mcg PATIENT SUPPLIED, 20 mcg, Subcutaneous, Daily  mirabegron (MYRBETRIQ) extended release tablet 50 mg PATIENT SUPPLIED, 50 mg, Oral, Daily  pantoprazole (PROTONIX) tablet 40 mg, 40 mg, Oral, BID AC  sucralfate (CARAFATE) tablet 1 g, 1 g, Oral, 4 times per day  aluminum & magnesium hydroxide-simethicone (MAALOX) 200-200-20 MG/5ML suspension 30 mL, 30 mL, Oral, Q6H PRN  oxyCODONE-acetaminophen (PERCOCET) 5-325 MG per tablet 1 tablet, 1 tablet, Oral, Q4H PRN  albuterol (PROVENTIL) nebulizer solution 2.5 mg, 2.5 mg, Nebulization, Q4H PRN  escitalopram (LEXAPRO) tablet 10 mg, 10 mg, Oral, Daily  potassium chloride (KLOR-CON M) extended release tablet 20 mEq, 20 mEq, Oral, Daily with breakfast  acetaminophen (TYLENOL) tablet 650 mg, 650 mg, Oral, Q6H PRN **OR** acetaminophen (TYLENOL) suppository 650 mg, 650 mg, Rectal, Q6H PRN  polyethylene glycol (GLYCOLAX) packet 17 g, 17 g, Oral, Daily PRN  promethazine (PHENERGAN) tablet 12.5 mg, 12.5 mg, Oral, Q6H PRN **OR** ondansetron (ZOFRAN) injection 4 mg, 4 mg, Intravenous, Q6H PRN  enoxaparin (LOVENOX) injection 40 mg, 40 mg, Subcutaneous, Nightly  aspirin EC tablet 81 mg, 81 mg, Oral, Daily **OR** aspirin suppository 300 mg, 300 mg, Rectal, Daily  0.9 % sodium chloride infusion, , Intravenous, Continuous  sodium chloride flush 0.9 % injection 10 mL, 10 mL, Intravenous, 2 times per day  sodium chloride flush 0.9 % injection 10 mL, 10 mL, Intravenous, PRN  potassium chloride (KLOR-CON M) extended release tablet 40 mEq, 40 mEq, Oral, PRN **OR** potassium bicarb-citric acid (EFFER-K) effervescent tablet 40 mEq, 40 mEq, Oral, PRN **OR** potassium chloride 10 mEq/100 mL IVPB (Peripheral Line), 10 mEq, Intravenous, PRN  atorvastatin (LIPITOR) tablet 80 mg, 80 mg, Oral, Nightly  labetalol (NORMODYNE;TRANDATE) injection 10 mg, 10 mg, Intravenous, Q10 Min PRN  perflutren lipid microspheres (DEFINITY) injection 1.65 mg, 1.5 mL, Intravenous, ONCE PRN  promethazine (PHENERGAN) injection 12.5 mg, 12.5 mg, Intramuscular, Q4H PRN  Prior to Admission medications    Medication Sig Start Date End Date Taking?  Authorizing Provider   aspirin 81 MG EC tablet Take 1 tablet by mouth daily 11/23/20  Yes aHrrison Wilder MD   atorvastatin (LIPITOR) 80 MG tablet Take 1 tablet by mouth nightly 11/23/20  Yes Harrison Wilder MD   lisinopril (PRINIVIL;ZESTRIL) 10 MG tablet Take 1 tablet by mouth daily 11/23/20  Yes Harrison Wilder MD   NIFEdipine (ADALAT CC) 30 MG extended release tablet Take 1 tablet by mouth 2 times daily 11/23/20  Bandar Wilder MD   cyclobenzaprine (FLEXERIL) 10 MG tablet Take 1 tablet by mouth 3 times daily as needed for Muscle spasms 11/23/20 12/3/20 Bandar Wilder MD   pantoprazole (PROTONIX) 40 MG tablet Take 1 tablet by mouth 2 times daily (before meals) 11/23/20  Bandar Wilder MD   sucralfate (CARAFATE) 1 GM tablet Take 1 tablet by mouth 4 times daily for 7 days 11/23/20 11/30/20 Bandar Wilder MD   mirabegron (MYRBETRIQ) 50 MG TB24 Take 50 mg by mouth daily 11/23/20  Bandar Wilder MD   escitalopram (LEXAPRO) 10 MG tablet TAKE 1 TABLET BY MOUTH EVERY DAY 10/20/20   MD RIO Parry-CON M20 20 MEQ extended release tablet TAKE 1 TABLET DAILY 10/16/20   Jyotsna Schulz MD   diclofenac (VOLTAREN) 75 MG EC tablet TAKE 1 TABLET TWICE A DAY data filed at 11/22/2020 1739  Gross per 24 hour   Intake --   Output 800 ml   Net -800 ml     DRAIN/TUBE OUTPUT:     CONSTITUTIONAL:  alert, mild distress and moderately obese, lying in bed  EYES:  sclera clear  ENT:  normocepalic, without obvious abnormality  NECK:  supple, symmetrical, trachea midline   LUNGS:  clear to auscultation  CARDIOVASCULAR:  regular rate and rhythm  ABDOMEN:  scars noted are healed, normal bowel sounds, soft, non-distended, non-tender, voluntary guarding absent, no masses palpated, no hepatosplenomegally and hernia absent on exam  MUSCULOSKELETAL:  0+ pitting edema lower extremities, poor ROM left arm  NEUROLOGIC:  Mental Status Exam:  Level of Alertness:   awake  Orientation:   person, place, time  SKIN:  no bruising or bleeding, normal skin color, texture, turgor and no redness, warmth, or swelling    DATA:    CBC:   Recent Labs     11/21/20  0516   WBC 5.2   HGB 12.7   HCT 37.5        BMP:    Recent Labs     11/21/20 0513 11/22/20 0457 11/23/20  0559    141 141   K 3.9 4.1 4.1    106 105   CO2 26 28 25   BUN 17 20 17   CREATININE 0.6 0.7 0.6   GLUCOSE 105* 103* 112*     Hepatic:   Recent Labs     11/21/20  0513 11/22/20 0457 11/23/20  0559   AST 16 15 17   ALT 20 20 19   BILITOT 0.4 0.5 0.4   ALKPHOS 107 109 108     Mag:      Recent Labs     11/21/20  0513   MG 2.00      Phos:   No results for input(s): PHOS in the last 72 hours. INR: No results for input(s): INR in the last 72 hours. LIPASE: No results for input(s): LIPASE in the last 72 hours. AMYLASE: No results for input(s): AMYLASE in the last 72 hours. Radiology Review:     5/2019 CT pelvis     INDICATION: Hx bilat hip fx, increasing pain         COMPARISON: MRI of the left hip and MRI of the lumbar spine April 5, 2018         TECHNIQUE: Axial CT imaging obtained through the pelvis.  Axial images and multiplanar reformatted images are provided for review.   Individualized dose optimization technique was used in order to meet ALARA standards for radiation dose reduction.  In    addition to vendor specific dose reduction algorithms, the dose reduction techniques vary based on the specific scanner utilized but frequently include automated exposure control, adjustment of the mA and/or kV according to patient size, and use of    iterative reconstruction technique.         IV Contrast: None    Oral Contrast: None         FINDINGS:         URINARY BLADDER: Decompressed and unremarkable.         REPRODUCTIVE ORGANS: Prior hysterectomy.         BOWEL: Visualized portions of large and small bowel in the pelvis nondilated without obstruction. Diverticulosis of the visualized colon.  Normal appendix.         LYMPH NODES: No abnormally enlarged nodes.         PERITONEUM/RETROPERITONEUM: No ascites or free air.         VESSELS: Atherosclerotic calcification of the visualized abdominal aorta and iliac vasculature without aneurysmal dilatation.         ABDOMINAL WALL: Normal.         BONES: Sclerosis of both the left and right sacral ala consistent with insufficiency fractures as demonstrated on recent MR examination. Associated sclerosis of the rightward aspect of the pubic symphysis again consistent with an insufficiency fracture. There is complete fracture through the leftward pubic symphysis, mildly comminuted and displaced more prominent than prior MR examination. No fracture of the proximal femurs is identified. Degenerative disc disease and facet arthropathy of the lower    lumbar spine.              Impression         Sclerosis of bilateral sacral ala as well as the rightward aspect of the pubic symphysis consistent with insufficiency fractures.         Worsening fracture/displacement of the leftward pubic symphysis compared with prior MR from April 2019.         Diverticulosis.           IMPRESSION/RECOMMENDATIONS:    Lower abdominal intermittent bulging and pain  Possible hernia, although last CT pelvis at time of pelvic fx was negative  No hernia on exam but difficult due to debility and body habitus  Will check new CT scan A/P to further assess  New intraabdominal pathology such as diverticulitis is unlikely as well    Thank you,    Aldair Arora

## 2020-11-23 NOTE — CONSULTS
reports that she quit smoking about 15 years ago. Her smoking use included cigarettes. She has a 35.00 pack-year smoking history. She has never used smokeless tobacco. She reports previous alcohol use. She reports that she does not use drugs. family history includes Cancer in an other family member; Hypertension in an other family member; Kidney Disease in an other family member. REVIEW OF SYSTEMS:   CONSTITUTIONAL: negative for fevers, chills, diaphoresis, appetite change, fatigue, night sweats and unexpected weight change. HEENT: negative for hearing loss, tinnitus, ear drainage, sinus pressure, nasal congestion, epistaxis and snoring. RESPIRATORY: Negative for hemoptysis, cough, sputum production. CARDIOVASCULAR: negative for chest pain, palpitations, exertional chest pressure/discomfort, edema, syncope. GASTROINTESTINAL: negative for nausea, vomiting, diarrhea, constipation, blood in stool and abdominal pain. GENITOURINARY: negative for frequency, dysuria, urinary incontinence, decreased urine volume, and hematuria. HEMATOLOGIC/LYMPHATIC: negative for easy bruising, bleeding and lymphadenopathy. ALLERGIC/IMMUNOLOGIC: negative for recurrent infections, angioedema, anaphylaxis and drug reactions. ENDOCRINE: negative for weight changes and diabetic symptoms including polyuria, polydipsia and polyphagia. MUSCULOSKELETAL: negative for pain, joint swelling, decreased range of motion and muscle weakness. NEUROLOGICAL: negative for headaches, slurred speech. Positive unilateral weakness. PSYCHIATRIC/BEHAVIORAL: negative for hallucinations, behavioral problems, confusion and agitation. All pertinent positives are noted in the HPI.     Physical Examination:  Vitals:   Patient Vitals for the past 24 hrs:   BP Temp Temp src Pulse Resp SpO2 Weight   11/23/20 0915 (!) 140/63 98.2 °F (36.8 °C) Oral 69 18 92 % --   11/23/20 0728 -- -- -- -- -- -- 211 lb 12.8 oz (96.1 kg)   11/23/20 0615 (!) 167/70 98.1 °F (36.7 °C) Oral 59 19 92 % --   11/23/20 0444 (!) 145/75 97.3 °F (36.3 °C) Oral 60 18 92 % --   11/23/20 0230 (!) 161/76 -- -- 61 18 92 % --   11/23/20 0029 (!) 144/71 97.8 °F (36.6 °C) Oral 64 16 93 % --   11/23/20 0005 (!) 143/55 97.8 °F (36.6 °C) Oral 58 16 92 % --   11/22/20 2020 (!) 164/78 -- -- 60 -- -- --   11/22/20 2010 (!) 182/73 98.2 °F (36.8 °C) Oral 60 18 94 % --     Psych: Stable mood, normal judgement, normal affect. Const: No distress  Eyes: Conjunctiva noninjected, no icterus noted; pupils equal, round. HENT: Atraumatic, normocephalic; Oral mucosa moist  Neck: Trachea midline, neck supple. No thyromegaly noted. CV: No audible murmurs  Resp: No increased WOB, no audible wheezing   GI: Nondistended   Neuro: Alert, oriented, appropriate. RUE 5/5 RLE deferred 2/2 contracture, LUE 0/5 SA/EF/EE 2/5 , LLE 2/5 diffusely  Skin: No visible abnormalities  MSK: Right knee contracture   Ext: No significant edema appreciated. No varicosities.     Lab Results   Component Value Date    WBC 5.2 11/21/2020    HGB 12.7 11/21/2020    HCT 37.5 11/21/2020    MCV 93.2 11/21/2020     11/21/2020     Lab Results   Component Value Date    INR 0.91 11/20/2020    INR 1.03 05/12/2019    INR 1.00 04/01/2019    PROTIME 10.5 11/20/2020    PROTIME 11.7 05/12/2019    PROTIME 11.4 04/01/2019     Lab Results   Component Value Date    CREATININE 0.6 11/23/2020    BUN 17 11/23/2020     11/23/2020    K 4.1 11/23/2020     11/23/2020    CO2 25 11/23/2020     Lab Results   Component Value Date    ALT 19 11/23/2020    AST 17 11/23/2020    ALKPHOS 108 11/23/2020    BILITOT 0.4 11/23/2020         Most recent imaging studies revealed   EXAMINATION:    MRI OF THE BRAIN WITHOUT CONTRAST  11/21/2020 11:06 am         TECHNIQUE:    Multiplanar multisequence MRI of the brain was performed without the    administration of intravenous contrast.         COMPARISON:    None.         HISTORY:    ORDERING SYSTEM PROVIDED HISTORY: Dysarthria, L weakness    TECHNOLOGIST PROVIDED HISTORY:    Reason for exam:->Dysarthria, L weakness    Reason for Exam: Facial Droop    Acuity: Unknown    Type of Exam: Ongoing         FINDINGS:    INTRACRANIAL STRUCTURES/VENTRICLES: There are small acute infarcts within the    right putamen and body of the caudate nucleus.  There is also a small infarct    in the right anterior temporal lobe.  There is no herniation or    hydrocephalus.  No acute hemorrhage.         Extensive confluent T2 hyperintense white matter signal abnormality is likely    due to chronic microvascular ischemia.         ORBITS: The visualized portion of the orbits demonstrate no acute abnormality.         SINUSES: The visualized paranasal sinuses and mastoid air cells are well    aerated.         BONES/SOFT TISSUES: The bone marrow signal intensity appears normal. The soft    tissues demonstrate no acute abnormality.              Impression    Acute infarcts in the right basal ganglia and anterior temporal lobe. The above laboratory data have been reviewed. The above imaging data have been reviewed. The above medical testing have been reviewed. Body mass index is 33.17 kg/m². Assessment and Plan:  Acute ischemic stroke: DAPT, statin. PT/OT/SLP  Dysphagia: SLP, dysphagia diet  HTN  HLD    Dispo: Patient is a complicated picture. Due to bed limitations this week and an anticipated prolonged recovery course, we will not be able to accept this patient. She would be appropriate for ARU or SNF but ARU beds are significantly limited currently. She is interested in a SNF stay which I feel would be a more appropriate timeline as she will be questionable to return to home with her new on chronic deficits. Nabil discussed with CM. Thank you for the consultation. Randal Ribera MD 11/23/2020, 5:11 PM     * This document was created using dictation software.   While all precautions were taken to ensure accuracy, errors may have occurred. Please disregard any typographical errors.

## 2020-11-23 NOTE — PROGRESS NOTES
verbalized understanding however would benefit from continued reinforcement for carryover  REQUIRES OT FOLLOW UP: Yes  Activity Tolerance  Activity Tolerance: Patient Tolerated treatment well;Patient limited by fatigue  Safety Devices  Safety Devices in place: Yes  Type of devices: All fall risk precautions in place;Call light within reach; Chair alarm in place;Gait belt;Patient at risk for falls; Left in chair;Nurse notified         Patient Diagnosis(es): The encounter diagnosis was Cerebrovascular accident (CVA), unspecified mechanism ([facility] [address] has a past medical history of Arthritis, Compression fracture, Concussion, DVT (deep venous thrombosis) ([address] Environmental allergies, Essential hypertension, benign, GERD (gastroesophageal reflux disease), History of peptic ulcer, Hx of blood clots, Hyperlipidemia, Lacunar infarction ([address] MRSA (methicillin resistant staph aureus) culture positive, Restrictive airway disease, Retention of urine, and Wound, open. has a past surgical history that includes Knee arthroscopy (Left, ); Cholecystectomy; Hysterectomy; Total knee arthroplasty (Right, 2017); Colonoscopy; Knee Arthroplasty (Right, 2018); Shoulder arthroscopy (Right);  section; pr revise knee joint replace,all parts (Right, 2018); and quadricepsplasty (Right, 2018). Restrictions  Restrictions/Precautions  Restrictions/Precautions: Fall Risk  Required Braces or Orthoses?: No  Position Activity Restriction  Other position/activity restrictions: Chiquis Zepeda is a 79 y.o. female who presents to the emergency department with left arm weakness.   This is a 80-year-old female who states that she started to have some left arm weakness yesterday morning which was over 24 hours prior to arrival.  The patient states the weakness continued and worsened throughout the day and apparently when she woke at 5:00 AM this morning the family noticed some left mouth drooping and some mildly garbled Decreased awareness of errors;Assistance required to correct errors made  Insights: Decreased awareness of deficits  Initiation: Requires cues for some  Sequencing: Requires cues for some     Perception  Overall Perceptual Status: Impaired  Unilateral Attention: Cues to maintain midline in sitting;Cues to attend left visual field;Cues to attend to left side of body  Initiation: Cues to initiate tasks           Upper Extremity Function  NDT Treatment: Upper extremity; Lower extremity; Sitting     Plan   Plan  Times per week: 5-7x/week  Times per day: Daily  Specific instructions for Next Treatment: Cotx with PT to maximize function and safety  Current Treatment Recommendations: Strengthening, Patient/Caregiver Education & Training, Equipment Evaluation, Education, & procurement, Balance Training, Neuromuscular Re-education, Functional Mobility Training, Positioning, Endurance Training, Cognitive/Perceptual Training, Safety Education & Training, Self-Care / ADL    AM-PAC Score        AM-PAC Inpatient Daily Activity Raw Score: 10 (11/23/20 1244)  AM-PAC Inpatient ADL T-Scale Score : 27.31 (11/23/20 1244)  ADL Inpatient CMS 0-100% Score: 74.7 (11/23/20 1244)  ADL Inpatient CMS G-Code Modifier : CL (11/23/20 1244)    Goals  Short term goals  Time Frame for Short term goals: Discharge  Short term goal 1: Mod Assist bed mobility--GOAL MET 11/22  Short term goal 2: Mod Assist ADL transfers to/fro bed, chair and toilet--dependent of jose CORONADO 11/23  Short term goal 3: Mod Assist toileting--dependent 11/23  Short term goal 4: Mod Assist UB/LB ADL's--ongoing 11/23  Short term goal 5: Supervision sit balance EOB (static and dynamic) for ADL's/functional activity--min-mod A 11/23  Long term goals  Time Frame for Long term goals : LTG=STG  Long term goal 1: Updated ST Goal 1: Min Assist bed mobility-mod A of 2 11/23       Therapy Time   Individual Concurrent Group Co-treatment   Time In       1133   Time Out       1212   Minutes 39      Timed Code Treatment Minutes:  39 Minutes  Total Treatment Minutes:  [address], [address]    I have reviewed and am in agreement with this treatment session.     Juventino Lester, MOT OTR/L [id number]

## 2020-11-23 NOTE — PROGRESS NOTES
Physical Therapy  Facility/Department: [address]  Daily Treatment Note  NAME: Harmony Scales  : 1950  MRN: [id number]    Date of Service: 2020    Discharge Recommendations:  Harmony Scales scored a 8/24 on the AM-PAC short mobility form. Current research shows that an AM-PAC score of 17 or less is typically not associated with a discharge to the patient's home setting. Based on the patient's AM-PAC score and their current functional mobility deficits, it is recommended that the patient have 5-7 sessions per week of Physical Therapy at d/c to increase the patient's independence. At this time, this patient demonstrates the endurance, and/or tolerance for 3 hours of therapy each day, with a treatment frequency of 5-7x/wk. Please see assessment section for further patient specific details. If patient discharges prior to next session this note will serve as a discharge summary. Please see below for the latest assessment towards goals. 24 hour supervision or assist, 5-7 sessions per week   PT Equipment Recommendations  Equipment Needed: No    Assessment   Body structures, Functions, Activity limitations: Decreased functional mobility ; Decreased ADL status; Decreased ROM; Decreased strength;Decreased safe awareness;Decreased endurance;Decreased balance;Decreased posture  Assessment: Patient demonstrates LUE weakness, poor trunk control/balance, severely limited right knee extension and strength all leading to patient's inability to stand or ambulate and relegated to lateral transfer. Recommend 24 hour assist and continued therapy at d/c. Treatment Diagnosis: weakness  Prognosis: Good  Decision Making: High Complexity  PT Education: PT Role;Plan of Care;Transfer Training; Injury Prevention;General Safety;Equipment; Family Education; Functional Mobility Training  Patient Education: Pt verbalized good understanding, needing reinforcement.   REQUIRES PT FOLLOW UP: Yes  Activity Tolerance  Activity Tolerance: Patient Tolerated treatment well;Patient limited by fatigue;Patient limited by endurance     Patient Diagnosis(es): The encounter diagnosis was Cerebrovascular accident (CVA), unspecified mechanism ([facility] [address] has a past medical history of Arthritis, Compression fracture, Concussion, DVT (deep venous thrombosis) ([facility] [address] Environmental allergies, Essential hypertension, benign, GERD (gastroesophageal reflux disease), History of peptic ulcer, Hx of blood clots, Hyperlipidemia, Lacunar infarction ([facility] [address] MRSA (methicillin resistant staph aureus) culture positive, Restrictive airway disease, Retention of urine, and Wound, open. has a past surgical history that includes Knee arthroscopy (Left, ); Cholecystectomy; Hysterectomy; Total knee arthroplasty (Right, 2017); Colonoscopy; Knee Arthroplasty (Right, 2018); Shoulder arthroscopy (Right);  section; pr revise knee joint replace,all parts (Right, 2018); and quadricepsplasty (Right, 2018). Restrictions  Restrictions/Precautions  Restrictions/Precautions: Fall Risk  Required Braces or Orthoses?: No  Position Activity Restriction  Other position/activity restrictions: Patricia Rocha is a 79 y.o. female who presents to the emergency department with left arm weakness. This is a 58-year-old female who states that she started to have some left arm weakness yesterday morning which was over 24 hours prior to arrival.  The patient states the weakness continued and worsened throughout the day and apparently when she woke at 5:00 AM this morning the family noticed some left mouth drooping and some mildly garbled speech as well. The last time the daughter talk to the patient where she sounded normal on the phone was around 8 or 9 PM last evening. Subjective   General  Chart Reviewed:  Yes  Additional Pertinent Hx: L side CVA, R LE contracted due to failed TKR - chronic issue  Family / Caregiver Present: Yes  Subjective  Subjective: Patient reports 6/10 pain after tx but naseous prior to tx. Recently had meds. General Comment  Comments: Agreeable to PT. Pain Screening  Patient Currently in Pain: Yes  Vital Signs  Patient Currently in Pain: Yes       Orientation  Orientation  Overall Orientation Status: Within Functional Limits  Cognition      Objective   Bed mobility  Supine to Sit: Dependent/Total(Mod A of 2)  Scooting: Minimal assistance(increased time)  Comment: cueing for B hand placement  Transfers  Sit to Stand: (unable after two attempts; required STEDY Dep of 2)  Stand to sit: (unable after two attempts; required STEDY Dep of 2)  Ambulation  Ambulation?: No  Stairs/Curb  Stairs?: No  Neuromuscular Education  Neuromuscular education: No  Balance  Posture: Fair  Sitting - Static: Fair;+  Sitting - Dynamic: Fair(Min A stting EOB)  Standing - Static: Poor;-(Mod A with Wendy Bury)  Standing - Dynamic: Poor;-(Mod A with Wendy Bury)  Comments: Min A with cueing to sit upright; weight bearing on L UE; sat in Wendy Bury ~5 min before becoming significantly fatigued  Exercises  Straight Leg Raise: minimal rise x8 LLE  Quad Sets: x8 BLE                        G-Code     OutComes Score                                                     AM-PAC Score  AM-PAC Inpatient Mobility Raw Score : 8 (11/23/20 1415)  AM-PAC Inpatient T-Scale Score : 28.52 (11/23/20 1415)  Mobility Inpatient CMS 0-100% Score: 86.62 (11/23/20 1415)  Mobility Inpatient CMS G-Code Modifier : CM (11/23/20 1415)          Goals  Short term goals  Time Frame for Short term goals: to be met by DC. Short term goal 1: Pt to perform bed mob with min A.  (Not met)  Short term goal 2: Pt to perform transfers bed to chair with max A.  (Not met)  Short term goal 3: Pt to sit EOB for ADLs with SBA for 5 min. (Not met)  Long term goals  Time Frame for Long term goals : LTGs=sTGs  Patient Goals   Patient goals : did not state.   NO GOALS MET THIS DATE    Plan Plan  Times per week: 5-7x/week  Times per day: Daily  Current Treatment Recommendations: Strengthening, ROM, Balance Training, Functional Mobility Training, Transfer Training, ADL/Self-care Training, Endurance Training, Safety Education & Training, Home Exercise Program, Pain Management, Patient/Caregiver Education & Training, Equipment Evaluation, Education, & procurement  Safety Devices  Type of devices: All fall risk precautions in place, Call light within reach, Chair alarm in place, Gait belt, Patient at risk for falls, Left in chair, Nurse notified  Restraints  Initially in place: No     Therapy Time   Individual Concurrent Group Co-treatment   Time In       1133   Time Out       1212   Minutes       39   Timed Code Treatment Minutes: [facility], [address]      I agree with the note above. Goals addressed by PT this session.    [address], [place] [address]

## 2020-11-23 NOTE — CARE COORDINATION
SW met with patient and her daughter. Patient's daughter requesting to see SNF list.  Daughter wants to suggest additional SNF choices. SW/CM will continue to follow progress and update patient's discharge plan as needed.     Electronically signed by GERMAN Huerta on 11/23/2020 at 3:47 PM

## 2020-11-23 NOTE — PLAN OF CARE
Problem: HEMODYNAMIC STATUS  Goal: Patient has stable vital signs and fluid balance  Outcome: Ongoing     Vitals:    11/23/20 0029   BP: (!) 144/71   Pulse: 64   Resp: 16   Temp: 97.8 °F (36.6 °C)   SpO2: 93%       Problem: COMMUNICATION IMPAIRMENT  Goal: Ability to express needs and understand communication  11/23/2020 0143 by Destiney Augustin, RN  Outcome: Ongoing   Pt's speech slurred at times but pt able to express needs appropriately. Problem: Falls - Risk of:  Goal: Will remain free from falls  Description: Will remain free from falls  11/23/2020 0143 by Destiney Augustin, RN  Outcome: Ongoing   Call light within reach, bed in lowest position, and pt educated to use call light for assistance.    Problem: Pain:  Goal: Pain level will decrease  Description: Pain level will decrease  11/23/2020 0143 by Destiney Augustin, RN  Outcome: Ongoing   Pt denies any pain at this time

## 2020-11-23 NOTE — DISCHARGE INSTR - COC
Continuity of Care Form    Patient Name: Harmony Scales   :  1950  MRN:  [id number]    Admit date:  2020  Discharge date:  2020    Code Status Order: Full Code   Advance Directives:   [facility] Documentation       Date/Time Healthcare Directive Type of Healthcare Directive Copy in [facility] [address] Agent's Name Healthcare Agent's Phone Number    20 1633  -- -- -- -- -- --            Admitting Physician:  Gordon Spencer MD  PCP: Edmond Asher MD    Discharging Nurse: Ziggy Sh[facility] Unit/Room#: 1LE-4743/8229-29  Discharging Unit Phone Number: [phone]    Emergency Contact:   Extended Emergency Contact Information  Primary Emergency Contact: Jessica Cushing  Address: [address] [facility], [address] [place] [address] Phone: [phone]  Work Phone: [phone]  Mobile Phone: [phone]  Relation: Spouse  Secondary Emergency Contact: Jesus Foot States  [facility] Phone: [phone]  Relation: Child    Past Surgical History:  Past Surgical History:   Procedure Laterality Date     SECTION      times 2    CHOLECYSTECTOMY      COLONOSCOPY      HYSTERECTOMY      KNEE ARTHROPLASTY Right 2018     RIGHT REVISION TOTAL KNEE ARTHROPLASTY    KNEE ARTHROSCOPY Left     Dr. Marychuy Evans Right 2018    RIGHT REVISION ARTHROSCOPY FEMORAL COMPONENT, SYNOVECTOMY performed by Kina Nelson MD at [facility] Right 2018    RIGHT KNEE QUADRICEP TENDON REPAIR WITH ALLOGRAFT AUGMENTATION performed by Kina Nelson MD at [address] [place] [address] ARTHROSCOPY Right     TOTAL KNEE ARTHROPLASTY Right 2017       Immunization History:   Immunization History   Administered Date(s) Administered    Influenza 2011    Influenza Vaccine, unspecified formulation 10/01/2018    Influenza Virus Vaccine 2015  Influenza, High Dose (Fluzone 65 yrs and older) 12/12/2017, 09/06/2018    Influenza, Intradermal, Preservative free 11/09/2015    Pneumococcal Conjugate 13-valent ([id number]) 04/02/2019    Pneumococcal Polysaccharide ([id number]) 12/09/2011    Tdap (Boostrix, Adacel) 04/14/2016       Active Problems:  Patient Active Problem List   Diagnosis Code    Hyperlipidemia E78.5    Hypertension I10    Edema R60.9    CTS (carpal tunnel syndrome) G56.00    Reactive airway disease J45.909    Pulmonary nodule R91.1    Chronic pain of right knee M25.561, G89.29    Anxiety and depression F41.9, F32.9    Anemia D64.9    Gastroesophageal reflux disease without esophagitis K21.9    Quadriceps tendon rupture, right, sequela S76.111S    Osteoporosis M81.0    Multiple fractures of pelvis with stable disruption of pelvic ring, subsequent encounter for fracture with delayed healing S32.810G    Lumbar disc disease M51.9    Acute CVA (cerebrovascular accident) ([facility] [address] I63.9    Dysarthria R47.1    Left-sided weakness R53.1       Isolation/Infection:   Isolation            No Isolation          Patient Infection Status       Infection Onset Added Last Indicated Last Indicated By Review Planned Expiration Resolved Resolved By    None active    Resolved    MRSA 12/07/18 01/01/19 12/07/18 Joint Culture   11/22/20 eLidy Gonzalez, RN            Nurse Assessment:  Last Vital Signs: BP (!) 167/70   Pulse 59   Temp 98.1 °F (36.7 °C) (Oral)   Resp 19   Ht 5' 7\" (1.702 m)   Wt 211 lb 12.8 oz (96.1 kg)   SpO2 92%   BMI 33.17 kg/m²     Last documented pain score (0-10 scale): Pain Level: 7  Last Weight:   Wt Readings from Last 1 Encounters:   11/23/20 211 lb 12.8 oz (96.1 kg)     Mental Status:  oriented, alert, coherent, logical, thought processes intact and able to concentrate and follow conversation    IV Access:  - None    Nursing Mobility/ADLs:  Walking   Dependent  Transfer  Dependent  Bathing  Assisted  Dressing Readmission:        10           Discharging to Facility/ Agency   · Name: [facility]  · Address: [address] [place], [place]  · Phone: [phone]  · Fax: [phone]      / signature: Electronically signed by GERMAN Spring on 11/24/2020 at 2:10 PM    PHYSICIAN SECTION    Prognosis: Good    Condition at Discharge: Stable    Rehab Potential (if transferring to Rehab): Good    Recommended Labs or Other Treatments After Discharge:     Physician Certification: I certify the above information and transfer of Jp Hugo  is necessary for the continuing treatment of the diagnosis listed and that she requires Acute Rehab for less 30 days.      Update Admission H&P: No change in H&P    PHYSICIAN SIGNATURE:  Electronically signed by Cristopher Mahoney MD on 11/23/20 at 7:45 AM EST

## 2020-11-23 NOTE — PROGRESS NOTES
[facility]    11/23/2020 11:07 AM        Name: Deion Schwartz . Admitted: 11/20/2020  Primary Care Provider: Jitendra Harper MD (Tel: [phone])    Brief Course:   79 y.o. female with history of HTN, hyperlipidemia, GERD, osteoporosis, wheel chair bound who came to ER with L sided weakness and dysarthria since morning. Admitted as inpatient for acute CVA with L sided weakness, dysarthria and dysphagia. MRI Brain done on 11/21 with \"preliminary report\" of:  Acute infarcts in the right basal ganglia and anterior temporal lobe. Echo with   A bubble study was performed and fails to show evidence of shunting. Left ventricular systolic function is normal with ejection fraction   estimated at 60-65 %. No regional wall motion abnormalities are noted. There is mild concentric left ventricular hypertrophy. There is reversal of E/A inflow velocities across the mitral valve. The ascending aorta is mildly dilated. Aortic valve appears sclerotic but opens adequately. Systolic pulmonary artery pressure (SPAP) is normal and estimated at 19 mmHg   (right atrial pressure 3 mmHg). IVC is normal in size (< 2.1 cm) and collapses > 50% with respiration   consistent with normal RA pressure (3 mmHg). Norvasc increased to 10 mg daily and started on Lisinopril 10 mg daily for HTN. PMR consulted for ARU. Changed to Nifedipine bid from Norvasc on 11/23. CC:  L sided weakness and dysarthrioa    Subjective:  . Patient with ongoing LUE>>LLE weakness. Still with dysarthria. R knee pain recurred. No CP, SOB, HA or fevers. Improving GERD.     Reviewed interval ancillary notes    Current Medications  NIFEdipine (PROCARDIA XL) extended release tablet 30 mg, BID  lisinopril (PRINIVIL;ZESTRIL) tablet 10 mg, Daily  cyclobenzaprine (FLEXERIL) tablet 10 mg, TID PRN  fluticasone-vilanterol (BREO ELLIPTA) 100-25 MCG/INH inhaler PATIENT SUPPLIED, Daily  Teriparatide (Recombinant) (FORTEO) injection 20 mcg PATIENT SUPPLIED, Daily  mirabegron (MYRBETRIQ) extended release tablet 50 mg PATIENT SUPPLIED, Daily  pantoprazole (PROTONIX) tablet 40 mg, BID AC  sucralfate (CARAFATE) tablet 1 g, 4 times per day  aluminum & magnesium hydroxide-simethicone (MAALOX) 200-200-20 MG/5ML suspension 30 mL, Q6H PRN  oxyCODONE-acetaminophen (PERCOCET) 5-325 MG per tablet 1 tablet, Q4H PRN  albuterol (PROVENTIL) nebulizer solution 2.5 mg, Q4H PRN  escitalopram (LEXAPRO) tablet 10 mg, Daily  potassium chloride (KLOR-CON M) extended release tablet 20 mEq, Daily with breakfast  acetaminophen (TYLENOL) tablet 650 mg, Q6H PRN    Or  acetaminophen (TYLENOL) suppository 650 mg, Q6H PRN  polyethylene glycol (GLYCOLAX) packet 17 g, Daily PRN  promethazine (PHENERGAN) tablet 12.5 mg, Q6H PRN    Or  ondansetron (ZOFRAN) injection 4 mg, Q6H PRN  enoxaparin (LOVENOX) injection 40 mg, Nightly  aspirin EC tablet 81 mg, Daily    Or  aspirin suppository 300 mg, Daily  0.9 % sodium chloride infusion, Continuous  sodium chloride flush 0.9 % injection 10 mL, 2 times per day  sodium chloride flush 0.9 % injection 10 mL, PRN  potassium chloride (KLOR-CON M) extended release tablet 40 mEq, PRN    Or  potassium bicarb-citric acid (EFFER-K) effervescent tablet 40 mEq, PRN    Or  potassium chloride 10 mEq/100 mL IVPB (Peripheral Line), PRN  atorvastatin (LIPITOR) tablet 80 mg, Nightly  labetalol (NORMODYNE;TRANDATE) injection 10 mg, Q10 Min PRN  perflutren lipid microspheres (DEFINITY) injection 1.65 mg, ONCE PRN  promethazine (PHENERGAN) injection 12.5 mg, Q4H PRN        Objective:  BP (!) 140/63   Pulse 69   Temp 98.2 °F (36.8 °C) (Oral)   Resp 18   Ht 5' 7\" (1.702 m)   Wt 211 lb 12.8 oz (96.1 kg)   SpO2 92%   BMI 33.17 kg/m²     Intake/Output Summary (Last 24 hours) at 11/23/2020 1107  Last data filed at 11/22/2020 1739  Gross per 24 hour Intake 100 ml   Output 800 ml   Net -700 ml      Wt Readings from Last 3 Encounters:   11/23/20 211 lb 12.8 oz (96.1 kg)   04/16/20 214 lb (97.1 kg)   05/17/19 214 lb (97.1 kg)       General appearance:  Appears comfortable. Well nourished  Eyes: Sclera clear, pupils equal  ENT: Moist mucus membranes, no thrush. Trachea midline. Cardiovascular: Regular rhythm, normal S1, S2. No murmur, gallop, rub. No edema in lower extremities  Respiratory: Clear to auscultation bilaterally, no wheeze, good inspiratory effort  Gastrointestinal: Abdomen soft, non-tender, not distended, normal bowel sounds  Musculoskeletal: No cyanosis in digits, neck supple  Neurology: L nasolabial flattening. Dysarthria. LUE 3+/5, LLE 4+/5, RUE/RLE 5/5  Psychiatry: Appropriate affect. Not agitated  Skin: Warm, dry, normal turgor, no rash  Brisk capillary refill, peripheral pulses palpable     Labs and Tests:  CBC:   Recent Labs     11/21/20  0516   WBC 5.2   HGB 12.7        BMP:    Recent Labs     11/21/20  0513 11/22/20  0457 11/23/20  0559    141 141   K 3.9 4.1 4.1    106 105   CO2 26 28 25   BUN 17 20 17   CREATININE 0.6 0.7 0.6   GLUCOSE 105* 103* 112*     Hepatic:   Recent Labs     11/21/20  0513 11/22/20  0457 11/23/20  0559   AST 16 15 17   ALT 20 20 19   BILITOT 0.4 0.5 0.4   ALKPHOS 107 109 108     MRI brain without contrast   Final Result   Acute infarcts in the right basal ganglia and anterior temporal lobe. CTA HEAD NECK W CONTRAST   Final Result   1. Approximately 40% left cervical internal carotid artery stenosis by NASCET   criteria. 2. Otherwise, no hemodynamically significant stenosis or branch occlusion in   the cervical arterial circulation. 3. No hemodynamically significant stenosis or branch occlusion in the   intracranial arterial circulation. CT Head WO Contrast   Final Result   Slightly asymmetric hypodensity in the right external capsule is nonspecific.    Although this is likely related to chronic small vessel ischemic change   superimposed acute ischemia cannot be excluded. If there is high clinical   concern for ischemia in this region further evaluation with MRI would be   helpful. XR CHEST PORTABLE   Final Result   No active cardiopulmonary disease             Problem List  Principal Problem:    Acute CVA (cerebrovascular accident) ([facility] [address]  Active Problems:    Hyperlipidemia    Hypertension    Chronic pain of right knee    Gastroesophageal reflux disease without esophagitis    Dysarthria    Left-sided weakness  Resolved Problems:    * No resolved hospital problems. *       Assessment & Plan:   1. ASA and Lipitor  2. PMR consult for acute stroke pending  3. Change Norvasc to Nifedipine for HTN  4.         Neg UA  5. Normal HgA1C, Lipids abnormal and noted  6. Cont Telemetry  7. Neuro checks completed  8. PT/OT/SLP eval recommend ARU  9.         Neuro consult for acute CVA appreciated  10. Cont Lisinopril  11. Protonix PO bid, Carafate qid, Maalox PRN  12. Flexeril PRN  13. Percocet PRN  14. COVID screen for ARU       IV Access: Peripheral  Meneses: No  Diet: DIET DYSPHAGIA SOFT AND BITE-SIZED; No Drinking Straw  Code:Full Code  DVT PPX Lovenox  Disposition ARU    Discussed with patient and nursing. Medically stable for DC to ARU. Awaiting PMR eval and bed arrangement.       Natalia Acuna MD   11/23/2020 11:07 AM

## 2020-11-24 VITALS
WEIGHT: 211.8 LBS | OXYGEN SATURATION: 90 % | TEMPERATURE: 98.2 F | RESPIRATION RATE: 16 BRPM | SYSTOLIC BLOOD PRESSURE: 154 MMHG | HEART RATE: 67 BPM | DIASTOLIC BLOOD PRESSURE: 66 MMHG | HEIGHT: 67 IN | BODY MASS INDEX: 33.24 KG/M2

## 2020-11-24 LAB
A/G RATIO: 1.5 (ref 1.1–2.2)
ALBUMIN SERPL-MCNC: 4 G/DL (ref 3.4–5)
ALP BLD-CCNC: 106 U/L (ref 40–129)
ALT SERPL-CCNC: 25 U/L (ref 10–40)
ANION GAP SERPL CALCULATED.3IONS-SCNC: 10 MMOL/L (ref 3–16)
AST SERPL-CCNC: 24 U/L (ref 15–37)
BILIRUB SERPL-MCNC: 0.5 MG/DL (ref 0–1)
BUN BLDV-MCNC: 17 MG/DL (ref 7–20)
CALCIUM SERPL-MCNC: 9.8 MG/DL (ref 8.3–10.6)
CHLORIDE BLD-SCNC: 104 MMOL/L (ref 99–110)
CO2: 24 MMOL/L (ref 21–32)
CREAT SERPL-MCNC: 0.7 MG/DL (ref 0.6–1.2)
GFR AFRICAN AMERICAN: >60
GFR NON-AFRICAN AMERICAN: >60
GLOBULIN: 2.7 G/DL
GLUCOSE BLD-MCNC: 95 MG/DL (ref 70–99)
POTASSIUM SERPL-SCNC: 4.5 MMOL/L (ref 3.5–5.1)
SODIUM BLD-SCNC: 138 MMOL/L (ref 136–145)
TOTAL PROTEIN: 6.7 G/DL (ref 6.4–8.2)

## 2020-11-24 PROCEDURE — APPSS15 APP SPLIT SHARED TIME 0-15 MINUTES: Performed by: NURSE PRACTITIONER

## 2020-11-24 PROCEDURE — APPNB30 APP NON BILLABLE TIME 0-30 MINS: Performed by: NURSE PRACTITIONER

## 2020-11-24 PROCEDURE — 6370000000 HC RX 637 (ALT 250 FOR IP): Performed by: INTERNAL MEDICINE

## 2020-11-24 PROCEDURE — 6360000002 HC RX W HCPCS: Performed by: INTERNAL MEDICINE

## 2020-11-24 PROCEDURE — 97530 THERAPEUTIC ACTIVITIES: CPT

## 2020-11-24 PROCEDURE — 94761 N-INVAS EAR/PLS OXIMETRY MLT: CPT

## 2020-11-24 PROCEDURE — 94640 AIRWAY INHALATION TREATMENT: CPT

## 2020-11-24 PROCEDURE — 2580000003 HC RX 258: Performed by: INTERNAL MEDICINE

## 2020-11-24 PROCEDURE — 99232 SBSQ HOSP IP/OBS MODERATE 35: CPT | Performed by: SURGERY

## 2020-11-24 PROCEDURE — 80053 COMPREHEN METABOLIC PANEL: CPT

## 2020-11-24 PROCEDURE — 97535 SELF CARE MNGMENT TRAINING: CPT

## 2020-11-24 PROCEDURE — 36415 COLL VENOUS BLD VENIPUNCTURE: CPT

## 2020-11-24 RX ORDER — PANTOPRAZOLE SODIUM 40 MG/1
40 TABLET, DELAYED RELEASE ORAL
Qty: 30 TABLET | Refills: 0 | Status: SHIPPED | OUTPATIENT
Start: 2020-11-24 | End: 2021-05-25 | Stop reason: ALTCHOICE

## 2020-11-24 RX ORDER — NIFEDIPINE 30 MG/1
30 TABLET, FILM COATED, EXTENDED RELEASE ORAL 2 TIMES DAILY
Qty: 30 TABLET | Refills: 0 | Status: SHIPPED | OUTPATIENT
Start: 2020-11-24 | End: 2021-01-11 | Stop reason: SDUPTHER

## 2020-11-24 RX ORDER — HYDRALAZINE HYDROCHLORIDE 25 MG/1
25 TABLET, FILM COATED ORAL EVERY 8 HOURS SCHEDULED
Qty: 90 TABLET | Refills: 3 | Status: SHIPPED | OUTPATIENT
Start: 2020-11-24 | End: 2020-11-24 | Stop reason: HOSPADM

## 2020-11-24 RX ORDER — LISINOPRIL 20 MG/1
20 TABLET ORAL DAILY
Status: DISCONTINUED | OUTPATIENT
Start: 2020-11-24 | End: 2020-11-24 | Stop reason: HOSPADM

## 2020-11-24 RX ORDER — HYDRALAZINE HYDROCHLORIDE 25 MG/1
25 TABLET, FILM COATED ORAL EVERY 8 HOURS SCHEDULED
Qty: 90 TABLET | Refills: 3 | Status: SHIPPED | OUTPATIENT
Start: 2020-11-24 | End: 2021-01-15 | Stop reason: SDUPTHER

## 2020-11-24 RX ORDER — SUCRALFATE 1 G/1
1 TABLET ORAL 4 TIMES DAILY
Qty: 28 TABLET | Refills: 0 | Status: SHIPPED | OUTPATIENT
Start: 2020-11-24 | End: 2021-05-25 | Stop reason: ALTCHOICE

## 2020-11-24 RX ORDER — LISINOPRIL 10 MG/1
10 TABLET ORAL DAILY
Qty: 30 TABLET | Refills: 0 | Status: SHIPPED | OUTPATIENT
Start: 2020-11-24 | End: 2020-12-31 | Stop reason: ALTCHOICE

## 2020-11-24 RX ORDER — HYDRALAZINE HYDROCHLORIDE 25 MG/1
25 TABLET, FILM COATED ORAL EVERY 8 HOURS SCHEDULED
Status: DISCONTINUED | OUTPATIENT
Start: 2020-11-24 | End: 2020-11-24

## 2020-11-24 RX ORDER — HYDROCORTISONE 0.5 %
CREAM (GRAM) TOPICAL 3 TIMES DAILY PRN
Status: DISCONTINUED | OUTPATIENT
Start: 2020-11-24 | End: 2020-11-24 | Stop reason: HOSPADM

## 2020-11-24 RX ORDER — HYDRALAZINE HYDROCHLORIDE 25 MG/1
25 TABLET, FILM COATED ORAL EVERY 8 HOURS SCHEDULED
Status: DISCONTINUED | OUTPATIENT
Start: 2020-11-24 | End: 2020-11-24 | Stop reason: HOSPADM

## 2020-11-24 RX ORDER — HYDRALAZINE HYDROCHLORIDE 25 MG/1
25 TABLET, FILM COATED ORAL ONCE
Status: COMPLETED | OUTPATIENT
Start: 2020-11-24 | End: 2020-11-24

## 2020-11-24 RX ORDER — CYCLOBENZAPRINE HCL 10 MG
10 TABLET ORAL 3 TIMES DAILY PRN
Qty: 10 TABLET | Refills: 0 | Status: SHIPPED | OUTPATIENT
Start: 2020-11-24 | End: 2020-12-04

## 2020-11-24 RX ADMIN — NIFEDIPINE 30 MG: 30 TABLET, FILM COATED, EXTENDED RELEASE ORAL at 09:07

## 2020-11-24 RX ADMIN — ASPIRIN 81 MG: 81 TABLET, COATED ORAL at 09:08

## 2020-11-24 RX ADMIN — ESCITALOPRAM OXALATE 10 MG: 10 TABLET ORAL at 09:07

## 2020-11-24 RX ADMIN — HYDRALAZINE HYDROCHLORIDE 25 MG: 25 TABLET, FILM COATED ORAL at 12:12

## 2020-11-24 RX ADMIN — PANTOPRAZOLE SODIUM 40 MG: 40 TABLET, DELAYED RELEASE ORAL at 16:36

## 2020-11-24 RX ADMIN — SUCRALFATE 1 G: 1 TABLET ORAL at 05:49

## 2020-11-24 RX ADMIN — OXYCODONE HYDROCHLORIDE AND ACETAMINOPHEN 1 TABLET: 5; 325 TABLET ORAL at 14:56

## 2020-11-24 RX ADMIN — OXYCODONE HYDROCHLORIDE AND ACETAMINOPHEN 1 TABLET: 5; 325 TABLET ORAL at 05:49

## 2020-11-24 RX ADMIN — PANTOPRAZOLE SODIUM 40 MG: 40 TABLET, DELAYED RELEASE ORAL at 05:49

## 2020-11-24 RX ADMIN — CYCLOBENZAPRINE 10 MG: 10 TABLET, FILM COATED ORAL at 05:49

## 2020-11-24 RX ADMIN — ONDANSETRON 4 MG: 2 INJECTION INTRAMUSCULAR; INTRAVENOUS at 14:02

## 2020-11-24 RX ADMIN — LISINOPRIL 20 MG: 20 TABLET ORAL at 09:56

## 2020-11-24 RX ADMIN — Medication 10 ML: at 12:12

## 2020-11-24 RX ADMIN — POTASSIUM CHLORIDE 20 MEQ: 1500 TABLET, EXTENDED RELEASE ORAL at 09:08

## 2020-11-24 RX ADMIN — FLUTICASONE FUROATE AND VILANTEROL 1 PUFF: 100; 25 POWDER RESPIRATORY (INHALATION) at 09:14

## 2020-11-24 RX ADMIN — SUCRALFATE 1 G: 1 TABLET ORAL at 12:12

## 2020-11-24 RX ADMIN — MENTHOL, METHYL SALICYLATE: 10; 15 CREAM TOPICAL at 13:58

## 2020-11-24 ASSESSMENT — PAIN - FUNCTIONAL ASSESSMENT: PAIN_FUNCTIONAL_ASSESSMENT: ACTIVITIES ARE NOT PREVENTED

## 2020-11-24 ASSESSMENT — PAIN DESCRIPTION - PAIN TYPE
TYPE: CHRONIC PAIN
TYPE: CHRONIC PAIN

## 2020-11-24 ASSESSMENT — PAIN DESCRIPTION - DESCRIPTORS: DESCRIPTORS: CONSTANT

## 2020-11-24 ASSESSMENT — PAIN DESCRIPTION - ONSET: ONSET: ON-GOING

## 2020-11-24 ASSESSMENT — PAIN SCALES - GENERAL
PAINLEVEL_OUTOF10: 5
PAINLEVEL_OUTOF10: 7
PAINLEVEL_OUTOF10: 5
PAINLEVEL_OUTOF10: 3
PAINLEVEL_OUTOF10: 4
PAINLEVEL_OUTOF10: 7

## 2020-11-24 ASSESSMENT — PAIN DESCRIPTION - ORIENTATION
ORIENTATION: LEFT

## 2020-11-24 ASSESSMENT — PAIN DESCRIPTION - LOCATION
LOCATION: HIP

## 2020-11-24 ASSESSMENT — PAIN DESCRIPTION - PROGRESSION: CLINICAL_PROGRESSION: OTHER (COMMENT)

## 2020-11-24 ASSESSMENT — PAIN DESCRIPTION - FREQUENCY: FREQUENCY: CONTINUOUS

## 2020-11-24 NOTE — PROGRESS NOTES
[facility]        Progress Note    Patient Name: Kathy Lowe  MRN: [id number]  YOB: 1950  Date of Evaluation: 2020    Chief Complaint: Lower abdominal pain    Subjective:  No acute events overnight  No abdominal pain at present  Denies nausea or vomiting, tolerating general diet  Resting in bed at this time      Vital Signs:  Patient Vitals for the past 24 hrs:   BP Temp Temp src Pulse Resp SpO2   20 1100 (!) 166/81 98.1 °F (36.7 °C) Oral 69 15 90 %   20 0956 (!) 159/79 -- -- -- -- --   20 0914 -- -- -- -- 16 92 %   20 0804 (!) 160/83 98.2 °F (36.8 °C) Oral 63 16 93 %   20 0546 (!) 160/88 98.2 °F (36.8 °C) Oral 68 18 90 %   20 0024 (!) 146/87 98.2 °F (36.8 °C) Oral 61 18 90 %   20 2112 139/75 98.2 °F (36.8 °C) Oral 62 18 91 %   20 1900 (!) 151/79 97.5 °F (36.4 °C) Oral 69 18 90 %      TEMPERATURE HISTORY 24H: Temp (24hrs), Av.1 °F (36.7 °C), Min:97.5 °F (36.4 °C), Max:98.2 °F (36.8 °C)    BLOOD PRESSURE HISTORY: Systolic (71EWA), IMP:600 , Min:139 , JSA:325    Diastolic (27ZHM), JKO:34, Min:63, Max:88      Intake/Output:  No intake/output data recorded. I/O this shift:  In: 10 [I.V.:10]  Out: -   Drain/tube Output:       Physical Exam:  General: awake, alert, oriented to  person, place, time  Cardiovascular:  regular rate and rhythm and no murmur noted  Lungs: clear to auscultation  Abdomen: soft, nontender, nondistended, bowel sounds normal     Labs:  CBC:  No results for input(s): WBC, HGB, HCT, PLT in the last 72 hours.   BMP:    Recent Labs     20  0457 20  0559 20  0508    141 138   K 4.1 4.1 4.5    105 104   CO2 28 25 24   BUN 20 17 17   CREATININE 0.7 0.6 0.7   GLUCOSE 103* 112* 95     Hepatic:    Recent Labs     20  0457 20  0559 20  0508   AST 15 17 24   ALT 20 19 25   BILITOT 0.5 0.4 0.5   ALKPHOS 109 108 106     Amylase:  No results found for: AMYLASE  Lipase:  No results found for: LIPASE   Mag:    Lab Results   Component Value Date    MG 2.00 11/21/2020    MG 1.90 12/09/2018     Phos:     Lab Results   Component Value Date    PHOS 3.8 05/11/2019    PHOS 3.8 12/10/2018      Coags:   Lab Results   Component Value Date    PROTIME 10.5 11/20/2020    PROTIME 42.7 03/22/2017    INR 0.91 11/20/2020    APTT 29.6 05/12/2019       Cultures:  Anaerobic culture  Lab Results   Component Value Date    LABANAE No anaerobes isolated 09/28/2018     Fungus stain  No results found for requested labs within last 30 days. Gram stain  No results found for requested labs within last 30 days. Organism  Lab Results   Component Value Date/Time    ORG Escherichia coli (A) 05/11/2019 12:36 PM     Surgical culture  Lab Results   Component Value Date/Time    CXSURG Isolated from Broth 09/28/2018 01:55 PM     Blood culture 1  No results found for requested labs within last 30 days. Blood culture 2  No results found for requested labs within last 30 days. Fecal occult  No results found for requested labs within last 30 days. GI bacterial pathogens by PCR  No results found for requested labs within last 30 days. C. difficile  No results found for requested labs within last 30 days. Urine culture  Lab Results   Component Value Date    LABURIN No growth at 18-36 hours 05/12/2019       Pathology:  No relevant pathology     Imaging:  I have personally reviewed the following films:    Ct Abdomen Pelvis W Iv Contrast Additional Contrast? Oral    Result Date: 11/24/2020  EXAMINATION: CT OF THE ABDOMEN AND PELVIS WITH CONTRAST, 11/23/2020 6:32 pm TECHNIQUE: CT of the abdomen and pelvis was performed with the administration of intravenous contrast. Multiplanar reformatted images are provided for review.  Dose modulation, iterative reconstruction, and/or weight based adjustment of the mA/kV was utilized to reduce the radiation dose to as low as reasonably achievable. COMPARISON: None HISTORY: ORDERING SYSTEM PROVIDED HISTORY: Eval abd pain, possible hernia? TECHNOLOGIST PROVIDED HISTORY: Reason for exam:   Eval abd pain, possible hernia? Additional Contrast?->Oral Reason for Exam: Eval abd pain, possible hernia? Acuity: Acute Type of Exam: Initial FINDINGS: Lower Chest: No acute infiltrate at the lung bases. Focal subsegmental atelectasis in the lingula laterally. There is a poorly defined nodular density in the left lower lobe posteriorly measuring 5 mm, likely infectious or inflammatory. Coronary vascular calcification. Organs: The liver, spleen, pancreas and adrenal glands are unremarkable. Status post cholecystectomy with no significant biliary dilatation. No renal mass or significant hydronephrosis. Punctate nonobstructive lower pole left renal calculus. GI/Bowel: Colonic diverticulosis with no acute features. There is no evidence of appendicitis. No small bowel distension. The stomach and duodenal sweep are intact. Small hiatal hernia. Pelvis: No pelvic mass or free pelvic fluid. Previous hysterectomy. Mild diffuse bladder wall thickening, likely accentuated by incomplete distention. Small bladder diverticulum anteriorly. Peritoneum/Retroperitoneum: The abdominal aorta is normal in caliber with diffuse aortoiliac atherosclerotic plaque. No retroperitoneal adenopathy or upper abdominal ascites. Bones/Soft Tissues: No acute soft tissue abnormality. There is no evidence of an abdominal wall hernia. Methacrylate cement in the sacral ala bilaterally for treatment of bilateral sacral insufficiency fractures noted on previous CT in 2019. 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 1. No acute findings within the abdomen or pelvis to explain the patient's pain. There is no evidence of abdominal wall hernia. Diverticulosis with no acute features. 2. No evidence of obstructive uropathy.   Punctate plan of care and disease process--all questions answered. Plans discussed with patient and nursing. Reviewed and discussed with Dr. Yudith Paiz. Signed:  JAZMIN Diallo - CNP  11/24/2020 1:50 PM      Surg Staff:  Pt seen and examined with NP  See full note above  Pt has ahd lower abd pain, clinically better today  PE remains benign  Pain appears to be MS and maybe related to prior pelvic fracture.    No intervention needed  Do local sx's care  Will see prn, thanks    Rohit Allen

## 2020-11-24 NOTE — PROGRESS NOTES
Pt sitting in bed. Ate % of breakfast. C/o pain in L hip. C/o L leg becoming weaker. Denied any other distress. Call light within reach. Bed locked in lowest position.

## 2020-11-24 NOTE — PROGRESS NOTES
Physical Therapy  Facility/Department: [address]  Daily Treatment Note  NAME: Migel Davies  : 1950  MRN: [id number]    Date of Service: 2020    Discharge Recommendations:  Migel Davies scored a 8 on the AM-PAC short mobility form. Current research shows that an AM-PAC score of 17 or less is typically not associated with a discharge to the patient's home setting. Based on the patient's AM-PAC score and their current functional mobility deficits, it is recommended that the patient have 5-7 sessions per week of Physical Therapy at d/c to increase the patient's independence. At this time, this patient demonstrates the endurance, and/or tolerance for 3 hours of therapy each day, with a treatment frequency of 5-7x/wk. Please see assessment section for further patient specific details. If patient discharges prior to next session this note will serve as a discharge summary. Please see below for the latest assessment towards goals. PT Equipment Recommendations  Equipment Needed: No    Assessment   Body structures, Functions, Activity limitations: Decreased functional mobility ; Decreased ADL status; Decreased ROM; Decreased strength;Decreased safe awareness;Decreased endurance;Decreased balance;Decreased posture  Assessment: Pt presents with decreased functional mobility below baseline at this time. Pt reporting PLOF as w/c bound with SPT prn. Pt currently demonstrates L sided weakness and poor trunk control requiring assist of 2, however pt able to complete SPTs this date without use of Stedy and maxA of 2. Pt most limited secondary to LLE/UE weakness and RLE contracture. Pt is motivated to participate and will benefit from continued skilled therapy to promote increased functional mobility towards baseline. Treatment Diagnosis: weakness  Prognosis: Good  Decision Making: High Complexity  PT Education: PT Role;Plan of Care;Transfer Training; Injury Prevention;General Safety;Equipment; Family Education; Functional Mobility Training  Patient Education: D/C recommendations. Pt and family verbalizing understanding. REQUIRES PT FOLLOW UP: Yes  Activity Tolerance  Activity Tolerance: Patient Tolerated treatment well;Patient limited by fatigue;Patient limited by endurance     Patient Diagnosis(es): The encounter diagnosis was Cerebrovascular accident (CVA), unspecified mechanism ([facility] [address] has a past medical history of Arthritis, Compression fracture, Concussion, DVT (deep venous thrombosis) ([address] Environmental allergies, Essential hypertension, benign, GERD (gastroesophageal reflux disease), History of peptic ulcer, Hx of blood clots, Hyperlipidemia, Lacunar infarction ([address] MRSA (methicillin resistant staph aureus) culture positive, Restrictive airway disease, Retention of urine, and Wound, open. has a past surgical history that includes Knee arthroscopy (Left, ); Cholecystectomy; Hysterectomy; Total knee arthroplasty (Right, 2017); Colonoscopy; Knee Arthroplasty (Right, 2018); Shoulder arthroscopy (Right);  section; pr revise knee joint replace,all parts (Right, 2018); and quadricepsplasty (Right, 2018). Restrictions  Restrictions/Precautions  Restrictions/Precautions: Fall Risk(High Fall Risk)  Required Braces or Orthoses?: No  Position Activity Restriction  Other position/activity restrictions: Vanna Mcardle is a 79 y.o. female who presents to the emergency department with left arm weakness. This is a 80-year-old female who states that she started to have some left arm weakness yesterday morning which was over 24 hours prior to arrival.  The patient states the weakness continued and worsened throughout the day and apparently when she woke at 5:00 AM this morning the family noticed some left mouth drooping and some mildly garbled speech as well.   The last time the daughter talk to the patient where she sounded normal on the phone was around 8 or 9 PM last evening. 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 Reports increased L hip pain with movement. Agreeable to therapy. General Comment  Comments: Pt supine in bed upon arrival. Requesting to get to [facility] in a rush. Pain Screening  Patient Currently in Pain: Denies  Vital Signs  Patient Currently in Pain: Denies       Orientation  Orientation  Overall Orientation Status: Within Functional Limits  Cognition      Objective   Bed mobility  Supine to Sit: Moderate assistance(HOB elevated. Use of bedrail. Assist a trunk.)  Scooting: Contact guard assistance  Transfers  Sit to Stand: 2 Person Assistance;Dependent/Total(maxA of 2 from EOB and BSC x3.)  Stand to sit: 2 Person Assistance;Dependent/Total  Stand Pivot Transfers: Dependent/Total;2 Person Assistance(from EOB<>BSC and BSC<>recliner)  Ambulation  Ambulation?: No  Stairs/Curb  Stairs?: No     Balance  Posture: Fair  Sitting - Static: Fair;+  Sitting - Dynamic: Fair  Standing - Static: Poor  Standing - Dynamic: Poor  Comments: Pt sat on BSC ~10 minutes varying between SBA and Kiara. Seated static, pt only requiring SBA and able to steady herself with RUE. With dynamic seated balance, pt requiring Kiara at times d/t increased L lateral lean and decreased trunk control. Pt stood ~1 minute x2 from [facility] for brief management maxA of 2. Exercises  Heelslides: x5 LLE  Hip Flexion: x5 LLE  Knee Long Arc Quad: x5 LLE  Ankle Pumps: x5 LLE  Comments: All therex completed seated in recliner. Limited reps d/tm increased L hip pain.                         G-Code     OutComes Score                                                     AM-PAC Score  AM-PAC Inpatient Mobility Raw Score : 8 (11/24/20 1255)  AM-PAC Inpatient T-Scale Score : 28.52 (11/24/20 1255)  Mobility Inpatient CMS 0-100% Score: 86.62 (11/24/20 5205)  Mobility Inpatient CMS G-Code Modifier : CM (11/24/20 1255)          Goals  Short term goals  Time Frame for Short term goals: to be met by DC. Short term goal 1: Pt to perform bed mob with min A.  (Not met)  Short term goal 2: Pt to perform transfers bed to chair with max A.  (Not met)  Short term goal 3: Pt to sit EOB for ADLs with SBA for 5 min. (Not met)  Long term goals  Time Frame for Long term goals : LTGs=sTGs  Patient Goals   Patient goals : did not state. **none met    Plan    Plan  Times per week: 5-7x/week  Times per day: Daily  Current Treatment Recommendations: Strengthening, ROM, Balance Training, Functional Mobility Training, Transfer Training, ADL/Self-care Training, Endurance Training, Safety Education & Training, Home Exercise Program, Pain Management, Patient/Caregiver Education & Training, Equipment Evaluation, Education, & procurement  Safety Devices  Type of devices: Left in chair, Call light within reach, Chair alarm in place, Nurse notified  Restraints  Initially in place: No     Therapy Time   Individual Concurrent Group Co-treatment   Time In       1115   Time Out       1200   Minutes       [address]   I agree with the above note and have addressed this patient's goals.   Ethan Munguia, PT, DPT, [id number]

## 2020-11-24 NOTE — PROGRESS NOTES
Speech Language Pathology    Patricia Rocha  1950    Attempted to see patient for speech/dysphagia therapy. Pt is currently working with PT/OT. Will attempt to follow-up as schedule allows.     Roxie Raphael M.A., [address] [place] [address]  Speech-Language Pathologist

## 2020-11-24 NOTE — PROGRESS NOTES
Spoke with transport agency. They stated that there was a problem with a truck and they would send another out. Time has now changed to 1900. Informed Pt.

## 2020-11-24 NOTE — PLAN OF CARE
Problem: HEMODYNAMIC STATUS  Goal: Patient has stable vital signs and fluid balance  11/24/2020 1804 by Vivienne Rodriguez RN  Outcome: Completed     Problem: ACTIVITY INTOLERANCE/IMPAIRED MOBILITY  Goal: Mobility/activity is maintained at optimum level for patient  11/24/2020 1804 by Vivienne Rodriguez RN  Outcome: Completed     Problem: COMMUNICATION IMPAIRMENT  Goal: Ability to express needs and understand communication  11/24/2020 1804 by Vivienne Rodriguez RN  Outcome: Completed     Problem: Falls - Risk of:  Goal: Will remain free from falls  Description: Will remain free from falls  11/24/2020 1804 by Vivienne Rodriguez RN  Outcome: Completed     Problem: Falls - Risk of:  Goal: Absence of physical injury  Description: Absence of physical injury  Outcome: Completed     Problem: Skin Integrity:  Goal: Will show no infection signs and symptoms  Description: Will show no infection signs and symptoms  11/24/2020 1804 by Vivienne Rodriguez RN  Outcome: Completed     Problem: Skin Integrity:  Goal: Absence of new skin breakdown  Description: Absence of new skin breakdown  Outcome: Completed     Problem: Neurological  Goal: Maximum potential motor/sensory/cognitive function  11/24/2020 1804 by Vivienne Rodriguez RN  Outcome: Completed     Problem: Musculor/Skeletal Functional Status  Goal: Highest potential functional level  11/24/2020 1804 by Vivienne Rodriguez RN  Outcome: Completed     Problem: Musculor/Skeletal Functional Status  Goal: Absence of falls  Outcome: Completed     Problem: Pain:  Goal: Pain level will decrease  Description: Pain level will decrease  11/24/2020 1804 by Vivienne Rodriguez RN  Outcome: Completed     Problem: Pain:  Goal: Control of acute pain  Description: Control of acute pain  Outcome: Completed     Problem: Pain:  Goal: Control of chronic pain  Description: Control of chronic pain  Outcome: Completed

## 2020-11-24 NOTE — PROGRESS NOTES
Occupational Therapy  Facility/Department: [address]  Daily Treatment Note  NAME: Laura Schmidt  : 1950  MRN: [id number]    Date of Service: 2020    Discharge Recommendations:    Laura Schmidt scored a  on the AM-PAC ADL Inpatient form. Current research shows that an AM-PAC score of 17 or less is typically not associated with a discharge to the patient's home setting. Based on the patient's AM-PAC score and their current ADL deficits, it is recommended that the patient have 5-7 sessions per week of Occupational Therapy at d/c to increase the patient's independence. At this time, this patient demonstrates the endurance, and/or tolerance for 3 hours of therapy each day, with a treatment frequency of 5-7x/wk. Please see assessment section for further patient specific details. If patient discharges prior to next session this note will serve as a discharge summary. Please see below for the latest assessment towards goals. OT Equipment Recommendations  Equipment Needed: No  Other: defer to next level of care    Assessment   Performance deficits / Impairments: Decreased functional mobility ; Decreased endurance;Decreased coordination;Decreased ADL status; Decreased posture;Decreased cognition;Decreased fine motor control;Decreased high-level IADLs;Decreased safe awareness;Decreased strength;Decreased ROM; Decreased balance  Assessment: Pt presents with the above deficits impacting daily occupational performance and would benefit from continued skilled OT services.   Treatment Diagnosis: Decreased mobility, ADL status, ADL transfers, endurance, coordination, posture, AROM and strength in LUE, safety awareness, high level IADL's, cognition and fine motor control associated with s/p CVA  Prognosis: Good  OT Education: Plan of Care;OT Role;Family Education;Equipment;Precautions;Transfer Training;Orientation  Patient Education: weight bearing techniques, AROM HEP for finger flexion/extension-pt verbalized understanding however would benefit from continued reinforcement for carryover  REQUIRES OT FOLLOW UP: Yes  Activity Tolerance  Activity Tolerance: Patient Tolerated treatment well  Safety Devices  Safety Devices in place: Yes  Type of devices: All fall risk precautions in place;Call light within reach; Chair alarm in place;Gait belt;Patient at risk for falls; Left in chair;Nurse notified  Restraints  Initially in place: No         Patient Diagnosis(es): The encounter diagnosis was Cerebrovascular accident (CVA), unspecified mechanism ([facility] has a past medical history of Arthritis, Compression fracture, Concussion, DVT (deep venous thrombosis) ([address] Environmental allergies, Essential hypertension, benign, GERD (gastroesophageal reflux disease), History of peptic ulcer, Hx of blood clots, Hyperlipidemia, Lacunar infarction ([facility] MRSA (methicillin resistant staph aureus) culture positive, Restrictive airway disease, Retention of urine, and Wound, open. has a past surgical history that includes Knee arthroscopy (Left, ); Cholecystectomy; Hysterectomy; Total knee arthroplasty (Right, 2017); Colonoscopy; Knee Arthroplasty (Right, 2018); Shoulder arthroscopy (Right);  section; pr revise knee joint replace,all parts (Right, 2018); and quadricepsplasty (Right, 2018). Restrictions  Restrictions/Precautions  Restrictions/Precautions: Fall Risk(High Fall Risk)  Required Braces or Orthoses?: No  Position Activity Restriction  Other position/activity restrictions: Marah Joseph is a 79 y.o. female who presents to the emergency department with left arm weakness.   This is a 72-year-old female who states that she started to have some left arm weakness yesterday morning which was over 24 hours prior to arrival.  The patient states the weakness continued and worsened throughout the day and apparently when she woke at 5:00 AM this morning the family noticed some left mouth drooping and some mildly garbled speech as well. The last time the daughter talk to the patient where she sounded normal on the phone was around 8 or 9 PM last evening. Subjective   General  Chart Reviewed: Yes  Patient assessed for rehabilitation services?: Yes  Response to previous treatment: Patient with no complaints from previous session  Family / Caregiver Present: Yes(spouse and two daughters present intermittently throughout the session)  Diagnosis: CVA  Subjective  Subjective: Pt supine in bed upon arrival requesting to toilet, agreeable to OT/PT evaluation  Pain Assessment  Pain Assessment: 0-10  Pain Level: 5  Pain Type: Chronic pain  Pain Location: Hip  Pain Orientation: Left  Pre Treatment Pain Screening  Intervention List: Patient able to continue with treatment  Comments / Details: Ice given to pt at EOS for pain  Vital Signs  Patient Currently in Pain: Yes     Orientation  Orientation  Overall Orientation Status: Within Functional Limits    Objective    ADL  LE Dressing: Dependent/Total(max(A)x2 for brief change this date. max(A)x1 to remain in stance + max(A)x1 to pull briefs over hips)  Toileting: Dependent/Total(completed on BSC; max(A)x2 to come to stand, max(A)x1 standing balance while max(A)x1 to pull briefs over hips)  Additional Comments: pt completed stand pivot transfer from EOB>BSC to complete toileting and stand<>sit transfer x2 from [facility] for quinn care and clothing management  Balance  Sitting Balance: Stand by assistance(SBA-Min(A); min(A) required with more dynamic seated balance tasks)  Standing Balance: Dependent/Total(max(A)x2\)  Standing Balance  Time: 5 minutes total  Activity: sit<>stand x4 for 1 minute each, x2 stand pivot transfer 30 seconds each  Comment: no LOB, no device use, max(A)x2 and verbal cueing needed  Functional Mobility  Functional Mobility Comments: unable to assess d/t safety concerns.  Pt states she ambulated very little at baseline d/t R knee contracture but was able to walk 2-3' to get to bathroom. Toilet Transfers  Toilet - Technique: Stand pivot  Equipment Used: Standard bedside commode  Toilet Transfer: Dependent/Total(max(A)x2)  Toilet Transfers Comments: EOB>BSC + BSC>recliner  Bed mobility  Supine to Sit: Moderate assistance(HOB elevated. Use of bedrail. Assist a trunk.)  Scooting: Contact guard assistance  Transfers  Stand Pivot Transfers: 2 Person assistance;Dependent/Total(max(A)x2)  Sit to stand: Dependent/Total  Stand to sit: Dependent/Total  Transfer Comments: Pt completed x2 stand pivot transfers this date + x4 sit<>stand transfers for ADL completion. Verbal cueing for safety and technique given     Neuromuscular Education  Neuromuscular education: Yes  NDT Treatment: Upper extremity; Lower extremity; Sitting  Weight Bearing  Weight Bearing Technique: Yes  LUE Weight Bearing: Forearm seated     Cognition  Overall Cognitive Status: Exceptions  Arousal/Alertness: Appropriate responses to stimuli  Following Commands: Follows one step commands with repetition; Follows one step commands with increased time  Attention Span: Difficulty dividing attention; Attends with cues to redirect  Memory: Decreased short term memory  Problem Solving: Decreased awareness of errors;Assistance required to correct errors made  Insights: Decreased awareness of deficits  Initiation: Requires cues for some  Sequencing: Requires cues for some     Perception  Overall Perceptual Status: Impaired  Unilateral Attention: Cues to maintain midline in sitting;Cues to attend left visual field;Cues to attend to left side of body  Initiation: Cues to initiate tasks  Motor Planning: Appears intact     Upper Extremity Function  NDT Treatment: Upper extremity; Lower extremity; Sitting  Type of ROM/Therapeutic Exercise  Type of ROM/Therapeutic Exercise: PROM  Exercises  Shoulder Flexion: 1x5  Shoulder Extension: 1x5  Shoulder ABduction: 1x5  Shoulder ADduction: 1x5  Elbow Flexion: 1x5  Elbow Extension: 1x5  Supination: 1x5  Pronation: 1x5  Wrist Flexion: 1x5  Wrist Extension: 1x5  Finger Flexion: Bilateral AROM finger flexion and extension 1x5- pt educated to complete 3x per day on own.  pt and family demo and verbalized understanding        Plan   Plan  Times per week: 5-7x/week  Times per day: Daily  Specific instructions for Next Treatment: Cotx with PT to maximize function and safety  Current Treatment Recommendations: Strengthening, Patient/Caregiver Education & Training, Equipment Evaluation, Education, & procurement, Balance Training, Neuromuscular Re-education, Functional Mobility Training, Positioning, Endurance Training, Cognitive/Perceptual Training, Safety Education & Training, Self-Care / ADL    G-Code     OutComes Score                                                  AM-PAC Score        AM-PAC Inpatient Daily Activity Raw Score: 11 (11/24/20 1251)  AM-PAC Inpatient ADL T-Scale Score : 29.04 (11/24/20 1251)  ADL Inpatient CMS 0-100% Score: 70.42 (11/24/20 1251)  ADL Inpatient CMS G-Code Modifier : CL (11/24/20 1251)    Goals  Short term goals  Time Frame for Short term goals: Discharge  Short term goal 1: Mod Assist bed mobility--GOAL MET 11/22  Short term goal 2: Mod Assist ADL transfers to/fro bed, chair and toilet--max(A)x2 stand>pivot transfer 11/24  Short term goal 3: Mod Assist toileting--dependent 11/24  Short term goal 4: Mod Assist UB/LB ADL's--ongoing 11/24  Short term goal 5: Supervision sit balance EOB (static and dynamic) for ADL's/functional activity--min-mod A 11/24  Long term goals  Time Frame for Long term goals : LTG=STG  Long term goal 1: Updated ST Goal 1: Min Assist bed mobility-mod A of 1 11/24       Therapy Time   Individual Concurrent Group Co-treatment   Time In       1115   Time Out       1200   Minutes       45   Timed Code Treatment Minutes: [address] [facility], [address] [place] [id number]

## 2020-11-24 NOTE — CARE COORDINATION
Discharge Plan:      Patient discharged [facility]  PHONE: [phone]: [phone]  SW/DC Planner faxed, [address] and AVS   Narcotic Prescriptions faxed were: not applicable  RN: Yesika Randle will call report      Medical Transport with: [facility] [phone]   time: 5:30 PM  Family advised of discharge?: Family at patient's bedside. HENS Submitted?:  N/A  All discharge needs met per case management.   GERMAN Petersen, [facility]  [phone]    Electronically signed by GERMAN Gilbert on 11/24/2020 at 2:08 PM

## 2020-11-24 NOTE — CARE COORDINATION
SW spoke with patient's daughters. Patient's daughters requesting that patient be referred to [facility] and [address] [place] [address]. SW contacted [facility] and provided referral information requested and SW faxed referral to [facility]). LEONELA informed patient's RN, Taran Barone. LEONELA/RUDDY will continue to follow progress and update patient's discharge plan as needed.     Electronically signed by GERMAN Pruett on 11/24/2020 at 11:21 AM

## 2020-12-16 ENCOUNTER — TELEPHONE (OUTPATIENT)
Dept: INTERNAL MEDICINE CLINIC | Age: 70
End: 2020-12-16

## 2020-12-16 NOTE — TELEPHONE ENCOUNTER
Blossom with [facility] called and wants to know if you are willing to follow th patient for home care?      Please call to advise

## 2020-12-18 ENCOUNTER — TELEPHONE (OUTPATIENT)
Dept: INTERNAL MEDICINE CLINIC | Age: 70
End: 2020-12-18

## 2020-12-18 NOTE — TELEPHONE ENCOUNTER
Darryl Brought with [facility] called wanting to know if you will sign orders for physical therapy, occupational therapy, speech therapy and nursing? Please call to advise.

## 2020-12-22 ENCOUNTER — TELEPHONE (OUTPATIENT)
Dept: INTERNAL MEDICINE CLINIC | Age: 70
End: 2020-12-22

## 2020-12-22 RX ORDER — TEMAZEPAM 7.5 MG/1
7.5 CAPSULE ORAL NIGHTLY PRN
Qty: 30 CAPSULE | Refills: 0 | Status: SHIPPED | OUTPATIENT
Start: 2020-12-22 | End: 2021-01-11 | Stop reason: SDUPTHER

## 2020-12-22 NOTE — TELEPHONE ENCOUNTER
Cecilia Rollins with [facility] called stating the patient was discharged from the hospital and the medication she had in the hospital was not what she has at home. She needs to go over the patient medication and let you know what she is not on.      Please call to advise

## 2020-12-22 NOTE — TELEPHONE ENCOUNTER
Patient daughter called stating that when patient was at [facility] they ere giving her Temazepam 7.5 mg 1 tablet daily at daily at bedtime. Can you please refill this for her, it helps her sleep? She is aware that Dr. William Garcia is out of the office. Please call to advise.      [facility] [address] [place] [address]

## 2020-12-31 ENCOUNTER — VIRTUAL VISIT (OUTPATIENT)
Dept: INTERNAL MEDICINE CLINIC | Age: 70
End: 2020-12-31
Payer: MEDICARE

## 2020-12-31 PROBLEM — R47.1 DYSARTHRIA: Status: RESOLVED | Noted: 2020-11-20 | Resolved: 2020-12-31

## 2020-12-31 PROBLEM — R53.1 LEFT-SIDED WEAKNESS: Status: RESOLVED | Noted: 2020-11-20 | Resolved: 2020-12-31

## 2020-12-31 PROCEDURE — G8399 PT W/DXA RESULTS DOCUMENT: HCPCS | Performed by: INTERNAL MEDICINE

## 2020-12-31 PROCEDURE — 1123F ACP DISCUSS/DSCN MKR DOCD: CPT | Performed by: INTERNAL MEDICINE

## 2020-12-31 PROCEDURE — 4040F PNEUMOC VAC/ADMIN/RCVD: CPT | Performed by: INTERNAL MEDICINE

## 2020-12-31 PROCEDURE — 3017F COLORECTAL CA SCREEN DOC REV: CPT | Performed by: INTERNAL MEDICINE

## 2020-12-31 PROCEDURE — 99214 OFFICE O/P EST MOD 30 MIN: CPT | Performed by: INTERNAL MEDICINE

## 2020-12-31 PROCEDURE — G8427 DOCREV CUR MEDS BY ELIG CLIN: HCPCS | Performed by: INTERNAL MEDICINE

## 2020-12-31 PROCEDURE — 1090F PRES/ABSN URINE INCON ASSESS: CPT | Performed by: INTERNAL MEDICINE

## 2020-12-31 RX ORDER — OXYCODONE HYDROCHLORIDE AND ACETAMINOPHEN 5; 325 MG/1; MG/1
1 TABLET ORAL EVERY 6 HOURS PRN
Qty: 20 TABLET | Refills: 0 | Status: SHIPPED | OUTPATIENT
Start: 2020-12-31 | End: 2021-05-25

## 2020-12-31 RX ORDER — AMLODIPINE BESYLATE 5 MG/1
5 TABLET ORAL NIGHTLY
COMMUNITY
End: 2020-12-31

## 2020-12-31 NOTE — DISCHARGE SUMMARY
Hospital Medicine Discharge Summary    Patient: Daya Wright     Gender: female  : 1950   Age: 79 y.o. MRN: [id number]    Admitting Physician: Gus Alamo MD  Discharge Physician: Hal Mcallister MD     Code Status: Prior     Admit Date: 2020   Discharge Date: 2020      Disposition:  [facility]      Discharge Diagnoses: Active Hospital Problems    Diagnosis Date Noted    Dyslipidemia [E78.5]     Cerebrovascular accident (CVA) ([address] [I63.9] 2020    Dysarthria [R47.1] 2020    Left-sided weakness [R53.1] 2020    Gastroesophageal reflux disease without esophagitis [K21.9]     Chronic pain of right knee [M25.561, G89.29] 2016    Hyperlipidemia [E78.5] 2010    HTN (hypertension), benign [I10] 2010       Follow-up appointments:  one week    Outpatient to do list: FOLLOW UP    Condition at Discharge:  [address] Course:   79 y.o. female with history of HTN, hyperlipidemia, GERD, osteoporosis, wheel chair bound who came to ER with L sided weakness and dysarthria since morning. Admitted as inpatient for acute CVA with L sided weakness, dysarthria and dysphagia.       MRI Brain done on  with \"preliminary report\" of:  Acute infarcts in the right basal ganglia and anterior temporal lobe.      Echo with   A bubble study was performed and fails to show evidence of shunting.   Left ventricular systolic function is normal with ejection fraction   estimated at 60-65 %.   No regional wall motion abnormalities are noted.   There is mild concentric left ventricular hypertrophy.  Kehinde Contreras is reversal of E/A inflow velocities across the mitral valve.   The ascending aorta is mildly dilated.   Aortic valve appears sclerotic but opens adequately.   Systolic pulmonary artery pressure (SPAP) is normal and estimated at 19 mmHg   (right atrial pressure 3 mmHg).   Brittany Carreon is normal in size (< 2.1 cm) and collapses > 50% with respiration   consistent with normal RA pressure (3 mmHg).     Norvasc increased to 10 mg daily and started on Lisinopril 10 mg daily for HTN. PMR consulted for ARU.       Changed to Nifedipine bid from Norvasc on 11/23.     CC:  L sided weakness and dysarthrioa       Discharge Medications:   Discharge Medication List as of 11/24/2020  6:05 PM      START taking these medications    Details   aspirin 81 MG EC tablet Take 1 tablet by mouth daily, Disp-30 tablet,R-0NO PRINT      atorvastatin (LIPITOR) 80 MG tablet Take 1 tablet by mouth nightly, Disp-30 tablet,R-0NO PRINT           Discharge Medication List as of 11/24/2020  6:05 PM      CONTINUE these medications which have CHANGED    Details   hydrALAZINE (APRESOLINE) 25 MG tablet Take 1 tablet by mouth every 8 hours, Disp-90 tablet,R-3Print      lisinopril (PRINIVIL;ZESTRIL) 10 MG tablet Take 1 tablet by mouth daily, Disp-30 tablet,R-0Print      NIFEdipine (ADALAT CC) 30 MG extended release tablet Take 1 tablet by mouth 2 times daily, Disp-30 tablet,R-0Print      cyclobenzaprine (FLEXERIL) 10 MG tablet Take 1 tablet by mouth 3 times daily as needed for Muscle spasms, Disp-10 tablet,R-0Print      pantoprazole (PROTONIX) 40 MG tablet Take 1 tablet by mouth 2 times daily (before meals), Disp-30 tablet,R-0Print      sucralfate (CARAFATE) 1 GM tablet Take 1 tablet by mouth 4 times daily for 7 days, Disp-28 tablet,R-0Print      mirabegron (MYRBETRIQ) 50 MG TB24 Take 50 mg by mouth daily, Disp-30 tablet,R-0Print           Discharge Medication List as of 11/24/2020  6:05 PM      CONTINUE these medications which have NOT CHANGED    Details   escitalopram (LEXAPRO) 10 MG tablet TAKE 1 TABLET BY MOUTH EVERY DAY, Disp-90 tablet,R-1Normal      KLOR-CON M20 20 MEQ extended release tablet TAKE 1 TABLET DAILY, Disp-90 tablet,R-3Normal      diclofenac (VOLTAREN) 75 MG EC tablet TAKE 1 TABLET TWICE A DAY  AFTER MEALS, Disp-180 tablet,R-3Normal      oxyCODONE-acetaminophen (PERCOCET) 5-325 MG per tablet Take 1 tablet by mouth every 6 hours as needed for Pain., Disp-20 tablet,R-0Normal      HYDROcodone-homatropine (HYDROMET) 5-1.5 MG/5ML syrup Take 5 mLs by mouth every 6 hours as needed (cough). , Disp-180 mL, R-0Normal      albuterol sulfate HFA (PROVENTIL HFA) 108 (90 Base) MCG/ACT inhaler 2 PUFFS EVERY 4 HOURS AS NEEDED WHEEZING, Disp-1 Inhaler, R-5Normal      fluticasone-vilanterol (BREO ELLIPTA) 100-25 MCG/INH AEPB inhaler USE 1 INHALATION ORALLY    DAILY, Disp-3 each, R-3Normal      teriparatide, recombinant, (FORTEO) 600 MCG/2.4ML SOLN injection Inject 20 mcg into the skin dailyHistorical Med      gabapentin (NEURONTIN) 300 MG capsule TK 1 C PO TID, Disp-90 capsule, R-3Normal      Multiple Vitamins-Minerals (MULTIVITAMIN PO) Take by mouth daily Women's Ultra Oscar for MenopauseHistorical Med           Discharge Medication List as of 11/24/2020  6:05 PM      STOP taking these medications       amLODIPine (NORVASC) 5 MG tablet Comments:   Reason for Stopping:         omeprazole (PRILOSEC) 20 MG delayed release capsule Comments:   Reason for Stopping:         furosemide (LASIX) 20 MG tablet Comments:   Reason for Stopping:               Discharge ROS:  A complete review of systems was asked and negative except for      Patient with ongoing LUE>>LLE weakness. Still with dysarthria. R knee pain recurred. No CP, SOB, HA or fevers. Improving GERD. Discharge Exam:    BP (!) 154/66 Comment: spoke with [facility], she is aware  Pulse 67   Temp 98.2 °F (36.8 °C) (Oral)   Resp 16   Ht 5' 7\" (1.702 m)   Wt 211 lb 12.8 oz (96.1 kg)   SpO2 90%   BMI 33.17 kg/m²   General appearance:  Appears comfortable. Well nourished  Eyes: Sclera clear, pupils equal  ENT: Moist mucus membranes, no thrush. Trachea midline. Cardiovascular: Regular rhythm, normal S1, S2. No murmur, gallop, rub.  No edema in lower extremities  Respiratory: Clear to auscultation bilaterally, no wheeze, good inspiratory effort  Gastrointestinal: Abdomen soft, non-tender, not distended, normal bowel sounds  Musculoskeletal: No cyanosis in digits, neck supple  Neurology: L nasolabial flattening.  Dysarthria.  LUE 3+/5, LLE 4+/5, RUE/RLE 5/5  Psychiatry: Appropriate affect. Not agitated  Skin: Warm, dry, normal turgor, no rash  Brisk capillary refill, peripheral pulses palpable     Labs: For convenience and continuity at follow-up the following most recent labs are provided:    Lab Results   Component Value Date    WBC 5.2 11/21/2020    HGB 12.7 11/21/2020    HCT 37.5 11/21/2020    MCV 93.2 11/21/2020     11/21/2020     11/24/2020    K 4.5 11/24/2020    K 4.0 11/20/2020     11/24/2020    CO2 24 11/24/2020    BUN 17 11/24/2020    CREATININE 0.7 11/24/2020    CALCIUM 9.8 11/24/2020    PHOS 3.8 05/11/2019    ALKPHOS 106 11/24/2020    ALT 25 11/24/2020    AST 24 11/24/2020    BILITOT 0.5 11/24/2020    LABALBU 4.0 11/24/2020    LDLCALC 159 11/21/2020    TRIG 239 11/21/2020     Lab Results   Component Value Date    INR 0.91 11/20/2020    INR 1.03 05/12/2019    INR 1.00 04/01/2019       Radiology:  No results found. The patient was seen and examined on day of discharge and this discharge summary is in conjunction with any daily progress note from day of discharge. Time Spent on discharge is 30 minutes  in the examination, evaluation, counseling and review of medications and discharge plan. Note that GREATER  than 30 minutes was spent in preparing discharge papers, discussing discharge with patient, medication review, etc.       Signed:    Corinne Penning, MD   12/31/2020      Thank you Ileana Rodriguez MD for the opportunity to be involved in this patient's care.  If you have any questions or concerns please feel free to contact me at [phone]

## 2020-12-31 NOTE — ASSESSMENT & PLAN NOTE
Per patient her blood pressure systolic is in the 628P. She is on amlodipine and nifedipine. We will stop amlodipine. And continue nifedipine. Currently on 30 mg twice daily. She will call with blood pressure readings in 1 to 2 weeks and we may titrate dose up if necessary.

## 2020-12-31 NOTE — PROGRESS NOTES
SUBJECTIVE:  Patient ID: Leslye Mckeon is an 79 y.o. female. HPI: Patient here today for the f/u of chronic problems-- see Problem List and associated comments. New issues or complaints include (also see Assessment for more details):      TELEHEALTH EVALUATION -- Audio/Visual (During [facility] public health emergency)    Pursuant to the emergency declaration under the [address] [place] [address] [facility] authority and the [employer] Act, this Virtual  Visit was conducted, with patient's consent, to reduce the patient's risk of exposure to COVID-19 and provide continuity of care for an established patient. Services were provided through a video synchronous discussion virtually to substitute for in-person clinic visit. Patient on follow-up after recent hospitalization and she is now in a rehab unit. She had a right cerebral infarct with left hemiparesis. It was felt to be a thrombotic event, possibly due to hypertension. She is doing well at the rehab unit but she still has significant weakness on the left side. One of her main complaints is her frequency of urination, and she has been started on Myrbetriq with significant benefit initially. Hospital records reviewed. Review of Systems   Constitutional: Negative for fever. HENT: Negative for trouble swallowing. Eyes: Negative for visual disturbance. Respiratory: Negative for shortness of breath. Cardiovascular: Negative for chest pain. Gastrointestinal: Negative. Genitourinary: Positive for frequency. Musculoskeletal: Positive for arthralgias. Neurological: Positive for speech difficulty and weakness. Negative for headaches. Psychiatric/Behavioral: Negative for dysphoric mood. OBJECTIVE:    There were no vitals taken for this visit. Physical Exam  Constitutional:       Appearance: She is not ill-appearing or toxic-appearing. Comments: Wheelchair   Eyes:      Extraocular Movements: Extraocular movements intact. Pulmonary:      Effort: Pulmonary effort is normal. No respiratory distress. Musculoskeletal:      Comments: Puffy left hand   Skin:     Coloration: Skin is not pale. Neurological:      Mental Status: She is oriented to person, place, and time. Comments: Minimally dysarthric speech   Psychiatric:         Mood and Affect: Mood normal.         Thought Content: Thought content normal.         Judgment: Judgment normal.         ASSESSMENT:       Encounter Diagnoses   Name Primary?  HTN (hypertension), benign     Hyperlipidemia, unspecified hyperlipidemia type     Closed fracture of sacrum with delayed healing, unspecified portion of sacrum, subsequent encounter     Lumbar disc disease        HTN (hypertension), benign  Per patient her blood pressure systolic is in the 654U. She is on amlodipine and nifedipine. We will stop amlodipine. And continue nifedipine. Currently on 30 mg twice daily. She will call with blood pressure readings in 1 to 2 weeks and we may titrate dose up if necessary. Hyperlipidemia  Lipitor    Cerebrovascular accident (CVA) ([address]  Right basal ganglia with left hemiparesis. She is now in a rehab unit. Continue therapy. We will give order for PT and OT for the CVA, dysphagia and hemiparesis. Lumbar disc disease  Monitor report done. Refill her pain medication. She uses sparingly20 pills have lasted approximately 5 to 6 months. PLAN:See ASSESSMENT for evaluation & PLAN     No orders of the defined types were placed in this encounter.    , PMH, SH and FH reviewed and noted. Recent and past labs, tests and consultsalso reviewed. Recent or new meds also reviewed.

## 2020-12-31 NOTE — ASSESSMENT & PLAN NOTE
Monitor report done. Refill her pain medication. She uses sparingly20 pills have lasted approximately 5 to 6 months.

## 2021-01-07 ENCOUNTER — OFFICE VISIT (OUTPATIENT)
Dept: NEUROLOGY | Age: 71
End: 2021-01-07
Payer: MEDICARE

## 2021-01-07 VITALS — DIASTOLIC BLOOD PRESSURE: 80 MMHG | HEART RATE: 76 BPM | SYSTOLIC BLOOD PRESSURE: 160 MMHG | TEMPERATURE: 97.2 F

## 2021-01-07 DIAGNOSIS — G81.94 LEFT HEMIPARESIS (HCC): Primary | ICD-10-CM

## 2021-01-07 DIAGNOSIS — I63.311 CEREBROVASCULAR ACCIDENT (CVA) DUE TO THROMBOSIS OF RIGHT MIDDLE CEREBRAL ARTERY (HCC): ICD-10-CM

## 2021-01-07 PROCEDURE — 1123F ACP DISCUSS/DSCN MKR DOCD: CPT | Performed by: PSYCHIATRY & NEUROLOGY

## 2021-01-07 PROCEDURE — 99213 OFFICE O/P EST LOW 20 MIN: CPT | Performed by: PSYCHIATRY & NEUROLOGY

## 2021-01-07 PROCEDURE — 3017F COLORECTAL CA SCREEN DOC REV: CPT | Performed by: PSYCHIATRY & NEUROLOGY

## 2021-01-07 PROCEDURE — G8484 FLU IMMUNIZE NO ADMIN: HCPCS | Performed by: PSYCHIATRY & NEUROLOGY

## 2021-01-07 PROCEDURE — 1036F TOBACCO NON-USER: CPT | Performed by: PSYCHIATRY & NEUROLOGY

## 2021-01-07 PROCEDURE — 1090F PRES/ABSN URINE INCON ASSESS: CPT | Performed by: PSYCHIATRY & NEUROLOGY

## 2021-01-07 PROCEDURE — G8427 DOCREV CUR MEDS BY ELIG CLIN: HCPCS | Performed by: PSYCHIATRY & NEUROLOGY

## 2021-01-07 PROCEDURE — G8399 PT W/DXA RESULTS DOCUMENT: HCPCS | Performed by: PSYCHIATRY & NEUROLOGY

## 2021-01-07 PROCEDURE — G8417 CALC BMI ABV UP PARAM F/U: HCPCS | Performed by: PSYCHIATRY & NEUROLOGY

## 2021-01-07 PROCEDURE — 4040F PNEUMOC VAC/ADMIN/RCVD: CPT | Performed by: PSYCHIATRY & NEUROLOGY

## 2021-01-07 NOTE — PROGRESS NOTES
NEUROLOGY CONSULTATION     Chief Complaint   Patient presents with    Follow-Up from Hospital     stroke- not sure if she should be taking a baby aspirin       HISTORY OF PRESENT ILLNESS :    Fito Zavaleta is a 79 y.o. female    History was obtained from patient  Additional history was obtained from her daughter. This was a patient that I had seen in the hospital in over 2020 for evaluation of acute left-sided weakness and speech difficulty. Patient has been wheelchair-bound because of severe arthritis and not able to walk. Patient had a detailed work-up. 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 Patient has known hypertension and at the time of the stroke the blood pressure was not well controlled  CT angiogram only showed 40% stenosis in the left internal carotid artery. Echocardiogram did not show any embolic source.     REVIEW OF SYSTEMS    Constitutional:  []   Chills   []  Fatigue   []  Fevers   []  Malaise   []  Weight loss     [x] Denies all of the above    Eyes:  []  Double vision   [x]  Blurry vision     [] Denies all of the above    Ears, nose, mouth, throat, and face:   [] Hearing loss    []   Hoarseness      []  Snoring    []  Tinnitus       [x] Denies all of the above     Respiratory:   []  Cough    []  Shortness of breath         [x] Denies all of the above     Cardiovascular:   []  Chest pain    []  Exertional chest pressure/discomfort           [] Palpitations    []  Syncope     [x] Denies all of the above    Gastrointestinal:   []  Abdominal pain   []  Constipation    []  Diarrhea    []   Dysphagia                      [x] Denies all of the above    Genitourinary:      []  Frequency   []  Hematuria     []  Urinary incontinence           [x] Denies all of the above     Hematologic/lymphatic:  [x]  Bleeding    [x]  Easy bruising   []  Anemia  [] Denies all of the above     Musculoskeletal: [] Back pain       [x]  Myalgias    []  Neck pain           [] Denies all of the above    Neurological: As noted in HPI    Behavioral/Psych:   [x] Anxiety    []  Depression     []  Mood swings     [] Denies all of the above     Endocrine:   []  Temperature intolerance     [] Fatigue      [x] Denies all of the above     Allergic/Immunologic:   [x] Hay fever    [] Denies all of the above     Past Medical History:   Diagnosis Date    Arthritis     Compression fracture     back due to fall    Concussion     due to fall    DVT (deep venous thrombosis) ([facility] [address] 01/2017    RLE after TKR    Environmental allergies     Essential hypertension, benign 6/28/2010    GERD (gastroesophageal reflux disease)     History of peptic ulcer     Hx of blood clots     Hyperlipidemia 6/28/2010    Lacunar infarction [facility])     old - per MRI 2015    MRSA (methicillin resistant staph aureus) culture positive 12/07/2018    knee    Restrictive airway disease     allergy induced    Retention of urine     Wound, open 09/2018    left shin area, healing well, tx in wound care center     Family History   Problem Relation Age of Onset    Cancer Other     Hypertension Other     Kidney Disease Other      Social History     Socioeconomic History    Marital status:      Spouse name: None    Number of children: None    Years of education: None    Highest education level: None   Occupational History    None   Social Needs    Financial resource strain: None    Food insecurity     Worry: None     Inability: None    Transportation needs     Medical: None     Non-medical: None   Tobacco Use    Smoking status: Former Smoker     Packs/day: 1.00     Years: 35.00     Pack years: 35.00     Types: Cigarettes     Quit date: 3/28/2005     Years since quitting: 15.7    Smokeless tobacco: Never Used   Substance and Sexual Activity    Alcohol use: Yes     Comment: occasional    Drug use: No    Sexual activity: None   Lifestyle  Physical activity     Days per week: None     Minutes per session: None    Stress: None   Relationships    Social connections     Talks on phone: None     Gets together: None     Attends [employer]ism service: None     Active member of club or organization: None     Attends meetings of clubs or organizations: None     Relationship status: None    Intimate partner violence     Fear of current or ex partner: None     Emotionally abused: None     Physically abused: None     Forced sexual activity: None   Other Topics Concern    None   Social History Narrative    None       PHYSICAL EXAMINATION:  BP (!) 160/80   Pulse 76   Temp 97.2 °F (36.2 °C)   Patient is awake and alert. Oriented x3. Speech normal.  Pupils equal round reactive to light. Visual field exam shows mild left homonymous hemianopsia. Face symmetrical.  Tongue midline. Motor exam shows significant left hemiparous cyst.  Coordination is impaired on the left side. Patient is unable to ambulate. No carotid bruit. No neck stiffness.     DATA:  LABS:  General Labs:    CBC:   Lab Results   Component Value Date    WBC 5.2 11/21/2020    RBC 4.02 11/21/2020    HGB 12.7 11/21/2020    HCT 37.5 11/21/2020    MCV 93.2 11/21/2020    MCH 31.7 11/21/2020    MCHC 34.0 11/21/2020    RDW 13.4 11/21/2020     11/21/2020    MPV 7.1 11/21/2020     BMP:    Lab Results   Component Value Date     11/24/2020    K 4.5 11/24/2020    K 4.0 11/20/2020     11/24/2020    CO2 24 11/24/2020    BUN 17 11/24/2020    LABALBU 4.0 11/24/2020    CREATININE 0.7 11/24/2020    CALCIUM 9.8 11/24/2020    GFRAA >60 11/24/2020    GFRAA >60 05/06/2013    LABGLOM >60 11/24/2020    LABGLOM 83 07/20/2017    GLUCOSE 95 11/24/2020    GLUCOSE 100 12/22/2011     RADIOLOGY REVIEW:  I have reviewed radiology report(s) of: MRI brain    IMPRESSION :  Right hemispheric infarction  Left hemiparesis  Hypertension  Ataxia due to severe arthritis  Hyperlipidemia      RECOMMENDATIONS : Discussed with patient and her daughter  Discussed about secondary stroke prevention measures  Continue statin therapy  Patient not able to take aspirin since she needs to be on diclofenac for her arthritis and is on Trental  Continue with good control of hypertension  Continue physical therapy  I will see her back in the office only on a as needed basis        Please note a portion of this chart was generated using dragon dictation software. Although every effort was made to ensure the accuracy of this automated transcription, some errors in transcription may have occurred.

## 2021-01-11 ENCOUNTER — TELEPHONE (OUTPATIENT)
Dept: INTERNAL MEDICINE CLINIC | Age: 71
End: 2021-01-11

## 2021-01-11 DIAGNOSIS — F51.01 PRIMARY INSOMNIA: ICD-10-CM

## 2021-01-11 RX ORDER — ATORVASTATIN CALCIUM 80 MG/1
80 TABLET, FILM COATED ORAL NIGHTLY
Qty: 90 TABLET | Refills: 3 | Status: SHIPPED | OUTPATIENT
Start: 2021-01-11 | End: 2021-07-12 | Stop reason: SDUPTHER

## 2021-01-11 RX ORDER — NIFEDIPINE 30 MG/1
30 TABLET, FILM COATED, EXTENDED RELEASE ORAL 2 TIMES DAILY
Qty: 180 TABLET | Refills: 3 | Status: SHIPPED | OUTPATIENT
Start: 2021-01-11 | End: 2021-01-15 | Stop reason: SDUPTHER

## 2021-01-11 RX ORDER — TRAZODONE HYDROCHLORIDE 50 MG/1
50 TABLET ORAL NIGHTLY
Qty: 90 TABLET | Refills: 3 | Status: SHIPPED | OUTPATIENT
Start: 2021-01-11 | End: 2021-01-12

## 2021-01-11 RX ORDER — TEMAZEPAM 7.5 MG/1
7.5 CAPSULE ORAL NIGHTLY PRN
Qty: 30 CAPSULE | Refills: 2 | Status: SHIPPED | OUTPATIENT
Start: 2021-01-11 | End: 2021-01-15 | Stop reason: SDUPTHER

## 2021-01-11 RX ORDER — ATORVASTATIN CALCIUM 80 MG/1
80 TABLET, FILM COATED ORAL NIGHTLY
Qty: 90 TABLET | Refills: 1 | Status: CANCELLED | OUTPATIENT
Start: 2021-01-11

## 2021-01-11 RX ORDER — TEMAZEPAM 7.5 MG/1
7.5 CAPSULE ORAL NIGHTLY PRN
Qty: 30 CAPSULE | Refills: 2 | Status: CANCELLED | OUTPATIENT
Start: 2021-01-11 | End: 2021-02-10

## 2021-01-11 RX ORDER — NIFEDIPINE 30 MG/1
30 TABLET, FILM COATED, EXTENDED RELEASE ORAL 2 TIMES DAILY
Qty: 120 TABLET | Refills: 1 | Status: CANCELLED | OUTPATIENT
Start: 2021-01-11

## 2021-01-11 RX ORDER — BUSPIRONE HYDROCHLORIDE 5 MG/1
5 TABLET ORAL 3 TIMES DAILY
Qty: 90 TABLET | Refills: 2 | Status: SHIPPED | OUTPATIENT
Start: 2021-01-11 | End: 2021-01-12

## 2021-01-11 NOTE — TELEPHONE ENCOUNTER
Okay to refill the medications, but the nifedipine is 30 mg twice a day, and the Lipitor is 80 mg once daily. Please change in the chart and ask her where the medications are to be sent.

## 2021-01-11 NOTE — TELEPHONE ENCOUNTER
Patient states medications should go to     [address], [address] [place] [address] [address] [phone] - F [phone]    Please advise

## 2021-01-11 NOTE — TELEPHONE ENCOUNTER
Spoke to pt she was put on trazodone 50mg QD, tamazepam 7.5mg QD, nifedipine 20mg 2 tabs once a day, busipone 5mg TID, lipitor 40mg BID. The neurologist told her to stay on these and Dr. Jade Byrd.   Pt wants to know if MD will fill them and if he will be the one to continue to fill them

## 2021-01-11 NOTE — TELEPHONE ENCOUNTER
Please call pt. Concerning she just got out of hospital and was put on medicine, ad the original doctor will not refill it.  Please advise

## 2021-01-12 RX ORDER — TRAZODONE HYDROCHLORIDE 50 MG/1
50 TABLET ORAL NIGHTLY
Qty: 90 TABLET | Refills: 1 | Status: SHIPPED | OUTPATIENT
Start: 2021-01-12 | End: 2021-05-25 | Stop reason: ALTCHOICE

## 2021-01-12 RX ORDER — BUSPIRONE HYDROCHLORIDE 5 MG/1
5 TABLET ORAL 3 TIMES DAILY
Qty: 90 TABLET | Refills: 1 | Status: SHIPPED | OUTPATIENT
Start: 2021-01-12 | End: 2021-02-11

## 2021-01-15 ENCOUNTER — TELEPHONE (OUTPATIENT)
Dept: INTERNAL MEDICINE CLINIC | Age: 71
End: 2021-01-15

## 2021-01-15 DIAGNOSIS — F51.01 PRIMARY INSOMNIA: ICD-10-CM

## 2021-01-15 DIAGNOSIS — I63.9 CEREBROVASCULAR ACCIDENT (CVA), UNSPECIFIED MECHANISM (HCC): Primary | ICD-10-CM

## 2021-01-15 DIAGNOSIS — R53.1 DECREASED STRENGTH: ICD-10-CM

## 2021-01-15 RX ORDER — HYDRALAZINE HYDROCHLORIDE 25 MG/1
25 TABLET, FILM COATED ORAL EVERY 8 HOURS SCHEDULED
Qty: 90 TABLET | Refills: 3 | Status: SHIPPED | OUTPATIENT
Start: 2021-01-15 | End: 2021-05-20

## 2021-01-15 RX ORDER — TEMAZEPAM 7.5 MG/1
7.5 CAPSULE ORAL NIGHTLY PRN
Qty: 30 CAPSULE | Refills: 2 | Status: SHIPPED | OUTPATIENT
Start: 2021-01-15 | End: 2021-05-25 | Stop reason: ALTCHOICE

## 2021-01-15 RX ORDER — NIFEDIPINE 30 MG/1
30 TABLET, FILM COATED, EXTENDED RELEASE ORAL 2 TIMES DAILY
Qty: 180 TABLET | Refills: 3 | Status: SHIPPED | OUTPATIENT
Start: 2021-01-15 | End: 2021-07-12 | Stop reason: SDUPTHER

## 2021-01-15 RX ORDER — TEMAZEPAM 7.5 MG/1
7.5 CAPSULE ORAL NIGHTLY PRN
Qty: 30 CAPSULE | Refills: 2 | Status: CANCELLED | OUTPATIENT
Start: 2021-01-15 | End: 2022-01-15

## 2021-01-15 NOTE — TELEPHONE ENCOUNTER
Patient needs refill on this medication and wants [facility] to call her so she can talk to her about these medications      temazepam (RESTORIL) 7.5 MG capsule     NIFEdipine (ADALAT CC) 30 MG extended release tablet

## 2021-01-15 NOTE — TELEPHONE ENCOUNTER
Patient called to give Dr. Ran Leyva to send over approval for the patient to get therapy. [facility] Fax number [phone]      Patient also wants another medication refilled, hydrALAZINE (APRESOLINE) 25 MG tablet        Please advise.

## 2021-01-20 ENCOUNTER — TELEPHONE (OUTPATIENT)
Dept: INTERNAL MEDICINE CLINIC | Age: 71
End: 2021-01-20

## 2021-01-22 RX ORDER — OMEPRAZOLE 20 MG/1
20 CAPSULE, DELAYED RELEASE ORAL
Qty: 30 CAPSULE | Refills: 5 | Status: SHIPPED | OUTPATIENT
Start: 2021-01-22 | End: 2021-01-25

## 2021-01-22 NOTE — TELEPHONE ENCOUNTER
Amanda from [facility] called and would like to add on additional orders for Nursing, PT (frequency: 3x a week for 4 weeks, 2x a week for 4 weeks) Speech therapy and a order for OT. Dates for pneumonia, flu and tetnus vaccine. Lisinopril 20mg qd, pt states she does not take, order needs to be clarified and sent, refill request for omeprazole. Orders, requests and refills will be faxed to be signed.

## 2021-01-25 RX ORDER — OMEPRAZOLE 20 MG/1
CAPSULE, DELAYED RELEASE ORAL
Qty: 90 CAPSULE | Refills: 1 | Status: SHIPPED | OUTPATIENT
Start: 2021-01-25 | End: 2021-07-22 | Stop reason: SDUPTHER

## 2021-01-27 ENCOUNTER — TELEPHONE (OUTPATIENT)
Dept: INTERNAL MEDICINE CLINIC | Age: 71
End: 2021-01-27

## 2021-01-27 NOTE — TELEPHONE ENCOUNTER
Srinath Keller with [facility] called wanting verbal orders for speech therapy. Please call to advise.

## 2021-01-29 RX ORDER — CEFUROXIME AXETIL 250 MG/1
250 TABLET ORAL 2 TIMES DAILY
Qty: 10 TABLET | Refills: 0 | OUTPATIENT
Start: 2021-01-29 | End: 2021-02-03

## 2021-01-29 NOTE — TELEPHONE ENCOUNTER
Pt calling back stated she has a uti and requesting antibiotics.  She cant sleep nor do anything because she stated she is always in the bathroom and would like something sent over for her so she wont have to deal with this over the weekend      [place] [address] [place] - F [phone]

## 2021-01-29 NOTE — TELEPHONE ENCOUNTER
Pt states [facility] notified pt that she has a UTI and needs Dr. Yoav Doshi to send in something. Pharmacy confirmed as [facility] [address] [facility]

## 2021-02-04 ENCOUNTER — TELEPHONE (OUTPATIENT)
Dept: INTERNAL MEDICINE CLINIC | Age: 71
End: 2021-02-04

## 2021-02-12 ENCOUNTER — TELEPHONE (OUTPATIENT)
Dept: INTERNAL MEDICINE CLINIC | Age: 71
End: 2021-02-12

## 2021-02-12 NOTE — TELEPHONE ENCOUNTER
Toula Gift from [facility] requesting last office notes from pt hosp f/u from 12/31/20 to be faxed to [phone]. Fax sent.

## 2021-02-15 ENCOUNTER — TELEPHONE (OUTPATIENT)
Dept: INTERNAL MEDICINE CLINIC | Age: 71
End: 2021-02-15

## 2021-02-15 NOTE — TELEPHONE ENCOUNTER
FYI  OT nurse will be off this week and  patient did not want another nurse coming in so her OT frequency will not be met this week.

## 2021-03-01 ENCOUNTER — TELEPHONE (OUTPATIENT)
Dept: INTERNAL MEDICINE CLINIC | Age: 71
End: 2021-03-01

## 2021-03-01 NOTE — TELEPHONE ENCOUNTER
Pt recently had a stroke November 20, 2020 and is going to get vaccine is this fine to do please call her and advise

## 2021-03-31 ENCOUNTER — TELEPHONE (OUTPATIENT)
Dept: INTERNAL MEDICINE CLINIC | Age: 71
End: 2021-03-31

## 2021-03-31 DIAGNOSIS — S32.810G: Primary | ICD-10-CM

## 2021-04-02 ENCOUNTER — TELEPHONE (OUTPATIENT)
Dept: INTERNAL MEDICINE CLINIC | Age: 71
End: 2021-04-02

## 2021-04-02 NOTE — TELEPHONE ENCOUNTER
Jenny Palacios with [facility] called stating they started patient back on her home physical therapy today. They are going to see her twice a week for 4 weeks.      Thanks

## 2021-04-15 RX ORDER — SOLIFENACIN SUCCINATE 5 MG/1
5 TABLET, FILM COATED ORAL DAILY
Qty: 30 TABLET | Refills: 5 | Status: SHIPPED | OUTPATIENT
Start: 2021-04-15 | End: 2021-04-16 | Stop reason: ALTCHOICE

## 2021-04-15 NOTE — TELEPHONE ENCOUNTER
Stop the trospium, and see if the following is more affordable:  Vesicare 5 mg     #30         1 daily         refill 5  If it helps some but not enough, we can increase the dose.

## 2021-04-16 ENCOUNTER — TELEPHONE (OUTPATIENT)
Dept: INTERNAL MEDICINE CLINIC | Age: 71
End: 2021-04-16

## 2021-04-16 RX ORDER — OXYBUTYNIN CHLORIDE 5 MG/1
5 TABLET, EXTENDED RELEASE ORAL DAILY
Qty: 30 TABLET | Refills: 5 | Status: SHIPPED | OUTPATIENT
Start: 2021-04-16 | End: 2021-04-29 | Stop reason: SDUPTHER

## 2021-04-16 NOTE — TELEPHONE ENCOUNTER
Spoke to pt she stated the pharmacy told her that vesicare was not covered by ins wants to know if there is anything else that can be sent in

## 2021-04-16 NOTE — TELEPHONE ENCOUNTER
Patient called in requesting call back from Elisabeth Carballo regarding medication that was called in yesterday. She has a few questions regarding her insurance not covering the medication. Patient looking for a call back regarding this matter.

## 2021-04-29 ENCOUNTER — TELEPHONE (OUTPATIENT)
Dept: INTERNAL MEDICINE CLINIC | Age: 71
End: 2021-04-29

## 2021-04-29 RX ORDER — BUSPIRONE HYDROCHLORIDE 5 MG/1
5 TABLET ORAL 2 TIMES DAILY
Qty: 60 TABLET | Refills: 5 | Status: SHIPPED | OUTPATIENT
Start: 2021-04-29 | End: 2021-07-22 | Stop reason: SDUPTHER

## 2021-04-29 RX ORDER — OXYBUTYNIN CHLORIDE 5 MG/1
5 TABLET, EXTENDED RELEASE ORAL 2 TIMES DAILY
Qty: 60 TABLET | Refills: 5 | Status: SHIPPED | OUTPATIENT
Start: 2021-04-29 | End: 2021-05-03 | Stop reason: ALTCHOICE

## 2021-04-29 NOTE — TELEPHONE ENCOUNTER
Iván Alba, physical therapist from the [facility] called in to inform Dr. Meng Randall that they are going to continue the home physical therapy for the patient 2 times a week for 3 weeks. Pls advise.

## 2021-04-30 ENCOUNTER — TELEPHONE (OUTPATIENT)
Dept: INTERNAL MEDICINE CLINIC | Age: 71
End: 2021-04-30

## 2021-04-30 NOTE — TELEPHONE ENCOUNTER
Home Care Nurse calling with FYI:    Wednesday patient slid out of wheel chair and fell. Did have bruising in left hip area. Pt was able to get back into chair.           [facility], [phone] nurse

## 2021-05-03 ENCOUNTER — TELEPHONE (OUTPATIENT)
Dept: INTERNAL MEDICINE CLINIC | Age: 71
End: 2021-05-03

## 2021-05-03 RX ORDER — OXYBUTYNIN CHLORIDE 10 MG/1
10 TABLET, EXTENDED RELEASE ORAL DAILY
Qty: 90 TABLET | Refills: 1 | Status: SHIPPED | OUTPATIENT
Start: 2021-05-03 | End: 2021-08-03 | Stop reason: SDUPTHER

## 2021-05-03 NOTE — TELEPHONE ENCOUNTER
Spoke to pt and she stated that her insurance will not cover oxybutynin 5mg BID. What would you like to do increase to 10mg or PA?

## 2021-05-03 NOTE — TELEPHONE ENCOUNTER
Patient is requesting to have Masoud Hagen call her back asap about an issue with beto and her medication. Please call and advise.

## 2021-05-11 RX ORDER — FLUTICASONE FUROATE AND VILANTEROL TRIFENATATE 100; 25 UG/1; UG/1
POWDER RESPIRATORY (INHALATION)
Qty: 1 EACH | Refills: 3 | Status: SHIPPED | OUTPATIENT
Start: 2021-05-11 | End: 2021-05-25 | Stop reason: ALTCHOICE

## 2021-05-20 RX ORDER — HYDRALAZINE HYDROCHLORIDE 25 MG/1
25 TABLET, FILM COATED ORAL EVERY 8 HOURS SCHEDULED
Qty: 90 TABLET | Refills: 3 | Status: SHIPPED | OUTPATIENT
Start: 2021-05-20 | End: 2021-08-03 | Stop reason: SDUPTHER

## 2021-05-21 ENCOUNTER — TELEPHONE (OUTPATIENT)
Dept: INTERNAL MEDICINE CLINIC | Age: 71
End: 2021-05-21

## 2021-05-21 NOTE — TELEPHONE ENCOUNTER
Discussed with patient and caregiver. She is actually doing well. Her headache is only some tenderness at the site of the fall. She had no loss of consciousness. She is not nauseated now. She is clearheaded without any new neurologic symptoms. She would prefer not to go to the ER given her condition and being wheelchair-bound. She knows to now to go to the ER should there be any change in neurologic symptoms, difficult to arouse, or severe headache. She will schedule appointment for follow-up here next week but if she is back to baseline she can cancel, to the difficulty for transportation.

## 2021-05-25 ENCOUNTER — OFFICE VISIT (OUTPATIENT)
Dept: INTERNAL MEDICINE CLINIC | Age: 71
End: 2021-05-25
Payer: MEDICARE

## 2021-05-25 VITALS — DIASTOLIC BLOOD PRESSURE: 68 MMHG | SYSTOLIC BLOOD PRESSURE: 122 MMHG

## 2021-05-25 DIAGNOSIS — S32.810G: ICD-10-CM
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
## 2021-05-25 DIAGNOSIS — G47.9 SLEEP DIFFICULTIES: ICD-10-CM
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
## 2021-05-25 DIAGNOSIS — S32.10XG CLOSED FRACTURE OF SACRUM WITH DELAYED HEALING, UNSPECIFIED PORTION OF SACRUM, SUBSEQUENT ENCOUNTER: ICD-10-CM

## 2021-05-25 DIAGNOSIS — M51.9 LUMBAR DISC DISEASE: ICD-10-CM

## 2021-05-25 PROCEDURE — 3017F COLORECTAL CA SCREEN DOC REV: CPT | Performed by: INTERNAL MEDICINE

## 2021-05-25 PROCEDURE — G8427 DOCREV CUR MEDS BY ELIG CLIN: HCPCS | Performed by: INTERNAL MEDICINE

## 2021-05-25 PROCEDURE — G8417 CALC BMI ABV UP PARAM F/U: HCPCS | Performed by: INTERNAL MEDICINE

## 2021-05-25 PROCEDURE — 99214 OFFICE O/P EST MOD 30 MIN: CPT | Performed by: INTERNAL MEDICINE

## 2021-05-25 PROCEDURE — 4040F PNEUMOC VAC/ADMIN/RCVD: CPT | Performed by: INTERNAL MEDICINE

## 2021-05-25 PROCEDURE — G8399 PT W/DXA RESULTS DOCUMENT: HCPCS | Performed by: INTERNAL MEDICINE

## 2021-05-25 PROCEDURE — 1090F PRES/ABSN URINE INCON ASSESS: CPT | Performed by: INTERNAL MEDICINE

## 2021-05-25 PROCEDURE — 1036F TOBACCO NON-USER: CPT | Performed by: INTERNAL MEDICINE

## 2021-05-25 PROCEDURE — 1123F ACP DISCUSS/DSCN MKR DOCD: CPT | Performed by: INTERNAL MEDICINE

## 2021-05-25 RX ORDER — ONDANSETRON 4 MG/1
4 TABLET, FILM COATED ORAL 3 TIMES DAILY PRN
Qty: 30 TABLET | Refills: 1 | Status: SHIPPED | OUTPATIENT
Start: 2021-05-25 | End: 2022-05-10 | Stop reason: SDUPTHER

## 2021-05-25 RX ORDER — ESCITALOPRAM OXALATE 10 MG/1
10 TABLET ORAL DAILY
COMMUNITY
End: 2021-07-22

## 2021-05-25 RX ORDER — OXYCODONE HYDROCHLORIDE AND ACETAMINOPHEN 5; 325 MG/1; MG/1
1 TABLET ORAL EVERY 6 HOURS PRN
Qty: 28 TABLET | Refills: 0 | Status: SHIPPED | OUTPATIENT
Start: 2021-05-25 | End: 2022-05-25

## 2021-05-25 RX ORDER — ESZOPICLONE 3 MG/1
3 TABLET, FILM COATED ORAL NIGHTLY PRN
Qty: 15 TABLET | Refills: 1 | Status: SHIPPED | OUTPATIENT
Start: 2021-05-25 | End: 2021-06-21 | Stop reason: SDUPTHER

## 2021-05-25 SDOH — ECONOMIC STABILITY: FOOD INSECURITY: WITHIN THE PAST 12 MONTHS, THE FOOD YOU BOUGHT JUST DIDN'T LAST AND YOU DIDN'T HAVE MONEY TO GET MORE.: NEVER TRUE

## 2021-05-25 ASSESSMENT — ENCOUNTER SYMPTOMS
BACK PAIN: 1
NAUSEA: 1

## 2021-05-25 ASSESSMENT — PATIENT HEALTH QUESTIONNAIRE - PHQ9
SUM OF ALL RESPONSES TO PHQ QUESTIONS 1-9: 0
SUM OF ALL RESPONSES TO PHQ9 QUESTIONS 1 & 2: 0
SUM OF ALL RESPONSES TO PHQ QUESTIONS 1-9: 0

## 2021-05-25 NOTE — ASSESSMENT & PLAN NOTE
fell at home 5 days ago. Fell backwards and hit her head. She was dazed but no LOC. She did have a headache in that area with a large lump. It is improved. Still with occasional mild headache. Still has some occasional nausea as well. Zofran did help. Discussed at length the merits of getting a CT scan, and she would prefer not to get a CT scan at this time. Should symptoms change she will call for evaluation. Her  keeps a close eye on her.

## 2021-05-25 NOTE — PROGRESS NOTES
SUBJECTIVE:  Patient ID: Angélica Caraballo is an 79 y.o. female. HPI: Patient here today for the f/u of chronic problems-- see Problem List and associated comments. New issues or complaints include (also see Assessment for more details): Here for follow-up after a fall last week. Except for localized headache she is doing well. She still gets occasional nausea and Zofran helps. Still having great difficulty falling asleep and staying asleep. Trazodone and Restoril did not work well. She still has some chronic pain and would like a refill of her pain medication which she uses judiciously. Review of Systems   Gastrointestinal: Positive for nausea. Musculoskeletal: Positive for arthralgias and back pain. Neurological: Positive for headaches. Psychiatric/Behavioral: Positive for sleep disturbance. OBJECTIVE:    /68 (Site: Right Upper Arm)      Physical Exam  Constitutional:       Comments: Wheelchair   HENT:      Head:     Cardiovascular:      Rate and Rhythm: Normal rate and regular rhythm. Pulmonary:      Effort: Pulmonary effort is normal. No respiratory distress. Musculoskeletal:      Comments: Difficulty moving right lower extremity   Skin:     Coloration: Skin is not pale. Neurological:      Mental Status: She is oriented to person, place, and time. Cranial Nerves: No cranial nerve deficit. ASSESSMENT:       Encounter Diagnoses   Name Primary?  Fall at home, initial encounter     Sleep difficulties     Multiple fractures of pelvis with stable disruption of pelvic ring, subsequent encounter for fracture with delayed healing     Closed fracture of sacrum with delayed healing, unspecified portion of sacrum, subsequent encounter     Lumbar disc disease        Fall at home, initial encounter    fell at home 5 days ago. Fell backwards and hit her head. She was dazed but no LOC. She did have a headache in that area with a large lump. It is improved.   Still with occasional mild headache. Still has some occasional nausea as well. Zofran did help. Discussed at length the merits of getting a CT scan, and she would prefer not to get a CT scan at this time. Should symptoms change she will call for evaluation. Her  keeps a close eye on her. Sleep difficulties    difficulty falling and staying asleep. Has failed multiple medications including temazepam and trazodone. Also failed OTC. Try Lunesta. Multiple fractures of pelvis with stable disruption of pelvic ring, subsequent encounter for fracture with delayed healing    still with resultant chronic pain. Will refill Percocet for as needed useshe only uses sparingly and no sign of abuse. Monitor report done. PLAN:See ASSESSMENT for evaluation & PLAN     No orders of the defined types were placed in this encounter.    , PMH, SH and FH reviewed and noted. Recent and past labs, tests and consultsalso reviewed. Recent or new meds also reviewed.

## 2021-06-14 ENCOUNTER — TELEPHONE (OUTPATIENT)
Dept: INTERNAL MEDICINE CLINIC | Age: 71
End: 2021-06-14

## 2021-06-14 NOTE — TELEPHONE ENCOUNTER
Patient called stating she would like to speak to Nelsy Meléndez regarding  eszopiclone (ESZOPICLONE) 3 MG TABS [[id number]. She states the pills work great but she can not afford $61.00 every two weeks. Please call to advise.

## 2021-06-21 ENCOUNTER — TELEPHONE (OUTPATIENT)
Dept: INTERNAL MEDICINE CLINIC | Age: 71
End: 2021-06-21

## 2021-06-21 DIAGNOSIS — G47.9 SLEEP DIFFICULTIES: ICD-10-CM

## 2021-06-21 RX ORDER — ESZOPICLONE 3 MG/1
3 TABLET, FILM COATED ORAL NIGHTLY PRN
Qty: 30 TABLET | Refills: 2 | Status: SHIPPED | OUTPATIENT
Start: 2021-06-21 | End: 2021-10-04 | Stop reason: SDUPTHER

## 2021-06-21 NOTE — TELEPHONE ENCOUNTER
Patient called in requesting refill for the following medication:      eszopiclone (ESZOPICLONE) 3 MG TABS       CHERYL DUBON [facility] [address], [place] - [facility] [phone]    Pls advise

## 2021-07-12 RX ORDER — NIFEDIPINE 30 MG/1
30 TABLET, FILM COATED, EXTENDED RELEASE ORAL 2 TIMES DAILY
Qty: 180 TABLET | Refills: 3 | Status: SHIPPED | OUTPATIENT
Start: 2021-07-12 | End: 2021-07-14 | Stop reason: SDUPTHER

## 2021-07-12 RX ORDER — ATORVASTATIN CALCIUM 80 MG/1
80 TABLET, FILM COATED ORAL NIGHTLY
Qty: 90 TABLET | Refills: 3 | Status: SHIPPED | OUTPATIENT
Start: 2021-07-12 | End: 2021-07-14 | Stop reason: SDUPTHER

## 2021-07-12 NOTE — TELEPHONE ENCOUNTER
Last office visit 5/25/2021     Last written 1/15/2021    Next office visit scheduled Visit date not found    Requested Prescriptions     Pending Prescriptions Disp Refills    atorvastatin (LIPITOR) 80 MG tablet 90 tablet 3     Sig: Take 1 tablet by mouth nightly    NIFEdipine (ADALAT CC) 30 MG extended release tablet 180 tablet 3     Sig: Take 1 tablet by mouth 2 times daily

## 2021-07-14 RX ORDER — ATORVASTATIN CALCIUM 80 MG/1
80 TABLET, FILM COATED ORAL NIGHTLY
Qty: 90 TABLET | Refills: 3 | Status: SHIPPED | OUTPATIENT
Start: 2021-07-14 | End: 2022-07-07 | Stop reason: SDUPTHER

## 2021-07-14 RX ORDER — NIFEDIPINE 30 MG/1
30 TABLET, FILM COATED, EXTENDED RELEASE ORAL 2 TIMES DAILY
Qty: 180 TABLET | Refills: 3 | Status: SHIPPED | OUTPATIENT
Start: 2021-07-14 | End: 2022-07-25 | Stop reason: SDUPTHER

## 2021-07-14 NOTE — TELEPHONE ENCOUNTER
Pt called into office to request the following medications to the listed pharmacy . Pt out in a request 7/12 and never got a follow up.       atorvastatin (LIPITOR) 80 MG tablet [[id number]    NIFEdipine (ADALAT CC) 30 MG extended release tablet [[id number]]     [address] [place] [address], [place] - [address] [place] [address] Pkw[address] - P [phone] - F [phone]    Please advise. Please LVM to patient to notify her that it's been done.

## 2021-07-22 RX ORDER — OMEPRAZOLE 20 MG/1
CAPSULE, DELAYED RELEASE ORAL
Qty: 30 CAPSULE | Refills: 5 | Status: SHIPPED | OUTPATIENT
Start: 2021-07-22 | End: 2022-01-24

## 2021-07-22 RX ORDER — BUSPIRONE HYDROCHLORIDE 10 MG/1
10 TABLET ORAL DAILY
Qty: 60 TABLET | Refills: 5 | Status: SHIPPED | OUTPATIENT
Start: 2021-07-22 | End: 2022-05-10 | Stop reason: SDUPTHER

## 2021-07-22 RX ORDER — ESCITALOPRAM OXALATE 10 MG/1
TABLET ORAL
Qty: 90 TABLET | Refills: 1 | Status: SHIPPED | OUTPATIENT
Start: 2021-07-22 | End: 2022-01-27

## 2021-07-22 NOTE — TELEPHONE ENCOUNTER
Pt called stated that she wants the buspar sent to [facility] for 10mg #60 for a coupon that she has and the omeprazole 20mg #30

## 2021-08-03 RX ORDER — OXYBUTYNIN CHLORIDE 10 MG/1
10 TABLET, EXTENDED RELEASE ORAL DAILY
Qty: 90 TABLET | Refills: 1 | Status: SHIPPED | OUTPATIENT
Start: 2021-08-03 | End: 2021-11-02 | Stop reason: SDUPTHER

## 2021-08-03 RX ORDER — HYDRALAZINE HYDROCHLORIDE 25 MG/1
25 TABLET, FILM COATED ORAL EVERY 8 HOURS SCHEDULED
Qty: 90 TABLET | Refills: 3 | Status: SHIPPED | OUTPATIENT
Start: 2021-08-03 | End: 2021-09-07 | Stop reason: SDUPTHER

## 2021-08-03 NOTE — TELEPHONE ENCOUNTER
Pt needs following medications to be refilled to the listed pharmacy. Pt needs a 90 day supply. hydrALAZINE (APRESOLINE) 25 MG tablet     oxybutynin (DITROPAN-XL) 10 MG extended release tablet       [facility] [address], [address] [facility] [phone] - F [phone]    Please advise.

## 2021-08-26 RX ORDER — DICLOFENAC SODIUM 75 MG/1
TABLET, DELAYED RELEASE ORAL
Qty: 180 TABLET | Refills: 3 | Status: SHIPPED | OUTPATIENT
Start: 2021-08-26 | End: 2022-05-10 | Stop reason: CLARIF

## 2021-09-07 ENCOUNTER — TELEPHONE (OUTPATIENT)
Dept: INTERNAL MEDICINE CLINIC | Age: 71
End: 2021-09-07

## 2021-09-07 RX ORDER — HYDRALAZINE HYDROCHLORIDE 25 MG/1
25 TABLET, FILM COATED ORAL EVERY 8 HOURS SCHEDULED
Qty: 90 TABLET | Refills: 3 | Status: SHIPPED | OUTPATIENT
Start: 2021-09-07 | End: 2021-12-06 | Stop reason: SDUPTHER

## 2021-09-07 NOTE — TELEPHONE ENCOUNTER
Medication refill :     hydrALAZINE (APRESOLINE) 25 MG tablet [[id number]    Patient called stating the [facility] does not or can not find the prescription for  This that was sent on 08/03/21.   Can you please resend this to :     [facility] [address], [address] [phone] - F [phone]

## 2021-10-04 ENCOUNTER — OFFICE VISIT (OUTPATIENT)
Dept: INTERNAL MEDICINE CLINIC | Age: 71
End: 2021-10-04
Payer: MEDICARE

## 2021-10-04 VITALS
SYSTOLIC BLOOD PRESSURE: 154 MMHG | HEIGHT: 67 IN | OXYGEN SATURATION: 95 % | HEART RATE: 70 BPM | BODY MASS INDEX: 33.17 KG/M2 | TEMPERATURE: 97.3 F | DIASTOLIC BLOOD PRESSURE: 80 MMHG

## 2021-10-04 DIAGNOSIS — R25.2 NOCTURNAL FOOT CRAMPS: ICD-10-CM

## 2021-10-04 DIAGNOSIS — R29.898 LEFT ARM WEAKNESS: ICD-10-CM

## 2021-10-04 DIAGNOSIS — E78.5 HYPERLIPIDEMIA, UNSPECIFIED HYPERLIPIDEMIA TYPE: ICD-10-CM

## 2021-10-04 DIAGNOSIS — I63.311 CEREBROVASCULAR ACCIDENT (CVA) DUE TO THROMBOSIS OF RIGHT MIDDLE CEREBRAL ARTERY (HCC): ICD-10-CM

## 2021-10-04 DIAGNOSIS — L98.9 SKIN LESION OF RIGHT ARM: ICD-10-CM

## 2021-10-04 DIAGNOSIS — R73.9 HYPERGLYCEMIA: ICD-10-CM

## 2021-10-04 DIAGNOSIS — D64.9 ANEMIA, UNSPECIFIED TYPE: ICD-10-CM

## 2021-10-04 DIAGNOSIS — G47.9 SLEEP DIFFICULTIES: ICD-10-CM

## 2021-10-04 DIAGNOSIS — I10 HTN (HYPERTENSION), BENIGN: Primary | ICD-10-CM

## 2021-10-04 DIAGNOSIS — M25.512 CHRONIC LEFT SHOULDER PAIN: ICD-10-CM

## 2021-10-04 DIAGNOSIS — G89.29 CHRONIC LEFT SHOULDER PAIN: ICD-10-CM
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
## 2021-10-04 PROCEDURE — G0008 ADMIN INFLUENZA VIRUS VAC: HCPCS | Performed by: INTERNAL MEDICINE

## 2021-10-04 PROCEDURE — G8427 DOCREV CUR MEDS BY ELIG CLIN: HCPCS | Performed by: INTERNAL MEDICINE

## 2021-10-04 PROCEDURE — 99214 OFFICE O/P EST MOD 30 MIN: CPT | Performed by: INTERNAL MEDICINE

## 2021-10-04 PROCEDURE — G8399 PT W/DXA RESULTS DOCUMENT: HCPCS | Performed by: INTERNAL MEDICINE

## 2021-10-04 PROCEDURE — 1036F TOBACCO NON-USER: CPT | Performed by: INTERNAL MEDICINE

## 2021-10-04 PROCEDURE — 1123F ACP DISCUSS/DSCN MKR DOCD: CPT | Performed by: INTERNAL MEDICINE

## 2021-10-04 PROCEDURE — G8484 FLU IMMUNIZE NO ADMIN: HCPCS | Performed by: INTERNAL MEDICINE

## 2021-10-04 PROCEDURE — 3017F COLORECTAL CA SCREEN DOC REV: CPT | Performed by: INTERNAL MEDICINE

## 2021-10-04 PROCEDURE — 90694 VACC AIIV4 NO PRSRV 0.5ML IM: CPT | Performed by: INTERNAL MEDICINE

## 2021-10-04 PROCEDURE — 4040F PNEUMOC VAC/ADMIN/RCVD: CPT | Performed by: INTERNAL MEDICINE

## 2021-10-04 PROCEDURE — G8417 CALC BMI ABV UP PARAM F/U: HCPCS | Performed by: INTERNAL MEDICINE

## 2021-10-04 PROCEDURE — 1090F PRES/ABSN URINE INCON ASSESS: CPT | Performed by: INTERNAL MEDICINE

## 2021-10-04 RX ORDER — ESZOPICLONE 3 MG/1
3 TABLET, FILM COATED ORAL NIGHTLY PRN
Qty: 90 TABLET | Refills: 2 | Status: SHIPPED | OUTPATIENT
Start: 2021-10-04 | End: 2022-07-01 | Stop reason: SDUPTHER

## 2021-10-04 NOTE — ASSESSMENT & PLAN NOTE
already using quinine water. Add magnesium in the evening. Check labs. Consider stretching exercises and possibly a small dose of muscle relaxer at bedtime.

## 2021-10-04 NOTE — PROGRESS NOTES
SUBJECTIVE:  Patient ID: Sheldon Lopez is an 79 y.o. female. HPI: Patient here today for the f/u of chronic problems-- see Problem List and associated comments. New issues or complaints include (also see Assessment for more details): She is having some leg cramps in her feet only at night. During the day she is okay. Also some more stiffening will also use for left upper extremity since therapy was completed a few months ago. She has had a very difficult last few years with her lumbar disc disease, right knee surgery and multiple sequelae problems. She is also had a stroke with some left-sided weakness. Hemiparesis - left. Review of Systems   Musculoskeletal: Positive for arthralgias. Neurological: Positive for weakness. OBJECTIVE:    BP (!) 154/80 (Site: Right Upper Arm, Position: Sitting, Cuff Size: Large Adult)   Pulse 70   Temp 97.3 °F (36.3 °C) (Temporal)   Ht 5' 7\" (1.702 m)   SpO2 95%   BMI 33.17 kg/m²      Physical Exam  Constitutional:       Appearance: She is not ill-appearing. Comments: Wheelchair   Cardiovascular:      Rate and Rhythm: Normal rate and regular rhythm. Pulses:           Radial pulses are 2+ on the right side and 2+ on the left side. Dorsalis pedis pulses are 2+ on the right side and 2+ on the left side. Posterior tibial pulses are 2+ on the right side and 2+ on the left side. Pulmonary:      Effort: Pulmonary effort is normal. No respiratory distress. Musculoskeletal:      Right lower leg: Edema (Nonpitting) present. Left lower leg: Edema (Nonpitting) present. Skin:     Coloration: Skin is not pale. Comments: Keratotic skin lesion right forearm   Neurological:      Mental Status: She is oriented to person, place, and time. Comments: Weak abduction left upper extremity         ASSESSMENT:       Encounter Diagnoses   Name Primary?     HTN (hypertension), benign Yes    Sleep difficulties     Hyperlipidemia, unspecified hyperlipidemia type     Anemia, unspecified type     Hyperglycemia     Cerebrovascular accident (CVA) due to thrombosis of right middle cerebral artery (HCC)     Left arm weakness     Nocturnal foot cramps     Skin lesion of right arm        Hyperlipidemia    nonfasting labs today on Lipitor    HTN (hypertension), benign    slightly elevated. She is on some discomfort after manipulating the wheelchair. No change in Rx. Anemia    recheck CBC    Sleep difficulties    monitor report done-refill Lunesta. Doing well with Rx. Cerebrovascular accident (CVA) ([address]    stable neurologically    Left arm weakness    residual after her stroke. It was getting better after therapy but the therapy was completed a few months ago when she is having some recurrent loss of function. Will order PT evaluation and treatment again. Nocturnal foot cramps    already using quinine water. Add magnesium in the evening. Check labs. Consider stretching exercises and possibly a small dose of muscle relaxer at bedtime. Skin lesion of right arm    keratotic lesion. She will schedule appointment with dermatology for follow-up.         PLAN:See ASSESSMENT for evaluation & PLAN     Orders Placed This Encounter   Procedures    INFLUENZA, QUADV, ADJUVANTED, 72 YRS =, IM, PF, PREFILL SYR, 0.5ML (FLUAD)    CBC Auto Differential     Standing Status:   Future     Number of Occurrences:   1     Standing Expiration Date:   10/4/2022    Comprehensive Metabolic Panel     Standing Status:   Future     Number of Occurrences:   1     Standing Expiration Date:   10/4/2022    Lipid Panel     Standing Status:   Future     Number of Occurrences:   1     Standing Expiration Date:   10/4/2022     Order Specific Question:   Is Patient Fasting?/# of Hours     Answer:   yes - 8 hours    Hemoglobin A1C     Standing Status:   Future     Number of Occurrences:   1     Standing Expiration Date:   10/4/2022    TSH WITH REFLEX TO FT4 Standing Status:   Future     Number of Occurrences:   1     Standing Expiration Date:   10/4/2022     , PMH, SH and FH reviewed and noted. Recent and past labs, tests and consultsalso reviewed. Recent or new meds also reviewed.

## 2021-10-05 ENCOUNTER — TELEPHONE (OUTPATIENT)
Dept: INTERNAL MEDICINE CLINIC | Age: 71
End: 2021-10-05

## 2021-10-05 DIAGNOSIS — I63.9 CEREBROVASCULAR ACCIDENT (CVA), UNSPECIFIED MECHANISM (HCC): Primary | ICD-10-CM

## 2021-10-05 RX ORDER — FLUTICASONE FUROATE AND VILANTEROL TRIFENATATE 100; 25 UG/1; UG/1
POWDER RESPIRATORY (INHALATION)
Qty: 1 EACH | Refills: 3 | Status: SHIPPED | OUTPATIENT
Start: 2021-10-05 | End: 2022-05-10 | Stop reason: CLARIF

## 2021-10-05 NOTE — TELEPHONE ENCOUNTER
Valentina Mendez from [facility] called I stating that they did not receive the full order for the referral that was sent over today and would like the full order to be sent over again. Pls call and advise.

## 2021-10-05 NOTE — TELEPHONE ENCOUNTER
Spoke with vanessa from  [facility] 10/4//21 FAXED [phone]   TAKEN CARE OF TODAY

## 2021-10-08 ENCOUNTER — TELEPHONE (OUTPATIENT)
Dept: INTERNAL MEDICINE CLINIC | Age: 71
End: 2021-10-08

## 2021-10-08 NOTE — TELEPHONE ENCOUNTER
PT needs a RX from him for a sparrow for scooter on the back of her car     Can fax to : [phone] José Antonio Stager (gerardo Chavarria)

## 2021-10-08 NOTE — TELEPHONE ENCOUNTER
Brionna From [facility] calling about the note on 10/4/21, asking about the statement  \"stroke with Left side weakness\" asking does he want to adeem it to say [facility] and please refax the note to [phone] to [address].

## 2021-10-18 ENCOUNTER — TELEPHONE (OUTPATIENT)
Dept: INTERNAL MEDICINE CLINIC | Age: 71
End: 2021-10-18

## 2021-10-18 NOTE — TELEPHONE ENCOUNTER
Patient would like for Kayla Gonzalez to call her as soon as possible for Her Prescription for The Scooter sparrow on her car     Please call to advise

## 2021-11-02 RX ORDER — OXYBUTYNIN CHLORIDE 10 MG/1
10 TABLET, EXTENDED RELEASE ORAL DAILY
Qty: 90 TABLET | Refills: 1 | Status: SHIPPED | OUTPATIENT
Start: 2021-11-02 | End: 2022-05-10 | Stop reason: SDUPTHER

## 2021-11-02 NOTE — TELEPHONE ENCOUNTER
Patient called for med refill on oxybutynin (DITROPAN-XL) 10 MG extended release tablet    Please send to [facility] [address], [address] [phone] - F [phone]      Patient also wanted to let the office know that she would like her and  to be NTP with Abdoulaye Lowery     Please advise

## 2021-11-08 ENCOUNTER — TELEPHONE (OUTPATIENT)
Dept: INTERNAL MEDICINE CLINIC | Age: 71
End: 2021-11-08

## 2021-11-08 ENCOUNTER — TELEPHONE (OUTPATIENT)
Dept: NEUROLOGY | Age: 71
End: 2021-11-08

## 2021-11-08 DIAGNOSIS — R29.898 LEFT ARM WEAKNESS: Primary | ICD-10-CM

## 2021-11-08 NOTE — TELEPHONE ENCOUNTER
Called the pt to inform her that she will need to have a referral to see and be advised the Md     The pt was advised to inform her PCP of the mini stroke like activity that she is having     The pt informed me that her PCP is leaving the office that they are at    The pt will need a referral to see the MD     The pt saw the MD 1/2021 as a follow up from ER     Will ask the MD to advise     MD stated that the pt will need to speak to her PCP     Pt made aware

## 2021-11-08 NOTE — TELEPHONE ENCOUNTER
Pt calling and states that her and her physical therapist think that shes had several mini strokes    # [phone]

## 2021-11-08 NOTE — TELEPHONE ENCOUNTER
Called patient at 5 10 in the evening-left a message for her to call me back in the morning or go to the [facility]-can you call the patient in the morning and get some more information.

## 2021-11-08 NOTE — TELEPHONE ENCOUNTER
Patient called in wanting a call back from Dr. Zelalem Walton regarding when she had a stroke last year. Patient has been having some weird symptoms that she would like to talk to Dr. Zelalem Walton about. Pls call and advise.

## 2021-11-09 NOTE — TELEPHONE ENCOUNTER
Every now and then she has a spell. She couldn't talk straight. A few weeks back, she couldn't move her arm. Also would like physical therapy referral to [facility]. She states that she does not know what is going on with her.

## 2021-11-10 RX ORDER — CLOPIDOGREL BISULFATE 75 MG/1
75 TABLET ORAL DAILY
Qty: 30 TABLET | Refills: 2 | Status: SHIPPED | OUTPATIENT
Start: 2021-11-10 | End: 2022-01-27

## 2021-11-22 ENCOUNTER — HOSPITAL ENCOUNTER (OUTPATIENT)
Dept: PHYSICAL THERAPY | Age: 71
Setting detail: THERAPIES SERIES
Discharge: HOME | End: 2021-11-22
Payer: MEDICARE

## 2021-11-22 PROCEDURE — 97161 PT EVAL LOW COMPLEX 20 MIN: CPT

## 2021-11-22 PROCEDURE — 97110 THERAPEUTIC EXERCISES: CPT

## 2021-11-22 NOTE — FLOWSHEET NOTE
[facility] - Outpatient Physical Therapy  Phone: [phone]   Fax: [phone]    Physical Therapy Daily Treatment Note  Date:  2021    Patient Name:  Radha Valles    :  1950  MRN: [id number]  Medical/Treatment Diagnosis Information:  Diagnosis: R29.898 - Left arm weakness  Treatment Diagnosis: Decreased AROM and strength of L UE, decreased functional mobility due to R knee contracture and R LE weakness, decreased functional use of L UE causing difficulty with ADLs  Insurance/Certification information:  PT Insurance Information: Medicare  Physician Information:  Referring Practitioner: Genesis Hickey MD  Plan of care signed (Y/N): []  Yes [x]  No     Date of Patient follow up with Physician:      Progress Report: []  Yes  [x]  No     Date Range for reporting period:  Beginnin/22  Ending:     Progress report due (10 Rx/or 30 days whichever is less): visit #65 or /    Recertification due (POC duration/ or 90 days whichever is less): visit #15 or 22     Visit # Insurance Allowable Auth required?  Date Range   1/15 mn []  Yes  [x]  No n/a         Latex Allergy:  [x]NO      []YES  Preferred Language for Healthcare:   [x]English       []other:    Functional Scale:        Date assessed:  QuickDASH: raw score = 40; dysfunction = 66%  21    Pain level:  0/10     SUBJECTIVE:  See eval    OBJECTIVE: See eval      RESTRICTIONS/PRECAUTIONS: Uses electric scooter for primary mode of transportation, R knee contracture (about 90% flexion), able to transport     Exercises/Interventions:     Therapeutic Exercises (34005) Resistance / level Sets/sec Reps Notes   Scifit StepOne 1 X 5 min                                                              Therapeutic Activities (48771)                                          Neuromuscular Re-ed (67682)                                                 Manual Intervention ([phone]) Modalities:     Pt. Education:  -patient educated on diagnosis, prognosis and expectations for rehab  -all patient questions were answered    Home Exercise Program:      Therapeutic Exercise and NMR EXR  [x] (59864) Provided verbal/tactile cueing for activities related to strengthening, flexibility, endurance, ROM for improvements in  [] LE / Lumbar: LE, proximal hip, and core control with self care, mobility, lifting, ambulation. [x] UE / Cervical: cervical, postural, scapular, scapulothoracic and UE control with self care, reaching, carrying, lifting, house/yardwork, driving, computer work.  [] (37562) Provided verbal/tactile cueing for activities related to improving balance, coordination, kinesthetic sense, posture, motor skill, proprioception to assist with   [] LE / lumbar: LE, proximal hip, and core control in self care, mobility, lifting, ambulation and eccentric single leg control. [] UE / cervical: cervical, scapular, scapulothoracic and UE control with self care, reaching, carrying, lifting, house/yardwork, driving, computer work.   [] (63859) Therapist is in constant attendance of 2 or more patients providing skilled therapy interventions, but not providing any significant amount of measurable one-on-one time to either patient, for improvements in  [] LE / lumbar: LE, proximal hip, and core control in self care, mobility, lifting, ambulation and eccentric single leg control. [] UE / cervical: cervical, scapular, scapulothoracic and UE control with self care, reaching, carrying, lifting, house/yardwork, driving, computer work.      NMR and Therapeutic Activities:    [] (02388 or 48333) Provided verbal/tactile cueing for activities related to improving balance, coordination, kinesthetic sense, posture, motor skill, proprioception and motor activation to allow for proper function of   [] LE: / Lumbar core, proximal hip and LE with self care and ADLs  [] UE / Cervical: cervical, postural, scapular, scapulothoracic and UE control with self care, carrying, lifting, driving, computer work.   [] (40259) Gait Re-education- Provided training and instruction to the patient for proper LE, core and proximal hip recruitment and positioning and eccentric body weight control with ambulation re-education including up and down stairs     Home Management Training / Self Care:  [] (25747) Provided self-care/home management training related to activities of daily living and compensatory training, and/or use of adaptive equipment for improvement with: ADLs and compensatory training, meal preparation, safety procedures and instruction in use of adaptive equipment, including bathing, grooming, dressing, personal hygiene, basic household cleaning and chores.      Home Exercise Program:    [x] (68474) Reviewed/Progressed HEP activities related to strengthening, flexibility, endurance, ROM of   [] LE / Lumbar: core, proximal hip and LE for functional self-care, mobility, lifting and ambulation/stair navigation   [] UE / Cervical: cervical, postural, scapular, scapulothoracic and UE control with self care, reaching, carrying, lifting, house/yardwork, driving, computer work  [] (76420)Reviewed/Progressed HEP activities related to improving balance, coordination, kinesthetic sense, posture, motor skill, proprioception of   [] LE: core, proximal hip and LE for self care, mobility, lifting, and ambulation/stair navigation    [] UE / Cervical: cervical, postural,  scapular, scapulothoracic and UE control with self care, reaching, carrying, lifting, house/yardwork, driving, computer work    Manual Treatments:  PROM / STM / Oscillations-Mobs:  G-I, II, III, IV (PA's, Inf., Post.)  [] (29596) Provided manual therapy to mobilize LE, proximal hip and/or LS spine soft tissue/joints for the purpose of modulating pain, promoting relaxation,  increasing ROM, reducing/eliminating soft tissue swelling/inflammation/restriction, improving soft tissue proper joint functioning as indicated by patients Functional Deficits. []? Progressing: []? Met: []? Not Met: []? Adjusted  3. Patient will demonstrate an increase in Strength to at least 4-/5 for gross L shoulder strength to allow for proper functional mobility as indicated by patients Functional Deficits. []? Progressing: []? Met: []? Not Met: []? Adjusted  4. Patient will return to functional activities including ability to  light weight items and move them with L UE while completing bathing and other ADLs without increased symptoms or restriction. []? Progressing: []? Met: []? Not Met: []? Adjusted  5. Patient will demonstrate ability to use L UE during transfers, while being able to pull and tolerate weight bearing through the L UE.   []? Progressing: []? Met: []? Not Met: []? Adjusted       Overall Progression Towards Functional goals/ Treatment Progress Update:  [] Patient is progressing as expected towards functional goals listed. [] Progression is slowed due to complexities/Impairments listed. [] Progression has been slowed due to co-morbidities.   [x] Plan just implemented, too soon to assess goals progression <30days   [] Goals require adjustment due to lack of progress  [] Patient is not progressing as expected and requires additional follow up with physician  [] Other    Persisting Functional Limitations/Impairments:  []Sleeping []Sitting               []Standing []Transfers        [x]Walking []Kneeling               [x]Stairs [x]Squatting / bending   [x]ADLs [x]Reaching  []Lifting  []Housework  []Driving []Job related tasks  []Sports/Recreation []Other:        ASSESSMENT:  See eval  Treatment/Activity Tolerance:  [] Patient able to complete tx [] Patient limited by fatigue  [] Patient limited by pain  [x] Patient limited by other medical complications  [] Other:     Prognosis: [] Good [x] Fair  [] Poor    Patient Requires Follow-up: [x] Yes  [] No    Plan for next treatment session:    PLAN: See eval. PT 3x / week for 5 weeks. [] Continue per plan of care [] Alter current plan (see comments)  [x] Plan of care initiated [] Hold pending MD visit [] Discharge    Electronically signed by: Beverly Ahumada, PT  DPT    Note: If patient does not return for scheduled/ recommended follow up visits, this note will serve as a discharge from care along with most recent update on progress.

## 2021-11-22 NOTE — PLAN OF CARE
[address] [facility], [address]  Phone: [phone]   Fax: [phone]                                                     Physical Therapy Certification    Dear Referring Practitioner: Funmilayo Sanders MD,    We had the pleasure of evaluating the following patient for physical therapy services at [address] [place] [address]. A summary of our findings can be found in the initial assessment below. This includes our plan of care. If you have any questions or concerns regarding these findings, please do not hesitate to contact me at the office phone number checked above. Thank you for the referral.       Physician Signature:_______________________________Date:__________________  By signing above (or electronic signature), therapists plan is approved by physician      Patient: Inderjit Everett   : 1950   MRN: [id number]  Referring Physician: Referring Practitioner: Funmilayo Sanders MD      Evaluation Date: 2021      Medical Diagnosis Information:  Diagnosis: R29.898 - Left arm weakness   Treatment Diagnosis: Decreased AROM and strength of L UE, decreased functional mobility due to R knee contracture and R LE weakness, decreased functional use of L UE causing difficulty with ADLs                                         Insurance information: PT Insurance Information: Medicare    Precautions/ Contra-indications: Uses electric scooter for primary mode of transportation   Latex Allergy:  [x]NO      []YES  Preferred Language for Healthcare:   [x]English       []Other:    C-SSRS Triggered by Intake questionnaire (Past 2 wk assessment ):   [x] No, Questionnaire did not trigger screening.   [] Yes, Patient intake triggered C-SSRS Screening     [] Completed, no further action required. [] Completed, PCP notified via Epic    SUBJECTIVE: Patient stated complaint: 2020 pt suffered a stroke affecting L side.  Pt had been doing chair yoga exercises at home and couldn't lift her L arm. When she woke up the next day, her face was drooping and her speech was bad. She called her MD and he sent her to the emergency room. She went to [facility] and then to [facility] for the stroke unit. Pt reports she had therapy while in the stroke center at [facility]. After [facility] Henry, pt went to a rehab center and was there ~4-5 weeks. Pt then went home and had in-home therapy. Pt reports she also had another TIA. She does feel like her L LE is doing pretty good. Unable to stand for 3 minutes. Relevant Medical History: See below    Functional Outcome: Quick DASH: raw score = 40; dysfunction = 66%    Pain Scale: 0/10  Easing factors: n/a  Provocative factors: n/a    Type: [x]Constant  - heaviness []Intermittent  []Radiating []Localized []other:     Numbness/Tingling: reports she does have feeling in the L arm, it just feels really heavy    Occupation/School: Retired     Living Status/Prior Level of Function: Prior to this injury / incident, pt was independent with ADLs and IADLs. Pt primary mode of transportation is electric scooter outside of the house and a manual wheelchair inside the home due to long history of post-surgical R knee issues.  helps with getting shower chair into shower, pt can transfer from chair to shower chair.  primarily there for safety. Pt is dressing and bathing mod I but requires increased time and occasional assist to complete. Ramped entrance to home. Cleaning person 1x/month for heavy cleaning. OBJECTIVE:     Hand dominance: R    Functional Mobility/Transfers: Able to complete transfers to/from electric scooter and mat table or nustep with CGA    Posture:  WNL    Dermatomes Normal Abnormal Comments   Top of head (C1)      Posterior occipital region (C2)      Side of neck (C3)      Top of shoulder (C4)      Lateral deltoid (C5) x     Tip of thumb (C6) x     Distal middle finger (C7) x     Distal fifth finger (C8) x Medial forearm (T1) x          PROM AROM    L R L R   Cervical Flexion        Cervical Extension        Cervical Rotation       Cervical Side-bend       Shoulder Flexion  ~90   80 140   Shoulder Abduction  ~ 90   80 160   Shoulder External Rotation    Side of head Top of R shoulder    Shoulder Internal Rotation    L glute Upper lumbar spine   Elbow Flexion    wnl    Elbow Extension    wnl    Pronation    wnl    Supination    wnl    Wrist Flexion    wnl    Wrist Extension    wnl    Radial Deviation    wnl    Ulnar Deviation    wnl         Strength (0-5) Left Right    Shoulder Shrug (C4) 4- 5   Shoulder Flex 2 5   Shoulder Abd (C5) 2 4   Shoulder ER 2 4-   Shoulder IR 2 4+   Biceps (C6) 4- 5   Triceps (C7) 4- 5   Lats     Middle Trap                                 [x] Patient history, allergies, meds reviewed. Medical chart reviewed. See intake form. Review Of Systems (ROS):  [x]Performed Review of systems (Integumentary, CardioPulmonary, Neurological) by intake and observation. Intake form has been scanned into medical record. Patient has been instructed to contact their primary care physician regarding ROS issues if not already being addressed at this time.       Co-morbidities/Complexities (which will affect course of rehabilitation):   []None        []Hx of COVID   Arthritic conditions   []Rheumatoid arthritis (M05.9)  [x]Osteoarthritis (M19.91)  []Gout   Cardiovascular conditions   [x]Hypertension (I10)  [x]Hyperlipidemia (E78.5)  []Angina pectoris (I20)  []Atherosclerosis (I70)  []Pacemaker  []Hx of CABG/stent/  cardiac surgeries   Musculoskeletal conditions   []Disc pathology   []Congenital spine pathologies   []Osteoporosis (M81.8)  []Osteopenia (M85.8)  []Scoliosis       Endocrine conditions   []Hypothyroid (E03.9)  []Hyperthyroid Gastrointestinal conditions   []Constipation (B18.08)   Metabolic conditions   []Morbid obesity (E66.01)  []Diabetes type 1(E10.65) or 2 (E11.65)   []Neuropathy (G60.9) Cardio/Pulmonary conditions   []Asthma (J45)  []Coughing   []COPD (J44.9)  []CHF  []A-fib   Psychological Disorders  []Anxiety (F41.9)  []Depression (F32.9)   []Other:   Developmental Disorders  []Autism (F84.0)  []CP (G80)  []Down Syndrome (Q90.9)  []Developmental delay     Neurological conditions  []Prior Stroke (I69.30)  []Parkinson's (G20)  []Encephalopathy (G93.40)  []MS (G35)  []Post-polio (G14)  []SCI  []TBI  []ALS Other conditions  []Fibromyalgia (M79.7)  []Vertigo  []Syncope  []Kidney Failure  []Cancer      []currently undergoing                treatment  []Pregnancy  []Incontinence   Prior surgeries  []involved limb  []previous spinal surgery  [] section birth  []hysterectomy  []bowel / bladder surgery  []other relevant surgeries   [x]Other: Hx of blood clots, compression fx, R shoulder scope, R TKA 2017 with revision 2018, R knee contracture              Barriers to/and or personal factors that will affect rehab potential:              []Age  []Sex    []Smoker              []Motivation/Lack of Motivation                        [x]Co-Morbidities              []Cognitive Function, education/learning barriers              []Environmental, home barriers              []profession/work barriers  [x]past PT/medical experience  []other:  Justification: pt with extensive medical hx      Falls Risk Assessment (30 days):   [x] Falls Risk assessed and no intervention required. [] Falls Risk assessed and Patient requires intervention due to being higher risk   TUG score (>12s at risk):     [] Falls education provided, including       ASSESSMENT: Raisa Boyd is a 70 y.o. female presenting to physical therapy with decreased use of L UE s/p CVA. Pt demonstrates decreased AROM and strength of L UE, decreased functional mobility due to R knee contracture and R LE weakness, and decreased functional use of L UE causing difficulty with ADLs.  Pt also uses either a w/c or electric scooter as her primary means of transportation due to R LE weakness and R knee contractures. Pt would benefit from skilled PT to improve functional use of L UE. Functional Impairments   [x]Noted spinal or UE joint hypomobility   []Noted spinal or UE joint hypermobility   [x]Decreased UE functional ROM   [x]Decreased UE functional strength   []Abnormal reflexes/sensation/myotomal/dermatomal deficits   [x]Decreased RC/scapular/core strength and neuromuscular control   []other:      Functional Activity Limitations (from functional questionnaire and intake)   []Reduced ability to tolerate prolonged functional positions   [x]Reduced ability or difficulty with changes of positions or transfers between positions   []Reduced ability to maintain good posture and demonstrate good body mechanics with sitting, bending, and lifting   [x] Reduced ability or tolerance with driving and/or computer work   []Reduced ability to sleep   [x]Reduced ability to perform lifting, reaching, carrying tasks   []Reduced ability to tolerate impact through UE   [x]Reduced ability to reach behind back   []Reduced ability to  or hold objects   []Reduced ability to throw or toss an object   [x]other: reduced ability for ambulation     Participation Restrictions   []Reduced participation in self care activities   [x]Reduced participation in home management activities   []Reduced participation in work activities   [x]Reduced participation in social activities. []Reduced participation in sport/recreation activities. Classification:   [x]Signs/symptoms consistent with post-CVA status including decreased ROM, strength and function.   []Signs/symptoms consistent with joint sprain/strain   []Signs/symptoms consistent with shoulder impingement   []Signs/symptoms consistent with shoulder/elbow/wrist tendinopathy   []Signs/symptoms consistent with Rotator cuff tear   []Signs/symptoms consistent with labral tear   []Signs/symptoms consistent with postural dysfunction    []Signs/symptoms consistent with Glenohumeral IR Deficit - <45 degrees   []Signs/symptoms consistent with facet dysfunction of cervical/thoracic spine    []Signs/symptoms consistent with pathology which may benefit from Dry needling     []other:     Prognosis/Rehab Potential:      []Excellent   []Good    [x]Fair   []Poor    Tolerance of evaluation/treatment:    []Excellent   [x]Good    []Fair   []Poor    Physical Therapy Evaluation Complexity Justification  [x] A history of present problem with:  [] no personal factors and/or comorbidities that impact the plan of care;  []1-2 personal factors and/or comorbidities that impact the plan of care  [x]3 personal factors and/or comorbidities that impact the plan of care  [x] An examination of body systems using standardized tests and measures addressing any of the following: body structures and functions (impairments), activity limitations, and/or participation restrictions;:  [] a total of 1-2 or more elements   [] a total of 3 or more elements   [x] a total of 4 or more elements   [x] A clinical presentation with:  [x] stable and/or uncomplicated characteristics   [] evolving clinical presentation with changing characteristics  [] unstable and unpredictable characteristics;   [x] Clinical decision making of [x] low, [] moderate, [] high complexity using standardized patient assessment instrument and/or measurable assessment of functional outcome.     [] EVAL (LOW) 37526 (typically 15 minutes face-to-face)  [] EVAL (MOD) 62938 (typically 30 minutes face-to-face)  [] EVAL (HIGH) 08571 (typically 45 minutes face-to-face)  [] RE-EVAL     PLAN:  Frequency/Duration:  3 days per week for 5 Weeks:  INTERVENTIONS:  [x] Therapeutic exercise including: strength training, ROM, for Upper extremity and core   [x]  NMR activation and proprioception for UE, scap and Core   [x] Manual therapy as indicated for shoulder, scapula and spine to include: Dry Needling/IASTM, STM, PROM, Gr I-IV mobilizations, manipulation. [x] Modalities as needed that may include: thermal agents, E-stim, Biofeedback, US, iontophoresis as indicated  [x] Patient education on joint protection, postural re-education, activity modification, progression of HEP. HEP instruction: Written HEP instructions provided and reviewed    GOALS:  Patient stated goal: to get better use of left arm and leg   [] Progressing: [] Met: [] Not Met: [] Adjusted    Therapist goals for Patient:   Short Term Goals: To be achieved in: 2 weeks  1. Independent in HEP and progression per patient tolerance, in order to prevent re-injury. [] Progressing: [] Met: [] Not Met: [] Adjusted  2. Patient will have a decrease in pain to facilitate improvement in movement, function, and ADLs as indicated by Functional Deficits. [] Progressing: [] Met: [] Not Met: [] Adjusted    Long Term Goals: To be achieved in: 5 weeks  1. Disability index score of 45% or less for the Quick DASH to assist with reaching prior level of function. [] Progressing: [] Met: [] Not Met: [] Adjusted  2. Patient will demonstrate increased L shoulder abduction and flexion AROM to at least 100 deg to allow for proper joint functioning as indicated by patients Functional Deficits. [] Progressing: [] Met: [] Not Met: [] Adjusted  3. Patient will demonstrate an increase in Strength to at least 4-/5 for gross L shoulder strength to allow for proper functional mobility as indicated by patients Functional Deficits. [] Progressing: [] Met: [] Not Met: [] Adjusted  4. Patient will return to functional activities including ability to  light weight items and move them with L UE while completing bathing and other ADLs without increased symptoms or restriction. [] Progressing: [] Met: [] Not Met: [] Adjusted  5.  Patient will demonstrate ability to use L UE during transfers, while being able to pull and tolerate weight bearing through the L UE.   [] Progressing: [] Met: [] Not Met: [] Adjusted     Electronically signed by:  Penelope Park, PT DPT

## 2021-11-29 ENCOUNTER — HOSPITAL ENCOUNTER (OUTPATIENT)
Dept: PHYSICAL THERAPY | Age: 71
Setting detail: THERAPIES SERIES
Discharge: HOME | End: 2021-11-29
Payer: MEDICARE

## 2021-11-29 ENCOUNTER — TELEPHONE (OUTPATIENT)
Dept: INTERNAL MEDICINE CLINIC | Age: 71
End: 2021-11-29

## 2021-11-29 NOTE — FLOWSHEET NOTE
[address] [facility]    Physical Therapy  Cancellation/No-show Note  Patient Name:  Lamont Mendez  :  1950   Date:  2021    Cancelled visits to date: 1  No-shows to date: 0    For today's appointment patient:  [x]  Cancelled   []  Rescheduled appointment  []  No-show     Reason given by patient:  []  Patient ill  []  Conflicting appointment  []  No transportation    []  Conflict with work  []  No reason given  [x]  Other:     Comments:  Pt exposed to QuVIS    Phone call information:   []  Phone call made today to patient at _ time at number provided:      []  Patient answered, conversation as follows:    []  Patient did not answer, message left as follows:  []  Phone call not made today  [x]  Phone call not needed - pt contacted us to cancel and provided reason for cancellation.      Electronically signed by:  Miya Gallegos, PT, DPT

## 2021-11-29 NOTE — TELEPHONE ENCOUNTER
Patient said everyone in family has had their shots, yet her daughter just tested positive for covid. They had been together for three straight days. Since she had a stroke this year, she is wondering if she needs to quarantine or do anything differently. Please call and advise.

## 2021-12-01 ENCOUNTER — HOSPITAL ENCOUNTER (OUTPATIENT)
Dept: PHYSICAL THERAPY | Age: 71
Setting detail: THERAPIES SERIES
Discharge: HOME | End: 2021-12-01
Payer: MEDICARE

## 2021-12-01 PROCEDURE — 97110 THERAPEUTIC EXERCISES: CPT

## 2021-12-01 PROCEDURE — 97140 MANUAL THERAPY 1/> REGIONS: CPT

## 2021-12-01 NOTE — FLOWSHEET NOTE
[address] [facility] Physical Therapy  Phone: [phone]   Fax: [phone]    Physical Therapy Daily Treatment Note  Date:  2021    Patient Name:  Saul Herr    :  1950  MRN: [id number]  Medical/Treatment Diagnosis Information:  Diagnosis: R29.898 - Left arm weakness  Treatment Diagnosis: Decreased AROM and strength of L UE, decreased functional mobility due to R knee contracture and R LE weakness, decreased functional use of L UE causing difficulty with ADLs  Insurance/Certification information:  PT Insurance Information: Medicare  Physician Information:  Referring Practitioner: Burton Izaguirre MD  Plan of care signed (Y/N): []  Yes [x]  No     Date of Patient follow up with Physician:      Progress Report: []  Yes  [x]  No     Date Range for reporting period:  Beginnin/22  Ending:     Progress report due (10 Rx/or 30 days whichever is less): visit #47 or     Recertification due (POC duration/ or 90 days whichever is less): visit #15 or 22     Visit # Insurance Allowable Auth required? Date Range   2/15 mn []  Yes  [x]  No n/a         Latex Allergy:  [x]NO      []YES  Preferred Language for Healthcare:   [x]English       []other:    Functional Scale:        Date assessed:  QuickDASH: raw score = 40; dysfunction = 66%  21    Pain level:  0/10     SUBJECTIVE:  Pt reports her COVID test was negative. Has no pain today. Has been able to increase her reps on HEP from home health to 20x.  She does as much as she can with the R LE.     OBJECTIVE:       RESTRICTIONS/PRECAUTIONS: Uses electric scooter for primary mode of transportation, R knee contracture (about 90% flexion), able to transfer with SBA    Exercises/Interventions:     Therapeutic Exercises (59700) Resistance / level Sets/sec Reps Notes   Scifit StepOne 1 X 7 min   R LE needs yellow strap   Pulleys  3 min  Done in electric scooter          UE [place] on L  - Flexion  - Abduction   - ER 20  20  20 Supine elevated head of table, cued to push to end range of motion and then pause                                       Therapeutic Activities (26651)                                          Neuromuscular Re-ed (93679)                                                 Manual Intervention (67778)       PROM L shoulder - all directions  8 min                                             Modalities:     Pt. Education:  -patient educated on diagnosis, prognosis and expectations for rehab  -all patient questions were answered    Home Exercise Program:      Therapeutic Exercise and NMR EXR  [x] (73435) Provided verbal/tactile cueing for activities related to strengthening, flexibility, endurance, ROM for improvements in  [] LE / Lumbar: LE, proximal hip, and core control with self care, mobility, lifting, ambulation. [x] UE / Cervical: cervical, postural, scapular, scapulothoracic and UE control with self care, reaching, carrying, lifting, house/yardwork, driving, computer work.  [] (82406) Provided verbal/tactile cueing for activities related to improving balance, coordination, kinesthetic sense, posture, motor skill, proprioception to assist with   [] LE / lumbar: LE, proximal hip, and core control in self care, mobility, lifting, ambulation and eccentric single leg control. [] UE / cervical: cervical, scapular, scapulothoracic and UE control with self care, reaching, carrying, lifting, house/yardwork, driving, computer work.   [] (14185) Therapist is in constant attendance of 2 or more patients providing skilled therapy interventions, but not providing any significant amount of measurable one-on-one time to either patient, for improvements in  [] LE / lumbar: LE, proximal hip, and core control in self care, mobility, lifting, ambulation and eccentric single leg control.    [] UE / cervical: cervical, scapular, scapulothoracic and UE control with self care, reaching, carrying, lifting, house/yardwork, driving, computer work. NMR and Therapeutic Activities:    [] (85052 or 15343) Provided verbal/tactile cueing for activities related to improving balance, coordination, kinesthetic sense, posture, motor skill, proprioception and motor activation to allow for proper function of   [] LE: / Lumbar core, proximal hip and LE with self care and ADLs  [] UE / Cervical: cervical, postural, scapular, scapulothoracic and UE control with self care, carrying, lifting, driving, computer work.   [] (94151) Gait Re-education- Provided training and instruction to the patient for proper LE, core and proximal hip recruitment and positioning and eccentric body weight control with ambulation re-education including up and down stairs     Home Management Training / Self Care:  [] (74026) Provided self-care/home management training related to activities of daily living and compensatory training, and/or use of adaptive equipment for improvement with: ADLs and compensatory training, meal preparation, safety procedures and instruction in use of adaptive equipment, including bathing, grooming, dressing, personal hygiene, basic household cleaning and chores.      Home Exercise Program:    [x] (07479) Reviewed/Progressed HEP activities related to strengthening, flexibility, endurance, ROM of   [] LE / Lumbar: core, proximal hip and LE for functional self-care, mobility, lifting and ambulation/stair navigation   [] UE / Cervical: cervical, postural, scapular, scapulothoracic and UE control with self care, reaching, carrying, lifting, house/yardwork, driving, computer work  [] (32721)Reviewed/Progressed HEP activities related to improving balance, coordination, kinesthetic sense, posture, motor skill, proprioception of   [] LE: core, proximal hip and LE for self care, mobility, lifting, and ambulation/stair navigation    [] UE / Cervical: cervical, postural,  scapular, scapulothoracic and UE control with self care, reaching, carrying, lifting, house/yardwork, driving, computer work    Manual Treatments:  PROM / STM / Oscillations-Mobs:  G-I, II, III, IV (PA's, Inf., Post.)  [] (56373) Provided manual therapy to mobilize LE, proximal hip and/or LS spine soft tissue/joints for the purpose of modulating pain, promoting relaxation,  increasing ROM, reducing/eliminating soft tissue swelling/inflammation/restriction, improving soft tissue extensibility and allowing for proper ROM for normal function with   [] LE / lumbar: self care, mobility, lifting and ambulation. [] UE / Cervical: self care, reaching, carrying, lifting, house/yardwork, driving, computer work. Modalities:  [] (20085) Vasopneumatic compression: Utilized vasopneumatic compression to decrease edema / swelling for the purpose of improving mobility and quad tone / recruitment which will allow for increased overall function including but not limited to self-care, transfers, ambulation, and ascending / descending stairs. Charges:  Timed Code Treatment Minutes: 48   Total Treatment Minutes: 48     [] EVAL - LOW (61548)   [] EVAL - MOD (09969)  [] EVAL - HIGH (73877)  [] RE-EVAL (87551)  [x] KS(92482) x 2      [] Ionto  [] NMR (95632) x       [] Vaso  [x] Manual (97104) x 1      [] Ultrasound  [] TA x        [] Mech Traction (79121)  [] Aquatic Therapy x     [] ES (un) (03302):   [] Home Management Training x  [] ES(attended) (65058)   [] Dry Needling 1-2 muscles (15707):  [] Dry Needling 3+ muscles (651211)  [] Group:      [] Other:     GOALS:   Patient stated goal: to get better use of left arm and leg   []? Progressing: []? Met: []? Not Met: []? Adjusted     Therapist goals for Patient:   Short Term Goals: To be achieved in: 2 weeks  1. Independent in HEP and progression per patient tolerance, in order to prevent re-injury. []? Progressing: []? Met: []? Not Met: []? Adjusted  2.  Patient will have a decrease in pain to facilitate improvement in movement, function, and ADLs as indicated by Functional Deficits. []? Progressing: []? Met: []? Not Met: []? Adjusted     Long Term Goals: To be achieved in: 5 weeks  1. Disability index score of 45% or less for the Quick DASH to assist with reaching prior level of function. []? Progressing: []? Met: []? Not Met: []? Adjusted  2. Patient will demonstrate increased L shoulder abduction and flexion AROM to at least 100 deg to allow for proper joint functioning as indicated by patients Functional Deficits. []? Progressing: []? Met: []? Not Met: []? Adjusted  3. Patient will demonstrate an increase in Strength to at least 4-/5 for gross L shoulder strength to allow for proper functional mobility as indicated by patients Functional Deficits. []? Progressing: []? Met: []? Not Met: []? Adjusted  4. Patient will return to functional activities including ability to  light weight items and move them with L UE while completing bathing and other ADLs without increased symptoms or restriction. []? Progressing: []? Met: []? Not Met: []? Adjusted  5. Patient will demonstrate ability to use L UE during transfers, while being able to pull and tolerate weight bearing through the L UE.   []? Progressing: []? Met: []? Not Met: []? Adjusted       Overall Progression Towards Functional goals/ Treatment Progress Update:  [] Patient is progressing as expected towards functional goals listed. [] Progression is slowed due to complexities/Impairments listed. [] Progression has been slowed due to co-morbidities.   [x] Plan just implemented, too soon to assess goals progression <30days   [] Goals require adjustment due to lack of progress  [] Patient is not progressing as expected and requires additional follow up with physician  [] Other    Persisting Functional Limitations/Impairments:  []Sleeping []Sitting               []Standing []Transfers        [x]Walking []Kneeling               [x]Stairs [x]Squatting / bending   [x]ADLs [x]Reaching  []Lifting []Housework  []Driving []Job related tasks  []Sports/Recreation []Other:        ASSESSMENT:  Able to get L shoulder overhead passively and with AAROM with UE ranger. Pt did well with UE ranger because we were able to secure her hand to it. Will continue with ROM and strength as able. Treatment/Activity Tolerance:  [] Patient able to complete tx [] Patient limited by fatigue  [] Patient limited by pain  [x] Patient limited by other medical complications  [] Other:     Prognosis: [] Good [x] Fair  [] Poor    Patient Requires Follow-up: [x] Yes  [] No    Plan for next treatment session:    PLAN: See eval. PT 3x / week for 5 weeks. [x] Continue per plan of care [] Alter current plan (see comments)  [] Plan of care initiated [] Hold pending MD visit [] Discharge    Electronically signed by: Codi Herr PT  DPT    Note: If patient does not return for scheduled/ recommended follow up visits, this note will serve as a discharge from care along with most recent update on progress.

## 2021-12-03 ENCOUNTER — HOSPITAL ENCOUNTER (OUTPATIENT)
Dept: PHYSICAL THERAPY | Age: 71
Setting detail: THERAPIES SERIES
Discharge: HOME | End: 2021-12-03
Payer: MEDICARE

## 2021-12-03 PROCEDURE — 97110 THERAPEUTIC EXERCISES: CPT

## 2021-12-03 NOTE — FLOWSHEET NOTE
[address] [facility] Physical Therapy  Phone: [phone]   Fax: [phone]    Physical Therapy Daily Treatment Note  Date:  12/3/2021    Patient Name:  Ryan Hancock    :  1950  MRN: [id number]  Medical/Treatment Diagnosis Information:  Diagnosis: R29.898 - Left arm weakness  Treatment Diagnosis: Decreased AROM and strength of L UE, decreased functional mobility due to R knee contracture and R LE weakness, decreased functional use of L UE causing difficulty with ADLs  Insurance/Certification information:  PT Insurance Information: Medicare  Physician Information:  Referring Practitioner: Kiera Jones MD  Plan of care signed (Y/N): []  Yes [x]  No     Date of Patient follow up with Physician:      Progress Report: []  Yes  [x]  No     Date Range for reporting period:  Beginnin/22  Ending:     Progress report due (10 Rx/or 30 days whichever is less): visit #13 or     Recertification due (POC duration/ or 90 days whichever is less): visit #15 or 22     Visit # Insurance Allowable Auth required? Date Range   3/15 mn []  Yes  [x]  No n/a         Latex Allergy:  [x]NO      []YES  Preferred Language for Healthcare:   [x]English       []other:    Functional Scale:        Date assessed:  QuickDASH: raw score = 40; dysfunction = 66%  21    Pain level:  0/10     SUBJECTIVE:  Pt got her COVID booster yesterday, reports she is feeling okay. She continues do her HEP at home.      OBJECTIVE:       RESTRICTIONS/PRECAUTIONS: Uses electric scooter for primary mode of transportation, R knee contracture (about 90% flexion), able to transfer with SBA    Exercises/Interventions:     Therapeutic Exercises (66092) Resistance / level Sets/sec Reps Notes   Scifit StepOne 1 X 8 min   R LE needs yellow strap, done for endurance as well as stretching for L UE and R knee    Pulleys  4 min  Done in electric scooter          UE [place] on L  - Flexion  - Abduction   - ER 20  20  20 , cued to push to end range of motion and then pause           Reaching with L UE  - supine, ~60 deg flexion   10x Supine with 2 pillows, bolster + towel roll under R knee                        Therapeutic Activities (97117)                                          Neuromuscular Re-ed (00461)                                                 Manual Intervention (74386)       PROM L shoulder - all directions                                              Modalities:     Pt. Education:  -patient educated on diagnosis, prognosis and expectations for rehab  -all patient questions were answered    Home Exercise Program:      Therapeutic Exercise and NMR EXR  [x] (75142) Provided verbal/tactile cueing for activities related to strengthening, flexibility, endurance, ROM for improvements in  [] LE / Lumbar: LE, proximal hip, and core control with self care, mobility, lifting, ambulation. [x] UE / Cervical: cervical, postural, scapular, scapulothoracic and UE control with self care, reaching, carrying, lifting, house/yardwork, driving, computer work.  [] (36291) Provided verbal/tactile cueing for activities related to improving balance, coordination, kinesthetic sense, posture, motor skill, proprioception to assist with   [] LE / lumbar: LE, proximal hip, and core control in self care, mobility, lifting, ambulation and eccentric single leg control. [] UE / cervical: cervical, scapular, scapulothoracic and UE control with self care, reaching, carrying, lifting, house/yardwork, driving, computer work.   [] (18952) Therapist is in constant attendance of 2 or more patients providing skilled therapy interventions, but not providing any significant amount of measurable one-on-one time to either patient, for improvements in  [] LE / lumbar: LE, proximal hip, and core control in self care, mobility, lifting, ambulation and eccentric single leg control.    [] UE / cervical: cervical, scapular, scapulothoracic and UE control with self care, reaching, carrying, lifting, house/yardwork, driving, computer work. NMR and Therapeutic Activities:    [] (89550 or 48430) Provided verbal/tactile cueing for activities related to improving balance, coordination, kinesthetic sense, posture, motor skill, proprioception and motor activation to allow for proper function of   [] LE: / Lumbar core, proximal hip and LE with self care and ADLs  [] UE / Cervical: cervical, postural, scapular, scapulothoracic and UE control with self care, carrying, lifting, driving, computer work.   [] (22553) Gait Re-education- Provided training and instruction to the patient for proper LE, core and proximal hip recruitment and positioning and eccentric body weight control with ambulation re-education including up and down stairs     Home Management Training / Self Care:  [] (91245) Provided self-care/home management training related to activities of daily living and compensatory training, and/or use of adaptive equipment for improvement with: ADLs and compensatory training, meal preparation, safety procedures and instruction in use of adaptive equipment, including bathing, grooming, dressing, personal hygiene, basic household cleaning and chores.      Home Exercise Program:    [x] (17331) Reviewed/Progressed HEP activities related to strengthening, flexibility, endurance, ROM of   [] LE / Lumbar: core, proximal hip and LE for functional self-care, mobility, lifting and ambulation/stair navigation   [] UE / Cervical: cervical, postural, scapular, scapulothoracic and UE control with self care, reaching, carrying, lifting, house/yardwork, driving, computer work  [] (65023)Reviewed/Progressed HEP activities related to improving balance, coordination, kinesthetic sense, posture, motor skill, proprioception of   [] LE: core, proximal hip and LE for self care, mobility, lifting, and ambulation/stair navigation    [] UE / Cervical: cervical, postural,  scapular, scapulothoracic and UE control with self care, reaching, carrying, lifting, house/yardwork, driving, computer work    Manual Treatments:  PROM / STM / Oscillations-Mobs:  G-I, II, III, IV (PA's, Inf., Post.)  [] (00121) Provided manual therapy to mobilize LE, proximal hip and/or LS spine soft tissue/joints for the purpose of modulating pain, promoting relaxation,  increasing ROM, reducing/eliminating soft tissue swelling/inflammation/restriction, improving soft tissue extensibility and allowing for proper ROM for normal function with   [] LE / lumbar: self care, mobility, lifting and ambulation. [] UE / Cervical: self care, reaching, carrying, lifting, house/yardwork, driving, computer work. Modalities:  [] (13030) Vasopneumatic compression: Utilized vasopneumatic compression to decrease edema / swelling for the purpose of improving mobility and quad tone / recruitment which will allow for increased overall function including but not limited to self-care, transfers, ambulation, and ascending / descending stairs. Charges:  Timed Code Treatment Minutes: 40   Total Treatment Minutes: 40     [] EVAL - LOW (79472)   [] EVAL - MOD (14720)  [] EVAL - HIGH (83001)  [] RE-EVAL (29727)  [x] EO(16605) x 3      [] Ionto  [] NMR (64635) x       [] Vaso  [] Manual (19506) x       [] Ultrasound  [] TA x        [] Mech Traction (65929)  [] Aquatic Therapy x     [] ES (un) (28341):   [] Home Management Training x  [] ES(attended) (82901)   [] Dry Needling 1-2 muscles (58016):  [] Dry Needling 3+ muscles (013495)  [] Group:      [] Other:     GOALS:   Patient stated goal: to get better use of left arm and leg   []? Progressing: []? Met: []? Not Met: []? Adjusted     Therapist goals for Patient:   Short Term Goals: To be achieved in: 2 weeks  1. Independent in HEP and progression per patient tolerance, in order to prevent re-injury. []? Progressing: []? Met: []? Not Met: []? Adjusted  2.  Patient will have a decrease in pain to facilitate improvement in movement, function, and ADLs as indicated by Functional Deficits. []? Progressing: []? Met: []? Not Met: []? Adjusted     Long Term Goals: To be achieved in: 5 weeks  1. Disability index score of 45% or less for the Quick DASH to assist with reaching prior level of function. []? Progressing: []? Met: []? Not Met: []? Adjusted  2. Patient will demonstrate increased L shoulder abduction and flexion AROM to at least 100 deg to allow for proper joint functioning as indicated by patients Functional Deficits. []? Progressing: []? Met: []? Not Met: []? Adjusted  3. Patient will demonstrate an increase in Strength to at least 4-/5 for gross L shoulder strength to allow for proper functional mobility as indicated by patients Functional Deficits. []? Progressing: []? Met: []? Not Met: []? Adjusted  4. Patient will return to functional activities including ability to  light weight items and move them with L UE while completing bathing and other ADLs without increased symptoms or restriction. []? Progressing: []? Met: []? Not Met: []? Adjusted  5. Patient will demonstrate ability to use L UE during transfers, while being able to pull and tolerate weight bearing through the L UE.   []? Progressing: []? Met: []? Not Met: []? Adjusted       Overall Progression Towards Functional goals/ Treatment Progress Update:  [] Patient is progressing as expected towards functional goals listed. [] Progression is slowed due to complexities/Impairments listed. [] Progression has been slowed due to co-morbidities.   [x] Plan just implemented, too soon to assess goals progression <30days   [] Goals require adjustment due to lack of progress  [] Patient is not progressing as expected and requires additional follow up with physician  [] Other    Persisting Functional Limitations/Impairments:  []Sleeping []Sitting               []Standing []Transfers        [x]Walking []Kneeling               [x]Stairs [x]Squatting / bending   [x]ADLs [x]Reaching  []Lifting  []Housework  []Driving []Job related tasks  []Sports/Recreation []Other:        ASSESSMENT: Added supine reaches with L UE this date. Pt required various level of assistance from independent to Kiara. Also discussed with pt laying prone and letting legs hang off edge of bed for LLLD stretch. Will continue with ROM and strengthening as able. Treatment/Activity Tolerance:  [] Patient able to complete tx [] Patient limited by fatigue  [] Patient limited by pain  [x] Patient limited by other medical complications  [] Other:     Prognosis: [] Good [x] Fair  [] Poor    Patient Requires Follow-up: [x] Yes  [] No    Plan for next treatment session:    PLAN: See eval. PT 3x / week for 5 weeks. [x] Continue per plan of care [] Alter current plan (see comments)  [] Plan of care initiated [] Hold pending MD visit [] Discharge    Electronically signed by: Will Michael PT  DPT    Note: If patient does not return for scheduled/ recommended follow up visits, this note will serve as a discharge from care along with most recent update on progress.

## 2021-12-06 ENCOUNTER — HOSPITAL ENCOUNTER (OUTPATIENT)
Dept: PHYSICAL THERAPY | Age: 71
Setting detail: THERAPIES SERIES
Discharge: HOME | End: 2021-12-06
Payer: MEDICARE

## 2021-12-06 PROCEDURE — 97530 THERAPEUTIC ACTIVITIES: CPT

## 2021-12-06 PROCEDURE — 97110 THERAPEUTIC EXERCISES: CPT

## 2021-12-06 RX ORDER — HYDRALAZINE HYDROCHLORIDE 25 MG/1
25 TABLET, FILM COATED ORAL EVERY 8 HOURS SCHEDULED
Qty: 90 TABLET | Refills: 0 | Status: SHIPPED | OUTPATIENT
Start: 2021-12-06 | End: 2022-01-04 | Stop reason: SDUPTHER

## 2021-12-06 NOTE — TELEPHONE ENCOUNTER
Patient asked to have a 90 day prescription of    hydrALAZINE (APRESOLINE) 25 MG tablet       Please send to [address] [place] [address] [address], [address] [place] [phone] Allyson Posadas [phone]    Please call to advise

## 2021-12-06 NOTE — FLOWSHEET NOTE
[facility]  Phone: [phone]   Fax: [phone]    Physical Therapy Daily Treatment Note  Date:  2021    Patient Name:  Celena Stovall    :  1950  MRN: [id number]  Medical/Treatment Diagnosis Information:  Diagnosis: R29.898 - Left arm weakness  Treatment Diagnosis: Decreased AROM and strength of L UE, decreased functional mobility due to R knee contracture and R LE weakness, decreased functional use of L UE causing difficulty with ADLs  Insurance/Certification information:  PT Insurance Information: Medicare  Physician Information:  Referring Practitioner: Nj Arroyo MD  Plan of care signed (Y/N): []  Yes [x]  No     Date of Patient follow up with Physician:      Progress Report: []  Yes  [x]  No     Date Range for reporting period:  Beginnin/22  Ending:     Progress report due (10 Rx/or 30 days whichever is less): visit #47 or     Recertification due (POC duration/ or 90 days whichever is less): visit #15 or 22     Visit # Insurance Allowable Auth required? Date Range   4/15 mn []  Yes  [x]  No n/a         Latex Allergy:  [x]NO      []YES  Preferred Language for Healthcare:   [x]English       []other:    Functional Scale:        Date assessed:  QuickDASH: raw score = 40; dysfunction = 66%  21    Pain level:  0/10     SUBJECTIVE: Pt reports her only pain today is the R knee when she is trying to move it.      OBJECTIVE:       RESTRICTIONS/PRECAUTIONS: Uses electric scooter for primary mode of transportation, R knee contracture (about 90% flexion), able to transfer with SBA    Exercises/Interventions:     Therapeutic Exercises (60226) Resistance / level Sets/sec Reps Notes   Scifit StepOne 1 X 7 min   R LE needs yellow strap, done for endurance as well as stretching for L UE and R knee    Pulleys  4 min  Done in electric scooter          UE [place] on L  - Flexion  - Abduction   - ER      , cued to push to end range of motion and then pause           Reaching with L UE  - supine, ~60 deg flexion    Supine with 2 pillows, bolster + towel roll under R knee                        Therapeutic Activities (69240)              Ballet barre - partial stand from scooter  - requires use of forearms on bar to stand, then once in partial stand can get forearms off of bar. 5 trials Attempted to put 4 in step under R foot, but stair did not have any traction on linoleum floor          Sitting in scooter - using wash cloth along bar   -elbow ext and horiz abd   - flexion/ext     Facing bar  // to bar      10  10             Neuromuscular Re-ed (72685)                                                 Manual Intervention (50993)       PROM L shoulder - all directions                                              Modalities:     Pt. Education:  -patient educated on diagnosis, prognosis and expectations for rehab  -all patient questions were answered    Home Exercise Program:      Therapeutic Exercise and NMR EXR  [x] (92592) Provided verbal/tactile cueing for activities related to strengthening, flexibility, endurance, ROM for improvements in  [] LE / Lumbar: LE, proximal hip, and core control with self care, mobility, lifting, ambulation. [x] UE / Cervical: cervical, postural, scapular, scapulothoracic and UE control with self care, reaching, carrying, lifting, house/yardwork, driving, computer work.  [] (84187) Provided verbal/tactile cueing for activities related to improving balance, coordination, kinesthetic sense, posture, motor skill, proprioception to assist with   [] LE / lumbar: LE, proximal hip, and core control in self care, mobility, lifting, ambulation and eccentric single leg control.    [] UE / cervical: cervical, scapular, scapulothoracic and UE control with self care, reaching, carrying, lifting, house/yardwork, driving, computer work.   [] (68328) Therapist is in constant attendance of 2 or more patients providing skilled therapy interventions, but not providing any significant amount of measurable one-on-one time to either patient, for improvements in  [] LE / lumbar: LE, proximal hip, and core control in self care, mobility, lifting, ambulation and eccentric single leg control. [] UE / cervical: cervical, scapular, scapulothoracic and UE control with self care, reaching, carrying, lifting, house/yardwork, driving, computer work. NMR and Therapeutic Activities:    [] (40639 or 60094) Provided verbal/tactile cueing for activities related to improving balance, coordination, kinesthetic sense, posture, motor skill, proprioception and motor activation to allow for proper function of   [] LE: / Lumbar core, proximal hip and LE with self care and ADLs  [] UE / Cervical: cervical, postural, scapular, scapulothoracic and UE control with self care, carrying, lifting, driving, computer work.   [] (39339) Gait Re-education- Provided training and instruction to the patient for proper LE, core and proximal hip recruitment and positioning and eccentric body weight control with ambulation re-education including up and down stairs     Home Management Training / Self Care:  [] (79710) Provided self-care/home management training related to activities of daily living and compensatory training, and/or use of adaptive equipment for improvement with: ADLs and compensatory training, meal preparation, safety procedures and instruction in use of adaptive equipment, including bathing, grooming, dressing, personal hygiene, basic household cleaning and chores.      Home Exercise Program:    [x] (83591) Reviewed/Progressed HEP activities related to strengthening, flexibility, endurance, ROM of   [] LE / Lumbar: core, proximal hip and LE for functional self-care, mobility, lifting and ambulation/stair navigation   [] UE / Cervical: cervical, postural, scapular, scapulothoracic and UE control with self care, reaching, carrying, lifting, house/yardwork, driving, computer work  [] (72911)Reviewed/Progressed HEP activities related to improving balance, coordination, kinesthetic sense, posture, motor skill, proprioception of   [] LE: core, proximal hip and LE for self care, mobility, lifting, and ambulation/stair navigation    [] UE / Cervical: cervical, postural,  scapular, scapulothoracic and UE control with self care, reaching, carrying, lifting, house/yardwork, driving, computer work    Manual Treatments:  PROM / STM / Oscillations-Mobs:  G-I, II, III, IV (PA's, Inf., Post.)  [] ([id number]) Provided manual therapy to mobilize LE, proximal hip and/or LS spine soft tissue/joints for the purpose of modulating pain, promoting relaxation,  increasing ROM, reducing/eliminating soft tissue swelling/inflammation/restriction, improving soft tissue extensibility and allowing for proper ROM for normal function with   [] LE / lumbar: self care, mobility, lifting and ambulation. [] UE / Cervical: self care, reaching, carrying, lifting, house/yardwork, driving, computer work. Modalities:  [] (90017) Vasopneumatic compression: Utilized vasopneumatic compression to decrease edema / swelling for the purpose of improving mobility and quad tone / recruitment which will allow for increased overall function including but not limited to self-care, transfers, ambulation, and ascending / descending stairs.      Charges:  Timed Code Treatment Minutes: 42   Total Treatment Minutes: 42     [] EVAL - LOW (04813)   [] EVAL - MOD (65682)  [] EVAL - HIGH (22150)  [] RE-EVAL (15339)  [x] OR(20041) x 1      [] Ionto  [] NMR (62926) x       [] Vaso  [] Manual (93899) x       [] Ultrasound  [x] TA x 2       [] Mech Traction (48997)  [] Aquatic Therapy x     [] ES (un) (73638):   [] Home Management Training x  [] ES(attended) (06317)   [] Dry Needling 1-2 muscles (71229):  [] Dry Needling 3+ muscles (187173)  [] Group:      [] Other:     GOALS:   Patient stated goal: to get better use of left arm and leg   []? Progressing: []? Met: []? Not Met: []? Adjusted     Therapist goals for Patient:   Short Term Goals: To be achieved in: 2 weeks  1. Independent in HEP and progression per patient tolerance, in order to prevent re-injury. []? Progressing: []? Met: []? Not Met: []? Adjusted  2. Patient will have a decrease in pain to facilitate improvement in movement, function, and ADLs as indicated by Functional Deficits. []? Progressing: []? Met: []? Not Met: []? Adjusted     Long Term Goals: To be achieved in: 5 weeks  1. Disability index score of 45% or less for the Quick DASH to assist with reaching prior level of function. []? Progressing: []? Met: []? Not Met: []? Adjusted  2. Patient will demonstrate increased L shoulder abduction and flexion AROM to at least 100 deg to allow for proper joint functioning as indicated by patients Functional Deficits. []? Progressing: []? Met: []? Not Met: []? Adjusted  3. Patient will demonstrate an increase in Strength to at least 4-/5 for gross L shoulder strength to allow for proper functional mobility as indicated by patients Functional Deficits. []? Progressing: []? Met: []? Not Met: []? Adjusted  4. Patient will return to functional activities including ability to  light weight items and move them with L UE while completing bathing and other ADLs without increased symptoms or restriction. []? Progressing: []? Met: []? Not Met: []? Adjusted  5. Patient will demonstrate ability to use L UE during transfers, while being able to pull and tolerate weight bearing through the L UE.   []? Progressing: []? Met: []? Not Met: []? Adjusted       Overall Progression Towards Functional goals/ Treatment Progress Update:  [] Patient is progressing as expected towards functional goals listed. [] Progression is slowed due to complexities/Impairments listed. [] Progression has been slowed due to co-morbidities.   [x] Plan just implemented, too soon to

## 2021-12-08 ENCOUNTER — HOSPITAL ENCOUNTER (OUTPATIENT)
Dept: PHYSICAL THERAPY | Age: 71
Setting detail: THERAPIES SERIES
Discharge: HOME | End: 2021-12-08
Payer: MEDICARE

## 2021-12-08 PROCEDURE — 97110 THERAPEUTIC EXERCISES: CPT

## 2021-12-08 PROCEDURE — 97530 THERAPEUTIC ACTIVITIES: CPT

## 2021-12-08 NOTE — FLOWSHEET NOTE
range of motion and then pause    Seated EOB wand lift overhead    10     Reaching with L UE  - supine, ~60 deg flexion, ~75 deg flex, ~90+ deg flexion     10x ea Supine with 2 pillows,    Seated EOB scap squeezes    15    Sitting in scooter - Bicep curls on L arm [place]  10 B           Therapeutic Activities (59347)              Ballet barre - partial stand from scooter  - requires use of forearms on bar to stand, then once in partial stand can get forearms off of bar. Attempted to put 4 in step under R foot, but stair did not have any traction on linoleum floor          Sitting in scooter - using wash cloth along bar   -elbow ext and horiz abd   - flexion/ext     Facing bar  // to bar                   Seated EOB - pushing wand away with resistance from PT  2 10                  Neuromuscular Re-ed (89794)                                                 Manual Intervention (66981)       PROM L shoulder - all directions                                              Modalities:     Pt. Education:  -patient educated on diagnosis, prognosis and expectations for rehab  -all patient questions were answered    Home Exercise Program:      Therapeutic Exercise and NMR EXR  [x] (86171) Provided verbal/tactile cueing for activities related to strengthening, flexibility, endurance, ROM for improvements in  [] LE / Lumbar: LE, proximal hip, and core control with self care, mobility, lifting, ambulation. [x] UE / Cervical: cervical, postural, scapular, scapulothoracic and UE control with self care, reaching, carrying, lifting, house/yardwork, driving, computer work.  [] (93590) Provided verbal/tactile cueing for activities related to improving balance, coordination, kinesthetic sense, posture, motor skill, proprioception to assist with   [] LE / lumbar: LE, proximal hip, and core control in self care, mobility, lifting, ambulation and eccentric single leg control.    [] UE / cervical: cervical, scapular, scapulothoracic and UE control with self care, reaching, carrying, lifting, house/yardwork, driving, computer work.   [] (25989) Therapist is in constant attendance of 2 or more patients providing skilled therapy interventions, but not providing any significant amount of measurable one-on-one time to either patient, for improvements in  [] LE / lumbar: LE, proximal hip, and core control in self care, mobility, lifting, ambulation and eccentric single leg control. [] UE / cervical: cervical, scapular, scapulothoracic and UE control with self care, reaching, carrying, lifting, house/yardwork, driving, computer work. NMR and Therapeutic Activities:    [] (22693 or 55869) Provided verbal/tactile cueing for activities related to improving balance, coordination, kinesthetic sense, posture, motor skill, proprioception and motor activation to allow for proper function of   [] LE: / Lumbar core, proximal hip and LE with self care and ADLs  [] UE / Cervical: cervical, postural, scapular, scapulothoracic and UE control with self care, carrying, lifting, driving, computer work.   [] (99090) Gait Re-education- Provided training and instruction to the patient for proper LE, core and proximal hip recruitment and positioning and eccentric body weight control with ambulation re-education including up and down stairs     Home Management Training / Self Care:  [] (34584) Provided self-care/home management training related to activities of daily living and compensatory training, and/or use of adaptive equipment for improvement with: ADLs and compensatory training, meal preparation, safety procedures and instruction in use of adaptive equipment, including bathing, grooming, dressing, personal hygiene, basic household cleaning and chores.      Home Exercise Program:    [x] (92851) Reviewed/Progressed HEP activities related to strengthening, flexibility, endurance, ROM of   [] LE / Lumbar: core, proximal hip and LE for functional self-care, mobility, lifting and ambulation/stair navigation   [] UE / Cervical: cervical, postural, scapular, scapulothoracic and UE control with self care, reaching, carrying, lifting, house/yardwork, driving, computer work  [] (13341)Reviewed/Progressed HEP activities related to improving balance, coordination, kinesthetic sense, posture, motor skill, proprioception of   [] LE: core, proximal hip and LE for self care, mobility, lifting, and ambulation/stair navigation    [] UE / Cervical: cervical, postural,  scapular, scapulothoracic and UE control with self care, reaching, carrying, lifting, house/yardwork, driving, computer work    Manual Treatments:  PROM / STM / Oscillations-Mobs:  G-I, II, III, IV (PA's, Inf., Post.)  [] (58929) Provided manual therapy to mobilize LE, proximal hip and/or LS spine soft tissue/joints for the purpose of modulating pain, promoting relaxation,  increasing ROM, reducing/eliminating soft tissue swelling/inflammation/restriction, improving soft tissue extensibility and allowing for proper ROM for normal function with   [] LE / lumbar: self care, mobility, lifting and ambulation. [] UE / Cervical: self care, reaching, carrying, lifting, house/yardwork, driving, computer work. Modalities:  [] (26426) Vasopneumatic compression: Utilized vasopneumatic compression to decrease edema / swelling for the purpose of improving mobility and quad tone / recruitment which will allow for increased overall function including but not limited to self-care, transfers, ambulation, and ascending / descending stairs.      Charges:  Timed Code Treatment Minutes: 41   Total Treatment Minutes: 41     [] EVAL - LOW (98919)   [] EVAL - MOD (79291)  [] EVAL - HIGH (75581)  [] RE-EVAL (58381)  [x] ZF(39573) x 2      [] Ionto  [] NMR (68028) x       [] Vaso  [] Manual (46544) x       [] Ultrasound  [x] TA x 1       [] Mech Traction (06964)  [] Aquatic Therapy x     [] ES (un) (96563):   [] Home Management Training x  [] ES(attended) (52813)   [] Dry Needling 1-2 muscles (47594):  [] Dry Needling 3+ muscles (606947)  [] Group:      [] Other:     GOALS:   Patient stated goal: to get better use of left arm and leg   []? Progressing: []? Met: []? Not Met: []? Adjusted     Therapist goals for Patient:   Short Term Goals: To be achieved in: 2 weeks  1. Independent in HEP and progression per patient tolerance, in order to prevent re-injury. []? Progressing: []? Met: []? Not Met: []? Adjusted  2. Patient will have a decrease in pain to facilitate improvement in movement, function, and ADLs as indicated by Functional Deficits. []? Progressing: []? Met: []? Not Met: []? Adjusted     Long Term Goals: To be achieved in: 5 weeks  1. Disability index score of 45% or less for the Quick DASH to assist with reaching prior level of function. []? Progressing: []? Met: []? Not Met: []? Adjusted  2. Patient will demonstrate increased L shoulder abduction and flexion AROM to at least 100 deg to allow for proper joint functioning as indicated by patients Functional Deficits. []? Progressing: []? Met: []? Not Met: []? Adjusted  3. Patient will demonstrate an increase in Strength to at least 4-/5 for gross L shoulder strength to allow for proper functional mobility as indicated by patients Functional Deficits. []? Progressing: []? Met: []? Not Met: []? Adjusted  4. Patient will return to functional activities including ability to  light weight items and move them with L UE while completing bathing and other ADLs without increased symptoms or restriction. []? Progressing: []? Met: []? Not Met: []? Adjusted  5. Patient will demonstrate ability to use L UE during transfers, while being able to pull and tolerate weight bearing through the L UE.   []? Progressing: []? Met: []? Not Met: []?  Adjusted       Overall Progression Towards Functional goals/ Treatment Progress Update:  [] Patient is progressing as expected towards functional goals listed. [] Progression is slowed due to complexities/Impairments listed. [] Progression has been slowed due to co-morbidities. [x] Plan just implemented, too soon to assess goals progression <30days   [] Goals require adjustment due to lack of progress  [] Patient is not progressing as expected and requires additional follow up with physician  [] Other    Persisting Functional Limitations/Impairments:  []Sleeping []Sitting               []Standing []Transfers        [x]Walking []Kneeling               [x]Stairs [x]Squatting / bending   [x]ADLs [x]Reaching  []Lifting  []Housework  []Driving []Job related tasks  []Sports/Recreation []Other:        ASSESSMENT: Performed most of session at mat table to preserve electric scooter battery so pt could make it back home. She performed more AROM this date instead of AAROM/PROM and fatigued. Will continue with ROM and functional strength. Treatment/Activity Tolerance:  [] Patient able to complete tx [] Patient limited by fatigue  [] Patient limited by pain  [x] Patient limited by other medical complications  [] Other:     Prognosis: [] Good [x] Fair  [] Poor    Patient Requires Follow-up: [x] Yes  [] No    Plan for next treatment session:    PLAN: See eval. PT 3x / week for 5 weeks. [x] Continue per plan of care [] Alter current plan (see comments)  [] Plan of care initiated [] Hold pending MD visit [] Discharge    Electronically signed by: Dorothy Patel PT  DPT    Note: If patient does not return for scheduled/ recommended follow up visits, this note will serve as a discharge from care along with most recent update on progress.

## 2021-12-10 ENCOUNTER — HOSPITAL ENCOUNTER (OUTPATIENT)
Dept: PHYSICAL THERAPY | Age: 71
Setting detail: THERAPIES SERIES
Discharge: HOME | End: 2021-12-10
Payer: MEDICARE

## 2021-12-10 PROCEDURE — 97530 THERAPEUTIC ACTIVITIES: CPT

## 2021-12-10 PROCEDURE — 97110 THERAPEUTIC EXERCISES: CPT

## 2021-12-10 NOTE — FLOWSHEET NOTE
[address] [facility] Physical Therapy  Phone: [phone]   Fax: [phone]    Physical Therapy Daily Treatment Note  Date:  12/10/2021    Patient Name:  Luanne Lujan    :  1950  MRN: [id number]  Medical/Treatment Diagnosis Information:  Diagnosis: R29.898 - Left arm weakness  Treatment Diagnosis: Decreased AROM and strength of L UE, decreased functional mobility due to R knee contracture and R LE weakness, decreased functional use of L UE causing difficulty with ADLs  Insurance/Certification information:  PT Insurance Information: Medicare  Physician Information:  Referring Practitioner: Smith Castellanos MD  Plan of care signed (Y/N): []  Yes [x]  No     Date of Patient follow up with Physician:      Progress Report: []  Yes  [x]  No     Date Range for reporting period:  Beginnin/22  Ending:     Progress report due (10 Rx/or 30 days whichever is less): visit #33 or /    Recertification due (POC duration/ or 90 days whichever is less): visit #15 or 22     Visit # Insurance Allowable Auth required? Date Range   6/15 mn []  Yes  [x]  No n/a         Latex Allergy:  [x]NO      []YES  Preferred Language for Healthcare:   [x]English       []other:    Functional Scale:        Date assessed:  QuickDASH: raw score = 40; dysfunction = 66%  21    Pain level:  0/10     SUBJECTIVE: Pt reports that she had to order a new part for her scooter. She has full battery the lights just aren't working. She has been doing her TB in the scooter for bicep curls.      OBJECTIVE:       RESTRICTIONS/PRECAUTIONS: Uses electric scooter for primary mode of transportation, R knee contracture (about 90% flexion), able to transfer with SBA    Exercises/Interventions:     Therapeutic Exercises (63834) Resistance / level Sets/sec Reps Notes   Scifit StepOne 1 X 7 min   R LE needs yellow strap, done for endurance as well as stretching for L UE and R knee    Pulleys  5 min  Done in electric scooter          UE [place] on L  - Flexion  - Abduction   - ER      , cued to push to end range of motion and then pause    Seated EOB wand lift overhead        Reaching with L UE  - supine, ~60 deg flexion, ~75 deg flex, ~90+ deg flexion      Supine with 2 pillows,    Seated EOB scap squeezes       Sitting in scooter - Bicep curls on L arm Lime TB                          Therapeutic Activities (42569)              Ballet barre - partial stand from scooter  - requires use of forearms on bar to stand, then once in partial stand can get forearms off of bar.   ~30-45 sec per trial  6 trials           Sitting in scooter - using wash cloth along bar   -elbow ext and horiz abd   - flexion/ext     Facing bar  // to bar            Stair rail flexion slides   2 10 Slight OP from PT at end range, 2nd set used a washcloth    Seated EOB - pushing wand away with resistance from PT                   Neuromuscular Re-ed (42203)                                                 Manual Intervention (62814)       PROM L shoulder - all directions                                              Modalities:     Pt. Education:  -patient educated on diagnosis, prognosis and expectations for rehab  -all patient questions were answered    Home Exercise Program:      Therapeutic Exercise and NMR EXR  [x] (62631) Provided verbal/tactile cueing for activities related to strengthening, flexibility, endurance, ROM for improvements in  [] LE / Lumbar: LE, proximal hip, and core control with self care, mobility, lifting, ambulation.   [x] UE / Cervical: cervical, postural, scapular, scapulothoracic and UE control with self care, reaching, carrying, lifting, house/yardwork, driving, computer work.  [] (40647) Provided verbal/tactile cueing for activities related to improving balance, coordination, kinesthetic sense, posture, motor skill, proprioception to assist with   [] LE / lumbar: LE, proximal hip, and core control in self care, mobility, lifting, ambulation and eccentric single leg control. [] UE / cervical: cervical, scapular, scapulothoracic and UE control with self care, reaching, carrying, lifting, house/yardwork, driving, computer work.   [] (95472) Therapist is in constant attendance of 2 or more patients providing skilled therapy interventions, but not providing any significant amount of measurable one-on-one time to either patient, for improvements in  [] LE / lumbar: LE, proximal hip, and core control in self care, mobility, lifting, ambulation and eccentric single leg control. [] UE / cervical: cervical, scapular, scapulothoracic and UE control with self care, reaching, carrying, lifting, house/yardwork, driving, computer work. NMR and Therapeutic Activities:    [] (28482 or 16515) Provided verbal/tactile cueing for activities related to improving balance, coordination, kinesthetic sense, posture, motor skill, proprioception and motor activation to allow for proper function of   [] LE: / Lumbar core, proximal hip and LE with self care and ADLs  [] UE / Cervical: cervical, postural, scapular, scapulothoracic and UE control with self care, carrying, lifting, driving, computer work.   [] (01718) Gait Re-education- Provided training and instruction to the patient for proper LE, core and proximal hip recruitment and positioning and eccentric body weight control with ambulation re-education including up and down stairs     Home Management Training / Self Care:  [] (56524) Provided self-care/home management training related to activities of daily living and compensatory training, and/or use of adaptive equipment for improvement with: ADLs and compensatory training, meal preparation, safety procedures and instruction in use of adaptive equipment, including bathing, grooming, dressing, personal hygiene, basic household cleaning and chores.      Home Exercise Program:    [x] (55705) Reviewed/Progressed HEP activities related to strengthening, flexibility, endurance, ROM of   [] LE / Lumbar: core, proximal hip and LE for functional self-care, mobility, lifting and ambulation/stair navigation   [] UE / Cervical: cervical, postural, scapular, scapulothoracic and UE control with self care, reaching, carrying, lifting, house/yardwork, driving, computer work  [] (76569)Reviewed/Progressed HEP activities related to improving balance, coordination, kinesthetic sense, posture, motor skill, proprioception of   [] LE: core, proximal hip and LE for self care, mobility, lifting, and ambulation/stair navigation    [] UE / Cervical: cervical, postural,  scapular, scapulothoracic and UE control with self care, reaching, carrying, lifting, house/yardwork, driving, computer work    Manual Treatments:  PROM / STM / Oscillations-Mobs:  G-I, II, III, IV (PA's, Inf., Post.)  [] (13338) Provided manual therapy to mobilize LE, proximal hip and/or LS spine soft tissue/joints for the purpose of modulating pain, promoting relaxation,  increasing ROM, reducing/eliminating soft tissue swelling/inflammation/restriction, improving soft tissue extensibility and allowing for proper ROM for normal function with   [] LE / lumbar: self care, mobility, lifting and ambulation. [] UE / Cervical: self care, reaching, carrying, lifting, house/yardwork, driving, computer work. Modalities:  [] (95061) Vasopneumatic compression: Utilized vasopneumatic compression to decrease edema / swelling for the purpose of improving mobility and quad tone / recruitment which will allow for increased overall function including but not limited to self-care, transfers, ambulation, and ascending / descending stairs.      Charges:  Timed Code Treatment Minutes: 40   Total Treatment Minutes: 40     [] EVAL - LOW (38675)   [] EVAL - MOD (39533)  [] EVAL - HIGH (13328)  [] RE-EVAL (71913)  [x] QL(77674) x 1      [] Ionto  [] NMR (72260) x       [] Vaso  [] Manual (55315) x       [] Ultrasound  [x] TA x 2 [] [facility] Traction (74342)  [] Aquatic Therapy x     [] ES (un) (02573):   [] Home Management Training x  [] ES(attended) (91378)   [] Dry Needling 1-2 muscles (07311):  [] Dry Needling 3+ muscles (535857)  [] Group:      [] Other:     GOALS:   Patient stated goal: to get better use of left arm and leg   []? Progressing: []? Met: []? Not Met: []? Adjusted     Therapist goals for Patient:   Short Term Goals: To be achieved in: 2 weeks  1. Independent in HEP and progression per patient tolerance, in order to prevent re-injury. []? Progressing: []? Met: []? Not Met: []? Adjusted  2. Patient will have a decrease in pain to facilitate improvement in movement, function, and ADLs as indicated by Functional Deficits. []? Progressing: []? Met: []? Not Met: []? Adjusted     Long Term Goals: To be achieved in: 5 weeks  1. Disability index score of 45% or less for the Quick DASH to assist with reaching prior level of function. []? Progressing: []? Met: []? Not Met: []? Adjusted  2. Patient will demonstrate increased L shoulder abduction and flexion AROM to at least 100 deg to allow for proper joint functioning as indicated by patients Functional Deficits. []? Progressing: []? Met: []? Not Met: []? Adjusted  3. Patient will demonstrate an increase in Strength to at least 4-/5 for gross L shoulder strength to allow for proper functional mobility as indicated by patients Functional Deficits. []? Progressing: []? Met: []? Not Met: []? Adjusted  4. Patient will return to functional activities including ability to  light weight items and move them with L UE while completing bathing and other ADLs without increased symptoms or restriction. []? Progressing: []? Met: []? Not Met: []? Adjusted  5. Patient will demonstrate ability to use L UE during transfers, while being able to pull and tolerate weight bearing through the L UE.   []? Progressing: []? Met: []? Not Met: []?  Adjusted       Overall Progression Towards Functional goals/ Treatment Progress Update:  [] Patient is progressing as expected towards functional goals listed. [] Progression is slowed due to complexities/Impairments listed. [] Progression has been slowed due to co-morbidities. [x] Plan just implemented, too soon to assess goals progression <30days   [] Goals require adjustment due to lack of progress  [] Patient is not progressing as expected and requires additional follow up with physician  [] Other    Persisting Functional Limitations/Impairments:  []Sleeping []Sitting               []Standing []Transfers        [x]Walking []Kneeling               [x]Stairs [x]Squatting / bending   [x]ADLs [x]Reaching  []Lifting  []Housework  []Driving []Job related tasks  []Sports/Recreation []Other:        ASSESSMENT:  Pt fatigued with stair rail slides this date. Required min A to reach end range. Pt also requested to complete partial standing at [employer] bar[employer] again. Less assistance needed this date but again, pt fatigued quickly. Will continue with strengthening and ROM of L UE as tolerated. Treatment/Activity Tolerance:  [] Patient able to complete tx  [] Patient limited by fatigue  [] Patient limited by pain  [x] Patient limited by other medical complications  [] Other:     Prognosis: [] Good [x] Fair  [] Poor    Patient Requires Follow-up: [x] Yes  [] No    Plan for next treatment session:    PLAN: See emily. PT 3x / week for 5 weeks. [x] Continue per plan of care [] Alter current plan (see comments)  [] Plan of care initiated [] Hold pending MD visit [] Discharge    Electronically signed by: Chandrakant Morrow PT  DPT    Note: If patient does not return for scheduled/ recommended follow up visits, this note will serve as a discharge from care along with most recent update on progress.

## 2021-12-13 ENCOUNTER — HOSPITAL ENCOUNTER (OUTPATIENT)
Dept: PHYSICAL THERAPY | Age: 71
Setting detail: THERAPIES SERIES
Discharge: HOME | End: 2021-12-13
Payer: MEDICARE

## 2021-12-13 PROCEDURE — 97140 MANUAL THERAPY 1/> REGIONS: CPT

## 2021-12-13 PROCEDURE — 97110 THERAPEUTIC EXERCISES: CPT

## 2021-12-13 NOTE — FLOWSHEET NOTE
[address] [facility] Physical Therapy  Phone: [phone]   Fax: [phone]    Physical Therapy Daily Treatment Note  Date:  2021    Patient Name:  Beth Romo    :  1950  MRN: [id number]  Medical/Treatment Diagnosis Information:  Diagnosis: R29.898 - Left arm weakness  Treatment Diagnosis: Decreased AROM and strength of L UE, decreased functional mobility due to R knee contracture and R LE weakness, decreased functional use of L UE causing difficulty with ADLs  Insurance/Certification information:  PT Insurance Information: Medicare  Physician Information:  Referring Practitioner: Edd Kurtz MD  Plan of care signed (Y/N): []  Yes [x]  No     Date of Patient follow up with Physician:      Progress Report: []  Yes  [x]  No     Date Range for reporting period:  Beginnin/22  Ending:     Progress report due (10 Rx/or 30 days whichever is less): visit #77 or     Recertification due (POC duration/ or 90 days whichever is less): visit #15 or 22     Visit # Insurance Allowable Auth required? Date Range   7/15 mn []  Yes  [x]  No n/a         Latex Allergy:  [x]NO      []YES  Preferred Language for Healthcare:   [x]English       []other:    Functional Scale:        Date assessed:  QuickDASH: raw score = 40; dysfunction = 66%  21    Pain level:  0/10     SUBJECTIVE: Pt reports her L LE was very swollen and very painful yesterday. When the leg is swollen, she has to lay down.      OBJECTIVE: : AAROM L renetta flex with pulleys: 124 deg       RESTRICTIONS/PRECAUTIONS: Uses electric scooter for primary mode of transportation, R knee contracture (about 90% flexion), able to transfer with SBA    Exercises/Interventions:     Therapeutic Exercises (43991) Resistance / level Sets/sec Reps Notes   Scifit StepOne 1 X 8 min   R LE needs yellow strap, done for endurance as well as stretching for L UE and R knee    Pulleys  5 min  Done in electric scooter eccentric single leg control. [] UE / cervical: cervical, scapular, scapulothoracic and UE control with self care, reaching, carrying, lifting, house/yardwork, driving, computer work.   [] (21058) Therapist is in constant attendance of 2 or more patients providing skilled therapy interventions, but not providing any significant amount of measurable one-on-one time to either patient, for improvements in  [] LE / lumbar: LE, proximal hip, and core control in self care, mobility, lifting, ambulation and eccentric single leg control. [] UE / cervical: cervical, scapular, scapulothoracic and UE control with self care, reaching, carrying, lifting, house/yardwork, driving, computer work. NMR and Therapeutic Activities:    [] (70827 or 50636) Provided verbal/tactile cueing for activities related to improving balance, coordination, kinesthetic sense, posture, motor skill, proprioception and motor activation to allow for proper function of   [] LE: / Lumbar core, proximal hip and LE with self care and ADLs  [] UE / Cervical: cervical, postural, scapular, scapulothoracic and UE control with self care, carrying, lifting, driving, computer work.   [] (56912) Gait Re-education- Provided training and instruction to the patient for proper LE, core and proximal hip recruitment and positioning and eccentric body weight control with ambulation re-education including up and down stairs     Home Management Training / Self Care:  [] (46975) Provided self-care/home management training related to activities of daily living and compensatory training, and/or use of adaptive equipment for improvement with: ADLs and compensatory training, meal preparation, safety procedures and instruction in use of adaptive equipment, including bathing, grooming, dressing, personal hygiene, basic household cleaning and chores.      Home Exercise Program:    [x] (43261) Reviewed/Progressed HEP activities related to strengthening, flexibility, endurance, ROM of   [] LE / Lumbar: core, proximal hip and LE for functional self-care, mobility, lifting and ambulation/stair navigation   [] UE / Cervical: cervical, postural, scapular, scapulothoracic and UE control with self care, reaching, carrying, lifting, house/yardwork, driving, computer work  [] (25946)Reviewed/Progressed HEP activities related to improving balance, coordination, kinesthetic sense, posture, motor skill, proprioception of   [] LE: core, proximal hip and LE for self care, mobility, lifting, and ambulation/stair navigation    [] UE / Cervical: cervical, postural,  scapular, scapulothoracic and UE control with self care, reaching, carrying, lifting, house/yardwork, driving, computer work    Manual Treatments:  PROM / STM / Oscillations-Mobs:  G-I, II, III, IV (PA's, Inf., Post.)  [] (53289) Provided manual therapy to mobilize LE, proximal hip and/or LS spine soft tissue/joints for the purpose of modulating pain, promoting relaxation,  increasing ROM, reducing/eliminating soft tissue swelling/inflammation/restriction, improving soft tissue extensibility and allowing for proper ROM for normal function with   [] LE / lumbar: self care, mobility, lifting and ambulation. [] UE / Cervical: self care, reaching, carrying, lifting, house/yardwork, driving, computer work. Modalities:  [] (55031) Vasopneumatic compression: Utilized vasopneumatic compression to decrease edema / swelling for the purpose of improving mobility and quad tone / recruitment which will allow for increased overall function including but not limited to self-care, transfers, ambulation, and ascending / descending stairs.      Charges:  Timed Code Treatment Minutes: 43   Total Treatment Minutes: 43     [] EVAL - LOW (52637)   [] EVAL - MOD (60942)  [] EVAL - HIGH (99097)  [] RE-EVAL (99685)  [x] JR(47278) x 2      [] Ionto  [] NMR (06793) x       [] Vaso  [x] Manual (70576) x 1      [] Ultrasound  [] TA x        [] Mech Traction (95156)  [] Aquatic Therapy x     [] ES (un) (96173):   [] Home Management Training x  [] ES(attended) (37448)   [] Dry Needling 1-2 muscles (03971):  [] Dry Needling 3+ muscles (178610)  [] Group:      [] Other:     GOALS:   Patient stated goal: to get better use of left arm and leg   []? Progressing: []? Met: []? Not Met: []? Adjusted     Therapist goals for Patient:   Short Term Goals: To be achieved in: 2 weeks  1. Independent in HEP and progression per patient tolerance, in order to prevent re-injury. []? Progressing: []? Met: []? Not Met: []? Adjusted  2. Patient will have a decrease in pain to facilitate improvement in movement, function, and ADLs as indicated by Functional Deficits. []? Progressing: []? Met: []? Not Met: []? Adjusted     Long Term Goals: To be achieved in: 5 weeks  1. Disability index score of 45% or less for the Quick DASH to assist with reaching prior level of function. []? Progressing: []? Met: []? Not Met: []? Adjusted  2. Patient will demonstrate increased L shoulder abduction and flexion AROM to at least 100 deg to allow for proper joint functioning as indicated by patients Functional Deficits. []? Progressing: []? Met: []? Not Met: []? Adjusted  3. Patient will demonstrate an increase in Strength to at least 4-/5 for gross L shoulder strength to allow for proper functional mobility as indicated by patients Functional Deficits. []? Progressing: []? Met: []? Not Met: []? Adjusted  4. Patient will return to functional activities including ability to  light weight items and move them with L UE while completing bathing and other ADLs without increased symptoms or restriction. []? Progressing: []? Met: []? Not Met: []? Adjusted  5. Patient will demonstrate ability to use L UE during transfers, while being able to pull and tolerate weight bearing through the L UE.   []? Progressing: []? Met: []? Not Met: []?  Adjusted       Overall Progression Towards Functional goals/ Treatment Progress Update:  [] Patient is progressing as expected towards functional goals listed. [] Progression is slowed due to complexities/Impairments listed. [] Progression has been slowed due to co-morbidities. [x] Plan just implemented, too soon to assess goals progression <30days   [] Goals require adjustment due to lack of progress  [] Patient is not progressing as expected and requires additional follow up with physician  [] Other    Persisting Functional Limitations/Impairments:  []Sleeping []Sitting               []Standing []Transfers        [x]Walking []Kneeling               [x]Stairs [x]Squatting / bending   [x]ADLs [x]Reaching  []Lifting  []Housework  []Driving []Job related tasks  []Sports/Recreation []Other:        ASSESSMENT:  Pt with improved PROM this session in supine. Requires Tona for sidelying to sitting EOB. Next session will plan to add chest press in supine and assisted bar strengthening. Will continue with strengthening and ROM of L UE as tolerated. Treatment/Activity Tolerance:  [] Patient able to complete tx  [] Patient limited by fatigue  [] Patient limited by pain  [x] Patient limited by other medical complications  [] Other:     Prognosis: [] Good [x] Fair  [] Poor    Patient Requires Follow-up: [x] Yes  [] No    Plan for next treatment session:    PLAN: See eval. PT 3x / week for 5 weeks. [x] Continue per plan of care [] Alter current plan (see comments)  [] Plan of care initiated [] Hold pending MD visit [] Discharge    Electronically signed by: Wayne Pollard PT  DPT    Note: If patient does not return for scheduled/ recommended follow up visits, this note will serve as a discharge from care along with most recent update on progress.

## 2021-12-15 ENCOUNTER — HOSPITAL ENCOUNTER (OUTPATIENT)
Dept: PHYSICAL THERAPY | Age: 71
Setting detail: THERAPIES SERIES
Discharge: HOME | End: 2021-12-15
Payer: MEDICARE

## 2021-12-15 PROCEDURE — 97530 THERAPEUTIC ACTIVITIES: CPT

## 2021-12-15 PROCEDURE — 97110 THERAPEUTIC EXERCISES: CPT

## 2021-12-15 NOTE — FLOWSHEET NOTE
[facility]  Phone: [phone]   Fax: [phone]    Physical Therapy Daily Treatment Note  Date:  12/15/2021    Patient Name:  Lillie Wallace    :  1950  MRN: [id number]  Medical/Treatment Diagnosis Information:  Diagnosis: R29.898 - Left arm weakness  Treatment Diagnosis: Decreased AROM and strength of L UE, decreased functional mobility due to R knee contracture and R LE weakness, decreased functional use of L UE causing difficulty with ADLs  Insurance/Certification information:  PT Insurance Information: Medicare  Physician Information:  Referring Practitioner: Florentin Nance MD  Plan of care signed (Y/N): []  Yes [x]  No     Date of Patient follow up with Physician:      Progress Report: []  Yes  [x]  No     Date Range for reporting period:  Beginnin/22  Ending:     Progress report due (10 Rx/or 30 days whichever is less): visit #15 or     Recertification due (POC duration/ or 90 days whichever is less): visit #15 or 22     Visit # Insurance Allowable Auth required? Date Range   8/15 mn []  Yes  [x]  No n/a         Latex Allergy:  [x]NO      []YES  Preferred Language for Healthcare:   [x]English       []other:    Functional Scale:        Date assessed:  QuickDASH: raw score = 40; dysfunction = 66%  21    Pain level:  0/10     SUBJECTIVE: Pt reports she still has swelling in the left leg but it is not as bad right now.      OBJECTIVE: : AAROM L renteta flex with pulleys: 124 deg       RESTRICTIONS/PRECAUTIONS: Uses electric scooter for primary mode of transportation, R knee contracture (about 90% flexion), able to transfer with SBA    Exercises/Interventions:     Therapeutic Exercises (04176) Resistance / level Sets/sec Reps Notes   Scifit StepOne 1 X 8 min   R LE needs yellow strap, done for endurance as well as stretching for L UE and R knee    Pulleys  5 min  Done in electric scooter          UE Ferguson on L  - Flexion  - Abduction   - ER      , cued to push to end range of motion and then pause    Seated EOB wand lift overhead        Reaching with L UE  - supine, ~40 deg flexion, ~75 deg flex, ~     10x ea Supine with 2 pillows,    Seated EOB scap squeezes   2 10   Sitting in scooter - Bicep curls on L arm Lime TB                          Therapeutic Activities (61635)              Ballet barre - partial stand from scooter  - requires use of forearms on bar to stand, then once in partial stand can get forearms off of bar. Sitting in scooter - using wash cloth along bar   -elbow ext and horiz abd   - flexion/ext     Facing bar  // to bar            Stair rail flexion slides   2 10 Slight OP from PT at end range, 2nd set used a washcloth    Seated EOB - pushing wand away with resistance from PT    Pushing wand away and up   10      10                 Neuromuscular Re-ed (04242)                                                 Manual Intervention (18074)       PROM L shoulder - all directions with distraction  10 min                                             Modalities:     Pt. Education:  -patient educated on diagnosis, prognosis and expectations for rehab  -all patient questions were answered    Home Exercise Program:      Therapeutic Exercise and NMR EXR  [x] (30231) Provided verbal/tactile cueing for activities related to strengthening, flexibility, endurance, ROM for improvements in  [] LE / Lumbar: LE, proximal hip, and core control with self care, mobility, lifting, ambulation.   [x] UE / Cervical: cervical, postural, scapular, scapulothoracic and UE control with self care, reaching, carrying, lifting, house/yardwork, driving, computer work.  [] (74054) Provided verbal/tactile cueing for activities related to improving balance, coordination, kinesthetic sense, posture, motor skill, proprioception to assist with   [] LE / lumbar: LE, proximal hip, and core control in self care, mobility, lifting, ambulation and eccentric single leg control. [] UE / cervical: cervical, scapular, scapulothoracic and UE control with self care, reaching, carrying, lifting, house/yardwork, driving, computer work.   [] (15289) Therapist is in constant attendance of 2 or more patients providing skilled therapy interventions, but not providing any significant amount of measurable one-on-one time to either patient, for improvements in  [] LE / lumbar: LE, proximal hip, and core control in self care, mobility, lifting, ambulation and eccentric single leg control. [] UE / cervical: cervical, scapular, scapulothoracic and UE control with self care, reaching, carrying, lifting, house/yardwork, driving, computer work. NMR and Therapeutic Activities:    [] (07575 or 09949) Provided verbal/tactile cueing for activities related to improving balance, coordination, kinesthetic sense, posture, motor skill, proprioception and motor activation to allow for proper function of   [] LE: / Lumbar core, proximal hip and LE with self care and ADLs  [] UE / Cervical: cervical, postural, scapular, scapulothoracic and UE control with self care, carrying, lifting, driving, computer work.   [] (39998) Gait Re-education- Provided training and instruction to the patient for proper LE, core and proximal hip recruitment and positioning and eccentric body weight control with ambulation re-education including up and down stairs     Home Management Training / Self Care:  [] (42036) Provided self-care/home management training related to activities of daily living and compensatory training, and/or use of adaptive equipment for improvement with: ADLs and compensatory training, meal preparation, safety procedures and instruction in use of adaptive equipment, including bathing, grooming, dressing, personal hygiene, basic household cleaning and chores.      Home Exercise Program:    [x] (41758) Reviewed/Progressed HEP activities related to strengthening, flexibility, endurance, ROM of   [] LE / Lumbar: core, proximal hip and LE for functional self-care, mobility, lifting and ambulation/stair navigation   [] UE / Cervical: cervical, postural, scapular, scapulothoracic and UE control with self care, reaching, carrying, lifting, house/yardwork, driving, computer work  [] (18107)Reviewed/Progressed HEP activities related to improving balance, coordination, kinesthetic sense, posture, motor skill, proprioception of   [] LE: core, proximal hip and LE for self care, mobility, lifting, and ambulation/stair navigation    [] UE / Cervical: cervical, postural,  scapular, scapulothoracic and UE control with self care, reaching, carrying, lifting, house/yardwork, driving, computer work    Manual Treatments:  PROM / STM / Oscillations-Mobs:  G-I, II, III, IV (PA's, Inf., Post.)  [] (34069) Provided manual therapy to mobilize LE, proximal hip and/or LS spine soft tissue/joints for the purpose of modulating pain, promoting relaxation,  increasing ROM, reducing/eliminating soft tissue swelling/inflammation/restriction, improving soft tissue extensibility and allowing for proper ROM for normal function with   [] LE / lumbar: self care, mobility, lifting and ambulation. [] UE / Cervical: self care, reaching, carrying, lifting, house/yardwork, driving, computer work. Modalities:  [] (65721) Vasopneumatic compression: Utilized vasopneumatic compression to decrease edema / swelling for the purpose of improving mobility and quad tone / recruitment which will allow for increased overall function including but not limited to self-care, transfers, ambulation, and ascending / descending stairs.      Charges:  Timed Code Treatment Minutes: 45   Total Treatment Minutes: 45     [] EVAL - LOW (65934)   [] EVAL - MOD (49607)  [] EVAL - HIGH (10703)  [] RE-EVAL (21439)  [x] RW(39789) x 2      [] Ionto  [] NMR (39665) x       [] Vaso  [] Manual (71301) x       [] Ultrasound  [x] TA x 1        [] [facility] Traction (11594)  [] Aquatic Therapy x     [] ES (un) (69720):   [] Home Management Training x  [] ES(attended) (12918)   [] Dry Needling 1-2 muscles (17141):  [] Dry Needling 3+ muscles (350707)  [] Group:      [] Other:     GOALS:   Patient stated goal: to get better use of left arm and leg   []? Progressing: []? Met: []? Not Met: []? Adjusted     Therapist goals for Patient:   Short Term Goals: To be achieved in: 2 weeks  1. Independent in HEP and progression per patient tolerance, in order to prevent re-injury. []? Progressing: []? Met: []? Not Met: []? Adjusted  2. Patient will have a decrease in pain to facilitate improvement in movement, function, and ADLs as indicated by Functional Deficits. []? Progressing: []? Met: []? Not Met: []? Adjusted     Long Term Goals: To be achieved in: 5 weeks  1. Disability index score of 45% or less for the Quick DASH to assist with reaching prior level of function. []? Progressing: []? Met: []? Not Met: []? Adjusted  2. Patient will demonstrate increased L shoulder abduction and flexion AROM to at least 100 deg to allow for proper joint functioning as indicated by patients Functional Deficits. []? Progressing: []? Met: []? Not Met: []? Adjusted  3. Patient will demonstrate an increase in Strength to at least 4-/5 for gross L shoulder strength to allow for proper functional mobility as indicated by patients Functional Deficits. []? Progressing: []? Met: []? Not Met: []? Adjusted  4. Patient will return to functional activities including ability to  light weight items and move them with L UE while completing bathing and other ADLs without increased symptoms or restriction. []? Progressing: []? Met: []? Not Met: []? Adjusted  5. Patient will demonstrate ability to use L UE during transfers, while being able to pull and tolerate weight bearing through the L UE.   []? Progressing: []? Met: []? Not Met: []?  Adjusted       Overall Progression Towards Functional goals/ Treatment Progress Update:  [] Patient is progressing as expected towards functional goals listed. [] Progression is slowed due to complexities/Impairments listed. [] Progression has been slowed due to co-morbidities. [x] Plan just implemented, too soon to assess goals progression <30days   [] Goals require adjustment due to lack of progress  [] Patient is not progressing as expected and requires additional follow up with physician  [] Other    Persisting Functional Limitations/Impairments:  []Sleeping []Sitting               []Standing []Transfers        [x]Walking []Kneeling               [x]Stairs [x]Squatting / bending   [x]ADLs [x]Reaching  []Lifting  []Housework  []Driving []Job related tasks  []Sports/Recreation []Other:        ASSESSMENT:  Continued to work on pt reaching in supine with facilitation at the elbow for full extension. Also added in pushing with the dowel mirza against PT resistance with emphasis on scap retraction between each rep. Will continue with functional movements and AAROM of L UE in hopes to increase L UE strength. Treatment/Activity Tolerance:  [] Patient able to complete tx  [] Patient limited by fatigue  [] Patient limited by pain  [x] Patient limited by other medical complications  [] Other:     Prognosis: [] Good [x] Fair  [] Poor    Patient Requires Follow-up: [x] Yes  [] No    Plan for next treatment session:    PLAN: See eval. PT 3x / week for 5 weeks. [x] Continue per plan of care [] Alter current plan (see comments)  [] Plan of care initiated [] Hold pending MD visit [] Discharge    Electronically signed by: Safia Gregorio PT  DPT    Note: If patient does not return for scheduled/ recommended follow up visits, this note will serve as a discharge from care along with most recent update on progress.

## 2021-12-17 ENCOUNTER — HOSPITAL ENCOUNTER (OUTPATIENT)
Dept: PHYSICAL THERAPY | Age: 71
Setting detail: THERAPIES SERIES
Discharge: HOME | End: 2021-12-17
Payer: MEDICARE

## 2021-12-17 PROCEDURE — 97110 THERAPEUTIC EXERCISES: CPT

## 2021-12-17 PROCEDURE — 97530 THERAPEUTIC ACTIVITIES: CPT

## 2021-12-20 ENCOUNTER — HOSPITAL ENCOUNTER (OUTPATIENT)
Dept: PHYSICAL THERAPY | Age: 71
Setting detail: THERAPIES SERIES
Discharge: HOME | End: 2021-12-20
Payer: MEDICARE

## 2021-12-20 PROCEDURE — 97530 THERAPEUTIC ACTIVITIES: CPT

## 2021-12-20 PROCEDURE — 97110 THERAPEUTIC EXERCISES: CPT

## 2021-12-20 PROCEDURE — 97140 MANUAL THERAPY 1/> REGIONS: CPT

## 2021-12-20 NOTE — FLOWSHEET NOTE
[facility]  Phone: [phone]   Fax: [phone]    Physical Therapy Daily Treatment Note  Date:  2021    Patient Name:  Gloria Murrieta    :  1950  MRN: [id number]  Medical/Treatment Diagnosis Information:  Diagnosis: R29.898 - Left arm weakness  Treatment Diagnosis: Decreased AROM and strength of L UE, decreased functional mobility due to R knee contracture and R LE weakness, decreased functional use of L UE causing difficulty with ADLs  Insurance/Certification information:  PT Insurance Information: Medicare  Physician Information:  Referring Practitioner: Aishwarya Tineo MD  Plan of care signed (Y/N): []  Yes [x]  No     Date of Patient follow up with Physician:      Progress Report: []  Yes  [x]  No     Date Range for reporting period:  Beginnin/22  Ending:     Progress report due (10 Rx/or 30 days whichever is less): visit #78 or     Recertification due (POC duration/ or 90 days whichever is less): visit #15 or 22     Visit # Insurance Allowable Auth required? Date Range   10/15 mn []  Yes  [x]  No n/a         Latex Allergy:  [x]NO      []YES  Preferred Language for Healthcare:   [x]English       []other:    Functional Scale:        Date assessed:  QuickDASH: raw score = 40; dysfunction = 66%  21    Pain level:  0/10 L leg     SUBJECTIVE: Pt reports no leg pain today.      OBJECTIVE: : AAROM L renetta flex with pulleys: 124 deg       RESTRICTIONS/PRECAUTIONS: Uses electric scooter for primary mode of transportation, R knee contracture (about 90% flexion), able to transfer with SBA    Exercises/Interventions:     Therapeutic Exercises (04171) Resistance / level Sets/sec Reps Notes   Scifit StepOne 1 X 10 min  978 steps  R LE needs yellow strap, done for endurance as well as stretching for L UE and R knee    Pulleys  4 min  Done in electric scooter          UE Mongo on L  - Flexion  - Abduction   - ER      , cued to push to end range of motion and then pause    Seated EOB wand lift overhead        Reaching with L UE  - supine, ~40 deg flexion, ~75 deg flex, ~      Supine with 2 pillows,    Seated EOB scap squeezes      Sitting in scooter - Bicep curls on L arm Lime TB                          Therapeutic Activities (86790)              Ballet barre - partial stand from scooter  - requires use of forearms on bar to stand, then once in partial stand can get forearms off of bar. Sitting in scooter - using wash cloth along bar   -elbow ext and horiz abd   - flexion/ext     Facing bar  // to bar       Stair rail flexion slides   2 Slight OP from PT at end range, 2nd set used a washcloth    Seated EOB - pushing wand away with resistance from PT    Pushing wand away and up             Sitting EOB at dry erase easel - working on L shoulder ROM  - tracing shapes/connecting dots  - reaching high with controlled lowering       10 L  10 L   Intermittent assist from PT, yuri at end range                         Neuromuscular Re-ed (68576)                                                 Manual Intervention (62822)       PROM L shoulder - all directions with distraction  8 min                                             Modalities:     Pt. Education:  -patient educated on diagnosis, prognosis and expectations for rehab  -all patient questions were answered    Home Exercise Program:      Therapeutic Exercise and NMR EXR  [x] (24836) Provided verbal/tactile cueing for activities related to strengthening, flexibility, endurance, ROM for improvements in  [] LE / Lumbar: LE, proximal hip, and core control with self care, mobility, lifting, ambulation.   [x] UE / Cervical: cervical, postural, scapular, scapulothoracic and UE control with self care, reaching, carrying, lifting, house/yardwork, driving, computer work.  [] (03496) Provided verbal/tactile cueing for activities related to improving balance, coordination, kinesthetic sense, posture, motor skill, proprioception to assist with   [] LE / lumbar: LE, proximal hip, and core control in self care, mobility, lifting, ambulation and eccentric single leg control. [] UE / cervical: cervical, scapular, scapulothoracic and UE control with self care, reaching, carrying, lifting, house/yardwork, driving, computer work.   [] (50054) Therapist is in constant attendance of 2 or more patients providing skilled therapy interventions, but not providing any significant amount of measurable one-on-one time to either patient, for improvements in  [] LE / lumbar: LE, proximal hip, and core control in self care, mobility, lifting, ambulation and eccentric single leg control. [] UE / cervical: cervical, scapular, scapulothoracic and UE control with self care, reaching, carrying, lifting, house/yardwork, driving, computer work.      NMR and Therapeutic Activities:    [] (64579 or 59762) Provided verbal/tactile cueing for activities related to improving balance, coordination, kinesthetic sense, posture, motor skill, proprioception and motor activation to allow for proper function of   [] LE: / Lumbar core, proximal hip and LE with self care and ADLs  [] UE / Cervical: cervical, postural, scapular, scapulothoracic and UE control with self care, carrying, lifting, driving, computer work.   [] (83291) Gait Re-education- Provided training and instruction to the patient for proper LE, core and proximal hip recruitment and positioning and eccentric body weight control with ambulation re-education including up and down stairs     Home Management Training / Self Care:  [] (16511) Provided self-care/home management training related to activities of daily living and compensatory training, and/or use of adaptive equipment for improvement with: ADLs and compensatory training, meal preparation, safety procedures and instruction in use of adaptive equipment, including bathing, grooming, dressing, personal hygiene, basic household cleaning and chores. Home Exercise Program:    [x] (15370) Reviewed/Progressed HEP activities related to strengthening, flexibility, endurance, ROM of   [] LE / Lumbar: core, proximal hip and LE for functional self-care, mobility, lifting and ambulation/stair navigation   [] UE / Cervical: cervical, postural, scapular, scapulothoracic and UE control with self care, reaching, carrying, lifting, house/yardwork, driving, computer work  [] (38692)Reviewed/Progressed HEP activities related to improving balance, coordination, kinesthetic sense, posture, motor skill, proprioception of   [] LE: core, proximal hip and LE for self care, mobility, lifting, and ambulation/stair navigation    [] UE / Cervical: cervical, postural,  scapular, scapulothoracic and UE control with self care, reaching, carrying, lifting, house/yardwork, driving, computer work    Manual Treatments:  PROM / STM / Oscillations-Mobs:  G-I, II, III, IV (PA's, Inf., Post.)  [] (66901) Provided manual therapy to mobilize LE, proximal hip and/or LS spine soft tissue/joints for the purpose of modulating pain, promoting relaxation,  increasing ROM, reducing/eliminating soft tissue swelling/inflammation/restriction, improving soft tissue extensibility and allowing for proper ROM for normal function with   [] LE / lumbar: self care, mobility, lifting and ambulation. [] UE / Cervical: self care, reaching, carrying, lifting, house/yardwork, driving, computer work. Modalities:  [] (04213) Vasopneumatic compression: Utilized vasopneumatic compression to decrease edema / swelling for the purpose of improving mobility and quad tone / recruitment which will allow for increased overall function including but not limited to self-care, transfers, ambulation, and ascending / descending stairs. Charges:  Timed Code Treatment Minutes:     Total Treatment Minutes:      [] EVAL - LOW (64256)   [] EVAL - MOD (21564)  [] EVAL - HIGH (28764)  [] pull and tolerate weight bearing through the L UE.   []? Progressing: []? Met: []? Not Met: []? Adjusted       Overall Progression Towards Functional goals/ Treatment Progress Update:  [] Patient is progressing as expected towards functional goals listed. [] Progression is slowed due to complexities/Impairments listed. [] Progression has been slowed due to co-morbidities. [x] Plan just implemented, too soon to assess goals progression <30days   [] Goals require adjustment due to lack of progress  [] Patient is not progressing as expected and requires additional follow up with physician  [] Other    Persisting Functional Limitations/Impairments:  []Sleeping []Sitting               []Standing []Transfers        [x]Walking []Kneeling               [x]Stairs [x]Squatting / bending   [x]ADLs [x]Reaching  []Lifting  []Housework  []Driving []Job related tasks  []Sports/Recreation []Other:        ASSESSMENT:  Completed dry erase board tasks for shoulder ROM. Pt did require some assistance to reach end range of motion and to assist with controlled lowering. Pt was fatigued following this task but was able to do without significant compensations. Will continue with ROM and strengthening as able. Treatment/Activity Tolerance:  [] Patient able to complete tx  [] Patient limited by fatigue  [] Patient limited by pain  [x] Patient limited by other medical complications  [] Other:     Prognosis: [] Good [x] Fair  [] Poor    Patient Requires Follow-up: [x] Yes  [] No    Plan for next treatment session:    PLAN: See emily. PT 3x / week for 5 weeks. [x] Continue per plan of care [] Alter current plan (see comments)  [] Plan of care initiated [] Hold pending MD visit [] Discharge    Electronically signed by: Glendora Saint, PT  DPT    Note: If patient does not return for scheduled/ recommended follow up visits, this note will serve as a discharge from care along with most recent update on progress.

## 2021-12-22 ENCOUNTER — HOSPITAL ENCOUNTER (OUTPATIENT)
Dept: PHYSICAL THERAPY | Age: 71
Setting detail: THERAPIES SERIES
Discharge: HOME | End: 2021-12-22
Payer: MEDICARE

## 2021-12-22 PROCEDURE — 97140 MANUAL THERAPY 1/> REGIONS: CPT

## 2021-12-22 PROCEDURE — 97110 THERAPEUTIC EXERCISES: CPT

## 2021-12-22 NOTE — FLOWSHEET NOTE
[facility] - Outpatient Physical Therapy  Phone: [phone]   Fax: [phone]    Physical Therapy Daily Treatment Note  Date:  2021    Patient Name:  Amber Subramanian    :  1950  MRN: [id number]  Medical/Treatment Diagnosis Information:  Diagnosis: R29.898 - Left arm weakness  Treatment Diagnosis: Decreased AROM and strength of L UE, decreased functional mobility due to R knee contracture and R LE weakness, decreased functional use of L UE causing difficulty with ADLs  Insurance/Certification information:  PT Insurance Information: Medicare  Physician Information:  Referring Practitioner: Yas Sheriff MD  Plan of care signed (Y/N): []  Yes [x]  No     Date of Patient follow up with Physician:      Progress Report: [x]  Yes  []  No     Date Range for reporting period:  Beginnin/22  PN:   Ending:     Progress report due (10 Rx/or 30 days whichever is less): visit #76 or     Recertification due (POC duration/ or 90 days whichever is less): visit #15 or 22     Visit # Insurance Allowable Auth required? Date Range   11/15 mn []  Yes  [x]  No n/a         Latex Allergy:  [x]NO      []YES  Preferred Language for Healthcare:   [x]English       []other:    Functional Scale:        Date assessed:  QuickDASH: raw score = 40; dysfunction = 66%  21    Pain level:  0/10 L leg     SUBJECTIVE: Pt reports her R knee is really bothering her today.       OBJECTIVE: : AAROM L renetta flex with pulleys: 124 deg   :    PROM AROM  SEATED AROM SUPINE     L R L R L   Shoulder Flexion  150   95 140 120   Shoulder Abduction  90   70 160 80   Shoulder External Rotation      Able to reach shouder (mostly elbow flexion) Top of R shoulder  26 (GHJ ~35 deg abd)   Shoulder Internal Rotation      Sacrum Upper lumbar spine     AAROM L renetta flex with pulleys: 124 deg         RESTRICTIONS/PRECAUTIONS: Uses electric scooter for primary mode of transportation, R knee activities related to strengthening, flexibility, endurance, ROM for improvements in  [] LE / Lumbar: LE, proximal hip, and core control with self care, mobility, lifting, ambulation. [x] UE / Cervical: cervical, postural, scapular, scapulothoracic and UE control with self care, reaching, carrying, lifting, house/yardwork, driving, computer work.  [] (91642) Provided verbal/tactile cueing for activities related to improving balance, coordination, kinesthetic sense, posture, motor skill, proprioception to assist with   [] LE / lumbar: LE, proximal hip, and core control in self care, mobility, lifting, ambulation and eccentric single leg control. [] UE / cervical: cervical, scapular, scapulothoracic and UE control with self care, reaching, carrying, lifting, house/yardwork, driving, computer work.   [] (44529) Therapist is in constant attendance of 2 or more patients providing skilled therapy interventions, but not providing any significant amount of measurable one-on-one time to either patient, for improvements in  [] LE / lumbar: LE, proximal hip, and core control in self care, mobility, lifting, ambulation and eccentric single leg control. [] UE / cervical: cervical, scapular, scapulothoracic and UE control with self care, reaching, carrying, lifting, house/yardwork, driving, computer work.      NMR and Therapeutic Activities:    [] (10940 or 20424) Provided verbal/tactile cueing for activities related to improving balance, coordination, kinesthetic sense, posture, motor skill, proprioception and motor activation to allow for proper function of   [] LE: / Lumbar core, proximal hip and LE with self care and ADLs  [] UE / Cervical: cervical, postural, scapular, scapulothoracic and UE control with self care, carrying, lifting, driving, computer work.   [] (27883) Gait Re-education- Provided training and instruction to the patient for proper LE, core and proximal hip recruitment and positioning and eccentric body weight control with ambulation re-education including up and down stairs     Home Management Training / Self Care:  [] (62096) Provided self-care/home management training related to activities of daily living and compensatory training, and/or use of adaptive equipment for improvement with: ADLs and compensatory training, meal preparation, safety procedures and instruction in use of adaptive equipment, including bathing, grooming, dressing, personal hygiene, basic household cleaning and chores. Home Exercise Program:    [x] (87193) Reviewed/Progressed HEP activities related to strengthening, flexibility, endurance, ROM of   [] LE / Lumbar: core, proximal hip and LE for functional self-care, mobility, lifting and ambulation/stair navigation   [] UE / Cervical: cervical, postural, scapular, scapulothoracic and UE control with self care, reaching, carrying, lifting, house/yardwork, driving, computer work  [] (44642)Reviewed/Progressed HEP activities related to improving balance, coordination, kinesthetic sense, posture, motor skill, proprioception of   [] LE: core, proximal hip and LE for self care, mobility, lifting, and ambulation/stair navigation    [] UE / Cervical: cervical, postural,  scapular, scapulothoracic and UE control with self care, reaching, carrying, lifting, house/yardwork, driving, computer work    Manual Treatments:  PROM / STM / Oscillations-Mobs:  G-I, II, III, IV (PA's, Inf., Post.)  [] (43293) Provided manual therapy to mobilize LE, proximal hip and/or LS spine soft tissue/joints for the purpose of modulating pain, promoting relaxation,  increasing ROM, reducing/eliminating soft tissue swelling/inflammation/restriction, improving soft tissue extensibility and allowing for proper ROM for normal function with   [] LE / lumbar: self care, mobility, lifting and ambulation. [] UE / Cervical: self care, reaching, carrying, lifting, house/yardwork, driving, computer work.      Modalities:  [] (77864) Vasopneumatic compression: Utilized vasopneumatic compression to decrease edema / swelling for the purpose of improving mobility and quad tone / recruitment which will allow for increased overall function including but not limited to self-care, transfers, ambulation, and ascending / descending stairs. Charges:  Timed Code Treatment Minutes: Total Treatment Minutes:      [] EVAL - LOW (34077)   [] EVAL - MOD (51790)  [] EVAL - HIGH (55293)  [] RE-EVAL (70864)  [x] IZ(59628) x 2     [] Ionto  [] NMR (52151) x       [] Vaso  [x] Manual (20482) x 1      [] Ultrasound  [] TA x         [] Mech Traction (79101)  [] Aquatic Therapy x     [] ES (un) (81058):   [] Home Management Training x  [] ES(attended) (05286)   [] Dry Needling 1-2 muscles (01850):  [] Dry Needling 3+ muscles (438919)  [] Group:      [] Other:     GOALS:   Patient stated goal: to get better use of left arm and leg   []? Progressing: []? Met: []? Not Met: []? Adjusted     Therapist goals for Patient:   Short Term Goals: To be achieved in: 2 weeks  1. Independent in HEP and progression per patient tolerance, in order to prevent re-injury. []? Progressing: []? Met: []? Not Met: []? Adjusted  2. Patient will have a decrease in pain to facilitate improvement in movement, function, and ADLs as indicated by Functional Deficits. []? Progressing: []? Met: []? Not Met: []? Adjusted     Long Term Goals: To be achieved in: 5 weeks  1. Disability index score of 45% or less for the Quick DASH to assist with reaching prior level of function. []? Progressing: []? Met: []? Not Met: []? Adjusted  2. Patient will demonstrate increased L shoulder abduction and flexion AROM to at least 100 deg to allow for proper joint functioning as indicated by patients Functional Deficits. [x]? Progressing: []? Met: []? Not Met: []? Adjusted  3.  Patient will demonstrate an increase in Strength to at least 4-/5 for gross L shoulder strength to allow for proper functional mobility as indicated by patients Functional Deficits. [x]? Progressing: []? Met: []? Not Met: []? Adjusted  4. Patient will return to functional activities including ability to  light weight items and move them with L UE while completing bathing and other ADLs without increased symptoms or restriction. [x]? Progressing: []? Met: []? Not Met: []? Adjusted  5. Patient will demonstrate ability to use L UE during transfers, while being able to pull and tolerate weight bearing through the L UE.   [x]? Progressing: []? Met: []? Not Met: []? Adjusted       Overall Progression Towards Functional goals/ Treatment Progress Update:  [] Patient is progressing as expected towards functional goals listed. [] Progression is slowed due to complexities/Impairments listed. [] Progression has been slowed due to co-morbidities. [x] Plan just implemented, too soon to assess goals progression <30days   [] Goals require adjustment due to lack of progress  [] Patient is not progressing as expected and requires additional follow up with physician  [] Other    Persisting Functional Limitations/Impairments:  []Sleeping []Sitting               []Standing []Transfers        [x]Walking []Kneeling               [x]Stairs [x]Squatting / bending   [x]ADLs [x]Reaching  []Lifting  []Housework  []Driving []Job related tasks  []Sports/Recreation []Other:        ASSESSMENT:  PN completed this date. Pt is better able to perform transfers to and from her electric scooter but still lacking full ROM with her L UE. Will plan to continue with ROM and strengthening activities as able. Also planning on completing aquatic sessions after the beginning of the new year.     Treatment/Activity Tolerance:  [] Patient able to complete tx  [] Patient limited by fatigue  [] Patient limited by pain  [x] Patient limited by other medical complications  [] Other:     Prognosis: [] Good [x] Fair  [] Poor    Patient Requires Follow-up: [x] Yes  [] No    Plan for next treatment session:    PLAN: See eval. PT 3x / week for 5 weeks. [x] Continue per plan of care [] Alter current plan (see comments)  [] Plan of care initiated [] Hold pending MD visit [] Discharge    Electronically signed by: Manish Walton, PT  DPT    Note: If patient does not return for scheduled/ recommended follow up visits, this note will serve as a discharge from care along with most recent update on progress.

## 2021-12-27 ENCOUNTER — HOSPITAL ENCOUNTER (OUTPATIENT)
Dept: PHYSICAL THERAPY | Age: 71
Setting detail: THERAPIES SERIES
Discharge: HOME | End: 2021-12-27
Payer: MEDICARE

## 2021-12-27 PROCEDURE — 97110 THERAPEUTIC EXERCISES: CPT

## 2021-12-27 PROCEDURE — 97530 THERAPEUTIC ACTIVITIES: CPT

## 2021-12-27 NOTE — FLOWSHEET NOTE
[facility]  [address]  [place] [address]               Juanita Lipscomb   1/3/2017  4:58 PM   ED    Description:  Female : 1993   Department:  [facility]           Your Care was Coordinated By:     None      Reason for Visit     Cerclage removal           Diagnoses this Visit        Comments    Cervical cerclage suture present, third trimester    -  Primary       ED Disposition     ED Disposition Condition Comment    Discharge             To Do List           [facility]     [facility] -  Assistance  Registered nurses in the [facility] provide clinical advisement, health education, appointment booking, and other advisory services.  Call for this free service at [phone].             Medications           Message regarding Medications     Verify the changes and/or additions to your medication regime listed below are the same as discussed with your clinician today.  If any of these changes or additions are incorrect, please notify your healthcare provider.             Verify that the below list of medications is an accurate representation of the medications you are currently taking.  If none reported, the list may be blank. If incorrect, please contact your healthcare provider. Carry this list with you in case of emergency.           Current Medications     PRENATAL VIT #76/IRON,CARB/FA (PNV 29-1 ORAL) Take 1 tablet by mouth once daily.    promethazine (PHENERGAN) 25 MG tablet Take 1 tablet (25 mg total) by mouth every 4 (four) hours.           Clinical Reference Information           Your Vitals Were     BP Pulse Temp Resp Weight Last Period    121/82 106 97.9 °F (36.6 °C) (Temporal) 18 73.9 kg (163 lb) 04/15/2016    SpO2 BMI             100% 27.98 kg/m2         Allergies as of 1/3/2017        Reactions    Watermelon Shortness Of Breath, Itching, Swelling, Edema    Chest pain, angioedema, SOB, after  [facility]  Phone: [phone]   Fax: [phone]    Physical Therapy Daily Treatment Note  Date:  2021    Patient Name:  Lillie Wallace    :  1950  MRN: [id number]  Medical/Treatment Diagnosis Information:  Diagnosis: R29.898 - Left arm weakness  Treatment Diagnosis: Decreased AROM and strength of L UE, decreased functional mobility due to R knee contracture and R LE weakness, decreased functional use of L UE causing difficulty with ADLs  Insurance/Certification information:  PT Insurance Information: Medicare  Physician Information:  Referring Practitioner: Florentin Nance MD  Plan of care signed (Y/N): []  Yes [x]  No     Date of Patient follow up with Physician:      Progress Report: [x]  Yes  []  No     Date Range for reporting period:  Beginnin/22  PN:   Ending:     Progress report due (10 Rx/or 30 days whichever is less): visit #02 or     Recertification due (POC duration/ or 90 days whichever is less): visit #15 or 22     Visit # Insurance Allowable Auth required? Date Range   12/15 mn []  Yes  [x]  No n/a         Latex Allergy:  [x]NO      []YES  Preferred Language for Healthcare:   [x]English       []other:    Functional Scale:        Date assessed:  QuickDASH: raw score = 40; dysfunction = 66%  21    Pain level:  0/10     SUBJECTIVE: Pt reports just discomfort in her legs today. She has not been able to use her STIM machine because she had to call and order new leads for it.      OBJECTIVE: : AAROM L renetta flex with pulleys: 124 deg   :    PROM AROM  SEATED AROM SUPINE     L R L R L   Shoulder Flexion  150   95 140 120   Shoulder Abduction  90   70 160 80   Shoulder External Rotation      Able to reach shouder (mostly elbow flexion) Top of R shoulder  26 (GHJ ~35 deg abd)   Shoulder Internal Rotation      Sacrum Upper lumbar spine     AAROM L renetta flex with pulleys: 124 deg RESTRICTIONS/PRECAUTIONS: Uses electric scooter for primary mode of transportation, R knee contracture (about 90 deg flexion), able to transfer with SBA    Exercises/Interventions:     Therapeutic Exercises (92786) Resistance / level Sets/sec Reps Notes   Scifit StepOne 1 X 10 min  1102 steps  R LE needs yellow strap, done for endurance as well as stretching for L UE and R knee    Pulleys  2min  Done in electric scooter          UE [place] on L  - Flexion  - Abduction   - ER      , cued to push to end range of motion and then pause    Seated EOB wand lift overhead        Reaching with L UE  - supine, ~40 deg flexion, ~75 deg flex, ~      Supine with 2 pillows,    Seated EOB scap squeezes      Sitting in scooter - Bicep curls on L arm [place] TB                          Therapeutic Activities (15684)              Ballet barre - partial stand from scooter  - requires use of forearms on bar to stand, then once in partial stand can get forearms off of bar.    Trials:  1'20\"  1'18\"                Sitting in scooter - using wash cloth along bar   -elbow ext and horiz abd   - flexion/ext     Facing bar  // to bar       Stair rail flexion slides   2 Slight OP from PT at end range, 2nd set used a washcloth    Seated EOB - pushing wand away with resistance from PT    Pushing wand away and up             Sitting EOB at dry erase easel - working on L shoulder ROM  - tracing shapes/connecting dots  - reaching high with controlled lowering   10 L  10 L  Intermittent assist from PT, yuri at end range           Transfers to/from electric scooter   X 1 CGA          Neuromuscular Re-ed (66526)                                                 Manual Intervention (64671)       PROM L shoulder - all directions with distraction    Done while assessing ROM                                          Modalities:     Pt. Education:  -patient educated on diagnosis, prognosis and expectations for rehab  -all patient questions were eating watermelon tonight      Immunizations Administered on Date of Encounter - 1/3/2017     None      ED Micro, Lab, POCT     None      ED Imaging Orders     None        Discharge Instructions       The following signs may be a warning that you may need medical care.  · Severe headache not relieved by tylenol.  · Blurry vision or seeing spots.  · Sudden swelling in your face or hands.  · Sudden weight gain in only a few days.  · Severe stomach pains or cramps.  · Vomiting lasting more than 24 hours.  · Fever greater than 100.4 degrees.  · Vaginal bleeding that is more than just spotting.  · Excessive and unusual vaginal discharge.  · A gush or flow of watery fluid from your vagina.  · Decrease or absence of baby's movement (starting at 28 weeks).  ·  (less than 37 weeks) labor: more than 4 contractions in an hour for 2 hours.  · Term (greater than 37 weeks) labor: contractions every 5 minutes for 2 hours.      Your Scheduled Appointments     2017  1:00 PM CST   Routine Prenatal Visit with Summer Núñez MD   [facility]    [address] [place] [id number]   [phone]               [facility] complies with applicable Federal civil rights laws and does not discriminate on the basis of race, color, national origin, age, disability, or sex.        Language Assistance Services     ATTENTION: Language assistance services are available, free of charge. Please call [phone].      ATENCIÓN: Si habla español, tiene a polk disposición servicios gratuitos de asistencia lingüística. Llame al [phone].     CHÚ Ý: N?u b?n nói Ti?ng Vi?t, có các d?ch v? h? tr? ngôn ng? mi?n phí dành cho b?n. G?i s? [phone].         answered    Home Exercise Program:      Therapeutic Exercise and NMR EXR  [x] (46592) Provided verbal/tactile cueing for activities related to strengthening, flexibility, endurance, ROM for improvements in  [] LE / Lumbar: LE, proximal hip, and core control with self care, mobility, lifting, ambulation. [x] UE / Cervical: cervical, postural, scapular, scapulothoracic and UE control with self care, reaching, carrying, lifting, house/yardwork, driving, computer work.  [] (12231) Provided verbal/tactile cueing for activities related to improving balance, coordination, kinesthetic sense, posture, motor skill, proprioception to assist with   [] LE / lumbar: LE, proximal hip, and core control in self care, mobility, lifting, ambulation and eccentric single leg control. [] UE / cervical: cervical, scapular, scapulothoracic and UE control with self care, reaching, carrying, lifting, house/yardwork, driving, computer work.   [] (83891) Therapist is in constant attendance of 2 or more patients providing skilled therapy interventions, but not providing any significant amount of measurable one-on-one time to either patient, for improvements in  [] LE / lumbar: LE, proximal hip, and core control in self care, mobility, lifting, ambulation and eccentric single leg control. [] UE / cervical: cervical, scapular, scapulothoracic and UE control with self care, reaching, carrying, lifting, house/yardwork, driving, computer work.      NMR and Therapeutic Activities:    [] (63772 or 65439) Provided verbal/tactile cueing for activities related to improving balance, coordination, kinesthetic sense, posture, motor skill, proprioception and motor activation to allow for proper function of   [] LE: / Lumbar core, proximal hip and LE with self care and ADLs  [] UE / Cervical: cervical, postural, scapular, scapulothoracic and UE control with self care, carrying, lifting, driving, computer work.   [] (61543) Gait Re-education- Provided training and instruction to the patient for proper LE, core and proximal hip recruitment and positioning and eccentric body weight control with ambulation re-education including up and down stairs     Home Management Training / Self Care:  [] (40354) Provided self-care/home management training related to activities of daily living and compensatory training, and/or use of adaptive equipment for improvement with: ADLs and compensatory training, meal preparation, safety procedures and instruction in use of adaptive equipment, including bathing, grooming, dressing, personal hygiene, basic household cleaning and chores. Home Exercise Program:    [x] (14726) Reviewed/Progressed HEP activities related to strengthening, flexibility, endurance, ROM of   [] LE / Lumbar: core, proximal hip and LE for functional self-care, mobility, lifting and ambulation/stair navigation   [] UE / Cervical: cervical, postural, scapular, scapulothoracic and UE control with self care, reaching, carrying, lifting, house/yardwork, driving, computer work  [] (83322)Reviewed/Progressed HEP activities related to improving balance, coordination, kinesthetic sense, posture, motor skill, proprioception of   [] LE: core, proximal hip and LE for self care, mobility, lifting, and ambulation/stair navigation    [] UE / Cervical: cervical, postural,  scapular, scapulothoracic and UE control with self care, reaching, carrying, lifting, house/yardwork, driving, computer work    Manual Treatments:  PROM / STM / Oscillations-Mobs:  G-I, II, III, IV (PA's, Inf., Post.)  [] (84163) Provided manual therapy to mobilize LE, proximal hip and/or LS spine soft tissue/joints for the purpose of modulating pain, promoting relaxation,  increasing ROM, reducing/eliminating soft tissue swelling/inflammation/restriction, improving soft tissue extensibility and allowing for proper ROM for normal function with   [] LE / lumbar: self care, mobility, lifting and ambulation. [] UE / Cervical: self care, reaching, carrying, lifting, house/yardwork, driving, computer work. Modalities:  [] (62892) Vasopneumatic compression: Utilized vasopneumatic compression to decrease edema / swelling for the purpose of improving mobility and quad tone / recruitment which will allow for increased overall function including but not limited to self-care, transfers, ambulation, and ascending / descending stairs. Charges:  Timed Code Treatment Minutes: 45   Total Treatment Minutes: 45     [] EVAL - LOW (14487)   [] EVAL - MOD (91783)  [] EVAL - HIGH (84460)  [] RE-EVAL (36873)  [x] MT(03461) x 1     [] Ionto  [] NMR (73249) x       [] Vaso  [] Manual (62747) x      [] Ultrasound  [x] TA x 2        [] Mech Traction (98491)  [] Aquatic Therapy x     [] ES (un) (21041):   [] Home Management Training x  [] ES(attended) (52156)   [] Dry Needling 1-2 muscles (72910):  [] Dry Needling 3+ muscles (039424)  [] Group:      [] Other:     GOALS:   Patient stated goal: to get better use of left arm and leg   []? Progressing: []? Met: []? Not Met: []? Adjusted     Therapist goals for Patient:   Short Term Goals: To be achieved in: 2 weeks  1. Independent in HEP and progression per patient tolerance, in order to prevent re-injury. []? Progressing: []? Met: []? Not Met: []? Adjusted  2. Patient will have a decrease in pain to facilitate improvement in movement, function, and ADLs as indicated by Functional Deficits. []? Progressing: []? Met: []? Not Met: []? Adjusted     Long Term Goals: To be achieved in: 5 weeks  1. Disability index score of 45% or less for the Quick DASH to assist with reaching prior level of function. []? Progressing: []? Met: []? Not Met: []? Adjusted  2. Patient will demonstrate increased L shoulder abduction and flexion AROM to at least 100 deg to allow for proper joint functioning as indicated by patients Functional Deficits. [x]? Progressing: []? Met: []? Not Met: []? Adjusted  3. Patient will demonstrate an increase in Strength to at least 4-/5 for gross L shoulder strength to allow for proper functional mobility as indicated by patients Functional Deficits. [x]? Progressing: []? Met: []? Not Met: []? Adjusted  4. Patient will return to functional activities including ability to  light weight items and move them with L UE while completing bathing and other ADLs without increased symptoms or restriction. [x]? Progressing: []? Met: []? Not Met: []? Adjusted  5. Patient will demonstrate ability to use L UE during transfers, while being able to pull and tolerate weight bearing through the L UE.   [x]? Progressing: []? Met: []? Not Met: []? Adjusted       Overall Progression Towards Functional goals/ Treatment Progress Update:  [] Patient is progressing as expected towards functional goals listed. [] Progression is slowed due to complexities/Impairments listed. [] Progression has been slowed due to co-morbidities. [x] Plan just implemented, too soon to assess goals progression <30days   [] Goals require adjustment due to lack of progress  [] Patient is not progressing as expected and requires additional follow up with physician  [] Other    Persisting Functional Limitations/Impairments:  []Sleeping []Sitting               []Standing []Transfers        [x]Walking []Kneeling               [x]Stairs [x]Squatting / bending   [x]ADLs [x]Reaching  []Lifting  []Housework  []Driving []Job related tasks  []Sports/Recreation []Other:        ASSESSMENT: Pt continues to fatigue easily which limits progression of activity. Pt to benefit from continuing to work on reaching for strengthening to improve functional use of UE.    Treatment/Activity Tolerance:  [] Patient able to complete tx  [] Patient limited by fatigue  [] Patient limited by pain  [x] Patient limited by other medical complications  [] Other:     Prognosis: [] Good [x] Fair  [] Poor    Patient Requires Follow-up: [x] Yes  [] No    Plan for next treatment session:    PLAN: See eval. PT 3x / week for 5 weeks. [x] Continue per plan of care [] Alter current plan (see comments)  [] Plan of care initiated [] Hold pending MD visit [] Discharge    Electronically signed by: Safia Gregorio, PT  DPT    Note: If patient does not return for scheduled/ recommended follow up visits, this note will serve as a discharge from care along with most recent update on progress.

## 2021-12-29 ENCOUNTER — HOSPITAL ENCOUNTER (OUTPATIENT)
Dept: PHYSICAL THERAPY | Age: 71
Setting detail: THERAPIES SERIES
Discharge: HOME | End: 2021-12-29
Payer: MEDICARE

## 2021-12-29 PROCEDURE — 97530 THERAPEUTIC ACTIVITIES: CPT

## 2021-12-29 PROCEDURE — 97110 THERAPEUTIC EXERCISES: CPT

## 2021-12-29 NOTE — FLOWSHEET NOTE
90 deg flexion), able to transfer with SBA    Exercises/Interventions:     Therapeutic Exercises (52582) Resistance / level Sets/sec Reps Notes   Scifit StepOne 1 X 10 min  1020 steps  R LE needs yellow strap, done for endurance as well as stretching for L UE and R knee    Pulleys  4min  Done in electric scooter          UE Amistad on L  - Flexion  - Abduction   - ER      , cued to push to end range of motion and then pause    Seated EOB wand lift overhead        Reaching with L UE  - supine, ~40 deg flexion, ~75 deg flex, ~      Supine with 2 pillows,    Seated EOB scap squeezes      Sitting in scooter - Bicep curls on L arm Lime TB                          Therapeutic Activities (90412)              Ballet barre - partial stand from scooter  - requires use of forearms on bar to stand, then once in partial stand can get forearms off of bar.                   Sitting in scooter - using wash cloth along bar   -elbow ext and horiz abd   - flexion/ext     Facing bar  // to bar       Stair rail flexion slides   2 Slight OP from PT at end range, 2nd set used a washcloth    Seated EOB - pushing wand away with resistance from PT    Pushing wand away and up             Sitting EOB at dry erase easel - working on L shoulder ROM  - tracing shapes/connecting dots  - reaching high with controlled lowering  - word search puzzle          X 15 min   Intermittent assist from PT, yuri at end range           Transfers to/from electric scooter   X 1 CGA          Neuromuscular Re-ed (13112)                                                 Manual Intervention (57636)       PROM L shoulder - all directions with distraction    Done while assessing ROM                                          Modalities:     Pt. Education:  -patient educated on diagnosis, prognosis and expectations for rehab  -all patient questions were answered    Home Exercise Program:      Therapeutic Exercise and NMR EXR  [x] (76513) Provided verbal/tactile cueing for activities related to strengthening, flexibility, endurance, ROM for improvements in  [] LE / Lumbar: LE, proximal hip, and core control with self care, mobility, lifting, ambulation. [x] UE / Cervical: cervical, postural, scapular, scapulothoracic and UE control with self care, reaching, carrying, lifting, house/yardwork, driving, computer work.  [] (73849) Provided verbal/tactile cueing for activities related to improving balance, coordination, kinesthetic sense, posture, motor skill, proprioception to assist with   [] LE / lumbar: LE, proximal hip, and core control in self care, mobility, lifting, ambulation and eccentric single leg control. [] UE / cervical: cervical, scapular, scapulothoracic and UE control with self care, reaching, carrying, lifting, house/yardwork, driving, computer work.   [] (30246) Therapist is in constant attendance of 2 or more patients providing skilled therapy interventions, but not providing any significant amount of measurable one-on-one time to either patient, for improvements in  [] LE / lumbar: LE, proximal hip, and core control in self care, mobility, lifting, ambulation and eccentric single leg control. [] UE / cervical: cervical, scapular, scapulothoracic and UE control with self care, reaching, carrying, lifting, house/yardwork, driving, computer work.      NMR and Therapeutic Activities:    [] (99142 or 27065) Provided verbal/tactile cueing for activities related to improving balance, coordination, kinesthetic sense, posture, motor skill, proprioception and motor activation to allow for proper function of   [] LE: / Lumbar core, proximal hip and LE with self care and ADLs  [] UE / Cervical: cervical, postural, scapular, scapulothoracic and UE control with self care, carrying, lifting, driving, computer work.   [] (42345) Gait Re-education- Provided training and instruction to the patient for proper LE, core and proximal hip recruitment and positioning and eccentric body weight control with ambulation re-education including up and down stairs     Home Management Training / Self Care:  [] (66994) Provided self-care/home management training related to activities of daily living and compensatory training, and/or use of adaptive equipment for improvement with: ADLs and compensatory training, meal preparation, safety procedures and instruction in use of adaptive equipment, including bathing, grooming, dressing, personal hygiene, basic household cleaning and chores. Home Exercise Program:    [x] (80077) Reviewed/Progressed HEP activities related to strengthening, flexibility, endurance, ROM of   [] LE / Lumbar: core, proximal hip and LE for functional self-care, mobility, lifting and ambulation/stair navigation   [] UE / Cervical: cervical, postural, scapular, scapulothoracic and UE control with self care, reaching, carrying, lifting, house/yardwork, driving, computer work  [] (06086)Reviewed/Progressed HEP activities related to improving balance, coordination, kinesthetic sense, posture, motor skill, proprioception of   [] LE: core, proximal hip and LE for self care, mobility, lifting, and ambulation/stair navigation    [] UE / Cervical: cervical, postural,  scapular, scapulothoracic and UE control with self care, reaching, carrying, lifting, house/yardwork, driving, computer work    Manual Treatments:  PROM / STM / Oscillations-Mobs:  G-I, II, III, IV (PA's, Inf., Post.)  [] (77184) Provided manual therapy to mobilize LE, proximal hip and/or LS spine soft tissue/joints for the purpose of modulating pain, promoting relaxation,  increasing ROM, reducing/eliminating soft tissue swelling/inflammation/restriction, improving soft tissue extensibility and allowing for proper ROM for normal function with   [] LE / lumbar: self care, mobility, lifting and ambulation. [] UE / Cervical: self care, reaching, carrying, lifting, house/yardwork, driving, computer work.      Modalities:  [] (02438) Vasopneumatic compression: Utilized vasopneumatic compression to decrease edema / swelling for the purpose of improving mobility and quad tone / recruitment which will allow for increased overall function including but not limited to self-care, transfers, ambulation, and ascending / descending stairs. Charges:  Timed Code Treatment Minutes: 40   Total Treatment Minutes: 40     [] EVAL - LOW (07911)   [] EVAL - MOD (06612)  [] EVAL - HIGH (00999)  [] RE-EVAL (01518)  [x] LE(98519) x 2    [] Ionto  [] NMR (44299) x       [] Vaso  [] Manual (65042) x      [] Ultrasound  [x] TA x 1        [] Mech Traction (76477)  [] Aquatic Therapy x     [] ES (un) (64522):   [] Home Management Training x  [] ES(attended) (26125)   [] Dry Needling 1-2 muscles (98778):  [] Dry Needling 3+ muscles (453898)  [] Group:      [] Other:     GOALS:   Patient stated goal: to get better use of left arm and leg   []? Progressing: []? Met: []? Not Met: []? Adjusted     Therapist goals for Patient:   Short Term Goals: To be achieved in: 2 weeks  1. Independent in HEP and progression per patient tolerance, in order to prevent re-injury. []? Progressing: []? Met: []? Not Met: []? Adjusted  2. Patient will have a decrease in pain to facilitate improvement in movement, function, and ADLs as indicated by Functional Deficits. []? Progressing: []? Met: []? Not Met: []? Adjusted     Long Term Goals: To be achieved in: 5 weeks  1. Disability index score of 45% or less for the Quick DASH to assist with reaching prior level of function. []? Progressing: []? Met: []? Not Met: []? Adjusted  2. Patient will demonstrate increased L shoulder abduction and flexion AROM to at least 100 deg to allow for proper joint functioning as indicated by patients Functional Deficits. [x]? Progressing: []? Met: []? Not Met: []? Adjusted  3.  Patient will demonstrate an increase in Strength to at least 4-/5 for gross L shoulder strength to allow for proper functional mobility as indicated by patients Functional Deficits. [x]? Progressing: []? Met: []? Not Met: []? Adjusted  4. Patient will return to functional activities including ability to  light weight items and move them with L UE while completing bathing and other ADLs without increased symptoms or restriction. [x]? Progressing: []? Met: []? Not Met: []? Adjusted  5. Patient will demonstrate ability to use L UE during transfers, while being able to pull and tolerate weight bearing through the L UE.   [x]? Progressing: []? Met: []? Not Met: []? Adjusted       Overall Progression Towards Functional goals/ Treatment Progress Update:  [] Patient is progressing as expected towards functional goals listed. [] Progression is slowed due to complexities/Impairments listed. [] Progression has been slowed due to co-morbidities. [x] Plan just implemented, too soon to assess goals progression <30days   [] Goals require adjustment due to lack of progress  [] Patient is not progressing as expected and requires additional follow up with physician  [] Other    Persisting Functional Limitations/Impairments:  []Sleeping []Sitting               []Standing []Transfers        [x]Walking []Kneeling               [x]Stairs [x]Squatting / bending   [x]ADLs [x]Reaching  []Lifting  []Housework  []Driving []Job related tasks  []Sports/Recreation []Other:        ASSESSMENT: Word search completed this date for patient to work on various shoulder movements as well as controlled eccentric lowering. Discussed with pt performing at home and pt agreeable. Will complete reassessment at upcoming visits and potential add aquatic visits to improve movement.    Treatment/Activity Tolerance:  [] Patient able to complete tx  [] Patient limited by fatigue  [] Patient limited by pain  [x] Patient limited by other medical complications  [] Other:     Prognosis: [] Good [x] Fair  [] Poor    Patient Requires Follow-up: [x] Yes  [] No    Plan for next treatment session:    PLAN: See eval. PT 3x / week for 5 weeks. [x] Continue per plan of care [] Alter current plan (see comments)  [] Plan of care initiated [] Hold pending MD visit [] Discharge    Electronically signed by: Edouard Foster PT  DPT    Note: If patient does not return for scheduled/ recommended follow up visits, this note will serve as a discharge from care along with most recent update on progress.

## 2022-01-04 ENCOUNTER — OFFICE VISIT (OUTPATIENT)
Dept: INTERNAL MEDICINE CLINIC | Age: 72
End: 2022-01-04
Payer: MEDICARE

## 2022-01-04 VITALS — BODY MASS INDEX: 33.17 KG/M2 | SYSTOLIC BLOOD PRESSURE: 171 MMHG | HEIGHT: 67 IN | DIASTOLIC BLOOD PRESSURE: 82 MMHG

## 2022-01-04 DIAGNOSIS — R60.0 BILATERAL LOWER EXTREMITY EDEMA: ICD-10-CM

## 2022-01-04 DIAGNOSIS — G81.94 LEFT HEMIPARESIS (HCC): ICD-10-CM

## 2022-01-04 DIAGNOSIS — F32.4 MAJOR DEPRESSIVE DISORDER WITH SINGLE EPISODE, IN PARTIAL REMISSION (HCC): ICD-10-CM

## 2022-01-04 DIAGNOSIS — I10 ESSENTIAL HYPERTENSION: Primary | ICD-10-CM

## 2022-01-04 DIAGNOSIS — R06.2 WHEEZING: ICD-10-CM

## 2022-01-04 PROCEDURE — G8399 PT W/DXA RESULTS DOCUMENT: HCPCS | Performed by: HOSPITALIST

## 2022-01-04 PROCEDURE — G8484 FLU IMMUNIZE NO ADMIN: HCPCS | Performed by: HOSPITALIST

## 2022-01-04 PROCEDURE — 1090F PRES/ABSN URINE INCON ASSESS: CPT | Performed by: HOSPITALIST

## 2022-01-04 PROCEDURE — 3017F COLORECTAL CA SCREEN DOC REV: CPT | Performed by: HOSPITALIST

## 2022-01-04 PROCEDURE — 99214 OFFICE O/P EST MOD 30 MIN: CPT | Performed by: HOSPITALIST

## 2022-01-04 PROCEDURE — G8417 CALC BMI ABV UP PARAM F/U: HCPCS | Performed by: HOSPITALIST

## 2022-01-04 PROCEDURE — 4040F PNEUMOC VAC/ADMIN/RCVD: CPT | Performed by: HOSPITALIST

## 2022-01-04 PROCEDURE — G8427 DOCREV CUR MEDS BY ELIG CLIN: HCPCS | Performed by: HOSPITALIST

## 2022-01-04 PROCEDURE — 1123F ACP DISCUSS/DSCN MKR DOCD: CPT | Performed by: HOSPITALIST

## 2022-01-04 PROCEDURE — 1036F TOBACCO NON-USER: CPT | Performed by: HOSPITALIST

## 2022-01-04 RX ORDER — HYDRALAZINE HYDROCHLORIDE 25 MG/1
25 TABLET, FILM COATED ORAL EVERY 8 HOURS SCHEDULED
Qty: 270 TABLET | Refills: 1 | Status: SHIPPED | OUTPATIENT
Start: 2022-01-04 | End: 2022-07-01

## 2022-01-04 RX ORDER — CHLORTHALIDONE 25 MG/1
25 TABLET ORAL DAILY
Qty: 30 TABLET | Refills: 3 | Status: SHIPPED | OUTPATIENT
Start: 2022-01-04 | End: 2022-02-10 | Stop reason: SDUPTHER

## 2022-01-04 RX ORDER — ALBUTEROL SULFATE 90 UG/1
2 AEROSOL, METERED RESPIRATORY (INHALATION) 4 TIMES DAILY PRN
Qty: 54 G | Refills: 1 | Status: SHIPPED | OUTPATIENT
Start: 2022-01-04

## 2022-01-04 ASSESSMENT — ENCOUNTER SYMPTOMS
GASTROINTESTINAL NEGATIVE: 1
EYES NEGATIVE: 1
SHORTNESS OF BREATH: 1
WHEEZING: 1

## 2022-01-04 NOTE — PROGRESS NOTES
Outpatient Note for Established Patient Visit    Patient:  Ranulfo Sr                                               : 1950  Age: 70 y.o. MRN: [id number]  Date : 2022    History Obtained From:  patient      OF PRESENT ILLNESS:   The patient is a 70 y.o. female who presents to establish her care. Her previous primary care physician, Dr. Lugo , has recently retired. Had a stroke in 2020 with residual L sided body weakness. Had several episodes of slurred speech and facial droop in 2021. Had CT scan of the head without IV contrast on 2021:  Slightly asymmetric hypodensity in the right external capsule is nonspecific. Although this is likely related to chronic small vessel ischemic change   superimposed acute ischemia cannot be excluded.  If there is high clinical   concern for ischemia in this region further evaluation with MRI would be   helpful. She also had CTA head/neck on 2020:  1. Approximately 40% left cervical internal carotid artery stenosis by NASCET   criteria. 2. Otherwise, no hemodynamically significant stenosis or branch occlusion in   the cervical arterial circulation. 3. No hemodynamically significant stenosis or branch occlusion in the   intracranial arterial circulation. MRI of the brain was performed on 2020:  Acute infarcts in the right basal ganglia and anterior temporal lobe.             Was started on Clopidogrel about 1 month ago by her neurologist, Dr. Timothy Maria.  + chronic tiredness. + wheezing secondary to asthma. Ran out of Albuterl- her rescue inhaler last week. + bilateral foot/ ankle swelling despite wearing compression stockings. Checks her BP about once/ week. Reports bilateral leg swelling which is worse by the end of the day. Tries to wear knee-high compression stockings daily. He had CT scan of the abdomen and pelvis on 2020 to evaluate for lower abdominal pain:  1.  No acute findings within the abdomen or pelvis to explain the patient's   pain.  There is no evidence of abdominal wall hernia.  Diverticulosis with no   acute features. 2. No evidence of obstructive uropathy.  Punctate nonobstructive left   nephrolithiasis. 3. 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  Interval treatment of bilateral sacral insufficiency fractures   with methacrylate cement.          Past Medical History:        Diagnosis Date    Arthritis     Compression fracture     back due to fall    Concussion     due to fall    DVT (deep venous thrombosis) ([address] 2017    RLE after TKR    Environmental allergies     Essential hypertension, benign 2010    GERD (gastroesophageal reflux disease)     History of peptic ulcer     Hx of blood clots     Hyperlipidemia 2010    Lacunar infarction [facility])     old - per MRI     MRSA (methicillin resistant staph aureus) culture positive 2018    knee    Restrictive airway disease     allergy induced    Retention of urine     Wound, open 2018    left shin area, healing well, tx in wound care center       Past Surgical History:        Procedure Laterality Date     SECTION      times 2    CHOLECYSTECTOMY      COLONOSCOPY      HYSTERECTOMY      KNEE ARTHROPLASTY Right 2018     RIGHT REVISION TOTAL KNEE ARTHROPLASTY    KNEE ARTHROSCOPY Left     Dr. Rigoberto Gusman Right 2018    RIGHT REVISION ARTHROSCOPY FEMORAL COMPONENT, SYNOVECTOMY performed by Hyun Hunter MD at [address] Right 2018    RIGHT KNEE QUADRICEP TENDON REPAIR WITH ALLOGRAFT AUGMENTATION performed by Hyun Hunter MD at [address] [place] [address] ARTHROSCOPY Right     TOTAL KNEE ARTHROPLASTY Right 2017       Family History:       Problem Relation Age of Onset    Cancer Other     Hypertension Other     Kidney Disease Other        Social History: TOBACCO:   reports that she quit smoking about 16 years ago. Her smoking use included cigarettes. She has a 35.00 pack-year smoking history. She has never used smokeless tobacco.  ETOH:   reports current alcohol use. OCCUPATION: retired. . Ambulates in wheelchair. Allergies:  Codeine, Demerol, Morphine, Tape [adhesive tape], Cabbage, and Erythromycin    Current Medications:    Prior to Admission medications    Medication Sig Start Date End Date Taking? Authorizing Provider   chlorthalidone (HYGROTON) 25 MG tablet Take 1 tablet by mouth daily 1/4/22  Yes Kelle Burgess MD   hydrALAZINE (APRESOLINE) 25 MG tablet Take 1 tablet by mouth every 8 hours 1/4/22  Yes Kelle Burgess MD   albuterol sulfate HFA (VENTOLIN HFA) 108 (90 Base) MCG/ACT inhaler Inhale 2 puffs into the lungs 4 times daily as needed for Wheezing 1/4/22  Yes Kelle Burgess MD   clopidogrel (PLAVIX) 75 MG tablet Take 1 tablet by mouth daily 11/10/21  Yes Pennie Malhotra MD   oxybutynin (DITROPAN-XL) 10 MG extended release tablet Take 1 tablet by mouth daily 11/2/21  Yes Pennie Malhotra MD   BREO ELLIPTA 100-25 MCG/INH AEPB inhaler USE 1 INHALATION ORALLY    DAILY 10/5/21  Yes Pennie Malhotra MD   eszopiclone (ESZOPICLONE) 3 MG TABS Take 1 tablet by mouth nightly as needed (sleep).  10/4/21 10/4/22 Yes Pennie Malhotra MD   diclofenac (VOLTAREN) 75 MG EC tablet TAKE 1 TABLET TWICE A DAY  AFTER MEALS 8/26/21  Yes Pennie Malhotra MD   omeprazole (PRILOSEC) 20 MG delayed release capsule TAKE 1 CAPSULE BY MOUTH EVERY MORNING BEFORE BREAKFAST 7/22/21  Yes Pennie Malhotra MD   escitalopram (LEXAPRO) 10 MG tablet TAKE 1 TABLET DAILY 7/22/21  Yes Pennie Malhotra MD   atorvastatin (LIPITOR) 80 MG tablet Take 1 tablet by mouth nightly 7/14/21  Yes Pennie Malhotra MD   NIFEdipine (ADALAT CC) 30 MG extended release tablet Take 1 tablet by mouth 2 times daily 7/14/21  Yes Pennie Malhotra MD   oxyCODONE-acetaminophen (PERCOCET) 5-325 MG per tablet Take 1 tablet by mouth every 6 hours as needed for Pain. 5/25/21 5/25/22 Yes Natalie Samson MD   ondansetron [facility]) 4 MG tablet Take 1 tablet by mouth 3 times daily as needed for Nausea or Vomiting 5/25/21  Yes Natalie Samson MD   KLOR-CON M20 20 MEQ extended release tablet TAKE 1 TABLET DAILY 10/16/20  Yes Natalie Samson MD       REVIEW OF SYSTEMS:  Review of Systems   Constitutional: Positive for fatigue. HENT: Positive for congestion and postnasal drip. Eyes: Negative. Respiratory: Positive for shortness of breath (mild) and wheezing. Cardiovascular: Positive for leg swelling. Gastrointestinal: Negative. Endocrine: Negative. Genitourinary: Negative. Musculoskeletal: Positive for arthralgias (feet). Allergic/Immunologic: Positive for food allergies (cabbage). Neurological: Positive for tremors (occasional hand tremor). Psychiatric/Behavioral: Negative. Physical Exam:      Vitals: BP (!) 171/82   Ht 5' 7\" (1.702 m)   BMI 33.17 kg/m²     Body mass index is 33.17 kg/m². Wt Readings from Last 3 Encounters:   11/23/20 211 lb 12.8 oz (96.1 kg)   04/16/20 214 lb (97.1 kg)   05/17/19 214 lb (97.1 kg)     Physical Exam  Vitals and nursing note reviewed. Constitutional:       General: She is not in acute distress. Appearance: Normal appearance. She is well-developed. She is obese. HENT:      Head: Normocephalic and atraumatic. Comments: + mild left facial droop     Right Ear: Tympanic membrane, ear canal and external ear normal. There is no impacted cerumen. Left Ear: Tympanic membrane, ear canal and external ear normal. There is no impacted cerumen. Nose: Nose normal.      Mouth/Throat:      Mouth: Mucous membranes are moist.      Pharynx: Oropharynx is clear. No oropharyngeal exudate or posterior oropharyngeal erythema. Eyes:      General: No scleral icterus. Extraocular Movements: Extraocular movements intact.       Pupils: Pupils are equal, round, and reactive to light.   Neck:      Vascular: No carotid bruit or JVD. Cardiovascular:      Rate and Rhythm: Normal rate and regular rhythm. Pulses: Normal pulses. Heart sounds: Normal heart sounds. No murmur heard. No friction rub. No gallop. Pulmonary:      Effort: Pulmonary effort is normal. No respiratory distress. Breath sounds: Normal breath sounds. No wheezing or rales. Abdominal:      General: Bowel sounds are normal. There is no distension. Palpations: Abdomen is soft. Tenderness: There is no abdominal tenderness. There is no right CVA tenderness or left CVA tenderness. Musculoskeletal:         General: Normal range of motion. Cervical back: Normal range of motion and neck supple. Right lower leg: Edema present. Left lower leg: Edema present. Comments: 1-2+ below the knee bilateral lower extremity edema   Lymphadenopathy:      Cervical: No cervical adenopathy. Skin:     General: Skin is warm and dry. Findings: Bruising (On the dorsal surface of both forearms and hands) present. Neurological:      Mental Status: She is alert and oriented to person, place, and time. Cranial Nerves: No cranial nerve deficit. Comments: There is mild left-sided body weakness. Psychiatric:         Mood and Affect: Mood normal.            Assessment/Plan:   Babatunde Ma was seen today for established new doctor. Diagnoses and all orders for this visit:    Essential hypertension  -     Will add chlorthalidone (HYGROTON) 25 MG tablet; Take 1 tablet by mouth daily  -     RENAL FUNCTION PANEL; Future  -     Magnesium; Future  -    Continue  hydrALAZINE (APRESOLINE) 25 MG tablet;  Take 1 tablet by mouth every 8 hours  -      Continue nifedipine 30 mg daily    Major depressive disorder with single episode, in partial remission (HCC)        -     Stable; continue Lexapro 10 mg daily    Left hemiparesis (HCC)         -    Stable; continue with home exercises         -    Safety tips were provided  Continue clopidogrel; will discontinue aspirin    Wheezing  -     albuterol sulfate HFA (VENTOLIN HFA) 108 (90 Base) MCG/ACT inhaler; Inhale 2 puffs into the lungs 4 times daily as needed for Wheezing    Primary insomnia  -      Continue to use eszopiclone 3 mg nightly as needed    Bilateral lower extremity edema  -      Continue regular use of knee-high compression stockings; keep feet elevated whenever possible  -      Chlorthalidone 25 mg daily          Return in about 1 month (around 2/4/2022) for HTN, wheezing, leg swelling.      Yousuf Buchanan MD, M.D.   1/4/2022, 5:14 PM

## 2022-01-05 ENCOUNTER — HOSPITAL ENCOUNTER (OUTPATIENT)
Dept: PHYSICAL THERAPY | Age: 72
Setting detail: THERAPIES SERIES
Discharge: HOME | End: 2022-01-05
Payer: MEDICARE

## 2022-01-05 PROCEDURE — 97110 THERAPEUTIC EXERCISES: CPT

## 2022-01-05 NOTE — FLOWSHEET NOTE
[facility]  Phone: [phone]   Fax: [phone]  Physical Therapy Re-Certification Plan of Care    Dear   Estrella Kemp MD,      We had the pleasure of treating the following patient for physical therapy services at [facility]. A summary of our findings can be found in the updated assessment below. This includes our plan of care. If you have any questions or concerns regarding these findings, please do not hesitate to contact me at the office phone number checked above. Thank you for the referral.     This patient has previously been seen by Dr Srathak Snell, but plans on continuing care with you now that Dr. Sarthak Snell is retiring. Physician Signature:________________________________Date:__________________  By signing above (or electronic signature), therapist's plan is approved by physician       AROM  SEATED AROM SUPINE     L R (from eval) L  (measured 12/22)   Shoulder Flexion  95 86 140 120   Shoulder Abduction  72 160 80   Shoulder External Rotation  Able to reach ipsilateral side of head  (mostly elbow flexion) Top of R shoulder  26 (GHJ ~35 deg abd)   Shoulder Internal Rotation  Sacrum Upper lumbar spine     AAROM L renetta flex with pulleys: 122 deg     Overall Response to Treatment:   []Patient is responding well to treatment and improvement is noted with regards  to goals   []Patient should continue to improve in reasonable time if they continue HEP   []Patient has plateaued and is no longer responding to skilled PT intervention    []Patient is getting worse and would benefit from return to referring MD   []Patient unable to adhere to initial POC   [x]Other: Pt seen in PT so far to improve R shoulder ROM and strength for improved function.  Pt may benefit from aquatic therapy as pt unable to stand upright on land due to R knee flexion contracture which limits amount of shoulder exercises that can be performed. Will continue for 4 weeks and reassess to see if progress has been made. Date range of Visits: 21 - 22  Total Visits: 14    Recommendation:    [x]Continue PT 2x / wk for 4 weeks. []Hold PT, pending MD visit        Physical Therapy Daily Treatment Note  Date:  2022    Patient Name:  Sal Breen    :  1950  MRN: [id number]  Medical/Treatment Diagnosis Information:  Diagnosis: R29.898 - Left arm weakness  Treatment Diagnosis: Decreased AROM and strength of L UE, decreased functional mobility due to R knee contracture and R LE weakness, decreased functional use of L UE causing difficulty with ADLs  Insurance/Certification information:  PT Insurance Information: Medicare  Physician Information:  Referring Practitioner: Tarun Jones MD ( MD is retiring) - pt will follow up with Dr Harjit Beach who is in the same office   Plan of care signed (Y/N): [x]  Yes []  No     Date of Patient follow up with Physician: 2/10/22     Progress Report: [x]  Yes  []  No     Date Range for reporting period:  Beginnin/22  PN:   POC: 22  Ending:     Progress report due (10 Rx/or 30 days whichever is less): visit #63 or     Recertification due (POC duration/ or 90 days whichever is less): visit #15 or 22     Visit # Insurance Allowable Auth required? Date Range   14/15 + 0/8 2022 visits: 1 []  Yes  [x]  No n/a       Latex Allergy:  [x]NO      []YES  Preferred Language for Healthcare:   [x]English       []other:    Functional Scale:        Date assessed:  QuickDASH: raw score = 40; dysfunction = 66%  21    Pain level:  0/10     SUBJECTIVE: Pt reports she has been getting bored with what she has been doing at home and is hoping there is more stuff she can do.        OBJECTIVE: : AAROM L renetta flex with pulleys: 124 deg   :    PROM AROM  SEATED AROM SUPINE     L R L R L   Shoulder Flexion  150   95 140 120   Shoulder Abduction  90   70 160 80 Shoulder External Rotation      Able to reach shouder (mostly elbow flexion) Top of R shoulder  26 (GHJ ~35 deg abd)   Shoulder Internal Rotation      Sacrum Upper lumbar spine     AAROM L renetta flex with pulleys: 124 deg         RESTRICTIONS/PRECAUTIONS: Uses electric scooter for primary mode of transportation, R knee contracture (about 90 deg flexion), able to transfer with SBA    Exercises/Interventions:     Therapeutic Exercises (27743) Resistance / level Sets/sec Reps Notes   Scifit StepOne 1 X 11 min  1050 steps  R LE needs yellow strap, done for endurance as well as stretching for L UE and R knee    Pulleys  4min  Done in electric scooter          UE [place] on L  - Flexion  - Abduction   - ER      , cued to push to end range of motion and then pause    Seated EOB wand lift overhead        Reaching with L UE  - supine, ~40 deg flexion, ~75 deg flex, ~      Supine with 2 pillows,    Seated EOB scap squeezes      Sitting in scooter - Bicep curls on L arm [place] TB            Assessment of ROM for POC, discussion of continuing POC and adding aquatic visits 1/5/22             Therapeutic Activities (99691)              Ballet barre - partial stand from scooter  - requires use of forearms on bar to stand, then once in partial stand can get forearms off of bar.                   Sitting in scooter - using wash cloth along bar   -elbow ext and horiz abd   - flexion/ext     Facing bar  // to bar       Stair rail flexion slides   2 Slight OP from PT at end range, 2nd set used a washcloth    Seated EOB - pushing wand away with resistance from PT    Pushing wand away and up             Sitting EOB at dry erase easel - working on L shoulder ROM  - tracing shapes/connecting dots  - reaching high with controlled lowering  - word search puzzle             Intermittent assist from PT, yuri at end range           Transfers to/from electric scooter   X 1 CGA          Neuromuscular Re-ed (38292) Manual Intervention ([id number])       PROM L shoulder - all directions with distraction    Done while assessing ROM                                          Modalities:     Pt. Education:  -patient educated on diagnosis, prognosis and expectations for rehab  -all patient questions were answered    Home Exercise Program:      Therapeutic Exercise and NMR EXR  [x] (58293) Provided verbal/tactile cueing for activities related to strengthening, flexibility, endurance, ROM for improvements in  [] LE / Lumbar: LE, proximal hip, and core control with self care, mobility, lifting, ambulation. [x] UE / Cervical: cervical, postural, scapular, scapulothoracic and UE control with self care, reaching, carrying, lifting, house/yardwork, driving, computer work.  [] (26809) Provided verbal/tactile cueing for activities related to improving balance, coordination, kinesthetic sense, posture, motor skill, proprioception to assist with   [] LE / lumbar: LE, proximal hip, and core control in self care, mobility, lifting, ambulation and eccentric single leg control. [] UE / cervical: cervical, scapular, scapulothoracic and UE control with self care, reaching, carrying, lifting, house/yardwork, driving, computer work.   [] (40583) Therapist is in constant attendance of 2 or more patients providing skilled therapy interventions, but not providing any significant amount of measurable one-on-one time to either patient, for improvements in  [] LE / lumbar: LE, proximal hip, and core control in self care, mobility, lifting, ambulation and eccentric single leg control. [] UE / cervical: cervical, scapular, scapulothoracic and UE control with self care, reaching, carrying, lifting, house/yardwork, driving, computer work.      NMR and Therapeutic Activities:    [] (20298 or 36323) Provided verbal/tactile cueing for activities related to improving balance, coordination, kinesthetic sense, posture, motor skill, proprioception and motor activation to allow for proper function of   [] LE: / Lumbar core, proximal hip and LE with self care and ADLs  [] UE / Cervical: cervical, postural, scapular, scapulothoracic and UE control with self care, carrying, lifting, driving, computer work.   [] (25664) Gait Re-education- Provided training and instruction to the patient for proper LE, core and proximal hip recruitment and positioning and eccentric body weight control with ambulation re-education including up and down stairs     Home Management Training / Self Care:  [] (24049) Provided self-care/home management training related to activities of daily living and compensatory training, and/or use of adaptive equipment for improvement with: ADLs and compensatory training, meal preparation, safety procedures and instruction in use of adaptive equipment, including bathing, grooming, dressing, personal hygiene, basic household cleaning and chores.      Home Exercise Program:    [x] (61259) Reviewed/Progressed HEP activities related to strengthening, flexibility, endurance, ROM of   [] LE / Lumbar: core, proximal hip and LE for functional self-care, mobility, lifting and ambulation/stair navigation   [] UE / Cervical: cervical, postural, scapular, scapulothoracic and UE control with self care, reaching, carrying, lifting, house/yardwork, driving, computer work  [] (62222)Reviewed/Progressed HEP activities related to improving balance, coordination, kinesthetic sense, posture, motor skill, proprioception of   [] LE: core, proximal hip and LE for self care, mobility, lifting, and ambulation/stair navigation    [] UE / Cervical: cervical, postural,  scapular, scapulothoracic and UE control with self care, reaching, carrying, lifting, house/yardwork, driving, computer work    Manual Treatments:  PROM / STM / Oscillations-Mobs:  G-I, II, III, IV (PA's, Inf., Post.)  [] (53869) Provided manual therapy to mobilize LE, proximal hip and/or LS spine soft tissue/joints for the purpose of modulating pain, promoting relaxation,  increasing ROM, reducing/eliminating soft tissue swelling/inflammation/restriction, improving soft tissue extensibility and allowing for proper ROM for normal function with   [] LE / lumbar: self care, mobility, lifting and ambulation. [] UE / Cervical: self care, reaching, carrying, lifting, house/yardwork, driving, computer work. Modalities:  [] (35219) Vasopneumatic compression: Utilized vasopneumatic compression to decrease edema / swelling for the purpose of improving mobility and quad tone / recruitment which will allow for increased overall function including but not limited to self-care, transfers, ambulation, and ascending / descending stairs. Charges:  Timed Code Treatment Minutes: 40   Total Treatment Minutes: 40     [] EVAL - LOW (19018)   [] EVAL - MOD (96528)  [] EVAL - HIGH (95590)  [] RE-EVAL (65502)  [x] HL(46220) x 3   [] Ionto  [] NMR (88272) x       [] Vaso  [] Manual (79035) x      [] Ultrasound  [] TA x 1        [] Mech Traction (71745)  [] Aquatic Therapy x     [] ES (un) (41664):   [] Home Management Training x  [] ES(attended) (93045)   [] Dry Needling 1-2 muscles (81239):  [] Dry Needling 3+ muscles (875213)  [] Group:      [] Other:     GOALS:   Patient stated goal: to get better use of left arm and leg   []? Progressing: []? Met: []? Not Met: []? Adjusted     Therapist goals for Patient:   Short Term Goals: To be achieved in: 2 weeks  1. Independent in HEP and progression per patient tolerance, in order to prevent re-injury. []? Progressing: []? Met: []? Not Met: []? Adjusted  2. Patient will have a decrease in pain to facilitate improvement in movement, function, and ADLs as indicated by Functional Deficits. []? Progressing: []? Met: []? Not Met: []? Adjusted     Long Term Goals: To be achieved in: 5 weeks  1. Disability index score of 45% or less for the Quick DASH to assist with reaching prior level of function.    []? Progressing: []? Met: []? Not Met: []? Adjusted  2. Patient will demonstrate increased L shoulder abduction and flexion AROM to at least 100 deg to allow for proper joint functioning as indicated by patients Functional Deficits. [x]? Progressing: []? Met: []? Not Met: []? Adjusted  3. Patient will demonstrate an increase in Strength to at least 4-/5 for gross L shoulder strength to allow for proper functional mobility as indicated by patients Functional Deficits. [x]? Progressing: []? Met: []? Not Met: []? Adjusted  4. Patient will return to functional activities including ability to  light weight items and move them with L UE while completing bathing and other ADLs without increased symptoms or restriction. [x]? Progressing: []? Met: []? Not Met: []? Adjusted  5. Patient will demonstrate ability to use L UE during transfers, while being able to pull and tolerate weight bearing through the L UE.   [x]? Progressing: []? Met: []? Not Met: []? Adjusted       Overall Progression Towards Functional goals/ Treatment Progress Update:  [] Patient is progressing as expected towards functional goals listed. [] Progression is slowed due to complexities/Impairments listed. [] Progression has been slowed due to co-morbidities.   [x] Plan just implemented, too soon to assess goals progression <30days   [] Goals require adjustment due to lack of progress  [] Patient is not progressing as expected and requires additional follow up with physician  [] Other    Persisting Functional Limitations/Impairments:  []Sleeping []Sitting               []Standing []Transfers        [x]Walking []Kneeling               [x]Stairs [x]Squatting / bending   [x]ADLs [x]Reaching  []Lifting  []Housework  []Driving []Job related tasks  []Sports/Recreation []Other:        ASSESSMENT: See above   Treatment/Activity Tolerance:  [] Patient able to complete tx  [] Patient limited by fatigue  [] Patient limited by pain  [x] Patient limited by

## 2022-01-07 ENCOUNTER — HOSPITAL ENCOUNTER (OUTPATIENT)
Dept: PHYSICAL THERAPY | Age: 72
Setting detail: THERAPIES SERIES
Discharge: HOME | End: 2022-01-07
Payer: MEDICARE

## 2022-01-07 PROCEDURE — 97110 THERAPEUTIC EXERCISES: CPT

## 2022-01-07 PROCEDURE — 97530 THERAPEUTIC ACTIVITIES: CPT

## 2022-01-07 NOTE — FLOWSHEET NOTE
[facility] Physical Therapy  Phone: [phone]   Fax: [phone]    Physical Therapy Daily Treatment Note  Date:  2022    Patient Name:  Pramod Craft    :  1950  MRN: [id number]  Medical/Treatment Diagnosis Information:  Diagnosis: R29.898 - Left arm weakness  Treatment Diagnosis: Decreased AROM and strength of L UE, decreased functional mobility due to R knee contracture and R LE weakness, decreased functional use of L UE causing difficulty with ADLs  Insurance/Certification information:  PT Insurance Information: Medicare  Physician Information:  Referring Practitioner: Suzi Pritchard MD ( MD is retiring) - pt will follow up with Dr Estrella Kemp who is in the same office   Plan of care signed (Y/N): [x]  Yes []  No     Date of Patient follow up with Physician: 2/10/22     Progress Report: []  Yes  [x]  No     Date Range for reporting period:  Beginnin/22  PN:   POC: 22  Ending:     Progress report due (10 Rx/or 30 days whichever is less): visit #93 or 71/47    Recertification due (POC duration/ or 90 days whichever is less): visit #15 or 22     Visit # Insurance Allowable Auth required?  Date Range   15/15 + 02022 visits: 2 []  Yes  [x]  No n/a       Latex Allergy:  [x]NO      []YES  Preferred Language for Healthcare:   [x]English       []other:    Functional Scale:        Date assessed:  QuickDASH: raw score = 40; dysfunction = 66%  21    Pain level:  0/10     SUBJECTIVE:Pt has been consistent with her HEP including pulleys, TB exercises, and     OBJECTIVE: : AAROM L renetta flex with pulleys: 124 deg   :    PROM AROM  SEATED AROM SUPINE     L R L R L   Shoulder Flexion  150   95 140 120   Shoulder Abduction  90   70 160 80   Shoulder External Rotation      Able to reach shouder (mostly elbow flexion) Top of R shoulder  26 (GHJ ~35 deg abd)   Shoulder Internal Rotation      Sacrum Upper lumbar spine     AAROM L renetta flex with pulleys: 124 deg     1/5:    AROM  SEATED AROM SUPINE     L R (from eval) L  (measured 12/22)   Shoulder Flexion  95 86 140 120   Shoulder Abduction  72 160 80   Shoulder External Rotation  Able to reach ipsilateral side of head  (mostly elbow flexion) Top of R shoulder  26 (GHJ ~35 deg abd)   Shoulder Internal Rotation  Sacrum Upper lumbar spine     AAROM L renetta flex with pulleys: 122 deg       RESTRICTIONS/PRECAUTIONS: Uses electric scooter for primary mode of transportation, R knee contracture (about 90 deg flexion), able to transfer with SBA    Exercises/Interventions:     Therapeutic Exercises (22700) Resistance / level Sets/sec Reps Notes   Scifit StepOne 1 X 10 min  1167 steps  R LE needs yellow strap, done for endurance as well as stretching for L UE and R knee    Pulleys    Done in electric scooter          UE Shanks on L  - Flexion  - Abduction   - ER      , cued to push to end range of motion and then pause    Seated EOB wand lift overhead        Reaching with L UE  - supine, ~40 deg flexion, ~75 deg flex, ~      Supine with 2 pillows,    Seated EOB scap squeezes      Sitting in scooter - Bicep curls on L arm Lime TB            Assessment of ROM for POC, discussion of continuing POC and adding aquatic visits              Therapeutic Activities (44881)              Ballet barre - partial stand from scooter  - requires use of forearms on bar to stand, then once in partial stand can get forearms off of bar.                   Sitting in scooter - using wash cloth along bar   -elbow ext and horiz abd   - flexion/ext     Facing bar  // to bar       Stair rail flexion slides   2 10 Slight OP from PT at end range   Seated EOB - pushing wand away with resistance from PT    Pushing wand away and up             Sitting EOB at dry erase easel - working on L shoulder ROM  - tracing shapes/connecting dots  - reaching high with controlled lowering  - word search puzzle             Intermittent assist from PT, yuri at end range           Transfers to/from electric scooter   X 1 CGA   Sitting in scooter with bedside table:  - folding pillow cases with L arm     ~10 min                               Neuromuscular Re-ed (93481)                                                 Manual Intervention (53177)       PROM L shoulder - all directions with distraction    Done while assessing ROM                                          Modalities:     Pt. Education:  -patient educated on diagnosis, prognosis and expectations for rehab  -all patient questions were answered    Home Exercise Program:      Therapeutic Exercise and NMR EXR  [x] (04852) Provided verbal/tactile cueing for activities related to strengthening, flexibility, endurance, ROM for improvements in  [] LE / Lumbar: LE, proximal hip, and core control with self care, mobility, lifting, ambulation. [x] UE / Cervical: cervical, postural, scapular, scapulothoracic and UE control with self care, reaching, carrying, lifting, house/yardwork, driving, computer work.  [] (91874) Provided verbal/tactile cueing for activities related to improving balance, coordination, kinesthetic sense, posture, motor skill, proprioception to assist with   [] LE / lumbar: LE, proximal hip, and core control in self care, mobility, lifting, ambulation and eccentric single leg control. [] UE / cervical: cervical, scapular, scapulothoracic and UE control with self care, reaching, carrying, lifting, house/yardwork, driving, computer work.   [] (23488) Therapist is in constant attendance of 2 or more patients providing skilled therapy interventions, but not providing any significant amount of measurable one-on-one time to either patient, for improvements in  [] LE / lumbar: LE, proximal hip, and core control in self care, mobility, lifting, ambulation and eccentric single leg control.    [] UE / cervical: cervical, scapular, scapulothoracic and UE control with self care, reaching, carrying, lifting, house/yardwork, driving, computer work. NMR and Therapeutic Activities:    [] (24148 or 46181) Provided verbal/tactile cueing for activities related to improving balance, coordination, kinesthetic sense, posture, motor skill, proprioception and motor activation to allow for proper function of   [] LE: / Lumbar core, proximal hip and LE with self care and ADLs  [] UE / Cervical: cervical, postural, scapular, scapulothoracic and UE control with self care, carrying, lifting, driving, computer work.   [] (02863) Gait Re-education- Provided training and instruction to the patient for proper LE, core and proximal hip recruitment and positioning and eccentric body weight control with ambulation re-education including up and down stairs     Home Management Training / Self Care:  [] (51289) Provided self-care/home management training related to activities of daily living and compensatory training, and/or use of adaptive equipment for improvement with: ADLs and compensatory training, meal preparation, safety procedures and instruction in use of adaptive equipment, including bathing, grooming, dressing, personal hygiene, basic household cleaning and chores.      Home Exercise Program:    [x] (51318) Reviewed/Progressed HEP activities related to strengthening, flexibility, endurance, ROM of   [] LE / Lumbar: core, proximal hip and LE for functional self-care, mobility, lifting and ambulation/stair navigation   [] UE / Cervical: cervical, postural, scapular, scapulothoracic and UE control with self care, reaching, carrying, lifting, house/yardwork, driving, computer work  [] (94259)Reviewed/Progressed HEP activities related to improving balance, coordination, kinesthetic sense, posture, motor skill, proprioception of   [] LE: core, proximal hip and LE for self care, mobility, lifting, and ambulation/stair navigation    [] UE / Cervical: cervical, postural,  scapular, scapulothoracic and UE control with self care, reaching, carrying, lifting, house/yardwork, driving, computer work    Manual Treatments:  PROM / STM / Oscillations-Mobs:  G-I, II, III, IV (PA's, Inf., Post.)  [] (04118) Provided manual therapy to mobilize LE, proximal hip and/or LS spine soft tissue/joints for the purpose of modulating pain, promoting relaxation,  increasing ROM, reducing/eliminating soft tissue swelling/inflammation/restriction, improving soft tissue extensibility and allowing for proper ROM for normal function with   [] LE / lumbar: self care, mobility, lifting and ambulation. [] UE / Cervical: self care, reaching, carrying, lifting, house/yardwork, driving, computer work. Modalities:  [] (14893) Vasopneumatic compression: Utilized vasopneumatic compression to decrease edema / swelling for the purpose of improving mobility and quad tone / recruitment which will allow for increased overall function including but not limited to self-care, transfers, ambulation, and ascending / descending stairs. Charges:  Timed Code Treatment Minutes: 40   Total Treatment Minutes: 40     [] EVAL - LOW (01770)   [] EVAL - MOD (21976)  [] EVAL - HIGH (95105)  [] RE-EVAL (37775)  [x] TU(46120) x 1   [] Ionto  [] NMR (91455) x       [] Vaso  [] Manual (97762) x      [] Ultrasound  [x] TA x 2        [] Mech Traction (35061)  [] Aquatic Therapy x     [] ES (un) (87376):   [] Home Management Training x  [] ES(attended) (18059)   [] Dry Needling 1-2 muscles (64962):  [] Dry Needling 3+ muscles (179089)  [] Group:      [] Other:     GOALS:   Patient stated goal: to get better use of left arm and leg   []? Progressing: []? Met: []? Not Met: []? Adjusted     Therapist goals for Patient:   Short Term Goals: To be achieved in: 2 weeks  1. Independent in HEP and progression per patient tolerance, in order to prevent re-injury. []? Progressing: []? Met: []? Not Met: []? Adjusted  2.  Patient will have a decrease in pain to facilitate improvement in movement, function, and ADLs as indicated by Functional Deficits. []? Progressing: []? Met: []? Not Met: []? Adjusted     Long Term Goals: To be achieved in: 5 weeks  1. Disability index score of 45% or less for the Quick DASH to assist with reaching prior level of function. []? Progressing: []? Met: []? Not Met: []? Adjusted  2. Patient will demonstrate increased L shoulder abduction and flexion AROM to at least 100 deg to allow for proper joint functioning as indicated by patients Functional Deficits. [x]? Progressing: []? Met: []? Not Met: []? Adjusted  3. Patient will demonstrate an increase in Strength to at least 4-/5 for gross L shoulder strength to allow for proper functional mobility as indicated by patients Functional Deficits. [x]? Progressing: []? Met: []? Not Met: []? Adjusted  4. Patient will return to functional activities including ability to  light weight items and move them with L UE while completing bathing and other ADLs without increased symptoms or restriction. [x]? Progressing: []? Met: []? Not Met: []? Adjusted  5. Patient will demonstrate ability to use L UE during transfers, while being able to pull and tolerate weight bearing through the L UE.   [x]? Progressing: []? Met: []? Not Met: []? Adjusted       Overall Progression Towards Functional goals/ Treatment Progress Update:  [] Patient is progressing as expected towards functional goals listed. [] Progression is slowed due to complexities/Impairments listed. [] Progression has been slowed due to co-morbidities.   [x] Plan just implemented, too soon to assess goals progression <30days   [] Goals require adjustment due to lack of progress  [] Patient is not progressing as expected and requires additional follow up with physician  [] Other    Persisting Functional Limitations/Impairments:  []Sleeping []Sitting               []Standing []Transfers        [x]Walking []Kneeling               [x]Stairs [x]Squatting /

## 2022-01-10 ENCOUNTER — HOSPITAL ENCOUNTER (OUTPATIENT)
Dept: PHYSICAL THERAPY | Age: 72
Setting detail: THERAPIES SERIES
Discharge: HOME | End: 2022-01-10
Payer: MEDICARE

## 2022-01-10 PROCEDURE — 97530 THERAPEUTIC ACTIVITIES: CPT

## 2022-01-10 PROCEDURE — 97110 THERAPEUTIC EXERCISES: CPT

## 2022-01-10 NOTE — FLOWSHEET NOTE
[address] [facility] Physical Therapy  Phone: [phone]   Fax: [phone]    Physical Therapy Daily Treatment Note  Date:  1/10/2022    Patient Name:  Yeny Schwartz    :  1950  MRN: [id number]  Medical/Treatment Diagnosis Information:  Diagnosis: R29.898 - Left arm weakness  Treatment Diagnosis: Decreased AROM and strength of L UE, decreased functional mobility due to R knee contracture and R LE weakness, decreased functional use of L UE causing difficulty with ADLs  Insurance/Certification information:  PT Insurance Information: Medicare  Physician Information:  Referring Practitioner: Clarita Nunes MD ( MD is retiring) - pt will follow up with Dr Geronimo iHnojosa who is in the same office   Plan of care signed (Y/N): [x]  Yes []  No     Date of Patient follow up with Physician: 2/10/22     Progress Report: []  Yes  [x]  No     Date Range for reporting period:  Beginnin/22  PN:   POC: 22  Ending:     Progress report due (10 Rx/or 30 days whichever is less): visit #81 or 74/37    Recertification due (POC duration/ or 90 days whichever is less): visit #15 or 22     Visit # Insurance Allowable Auth required? Date Range   15/15 +  visits: 3 []  Yes  [x]  No n/a       Latex Allergy:  [x]NO      []YES  Preferred Language for Healthcare:   [x]English       []other:    Functional Scale:        Date assessed:  QuickDASH: raw score = 40; dysfunction = 66%  21    Pain level:  0/10     SUBJECTIVE:Pt reports some soreness in the muscles in her R leg. She increased her HEP from 15 reps to 20.      OBJECTIVE: : AAROM L renetta flex with pulleys: 124 deg   :    PROM AROM  SEATED AROM SUPINE     L R L R L   Shoulder Flexion  150   95 140 120   Shoulder Abduction  90   70 160 80   Shoulder External Rotation      Able to reach shouder (mostly elbow flexion) Top of R shoulder  26 (GHJ ~35 deg abd)   Shoulder Internal Rotation      Sacrum Upper lumbar spine     AAROM L renetta flex with pulleys: 124 deg     1/5:    AROM  SEATED AROM SUPINE     L R (from eval) L  (measured 12/22)   Shoulder Flexion  95 86 140 120   Shoulder Abduction  72 160 80   Shoulder External Rotation  Able to reach ipsilateral side of head  (mostly elbow flexion) Top of R shoulder  26 (GHJ ~35 deg abd)   Shoulder Internal Rotation  Sacrum Upper lumbar spine     AAROM L renetta flex with pulleys: 122 deg       RESTRICTIONS/PRECAUTIONS: Uses electric scooter for primary mode of transportation, R knee contracture (about 90 deg flexion), able to transfer with SBA    Exercises/Interventions:     Therapeutic Exercises (44774) Resistance / level Sets/sec Reps Notes   Scifit StepOne 1 X 11 min  1236 steps  R LE needs yellow strap, done for endurance as well as stretching for L UE and R knee    Pulleys    Done in electric scooter          UE War on L  - Flexion  - Abduction   - ER      , cued to push to end range of motion and then pause    Seated EOB wand lift overhead        Reaching with L UE  - supine, ~40 deg flexion, ~75 deg flex, ~      Supine with 2 pillows,    Seated EOB scap squeezes      Sitting in scooter - Bicep curls on L arm Lime TB            Assessment of ROM for POC, discussion of continuing POC and adding aquatic visits              Therapeutic Activities (28944)              Ballet barre - partial stand from scooter  - requires use of forearms on bar to stand, then once in partial stand can get forearms off of bar.    Trials:  30\"  45\"                Sitting in scooter - using wash cloth along bar   -elbow ext and horiz abd   - flexion/ext     Facing bar  // to bar  15  15     Stair rail flexion slides   Slight OP from PT at end range   Seated EOB - pushing wand away with resistance from PT    Pushing wand away and up    10      10 1/10: + 10 reps w/o resistance for warm up          Sitting EOB at dry erase easel - working on L shoulder ROM  - tracing shapes/connecting dots  - Ladder Pull      Pec Stretch            Core: Other:    TrA set      Pelvic Tilts      Multifidi Walk outs c paddle      PNF Chop/Lifts                          Aquatic Abbreviation Key  B= Belt DB= Dumbells T= Theratube   H= Hydrotone N= Noodles W= Weights   P= Paddles S= Speedo equipment K= Kickboard      Pt. Education: Gave pt tour of pool, explained how to use lockers, what to wear, that it would be in group setting, and showed pt aquatic equipment. .  Modalities:     Pt. Education:  -patient educated on diagnosis, prognosis and expectations for rehab  -all patient questions were answered    Home Exercise Program:      Therapeutic Exercise and NMR EXR  [x] (06889) Provided verbal/tactile cueing for activities related to strengthening, flexibility, endurance, ROM for improvements in  [] LE / Lumbar: LE, proximal hip, and core control with self care, mobility, lifting, ambulation. [x] UE / Cervical: cervical, postural, scapular, scapulothoracic and UE control with self care, reaching, carrying, lifting, house/yardwork, driving, computer work.  [] (86534) Provided verbal/tactile cueing for activities related to improving balance, coordination, kinesthetic sense, posture, motor skill, proprioception to assist with   [] LE / lumbar: LE, proximal hip, and core control in self care, mobility, lifting, ambulation and eccentric single leg control. [] UE / cervical: cervical, scapular, scapulothoracic and UE control with self care, reaching, carrying, lifting, house/yardwork, driving, computer work.   [] (12460) Therapist is in constant attendance of 2 or more patients providing skilled therapy interventions, but not providing any significant amount of measurable one-on-one time to either patient, for improvements in  [] LE / lumbar: LE, proximal hip, and core control in self care, mobility, lifting, ambulation and eccentric single leg control.    [] UE / cervical: cervical, scapular, scapulothoracic and UE control with self care, reaching, carrying, lifting, house/yardwork, driving, computer work. NMR and Therapeutic Activities:    [] (11638 or 93008) Provided verbal/tactile cueing for activities related to improving balance, coordination, kinesthetic sense, posture, motor skill, proprioception and motor activation to allow for proper function of   [] LE: / Lumbar core, proximal hip and LE with self care and ADLs  [] UE / Cervical: cervical, postural, scapular, scapulothoracic and UE control with self care, carrying, lifting, driving, computer work.   [] (18658) Gait Re-education- Provided training and instruction to the patient for proper LE, core and proximal hip recruitment and positioning and eccentric body weight control with ambulation re-education including up and down stairs     Home Management Training / Self Care:  [] (61727) Provided self-care/home management training related to activities of daily living and compensatory training, and/or use of adaptive equipment for improvement with: ADLs and compensatory training, meal preparation, safety procedures and instruction in use of adaptive equipment, including bathing, grooming, dressing, personal hygiene, basic household cleaning and chores.      Home Exercise Program:    [x] (97306) Reviewed/Progressed HEP activities related to strengthening, flexibility, endurance, ROM of   [] LE / Lumbar: core, proximal hip and LE for functional self-care, mobility, lifting and ambulation/stair navigation   [] UE / Cervical: cervical, postural, scapular, scapulothoracic and UE control with self care, reaching, carrying, lifting, house/yardwork, driving, computer work  [] (43302)Reviewed/Progressed HEP activities related to improving balance, coordination, kinesthetic sense, posture, motor skill, proprioception of   [] LE: core, proximal hip and LE for self care, mobility, lifting, and ambulation/stair navigation    [] UE / Cervical: cervical, postural,  scapular, scapulothoracic and UE control with self care, reaching, carrying, lifting, house/yardwork, driving, computer work    Manual Treatments:  PROM / STM / Oscillations-Mobs:  G-I, II, III, IV (PA's, Inf., Post.)  [] (23965) Provided manual therapy to mobilize LE, proximal hip and/or LS spine soft tissue/joints for the purpose of modulating pain, promoting relaxation,  increasing ROM, reducing/eliminating soft tissue swelling/inflammation/restriction, improving soft tissue extensibility and allowing for proper ROM for normal function with   [] LE / lumbar: self care, mobility, lifting and ambulation. [] UE / Cervical: self care, reaching, carrying, lifting, house/yardwork, driving, computer work. Modalities:  [] (16714) Vasopneumatic compression: Utilized vasopneumatic compression to decrease edema / swelling for the purpose of improving mobility and quad tone / recruitment which will allow for increased overall function including but not limited to self-care, transfers, ambulation, and ascending / descending stairs. Charges:  Timed Code Treatment Minutes: 43   Total Treatment Minutes: 43     [] EVAL - LOW (15841)   [] EVAL - MOD (15312)  [] EVAL - HIGH (69716)  [] RE-EVAL (03789)  [x] IG(36897) x 1    [] Ionto  [] NMR (43067) x       [] Vaso  [] Manual (48678) x       [] Ultrasound  [x] TA x 2        [] Mech Traction (62657)  [] Aquatic Therapy x      [] ES (un) (49905):   [] Home Management Training x  [] ES(attended) (50486)   [] Dry Needling 1-2 muscles (36254):  [] Dry Needling 3+ muscles (613976)  [] Group:      [] Other:     GOALS:   Patient stated goal: to get better use of left arm and leg   []? Progressing: []? Met: []? Not Met: []? Adjusted     Therapist goals for Patient:   Short Term Goals: To be achieved in: 2 weeks  1. Independent in HEP and progression per patient tolerance, in order to prevent re-injury. []? Progressing: []? Met: []? Not Met: []? Adjusted  2.  Patient will have a decrease in pain to facilitate improvement in movement, function, and ADLs as indicated by Functional Deficits. []? Progressing: []? Met: []? Not Met: []? Adjusted     Long Term Goals: To be achieved in: 5 weeks  1. Disability index score of 45% or less for the Quick DASH to assist with reaching prior level of function. []? Progressing: []? Met: []? Not Met: []? Adjusted  2. Patient will demonstrate increased L shoulder abduction and flexion AROM to at least 100 deg to allow for proper joint functioning as indicated by patients Functional Deficits. [x]? Progressing: []? Met: []? Not Met: []? Adjusted  3. Patient will demonstrate an increase in Strength to at least 4-/5 for gross L shoulder strength to allow for proper functional mobility as indicated by patients Functional Deficits. [x]? Progressing: []? Met: []? Not Met: []? Adjusted  4. Patient will return to functional activities including ability to  light weight items and move them with L UE while completing bathing and other ADLs without increased symptoms or restriction. [x]? Progressing: []? Met: []? Not Met: []? Adjusted  5. Patient will demonstrate ability to use L UE during transfers, while being able to pull and tolerate weight bearing through the L UE.   [x]? Progressing: []? Met: []? Not Met: []? Adjusted       Overall Progression Towards Functional goals/ Treatment Progress Update:  [] Patient is progressing as expected towards functional goals listed. [] Progression is slowed due to complexities/Impairments listed. [] Progression has been slowed due to co-morbidities.   [x] Plan just implemented, too soon to assess goals progression <30days   [] Goals require adjustment due to lack of progress  [] Patient is not progressing as expected and requires additional follow up with physician  [] Other    Persisting Functional Limitations/Impairments:  []Sleeping []Sitting               []Standing []Transfers        [x]Walking []Kneeling               [x]Stairs [x]Squatting / bending   [x]ADLs [x]Reaching  []Lifting  []Housework  []Driving []Job related tasks  []Sports/Recreation []Other:        ASSESSMENT: Pt with more difficulty this date with movement, complaining of increased stiffness in the L shoulder and elbow. Pt has increased her reps for HEP but would like to brainstorm more to do at home with PT next session. Treatment/Activity Tolerance:  [] Patient able to complete tx  [] Patient limited by fatigue  [] Patient limited by pain  [x] Patient limited by other medical complications  [] Other:     Prognosis: [] Good [x] Fair  [] Poor    Patient Requires Follow-up: [x] Yes  [] No    Plan for next treatment session:    PLAN: See emily. PT 3x / week for 5 weeks. [x] Continue per plan of care [] Alter current plan (see comments)  [] Plan of care initiated [] Hold pending MD visit [] Discharge    Electronically signed by: Christine Hayward PT  DPT    Note: If patient does not return for scheduled/ recommended follow up visits, this note will serve as a discharge from care along with most recent update on progress.

## 2022-01-12 ENCOUNTER — HOSPITAL ENCOUNTER (OUTPATIENT)
Dept: PHYSICAL THERAPY | Age: 72
Setting detail: THERAPIES SERIES
Discharge: HOME | End: 2022-01-12
Payer: MEDICARE

## 2022-01-12 PROCEDURE — 97110 THERAPEUTIC EXERCISES: CPT

## 2022-01-12 PROCEDURE — 97530 THERAPEUTIC ACTIVITIES: CPT

## 2022-01-12 NOTE — FLOWSHEET NOTE
[address] [facility] Physical Therapy  Phone: [phone]   Fax: [phone]    Physical Therapy Daily Treatment Note  Date:  2022    Patient Name:  Bentley Mcrae    :  1950  MRN: [id number]  Medical/Treatment Diagnosis Information:  Diagnosis: R29.898 - Left arm weakness  Treatment Diagnosis: Decreased AROM and strength of L UE, decreased functional mobility due to R knee contracture and R LE weakness, decreased functional use of L UE causing difficulty with ADLs  Insurance/Certification information:  PT Insurance Information: Medicare  Physician Information:  Referring Practitioner: Marialuisa Yee MD ( MD is retiring) - pt will follow up with Dr Rosario Subramanian who is in the same office   Plan of care signed (Y/N): [x]  Yes []  No     Date of Patient follow up with Physician: 2/10/22     Progress Report: []  Yes  [x]  No     Date Range for reporting period:  Beginnin/22  PN:   POC: 22  Ending:     Progress report due (10 Rx/or 30 days whichever is less): visit #96 or 15/77    Recertification due (POC duration/ or 90 days whichever is less): visit #15 or 22     Visit # Insurance Allowable Auth required? Date Range   15/15 +  visits: 3 []  Yes  [x]  No n/a       Latex Allergy:  [x]NO      []YES  Preferred Language for Healthcare:   [x]English       []other:    Functional Scale:        Date assessed:  QuickDASH: raw score = 40; dysfunction = 66%  21    Pain level:  0/10     SUBJECTIVE:Pt states her R calf and L groin are very sore today, she thinks she just overdid it with her exercises the other night.      OBJECTIVE: : AAROM L renetta flex with pulleys: 124 deg   :    PROM AROM  SEATED AROM SUPINE     L R L R L   Shoulder Flexion  150   95 140 120   Shoulder Abduction  90   70 160 80   Shoulder External Rotation      Able to reach shouder (mostly elbow flexion) Top of R shoulder  26 (GHJ ~35 deg abd)   Shoulder Internal Rotation      Sacrum Upper lumbar spine     AAROM L renetta flex with pulleys: 124 deg     1/5:    AROM  SEATED AROM SUPINE     L R (from eval) L  (measured 12/22)   Shoulder Flexion  95 86 140 120   Shoulder Abduction  72 160 80   Shoulder External Rotation  Able to reach ipsilateral side of head  (mostly elbow flexion) Top of R shoulder  26 (GHJ ~35 deg abd)   Shoulder Internal Rotation  Sacrum Upper lumbar spine     AAROM L renetta flex with pulleys: 122 deg       RESTRICTIONS/PRECAUTIONS: Uses electric scooter for primary mode of transportation, R knee contracture (about 90 deg flexion), able to transfer with SBA    Exercises/Interventions:     Therapeutic Exercises (83833) Resistance / level Sets/sec Reps Notes   Scifit StepOne 1 X 10 min  755 steps  R LE needs yellow strap, done for endurance as well as stretching for L UE and R knee    Pulleys    Done in electric scooter          UE [place] on L  - Flexion  - Abduction   - ER      , cued to push to end range of motion and then pause    Seated EOB wand lift overhead        Reaching with L UE  - supine, ~40 deg flexion, ~75 deg flex, ~      Supine with 2 pillows,    Seated EOB scap squeezes      Sitting in scooter - Bicep curls on L arm Lime TB            Assessment of ROM for POC, discussion of continuing POC and adding aquatic visits              Therapeutic Activities (36280)              Ballet barre - partial stand from scooter  - requires use of forearms on bar to stand, then once in partial stand can get forearms off of bar.                    Sitting in scooter - using wash cloth along bar   -elbow ext and horiz abd   - flexion/ext     Facing bar  // to bar       Stair rail flexion slides   2 10 Manual assist from PT to reach end range and prevent shoulder hiking   Seated EOB - pushing wand away with resistance from PT    Pushing wand away and up     1/10: + 10 reps w/o resistance for warm up          Sitting EOB at dry erase easel - working on L shoulder ROM  - tracing shapes/connecting dots  - reaching high with controlled lowering  - word search puzzle             Intermittent assist from PT, yuri at end range           Transfers to/from electric scooter   X 1 CGA   Sitting in scooter with bedside table:  - folding pillow cases with L arm               Reaching up to cones placed on stair railing, bringing them down and stacking on stair platform    2 trials 6 cones Min to Mod A    Bean bag toss into bucket  2 trials 10 bags Pt reached slightly OH to get bags from PT before throwing          Neuromuscular Re-ed (59675)                                                 Manual Intervention (14821)       PROM L shoulder - all directions with distraction    Done while assessing ROM                                          AquaticTherapy Dates of Service:   Aquatic Visits Exercises/Activities:   Transfers:  [] Stairs   [] Ramp  [x] Chair Lift   % Immersion:            Ambulation/ Warm up:   UE Exercises:       Forward    Shoulder Shrugs      Lateral    Shoulder Circles  x    Retro    Scapular Retraction   x   [place]    Push Downs   x   Heel/Toe Walking    Punching x      Rowing x      Elbow Flex/Ext x      Shldr Flex/Ext x      Shldr aBd/aDd x   LE Exercises:  Shldr Horiz aBd/aDd x   HR/TR  [employer] IR/ER x   Marches  Arm Circles x   Squats   PNF Diagonals    Hamstring Curls  Wall Push Ups    Hip Flexion (SLR)      Hip aBduction (SLR)      Hip Extension (SLR)       Hip aDduction (SLR)      Hip Circles  Functional:    Hip IR/Er  Step up forward    Hip Hikes  Step up lateral       Step down        Lunges Forward      Lunges Retro      Lunges Lateral     Balance:        SLS        Tandem Stance       NBOS eyes open  Seated:     NBOS eyes closed  Ankle pumps  x   Hand to Opposite Knee  Ankle Circles  x   Fwd Step ups to SLS  Knee Flex/Ext x   Lateral Step ups to SLS  Hip aBd/aDd x   Stop/Go Gait   Bicycle  x     Ankle DF/PF x     Ankle Inv/Ev x   Stretching:       Gastroc/Soleus Hamstring   Deep Water:    Knee Flex Stretch  Jog    Piriformis   Noodle Hang    Hip Flexor  Traction at [facility], [place]      DKTC       ITB  Cool Down:    Quad  Fwd Walking    Mid Back   Lat Walking    UT  Retro Walking    Post Shoulder  Noodle float    Ladder Pull      Pec Stretch            Core: Other:    TrA set      Pelvic Tilts      Multifidi Walk outs c paddle      PNF Chop/Lifts                          Aquatic Abbreviation Key  B= Belt DB= Dumbells T= Theratube   H= Hydrotone N= Noodles W= Weights   P= Paddles S= Speedo equipment K= Kickboard      Pt. Education: Gave pt tour of pool, explained how to use lockers, what to wear, that it would be in group setting, and showed pt aquatic equipment. .  Modalities:     Pt. Education:  -patient educated on diagnosis, prognosis and expectations for rehab  -all patient questions were answered    Home Exercise Program:      Therapeutic Exercise and NMR EXR  [x] (47907) Provided verbal/tactile cueing for activities related to strengthening, flexibility, endurance, ROM for improvements in  [] LE / Lumbar: LE, proximal hip, and core control with self care, mobility, lifting, ambulation. [x] UE / Cervical: cervical, postural, scapular, scapulothoracic and UE control with self care, reaching, carrying, lifting, house/yardwork, driving, computer work.  [] (42357) Provided verbal/tactile cueing for activities related to improving balance, coordination, kinesthetic sense, posture, motor skill, proprioception to assist with   [] LE / lumbar: LE, proximal hip, and core control in self care, mobility, lifting, ambulation and eccentric single leg control.    [] UE / cervical: cervical, scapular, scapulothoracic and UE control with self care, reaching, carrying, lifting, house/yardwork, driving, computer work.   [] (28532) Therapist is in constant attendance of 2 or more patients providing skilled therapy interventions, but not providing any significant amount of measurable one-on-one time to either patient, for improvements in  [] LE / lumbar: LE, proximal hip, and core control in self care, mobility, lifting, ambulation and eccentric single leg control. [] UE / cervical: cervical, scapular, scapulothoracic and UE control with self care, reaching, carrying, lifting, house/yardwork, driving, computer work. NMR and Therapeutic Activities:    [] (65495 or 46526) Provided verbal/tactile cueing for activities related to improving balance, coordination, kinesthetic sense, posture, motor skill, proprioception and motor activation to allow for proper function of   [] LE: / Lumbar core, proximal hip and LE with self care and ADLs  [] UE / Cervical: cervical, postural, scapular, scapulothoracic and UE control with self care, carrying, lifting, driving, computer work.   [] (32070) Gait Re-education- Provided training and instruction to the patient for proper LE, core and proximal hip recruitment and positioning and eccentric body weight control with ambulation re-education including up and down stairs     Home Management Training / Self Care:  [] (59660) Provided self-care/home management training related to activities of daily living and compensatory training, and/or use of adaptive equipment for improvement with: ADLs and compensatory training, meal preparation, safety procedures and instruction in use of adaptive equipment, including bathing, grooming, dressing, personal hygiene, basic household cleaning and chores.      Home Exercise Program:    [x] (52249) Reviewed/Progressed HEP activities related to strengthening, flexibility, endurance, ROM of   [] LE / Lumbar: core, proximal hip and LE for functional self-care, mobility, lifting and ambulation/stair navigation   [] UE / Cervical: cervical, postural, scapular, scapulothoracic and UE control with self care, reaching, carrying, lifting, house/yardwork, driving, computer work  [] (96876)Reviewed/Progressed HEP activities related to improving balance, coordination, kinesthetic sense, posture, motor skill, proprioception of   [] LE: core, proximal hip and LE for self care, mobility, lifting, and ambulation/stair navigation    [] UE / Cervical: cervical, postural,  scapular, scapulothoracic and UE control with self care, reaching, carrying, lifting, house/yardwork, driving, computer work    Manual Treatments:  PROM / STM / Oscillations-Mobs:  G-I, II, III, IV (PA's, Inf., Post.)  [] (30498) Provided manual therapy to mobilize LE, proximal hip and/or LS spine soft tissue/joints for the purpose of modulating pain, promoting relaxation,  increasing ROM, reducing/eliminating soft tissue swelling/inflammation/restriction, improving soft tissue extensibility and allowing for proper ROM for normal function with   [] LE / lumbar: self care, mobility, lifting and ambulation. [] UE / Cervical: self care, reaching, carrying, lifting, house/yardwork, driving, computer work. Modalities:  [] (45338) Vasopneumatic compression: Utilized vasopneumatic compression to decrease edema / swelling for the purpose of improving mobility and quad tone / recruitment which will allow for increased overall function including but not limited to self-care, transfers, ambulation, and ascending / descending stairs. Charges:  Timed Code Treatment Minutes: 40   Total Treatment Minutes: 40     [] EVAL - LOW (70704)   [] EVAL - MOD (03959)  [] EVAL - HIGH (83830)  [] RE-EVAL (17496)  [x] PZ(47707) x 1    [] Ionto  [] NMR (64893) x       [] Vaso  [] Manual (28817) x       [] Ultrasound  [x] TA x 2        [] Mech Traction (57375)  [] Aquatic Therapy x      [] ES (un) (09946):   [] Home Management Training x  [] ES(attended) (90340)   [] Dry Needling 1-2 muscles (37773):  [] Dry Needling 3+ muscles (421714)  [] Group:      [] Other:     GOALS:   Patient stated goal: to get better use of left arm and leg   []? Progressing: []? Met: []? Not Met: []?  Adjusted     Therapist goals for Patient:   Short Term Goals: To be achieved in: 2 weeks  1. Independent in HEP and progression per patient tolerance, in order to prevent re-injury. []? Progressing: []? Met: []? Not Met: []? Adjusted  2. Patient will have a decrease in pain to facilitate improvement in movement, function, and ADLs as indicated by Functional Deficits. []? Progressing: []? Met: []? Not Met: []? Adjusted     Long Term Goals: To be achieved in: 5 weeks  1. Disability index score of 45% or less for the Quick DASH to assist with reaching prior level of function. []? Progressing: []? Met: []? Not Met: []? Adjusted  2. Patient will demonstrate increased L shoulder abduction and flexion AROM to at least 100 deg to allow for proper joint functioning as indicated by patients Functional Deficits. [x]? Progressing: []? Met: []? Not Met: []? Adjusted  3. Patient will demonstrate an increase in Strength to at least 4-/5 for gross L shoulder strength to allow for proper functional mobility as indicated by patients Functional Deficits. [x]? Progressing: []? Met: []? Not Met: []? Adjusted  4. Patient will return to functional activities including ability to  light weight items and move them with L UE while completing bathing and other ADLs without increased symptoms or restriction. [x]? Progressing: []? Met: []? Not Met: []? Adjusted  5. Patient will demonstrate ability to use L UE during transfers, while being able to pull and tolerate weight bearing through the L UE.   [x]? Progressing: []? Met: []? Not Met: []? Adjusted       Overall Progression Towards Functional goals/ Treatment Progress Update:  [] Patient is progressing as expected towards functional goals listed. [] Progression is slowed due to complexities/Impairments listed. [] Progression has been slowed due to co-morbidities.   [x] Plan just implemented, too soon to assess goals progression <30days   [] Goals require adjustment due to lack of progress  []

## 2022-01-17 ENCOUNTER — HOSPITAL ENCOUNTER (OUTPATIENT)
Dept: PHYSICAL THERAPY | Age: 72
Setting detail: THERAPIES SERIES
Discharge: HOME | End: 2022-01-17
Payer: MEDICARE

## 2022-01-17 PROCEDURE — 97113 AQUATIC THERAPY/EXERCISES: CPT

## 2022-01-17 RX ORDER — POTASSIUM CHLORIDE 1500 MG/1
TABLET, EXTENDED RELEASE ORAL
Qty: 90 TABLET | Refills: 3 | Status: SHIPPED | OUTPATIENT
Start: 2022-01-17

## 2022-01-17 NOTE — FLOWSHEET NOTE
[address] [facility] Physical Therapy  Phone: [phone]   Fax: [phone]    Physical Therapy Daily Treatment Note  Date:  2022    Patient Name:  Yeny Schwartz    :  1950  MRN: [id number]  Medical/Treatment Diagnosis Information:  Diagnosis: R29.898 - Left arm weakness  Treatment Diagnosis: Decreased AROM and strength of L UE, decreased functional mobility due to R knee contracture and R LE weakness, decreased functional use of L UE causing difficulty with ADLs  Insurance/Certification information:  PT Insurance Information: Medicare  Physician Information:  Referring Practitioner: Clarita Nunes MD ( MD is retiring) - pt will follow up with Dr Geronmio Hinojosa who is in the same office   Plan of care signed (Y/N): [x]  Yes []  No     Date of Patient follow up with Physician: 2/10/22     Progress Report: []  Yes  [x]  No     Date Range for reporting period:  Beginnin/22  PN:   POC: 22  Ending:     Progress report due (10 Rx/or 30 days whichever is less): visit #55 or     Recertification due (POC duration/ or 90 days whichever is less): visit #15 or 22     Visit # Insurance Allowable Auth required?  Date Range   15/15 + 3/8 mn  2022 visits: 3 []  Yes  [x]  No n/a       Latex Allergy:  [x]NO      []YES  Preferred Language for Healthcare:   [x]English       []other:    Functional Scale:        Date assessed:  QuickDASH: raw score = 40; dysfunction = 66%  21    Pain level:  0/10     SUBJECTIVE:Patient excited to start aquatic PT.    OBJECTIVE: : AAROM L renetta flex with pulleys: 124 deg   :    PROM AROM  SEATED AROM SUPINE     L R L R L   Shoulder Flexion  150   95 140 120   Shoulder Abduction  90   70 160 80   Shoulder External Rotation      Able to reach shouder (mostly elbow flexion) Top of R shoulder  26 (GHJ ~35 deg abd)   Shoulder Internal Rotation      Sacrum Upper lumbar spine     AAROM L renetta flex with pulleys: 124 deg search puzzle             Intermittent assist from PT, yuri at end range           Transfers to/from electric scooter   X 1 CGA   Sitting in scooter with bedside table:  - folding pillow cases with L arm               Reaching up to cones placed on stair railing, bringing them down and stacking on stair platform    2 trials 6 cones Min to Mod A    Bean bag toss into bucket  2 trials 10 bags Pt reached slightly OH to get bags from PT before throwing          Neuromuscular Re-ed (34291)                                                 Manual Intervention (92864)       PROM L shoulder - all directions with distraction    Done while assessing ROM                                          AquaticTherapy Dates of Service: 1/17  Aquatic Visits Exercises/Activities:transfer from scooter to chair lift   Transfers:  [] Stairs   [] Ramp  [x] Chair Lift   % Immersion:            Ambulation/ Warm up:   UE Exercises:   All performed seated on bench with CGA/Min A for trunk balance intermittent    Forward    Shoulder Shrugs      Lateral    Shoulder Circles  x    Retro    Scapular Retraction   x   [place]ari[place]as    Push Downs   x   Heel/Toe Walking    Punching x      Rowing x      Elbow Flex/Ext x12      Shldr Flex/Ext x      Shldr aBd/aDd x12   LE Exercises:  Shldr Horiz aBd/aDd x12   HR/TR  Shldr IR/ER x   Marches  Arm Circles x   Squats   PNF Diagonals    Hamstring Curls  Wall Push Ups    Hip Flexion (SLR)  Pull downs X12   Hip aBduction (SLR)      Hip Extension (SLR)       Hip aDduction (SLR)      Hip Circles  Functional:    Hip IR/Er  Step up forward    Hip Hikes  Step up lateral     Sit to stand from Bnech L LE 3 X5 with L Forearm WB Step down        Lunges Forward      Lunges Retro      Lunges Lateral     Balance:        SLS  L SLS 10\" X3; 3 sets B UE support      Tandem Stance       NBOS eyes open  Seated:     NBOS eyes closed  Ankle pumps  x10 L   Hand to Opposite Knee  Ankle Circles  x10 L    Fwd Step ups to SLS  Knee Flex/Ext 2 X10 L LAQ; 2 X10 resisted  HS curl L   Lateral Step ups to SLS  Hip aBd/aDd x   Stop/Go Gait   Bicycle  x     Ankle DF/PF x10 L     Ankle Inv/Ev x10 L   Stretching:   Manual L Gastroc stretch 20\" X4   Gastroc/Soleus Seated soleus L with compression through knee by PT 20  ' X4 Marches  Hip AB/ADD X12 L  X12 L   Hamstring  Seated on bencj 20\" X4 Deep Water:    Knee Flex Stretch  Jog    Piriformis   Noodle Hang    Hip Flexor  Traction at [address]    SKTC      DKTC       ITB  Cool Down:    Quad  Fwd Walking    Mid Back   Lat Walking    UT  Retro Walking    Post Shoulder  Noodle float    Ladder Pull      Pec Stretch            Core: Other:    TrA set      Pelvic Tilts      Multifidi Walk outs c paddle      PNF Chop/Lifts                          Aquatic Abbreviation Key  B= Belt DB= Dumbells T= Theratube   H= Hydrotone N= Noodles W= Weights   P= Paddles S= Speedo equipment K= Kickboard      Pt. Education: Gave pt tour of pool, explained how to use lockers, what to wear, that it would be in group setting, and showed pt aquatic equipment. .  Modalities:     Pt. Education:  -patient educated on diagnosis, prognosis and expectations for rehab  -all patient questions were answered    Home Exercise Program:      Therapeutic Exercise and NMR EXR  [x] (02876) Provided verbal/tactile cueing for activities related to strengthening, flexibility, endurance, ROM for improvements in  [] LE / Lumbar: LE, proximal hip, and core control with self care, mobility, lifting, ambulation.   [x] UE / Cervical: cervical, postural, scapular, scapulothoracic and UE control with self care, reaching, carrying, lifting, house/yardwork, driving, computer work.  [] (63429) Provided verbal/tactile cueing for activities related to improving balance, coordination, kinesthetic sense, posture, motor skill, proprioception to assist with   [] LE / lumbar: LE, proximal hip, and core control in self care, mobility, lifting, ambulation and eccentric single leg control. [] UE / cervical: cervical, scapular, scapulothoracic and UE control with self care, reaching, carrying, lifting, house/yardwork, driving, computer work.   [] (58232) Therapist is in constant attendance of 2 or more patients providing skilled therapy interventions, but not providing any significant amount of measurable one-on-one time to either patient, for improvements in  [] LE / lumbar: LE, proximal hip, and core control in self care, mobility, lifting, ambulation and eccentric single leg control. [] UE / cervical: cervical, scapular, scapulothoracic and UE control with self care, reaching, carrying, lifting, house/yardwork, driving, computer work. NMR and Therapeutic Activities:    [] (56416 or 59719) Provided verbal/tactile cueing for activities related to improving balance, coordination, kinesthetic sense, posture, motor skill, proprioception and motor activation to allow for proper function of   [] LE: / Lumbar core, proximal hip and LE with self care and ADLs  [] UE / Cervical: cervical, postural, scapular, scapulothoracic and UE control with self care, carrying, lifting, driving, computer work.   [] (39690) Gait Re-education- Provided training and instruction to the patient for proper LE, core and proximal hip recruitment and positioning and eccentric body weight control with ambulation re-education including up and down stairs     Home Management Training / Self Care:  [] (92387) Provided self-care/home management training related to activities of daily living and compensatory training, and/or use of adaptive equipment for improvement with: ADLs and compensatory training, meal preparation, safety procedures and instruction in use of adaptive equipment, including bathing, grooming, dressing, personal hygiene, basic household cleaning and chores.      Home Exercise Program:    [x] (78053) Reviewed/Progressed HEP activities related to strengthening, flexibility, endurance, ROM of   [] LE / Lumbar: core, proximal hip and LE for functional self-care, mobility, lifting and ambulation/stair navigation   [] UE / Cervical: cervical, postural, scapular, scapulothoracic and UE control with self care, reaching, carrying, lifting, house/yardwork, driving, computer work  [] (43153)Reviewed/Progressed HEP activities related to improving balance, coordination, kinesthetic sense, posture, motor skill, proprioception of   [] LE: core, proximal hip and LE for self care, mobility, lifting, and ambulation/stair navigation    [] UE / Cervical: cervical, postural,  scapular, scapulothoracic and UE control with self care, reaching, carrying, lifting, house/yardwork, driving, computer work    Manual Treatments:  PROM / STM / Oscillations-Mobs:  G-I, II, III, IV (PA's, Inf., Post.)  [] (28334) Provided manual therapy to mobilize LE, proximal hip and/or LS spine soft tissue/joints for the purpose of modulating pain, promoting relaxation,  increasing ROM, reducing/eliminating soft tissue swelling/inflammation/restriction, improving soft tissue extensibility and allowing for proper ROM for normal function with   [] LE / lumbar: self care, mobility, lifting and ambulation. [] UE / Cervical: self care, reaching, carrying, lifting, house/yardwork, driving, computer work. Modalities:  [] (91411) Vasopneumatic compression: Utilized vasopneumatic compression to decrease edema / swelling for the purpose of improving mobility and quad tone / recruitment which will allow for increased overall function including but not limited to self-care, transfers, ambulation, and ascending / descending stairs.      Charges:  Timed Code Treatment Minutes: 40   Total Treatment Minutes: 40     [] EVAL - LOW (01218)   [] EVAL - MOD (63424)  [] EVAL - HIGH (65749)  [] RE-EVAL (23680)  [] YG(81968) x 1    [] Ionto  [] NMR (74657) x       [] Vaso  [] Manual (67053) x       [] Ultrasound  [] TA x 2        [] Mech Traction (96150)  [x] Aquatic Therapy x   3   [] ES (un) (01799):   [] Home Management Training x  [] ES(attended) (41938)   [] Dry Needling 1-2 muscles (50403):  [] Dry Needling 3+ muscles (188744)  [] Group:      [] Other:     GOALS:   Patient stated goal: to get better use of left arm and leg   []? Progressing: []? Met: []? Not Met: []? Adjusted     Therapist goals for Patient:   Short Term Goals: To be achieved in: 2 weeks  1. Independent in HEP and progression per patient tolerance, in order to prevent re-injury. []? Progressing: []? Met: []? Not Met: []? Adjusted  2. Patient will have a decrease in pain to facilitate improvement in movement, function, and ADLs as indicated by Functional Deficits. []? Progressing: []? Met: []? Not Met: []? Adjusted     Long Term Goals: To be achieved in: 5 weeks  1. Disability index score of 45% or less for the Quick DASH to assist with reaching prior level of function. []? Progressing: []? Met: []? Not Met: []? Adjusted  2. Patient will demonstrate increased L shoulder abduction and flexion AROM to at least 100 deg to allow for proper joint functioning as indicated by patients Functional Deficits. [x]? Progressing: []? Met: []? Not Met: []? Adjusted  3. Patient will demonstrate an increase in Strength to at least 4-/5 for gross L shoulder strength to allow for proper functional mobility as indicated by patients Functional Deficits. [x]? Progressing: []? Met: []? Not Met: []? Adjusted  4. Patient will return to functional activities including ability to  light weight items and move them with L UE while completing bathing and other ADLs without increased symptoms or restriction. [x]? Progressing: []? Met: []? Not Met: []? Adjusted  5. Patient will demonstrate ability to use L UE during transfers, while being able to pull and tolerate weight bearing through the L UE.   [x]? Progressing: []? Met: []? Not Met: []?  Adjusted       Overall Progression Towards Functional goals/ Treatment Progress Update:  [x] Patient is progressing as expected towards functional goals listed. [] Progression is slowed due to complexities/Impairments listed. [] Progression has been slowed due to co-morbidities. [] Plan just implemented, too soon to assess goals progression <30days   [] Goals require adjustment due to lack of progress  [] Patient is not progressing as expected and requires additional follow up with physician  [] Other    Persisting Functional Limitations/Impairments:  []Sleeping []Sitting               []Standing []Transfers        [x]Walking []Kneeling               [x]Stairs [x]Squatting / bending   [x]ADLs [x]Reaching  []Lifting  []Housework  []Driving []Job related tasks  []Sports/Recreation []Other:        ASSESSMENT: . Pt started aquatic therapy with focus on  UE ROM, standing on L LE., as well as trunk control /recruitment with equal WBing on ischial tuberosity. Patient required tactile cues as well as compression through L iliac crest and L knee to improve l ishial tuberosity WB in sitting due to L trunk weakness. Required moderate A for L single leg sit to stand from bench with L forearm WBing on pool ledge. Gradually progress patient as tolerated with all aquatic exercises within limitations of R severe knee flexion contracture with inability to WB on R LE. Treatment/Activity Tolerance:  [] Patient able to complete tx  [] Patient limited by fatigue  [] Patient limited by pain  [x] Patient limited by other medical complications  [] Other:     Prognosis: [] Good [x] Fair  [] Poor    Patient Requires Follow-up: [x] Yes  [] No    Plan for next treatment session:    PLAN: See eval. PT 3x / week for 5 weeks.    [x] Continue per plan of care [] Alter current plan (see comments)  [] Plan of care initiated [] Hold pending MD visit [] Discharge    Electronically signed by: Maria R Browne, PT  MSPT    Note: If patient does not return for scheduled/ recommended follow up visits, this note will serve as a discharge from care along with most recent update on progress.

## 2022-01-18 ENCOUNTER — TELEPHONE (OUTPATIENT)
Dept: INTERNAL MEDICINE CLINIC | Age: 72
End: 2022-01-18

## 2022-01-18 NOTE — TELEPHONE ENCOUNTER
Pt would like a script for a lift for her pool. Pt has been having difficulty getting in and out of the pool and would need a lift so that she is able to get in and out of her pool. Please fax script once completed. Fax: [phone]    Please advise and call.

## 2022-01-21 ENCOUNTER — HOSPITAL ENCOUNTER (OUTPATIENT)
Dept: PHYSICAL THERAPY | Age: 72
Setting detail: THERAPIES SERIES
Discharge: HOME | End: 2022-01-21
Payer: MEDICARE

## 2022-01-21 PROCEDURE — 97113 AQUATIC THERAPY/EXERCISES: CPT

## 2022-01-21 NOTE — FLOWSHEET NOTE
[facility] Physical Therapy  Phone: [phone]   Fax: [phone]    Physical Therapy Daily Treatment Note  Date:  2022    Patient Name:  Ml Hodge    :  1950  MRN: [id number]  Medical/Treatment Diagnosis Information:  Diagnosis: R29.898 - Left arm weakness  Treatment Diagnosis: Decreased AROM and strength of L UE, decreased functional mobility due to R knee contracture and R LE weakness, decreased functional use of L UE causing difficulty with ADLs  Insurance/Certification information:  PT Insurance Information: Medicare  Physician Information:  Referring Practitioner: Jeffrey Benito MD ( MD is retiring) - pt will follow up with Dr Quyen Palm who is in the same office   Plan of care signed (Y/N): [x]  Yes []  No     Date of Patient follow up with Physician: 2/10/22     Progress Report: []  Yes  [x]  No     Date Range for reporting period:  Beginnin/22  PN:   POC: 22  Ending:     Progress report due (10 Rx/or 30 days whichever is less): visit #15 or 80/65    Recertification due (POC duration/ or 90 days whichever is less): visit #15 or 22     Visit # Insurance Allowable Auth required? Date Range   15/15 +  visits: 6 []  Yes  [x]  No n/a       Latex Allergy:  [x]NO      []YES  Preferred Language for Healthcare:   [x]English       []other:    Functional Scale:        Date assessed:  QuickDASH: raw score = 40; dysfunction = 66%  21    Pain level:  0/10     SUBJECTIVE:   Patient reports her and her  have purchased an electronic lift chair for their pool at home.      OBJECTIVE: : AAROM L renetta flex with pulleys: 124 deg   :    PROM AROM  SEATED AROM SUPINE     L R L R L   Shoulder Flexion  150   95 140 120   Shoulder Abduction  90   70 160 80   Shoulder External Rotation      Able to reach shouder (mostly elbow flexion) Top of R shoulder  26 (GHJ ~35 deg abd)   Shoulder Internal Rotation      Sacrum Upper lumbar spine     AAROM L renetta flex with pulleys: 124 deg     1/5:    AROM  SEATED AROM SUPINE     L R (from eval) L  (measured 12/22)   Shoulder Flexion  95 86 140 120   Shoulder Abduction  72 160 80   Shoulder External Rotation  Able to reach ipsilateral side of head  (mostly elbow flexion) Top of R shoulder  26 (GHJ ~35 deg abd)   Shoulder Internal Rotation  Sacrum Upper lumbar spine     AAROM L renetta flex with pulleys: 122 deg       RESTRICTIONS/PRECAUTIONS: Uses electric scooter for primary mode of transportation, R knee contracture (about 90 deg flexion), able to transfer with SBA    Exercises/Interventions:     Therapeutic Exercises (88790) Resistance / level Sets/sec Reps Notes   Scifit StepOne 1 X 10 min  755 steps  R LE needs yellow strap, done for endurance as well as stretching for L UE and R knee    Pulleys    Done in electric scooter          UE [place] on L  - Flexion  - Abduction   - ER      , cued to push to end range of motion and then pause    Seated EOB wand lift overhead        Reaching with L UE  - supine, ~40 deg flexion, ~75 deg flex, ~      Supine with 2 pillows,    Seated EOB scap squeezes      Sitting in scooter - Bicep curls on L arm Lime TB            Assessment of ROM for POC, discussion of continuing POC and adding aquatic visits              Therapeutic Activities (04163)              Ballet barre - partial stand from scooter  - requires use of forearms on bar to stand, then once in partial stand can get forearms off of bar.                    Sitting in scooter - using wash cloth along bar   -elbow ext and horiz abd   - flexion/ext     Facing bar  // to bar       Stair rail flexion slides   2 10 Manual assist from PT to reach end range and prevent shoulder hiking   Seated EOB - pushing wand away with resistance from PT    Pushing wand away and up     1/10: + 10 reps w/o resistance for warm up          Sitting EOB at dry erase easel - working on L shoulder ROM  - tracing shapes/connecting dots  - reaching high with controlled lowering  - word search puzzle             Intermittent assist from PT, yuri at end range           Transfers to/from electric scooter   X 1 CGA   Sitting in scooter with bedside table:  - folding pillow cases with L arm               Reaching up to cones placed on stair railing, bringing them down and stacking on stair platform    2 trials 6 cones Min to Mod A    Bean bag toss into bucket  2 trials 10 bags Pt reached slightly OH to get bags from PT before throwing          Neuromuscular Re-ed (27757)                                                 Manual Intervention (30446)       PROM L shoulder - all directions with distraction    Done while assessing ROM                                          AquaticTherapy Dates of Service: 1/17, 1/21  Aquatic Visits Exercises/Activities:transfer from scooter to chair lift   Transfers:  [] Stairs   [] Ramp  [x] Chair Lift   % Immersion:            Ambulation/ Warm up:   UE Exercises:   All performed seated on bench with CGA/Min A for trunk balance intermittent    Forward    Shoulder Shrugs      Lateral    Shoulder Circles  x    Retro    Scapular Retraction   x   [place]    Push Downs   x   Heel/Toe Walking    Punching X 10      Rowing x      Elbow Flex/Ext X12, x 10 with P      Shldr Flex/Ext X12, x 10 with P      Shldr aBd/aDd X12, x 10 with P   LE Exercises:  Shldr Horiz aBd/aDd x12 B   HR/TR  Shldr IR/ER x   Marches  Arm Circles X 12 L    Squats - L single leg 2 x 10  PNF Diagonals    Hamstring Curls  Wall Push Ups    Hip Flexion (SLR)  Pull downs X12   Hip aBduction (SLR)      Hip Extension (SLR)       Hip aDduction (SLR)      Hip Circles  Functional:    Hip IR/Er  Step up forward    Hip Hikes  Step up lateral     Step down        Lunges Forward      Lunges Retro      Lunges Lateral     Balance:        SLS  L SLS 10\" X3; 3 sets B UE support      Tandem Stance       NBOS eyes open  Seated:     NBOS eyes closed  Ankle pumps  x10 L   Hand to Opposite Knee  Ankle Circles  x10 L    Fwd Step ups to SLS  Knee Flex/Ext 2 X10 L LAQ; 2 X10 resisted  HS curl L   Lateral Step ups to SLS  Hip aBd/aDd x   Stop/Go Gait   Bicycle      Ankle DF/PF     Ankle Inv/Ev   Stretching:   Manual L Gastroc stretch   Gastroc/Soleus  Marches  Hip AB/ADD   Hamstring   [place]:    Knee Ext stretch with OP - pt seated with ankle propped on PT knees 4x30\" L  Jog    Piriformis   Noodle Hang    Hip Flexor  Traction at [address]    SKTC      DKTC       ITB  Cool Down:    Quad  Fwd Walking    Mid Back   Lat Walking    UT  Retro Walking    Post Shoulder  Noodle float    Ladder Pull      Pec Stretch            Core: Other:    TrA set      Pelvic Tilts      Multifidi Walk outs c paddle      PNF Chop/Lifts                          Aquatic Abbreviation Key  B= Belt DB= Dumbells T= Theratube   H= Hydrotone N= Noodles W= Weights   P= Paddles S= Speedo equipment K= Kickboard      Pt. Education: Gave pt tour of pool, explained how to use lockers, what to wear, that it would be in group setting, and showed pt aquatic equipment. .  Modalities:     Pt. Education:  -patient educated on diagnosis, prognosis and expectations for rehab  -all patient questions were answered    Home Exercise Program:      Therapeutic Exercise and NMR EXR  [x] (26599) Provided verbal/tactile cueing for activities related to strengthening, flexibility, endurance, ROM for improvements in  [] LE / Lumbar: LE, proximal hip, and core control with self care, mobility, lifting, ambulation.   [x] UE / Cervical: cervical, postural, scapular, scapulothoracic and UE control with self care, reaching, carrying, lifting, house/yardwork, driving, computer work.  [] (38233) Provided verbal/tactile cueing for activities related to improving balance, coordination, kinesthetic sense, posture, motor skill, proprioception to assist with   [] LE / lumbar: LE, proximal hip, and core control in self care, mobility, lifting, ambulation and eccentric single leg control. [] UE / cervical: cervical, scapular, scapulothoracic and UE control with self care, reaching, carrying, lifting, house/yardwork, driving, computer work.   [] (07315) Therapist is in constant attendance of 2 or more patients providing skilled therapy interventions, but not providing any significant amount of measurable one-on-one time to either patient, for improvements in  [] LE / lumbar: LE, proximal hip, and core control in self care, mobility, lifting, ambulation and eccentric single leg control. [] UE / cervical: cervical, scapular, scapulothoracic and UE control with self care, reaching, carrying, lifting, house/yardwork, driving, computer work. NMR and Therapeutic Activities:    [] (08621 or 20141) Provided verbal/tactile cueing for activities related to improving balance, coordination, kinesthetic sense, posture, motor skill, proprioception and motor activation to allow for proper function of   [] LE: / Lumbar core, proximal hip and LE with self care and ADLs  [] UE / Cervical: cervical, postural, scapular, scapulothoracic and UE control with self care, carrying, lifting, driving, computer work.   [] (08355) Gait Re-education- Provided training and instruction to the patient for proper LE, core and proximal hip recruitment and positioning and eccentric body weight control with ambulation re-education including up and down stairs     Home Management Training / Self Care:  [] (74269) Provided self-care/home management training related to activities of daily living and compensatory training, and/or use of adaptive equipment for improvement with: ADLs and compensatory training, meal preparation, safety procedures and instruction in use of adaptive equipment, including bathing, grooming, dressing, personal hygiene, basic household cleaning and chores.      Home Exercise Program:    [x] (64081) Reviewed/Progressed HEP activities related to strengthening, flexibility, endurance, ROM of   [] LE / Lumbar: core, proximal hip and LE for functional self-care, mobility, lifting and ambulation/stair navigation   [] UE / Cervical: cervical, postural, scapular, scapulothoracic and UE control with self care, reaching, carrying, lifting, house/yardwork, driving, computer work  [] (90138)Reviewed/Progressed HEP activities related to improving balance, coordination, kinesthetic sense, posture, motor skill, proprioception of   [] LE: core, proximal hip and LE for self care, mobility, lifting, and ambulation/stair navigation    [] UE / Cervical: cervical, postural,  scapular, scapulothoracic and UE control with self care, reaching, carrying, lifting, house/yardwork, driving, computer work    Manual Treatments:  PROM / STM / Oscillations-Mobs:  G-I, II, III, IV (PA's, Inf., Post.)  [] (75952) Provided manual therapy to mobilize LE, proximal hip and/or LS spine soft tissue/joints for the purpose of modulating pain, promoting relaxation,  increasing ROM, reducing/eliminating soft tissue swelling/inflammation/restriction, improving soft tissue extensibility and allowing for proper ROM for normal function with   [] LE / lumbar: self care, mobility, lifting and ambulation. [] UE / Cervical: self care, reaching, carrying, lifting, house/yardwork, driving, computer work. Modalities:  [] (34358) Vasopneumatic compression: Utilized vasopneumatic compression to decrease edema / swelling for the purpose of improving mobility and quad tone / recruitment which will allow for increased overall function including but not limited to self-care, transfers, ambulation, and ascending / descending stairs.      Charges:  Timed Code Treatment Minutes: 43   Total Treatment Minutes: 43     [] EVAL - LOW (62461)   [] EVAL - MOD (69397)  [] EVAL - HIGH (53207)  [] RE-EVAL (67586)  [] MG(34747) x 1    [] Ionto  [] NMR (41995) x       [] Vaso  [] Manual (07545) x       [] Ultrasound  [] TA x 2        [] [facility] Traction (15741)  [x] Aquatic Therapy x   3   [] ES (un) (87341):   [] Home Management Training x  [] ES(attended) (92470)   [] Dry Needling 1-2 muscles (65838):  [] Dry Needling 3+ muscles (773211)  [] Group:      [] Other:     GOALS:   Patient stated goal: to get better use of left arm and leg   []? Progressing: []? Met: []? Not Met: []? Adjusted     Therapist goals for Patient:   Short Term Goals: To be achieved in: 2 weeks  1. Independent in HEP and progression per patient tolerance, in order to prevent re-injury. []? Progressing: []? Met: []? Not Met: []? Adjusted  2. Patient will have a decrease in pain to facilitate improvement in movement, function, and ADLs as indicated by Functional Deficits. []? Progressing: []? Met: []? Not Met: []? Adjusted     Long Term Goals: To be achieved in: 5 weeks  1. Disability index score of 45% or less for the Quick DASH to assist with reaching prior level of function. []? Progressing: []? Met: []? Not Met: []? Adjusted  2. Patient will demonstrate increased L shoulder abduction and flexion AROM to at least 100 deg to allow for proper joint functioning as indicated by patients Functional Deficits. [x]? Progressing: []? Met: []? Not Met: []? Adjusted  3. Patient will demonstrate an increase in Strength to at least 4-/5 for gross L shoulder strength to allow for proper functional mobility as indicated by patients Functional Deficits. [x]? Progressing: []? Met: []? Not Met: []? Adjusted  4. Patient will return to functional activities including ability to  light weight items and move them with L UE while completing bathing and other ADLs without increased symptoms or restriction. [x]? Progressing: []? Met: []? Not Met: []? Adjusted  5. Patient will demonstrate ability to use L UE during transfers, while being able to pull and tolerate weight bearing through the L UE.   [x]? Progressing: []? Met: []? Not Met: []?  Adjusted       Overall Progression Towards Functional goals/ Treatment Progress Update:  [x] Patient is progressing as expected towards functional goals listed. [] Progression is slowed due to complexities/Impairments listed. [] Progression has been slowed due to co-morbidities. [] Plan just implemented, too soon to assess goals progression <30days   [] Goals require adjustment due to lack of progress  [] Patient is not progressing as expected and requires additional follow up with physician  [] Other    Persisting Functional Limitations/Impairments:  []Sleeping []Sitting               []Standing []Transfers        [x]Walking []Kneeling               [x]Stairs [x]Squatting / bending   [x]ADLs [x]Reaching  []Lifting  []Housework  []Driving []Job related tasks  []Sports/Recreation []Other:        ASSESSMENT: . Pt in good spirits today because she has ordered a lift chair which will allow her to get into and use her home pool this summer. Added open paddle with some UE exercises on L side for second set listed above. Pt was also excited for how upright she was able to stand on her L LE. Pt provided with facilitation at L hip and knee to increase knee extension at standing. Will continue with aquatic visits when schedule allows it to improve ROM and strength in the L shoulder. Treatment/Activity Tolerance:  [] Patient able to complete tx  [] Patient limited by fatigue  [] Patient limited by pain  [x] Patient limited by other medical complications  [] Other:     Prognosis: [] Good [x] Fair  [] Poor    Patient Requires Follow-up: [x] Yes  [] No    Plan for next treatment session:    PLAN: See eval. PT 3x / week for 5 weeks.    [x] Continue per plan of care [] Alter current plan (see comments)  [] Plan of care initiated [] Hold pending MD visit [] Discharge    Electronically signed by: Kennie Opitz, PT  DPT    Note: If patient does not return for scheduled/ recommended follow up visits, this note will serve as a discharge from care along with most recent update on progress.

## 2022-01-24 ENCOUNTER — HOSPITAL ENCOUNTER (OUTPATIENT)
Dept: PHYSICAL THERAPY | Age: 72
Setting detail: THERAPIES SERIES
Discharge: HOME | End: 2022-01-24
Payer: MEDICARE

## 2022-01-24 PROCEDURE — 97530 THERAPEUTIC ACTIVITIES: CPT

## 2022-01-24 PROCEDURE — 97110 THERAPEUTIC EXERCISES: CPT

## 2022-01-24 RX ORDER — OMEPRAZOLE 20 MG/1
CAPSULE, DELAYED RELEASE ORAL
Qty: 90 CAPSULE | Refills: 1 | Status: SHIPPED | OUTPATIENT
Start: 2022-01-24 | End: 2022-07-25 | Stop reason: SDUPTHER

## 2022-01-24 NOTE — PROGRESS NOTES
visit        Physical Therapy Daily Treatment Note  Date:  2022    Patient Name:  Michelle Elmore    :  1950  MRN: [id number]  Medical/Treatment Diagnosis Information:  Diagnosis: R29.898 - Left arm weakness  Treatment Diagnosis: Decreased AROM and strength of L UE, decreased functional mobility due to R knee contracture and R LE weakness, decreased functional use of L UE causing difficulty with ADLs  Insurance/Certification information:  PT Insurance Information: Medicare  Physician Information:  Referring Practitioner: Jewels Mojica MD ( MD is retiring) - pt will follow up with Dr Angeles German who is in the same office   Plan of care signed (Y/N): [x]  Yes []  No     Date of Patient follow up with Physician: 2/10/22     Progress Report: []  Yes  [x]  No     Date Range for reporting period:  Beginnin/22  PN:   POC: 22  POC:   Ending:     Progress report due (10 Rx/or 30 days whichever is less): visit #41 or     Recertification due (POC duration/ or 90 days whichever is less): visit #15 or 22     Visit # Insurance Allowable Auth required? Date Range   15/15 +  visits: 7 []  Yes  [x]  No n/a       Latex Allergy:  [x]NO      []YES  Preferred Language for Healthcare:   [x]English       []other:    Functional Scale:        Date assessed:  QuickDASH: raw score = 40; dysfunction = 66%  21    Pain level:  0/10     SUBJECTIVE:   Patient reports that a rep is coming to their house tomorrow to see if it is possible to get a lift chair installed for her pool. Pt's  is present for pt's transfers onto the shower chair, but pt reports she has been completing the bath activities herself.      OBJECTIVE: : AAROM L renetta flex with pulleys: 124 deg   :    PROM AROM  SEATED AROM SUPINE     L R L R L   Shoulder Flexion  150   95 140 120   Shoulder Abduction  90   70 160 80   Shoulder External Rotation      Able to reach shouder (mostly elbow flexion) Top of R shoulder  26 (GHJ ~35 deg abd)   Shoulder Internal Rotation      Sacrum Upper lumbar spine     AAROM L renetta flex with pulleys: 124 deg     1/5:    AROM  SEATED AROM SUPINE     L R (from eval) L  (measured 12/22)   Shoulder Flexion  95 86 140 120   Shoulder Abduction  72 160 80   Shoulder External Rotation  Able to reach ipsilateral side of head  (mostly elbow flexion) Top of R shoulder  26 (GHJ ~35 deg abd)   Shoulder Internal Rotation  Sacrum Upper lumbar spine     AAROM L renetta flex with pulleys: 122 deg       RESTRICTIONS/PRECAUTIONS: Uses electric scooter for primary mode of transportation, R knee contracture (about 90 deg flexion), able to transfer with SBA    Exercises/Interventions:     Therapeutic Exercises (32650) Resistance / level Sets/sec Reps Notes   Scifit StepOne 1 X 10 min  1100 steps  R LE needs yellow strap, done for endurance as well as stretching for L UE and R knee    Pulleys - flex and abd  5\" hold  3min  Done in electric scooter          UE [place] on L  - Flexion  - Abduction   - ER      , cued to push to end range of motion and then pause    Seated EOB wand lift overhead        Reaching with L UE  - supine, ~40 deg flexion, ~75 deg flex, ~      Supine with 2 pillows,    Seated EOB scap squeezes      Sitting in scooter - Bicep curls on L arm Lime TB            Assessment of ROM for POC 1/24             Therapeutic Activities (41425)              Ballet barre - partial stand from scooter  - requires use of forearms on bar to stand, then once in partial stand can get forearms off of bar.                    Sitting in scooter - using wash cloth along bar   -elbow ext and horiz abd   - flexion/ext     Facing bar  // to bar       Stair rail flexion slides   2 10 Manual assist from PT to reach end range and prevent shoulder hiking   Seated EOB - pushing wand away with resistance from PT    Pushing wand away and up     1/10: + 10 reps w/o resistance for warm up          Sitting EOB at dry erase easel - working on L shoulder ROM  - tracing shapes/connecting dots  - reaching high with controlled lowering  - word search puzzle             Intermittent assist from PT, yuri at end range           Transfers to/from electric scooter   X 1 CGA   Sitting in scooter with bedside table:  - folding pillow cases with L arm               Reaching up to cones placed on stair railing, bringing them down and stacking on stair platform    Min to Mod A    Bean bag toss into bucket  2 trials 10 bags Pt reached slightly OH to get bags from PT before throwing   Hitting small nerf ball with pickle ball raquet   20                  Neuromuscular Re-ed (55665)                                                 Manual Intervention (01190)       PROM L shoulder - all directions with distraction    Done while assessing ROM                                          AquaticTherapy Dates of Service: 1/17, 1/21  Aquatic Visits Exercises/Activities:transfer from scooter to chair lift   Transfers:  [] Stairs   [] Ramp  [x] Chair Lift   % Immersion:            Ambulation/ Warm up:   UE Exercises:   All performed seated on bench with CGA/Min A for trunk balance intermittent    Forward    Shoulder Shrugs      Lateral    Shoulder Circles  x    Retro    Scapular Retraction   x   [place]    Push Downs   x   Heel/Toe Walking    Punching X 10      Rowing x      Elbow Flex/Ext X12, x 10 with P      Shldr Flex/Ext X12, x 10 with P      Shldr aBd/aDd X12, x 10 with P   LE Exercises:  Shldr Horiz aBd/aDd x12 B   HR/TR  Shldr IR/ER x   Marches  Arm Circles X 12 L    Squats - L single leg 2 x 10  PNF Diagonals    Hamstring Curls  Wall Push Ups    Hip Flexion (SLR)  Pull downs X12   Hip aBduction (SLR)      Hip Extension (SLR)       Hip aDduction (SLR)      Hip Circles  Functional:    Hip IR/Er  Step up forward    Hip Hikes  Step up lateral     Step down        Lunges Forward      Lunges Retro      Lunges Lateral     Balance:        SLS  L SLS 10\" X3; 3 sets B UE support      Tandem Stance       NBOS eyes open  Seated:     NBOS eyes closed  Ankle pumps  x10 L   Hand to Opposite Knee  Ankle Circles  x10 L    Fwd Step ups to SLS  Knee Flex/Ext 2 X10 L LAQ; 2 X10 resisted  HS curl L   Lateral Step ups to SLS  Hip aBd/aDd x   Stop/Go Gait   Bicycle      Ankle DF/PF     Ankle Inv/Ev   Stretching:   Manual L Gastroc stretch   Gastroc/Soleus  Marches  Hip AB/ADD   Hamstring   [place]:    Knee Ext stretch with OP - pt seated with ankle propped on PT knees 4x30\" L  Jog    Piriformis   Noodle Hang    Hip Flexor  Traction at [facility]       ITB  Cool Down:    Quad  Fwd Walking    Mid Back   Lat Walking    UT  Retro Walking    Post Shoulder  Noodle float    Ladder Pull      Pec Stretch            Core: Other:    TrA set      Pelvic Tilts      Multifidi Walk outs c paddle      PNF Chop/Lifts                          Aquatic Abbreviation Key  B= Belt DB= Dumbells T= Theratube   H= Hydrotone N= Noodles W= Weights   P= Paddles S= Speedo equipment K= Kickboard      Pt. Education: Gave pt tour of pool, explained how to use lockers, what to wear, that it would be in group setting, and showed pt aquatic equipment. .  Modalities:     Pt. Education:  -patient educated on diagnosis, prognosis and expectations for rehab  -all patient questions were answered    Home Exercise Program:      Therapeutic Exercise and NMR EXR  [x] (36718) Provided verbal/tactile cueing for activities related to strengthening, flexibility, endurance, ROM for improvements in  [] LE / Lumbar: LE, proximal hip, and core control with self care, mobility, lifting, ambulation.   [x] UE / Cervical: cervical, postural, scapular, scapulothoracic and UE control with self care, reaching, carrying, lifting, house/yardwork, driving, computer work.  [] (65371) Provided verbal/tactile cueing for activities related to improving balance, coordination, kinesthetic sense, posture, motor skill, proprioception to assist with [] LE / lumbar: LE, proximal hip, and core control in self care, mobility, lifting, ambulation and eccentric single leg control. [] UE / cervical: cervical, scapular, scapulothoracic and UE control with self care, reaching, carrying, lifting, house/yardwork, driving, computer work.   [] (75789) Therapist is in constant attendance of 2 or more patients providing skilled therapy interventions, but not providing any significant amount of measurable one-on-one time to either patient, for improvements in  [] LE / lumbar: LE, proximal hip, and core control in self care, mobility, lifting, ambulation and eccentric single leg control. [] UE / cervical: cervical, scapular, scapulothoracic and UE control with self care, reaching, carrying, lifting, house/yardwork, driving, computer work. NMR and Therapeutic Activities:    [] (61397 or 23682) Provided verbal/tactile cueing for activities related to improving balance, coordination, kinesthetic sense, posture, motor skill, proprioception and motor activation to allow for proper function of   [] LE: / Lumbar core, proximal hip and LE with self care and ADLs  [] UE / Cervical: cervical, postural, scapular, scapulothoracic and UE control with self care, carrying, lifting, driving, computer work.   [] (04345) Gait Re-education- Provided training and instruction to the patient for proper LE, core and proximal hip recruitment and positioning and eccentric body weight control with ambulation re-education including up and down stairs     Home Management Training / Self Care:  [] (52931) Provided self-care/home management training related to activities of daily living and compensatory training, and/or use of adaptive equipment for improvement with: ADLs and compensatory training, meal preparation, safety procedures and instruction in use of adaptive equipment, including bathing, grooming, dressing, personal hygiene, basic household cleaning and chores.      Home Exercise Program:    [x] (54611) Reviewed/Progressed HEP activities related to strengthening, flexibility, endurance, ROM of   [] LE / Lumbar: core, proximal hip and LE for functional self-care, mobility, lifting and ambulation/stair navigation   [] UE / Cervical: cervical, postural, scapular, scapulothoracic and UE control with self care, reaching, carrying, lifting, house/yardwork, driving, computer work  [] (02337)Reviewed/Progressed HEP activities related to improving balance, coordination, kinesthetic sense, posture, motor skill, proprioception of   [] LE: core, proximal hip and LE for self care, mobility, lifting, and ambulation/stair navigation    [] UE / Cervical: cervical, postural,  scapular, scapulothoracic and UE control with self care, reaching, carrying, lifting, house/yardwork, driving, computer work    Manual Treatments:  PROM / STM / Oscillations-Mobs:  G-I, II, III, IV (PA's, Inf., Post.)  [] (86384) Provided manual therapy to mobilize LE, proximal hip and/or LS spine soft tissue/joints for the purpose of modulating pain, promoting relaxation,  increasing ROM, reducing/eliminating soft tissue swelling/inflammation/restriction, improving soft tissue extensibility and allowing for proper ROM for normal function with   [] LE / lumbar: self care, mobility, lifting and ambulation. [] UE / Cervical: self care, reaching, carrying, lifting, house/yardwork, driving, computer work. Modalities:  [] (93598) Vasopneumatic compression: Utilized vasopneumatic compression to decrease edema / swelling for the purpose of improving mobility and quad tone / recruitment which will allow for increased overall function including but not limited to self-care, transfers, ambulation, and ascending / descending stairs.      Charges:  Timed Code Treatment Minutes: 45   Total Treatment Minutes: 45     [] EVAL - LOW (51805)   [] EVAL - MOD (17265)  [] EVAL - HIGH (52390)  [] RE-EVAL (83113)  [x] FQ(95203) x 1    [] Ionto  [] NMR (73564) x       [] Vaso  [] Manual (61328) x       [] Ultrasound  [x] TA x 2        [] Mech Traction (30859)  [] Aquatic Therapy x      [] ES (un) (64652):   [] Home Management Training x  [] ES(attended) (06528)   [] Dry Needling 1-2 muscles (76344):  [] Dry Needling 3+ muscles (685429)  [] Group:      [] Other:     GOALS:   Patient stated goal: to get better use of left arm and leg   []? Progressing: []? Met: []? Not Met: []? Adjusted     Therapist goals for Patient:   Short Term Goals: To be achieved in: 2 weeks  1. Independent in HEP and progression per patient tolerance, in order to prevent re-injury. []? Progressing: []? Met: []? Not Met: []? Adjusted  2. Patient will have a decrease in pain to facilitate improvement in movement, function, and ADLs as indicated by Functional Deficits. []? Progressing: []? Met: []? Not Met: []? Adjusted     Long Term Goals: To be achieved in: 5 weeks  1. Disability index score of 45% or less for the Quick DASH to assist with reaching prior level of function. []? Progressing: []? Met: []? Not Met: []? Adjusted  2. Patient will demonstrate increased L shoulder abduction and flexion AROM to at least 100 deg to allow for proper joint functioning as indicated by patients Functional Deficits. [x]? Progressing: []? Met: []? Not Met: []? Adjusted  3. Patient will demonstrate an increase in Strength to at least 4-/5 for gross L shoulder strength to allow for proper functional mobility as indicated by patients Functional Deficits. [x]? Progressing: []? Met: []? Not Met: []? Adjusted  4. Patient will return to functional activities including ability to  light weight items and move them with L UE while completing bathing and other ADLs without increased symptoms or restriction. [x]? Progressing: []? Met: []? Not Met: []? Adjusted  5. Patient will demonstrate ability to use L UE during transfers, while being able to pull and tolerate weight bearing through the L UE.   [x]? Progressing: []? Met: []? Not Met: []? Adjusted       Overall Progression Towards Functional goals/ Treatment Progress Update:  [x] Patient is progressing as expected towards functional goals listed. [] Progression is slowed due to complexities/Impairments listed. [] Progression has been slowed due to co-morbidities. [] Plan just implemented, too soon to assess goals progression <30days   [] Goals require adjustment due to lack of progress  [] Patient is not progressing as expected and requires additional follow up with physician  [] Other    Persisting Functional Limitations/Impairments:  []Sleeping []Sitting               []Standing []Transfers        [x]Walking []Kneeling               [x]Stairs [x]Squatting / bending   [x]ADLs [x]Reaching  []Lifting  []Housework  []Driving []Job related tasks  []Sports/Recreation []Other:        ASSESSMENT: . See above  Treatment/Activity Tolerance:  [] Patient able to complete tx  [] Patient limited by fatigue  [] Patient limited by pain  [x] Patient limited by other medical complications  [] Other:     Prognosis: [] Good [x] Fair  [] Poor    Patient Requires Follow-up: [x] Yes  [] No    Plan for next treatment session:    PLAN: See emily. PT 3x / week for 5 weeks. [x] Continue per plan of care [] Alter current plan (see comments)  [] Plan of care initiated [] Hold pending MD visit [] Discharge    Electronically signed by: Christopher Bardales PT  DPT    Note: If patient does not return for scheduled/ recommended follow up visits, this note will serve as a discharge from care along with most recent update on progress.

## 2022-01-26 ENCOUNTER — HOSPITAL ENCOUNTER (OUTPATIENT)
Dept: PHYSICAL THERAPY | Age: 72
Setting detail: THERAPIES SERIES
Discharge: HOME | End: 2022-01-26
Payer: MEDICARE

## 2022-01-26 PROCEDURE — 97113 AQUATIC THERAPY/EXERCISES: CPT

## 2022-01-26 NOTE — PROGRESS NOTES
[facility]  Phone: [phone]   Fax: [phone]    Physical Therapy Daily Treatment Note  Date:  2022    Patient Name:  Michelle Elmore    :  1950  MRN: [id number]  Medical/Treatment Diagnosis Information:  Diagnosis: R29.898 - Left arm weakness  Treatment Diagnosis: Decreased AROM and strength of L UE, decreased functional mobility due to R knee contracture and R LE weakness, decreased functional use of L UE causing difficulty with ADLs  Insurance/Certification information:  PT Insurance Information: Medicare  Physician Information:  Referring Practitioner: Jewels Mojica MD ( MD is retiring) - pt will follow up with Dr Angeles German who is in the same office   Plan of care signed (Y/N): [x]  Yes []  No     Date of Patient follow up with Physician: 2/10/22     Progress Report: []  Yes  [x]  No     Date Range for reporting period:  Beginnin/22  PN:   POC: 22  POC:   Ending:     Progress report due (10 Rx/or 30 days whichever is less): visit #75 or     Recertification due (POC duration/ or 90 days whichever is less): visit #15 or 22     Visit # Insurance Allowable Auth required? Date Range   15/15 +  visits: 7 []  Yes  [x]  No n/a       Latex Allergy:  [x]NO      []YES  Preferred Language for Healthcare:   [x]English       []other:    Functional Scale:        Date assessed:  QuickDASH: raw score = 40; dysfunction = 66%  21    Pain level:  0/10     SUBJECTIVE:   Pt reports no pain today. States she felt good after LV and was able to do more at home including stand more.       OBJECTIVE: : AAROM L renetta flex with pulleys: 124 deg   :    PROM AROM  SEATED AROM SUPINE     L R L R L   Shoulder Flexion  150   95 140 120   Shoulder Abduction  90   70 160 80   Shoulder External Rotation      Able to reach shouder (mostly elbow flexion) Top of R shoulder  26 (GHJ ~35 deg abd)   Shoulder Internal Rotation      Sacrum Upper lumbar spine     AAROM L renetta flex with pulleys: 124 deg     1/5:    AROM  SEATED AROM SUPINE     L R (from eval) L  (measured 12/22)   Shoulder Flexion  95 86 140 120   Shoulder Abduction  72 160 80   Shoulder External Rotation  Able to reach ipsilateral side of head  (mostly elbow flexion) Top of R shoulder  26 (GHJ ~35 deg abd)   Shoulder Internal Rotation  Sacrum Upper lumbar spine     AAROM L renetta flex with pulleys: 122 deg       RESTRICTIONS/PRECAUTIONS: Uses electric scooter for primary mode of transportation, R knee contracture (about 90 deg flexion), able to transfer with SBA    Exercises/Interventions:     Therapeutic Exercises (27867) Resistance / level Sets/sec Reps Notes   Scifit StepOne 1 X 10 min  1100 steps  R LE needs yellow strap, done for endurance as well as stretching for L UE and R knee    Pulleys - flex and abd  5\" hold  3min  Done in electric scooter          UE [place] on L  - Flexion  - Abduction   - ER      , cued to push to end range of motion and then pause    Seated EOB wand lift overhead        Reaching with L UE  - supine, ~40 deg flexion, ~75 deg flex, ~      Supine with 2 pillows,    Seated EOB scap squeezes      Sitting in scooter - Bicep curls on L arm Lime TB            Assessment of ROM for POC 1/24             Therapeutic Activities (21076)              Ballet barre - partial stand from scooter  - requires use of forearms on bar to stand, then once in partial stand can get forearms off of bar.                    Sitting in scooter - using wash cloth along bar   -elbow ext and horiz abd   - flexion/ext     Facing bar  // to bar       Stair rail flexion slides   2 10 Manual assist from PT to reach end range and prevent shoulder hiking   Seated EOB - pushing wand away with resistance from PT    Pushing wand away and up     1/10: + 10 reps w/o resistance for warm up          Sitting EOB at dry erase easel - working on L shoulder ROM  - tracing shapes/connecting dots  - reaching high with controlled lowering  - word search puzzle             Intermittent assist from PT, yuri at end range           Transfers to/from electric scooter   X 1 CGA   Sitting in scooter with bedside table:  - folding pillow cases with L arm               Reaching up to cones placed on stair railing, bringing them down and stacking on stair platform    Min to Mod A    Bean bag toss into bucket  2 trials 10 bags Pt reached slightly OH to get bags from PT before throwing   Hitting small nerf ball with pickle ball raquet   20                  Neuromuscular Re-ed (11909)                                                 Manual Intervention (80437)       PROM L shoulder - all directions with distraction    Done while assessing ROM                                          AquaticTherapy Dates of Service: 1/17, 1/21, 1/26  Aquatic Visits Exercises/Activities:transfer from scooter to chair lift   Transfers:  [] Stairs   [] Ramp  [x] Chair Lift   % Immersion:            Ambulation/ Warm up:   UE Exercises:   All performed seated on bench with SBA-CGA    Forward    Shoulder Shrugs      Lateral    Shoulder Circles  x    Retro    Scapular Retraction   x   [place]    Push Downs   x20 with noodle   Heel/Toe Walking    Punching X20 with kickboard       Rowing x      Elbow Flex/Ext x20      Shldr Flex/Ext x 20 with P      Shldr aBd/aDd x 20 with P   LE Exercises:  Shldr Horiz aBd/aDd x20   HR/TR  Shldr IR/ER x   Marches  Arm Circles X 20 CW and CCW L    Squats - L single leg 2 x15 PNF Diagonals    Hamstring Curls  Wall Push Ups    Hip Flexion (SLR)  Pull downs    Hip aBduction (SLR)      Hip Extension (SLR)       Hip aDduction (SLR)      Hip Circles  Functional:    Hip IR/Er  Step up forward    Hip Hikes  Step up lateral     Step down        Lunges Forward      Lunges Retro      Lunges Lateral     Balance:        SLS  L SLS 10\" X3; unilateral UE support - fingertips       Tandem Stance       NBOS eyes open  Seated:     NBOS eyes closed  Ankle pumps  x20 B   Hand to Opposite Knee  Ankle Circles  x20 B   Fwd Step ups to SLS  Knee Flex/Ext 2 X10 L LAQ; X10 resisted  HS curl L   Lateral Step ups to SLS  Hip aBd/aDd x20   Stop/Go Gait   Bicycle  x20    Ankle DF/PF     Ankle Inv/Ev   Stretching:   Manual L Gastroc stretch   Gastroc/Soleus 20 sec x 3 L  Marches  Hip AB/ADD   Hamstring   [place]:    Knee Ext stretch with OP - pt in long sitting on bench  4x30\" L  Jog    Piriformis   Noodle Hang    Hip Flexor  Traction at [facility]       ITB  Cool Down:    Quad  Fwd Walking    Mid Back   Lat Walking    UT  Retro Walking    Post Shoulder  Noodle float    Ladder Pull      Pec Stretch            Core: Other:    TrA set      Pelvic Tilts      Multifidi Walk outs c paddle      PNF Chop/Lifts                          Aquatic Abbreviation Key  B= Belt DB= Dumbells T= Theratube   H= Hydrotone N= Noodles W= Weights   P= Paddles S= Speedo equipment K= Kickboard      Pt. Education: Gave pt tour of pool, explained how to use lockers, what to wear, that it would be in group setting, and showed pt aquatic equipment. .  Modalities:     Pt. Education:  -patient educated on diagnosis, prognosis and expectations for rehab  -all patient questions were answered    Home Exercise Program:      Therapeutic Exercise and NMR EXR  [x] (91039) Provided verbal/tactile cueing for activities related to strengthening, flexibility, endurance, ROM for improvements in  [] LE / Lumbar: LE, proximal hip, and core control with self care, mobility, lifting, ambulation.   [x] UE / Cervical: cervical, postural, scapular, scapulothoracic and UE control with self care, reaching, carrying, lifting, house/yardwork, driving, computer work.  [] (06129) Provided verbal/tactile cueing for activities related to improving balance, coordination, kinesthetic sense, posture, motor skill, proprioception to assist with   [] LE / lumbar: LE, proximal hip, and core control in self care, mobility, lifting, ambulation and eccentric single leg control. [] UE / cervical: cervical, scapular, scapulothoracic and UE control with self care, reaching, carrying, lifting, house/yardwork, driving, computer work.   [] (80394) Therapist is in constant attendance of 2 or more patients providing skilled therapy interventions, but not providing any significant amount of measurable one-on-one time to either patient, for improvements in  [] LE / lumbar: LE, proximal hip, and core control in self care, mobility, lifting, ambulation and eccentric single leg control. [] UE / cervical: cervical, scapular, scapulothoracic and UE control with self care, reaching, carrying, lifting, house/yardwork, driving, computer work. NMR and Therapeutic Activities:    [] (40612 or 63183) Provided verbal/tactile cueing for activities related to improving balance, coordination, kinesthetic sense, posture, motor skill, proprioception and motor activation to allow for proper function of   [] LE: / Lumbar core, proximal hip and LE with self care and ADLs  [] UE / Cervical: cervical, postural, scapular, scapulothoracic and UE control with self care, carrying, lifting, driving, computer work.   [] (86536) Gait Re-education- Provided training and instruction to the patient for proper LE, core and proximal hip recruitment and positioning and eccentric body weight control with ambulation re-education including up and down stairs     Home Management Training / Self Care:  [] (92305) Provided self-care/home management training related to activities of daily living and compensatory training, and/or use of adaptive equipment for improvement with: ADLs and compensatory training, meal preparation, safety procedures and instruction in use of adaptive equipment, including bathing, grooming, dressing, personal hygiene, basic household cleaning and chores.      Home Exercise Program:    [x] (82312) Reviewed/Progressed HEP activities related to strengthening, flexibility, endurance, ROM of   [] LE / Lumbar: core, proximal hip and LE for functional self-care, mobility, lifting and ambulation/stair navigation   [] UE / Cervical: cervical, postural, scapular, scapulothoracic and UE control with self care, reaching, carrying, lifting, house/yardwork, driving, computer work  [] (98374)Reviewed/Progressed HEP activities related to improving balance, coordination, kinesthetic sense, posture, motor skill, proprioception of   [] LE: core, proximal hip and LE for self care, mobility, lifting, and ambulation/stair navigation    [] UE / Cervical: cervical, postural,  scapular, scapulothoracic and UE control with self care, reaching, carrying, lifting, house/yardwork, driving, computer work    Manual Treatments:  PROM / STM / Oscillations-Mobs:  G-I, II, III, IV (PA's, Inf., Post.)  [] (26620) Provided manual therapy to mobilize LE, proximal hip and/or LS spine soft tissue/joints for the purpose of modulating pain, promoting relaxation,  increasing ROM, reducing/eliminating soft tissue swelling/inflammation/restriction, improving soft tissue extensibility and allowing for proper ROM for normal function with   [] LE / lumbar: self care, mobility, lifting and ambulation. [] UE / Cervical: self care, reaching, carrying, lifting, house/yardwork, driving, computer work. Modalities:  [] (94387) Vasopneumatic compression: Utilized vasopneumatic compression to decrease edema / swelling for the purpose of improving mobility and quad tone / recruitment which will allow for increased overall function including but not limited to self-care, transfers, ambulation, and ascending / descending stairs.      Charges:  Timed Code Treatment Minutes: 47   Total Treatment Minutes: 47     [] EVAL - LOW (48615)   [] EVAL - MOD (23890)  [] EVAL - HIGH (77324)  [] RE-EVAL (71229)  [] HU(28274) x 1    [] Ionto  [] NMR (71180) x       [] Vaso  [] Manual (17421) x [] Ultrasound  [] TA x 2        [] [facility] Traction (76474)  [x] Aquatic Therapy x   3   [] ES (un) (45836):   [] Home Management Training x  [] ES(attended) (66309)   [] Dry Needling 1-2 muscles (24260):  [] Dry Needling 3+ muscles (053171)  [] Group:      [] Other:     GOALS:   Patient stated goal: to get better use of left arm and leg   []? Progressing: []? Met: []? Not Met: []? Adjusted     Therapist goals for Patient:   Short Term Goals: To be achieved in: 2 weeks  1. Independent in HEP and progression per patient tolerance, in order to prevent re-injury. []? Progressing: []? Met: []? Not Met: []? Adjusted  2. Patient will have a decrease in pain to facilitate improvement in movement, function, and ADLs as indicated by Functional Deficits. []? Progressing: []? Met: []? Not Met: []? Adjusted     Long Term Goals: To be achieved in: 5 weeks  1. Disability index score of 45% or less for the Quick DASH to assist with reaching prior level of function. []? Progressing: []? Met: []? Not Met: []? Adjusted  2. Patient will demonstrate increased L shoulder abduction and flexion AROM to at least 100 deg to allow for proper joint functioning as indicated by patients Functional Deficits. [x]? Progressing: []? Met: []? Not Met: []? Adjusted  3. Patient will demonstrate an increase in Strength to at least 4-/5 for gross L shoulder strength to allow for proper functional mobility as indicated by patients Functional Deficits. [x]? Progressing: []? Met: []? Not Met: []? Adjusted  4. Patient will return to functional activities including ability to  light weight items and move them with L UE while completing bathing and other ADLs without increased symptoms or restriction. [x]? Progressing: []? Met: []? Not Met: []? Adjusted  5. Patient will demonstrate ability to use L UE during transfers, while being able to pull and tolerate weight bearing through the L UE.   [x]? Progressing: []? Met: []?  Not Met: []? Adjusted       Overall Progression Towards Functional goals/ Treatment Progress Update:  [x] Patient is progressing as expected towards functional goals listed. [] Progression is slowed due to complexities/Impairments listed. [] Progression has been slowed due to co-morbidities. [] Plan just implemented, too soon to assess goals progression <30days   [] Goals require adjustment due to lack of progress  [] Patient is not progressing as expected and requires additional follow up with physician  [] Other    Persisting Functional Limitations/Impairments:  []Sleeping []Sitting               []Standing []Transfers        [x]Walking []Kneeling               [x]Stairs [x]Squatting / bending   [x]ADLs [x]Reaching  []Lifting  []Housework  []Driving []Job related tasks  []Sports/Recreation []Other:        ASSESSMENT:  Increased reps to 20 this date. Pt with increased difficulty with SL balance which could be attributed to fatigue from increased reps of strengthening. Initiated L gastroc stretch this date. Patient to continue with aquatic therapy to improve mobility and standing tolerance. Treatment/Activity Tolerance:  [] Patient able to complete tx  [] Patient limited by fatigue  [] Patient limited by pain  [x] Patient limited by other medical complications  [] Other:     Prognosis: [] Good [x] Fair  [] Poor    Patient Requires Follow-up: [x] Yes  [] No    Plan for next treatment session:    PLAN: See eval. PT 3x / week for 5 weeks. [x] Continue per plan of care [] Alter current plan (see comments)  [] Plan of care initiated [] Hold pending MD visit [] Discharge    Electronically signed by: Minnie General PT DPT   Note: If patient does not return for scheduled/ recommended follow up visits, this note will serve as a discharge from care along with most recent update on progress.

## 2022-01-27 ENCOUNTER — HOSPITAL ENCOUNTER (OUTPATIENT)
Age: 72
Discharge: HOME | End: 2022-01-27
Payer: MEDICARE

## 2022-01-27 DIAGNOSIS — I10 ESSENTIAL HYPERTENSION: ICD-10-CM

## 2022-01-27 LAB
ALBUMIN SERPL-MCNC: 4.6 G/DL (ref 3.4–5)
ANION GAP SERPL CALCULATED.3IONS-SCNC: 15 MMOL/L (ref 3–16)
BUN BLDV-MCNC: 27 MG/DL (ref 7–20)
CALCIUM SERPL-MCNC: 9.4 MG/DL (ref 8.3–10.6)
CHLORIDE BLD-SCNC: 101 MMOL/L (ref 99–110)
CO2: 24 MMOL/L (ref 21–32)
CREAT SERPL-MCNC: 0.6 MG/DL (ref 0.6–1.2)
GFR AFRICAN AMERICAN: >60
GFR NON-AFRICAN AMERICAN: >60
GLUCOSE BLD-MCNC: 108 MG/DL (ref 70–99)
MAGNESIUM: 2.2 MG/DL (ref 1.8–2.4)
PHOSPHORUS: 4.9 MG/DL (ref 2.5–4.9)
POTASSIUM SERPL-SCNC: 4.1 MMOL/L (ref 3.5–5.1)
SODIUM BLD-SCNC: 140 MMOL/L (ref 136–145)

## 2022-01-27 PROCEDURE — 36415 COLL VENOUS BLD VENIPUNCTURE: CPT

## 2022-01-27 PROCEDURE — 83735 ASSAY OF MAGNESIUM: CPT

## 2022-01-27 PROCEDURE — 80069 RENAL FUNCTION PANEL: CPT

## 2022-01-27 RX ORDER — ESCITALOPRAM OXALATE 10 MG/1
TABLET ORAL
Qty: 90 TABLET | Refills: 1 | Status: SHIPPED | OUTPATIENT
Start: 2022-01-27 | End: 2022-07-18

## 2022-01-27 RX ORDER — CLOPIDOGREL BISULFATE 75 MG/1
TABLET ORAL
Qty: 30 TABLET | Refills: 2 | Status: SHIPPED | OUTPATIENT
Start: 2022-01-27 | End: 2022-05-02

## 2022-01-31 ENCOUNTER — HOSPITAL ENCOUNTER (OUTPATIENT)
Dept: PHYSICAL THERAPY | Age: 72
Setting detail: THERAPIES SERIES
Discharge: HOME | End: 2022-01-31
Payer: MEDICARE

## 2022-01-31 ENCOUNTER — APPOINTMENT (OUTPATIENT)
Dept: PHYSICAL THERAPY | Age: 72
End: 2022-01-31
Payer: MEDICARE

## 2022-01-31 PROCEDURE — 97110 THERAPEUTIC EXERCISES: CPT

## 2022-01-31 NOTE — PROGRESS NOTES
[facility] Physical Therapy  Phone: [phone]   Fax: [phone]    Physical Therapy Daily Treatment Note  Date:  2022    Patient Name:  Navdeep Rodriguez    :  1950  MRN: [id number]  Medical/Treatment Diagnosis Information:  Diagnosis: R29.898 - Left arm weakness  Treatment Diagnosis: Decreased AROM and strength of L UE, decreased functional mobility due to R knee contracture and R LE weakness, decreased functional use of L UE causing difficulty with ADLs  Insurance/Certification information:  PT Insurance Information: Medicare  Physician Information:  Referring Practitioner: Rissa Martines MD ( MD is retiring) - pt will follow up with Dr Marquis Barry who is in the same office   Plan of care signed (Y/N): [x]  Yes []  No     Date of Patient follow up with Physician: 2/10/22     Progress Report: []  Yes  [x]  No     Date Range for reporting period:  Beginnin/22  PN:   POC: 22  POC:   Ending:     Progress report due (10 Rx/or 30 days whichever is less): visit #44 or 50/00    Recertification due (POC duration/ or 90 days whichever is less): visit #15 or 22     Visit # Insurance Allowable Auth required? Date Range   15/15 +  visits: 7 []  Yes  [x]  No n/a       Latex Allergy:  [x]NO      []YES  Preferred Language for Healthcare:   [x]English       []other:    Functional Scale:        Date assessed:  QuickDASH: raw score = 40; dysfunction = 66%  21    Pain level:  2-3/10     SUBJECTIVE:   Pt reports she is stiff today.      OBJECTIVE:     : AAROM L renetta flex with pulleys: 124 deg   :    PROM AROM  SEATED AROM SUPINE     L R L R L   Shoulder Flexion  150   95 140 120   Shoulder Abduction  90   70 160 80   Shoulder External Rotation      Able to reach shouder (mostly elbow flexion) Top of R shoulder  26 (GHJ ~35 deg abd)   Shoulder Internal Rotation      Sacrum Upper lumbar spine     AAROM L renetta flex with pulleys: 124 deg     1/5:    AROM  SEATED AROM SUPINE     L R (from eval) L  (measured 12/22)   Shoulder Flexion  95 86 140 120   Shoulder Abduction  72 160 80   Shoulder External Rotation  Able to reach ipsilateral side of head  (mostly elbow flexion) Top of R shoulder  26 (GHJ ~35 deg abd)   Shoulder Internal Rotation  Sacrum Upper lumbar spine     AAROM L renetta flex with pulleys: 122 deg       RESTRICTIONS/PRECAUTIONS: Uses electric scooter for primary mode of transportation, R knee contracture (about 90 deg flexion), able to transfer with SBA    Exercises/Interventions:     Therapeutic Exercises (45401) Resistance / level Sets/sec Reps Notes   Scifit StepOne R LE needs yellow strap, done for endurance as well as stretching for L UE and R knee    Pulleys - flex and abd  Done in electric scooter       UE [place] on L  - Flexion  - Abduction   - ER , cued to push to end range of motion and then pause    Seated EOB wand lift overhead     Reaching with L UE  - supine, ~40 deg flexion, ~75 deg flex, ~ Supine with 2 pillows,    Seated EOB scap squeezes     Sitting in scooter - Bicep curls on L arm        Assessment of ROM for POC        Therapeutic Activities (10117)        Ballet barre - partial stand from scooter  - requires use of forearms on bar to stand, then once in partial stand can get forearms off of bar.          Sitting in scooter - using wash cloth along bar   -elbow ext and horiz abd   - flexion/ext      Stair rail flexion slides  Manual assist from PT to reach end range and prevent shoulder hiking   Seated EOB - pushing wand away with resistance from PT    Pushing wand away and up  1/10: + 10 reps w/o resistance for warm up       Sitting EOB at dry erase easel - working on L shoulder ROM  - tracing shapes/connecting dots  - reaching high with controlled lowering  - word search puzzle   Intermittent assist from PT, yuri at end range        Transfers to/from electric scooter CGA   Sitting in scooter with bedside table:  - folding pillow cases with L arm       Reaching up to cones placed on stair railing, bringing them down and stacking on stair platform   Min to Mod A    Bean bag toss into bucket Pt reached slightly OH to get bags from PT before throwing   Hitting small nerf ball with pickle ball raquet                  Neuromuscular Re-ed (73251)                                                 Manual Intervention (20904)       PROM L shoulder - all directions with distraction    Done while assessing ROM                                          AquaticTherapy Dates of Service: 1/17, 1/21, 1/26, 1/31  Aquatic Visits Exercises/Activities:transfer from scooter to chair lift   Transfers:  [] Stairs   [] Ramp  [x] Chair Lift   % Immersion:            Ambulation/ Warm up:   UE Exercises:   All performed seated on bench with SBA-CGA    Forward    Shoulder Shrugs      Lateral    Shoulder Circles  x    Retro    Scapular Retraction   x   [place]    Push Downs    Heel/Toe Walking    Punching      Rowing x20 CP      Elbow Flex/Ext x20 with CP      Shldr Flex/Ext x 20 with CP      Shldr aBd/aDd x 20 with CP   LE Exercises:  Shldr Horiz aBd/aDd x20 with CP   HR/TR  Shldr IR/ER x   Marches  Arm Circles X 20 CW and CCW L    Squats - L single leg 2 x15 PNF Diagonals    Hamstring Curls  Wall Push Ups    Hip Flexion (SLR)  Pull downs    Hip aBduction (SLR)      Hip Extension (SLR)       Hip aDduction (SLR)      Hip Circles  Functional:    Hip IR/Er  Step up forward    Hip Hikes  Step up lateral     Step down        Lunges Forward      Lunges Retro      Lunges Lateral     Balance:        SLS  L SLS 10\" X3; unilateral UE support - fingertips       Tandem Stance       NBOS eyes open  Seated:     NBOS eyes closed  Ankle pumps  x20 B   Hand to Opposite Knee  Ankle Circles  x20 B   Fwd Step ups to SLS  Knee Flex/Ext 2 X10 L LAQ; X10 resisted  HS curl L   Lateral Step ups to SLS  Hip aBd/aDd x20   Stop/Go Gait   Bicycle  x20    Ankle DF/PF x20 L Ankle Inv/Ev x20 L   Stretching:   Manual L Gastroc stretch   Gastroc/Soleus  Marches  Hip AB/ADD   Hamstring  Seated on bench 20\" X4 [place]:    Knee Ext stretch with OP - pt in long sitting on bench  4x30\" L  Jog    Piriformis   Noodle Hang    Hip Flexor  [facility]       ITB  Cool Down:    Quad  Fwd Walking    Mid Back   Lat Walking    UT  Retro Walking    Post Shoulder  Noodle float    Ladder Pull      Pec Stretch            Core: Other:    TrA set      Pelvic Tilts      Multifidi Walk outs c paddle      PNF Chop/Lifts                          Aquatic Abbreviation Key  B= Belt DB= Dumbells T= Theratube   H= Hydrotone N= Noodles W= Weights   P= Paddles S= Speedo equipment K= Kickboard      Pt. Education: Gave pt tour of pool, explained how to use lockers, what to wear, that it would be in group setting, and showed pt aquatic equipment. .  Modalities:     Pt. Education:  -patient educated on diagnosis, prognosis and expectations for rehab  -all patient questions were answered    Home Exercise Program:      Therapeutic Exercise and NMR EXR  [] (60589) Provided verbal/tactile cueing for activities related to strengthening, flexibility, endurance, ROM for improvements in  [] LE / Lumbar: LE, proximal hip, and core control with self care, mobility, lifting, ambulation. [] UE / Cervical: cervical, postural, scapular, scapulothoracic and UE control with self care, reaching, carrying, lifting, house/yardwork, driving, computer work.  [] (48946) Provided verbal/tactile cueing for activities related to improving balance, coordination, kinesthetic sense, posture, motor skill, proprioception to assist with   [] LE / lumbar: LE, proximal hip, and core control in self care, mobility, lifting, ambulation and eccentric single leg control. [] UE / cervical: cervical, scapular, scapulothoracic and UE control with self care, reaching, carrying, lifting, house/yardwork, driving, computer work.    [x] (82026) Aquatic therapy with therapeutic exercise. Provided verbal and tactile cueing for activities related to strengthening, flexibility, endurance, ROM for improvements in  [x] LE / lumbar: LE, proximal hip, and core control in self care, mobility, lifting, ambulation and eccentric single leg control. [x] UE / cervical: cervical, scapular, scapulothoracic and UE control with self care, reaching, carrying, lifting, house/yardwork, driving, computer work.   [] (79175) Therapist is in constant attendance of 2 or more patients providing skilled therapy interventions, but not providing any significant amount of measurable one-on-one time to either patient, for improvements in  [] LE / lumbar: LE, proximal hip, and core control in self care, mobility, lifting, ambulation and eccentric single leg control. [] UE / cervical: cervical, scapular, scapulothoracic and UE control with self care, reaching, carrying, lifting, house/yardwork, driving, computer work.        NMR and Therapeutic Activities:    [] (53425 or 89386) Provided verbal/tactile cueing for activities related to improving balance, coordination, kinesthetic sense, posture, motor skill, proprioception and motor activation to allow for proper function of   [] LE: / Lumbar core, proximal hip and LE with self care and ADLs  [] UE / Cervical: cervical, postural, scapular, scapulothoracic and UE control with self care, carrying, lifting, driving, computer work.   [] (26904) Gait Re-education- Provided training and instruction to the patient for proper LE, core and proximal hip recruitment and positioning and eccentric body weight control with ambulation re-education including up and down stairs     Home Management Training / Self Care:  [] (22698) Provided self-care/home management training related to activities of daily living and compensatory training, and/or use of adaptive equipment for improvement with: ADLs and compensatory training, meal preparation, safety procedures and instruction in use of adaptive equipment, including bathing, grooming, dressing, personal hygiene, basic household cleaning and chores. Home Exercise Program:    [] (89223) Reviewed/Progressed HEP activities related to strengthening, flexibility, endurance, ROM of   [] LE / Lumbar: core, proximal hip and LE for functional self-care, mobility, lifting and ambulation/stair navigation   [] UE / Cervical: cervical, postural, scapular, scapulothoracic and UE control with self care, reaching, carrying, lifting, house/yardwork, driving, computer work  [] (52887)Reviewed/Progressed HEP activities related to improving balance, coordination, kinesthetic sense, posture, motor skill, proprioception of   [] LE: core, proximal hip and LE for self care, mobility, lifting, and ambulation/stair navigation    [] UE / Cervical: cervical, postural,  scapular, scapulothoracic and UE control with self care, reaching, carrying, lifting, house/yardwork, driving, computer work    Manual Treatments:  PROM / STM / Oscillations-Mobs:  G-I, II, III, IV (PA's, Inf., Post.)  [] (26542) Provided manual therapy to mobilize LE, proximal hip and/or LS spine soft tissue/joints for the purpose of modulating pain, promoting relaxation,  increasing ROM, reducing/eliminating soft tissue swelling/inflammation/restriction, improving soft tissue extensibility and allowing for proper ROM for normal function with   [] LE / lumbar: self care, mobility, lifting and ambulation. [] UE / Cervical: self care, reaching, carrying, lifting, house/yardwork, driving, computer work. Modalities:  [] (17392) Vasopneumatic compression: Utilized vasopneumatic compression to decrease edema / swelling for the purpose of improving mobility and quad tone / recruitment which will allow for increased overall function including but not limited to self-care, transfers, ambulation, and ascending / descending stairs.      Charges:  Timed Code Treatment Minutes: 32   Total Treatment Minutes: 32     [] EVAL - LOW (98024)   [] EVAL - MOD (71956)  [] EVAL - HIGH (77960)  [] RE-EVAL (39342)  [] VJ(32107) x 1    [] Ionto  [] NMR (57557) x       [] Vaso  [] Manual (76093) x       [] Ultrasound  [] TA x 2        [] Mech Traction (53639)  [x] Aquatic Therapy x   2   [] ES (un) (63772):   [] Home Management Training x  [] ES(attended) (67294)   [] Dry Needling 1-2 muscles (47563):  [] Dry Needling 3+ muscles (578998)  [] Group:      [] Other:     GOALS:   Patient stated goal: to get better use of left arm and leg   []? Progressing: []? Met: []? Not Met: []? Adjusted     Therapist goals for Patient:   Short Term Goals: To be achieved in: 2 weeks  1. Independent in HEP and progression per patient tolerance, in order to prevent re-injury. []? Progressing: []? Met: []? Not Met: []? Adjusted  2. Patient will have a decrease in pain to facilitate improvement in movement, function, and ADLs as indicated by Functional Deficits. []? Progressing: []? Met: []? Not Met: []? Adjusted     Long Term Goals: To be achieved in: 5 weeks  1. Disability index score of 45% or less for the Quick DASH to assist with reaching prior level of function. []? Progressing: []? Met: []? Not Met: []? Adjusted  2. Patient will demonstrate increased L shoulder abduction and flexion AROM to at least 100 deg to allow for proper joint functioning as indicated by patients Functional Deficits. [x]? Progressing: []? Met: []? Not Met: []? Adjusted  3. Patient will demonstrate an increase in Strength to at least 4-/5 for gross L shoulder strength to allow for proper functional mobility as indicated by patients Functional Deficits. [x]? Progressing: []? Met: []? Not Met: []? Adjusted  4. Patient will return to functional activities including ability to  light weight items and move them with L UE while completing bathing and other ADLs without increased symptoms or restriction. [x]?  Progressing: []? Met: []? Not Met: []? Adjusted  5. Patient will demonstrate ability to use L UE during transfers, while being able to pull and tolerate weight bearing through the L UE.   [x]? Progressing: []? Met: []? Not Met: []? Adjusted       Overall Progression Towards Functional goals/ Treatment Progress Update:  [x] Patient is progressing as expected towards functional goals listed. [] Progression is slowed due to complexities/Impairments listed. [] Progression has been slowed due to co-morbidities. [] Plan just implemented, too soon to assess goals progression <30days   [] Goals require adjustment due to lack of progress  [] Patient is not progressing as expected and requires additional follow up with physician  [] Other    Persisting Functional Limitations/Impairments:  []Sleeping []Sitting               []Standing []Transfers        [x]Walking []Kneeling               [x]Stairs [x]Squatting / bending   [x]ADLs [x]Reaching  []Lifting  []Housework  []Driving []Job related tasks  []Sports/Recreation []Other:        ASSESSMENT:  Increased reps to 20 this date. Pt with increased difficulty with SL balance which could be attributed to fatigue from increased reps of strengthening. Initiated L gastroc stretch this date. Patient to continue with aquatic therapy to improve mobility and standing tolerance. Treatment/Activity Tolerance:  [] Patient able to complete tx  [] Patient limited by fatigue  [] Patient limited by pain  [x] Patient limited by other medical complications  [] Other:     Prognosis: [] Good [x] Fair  [] Poor    Patient Requires Follow-up: [x] Yes  [] No    Plan for next treatment session:    PLAN: See eval. PT 3x / week for 5 weeks.    [x] Continue per plan of care [] Alter current plan (see comments)  [] Plan of care initiated [] Hold pending MD visit [] Discharge    Electronically signed by: Edward Hadley PT, DPT    Note: If patient does not return for scheduled/ recommended follow up visits, this note will serve as a discharge from care along with most recent update on progress.

## 2022-02-02 ENCOUNTER — HOSPITAL ENCOUNTER (OUTPATIENT)
Dept: PHYSICAL THERAPY | Age: 72
Setting detail: THERAPIES SERIES
Discharge: HOME | End: 2022-02-02
Payer: MEDICARE

## 2022-02-02 NOTE — FLOWSHEET NOTE
Physical Therapy  Cancellation/No-show Note  Patient Name:  Wolf Hernandez  :  1950   Date:  2022  Cancelled visits to date: 0  No-shows to date: 0    Patient status for today's appointment patient:  [x]  Cancelled 22  []  Rescheduled appointment  []  No-show     Reason given by patient:  [x]  Patient ill pt up all night with legs cramping  []  Conflicting appointment  []  No transportation    []  Conflict with work  []  No reason given  []  Other:     Comments:      Phone call information:   []  Phone call made today to patient at _ time at number provided:      []  Patient answered, conversation as follows:    []  Patient did not answer, message left as follows:  []  Phone call not made today  [x]  Phone call not needed - pt contacted us to cancel and provided reason for cancellation.      Electronically signed by:  Eva Bergman, PT

## 2022-02-07 ENCOUNTER — HOSPITAL ENCOUNTER (OUTPATIENT)
Dept: PHYSICAL THERAPY | Age: 72
Setting detail: THERAPIES SERIES
Discharge: HOME | End: 2022-02-07
Payer: MEDICARE

## 2022-02-07 PROCEDURE — 97112 NEUROMUSCULAR REEDUCATION: CPT

## 2022-02-07 PROCEDURE — 97110 THERAPEUTIC EXERCISES: CPT

## 2022-02-07 NOTE — PROGRESS NOTES
[address] [facility] Therapy  Phone: [phone]   Fax: [phone]  Physical Therapy Re-Certification Plan of Care    Dear  Dr. Julianna Grover,      We had the pleasure of treating the following patient for physical therapy services at [facility]. A summary of our findings can be found in the updated assessment below. This includes our plan of care. If you have any questions or concerns regarding these findings, please do not hesitate to contact me at the office phone number checked above. Thank you for the referral.     Physician Signature:________________________________Date:__________________  By signing above (or electronic signature), therapist's plan is approved by physician      Functional Outcome:                                 QuickDASH Total Score: 36  QUICKDASH Disability Index: 40-59%                         AROM  SEATED AROM SUPINE     L R (from eval)     Shoulder Flexion  96 140     Shoulder Abduction  83 160     Shoulder External Rotation  Able to reach top of head Top of R shoulder      Shoulder Internal Rotation  Left glute/flank Upper lumbar spine      AAROM L renetta flex with pulleys: 126 deg        Overall Response to Treatment:   []Patient is responding well to treatment and improvement is noted with regards  to goals   []Patient should continue to improve in reasonable time if they continue HEP   []Patient has plateaued and is no longer responding to skilled PT intervention    []Patient is getting worse and would benefit from return to referring MD   []Patient unable to adhere to initial POC   [x]Other: Pt reports improvements with washing dishes from her wheelchair at home. She is able to use both hands to wash her hair but it is awkward and uncomfortable. Pt has improved with L shoulder ROM since the last assessment, and prior to that had hit a plateau.  Pt seems to have made the most progress with aquatic therapy allowing her to move the arm actively but with some assistance from the water. The plan will be to continue with aquatic therapy until discharge. Date range of Visits: 21 -22  Total Visits: 23    Recommendation:    [x]Continue PT 2x / wk for 5 weeks. []Hold PT, pending MD visit        Physical Therapy Daily Treatment Note  Date:  2022    Patient Name:  Brady Hidalgo    :  1950  MRN: [id number]  Medical/Treatment Diagnosis Information:  Diagnosis: R29.898 - Left arm weakness  Treatment Diagnosis: Decreased AROM and strength of L UE, decreased functional mobility due to R knee contracture and R LE weakness, decreased functional use of L UE causing difficulty with ADLs  Insurance/Certification information:  PT Insurance Information: Medicare  Physician Information:  Referring Practitioner: Paulene Moritz, MD ( MD is retiring) - pt will follow up with Dr Scharlene Holstein who is in the same office   Plan of care signed (Y/N): [x]  Yes []  No     Date of Patient follow up with Physician: 2/10/22     Progress Report: [x]  Yes  []  No     Date Range for reporting period:  Beginnin/22  PN:   POC: 22  POC:   POC:   Ending:     Progress report due (10 Rx/or 30 days whichever is less): visit #27 or     Recertification due (POC duration/ or 90 days whichever is less): visit #15 or 22     Visit # Insurance Allowable Auth required? Date Range   15/15 + 8/8 + 010 2022 visits: 8 []  Yes  [x]  No n/a       Latex Allergy:  [x]NO      []YES  Preferred Language for Healthcare:   [x]English       []other:    Functional Scale:        Date assessed:  QuickDASH: raw score = 40; dysfunction = 66%  21    Pain level:  2-3/10     SUBJECTIVE:   Pt reports she is stiff today.      OBJECTIVE:     : AAROM L renetta flex with pulleys: 124 deg   :    PROM AROM  SEATED AROM SUPINE     L R L R L   Shoulder Flexion  150   95 140 120   Shoulder Abduction  90   70 160 80   Shoulder External Rotation      Able to reach shouder (mostly elbow flexion) Top of R shoulder  26 (GHJ ~35 deg abd)   Shoulder Internal Rotation      Sacrum Upper lumbar spine     AAROM L renetta flex with pulleys: 124 deg     1/5:    AROM  SEATED AROM SUPINE     L R (from eval) L  (measured 12/22)   Shoulder Flexion  95 86 140 120   Shoulder Abduction  72 160 80   Shoulder External Rotation  Able to reach ipsilateral side of head  (mostly elbow flexion) Top of R shoulder  26 (GHJ ~35 deg abd)   Shoulder Internal Rotation  Sacrum Upper lumbar spine     AAROM L renetta flex with pulleys: 122 deg       RESTRICTIONS/PRECAUTIONS: Uses electric scooter for primary mode of transportation, R knee contracture (about 90 deg flexion), able to transfer with SBA    Exercises/Interventions:     Therapeutic Exercises (26338) Resistance / level Sets/sec Reps Notes   Scifit StepOne R LE needs yellow strap, done for endurance as well as stretching for L UE and R knee    Pulleys - flex and abd  5\" hold  5 min Done in electric scooter       UE [place] on L  - Flexion  - Abduction   - ER , cued to push to end range of motion and then pause    Seated EOB wand lift overhead     Reaching with L UE  - supine, ~40 deg flexion, ~75 deg flex, ~ Supine with 2 pillows,    Seated EOB scap squeezes     Sitting in scooter - Bicep curls on L arm        Assessment of ROM for POC 2/7       Therapeutic Activities (46421)        Ballet barre - partial stand from scooter  - requires use of forearms on bar to stand, then once in partial stand can get forearms off of bar.          Sitting in scooter - using wash cloth along bar   -elbow ext and horiz abd   - flexion/ext      Stair rail flexion slides  2 10 Manual assist from PT to reach end range and prevent shoulder hiking   Seated EOB - pushing wand away with resistance from PT    Pushing wand away and up  1/10: + 10 reps w/o resistance for warm up       Sitting EOB at dry erase easel - working on L shoulder ROM  - tracing shapes/connecting dots  - reaching high with controlled lowering  - word search puzzle   Intermittent assist from PT, yuri at end range        Transfers to/from electric scooter CGA   Sitting in scooter with bedside table:  - folding pillow cases with L arm       Reaching up to cones placed on stair railing, bringing them down and stacking on stair platform   Min to Mod A    Bean bag toss into bucket 3 trials 8 bags Pt reached slightly OH to get bags from PT before throwing   Hitting small beach ball with pickle ball raquet 15                 Neuromuscular Re-ed (06197)                                                 Manual Intervention (20205)       PROM L shoulder - all directions with distraction    Done while assessing ROM                                          AquaticTherapy Dates of Service: 1/17, 1/21, 1/26, 1/31  Aquatic Visits Exercises/Activities:transfer from scooter to chair lift   Transfers:  [] Stairs   [] Ramp  [x] Chair Lift   % Immersion:            Ambulation/ Warm up:   UE Exercises:   All performed seated on bench with SBA-CGA    Forward    Shoulder Shrugs      Lateral    Shoulder Circles  x    Retro    Scapular Retraction   x   [place]    Push Downs    Heel/Toe Walking    Punching      Rowing x20 CP      Elbow Flex/Ext x20 with CP      Shldr Flex/Ext x 20 with CP      Shldr aBd/aDd x 20 with CP   LE Exercises:  Shldr Horiz aBd/aDd x20 with CP   HR/TR  Shldr IR/ER x   Marches  Arm Circles X 20 CW and CCW L    Squats - L single leg 2 x15 PNF Diagonals    Hamstring Curls  Wall Push Ups    Hip Flexion (SLR)  Pull downs    Hip aBduction (SLR)      Hip Extension (SLR)       Hip aDduction (SLR)      Hip Circles  Functional:    Hip IR/Er  Step up forward    Hip Hikes  Step up lateral     Step down        Lunges Forward      Lunges Retro      Lunges Lateral     Balance:        SLS  L SLS 10\" X3; unilateral UE support - fingertips       Tandem Stance       NBOS eyes open  Seated: NBOS eyes closed  Ankle pumps  x20 B   Hand to Opposite Knee  Ankle Circles  x20 B   Fwd Step ups to SLS  Knee Flex/Ext 2 X10 L LAQ; X10 resisted  HS curl L   Lateral Step ups to SLS  Hip aBd/aDd x20   Stop/Go Gait   Bicycle  x20    Ankle DF/PF x20 L     Ankle Inv/Ev x20 L   Stretching:   Manual L Gastroc stretch   Gastroc/Soleus  Marches  Hip AB/ADD   Hamstring  Seated on bench 20\" X4 [place]:    Knee Ext stretch with OP - pt in long sitting on bench  4x30\" L  Jog    Piriformis   Noodle Hang    Hip Flexor  Traction at [facility]      DKTC       ITB  Cool Down:    Quad  Fwd Walking    Mid Back   Lat Walking    UT  Retro Walking    Post Shoulder  Noodle float    Ladder Pull      Pec Stretch            Core: Other:    TrA set      Pelvic Tilts      Multifidi Walk outs c paddle      PNF Chop/Lifts                          Aquatic Abbreviation Key  B= Belt DB= Dumbells T= Theratube   H= Hydrotone N= Noodles W= Weights   P= Paddles S= Speedo equipment K= Kickboard      Pt. Education: Gave pt tour of pool, explained how to use lockers, what to wear, that it would be in group setting, and showed pt aquatic equipment. .  Modalities:     Pt. Education:  -patient educated on diagnosis, prognosis and expectations for rehab  -all patient questions were answered    Home Exercise Program:      Therapeutic Exercise and NMR EXR  [] (19665) Provided verbal/tactile cueing for activities related to strengthening, flexibility, endurance, ROM for improvements in  [] LE / Lumbar: LE, proximal hip, and core control with self care, mobility, lifting, ambulation.   [] UE / Cervical: cervical, postural, scapular, scapulothoracic and UE control with self care, reaching, carrying, lifting, house/yardwork, driving, computer work.  [] (25351) Provided verbal/tactile cueing for activities related to improving balance, coordination, kinesthetic sense, posture, motor skill, proprioception to assist with   [] LE / lumbar: LE, proximal hip, and core control in self care, mobility, lifting, ambulation and eccentric single leg control. [] UE / cervical: cervical, scapular, scapulothoracic and UE control with self care, reaching, carrying, lifting, house/yardwork, driving, computer work. [x] (55007) Aquatic therapy with therapeutic exercise. Provided verbal and tactile cueing for activities related to strengthening, flexibility, endurance, ROM for improvements in  [x] LE / lumbar: LE, proximal hip, and core control in self care, mobility, lifting, ambulation and eccentric single leg control. [x] UE / cervical: cervical, scapular, scapulothoracic and UE control with self care, reaching, carrying, lifting, house/yardwork, driving, computer work.   [] (04181) Therapist is in constant attendance of 2 or more patients providing skilled therapy interventions, but not providing any significant amount of measurable one-on-one time to either patient, for improvements in  [] LE / lumbar: LE, proximal hip, and core control in self care, mobility, lifting, ambulation and eccentric single leg control. [] UE / cervical: cervical, scapular, scapulothoracic and UE control with self care, reaching, carrying, lifting, house/yardwork, driving, computer work.        NMR and Therapeutic Activities:    [] (00226 or 86322) Provided verbal/tactile cueing for activities related to improving balance, coordination, kinesthetic sense, posture, motor skill, proprioception and motor activation to allow for proper function of   [] LE: / Lumbar core, proximal hip and LE with self care and ADLs  [] UE / Cervical: cervical, postural, scapular, scapulothoracic and UE control with self care, carrying, lifting, driving, computer work.   [] (83417) Gait Re-education- Provided training and instruction to the patient for proper LE, core and proximal hip recruitment and positioning and eccentric body weight control with ambulation re-education including up and down stairs     Home Management Training / Self Care:  [] (31866) Provided self-care/home management training related to activities of daily living and compensatory training, and/or use of adaptive equipment for improvement with: ADLs and compensatory training, meal preparation, safety procedures and instruction in use of adaptive equipment, including bathing, grooming, dressing, personal hygiene, basic household cleaning and chores. Home Exercise Program:    [] (26684) Reviewed/Progressed HEP activities related to strengthening, flexibility, endurance, ROM of   [] LE / Lumbar: core, proximal hip and LE for functional self-care, mobility, lifting and ambulation/stair navigation   [] UE / Cervical: cervical, postural, scapular, scapulothoracic and UE control with self care, reaching, carrying, lifting, house/yardwork, driving, computer work  [] (36185)Reviewed/Progressed HEP activities related to improving balance, coordination, kinesthetic sense, posture, motor skill, proprioception of   [] LE: core, proximal hip and LE for self care, mobility, lifting, and ambulation/stair navigation    [] UE / Cervical: cervical, postural,  scapular, scapulothoracic and UE control with self care, reaching, carrying, lifting, house/yardwork, driving, computer work    Manual Treatments:  PROM / STM / Oscillations-Mobs:  G-I, II, III, IV (PA's, Inf., Post.)  [] (27956) Provided manual therapy to mobilize LE, proximal hip and/or LS spine soft tissue/joints for the purpose of modulating pain, promoting relaxation,  increasing ROM, reducing/eliminating soft tissue swelling/inflammation/restriction, improving soft tissue extensibility and allowing for proper ROM for normal function with   [] LE / lumbar: self care, mobility, lifting and ambulation. [] UE / Cervical: self care, reaching, carrying, lifting, house/yardwork, driving, computer work.      Modalities:  [] (28736) Vasopneumatic compression: Utilized vasopneumatic compression to decrease edema Met: []? Not Met: []? Adjusted  4. Patient will return to functional activities including ability to  light weight items and move them with L UE while completing bathing and other ADLs without increased symptoms or restriction. [x]? Progressing: []? Met: []? Not Met: []? Adjusted  5. Patient will demonstrate ability to use L UE during transfers, while being able to pull and tolerate weight bearing through the L UE.   [x]? Progressing: []? Met: []? Not Met: []? Adjusted       Overall Progression Towards Functional goals/ Treatment Progress Update:  [x] Patient is progressing as expected towards functional goals listed. [] Progression is slowed due to complexities/Impairments listed. [] Progression has been slowed due to co-morbidities. [] Plan just implemented, too soon to assess goals progression <30days   [] Goals require adjustment due to lack of progress  [] Patient is not progressing as expected and requires additional follow up with physician  [] Other    Persisting Functional Limitations/Impairments:  []Sleeping []Sitting               []Standing []Transfers        [x]Walking []Kneeling               [x]Stairs [x]Squatting / bending   [x]ADLs [x]Reaching  []Lifting  []Housework  []Driving []Job related tasks  []Sports/Recreation []Other:        ASSESSMENT: See above    Treatment/Activity Tolerance:  [] Patient able to complete tx  [] Patient limited by fatigue  [] Patient limited by pain  [x] Patient limited by other medical complications  [] Other:     Prognosis: [] Good [x] Fair  [] Poor    Patient Requires Follow-up: [x] Yes  [] No    Plan for next treatment session:    PLAN: See emily. PT 3x / week for 5 weeks.    [x] Continue per plan of care [] Alter current plan (see comments)  [] Plan of care initiated [] Hold pending MD visit [] Discharge    Electronically signed by: Lorette Aschoff PT, DPT    Note: If patient does not return for scheduled/ recommended follow up visits, this note will serve as a discharge from care along with most recent update on progress.

## 2022-02-09 ENCOUNTER — APPOINTMENT (OUTPATIENT)
Dept: PHYSICAL THERAPY | Age: 72
End: 2022-02-09
Payer: MEDICARE

## 2022-02-10 ENCOUNTER — HOSPITAL ENCOUNTER (OUTPATIENT)
Dept: PHYSICAL THERAPY | Age: 72
Setting detail: THERAPIES SERIES
Discharge: HOME | End: 2022-02-10
Payer: MEDICARE

## 2022-02-10 ENCOUNTER — OFFICE VISIT (OUTPATIENT)
Dept: INTERNAL MEDICINE CLINIC | Age: 72
End: 2022-02-10
Payer: MEDICARE

## 2022-02-10 VITALS — DIASTOLIC BLOOD PRESSURE: 72 MMHG | SYSTOLIC BLOOD PRESSURE: 148 MMHG | HEART RATE: 66 BPM

## 2022-02-10 DIAGNOSIS — I10 ESSENTIAL HYPERTENSION: ICD-10-CM

## 2022-02-10 PROCEDURE — 99213 OFFICE O/P EST LOW 20 MIN: CPT | Performed by: HOSPITALIST

## 2022-02-10 PROCEDURE — 4040F PNEUMOC VAC/ADMIN/RCVD: CPT | Performed by: HOSPITALIST

## 2022-02-10 PROCEDURE — 3017F COLORECTAL CA SCREEN DOC REV: CPT | Performed by: HOSPITALIST

## 2022-02-10 PROCEDURE — G8417 CALC BMI ABV UP PARAM F/U: HCPCS | Performed by: HOSPITALIST

## 2022-02-10 PROCEDURE — 1036F TOBACCO NON-USER: CPT | Performed by: HOSPITALIST

## 2022-02-10 PROCEDURE — 97113 AQUATIC THERAPY/EXERCISES: CPT

## 2022-02-10 PROCEDURE — 1123F ACP DISCUSS/DSCN MKR DOCD: CPT | Performed by: HOSPITALIST

## 2022-02-10 PROCEDURE — G8484 FLU IMMUNIZE NO ADMIN: HCPCS | Performed by: HOSPITALIST

## 2022-02-10 PROCEDURE — G8399 PT W/DXA RESULTS DOCUMENT: HCPCS | Performed by: HOSPITALIST

## 2022-02-10 PROCEDURE — G8427 DOCREV CUR MEDS BY ELIG CLIN: HCPCS | Performed by: HOSPITALIST

## 2022-02-10 PROCEDURE — 1090F PRES/ABSN URINE INCON ASSESS: CPT | Performed by: HOSPITALIST

## 2022-02-10 RX ORDER — CHLORTHALIDONE 25 MG/1
25 TABLET ORAL DAILY
Qty: 90 TABLET | Refills: 3 | Status: SHIPPED | OUTPATIENT
Start: 2022-02-10

## 2022-02-10 NOTE — FLOWSHEET NOTE
[facility]  Phone: [phone]   Fax: [phone]    Physical Therapy Daily Treatment Note  Date:  2/10/2022    Patient Name:  Yeny Schwartz    :  1950  MRN: [id number]  Medical/Treatment Diagnosis Information:  Diagnosis: R29.898 - Left arm weakness  Treatment Diagnosis: Decreased AROM and strength of L UE, decreased functional mobility due to R knee contracture and R LE weakness, decreased functional use of L UE causing difficulty with ADLs  Insurance/Certification information:  PT Insurance Information: Medicare  Physician Information:  Referring Practitioner: Clarita Nunes MD ( MD is retiring) - pt will follow up with Dr Geronimo Hinojosa who is in the same office   Plan of care signed (Y/N): [x]  Yes []  No     Date of Patient follow up with Physician: 2/10/22     Progress Report: []  Yes  [x]  No     Date Range for reporting period:  Beginnin/22  PN:   POC: 22  Ending:     Progress report due (10 Rx/or 30 days whichever is less): visit #50 or 94/41    Recertification due (POC duration/ or 90 days whichever is less): visit #15 or 22     Visit # Insurance Allowable Auth required? Date Range   15/15 +  + 1/10 mn  2022 visits: 9 []  Yes  [x]  No n/a       Latex Allergy:  [x]NO      []YES  Preferred Language for Healthcare:   [x]English       []other:    Functional Scale:        Date assessed:  QuickDASH: raw score = 40; dysfunction = 66%  21    Pain level:  3/10     SUBJECTIVE:  Pt reports her left leg has been weaker today. Pt has had a doctor's appointment and went out to lunch with family. Pt and  purchased a chair lift for their pool at home but it has not been installed yet as they won't open their pool until May.        OBJECTIVE: : AAROM L renetta flex with pulleys: 124 deg   :    PROM AROM  SEATED AROM SUPINE     L R L R L   Shoulder Flexion  150   95 140 120   Shoulder Abduction  90   70 160 80 Shoulder External Rotation      Able to reach shouder (mostly elbow flexion) Top of R shoulder  26 (GHJ ~35 deg abd)   Shoulder Internal Rotation      Sacrum Upper lumbar spine     AAROM L renetta flex with pulleys: 124 deg     1/5:    AROM  SEATED AROM SUPINE     L R (from eval) L  (measured 12/22)   Shoulder Flexion  95 86 140 120   Shoulder Abduction  72 160 80   Shoulder External Rotation  Able to reach ipsilateral side of head  (mostly elbow flexion) Top of R shoulder  26 (GHJ ~35 deg abd)   Shoulder Internal Rotation  Sacrum Upper lumbar spine     AAROM L renetta flex with pulleys: 122 deg       RESTRICTIONS/PRECAUTIONS: Uses electric scooter for primary mode of transportation, R knee contracture (about 90 deg flexion), able to transfer with SBA    Exercises/Interventions:     Therapeutic Exercises (22165) Resistance / level Sets/sec Reps Notes   Scifit StepOne 1 X 10 min  755 steps  R LE needs yellow strap, done for endurance as well as stretching for L UE and R knee    Pulleys    Done in electric scooter          UE [place] on L  - Flexion  - Abduction   - ER      , cued to push to end range of motion and then pause    Seated EOB wand lift overhead        Reaching with L UE  - supine, ~40 deg flexion, ~75 deg flex, ~      Supine with 2 pillows,    Seated EOB scap squeezes      Sitting in scooter - Bicep curls on L arm L[place] TB            Assessment of ROM for POC, discussion of continuing POC and adding aquatic visits              Therapeutic Activities (11237)              Ballet barre - partial stand from scooter  - requires use of forearms on bar to stand, then once in partial stand can get forearms off of bar.                    Sitting in scooter - using wash cloth along bar   -elbow ext and horiz abd   - flexion/ext     Facing bar  // to bar       Stair rail flexion slides   2 10 Manual assist from PT to reach end range and prevent shoulder hiking   Seated EOB - pushing wand away with resistance from PT    Pushing wand away and up     1/10: + 10 reps w/o resistance for warm up          Sitting EOB at dry erase easel - working on L shoulder ROM  - tracing shapes/connecting dots  - reaching high with controlled lowering  - word search puzzle             Intermittent assist from PT, yuri at end range           Transfers to/from electric scooter   X 1 CGA   Sitting in scooter with bedside table:  - folding pillow cases with L arm               Reaching up to cones placed on stair railing, bringing them down and stacking on stair platform    2 trials 6 cones Min to Mod A    Bean bag toss into bucket  2 trials 10 bags Pt reached slightly OH to get bags from PT before throwing          Neuromuscular Re-ed (24961)                                                 Manual Intervention (52202)       PROM L shoulder - all directions with distraction    Done while assessing ROM                                          AquaticTherapy Dates of Service: 1/17, 1/21, 2/10  Aquatic Visits Exercises/Activities:transfer from scooter to chair lift   Transfers:  [] Stairs   [] Ramp  [x] Chair Lift (CGA to/from electric scooter to chair lift)   % Immersion:            Ambulation/ Warm up:   UE Exercises:   All performed seated on bench with CGA/Min A for trunk balance intermittent    Forward    Shoulder Shrugs      Lateral    Shoulder Circles  x    Retro    Scapular Retraction   x 15 with P   [place]    Push Downs   x   Heel/Toe Walking    Punching 2 X 10      Rowing x      Elbow Flex/Ext X12, x 12 with P      Shldr Flex/Ext X12, x 12 with P      Shldr aBd/aDd X12, x 12 with P   LE Exercises:  Shldr Horiz aBd/aDd 2 x 10 B with P   HR/TR  Shldr IR/ER x   Erica  Arm Circles X 12 L    Squats - L single leg 2 x 10  PNF Diagonals    Hamstring Curls  Wall Push Ups    Hip Flexion (SLR)  Pull downs X20   Hip aBduction (SLR)      Hip Extension (SLR)       Hip aDduction (SLR)      Hip Circles  Functional:    Hip IR/Er  Step up forward    Hip Hikes  Step up lateral     Step down        Lunges Forward      Lunges Retro      Lunges Lateral     Balance:        SLS  L SLS 10\" X3; 3 sets B UE support. Max time LLE w/o UE support 5 x ~10s       Tandem Stance       NBOS eyes open  Seated:     NBOS eyes closed  Ankle pumps   Hand to Opposite Knee  Ankle Circles   Fwd Step ups to SLS  Knee Flex/Ext 2 X10 L LAQ; 2 X10 resisted  HS curl L   Lateral Step ups to SLS  Hip aBd/aDd X 20    Stop/Go Gait   Bicycle      Ankle DF/PF     Ankle Inv/Ev   Stretching:   Manual L Gastroc stretch   Gastroc/Soleus  Marches  Hip AB/ADD   Hamstring   [place]:    Knee Ext stretch with OP - pt seated with ankle propped on PT knees 4x30\" L     85* R knee ext contracture Jog    Piriformis   Noodle Hang    Hip Flexor  Traction at [facility]      DKTC       ITB  Cool Down:    Quad  Fwd Walking    Mid Back   Lat Walking    UT  Retro Walking    Post Shoulder  Noodle float    Ladder Pull      Pec Stretch            Core: Other:    TrA set      Pelvic Tilts      Multifidi Walk outs c paddle      PNF Chop/Lifts                          Aquatic Abbreviation Key  B= Belt DB= Dumbells T= Theratube   H= Hydrotone N= Noodles W= Weights   P= Paddles S= Speedo equipment K= Kickboard      Pt. Education: Gave pt tour of pool, explained how to use lockers, what to wear, that it would be in group setting, and showed pt aquatic equipment. .  Modalities:     Pt. Education:  -patient educated on diagnosis, prognosis and expectations for rehab  -all patient questions were answered    Home Exercise Program:      Therapeutic Exercise and NMR EXR  [] (73730) Provided verbal/tactile cueing for activities related to strengthening, flexibility, endurance, ROM for improvements in  [] LE / Lumbar: LE, proximal hip, and core control with self care, mobility, lifting, ambulation.   [] UE / Cervical: cervical, postural, scapular, scapulothoracic and UE control with self care, reaching, carrying, lifting, house/yardwork, driving, computer work.  [] (70781) Provided verbal/tactile cueing for activities related to improving balance, coordination, kinesthetic sense, posture, motor skill, proprioception to assist with   [] LE / lumbar: LE, proximal hip, and core control in self care, mobility, lifting, ambulation and eccentric single leg control. [] UE / cervical: cervical, scapular, scapulothoracic and UE control with self care, reaching, carrying, lifting, house/yardwork, driving, computer work.   [] (58048) Therapist is in constant attendance of 2 or more patients providing skilled therapy interventions, but not providing any significant amount of measurable one-on-one time to either patient, for improvements in  [] LE / lumbar: LE, proximal hip, and core control in self care, mobility, lifting, ambulation and eccentric single leg control. [] UE / cervical: cervical, scapular, scapulothoracic and UE control with self care, reaching, carrying, lifting, house/yardwork, driving, computer work.      NMR and Therapeutic Activities:    [] (21031 or 38455) Provided verbal/tactile cueing for activities related to improving balance, coordination, kinesthetic sense, posture, motor skill, proprioception and motor activation to allow for proper function of   [] LE: / Lumbar core, proximal hip and LE with self care and ADLs  [] UE / Cervical: cervical, postural, scapular, scapulothoracic and UE control with self care, carrying, lifting, driving, computer work.   [] (83753) Gait Re-education- Provided training and instruction to the patient for proper LE, core and proximal hip recruitment and positioning and eccentric body weight control with ambulation re-education including up and down stairs     Home Management Training / Self Care:  [] (49349) Provided self-care/home management training related to activities of daily living and compensatory training, and/or use of adaptive equipment for improvement with: ADLs and compensatory training, meal preparation, safety procedures and instruction in use of adaptive equipment, including bathing, grooming, dressing, personal hygiene, basic household cleaning and chores. Home Exercise Program:    [] (45300) Reviewed/Progressed HEP activities related to strengthening, flexibility, endurance, ROM of   [] LE / Lumbar: core, proximal hip and LE for functional self-care, mobility, lifting and ambulation/stair navigation   [] UE / Cervical: cervical, postural, scapular, scapulothoracic and UE control with self care, reaching, carrying, lifting, house/yardwork, driving, computer work  [] (74025)Reviewed/Progressed HEP activities related to improving balance, coordination, kinesthetic sense, posture, motor skill, proprioception of   [] LE: core, proximal hip and LE for self care, mobility, lifting, and ambulation/stair navigation    [] UE / Cervical: cervical, postural,  scapular, scapulothoracic and UE control with self care, reaching, carrying, lifting, house/yardwork, driving, computer work    Manual Treatments:  PROM / STM / Oscillations-Mobs:  G-I, II, III, IV (PA's, Inf., Post.)  [] (98333) Provided manual therapy to mobilize LE, proximal hip and/or LS spine soft tissue/joints for the purpose of modulating pain, promoting relaxation,  increasing ROM, reducing/eliminating soft tissue swelling/inflammation/restriction, improving soft tissue extensibility and allowing for proper ROM for normal function with   [] LE / lumbar: self care, mobility, lifting and ambulation. [] UE / Cervical: self care, reaching, carrying, lifting, house/yardwork, driving, computer work.      Modalities:  [] (09228) Vasopneumatic compression: Utilized vasopneumatic compression to decrease edema / swelling for the purpose of improving mobility and quad tone / recruitment which will allow for increased overall function including but not limited to self-care, transfers, ambulation, and ascending / descending stairs. [x] (08477) Aquatic therapy with therapeutic exercise. Provided verbal and tactile cueing for activities related to strengthening, flexibility, endurance, ROM for improvements in  [x] LE / lumbar: LE, proximal hip, and core control in self care, mobility, lifting, ambulation and eccentric single leg control. [x] UE / cervical: cervical, scapular, scapulothoracic and UE control with self care, reaching, carrying, lifting, house/yardwork, driving, computer work.   [] (30231) Therapist is in constant attendance of 2 or more patients providing skilled therapy interventions, but not providing any significant amount of measurable one-on-one time to either patient, for improvements in  [] LE / lumbar: LE, proximal hip, and core control in self care, mobility, lifting, ambulation and eccentric single leg control. [] UE / cervical: cervical, scapular, scapulothoracic and UE control with self care, reaching, carrying, lifting, house/yardwork, driving, computer work. Charges:  Timed Code Treatment Minutes: 40   Total Treatment Minutes: 40     [] EVAL - LOW (27424)   [] EVAL - MOD (79295)  [] EVAL - HIGH (00122)  [] RE-EVAL (82940)  [] [place](76557) x 1    [] [place]  [] NMR (21292) x       [] Vaso  [] Manual (83963) x       [] Ultrasound  [] TA x 2        [] Mech Traction (64940)  [x] Aquatic Therapy x   3   [] ES (un) (12164):   [] Home Management Training x  [] ES(attended) (32185)   [] Dry Needling 1-2 muscles (77585):  [] Dry Needling 3+ muscles (719329)  [] Group:      [] Other:     GOALS:   Patient stated goal: to get better use of left arm and leg   []? Progressing: []? Met: []? Not Met: []? Adjusted     Therapist goals for Patient:   Short Term Goals: To be achieved in: 2 weeks  1. Independent in HEP and progression per patient tolerance, in order to prevent re-injury. []? Progressing: []? Met: []? Not Met: []? Adjusted  2.  Patient will have a decrease in pain to facilitate improvement in movement, function, and ADLs as indicated by Functional Deficits. []? Progressing: []? Met: []? Not Met: []? Adjusted     Long Term Goals: To be achieved in: 5 weeks  1. Disability index score of 45% or less for the Quick DASH to assist with reaching prior level of function. []? Progressing: []? Met: []? Not Met: []? Adjusted  2. Patient will demonstrate increased L shoulder abduction and flexion AROM to at least 100 deg to allow for proper joint functioning as indicated by patients Functional Deficits. [x]? Progressing: []? Met: []? Not Met: []? Adjusted  3. Patient will demonstrate an increase in Strength to at least 4-/5 for gross L shoulder strength to allow for proper functional mobility as indicated by patients Functional Deficits. [x]? Progressing: []? Met: []? Not Met: []? Adjusted  4. Patient will return to functional activities including ability to  light weight items and move them with L UE while completing bathing and other ADLs without increased symptoms or restriction. [x]? Progressing: []? Met: []? Not Met: []? Adjusted  5. Patient will demonstrate ability to use L UE during transfers, while being able to pull and tolerate weight bearing through the L UE.   [x]? Progressing: []? Met: []? Not Met: []? Adjusted       Overall Progression Towards Functional goals/ Treatment Progress Update:  [x] Patient is progressing as expected towards functional goals listed. [] Progression is slowed due to complexities/Impairments listed. [] Progression has been slowed due to co-morbidities.   [] Plan just implemented, too soon to assess goals progression <30days   [] Goals require adjustment due to lack of progress  [] Patient is not progressing as expected and requires additional follow up with physician  [] Other    Persisting Functional Limitations/Impairments:  []Sleeping []Sitting               []Standing [x]Transfers (in/out of car)        [x]Walking []Kneeling               [x]Stairs [x]Squatting / bending   [x]ADLs [x]Reaching  []Lifting  []Housework  []Driving []Job related tasks  []Sports/Recreation []Other:        ASSESSMENT:  Pt able to complete session despite increased fatigue from various community activities this date. Pt increased resistance for UE exercises using paddles for most of them. Pt able to increase reps and number of activities this date as noted in bold above w/o increased pain. Pt is highly motivated and enjoys completing aquatic therapy to maintain current functional abilities to improve quality of life. Will continue with aquatic visits when schedule allows it to improve ROM and strength in the L shoulder and BLEs. Treatment/Activity Tolerance:  [x] Patient able to complete tx  [] Patient limited by fatigue  [] Patient limited by pain  [x] Patient limited by other medical complications  [] Other:     Prognosis: [] Good [x] Fair  [] Poor    Patient Requires Follow-up: [x] Yes  [] No    Plan for next treatment session:    PLAN: See eval. PT 3x / week for 5 weeks. [x] Continue per plan of care [] Alter current plan (see comments)  [] Plan of care initiated [] Hold pending MD visit [] Discharge    Electronically signed by: Edward Gonzalez, PT  PT  Therapist was present, directed the patient's care, made skilled judgement, and was responsible for assessment and treatment of the patient. Caitlyn Boston, SPT  Note: If patient does not return for scheduled/ recommended follow up visits, this note will serve as a discharge from care along with most recent update on progress.

## 2022-02-10 NOTE — PROGRESS NOTES
Follow Up Visit Established Patient Visit    Patient:  Ranulfo Sr                                               : 1950  Age: 70 y.o. MRN: [id number]  Date : 2/10/2022    Ranulfo Sr is a 70 y.o. female who presents for : Follow-up appointment    Chief Complaint   Patient presents with    Follow-up     HTN -chlorthalidone 25 mg daily was added during her previous appointment 1 months ago. She was recommended to continue hydralazine 25 mg 3 times a day and nifedipine XL 30 mg daily. Checks her BP every other day; SBP ranges 130-140 mm Hg. Mood is relatively stable. Takes Lexapro regularly. Ambulates in electric wheelchair. + left sided body weakness; undergoes aqua therapy. Uses Albuterol as needed and Breo Ellipta daily.     Past Medical History:   Diagnosis Date    Arthritis     Compression fracture     back due to fall    Concussion     due to fall    DVT (deep venous thrombosis) ([facility] [address] 2017    RLE after TKR    Environmental allergies     Essential hypertension, benign 2010    GERD (gastroesophageal reflux disease)     History of peptic ulcer     Hx of blood clots     Hyperlipidemia 2010    Lacunar [facility])     old - per MRI     MRSA (methicillin resistant staph aureus) culture positive 2018    knee    Restrictive airway disease     allergy induced    Retention of urine     Wound, open 2018    left shin area, healing well, tx in wound care center       Past Surgical History:   Procedure Laterality Date     SECTION      times 2    CHOLECYSTECTOMY      COLONOSCOPY      HYSTERECTOMY      KNEE ARTHROPLASTY Right 2018     RIGHT REVISION TOTAL KNEE ARTHROPLASTY    KNEE ARTHROSCOPY Left     Dr. Tanika Sesay Right 2018    RIGHT REVISION ARTHROSCOPY FEMORAL COMPONENT, SYNOVECTOMY performed by Randa Arnold MD at [address] Right 2018    RIGHT KNEE QUADRICEP TENDON REPAIR WITH ALLOGRAFT AUGMENTATION performed by Jodi Alvarenga MD at [address] [place] [address] ARTHROSCOPY Right     TOTAL KNEE ARTHROPLASTY Right 01/2017       Current Outpatient Medications   Medication Sig Dispense Refill    chlorthalidone (HYGROTON) 25 MG tablet Take 1 tablet by mouth daily 90 tablet 3    clopidogrel (PLAVIX) 75 MG tablet TAKE 1 TABLET DAILY 30 tablet 2    escitalopram (LEXAPRO) 10 MG tablet TAKE 1 TABLET DAILY 90 tablet 1    omeprazole (PRILOSEC) 20 MG delayed release capsule TAKE ONE CAPSULE BY MOUTH EVERY MORNING BEFORE BREAKFAST 90 capsule 1    KLOR-CON M20 20 MEQ extended release tablet TAKE 1 TABLET DAILY 90 tablet 3    hydrALAZINE (APRESOLINE) 25 MG tablet Take 1 tablet by mouth every 8 hours 270 tablet 1    albuterol sulfate HFA (VENTOLIN HFA) 108 (90 Base) MCG/ACT inhaler Inhale 2 puffs into the lungs 4 times daily as needed for Wheezing 54 g 1    oxybutynin (DITROPAN-XL) 10 MG extended release tablet Take 1 tablet by mouth daily 90 tablet 1    BREO ELLIPTA 100-25 MCG/INH AEPB inhaler USE 1 INHALATION ORALLY    DAILY 1 each 3    eszopiclone (ESZOPICLONE) 3 MG TABS Take 1 tablet by mouth nightly as needed (sleep). 90 tablet 2    diclofenac (VOLTAREN) 75 MG EC tablet TAKE 1 TABLET TWICE A DAY  AFTER MEALS 180 tablet 3    atorvastatin (LIPITOR) 80 MG tablet Take 1 tablet by mouth nightly 90 tablet 3    NIFEdipine (ADALAT CC) 30 MG extended release tablet Take 1 tablet by mouth 2 times daily 180 tablet 3    oxyCODONE-acetaminophen (PERCOCET) 5-325 MG per tablet Take 1 tablet by mouth every 6 hours as needed for Pain. 28 tablet 0    ondansetron (ZOFRAN) 4 MG tablet Take 1 tablet by mouth 3 times daily as needed for Nausea or Vomiting 30 tablet 1     No current facility-administered medications for this visit. BP (!) 148/72   Pulse 66     Physical Exam   Physical Exam  Vitals and nursing note reviewed. Constitutional:       General: She is not in acute distress. Appearance: Normal appearance. She is well-developed. HENT:      Head: Normocephalic and atraumatic. Mouth/Throat:      Mouth: Mucous membranes are moist.      Pharynx: Oropharynx is clear. No oropharyngeal exudate or posterior oropharyngeal erythema. Eyes:      General: No scleral icterus. Extraocular Movements: Extraocular movements intact. Pupils: Pupils are equal, round, and reactive to light. Neck:      Vascular: No carotid bruit or JVD. Cardiovascular:      Rate and Rhythm: Normal rate and regular rhythm. Heart sounds: Normal heart sounds. No murmur heard. No friction rub. No gallop. Pulmonary:      Effort: Pulmonary effort is normal. No respiratory distress. Breath sounds: Normal breath sounds. No wheezing or rales. Abdominal:      General: Bowel sounds are normal. There is no distension. Palpations: Abdomen is soft. Tenderness: There is no abdominal tenderness. Musculoskeletal:         General: Normal range of motion. Cervical back: Normal range of motion. Comments: + Left-sided body weakness   Lymphadenopathy:      Cervical: No cervical adenopathy. Skin:     General: Skin is warm and dry. Neurological:      Mental Status: She is alert and oriented to person, place, and time. Cranial Nerves: No cranial nerve deficit. Psychiatric:         Mood and Affect: Mood normal.         Assessment/ plan:     Asim Lowe was seen today for follow-up. Diagnoses and all orders for this visit:    Essential hypertension, controlled  -     chlorthalidone (HYGROTON) 25 MG tablet; Take 1 tablet by mouth daily  -     Continue to take hydralazine and nifedipine XL  -     Low-sodium diet  -     Continue with aqua aerobics      Return in about 3 months (around 5/10/2022) for HTN, left sided weakness, insomnia.     Parvez Lopez MD

## 2022-02-11 ENCOUNTER — HOSPITAL ENCOUNTER (OUTPATIENT)
Dept: PHYSICAL THERAPY | Age: 72
Setting detail: THERAPIES SERIES
Discharge: HOME | End: 2022-02-11
Payer: MEDICARE

## 2022-02-11 PROCEDURE — 97113 AQUATIC THERAPY/EXERCISES: CPT

## 2022-02-11 NOTE — FLOWSHEET NOTE
Shoulder Internal Rotation      Sacrum Upper lumbar spine     AAROM L renetta flex with pulleys: 124 deg     1/5:    AROM  SEATED AROM SUPINE     L R (from eval) L  (measured 12/22)   Shoulder Flexion  95 86 140 120   Shoulder Abduction  72 160 80   Shoulder External Rotation  Able to reach ipsilateral side of head  (mostly elbow flexion) Top of R shoulder  26 (GHJ ~35 deg abd)   Shoulder Internal Rotation  Sacrum Upper lumbar spine     AAROM L renetta flex with pulleys: 122 deg       RESTRICTIONS/PRECAUTIONS: Uses electric scooter for primary mode of transportation, R knee contracture (about 90 deg flexion), able to transfer with SBA    Exercises/Interventions:     Therapeutic Exercises (94267) Resistance / level Sets/sec Reps Notes   Scifit StepOne 1 X 10 min  755 steps  R LE needs yellow strap, done for endurance as well as stretching for L UE and R knee    Pulleys    Done in electric scooter          UE [place] on L  - Flexion  - Abduction   - ER      , cued to push to end range of motion and then pause    Seated EOB wand lift overhead        Reaching with L UE  - supine, ~40 deg flexion, ~75 deg flex, ~      Supine with 2 pillows,    Seated EOB scap squeezes      Sitting in scooter - Bicep curls on L arm Lime TB            Assessment of ROM for POC, discussion of continuing POC and adding aquatic visits              Therapeutic Activities (92752)              Ballet barre - partial stand from scooter  - requires use of forearms on bar to stand, then once in partial stand can get forearms off of bar.                    Sitting in scooter - using wash cloth along bar   -elbow ext and horiz abd   - flexion/ext     Facing bar  // to bar       Stair rail flexion slides   2 10 Manual assist from PT to reach end range and prevent shoulder hiking   Seated EOB - pushing wand away with resistance from PT    Pushing wand away and up     1/10: + 10 reps w/o resistance for warm up          Sitting EOB at dry erase easel - working on L shoulder ROM  - tracing shapes/connecting dots  - reaching high with controlled lowering  - word search puzzle             Intermittent assist from PT, yuri at end range           Transfers to/from electric scooter   X 1 CGA   Sitting in scooter with bedside table:  - folding pillow cases with L arm               Reaching up to cones placed on stair railing, bringing them down and stacking on stair platform    2 trials 6 cones Min to Mod A    Bean bag toss into bucket  2 trials 10 bags Pt reached slightly OH to get bags from PT before throwing          Neuromuscular Re-ed (33804)                                                 Manual Intervention (75886)       PROM L shoulder - all directions with distraction    Done while assessing ROM                                          AquaticTherapy Dates of Service: 1/17, 1/21, 2/10, 2/10  Aquatic Visits Exercises/Activities:transfer from scooter to chair lift   Transfers:  [] Stairs   [] Ramp  [x] Chair Lift (CGA to/from electric scooter to chair lift)   % Immersion:            Ambulation/ Warm up:   UE Exercises:   All performed seated on bench with CGA/Min A for trunk balance intermittent    Forward    Shoulder Shrugs      Lateral    Shoulder Circles  x    Retro    Scapular Retraction   x 15 with P   [place]    Push Downs   x   Heel/Toe Walking    Punching 2 X 10      Rowing x      Elbow Flex/Ext X12, x 12 with P      Shldr Flex/Ext X12, x 12 with P      Shldr aBd/aDd X12, x 12 with P   LE Exercises:  Shldr Horiz aBd/aDd 2 x 10 B with P   HR/TR  Shldr IR/ER x   Marches  Arm Circles X    Squats - L single leg 2 x 10  PNF Diagonals    Hamstring Curls  Wall Push Ups    Hip Flexion (SLR)  Pull downs X   Hip aBduction (SLR)      Hip Extension (SLR)       Hip aDduction (SLR)      Hip Circles  Functional:    Hip IR/Er  Step up forward    Hip Hikes  Step up lateral     Step down        Lunges Forward      Lunges Retro      Lunges Lateral     Balance:        SLS Tandem Stance       NBOS eyes open  Seated:     NBOS eyes closed  Ankle pumps   Hand to Opposite Knee  Ankle Circles   Fwd Step ups to SLS  Knee Flex/Ext 2 X10 L LAQ; 2 X10 resisted  HS curl L   Lateral Step ups to SLS  Hip aBd/aDd X 20    Stop/Go Gait   Bicycle      Ankle DF/PF     Ankle Inv/Ev   Stretching:   Manual L Gastroc stretch   Gastroc/Soleus  Marches  Hip AB/ADD   Hamstring   [place]:    Knee Ext stretch with OP - pt seated with ankle propped on PT knees  Jog    Piriformis   Noodle Hang    Hip Flexor  Traction at [facility]      DKTC       ITB  Cool Down:    Quad  Fwd Walking    Mid Back   Lat Walking    UT  Retro Walking    Post Shoulder  Noodle float    Ladder Pull      Pec Stretch            Core: Other:    TrA set      Pelvic Tilts      Multifidi Walk outs c paddle      PNF Chop/Lifts                          Aquatic Abbreviation Key  B= Belt DB= Dumbells T= Theratube   H= Hydrotone N= Noodles W= Weights   P= Paddles S= Speedo equipment K= Kickboard      Pt. Education: Gave pt tour of pool, explained how to use lockers, what to wear, that it would be in group setting, and showed pt aquatic equipment. .  Modalities:     Pt. Education:  -patient educated on diagnosis, prognosis and expectations for rehab  -all patient questions were answered    Home Exercise Program:      Therapeutic Exercise and NMR EXR  [] (86446) Provided verbal/tactile cueing for activities related to strengthening, flexibility, endurance, ROM for improvements in  [] LE / Lumbar: LE, proximal hip, and core control with self care, mobility, lifting, ambulation.   [] UE / Cervical: cervical, postural, scapular, scapulothoracic and UE control with self care, reaching, carrying, lifting, house/yardwork, driving, computer work.  [] (66733) Provided verbal/tactile cueing for activities related to improving balance, coordination, kinesthetic sense, posture, motor skill, proprioception to assist with   [] LE / lumbar: LE, proximal hip, and core control in self care, mobility, lifting, ambulation and eccentric single leg control. [] UE / cervical: cervical, scapular, scapulothoracic and UE control with self care, reaching, carrying, lifting, house/yardwork, driving, computer work.   [] (06143) Therapist is in constant attendance of 2 or more patients providing skilled therapy interventions, but not providing any significant amount of measurable one-on-one time to either patient, for improvements in  [] LE / lumbar: LE, proximal hip, and core control in self care, mobility, lifting, ambulation and eccentric single leg control. [] UE / cervical: cervical, scapular, scapulothoracic and UE control with self care, reaching, carrying, lifting, house/yardwork, driving, computer work. NMR and Therapeutic Activities:    [] (91125 or 82876) Provided verbal/tactile cueing for activities related to improving balance, coordination, kinesthetic sense, posture, motor skill, proprioception and motor activation to allow for proper function of   [] LE: / Lumbar core, proximal hip and LE with self care and ADLs  [] UE / Cervical: cervical, postural, scapular, scapulothoracic and UE control with self care, carrying, lifting, driving, computer work.   [] (61634) Gait Re-education- Provided training and instruction to the patient for proper LE, core and proximal hip recruitment and positioning and eccentric body weight control with ambulation re-education including up and down stairs     Home Management Training / Self Care:  [] (90168) Provided self-care/home management training related to activities of daily living and compensatory training, and/or use of adaptive equipment for improvement with: ADLs and compensatory training, meal preparation, safety procedures and instruction in use of adaptive equipment, including bathing, grooming, dressing, personal hygiene, basic household cleaning and chores.      Home Exercise Program:    [] (22682) Reviewed/Progressed HEP activities related to strengthening, flexibility, endurance, ROM of   [] LE / Lumbar: core, proximal hip and LE for functional self-care, mobility, lifting and ambulation/stair navigation   [] UE / Cervical: cervical, postural, scapular, scapulothoracic and UE control with self care, reaching, carrying, lifting, house/yardwork, driving, computer work  [] (26328)Reviewed/Progressed HEP activities related to improving balance, coordination, kinesthetic sense, posture, motor skill, proprioception of   [] LE: core, proximal hip and LE for self care, mobility, lifting, and ambulation/stair navigation    [] UE / Cervical: cervical, postural,  scapular, scapulothoracic and UE control with self care, reaching, carrying, lifting, house/yardwork, driving, computer work    Manual Treatments:  PROM / STM / Oscillations-Mobs:  G-I, II, III, IV (PA's, Inf., Post.)  [] (28326) Provided manual therapy to mobilize LE, proximal hip and/or LS spine soft tissue/joints for the purpose of modulating pain, promoting relaxation,  increasing ROM, reducing/eliminating soft tissue swelling/inflammation/restriction, improving soft tissue extensibility and allowing for proper ROM for normal function with   [] LE / lumbar: self care, mobility, lifting and ambulation. [] UE / Cervical: self care, reaching, carrying, lifting, house/yardwork, driving, computer work. Modalities:  [] (46508) Vasopneumatic compression: Utilized vasopneumatic compression to decrease edema / swelling for the purpose of improving mobility and quad tone / recruitment which will allow for increased overall function including but not limited to self-care, transfers, ambulation, and ascending / descending stairs. [x] (82261) Aquatic therapy with therapeutic exercise.  Provided verbal and tactile cueing for activities related to strengthening, flexibility, endurance, ROM for improvements in  [x] LE / lumbar: LE, proximal hip, and core control in self care, mobility, lifting, ambulation and eccentric single leg control. [x] UE / cervical: cervical, scapular, scapulothoracic and UE control with self care, reaching, carrying, lifting, house/yardwork, driving, computer work.   [] (16626) Therapist is in constant attendance of 2 or more patients providing skilled therapy interventions, but not providing any significant amount of measurable one-on-one time to either patient, for improvements in  [] LE / lumbar: LE, proximal hip, and core control in self care, mobility, lifting, ambulation and eccentric single leg control. [] UE / cervical: cervical, scapular, scapulothoracic and UE control with self care, reaching, carrying, lifting, house/yardwork, driving, computer work. Charges:  Timed Code Treatment Minutes:  31   Total Treatment Minutes:  31     [] EVAL - LOW (69692)   [] EVAL - MOD (86288)  [] EVAL - HIGH (97462)  [] RE-EVAL (59212)  [] AS(59916) x 1    [] Ionto  [] NMR (23422) x       [] Vaso  [] Manual (41565) x       [] Ultrasound  [] TA x 2        [] Mech Traction (46076)  [x] Aquatic Therapy x   2   [] ES (un) (05374):   [] Home Management Training x  [] ES(attended) (10650)   [] Dry Needling 1-2 muscles (07613):  [] Dry Needling 3+ muscles (715265)  [] Group:      [] Other:     GOALS:   Patient stated goal: to get better use of left arm and leg   []? Progressing: []? Met: []? Not Met: []? Adjusted     Therapist goals for Patient:   Short Term Goals: To be achieved in: 2 weeks  1. Independent in HEP and progression per patient tolerance, in order to prevent re-injury. []? Progressing: []? Met: []? Not Met: []? Adjusted  2. Patient will have a decrease in pain to facilitate improvement in movement, function, and ADLs as indicated by Functional Deficits. []? Progressing: []? Met: []? Not Met: []? Adjusted     Long Term Goals: To be achieved in: 5 weeks  1.  Disability index score of 45% or less for the Quick DASH to assist with reaching prior level of function. []? Progressing: []? Met: []? Not Met: []? Adjusted  2. Patient will demonstrate increased L shoulder abduction and flexion AROM to at least 100 deg to allow for proper joint functioning as indicated by patients Functional Deficits. [x]? Progressing: []? Met: []? Not Met: []? Adjusted  3. Patient will demonstrate an increase in Strength to at least 4-/5 for gross L shoulder strength to allow for proper functional mobility as indicated by patients Functional Deficits. [x]? Progressing: []? Met: []? Not Met: []? Adjusted  4. Patient will return to functional activities including ability to  light weight items and move them with L UE while completing bathing and other ADLs without increased symptoms or restriction. [x]? Progressing: []? Met: []? Not Met: []? Adjusted  5. Patient will demonstrate ability to use L UE during transfers, while being able to pull and tolerate weight bearing through the L UE.   [x]? Progressing: []? Met: []? Not Met: []? Adjusted       Overall Progression Towards Functional goals/ Treatment Progress Update:  [x] Patient is progressing as expected towards functional goals listed. [] Progression is slowed due to complexities/Impairments listed. [] Progression has been slowed due to co-morbidities. [] Plan just implemented, too soon to assess goals progression <30days   [] Goals require adjustment due to lack of progress  [] Patient is not progressing as expected and requires additional follow up with physician  [] Other    Persisting Functional Limitations/Impairments:  []Sleeping []Sitting               []Standing [x]Transfers (in/out of car)        [x]Walking []Kneeling               [x]Stairs [x]Squatting / bending   [x]ADLs [x]Reaching  []Lifting  []Housework  []Driving []Job related tasks  []Sports/Recreation []Other:        ASSESSMENT:   Patient able to complete all aquatic exercises this date.  Demonstrated a willingness to perform SL squats to maintain strengthening. As mentioned before pt is motivated in the pool and can't wait to set up and open her personal pool in May. Will continue with Aquatic exercises progressing as tolerated. Pt able to complete session despite increased fatigue from various community activities this date. Pt increased resistance for UE exercises using paddles for most of them. Pt able to increase reps and number of activities this date as noted in bold above w/o increased pain. Pt is highly motivated and enjoys completing aquatic therapy to maintain current functional abilities to improve quality of life. Will continue with aquatic visits when schedule allows it to improve ROM and strength in the L shoulder and BLEs. Treatment/Activity Tolerance:  [x] Patient able to complete tx  [] Patient limited by fatigue  [] Patient limited by pain  [x] Patient limited by other medical complications  [] Other:     Prognosis: [] Good [x] Fair  [] Poor    Patient Requires Follow-up: [x] Yes  [] No    Plan for next treatment session:    PLAN: See eval. PT 3x / week for 5 weeks. [x] Continue per plan of care [] Alter current plan (see comments)  [] Plan of care initiated [] Hold pending MD visit [] Discharge    Electronically signed by: Sue Puga DPT, CARLOS       Note: If patient does not return for scheduled/ recommended follow up visits, this note will serve as a discharge from care along with most recent update on progress.

## 2022-02-14 ENCOUNTER — APPOINTMENT (OUTPATIENT)
Dept: PHYSICAL THERAPY | Age: 72
End: 2022-02-14
Payer: MEDICARE

## 2022-02-15 ENCOUNTER — HOSPITAL ENCOUNTER (OUTPATIENT)
Dept: PHYSICAL THERAPY | Age: 72
Setting detail: THERAPIES SERIES
Discharge: HOME | End: 2022-02-15
Payer: MEDICARE

## 2022-02-15 PROCEDURE — 97113 AQUATIC THERAPY/EXERCISES: CPT

## 2022-02-15 PROCEDURE — 97150 GROUP THERAPEUTIC PROCEDURES: CPT

## 2022-02-15 NOTE — FLOWSHEET NOTE
[facility]  Phone: [phone]   Fax: [phone]    Physical Therapy Daily Treatment Note  Date:  2/15/2022    Patient Name:  Ml Hodge    :  1950  MRN: [id number]  Medical/Treatment Diagnosis Information:  Diagnosis: R29.898 - Left arm weakness  Treatment Diagnosis: Decreased AROM and strength of L UE, decreased functional mobility due to R knee contracture and R LE weakness, decreased functional use of L UE causing difficulty with ADLs  Insurance/Certification information:  PT Insurance Information: Medicare  Physician Information:  Referring Practitioner: Jeffrey Benito MD ( MD is retiring) - pt will follow up with Dr Quyen Palm who is in the same office   Plan of care signed (Y/N): [x]  Yes []  No     Date of Patient follow up with Physician: 2/10/22     Progress Report: []  Yes  [x]  No     Date Range for reporting period:  Beginnin/22  PN:   POC: 22  Ending:     Progress report due (10 Rx/or 30 days whichever is less): visit #49 or /32    Recertification due (POC duration/ or 90 days whichever is less): visit #15 or 22     Visit # Insurance Allowable Auth required? Date Range   15/15 +  + 3/10 mn  2022 visits: 9 []  Yes  [x]  No n/a       Latex Allergy:  [x]NO      []YES  Preferred Language for Healthcare:   [x]English       []other:    Functional Scale:        Date assessed:  QuickDASH: raw score = 40; dysfunction = 66%  21    Pain level:  3/10     SUBJECTIVE:    Feeling pretty good today, pain is better controlled. Was okay after last session.          OBJECTIVE: : AAROM L renetta flex with pulleys: 124 deg   :    PROM AROM  SEATED AROM SUPINE     L R L R L   Shoulder Flexion  150   95 140 120   Shoulder Abduction  90   70 160 80   Shoulder External Rotation      Able to reach shouder (mostly elbow flexion) Top of R shoulder  26 (GHJ ~35 deg abd)   Shoulder Internal Rotation      Sacrum Upper lumbar spine     AAROM L renetta flex with pulleys: 124 deg     1/5:    AROM  SEATED AROM SUPINE     L R (from eval) L  (measured 12/22)   Shoulder Flexion  95 86 140 120   Shoulder Abduction  72 160 80   Shoulder External Rotation  Able to reach ipsilateral side of head  (mostly elbow flexion) Top of R shoulder  26 (GHJ ~35 deg abd)   Shoulder Internal Rotation  Sacrum Upper lumbar spine     AAROM L renetta flex with pulleys: 122 deg       RESTRICTIONS/PRECAUTIONS: Uses electric scooter for primary mode of transportation, R knee contracture (about 90 deg flexion), able to transfer with SBA    Exercises/Interventions:     Therapeutic Exercises (48254) Resistance / level Sets/sec Reps Notes   Scifit StepOne 1 X 10 min  755 steps  R LE needs yellow strap, done for endurance as well as stretching for L UE and R knee    Pulleys    Done in electric scooter          UE [place] on L  - Flexion  - Abduction   - ER      , cued to push to end range of motion and then pause    Seated EOB wand lift overhead        Reaching with L UE  - supine, ~40 deg flexion, ~75 deg flex, ~      Supine with 2 pillows,    Seated EOB scap squeezes      Sitting in scooter - Bicep curls on L arm [place] TB            Assessment of ROM for POC, discussion of continuing POC and adding aquatic visits              Therapeutic Activities (93021)              Ballet barre - partial stand from scooter  - requires use of forearms on bar to stand, then once in partial stand can get forearms off of bar.                    Sitting in scooter - using wash cloth along bar   -elbow ext and horiz abd   - flexion/ext     Facing bar  // to bar       Stair rail flexion slides   2 10 Manual assist from PT to reach end range and prevent shoulder hiking   Seated EOB - pushing wand away with resistance from PT    Pushing wand away and up     1/10: + 10 reps w/o resistance for warm up          Sitting EOB at dry erase easel - working on L shoulder ROM  - tracing shapes/connecting dots  - reaching high with controlled lowering  - word search puzzle             Intermittent assist from PT, yuri at end range           Transfers to/from electric scooter   X 1 CGA   Sitting in scooter with bedside table:  - folding pillow cases with L arm               Reaching up to cones placed on stair railing, bringing them down and stacking on stair platform    2 trials 6 cones Min to Mod A    Bean bag toss into bucket  2 trials 10 bags Pt reached slightly OH to get bags from PT before throwing          Neuromuscular Re-ed (50862)                                                 Manual Intervention (76756)       PROM L shoulder - all directions with distraction    Done while assessing ROM                                          AquaticTherapy Dates of Service: 1/17, 1/21, 2/10, 2/10, 2/15  Aquatic Visits Exercises/Activities:transfer from scooter to chair lift   Transfers:  [] Stairs   [] Ramp  [x] Chair Lift (Sup to/from electric scooter to chair lift)   % Immersion:            Ambulation/ Warm up:   UE Exercises:   All performed seated on bench with CGA/Min A for trunk balance intermittent    Forward    Shoulder Shrugs      Lateral    Shoulder Circles  x    Retro    Scapular Retraction   x 15 with P   [place]    Push Downs   x10 w/N   Heel/Toe Walking    Punching X 20      Rowing x      Elbow Flex/Ext X15 with P      Shldr Flex/Ext X15 with P      Shldr aBd/aDd X15 with P   LE Exercises:  Shldr Horiz aBd/aDd x15 B with P   HR/TR  Shldr IR/ER x   Marches  Arm Circles X 15 B w/P   Squats - L single leg 2 x 15  PNF Diagonals    Hamstring Curls  Wall Push Ups    Hip Flexion (SLR)  Pull downs X   Hip aBduction (SLR)      Hip Extension (SLR)       Hip aDduction (SLR)      Hip Circles  Functional:    Hip IR/Er  Step up forward    Hip Hikes  Step up lateral     Step down        Lunges Forward      Lunges Retro      Lunges Lateral     Balance:        SLS  L SLS 30\" X4       Tandem Stance       NBOS eyes open Seated:     NBOS eyes closed  Ankle pumps  2x20 B  Hand to Opposite Knee  Ankle Circles   Fwd Step ups to SLS  Knee Flex/Ext 2 x20 L   Lateral Step ups to SLS  Hip aBd/aDd     Stop/Go Gait   Bicycle      Ankle DF/PF     Ankle Inv/Ev   Stretching:   Manual L Gastroc stretch   Gastroc/Soleus  Marches  Hip AB/ADD X15 B  Hamstring  Seated on bench 20\" X2 L [place]:    Knee Ext stretch with OP - pt seated with ankle propped on PT knees  Jog    Piriformis   Noodle Hang    Hip Flexor  Traction at [facility]       ITB  Cool Down:    Quad  Fwd Walking    Mid Back   Lat Walking    UT  Retro Walking    Post Shoulder  Noodle float    Ladder Pull      Pec Stretch            Core: Other:    TrA set      Pelvic Tilts      Multifidi Walk outs c paddle      PNF Chop/Lifts                          Aquatic Abbreviation Key  B= Belt DB= Dumbells T= Theratube   H= Hydrotone N= Noodles W= Weights   P= Paddles S= Speedo equipment K= Kickboard      Pt. Education: Gave pt tour of pool, explained how to use lockers, what to wear, that it would be in group setting, and showed pt aquatic equipment. .  Modalities:     Pt. Education:  -patient educated on diagnosis, prognosis and expectations for rehab  -all patient questions were answered    Home Exercise Program:      Therapeutic Exercise and NMR EXR  [] (19592) Provided verbal/tactile cueing for activities related to strengthening, flexibility, endurance, ROM for improvements in  [] LE / Lumbar: LE, proximal hip, and core control with self care, mobility, lifting, ambulation.   [] UE / Cervical: cervical, postural, scapular, scapulothoracic and UE control with self care, reaching, carrying, lifting, house/yardwork, driving, computer work.  [] (24664) Provided verbal/tactile cueing for activities related to improving balance, coordination, kinesthetic sense, posture, motor skill, proprioception to assist with   [] LE / lumbar: LE, proximal hip, and core control in self care, mobility, lifting, ambulation and eccentric single leg control. [] UE / cervical: cervical, scapular, scapulothoracic and UE control with self care, reaching, carrying, lifting, house/yardwork, driving, computer work.   [] (24551) Therapist is in constant attendance of 2 or more patients providing skilled therapy interventions, but not providing any significant amount of measurable one-on-one time to either patient, for improvements in  [] LE / lumbar: LE, proximal hip, and core control in self care, mobility, lifting, ambulation and eccentric single leg control. [] UE / cervical: cervical, scapular, scapulothoracic and UE control with self care, reaching, carrying, lifting, house/yardwork, driving, computer work. [x] (11339) Aquatic therapy with therapeutic exercise. Provided verbal and tactile cueing for activities related to strengthening, flexibility, endurance, ROM for improvements in  [x] LE / lumbar: LE, proximal hip, and core control in self care, mobility, lifting, ambulation and eccentric single leg control. [x] UE / cervical: cervical, scapular, scapulothoracic and UE control with self care, reaching, carrying, lifting, house/yardwork, driving, computer work. [x] (16090) Therapist is in constant attendance of 2 or more patients providing skilled therapy interventions, but not providing any significant amount of measurable one-on-one time to either patient, for improvements in  [x] LE / lumbar: LE, proximal hip, and core control in self care, mobility, lifting, ambulation and eccentric single leg control. [x] UE / cervical: cervical, scapular, scapulothoracic and UE control with self care, reaching, carrying, lifting, house/yardwork, driving, computer work.      NMR and Therapeutic Activities:    [] (78667 or 23918) Provided verbal/tactile cueing for activities related to improving balance, coordination, kinesthetic sense, posture, motor skill, proprioception and motor activation to allow for proper function of   [] LE: / Lumbar core, proximal hip and LE with self care and ADLs  [] UE / Cervical: cervical, postural, scapular, scapulothoracic and UE control with self care, carrying, lifting, driving, computer work.   [] (87527) Gait Re-education- Provided training and instruction to the patient for proper LE, core and proximal hip recruitment and positioning and eccentric body weight control with ambulation re-education including up and down stairs     Home Management Training / Self Care:  [] (06398) Provided self-care/home management training related to activities of daily living and compensatory training, and/or use of adaptive equipment for improvement with: ADLs and compensatory training, meal preparation, safety procedures and instruction in use of adaptive equipment, including bathing, grooming, dressing, personal hygiene, basic household cleaning and chores.      Home Exercise Program:    [] (03611) Reviewed/Progressed HEP activities related to strengthening, flexibility, endurance, ROM of   [] LE / Lumbar: core, proximal hip and LE for functional self-care, mobility, lifting and ambulation/stair navigation   [] UE / Cervical: cervical, postural, scapular, scapulothoracic and UE control with self care, reaching, carrying, lifting, house/yardwork, driving, computer work  [] (84840)Reviewed/Progressed HEP activities related to improving balance, coordination, kinesthetic sense, posture, motor skill, proprioception of   [] LE: core, proximal hip and LE for self care, mobility, lifting, and ambulation/stair navigation    [] UE / Cervical: cervical, postural,  scapular, scapulothoracic and UE control with self care, reaching, carrying, lifting, house/yardwork, driving, computer work    Manual Treatments:  PROM / STM / Oscillations-Mobs:  G-I, II, III, IV (PA's, Inf., Post.)  [] (01305) Provided manual therapy to mobilize LE, proximal hip and/or LS spine soft tissue/joints for the purpose of modulating pain, promoting relaxation,  increasing ROM, reducing/eliminating soft tissue swelling/inflammation/restriction, improving soft tissue extensibility and allowing for proper ROM for normal function with   [] LE / lumbar: self care, mobility, lifting and ambulation. [] UE / Cervical: self care, reaching, carrying, lifting, house/yardwork, driving, computer work. Modalities:  [] (22971) Vasopneumatic compression: Utilized vasopneumatic compression to decrease edema / swelling for the purpose of improving mobility and quad tone / recruitment which will allow for increased overall function including but not limited to self-care, transfers, ambulation, and ascending / descending stairs. [x] (84147) Aquatic therapy with therapeutic exercise. Provided verbal and tactile cueing for activities related to strengthening, flexibility, endurance, ROM for improvements in  [x] LE / lumbar: LE, proximal hip, and core control in self care, mobility, lifting, ambulation and eccentric single leg control. [x] UE / cervical: cervical, scapular, scapulothoracic and UE control with self care, reaching, carrying, lifting, house/yardwork, driving, computer work.   [] (34443) Therapist is in constant attendance of 2 or more patients providing skilled therapy interventions, but not providing any significant amount of measurable one-on-one time to either patient, for improvements in  [] LE / lumbar: LE, proximal hip, and core control in self care, mobility, lifting, ambulation and eccentric single leg control. [] UE / cervical: cervical, scapular, scapulothoracic and UE control with self care, reaching, carrying, lifting, house/yardwork, driving, computer work.          Charges:  Timed Code Treatment Minutes:  15   Total Treatment Minutes:  39     [] EVAL - LOW (99673)   [] EVAL - MOD (65146)  [] EVAL - HIGH (15834)  [] RE-EVAL (54547)  [] BS(97302) x 1    [] Ionto  [] NMR (00149) x       [] Vaso  [] Manual (22091) x       [] Overall Progression Towards Functional goals/ Treatment Progress Update:  [x] Patient is progressing as expected towards functional goals listed. [] Progression is slowed due to complexities/Impairments listed. [] Progression has been slowed due to co-morbidities. [] Plan just implemented, too soon to assess goals progression <30days   [] Goals require adjustment due to lack of progress  [] Patient is not progressing as expected and requires additional follow up with physician  [] Other    Persisting Functional Limitations/Impairments:  []Sleeping []Sitting               []Standing [x]Transfers (in/out of car)        [x]Walking []Kneeling               [x]Stairs [x]Squatting / bending   [x]ADLs [x]Reaching  []Lifting  []Housework  []Driving []Job related tasks  []Sports/Recreation []Other:        ASSESSMENT:     Patient demo'd difficulty maintaining balance while performing row with N, however static standing at wall improved. Decreased AROM of L GH joint remains, continue to progress ROM and strength as tolerated. Treatment/Activity Tolerance:  [x] Patient able to complete tx  [] Patient limited by fatigue  [] Patient limited by pain  [x] Patient limited by other medical complications  [] Other:     Prognosis: [] Good [x] Fair  [] Poor    Patient Requires Follow-up: [x] Yes  [] No    Plan for next treatment session:    PLAN: See eval. PT 3x / week for 5 weeks. [x] Continue per plan of care [] Alter current plan (see comments)  [] Plan of care initiated [] Hold pending MD visit [] Discharge    Electronically signed by: Sue Puga DPT, CARLOS       Note: If patient does not return for scheduled/ recommended follow up visits, this note will serve as a discharge from care along with most recent update on progress.

## 2022-02-16 ENCOUNTER — HOSPITAL ENCOUNTER (OUTPATIENT)
Dept: PHYSICAL THERAPY | Age: 72
Setting detail: THERAPIES SERIES
Discharge: HOME | End: 2022-02-16
Payer: MEDICARE

## 2022-02-16 PROCEDURE — 97113 AQUATIC THERAPY/EXERCISES: CPT

## 2022-02-16 NOTE — FLOWSHEET NOTE
[facility] Therapy  Phone: [phone]   Fax: [phone]    Physical Therapy Daily Treatment Note  Date:  2022    Patient Name:  Madelyn Domínguez    :  1950  MRN: [id number]  Medical/Treatment Diagnosis Information:  Diagnosis: R29.898 - Left arm weakness  Treatment Diagnosis: Decreased AROM and strength of L UE, decreased functional mobility due to R knee contracture and R LE weakness, decreased functional use of L UE causing difficulty with ADLs  Insurance/Certification information:  PT Insurance Information: Medicare  Physician Information:  Referring Practitioner: Echo Lambert MD ( MD is retiring) - pt will follow up with Dr Shy Dobson who is in the same office   Plan of care signed (Y/N): [x]  Yes []  No     Date of Patient follow up with Physician: 2/10/22     Progress Report: []  Yes  [x]  No     Date Range for reporting period:  Beginnin/22  PN:   POC: 22  Ending:     Progress report due (10 Rx/or 30 days whichever is less): visit #73 or 87/95    Recertification due (POC duration/ or 90 days whichever is less): visit #15 or 22     Visit # Insurance Allowable Auth required? Date Range   15/15 +  + 4/10 mn  2022 visits: 9 []  Yes  [x]  No n/a       Latex Allergy:  [x]NO      []YES  Preferred Language for Healthcare:   [x]English       []other:    Functional Scale:        Date assessed:  QuickDASH: raw score = 40; dysfunction = 66%  21    Pain level:  3/10     SUBJECTIVE:    Pt states that her biceps are sore from exercises given yesterday in therapy.      OBJECTIVE: : AAROM L renetta flex with pulleys: 124 deg   :    PROM AROM  SEATED AROM SUPINE     L R L R L   Shoulder Flexion  150   95 140 120   Shoulder Abduction  90   70 160 80   Shoulder External Rotation      Able to reach shouder (mostly elbow flexion) Top of R shoulder  26 (GHJ ~35 deg abd)   Shoulder Internal Rotation      Sacrum Upper lumbar spine     AAROM L renetta flex with pulleys: 124 deg     1/5:    AROM  SEATED AROM SUPINE     L R (from eval) L  (measured 12/22)   Shoulder Flexion  95 86 140 120   Shoulder Abduction  72 160 80   Shoulder External Rotation  Able to reach ipsilateral side of head  (mostly elbow flexion) Top of R shoulder  26 (GHJ ~35 deg abd)   Shoulder Internal Rotation  Sacrum Upper lumbar spine     AAROM L renetta flex with pulleys: 122 deg       RESTRICTIONS/PRECAUTIONS: Uses electric scooter for primary mode of transportation, R knee contracture (about 90 deg flexion), able to transfer with SBA    Exercises/Interventions:     Therapeutic Exercises (38549) Resistance / level Sets/sec Reps Notes   Scifit StepOne 1 X 10 min  755 steps  R LE needs yellow strap, done for endurance as well as stretching for L UE and R knee    Pulleys    Done in electric scooter          UE [place] on L  - Flexion  - Abduction   - ER      , cued to push to end range of motion and then pause    Seated EOB wand lift overhead        Reaching with L UE  - supine, ~40 deg flexion, ~75 deg flex, ~      Supine with 2 pillows,    Seated EOB scap squeezes      Sitting in scooter - Bicep curls on L arm Lime TB            Assessment of ROM for POC, discussion of continuing POC and adding aquatic visits              Therapeutic Activities (25169)              Ballet barre - partial stand from scooter  - requires use of forearms on bar to stand, then once in partial stand can get forearms off of bar.                    Sitting in scooter - using wash cloth along bar   -elbow ext and horiz abd   - flexion/ext     Facing bar  // to bar       Stair rail flexion slides   2 10 Manual assist from PT to reach end range and prevent shoulder hiking   Seated EOB - pushing wand away with resistance from PT    Pushing wand away and up     1/10: + 10 reps w/o resistance for warm up          Sitting EOB at dry erase easel - working on L shoulder ROM  - tracing shapes/connecting dots  - reaching high with controlled lowering  - word search puzzle             Intermittent assist from PT, yuri at end range           Transfers to/from electric scooter   X 1 CGA   Sitting in scooter with bedside table:  - folding pillow cases with L arm               Reaching up to cones placed on stair railing, bringing them down and stacking on stair platform    2 trials 6 cones Min to Mod A    Bean bag toss into bucket  2 trials 10 bags Pt reached slightly OH to get bags from PT before throwing          Neuromuscular Re-ed (30166)                                                 Manual Intervention (82237)       PROM L shoulder - all directions with distraction    Done while assessing ROM                                          AquaticTherapy Dates of Service: 1/17, 1/21, 2/10, 2/10, 2/15, 2/16  Aquatic Visits Exercises/Activities:transfer from scooter to chair lift   Transfers:  [] Stairs   [] Ramp  [x] Chair Lift (Sup to/from electric scooter to chair lift)   % Immersion:            Ambulation/ Warm up:   UE Exercises:   All performed seated on bench with CGA/Min A for trunk balance intermittent    Forward    Shoulder Shrugs      Lateral    Shoulder Circles  x 15 both ways    Retro    Scapular Retraction   x 15 with P   [place]    Push Downs   -bent elbows  -straight arms    x15 w/N  x15 w/N   Heel/Toe Walking    Punching X 20      Rowing X 20 with N      Elbow Flex/Ext X15 with P      Shldr Flex/Ext X15 with P      Shldr aBd/aDd X15 with P   LE Exercises:  Shldr Horiz aBd/aDd x15 B with P   HR/TR  SL x 10  Shldr IR/ER X 15 with P   Marches  Arm Circles X 15 B w/P   Squats - L single leg 2 x 15  PNF Diagonals    Hamstring Curls  Wall Push Ups    Hip Flexion (SLR)  Pull downs X20   Hip aBduction (SLR)  Chest press wide & narrow X 20 with N   Hip Extension (SLR)       Hip aDduction (SLR)      Hip Circles  Functional:    Hip IR/Er  Step up forward    Hip Hikes  Step up lateral     Step down        Lunges Forward Lunges Retro      Lunges Lateral     Balance:        SLS  L SLS 30\" X4       Tandem Stance       NBOS eyes open  Seated:     NBOS eyes closed  Ankle pumps  2x20 B  Hand to Opposite Knee  Ankle Circles   Fwd Step ups to SLS  Knee Flex/Ext 2 x20 L   Lateral Step ups to SLS  Hip aBd/aDd     Stop/Go Gait   Bicycle      Ankle DF/PF     Ankle Inv/Ev   Stretching:   Manual L Gastroc stretch   Gastroc/Soleus  Marches  Hip AB/ADD X15 B  Hamstring  Seated on bench 20\" X2 L [place]:    Knee Ext stretch with OP - pt seated with ankle propped on PT knees  Jog    Piriformis   Noodle Hang    Hip Flexor  Traction at [employer]      DKTC       ITB  Cool Down:    Quad  Fwd Walking    Mid Back   Lat Walking    UT  Retro Walking    Post Shoulder  Noodle float    Ladder Pull      Pec Stretch            Core: Other:    TrA set      Pelvic Tilts      Multifidi Walk outs c paddle      PNF Chop/Lifts                          Aquatic Abbreviation Key  B= Belt DB= Dumbells T= Theratube   H= Hydrotone N= Noodles W= Weights   P= Paddles S= Speedo equipment K= Kickboard      Pt. Education: Gave pt tour of pool, explained how to use lockers, what to wear, that it would be in group setting, and showed pt aquatic equipment. .  Modalities:     Pt. Education:  -patient educated on diagnosis, prognosis and expectations for rehab  -all patient questions were answered    Home Exercise Program:      Therapeutic Exercise and NMR EXR  [] (08358) Provided verbal/tactile cueing for activities related to strengthening, flexibility, endurance, ROM for improvements in  [] LE / Lumbar: LE, proximal hip, and core control with self care, mobility, lifting, ambulation.   [] UE / Cervical: cervical, postural, scapular, scapulothoracic and UE control with self care, reaching, carrying, lifting, house/yardwork, driving, computer work.  [] (00756) Provided verbal/tactile cueing for activities related to improving balance, coordination, kinesthetic sense, posture, motor skill, proprioception to assist with   [] LE / lumbar: LE, proximal hip, and core control in self care, mobility, lifting, ambulation and eccentric single leg control. [] UE / cervical: cervical, scapular, scapulothoracic and UE control with self care, reaching, carrying, lifting, house/yardwork, driving, computer work.   [] (47317) Therapist is in constant attendance of 2 or more patients providing skilled therapy interventions, but not providing any significant amount of measurable one-on-one time to either patient, for improvements in  [] LE / lumbar: LE, proximal hip, and core control in self care, mobility, lifting, ambulation and eccentric single leg control. [] UE / cervical: cervical, scapular, scapulothoracic and UE control with self care, reaching, carrying, lifting, house/yardwork, driving, computer work. [x] (39780) Aquatic therapy with therapeutic exercise. Provided verbal and tactile cueing for activities related to strengthening, flexibility, endurance, ROM for improvements in  [x] LE / lumbar: LE, proximal hip, and core control in self care, mobility, lifting, ambulation and eccentric single leg control. [x] UE / cervical: cervical, scapular, scapulothoracic and UE control with self care, reaching, carrying, lifting, house/yardwork, driving, computer work.   [] (23620) Therapist is in constant attendance of 2 or more patients providing skilled therapy interventions, but not providing any significant amount of measurable one-on-one time to either patient, for improvements in  [] LE / lumbar: LE, proximal hip, and core control in self care, mobility, lifting, ambulation and eccentric single leg control. [] UE / cervical: cervical, scapular, scapulothoracic and UE control with self care, reaching, carrying, lifting, house/yardwork, driving, computer work.      NMR and Therapeutic Activities:    [] (26792 or 20634) Provided verbal/tactile cueing for activities related to improving balance, therapy to mobilize LE, proximal hip and/or LS spine soft tissue/joints for the purpose of modulating pain, promoting relaxation,  increasing ROM, reducing/eliminating soft tissue swelling/inflammation/restriction, improving soft tissue extensibility and allowing for proper ROM for normal function with   [] LE / lumbar: self care, mobility, lifting and ambulation. [] UE / Cervical: self care, reaching, carrying, lifting, house/yardwork, driving, computer work. Modalities:  [] (99576) Vasopneumatic compression: Utilized vasopneumatic compression to decrease edema / swelling for the purpose of improving mobility and quad tone / recruitment which will allow for increased overall function including but not limited to self-care, transfers, ambulation, and ascending / descending stairs. [x] (65891) Aquatic therapy with therapeutic exercise. Provided verbal and tactile cueing for activities related to strengthening, flexibility, endurance, ROM for improvements in  [x] LE / lumbar: LE, proximal hip, and core control in self care, mobility, lifting, ambulation and eccentric single leg control. [x] UE / cervical: cervical, scapular, scapulothoracic and UE control with self care, reaching, carrying, lifting, house/yardwork, driving, computer work.   [] (19281) Therapist is in constant attendance of 2 or more patients providing skilled therapy interventions, but not providing any significant amount of measurable one-on-one time to either patient, for improvements in  [] LE / lumbar: LE, proximal hip, and core control in self care, mobility, lifting, ambulation and eccentric single leg control. [] UE / cervical: cervical, scapular, scapulothoracic and UE control with self care, reaching, carrying, lifting, house/yardwork, driving, computer work.          Charges:  Timed Code Treatment Minutes: 32   Total Treatment Minutes: 32     [] EVAL - LOW (38521)   [] EVAL - MOD (12726)  [] EVAL - HIGH (02375)  [] RE-EVAL (67518)  [] IK(73825) x 1    [] Ionto  [] NMR (24031) x       [] Vaso  [] Manual (29840) x       [] Ultrasound  [] TA x 2        [] Mech Traction (90203)  [x] Aquatic Therapy x   2   [] ES (un) (44391):   [] Home Management Training x  [] ES(attended) (66106)   [] Dry Needling 1-2 muscles (60782):  [] Dry Needling 3+ muscles (587149)  [] Group: 1     [] Other:     GOALS:   Patient stated goal: to get better use of left arm and leg   []? Progressing: []? Met: []? Not Met: []? Adjusted     Therapist goals for Patient:   Short Term Goals: To be achieved in: 2 weeks  1. Independent in HEP and progression per patient tolerance, in order to prevent re-injury. []? Progressing: []? Met: []? Not Met: []? Adjusted  2. Patient will have a decrease in pain to facilitate improvement in movement, function, and ADLs as indicated by Functional Deficits. []? Progressing: []? Met: []? Not Met: []? Adjusted     Long Term Goals: To be achieved in: 5 weeks  1. Disability index score of 45% or less for the Quick DASH to assist with reaching prior level of function. []? Progressing: []? Met: []? Not Met: []? Adjusted  2. Patient will demonstrate increased L shoulder abduction and flexion AROM to at least 100 deg to allow for proper joint functioning as indicated by patients Functional Deficits. [x]? Progressing: []? Met: []? Not Met: []? Adjusted  3. Patient will demonstrate an increase in Strength to at least 4-/5 for gross L shoulder strength to allow for proper functional mobility as indicated by patients Functional Deficits. [x]? Progressing: []? Met: []? Not Met: []? Adjusted  4. Patient will return to functional activities including ability to  light weight items and move them with L UE while completing bathing and other ADLs without increased symptoms or restriction. [x]? Progressing: []? Met: []? Not Met: []? Adjusted  5.  Patient will demonstrate ability to use L UE during transfers, while being able to pull and tolerate weight bearing through the L UE.   [x]? Progressing: []? Met: []? Not Met: []? Adjusted       Overall Progression Towards Functional goals/ Treatment Progress Update:  [x] Patient is progressing as expected towards functional goals listed. [] Progression is slowed due to complexities/Impairments listed. [] Progression has been slowed due to co-morbidities. [] Plan just implemented, too soon to assess goals progression <30days   [] Goals require adjustment due to lack of progress  [] Patient is not progressing as expected and requires additional follow up with physician  [] Other    Persisting Functional Limitations/Impairments:  []Sleeping []Sitting               []Standing [x]Transfers (in/out of car)        [x]Walking []Kneeling               [x]Stairs [x]Squatting / bending   [x]ADLs [x]Reaching  []Lifting  []Housework  []Driving []Job related tasks  []Sports/Recreation []Other:        ASSESSMENT:    Added last pull downs, tricep presses, and noodle adduction with N this date. Also added standing HR on LLE. Pt tolerated all exercises well, was bale to perform 15-20 reps depending on muscle soreness. Plan to continue to work towards increasing tolerance to exercise in order to increase muscular strength, endurance and AROM. Treatment/Activity Tolerance:  [x] Patient able to complete tx  [] Patient limited by fatigue  [] Patient limited by pain  [x] Patient limited by other medical complications  [] Other:     Prognosis: [] Good [x] Fair  [] Poor    Patient Requires Follow-up: [x] Yes  [] No    Plan for next treatment session:    PLAN: See eval. PT 3x / week for 5 weeks.    [x] Continue per plan of care [] Alter current plan (see comments)  [] Plan of care initiated [] Hold pending MD visit [] Discharge    Electronically signed by: Elias Rene DPT, [id number]       Note: If patient does not return for scheduled/ recommended follow up visits, this note will serve as a discharge from care along with most recent update on progress.

## 2022-02-23 ENCOUNTER — HOSPITAL ENCOUNTER (OUTPATIENT)
Dept: PHYSICAL THERAPY | Age: 72
Setting detail: THERAPIES SERIES
Discharge: HOME | End: 2022-02-23
Payer: MEDICARE

## 2022-02-23 PROCEDURE — 97113 AQUATIC THERAPY/EXERCISES: CPT

## 2022-02-23 NOTE — FLOWSHEET NOTE
[facility] Physical Therapy  Phone: [phone]   Fax: [phone]    Physical Therapy Daily Treatment Note  Date:  2022    Patient Name:  Reyna Oneal    :  1950  MRN: [id number]  Medical/Treatment Diagnosis Information:  Diagnosis: R29.898 - Left arm weakness  Treatment Diagnosis: Decreased AROM and strength of L UE, decreased functional mobility due to R knee contracture and R LE weakness, decreased functional use of L UE causing difficulty with ADLs  Insurance/Certification information:  PT Insurance Information: Medicare  Physician Information:  Referring Practitioner: Louise Thomas MD ( MD is retiring) - pt will follow up with Dr Liudmila Thompson who is in the same office   Plan of care signed (Y/N): [x]  Yes []  No     Date of Patient follow up with Physician: 2/10/22     Progress Report: []  Yes  [x]  No     Date Range for reporting period:  Beginnin/22  PN:   POC: 22  Ending:     Progress report due (10 Rx/or 30 days whichever is less): visit #79 or 15/51    Recertification due (POC duration/ or 90 days whichever is less): visit #15 or 22     Visit # Insurance Allowable Auth required? Date Range   15/15 +  + 5/10 mn  2022 visits: 9 []  Yes  [x]  No n/a       Latex Allergy:  [x]NO      []YES  Preferred Language for Healthcare:   [x]English       []other:    Functional Scale:        Date assessed:  QuickDASH: raw score = 40; dysfunction = 66%  21    Pain level:  3/10     SUBJECTIVE:    Pt reports she has been feeling good, was happy to be able to get her toes done.       OBJECTIVE: : AAROM L renetta flex with pulleys: 124 deg   :    PROM AROM  SEATED AROM SUPINE     L R L R L   Shoulder Flexion  150   95 140 120   Shoulder Abduction  90   70 160 80   Shoulder External Rotation      Able to reach shouder (mostly elbow flexion) Top of R shoulder  26 (GHJ ~35 deg abd)   Shoulder Internal Rotation      Sacrum Upper lumbar spine     AAROM L renetta flex with pulleys: 124 deg     1/5:    AROM  SEATED AROM SUPINE     L R (from eval) L  (measured 12/22)   Shoulder Flexion  95 86 140 120   Shoulder Abduction  72 160 80   Shoulder External Rotation  Able to reach ipsilateral side of head  (mostly elbow flexion) Top of R shoulder  26 (GHJ ~35 deg abd)   Shoulder Internal Rotation  Sacrum Upper lumbar spine     AAROM L renetta flex with pulleys: 122 deg       RESTRICTIONS/PRECAUTIONS: Uses electric scooter for primary mode of transportation, R knee contracture (about 90 deg flexion), able to transfer with SBA    Exercises/Interventions:     Therapeutic Exercises (39306) Resistance / level Sets/sec Reps Notes   Scifit StepOne 1 X 10 min  755 steps  R LE needs yellow strap, done for endurance as well as stretching for L UE and R knee    Pulleys    Done in electric scooter          UE [place] on L  - Flexion  - Abduction   - ER      , cued to push to end range of motion and then pause    Seated EOB wand lift overhead        Reaching with L UE  - supine, ~40 deg flexion, ~75 deg flex, ~      Supine with 2 pillows,    Seated EOB scap squeezes      Sitting in scooter - Bicep curls on L arm Lime TB            Assessment of ROM for POC, discussion of continuing POC and adding aquatic visits              Therapeutic Activities (82100)              Ballet barre - partial stand from scooter  - requires use of forearms on bar to stand, then once in partial stand can get forearms off of bar.                    Sitting in scooter - using wash cloth along bar   -elbow ext and horiz abd   - flexion/ext     Facing bar  // to bar       Stair rail flexion slides   2 10 Manual assist from PT to reach end range and prevent shoulder hiking   Seated EOB - pushing wand away with resistance from PT    Pushing wand away and up     1/10: + 10 reps w/o resistance for warm up          Sitting EOB at dry erase easel - working on L shoulder ROM  - tracing shapes/connecting dots  - reaching high with controlled lowering  - word search puzzle             Intermittent assist from PT, yuri at end range           Transfers to/from electric scooter   X 1 CGA   Sitting in scooter with bedside table:  - folding pillow cases with L arm               Reaching up to cones placed on stair railing, bringing them down and stacking on stair platform    2 trials 6 cones Min to Mod A    Bean bag toss into bucket  2 trials 10 bags Pt reached slightly OH to get bags from PT before throwing          Neuromuscular Re-ed (58086)                                                 Manual Intervention (99983)       PROM L shoulder - all directions with distraction    Done while assessing ROM                                          AquaticTherapy Dates of Service: 1/17, 1/21, 2/10, 2/10, 2/15, 2/16, 2/23  Aquatic Visits Exercises/Activities:transfer from scooter to chair lift   Transfers:  [] Stairs   [] Ramp  [x] Chair Lift (Sup to/from electric scooter to chair lift)   % Immersion:            Ambulation/ Warm up:   UE Exercises:   All performed seated on bench with CGA/Min A for trunk balance intermittent    Forward    Shoulder Shrugs      Lateral    Shoulder Circles  x 15 both ways    Retro    Scapular Retraction   x 15 with P   [place]    Push Downs   -bent elbows  -straight arms    x15 w/N  x15 w/N   Heel/Toe Walking    Punching X 20      Rowing X 20 with N      Elbow Flex/Ext X15 with P      Shldr Flex/Ext X15 with P      Shldr aBd/aDd X15 with P   LE Exercises:  Shldr Horiz aBd/aDd x15 B with P   HR/TR  Shldr IR/ER X 15 with P   Marches  Arm Circles X 15 B w/P   Squats - L single leg 2 x 15  PNF Diagonals    Hamstring Curls  Wall Push Ups    Hip Flexion (SLR)  Pull downs X20   Hip aBduction (SLR)  Chest press wide & narrow X 20 with N   Hip Extension (SLR)       Hip aDduction (SLR)      Hip Circles  Functional:    Hip IR/Er  Step up forward    Hip Hikes  Step up lateral     Step down        Lunges Forward Lunges Retro      Lunges Lateral     Balance:        SLS  L SLS 30\" X4       Tandem Stance       NBOS eyes open  Seated:     NBOS eyes closed  Ankle pumps  2x20 B  Hand to Opposite Knee  Ankle Circles   Fwd Step ups to SLS  Knee Flex/Ext 2 x20 L   Lateral Step ups to SLS  Hip aBd/aDd     Stop/Go Gait   Bicycle      Ankle DF/PF     Ankle Inv/Ev   Stretching:   Manual L Gastroc stretch   Gastroc/Soleus  Marches  Hip AB/ADD X15 B  Hamstring   [place]:    Knee Ext stretch with OP - pt seated with ankle propped on PT knees  Jog    Piriformis   Noodle Hang    Hip Flexor  [facility]       ITB  Cool Down:    Quad  Fwd Walking    Mid Back   Lat Walking    UT  Retro Walking    Post Shoulder  Noodle float    Ladder Pull      Pec Stretch            Core: Other:    TrA set      Pelvic Tilts      Multifidi Walk outs c paddle      PNF Chop/Lifts                          Aquatic Abbreviation Key  B= Belt DB= Dumbells T= Theratube   H= Hydrotone N= Noodles W= Weights   P= Paddles S= Speedo equipment K= Kickboard      Pt. Education: Gave pt tour of pool, explained how to use lockers, what to wear, that it would be in group setting, and showed pt aquatic equipment. .  Modalities:     Pt. Education:  -patient educated on diagnosis, prognosis and expectations for rehab  -all patient questions were answered    Home Exercise Program:      Therapeutic Exercise and NMR EXR  [] (18638) Provided verbal/tactile cueing for activities related to strengthening, flexibility, endurance, ROM for improvements in  [] LE / Lumbar: LE, proximal hip, and core control with self care, mobility, lifting, ambulation.   [] UE / Cervical: cervical, postural, scapular, scapulothoracic and UE control with self care, reaching, carrying, lifting, house/yardwork, driving, computer work.  [] (60359) Provided verbal/tactile cueing for activities related to improving balance, coordination, kinesthetic sense, posture, motor skill, proprioception to assist with   [] LE / lumbar: LE, proximal hip, and core control in self care, mobility, lifting, ambulation and eccentric single leg control. [] UE / cervical: cervical, scapular, scapulothoracic and UE control with self care, reaching, carrying, lifting, house/yardwork, driving, computer work.   [] (60639) Therapist is in constant attendance of 2 or more patients providing skilled therapy interventions, but not providing any significant amount of measurable one-on-one time to either patient, for improvements in  [] LE / lumbar: LE, proximal hip, and core control in self care, mobility, lifting, ambulation and eccentric single leg control. [] UE / cervical: cervical, scapular, scapulothoracic and UE control with self care, reaching, carrying, lifting, house/yardwork, driving, computer work. [x] (26440) Aquatic therapy with therapeutic exercise. Provided verbal and tactile cueing for activities related to strengthening, flexibility, endurance, ROM for improvements in  [x] LE / lumbar: LE, proximal hip, and core control in self care, mobility, lifting, ambulation and eccentric single leg control. [x] UE / cervical: cervical, scapular, scapulothoracic and UE control with self care, reaching, carrying, lifting, house/yardwork, driving, computer work.   [] (35824) Therapist is in constant attendance of 2 or more patients providing skilled therapy interventions, but not providing any significant amount of measurable one-on-one time to either patient, for improvements in  [] LE / lumbar: LE, proximal hip, and core control in self care, mobility, lifting, ambulation and eccentric single leg control. [] UE / cervical: cervical, scapular, scapulothoracic and UE control with self care, reaching, carrying, lifting, house/yardwork, driving, computer work.      NMR and Therapeutic Activities:    [] (32784 or 80784) Provided verbal/tactile cueing for activities related to improving balance, coordination, kinesthetic sense, posture, motor skill, proprioception and motor activation to allow for proper function of   [] LE: / Lumbar core, proximal hip and LE with self care and ADLs  [] UE / Cervical: cervical, postural, scapular, scapulothoracic and UE control with self care, carrying, lifting, driving, computer work.   [] (36925) Gait Re-education- Provided training and instruction to the patient for proper LE, core and proximal hip recruitment and positioning and eccentric body weight control with ambulation re-education including up and down stairs     Home Management Training / Self Care:  [] (84851) Provided self-care/home management training related to activities of daily living and compensatory training, and/or use of adaptive equipment for improvement with: ADLs and compensatory training, meal preparation, safety procedures and instruction in use of adaptive equipment, including bathing, grooming, dressing, personal hygiene, basic household cleaning and chores.      Home Exercise Program:    [] (23725) Reviewed/Progressed HEP activities related to strengthening, flexibility, endurance, ROM of   [] LE / Lumbar: core, proximal hip and LE for functional self-care, mobility, lifting and ambulation/stair navigation   [] UE / Cervical: cervical, postural, scapular, scapulothoracic and UE control with self care, reaching, carrying, lifting, house/yardwork, driving, computer work  [] (64405)Reviewed/Progressed HEP activities related to improving balance, coordination, kinesthetic sense, posture, motor skill, proprioception of   [] LE: core, proximal hip and LE for self care, mobility, lifting, and ambulation/stair navigation    [] UE / Cervical: cervical, postural,  scapular, scapulothoracic and UE control with self care, reaching, carrying, lifting, house/yardwork, driving, computer work    Manual Treatments:  PROM / STM / Oscillations-Mobs:  G-I, II, III, IV (PA's, Inf., Post.)  [] (13400) Provided manual therapy to mobilize LE, proximal hip and/or LS spine soft tissue/joints for the purpose of modulating pain, promoting relaxation,  increasing ROM, reducing/eliminating soft tissue swelling/inflammation/restriction, improving soft tissue extensibility and allowing for proper ROM for normal function with   [] LE / lumbar: self care, mobility, lifting and ambulation. [] UE / Cervical: self care, reaching, carrying, lifting, house/yardwork, driving, computer work. Modalities:  [] (94463) Vasopneumatic compression: Utilized vasopneumatic compression to decrease edema / swelling for the purpose of improving mobility and quad tone / recruitment which will allow for increased overall function including but not limited to self-care, transfers, ambulation, and ascending / descending stairs. Charges:  Timed Code Treatment Minutes: 34   Total Treatment Minutes: 34     [] EVAL - LOW (49453)   [] EVAL - MOD (70259)  [] EVAL - HIGH (44300)  [] RE-EVAL (87529)  [] HX(11063) x 1    [] Ionto  [] NMR (95095) x       [] Vaso  [] Manual (09870) x       [] Ultrasound  [] TA x 2        [] Mech Traction (27723)  [x] Aquatic Therapy x   2   [] ES (un) (18186):   [] Home Management Training x  [] ES(attended) (49284)   [] Dry Needling 1-2 muscles (82926):  [] Dry Needling 3+ muscles (261274)  [] Group: 1     [] Other:     GOALS:   Patient stated goal: to get better use of left arm and leg   []? Progressing: []? Met: []? Not Met: []? Adjusted     Therapist goals for Patient:   Short Term Goals: To be achieved in: 2 weeks  1. Independent in HEP and progression per patient tolerance, in order to prevent re-injury. []? Progressing: []? Met: []? Not Met: []? Adjusted  2. Patient will have a decrease in pain to facilitate improvement in movement, function, and ADLs as indicated by Functional Deficits. []? Progressing: []? Met: []? Not Met: []? Adjusted     Long Term Goals: To be achieved in: 5 weeks  1.  Disability index score of 45% or less for the Quick DASH to assist with reaching prior level of function. []? Progressing: []? Met: []? Not Met: []? Adjusted  2. Patient will demonstrate increased L shoulder abduction and flexion AROM to at least 100 deg to allow for proper joint functioning as indicated by patients Functional Deficits. [x]? Progressing: []? Met: []? Not Met: []? Adjusted  3. Patient will demonstrate an increase in Strength to at least 4-/5 for gross L shoulder strength to allow for proper functional mobility as indicated by patients Functional Deficits. [x]? Progressing: []? Met: []? Not Met: []? Adjusted  4. Patient will return to functional activities including ability to  light weight items and move them with L UE while completing bathing and other ADLs without increased symptoms or restriction. [x]? Progressing: []? Met: []? Not Met: []? Adjusted  5. Patient will demonstrate ability to use L UE during transfers, while being able to pull and tolerate weight bearing through the L UE.   [x]? Progressing: []? Met: []? Not Met: []? Adjusted       Overall Progression Towards Functional goals/ Treatment Progress Update:  [x] Patient is progressing as expected towards functional goals listed. [] Progression is slowed due to complexities/Impairments listed. [] Progression has been slowed due to co-morbidities.   [] Plan just implemented, too soon to assess goals progression <30days   [] Goals require adjustment due to lack of progress  [] Patient is not progressing as expected and requires additional follow up with physician  [] Other    Persisting Functional Limitations/Impairments:  []Sleeping []Sitting               []Standing [x]Transfers (in/out of car)        [x]Walking []Kneeling               [x]Stairs [x]Squatting / bending   [x]ADLs [x]Reaching  []Lifting  []Housework  []Driving []Job related tasks  []Sports/Recreation []Other:        ASSESSMENT:    Patient appears to be able to stand taller in pool this date. Will continue with aquatic therapy until end of POC to try and increase ROM and strength as able. Treatment/Activity Tolerance:  [x] Patient able to complete tx  [] Patient limited by fatigue  [] Patient limited by pain  [x] Patient limited by other medical complications  [] Other:     Prognosis: [] Good [x] Fair  [] Poor    Patient Requires Follow-up: [x] Yes  [] No    Plan for next treatment session:    PLAN: See eval. PT 3x / week for 5 weeks. [x] Continue per plan of care [] Alter current plan (see comments)  [] Plan of care initiated [] Hold pending MD visit [] Discharge    Electronically signed by: Luke Villaseñor PT DPT       Note: If patient does not return for scheduled/ recommended follow up visits, this note will serve as a discharge from care along with most recent update on progress.

## 2022-02-25 ENCOUNTER — HOSPITAL ENCOUNTER (OUTPATIENT)
Dept: PHYSICAL THERAPY | Age: 72
Setting detail: THERAPIES SERIES
Discharge: HOME | End: 2022-02-25
Payer: MEDICARE

## 2022-02-25 PROCEDURE — 97113 AQUATIC THERAPY/EXERCISES: CPT

## 2022-02-25 NOTE — FLOWSHEET NOTE
[address] [facility]  Phone: [phone]   Fax: [phone]    Physical Therapy Daily Treatment Note  Date:  2022    Patient Name:  Ingrid Hahn    :  1950  MRN: [id number]  Medical/Treatment Diagnosis Information:  Diagnosis: R29.898 - Left arm weakness  Treatment Diagnosis: Decreased AROM and strength of L UE, decreased functional mobility due to R knee contracture and R LE weakness, decreased functional use of L UE causing difficulty with ADLs  Insurance/Certification information:  PT Insurance Information: Medicare  Physician Information:  Referring Practitioner: Emmett Soriano MD ( MD is retiring) - pt will follow up with Dr Nidia Snowden who is in the same office   Plan of care signed (Y/N): [x]  Yes []  No     Date of Patient follow up with Physician: 2/10/22     Progress Report: []  Yes  [x]  No     Date Range for reporting period:  Beginnin/22  PN:   POC: 22  Ending:     Progress report due (10 Rx/or 30 days whichever is less): visit #31 or     Recertification due (POC duration/ or 90 days whichever is less): visit #15 or 22     Visit # Insurance Allowable Auth required? Date Range   15/15 +  + 5/10 mn  2022 visits: 9 []  Yes  [x]  No n/a       Latex Allergy:  [x]NO      []YES  Preferred Language for Healthcare:   [x]English       []other:    Functional Scale:        Date assessed:  QuickDASH: raw score = 40; dysfunction = 66%  21    Pain level:  3/10     SUBJECTIVE:    Feels stiff today, minimal pain.      OBJECTIVE: : AAROM L renetta flex with pulleys: 124 deg   :    PROM AROM  SEATED AROM SUPINE     L R L R L   Shoulder Flexion  150   95 140 120   Shoulder Abduction  90   70 160 80   Shoulder External Rotation      Able to reach shouder (mostly elbow flexion) Top of R shoulder  26 (GHJ ~35 deg abd)   Shoulder Internal Rotation      Sacrum Upper lumbar spine     AAROM L renetta flex with pulleys: 124 deg 1/5:    AROM  SEATED AROM SUPINE     L R (from eval) L  (measured 12/22)   Shoulder Flexion  95 86 140 120   Shoulder Abduction  72 160 80   Shoulder External Rotation  Able to reach ipsilateral side of head  (mostly elbow flexion) Top of R shoulder  26 (GHJ ~35 deg abd)   Shoulder Internal Rotation  Sacrum Upper lumbar spine     AAROM L renetta flex with pulleys: 122 deg       RESTRICTIONS/PRECAUTIONS: Uses electric scooter for primary mode of transportation, R knee contracture (about 90 deg flexion), able to transfer with SBA    Exercises/Interventions:     Therapeutic Exercises (15073) Resistance / level Sets/sec Reps Notes   Scifit StepOne 1 X 10 min  755 steps  R LE needs yellow strap, done for endurance as well as stretching for L UE and R knee    Pulleys    Done in electric scooter          UE [place] on L  - Flexion  - Abduction   - ER      , cued to push to end range of motion and then pause    Seated EOB wand lift overhead        Reaching with L UE  - supine, ~40 deg flexion, ~75 deg flex, ~      Supine with 2 pillows,    Seated EOB scap squeezes      Sitting in scooter - Bicep curls on L arm L[place] TB            Assessment of ROM for POC, discussion of continuing POC and adding aquatic visits              Therapeutic Activities (03020)              Ballet barre - partial stand from scooter  - requires use of forearms on bar to stand, then once in partial stand can get forearms off of bar.                    Sitting in scooter - using wash cloth along bar   -elbow ext and horiz abd   - flexion/ext     Facing bar  // to bar       Stair rail flexion slides   2 10 Manual assist from PT to reach end range and prevent shoulder hiking   Seated EOB - pushing wand away with resistance from PT    Pushing wand away and up     1/10: + 10 reps w/o resistance for warm up          Sitting EOB at dry erase easel - working on L shoulder ROM  - tracing shapes/connecting dots  - reaching high with controlled lowering  - word search puzzle             Intermittent assist from PT, yuri at end range           Transfers to/from electric scooter   X 1 CGA   Sitting in scooter with bedside table:  - folding pillow cases with L arm               Reaching up to cones placed on stair railing, bringing them down and stacking on stair platform    2 trials 6 cones Min to Mod A    Bean bag toss into bucket  2 trials 10 bags Pt reached slightly OH to get bags from PT before throwing          Neuromuscular Re-ed (18937)                                                 Manual Intervention (78890)       PROM L shoulder - all directions with distraction    Done while assessing ROM                                          AquaticTherapy Dates of Service: 1/17, 1/21, 2/10, 2/10, 2/15, 2/16, 2/23, 2/25  Aquatic Visits Exercises/Activities:transfer from scooter to chair lift   Transfers:  [] Stairs   [] Ramp  [x] Chair Lift (Sup to/from electric scooter to chair lift)   % Immersion:            Ambulation/ Warm up:   UE Exercises:   All performed seated on bench with CGA/Min A for trunk balance intermittent    Forward    Shoulder Shrugs      Lateral    Shoulder Circles  x 15 both ways    Retro    Scapular Retraction   x 15 with P   [place]    Push Downs   -bent elbows  -straight arms    x15 w/N  x15 w/N   Heel/Toe Walking    Punching X 20      Rowing X 20 with N      Elbow Flex/Ext X15 with P      Shldr Flex/Ext X15 with P      Shldr aBd/aDd X15 with P   LE Exercises:  Shldr Horiz aBd/aDd x15 B with P   HR/TR  Shldr IR/ER X 15 with P   Marches  Arm Circles X 15 B w/P   Squats - L single leg 2 x 15  PNF Diagonals    Hamstring Curls  Wall Push Ups    Hip Flexion (SLR)  Pull downs X20   Hip aBduction (SLR)  Chest press wide & narrow X 20 with N   Hip Extension (SLR)       Hip aDduction (SLR)      Hip Circles  Functional:    Hip IR/Er  Step up forward    Hip Hikes  Step up lateral     Step down        Lunges Forward      Lunges Retro      Lunges Lateral Balance:        SLS  L SLS 30\" X4       Tandem Stance       NBOS eyes open  Seated:     NBOS eyes closed  Ankle pumps  2x20 B  Hand to Opposite Knee  Ankle Circles   Fwd Step ups to SLS  Knee Flex/Ext 2 x20 L   Lateral Step ups to SLS  Hip aBd/aDd     Stop/Go Gait   Bicycle      Ankle DF/PF     Ankle Inv/Ev   Stretching:   Manual L Gastroc stretch   Gastroc/Soleus  Marches  Hip AB/ADD X15 B  Hamstring   [place]:    Knee Ext stretch with OP - pt seated with ankle propped on PT knees  Jog    Piriformis   Noodle Hang    Hip Flexor  Traction at [address]    SKTC      DKTC       ITB  Cool Down:    Quad  Fwd Walking    Mid Back   Lat Walking    UT  Retro Walking    Post Shoulder  Noodle float    Ladder Pull      Pec Stretch            Core: Other:    TrA set      Pelvic Tilts      Multifidi Walk outs c paddle      PNF Chop/Lifts                          Aquatic Abbreviation Key  B= Belt DB= Dumbells T= Theratube   H= Hydrotone N= Noodles W= Weights   P= Paddles S= Speedo equipment K= Kickboard      Pt. Education: Gave pt tour of pool, explained how to use lockers, what to wear, that it would be in group setting, and showed pt aquatic equipment. .  Modalities:     Pt. Education:  -patient educated on diagnosis, prognosis and expectations for rehab  -all patient questions were answered    Home Exercise Program:      Therapeutic Exercise and NMR EXR  [] (86785) Provided verbal/tactile cueing for activities related to strengthening, flexibility, endurance, ROM for improvements in  [] LE / Lumbar: LE, proximal hip, and core control with self care, mobility, lifting, ambulation.   [] UE / Cervical: cervical, postural, scapular, scapulothoracic and UE control with self care, reaching, carrying, lifting, house/yardwork, driving, computer work.  [] (25457) Provided verbal/tactile cueing for activities related to improving balance, coordination, kinesthetic sense, posture, motor skill, proprioception to assist with   [] LE / lumbar: LE, proximal hip, and core control in self care, mobility, lifting, ambulation and eccentric single leg control. [] UE / cervical: cervical, scapular, scapulothoracic and UE control with self care, reaching, carrying, lifting, house/yardwork, driving, computer work.   [] (12826) Therapist is in constant attendance of 2 or more patients providing skilled therapy interventions, but not providing any significant amount of measurable one-on-one time to either patient, for improvements in  [] LE / lumbar: LE, proximal hip, and core control in self care, mobility, lifting, ambulation and eccentric single leg control. [] UE / cervical: cervical, scapular, scapulothoracic and UE control with self care, reaching, carrying, lifting, house/yardwork, driving, computer work. [x] (53413) Aquatic therapy with therapeutic exercise. Provided verbal and tactile cueing for activities related to strengthening, flexibility, endurance, ROM for improvements in  [x] LE / lumbar: LE, proximal hip, and core control in self care, mobility, lifting, ambulation and eccentric single leg control. [x] UE / cervical: cervical, scapular, scapulothoracic and UE control with self care, reaching, carrying, lifting, house/yardwork, driving, computer work.   [] (04774) Therapist is in constant attendance of 2 or more patients providing skilled therapy interventions, but not providing any significant amount of measurable one-on-one time to either patient, for improvements in  [] LE / lumbar: LE, proximal hip, and core control in self care, mobility, lifting, ambulation and eccentric single leg control. [] UE / cervical: cervical, scapular, scapulothoracic and UE control with self care, reaching, carrying, lifting, house/yardwork, driving, computer work.      NMR and Therapeutic Activities:    [] (49286 or 27052) Provided verbal/tactile cueing for activities related to improving balance, coordination, kinesthetic sense, posture, motor skill, proprioception and motor activation to allow for proper function of   [] LE: / Lumbar core, proximal hip and LE with self care and ADLs  [] UE / Cervical: cervical, postural, scapular, scapulothoracic and UE control with self care, carrying, lifting, driving, computer work.   [] (35049) Gait Re-education- Provided training and instruction to the patient for proper LE, core and proximal hip recruitment and positioning and eccentric body weight control with ambulation re-education including up and down stairs     Home Management Training / Self Care:  [] (42466) Provided self-care/home management training related to activities of daily living and compensatory training, and/or use of adaptive equipment for improvement with: ADLs and compensatory training, meal preparation, safety procedures and instruction in use of adaptive equipment, including bathing, grooming, dressing, personal hygiene, basic household cleaning and chores.      Home Exercise Program:    [] (81496) Reviewed/Progressed HEP activities related to strengthening, flexibility, endurance, ROM of   [] LE / Lumbar: core, proximal hip and LE for functional self-care, mobility, lifting and ambulation/stair navigation   [] UE / Cervical: cervical, postural, scapular, scapulothoracic and UE control with self care, reaching, carrying, lifting, house/yardwork, driving, computer work  [] (41350)Reviewed/Progressed HEP activities related to improving balance, coordination, kinesthetic sense, posture, motor skill, proprioception of   [] LE: core, proximal hip and LE for self care, mobility, lifting, and ambulation/stair navigation    [] UE / Cervical: cervical, postural,  scapular, scapulothoracic and UE control with self care, reaching, carrying, lifting, house/yardwork, driving, computer work    Manual Treatments:  PROM / STM / Oscillations-Mobs:  G-I, II, III, IV (PA's, Inf., Post.)  [] (12128) Provided manual therapy to mobilize LE, proximal hip and/or LS spine soft tissue/joints for the purpose of modulating pain, promoting relaxation,  increasing ROM, reducing/eliminating soft tissue swelling/inflammation/restriction, improving soft tissue extensibility and allowing for proper ROM for normal function with   [] LE / lumbar: self care, mobility, lifting and ambulation. [] UE / Cervical: self care, reaching, carrying, lifting, house/yardwork, driving, computer work. Modalities:  [] (13180) Vasopneumatic compression: Utilized vasopneumatic compression to decrease edema / swelling for the purpose of improving mobility and quad tone / recruitment which will allow for increased overall function including but not limited to self-care, transfers, ambulation, and ascending / descending stairs. Charges:  Timed Code Treatment Minutes: 35   Total Treatment Minutes: 35     [] EVAL - LOW (22720)   [] EVAL - MOD (21763)  [] EVAL - HIGH (81138)  [] RE-EVAL (92017)  [] EB(68054) x 1    [] Ionto  [] NMR (94444) x       [] Vaso  [] Manual (18962) x       [] Ultrasound  [] TA x 2        [] Mech Traction (64993)  [x] Aquatic Therapy x   2   [] ES (un) (55178):   [] Home Management Training x  [] ES(attended) (54982)   [] Dry Needling 1-2 muscles (24263):  [] Dry Needling 3+ muscles (523173)  [] Group: 1     [] Other:     GOALS:   Patient stated goal: to get better use of left arm and leg   []? Progressing: []? Met: []? Not Met: []? Adjusted     Therapist goals for Patient:   Short Term Goals: To be achieved in: 2 weeks  1. Independent in HEP and progression per patient tolerance, in order to prevent re-injury. []? Progressing: []? Met: []? Not Met: []? Adjusted  2. Patient will have a decrease in pain to facilitate improvement in movement, function, and ADLs as indicated by Functional Deficits. []? Progressing: []? Met: []? Not Met: []? Adjusted     Long Term Goals: To be achieved in: 5 weeks  1.  Disability index score of 45% or less for the Quick DASH to assist with reaching prior level of function. []? Progressing: []? Met: []? Not Met: []? Adjusted  2. Patient will demonstrate increased L shoulder abduction and flexion AROM to at least 100 deg to allow for proper joint functioning as indicated by patients Functional Deficits. [x]? Progressing: []? Met: []? Not Met: []? Adjusted  3. Patient will demonstrate an increase in Strength to at least 4-/5 for gross L shoulder strength to allow for proper functional mobility as indicated by patients Functional Deficits. [x]? Progressing: []? Met: []? Not Met: []? Adjusted  4. Patient will return to functional activities including ability to  light weight items and move them with L UE while completing bathing and other ADLs without increased symptoms or restriction. [x]? Progressing: []? Met: []? Not Met: []? Adjusted  5. Patient will demonstrate ability to use L UE during transfers, while being able to pull and tolerate weight bearing through the L UE.   [x]? Progressing: []? Met: []? Not Met: []? Adjusted       Overall Progression Towards Functional goals/ Treatment Progress Update:  [x] Patient is progressing as expected towards functional goals listed. [] Progression is slowed due to complexities/Impairments listed. [] Progression has been slowed due to co-morbidities.   [] Plan just implemented, too soon to assess goals progression <30days   [] Goals require adjustment due to lack of progress  [] Patient is not progressing as expected and requires additional follow up with physician  [] Other    Persisting Functional Limitations/Impairments:  []Sleeping []Sitting               []Standing [x]Transfers (in/out of car)        [x]Walking []Kneeling               [x]Stairs [x]Squatting / bending   [x]ADLs [x]Reaching  []Lifting  []Housework  []Driving []Job related tasks  []Sports/Recreation []Other:        ASSESSMENT:    Pt having more stiffness today in LE's and hips, requiring a little more lean onto wall for support. Continue with exercises as tolerated to promote increased strength and functional mobility. Treatment/Activity Tolerance:  [x] Patient able to complete tx  [] Patient limited by fatigue  [] Patient limited by pain  [x] Patient limited by other medical complications  [] Other:     Prognosis: [] Good [x] Fair  [] Poor    Patient Requires Follow-up: [x] Yes  [] No    Plan for next treatment session:    PLAN: See eval. PT 3x / week for 5 weeks. [x] Continue per plan of care [] Alter current plan (see comments)  [] Plan of care initiated [] Hold pending MD visit [] Discharge    Electronically signed by Dagoberto Corona PTA [id number]    Note: If patient does not return for scheduled/ recommended follow up visits, this note will serve as a discharge from care along with most recent update on progress.

## 2022-03-02 ENCOUNTER — HOSPITAL ENCOUNTER (OUTPATIENT)
Dept: PHYSICAL THERAPY | Age: 72
Setting detail: THERAPIES SERIES
Discharge: HOME | End: 2022-03-02
Payer: MEDICARE

## 2022-03-02 PROCEDURE — 97113 AQUATIC THERAPY/EXERCISES: CPT

## 2022-03-02 NOTE — FLOWSHEET NOTE
[address] [facility] Physical Therapy  Phone: [phone]   Fax: [phone]    Physical Therapy Daily Treatment Note  Date:  3/2/2022    Patient Name:  Edward Suggs    :  1950  MRN: [id number]  Medical/Treatment Diagnosis Information:  Diagnosis: R29.898 - Left arm weakness  Treatment Diagnosis: Decreased AROM and strength of L UE, decreased functional mobility due to R knee contracture and R LE weakness, decreased functional use of L UE causing difficulty with ADLs  Insurance/Certification information:  PT Insurance Information: Medicare  Physician Information:  Referring Practitioner: Mejia Allan MD ( MD is retiring) - pt will follow up with Dr Radha Hunter who is in the same office   Plan of care signed (Y/N): [x]  Yes []  No     Date of Patient follow up with Physician: 2/10/22     Progress Report: []  Yes  [x]  No     Date Range for reporting period:  Beginnin/22  PN:   POC: 22  Ending:     Progress report due (10 Rx/or 30 days whichever is less): visit #55 or 86/57    Recertification due (POC duration/ or 90 days whichever is less): visit #15 or 22     Visit # Insurance Allowable Auth required? Date Range   15/15 +  + 6/10 mn  2022 visits: 9 []  Yes  [x]  No n/a       Latex Allergy:  [x]NO      []YES  Preferred Language for Healthcare:   [x]English       []other:    Functional Scale:        Date assessed:  QuickDASH: raw score = 40; dysfunction = 66%  21    Pain level:  310     SUBJECTIVE:    R arm is sore today.     OBJECTIVE: : AAROM L renetta flex with pulleys: 124 deg   :    PROM AROM  SEATED AROM SUPINE     L R L R L   Shoulder Flexion  150   95 140 120   Shoulder Abduction  90   70 160 80   Shoulder External Rotation      Able to reach shouder (mostly elbow flexion) Top of R shoulder  26 (GHJ ~35 deg abd)   Shoulder Internal Rotation      Sacrum Upper lumbar spine     AAROM L renetta flex with pulleys: 124 deg     : AROM  SEATED AROM SUPINE     L R (from eval) L  (measured 12/22)   Shoulder Flexion  95 86 140 120   Shoulder Abduction  72 160 80   Shoulder External Rotation  Able to reach ipsilateral side of head  (mostly elbow flexion) Top of R shoulder  26 (GHJ ~35 deg abd)   Shoulder Internal Rotation  Sacrum Upper lumbar spine     AAROM L renetta flex with pulleys: 122 deg       RESTRICTIONS/PRECAUTIONS: Uses electric scooter for primary mode of transportation, R knee contracture (about 90 deg flexion), able to transfer with SBA    Exercises/Interventions:     Therapeutic Exercises (80194) Resistance / level Sets/sec Reps Notes   Scifit StepOne 1 R LE needs yellow strap, done for endurance as well as stretching for L UE and R knee    Pulleys    Done in electric scooter          UE [place] on L  - Flexion  - Abduction   - ER      , cued to push to end range of motion and then pause    Seated EOB wand lift overhead        Reaching with L UE  - supine, ~40 deg flexion, ~75 deg flex, ~      Supine with 2 pillows,    Seated EOB scap squeezes      Sitting in scooter - Bicep curls on L arm [place] TB            Assessment of ROM for POC, discussion of continuing POC and adding aquatic visits              Therapeutic Activities ()              Ballet barre - partial stand from scooter  - requires use of forearms on bar to stand, then once in partial stand can get forearms off of bar.                    Sitting in scooter - using wash cloth along bar   -elbow ext and horiz abd   - flexion/ext     Facing bar  // to bar       Stair rail flexion slides   Manual assist from PT to reach end range and prevent shoulder hiking   Seated EOB - pushing wand away with resistance from PT    Pushing wand away and up     1/10: + 10 reps w/o resistance for warm up          Sitting EOB at dry erase easel - working on L shoulder ROM  - tracing shapes/connecting dots  - reaching high with controlled lowering  - word search puzzle             Intermittent NBOS eyes open  Seated:     NBOS eyes closed  Ankle pumps  2x20 B  Hand to Opposite Knee  Ankle Circles   Fwd Step ups to SLS  Knee Flex/Ext 2 x20 L   Lateral Step ups to SLS  Hip aBd/aDd     Stop/Go Gait   Bicycle      Ankle DF/PF     Ankle Inv/Ev   Stretching:   Manual L Gastroc stretch   Gastroc/Soleus  Marches  Hip AB/ADD X15 B  Hamstring   [place]:    Knee Ext stretch with OP - pt seated with ankle propped on PT knees  Jog    Piriformis   Noodle Hang    Hip Flexor  Traction at [facility]       ITB  Cool Down:    Quad  Fwd Walking    Mid Back   Lat Walking    UT  Retro Walking    Post Shoulder  Noodle float    Ladder Pull      Pec Stretch            Core: Other:    TrA set      Pelvic Tilts      Multifidi Walk outs c paddle      PNF Chop/Lifts                          Aquatic Abbreviation Key  B= Belt DB= Dumbells T= Theratube   H= Hydrotone N= Noodles W= Weights   P= Paddles S= Speedo equipment K= Kickboard      Pt. Education: Gave pt tour of pool, explained how to use lockers, what to wear, that it would be in group setting, and showed pt aquatic equipment. .  Modalities:     Pt. Education:  -patient educated on diagnosis, prognosis and expectations for rehab  -all patient questions were answered    Home Exercise Program:      Therapeutic Exercise and NMR EXR  [] (81605) Provided verbal/tactile cueing for activities related to strengthening, flexibility, endurance, ROM for improvements in  [] LE / Lumbar: LE, proximal hip, and core control with self care, mobility, lifting, ambulation.   [] UE / Cervical: cervical, postural, scapular, scapulothoracic and UE control with self care, reaching, carrying, lifting, house/yardwork, driving, computer work.  [] (23462) Provided verbal/tactile cueing for activities related to improving balance, coordination, kinesthetic sense, posture, motor skill, proprioception to assist with   [] LE / lumbar: LE, proximal hip, and core control in self care, mobility, lifting, ambulation and eccentric single leg control. [] UE / cervical: cervical, scapular, scapulothoracic and UE control with self care, reaching, carrying, lifting, house/yardwork, driving, computer work.   [] (95162) Therapist is in constant attendance of 2 or more patients providing skilled therapy interventions, but not providing any significant amount of measurable one-on-one time to either patient, for improvements in  [] LE / lumbar: LE, proximal hip, and core control in self care, mobility, lifting, ambulation and eccentric single leg control. [] UE / cervical: cervical, scapular, scapulothoracic and UE control with self care, reaching, carrying, lifting, house/yardwork, driving, computer work. [x] (20788) Aquatic therapy with therapeutic exercise. Provided verbal and tactile cueing for activities related to strengthening, flexibility, endurance, ROM for improvements in  [x] LE / lumbar: LE, proximal hip, and core control in self care, mobility, lifting, ambulation and eccentric single leg control. [x] UE / cervical: cervical, scapular, scapulothoracic and UE control with self care, reaching, carrying, lifting, house/yardwork, driving, computer work.   [] (11732) Therapist is in constant attendance of 2 or more patients providing skilled therapy interventions, but not providing any significant amount of measurable one-on-one time to either patient, for improvements in  [] LE / lumbar: LE, proximal hip, and core control in self care, mobility, lifting, ambulation and eccentric single leg control. [] UE / cervical: cervical, scapular, scapulothoracic and UE control with self care, reaching, carrying, lifting, house/yardwork, driving, computer work.      NMR and Therapeutic Activities:    [] (24223 or 45620) Provided verbal/tactile cueing for activities related to improving balance, coordination, kinesthetic sense, posture, motor skill, proprioception and motor activation to allow for proper function of   [] LE: / Lumbar core, proximal hip and LE with self care and ADLs  [] UE / Cervical: cervical, postural, scapular, scapulothoracic and UE control with self care, carrying, lifting, driving, computer work.   [] (90580) Gait Re-education- Provided training and instruction to the patient for proper LE, core and proximal hip recruitment and positioning and eccentric body weight control with ambulation re-education including up and down stairs     Home Management Training / Self Care:  [] (05040) Provided self-care/home management training related to activities of daily living and compensatory training, and/or use of adaptive equipment for improvement with: ADLs and compensatory training, meal preparation, safety procedures and instruction in use of adaptive equipment, including bathing, grooming, dressing, personal hygiene, basic household cleaning and chores.      Home Exercise Program:    [] (20254) Reviewed/Progressed HEP activities related to strengthening, flexibility, endurance, ROM of   [] LE / Lumbar: core, proximal hip and LE for functional self-care, mobility, lifting and ambulation/stair navigation   [] UE / Cervical: cervical, postural, scapular, scapulothoracic and UE control with self care, reaching, carrying, lifting, house/yardwork, driving, computer work  [] (96888)Reviewed/Progressed HEP activities related to improving balance, coordination, kinesthetic sense, posture, motor skill, proprioception of   [] LE: core, proximal hip and LE for self care, mobility, lifting, and ambulation/stair navigation    [] UE / Cervical: cervical, postural,  scapular, scapulothoracic and UE control with self care, reaching, carrying, lifting, house/yardwork, driving, computer work    Manual Treatments:  PROM / STM / Oscillations-Mobs:  G-I, II, III, IV (PA's, Inf., Post.)  [] (53759) Provided manual therapy to mobilize LE, proximal hip and/or LS spine soft tissue/joints for the purpose of modulating pain, promoting relaxation,  increasing ROM, reducing/eliminating soft tissue swelling/inflammation/restriction, improving soft tissue extensibility and allowing for proper ROM for normal function with   [] LE / lumbar: self care, mobility, lifting and ambulation. [] UE / Cervical: self care, reaching, carrying, lifting, house/yardwork, driving, computer work. Modalities:  [] (48590) Vasopneumatic compression: Utilized vasopneumatic compression to decrease edema / swelling for the purpose of improving mobility and quad tone / recruitment which will allow for increased overall function including but not limited to self-care, transfers, ambulation, and ascending / descending stairs. Charges:  Timed Code Treatment Minutes: 39   Total Treatment Minutes: 39     [] EVAL - LOW (02729)   [] EVAL - MOD (04924)  [] EVAL - HIGH (51294)  [] RE-EVAL (45599)  [] CX(07164) x 1    [] Ionto  [] NMR (36901) x       [] Vaso  [] Manual (79255) x       [] Ultrasound  [] TA x 2        [] Mech Traction (94836)  [x] Aquatic Therapy x   3   [] ES (un) (72834):   [] Home Management Training x  [] ES(attended) (94123)   [] Dry Needling 1-2 muscles (26753):  [] Dry Needling 3+ muscles (672445)  [] Group: 1     [] Other:     GOALS:   Patient stated goal: to get better use of left arm and leg   []? Progressing: []? Met: []? Not Met: []? Adjusted     Therapist goals for Patient:   Short Term Goals: To be achieved in: 2 weeks  1. Independent in HEP and progression per patient tolerance, in order to prevent re-injury. []? Progressing: []? Met: []? Not Met: []? Adjusted  2. Patient will have a decrease in pain to facilitate improvement in movement, function, and ADLs as indicated by Functional Deficits. []? Progressing: []? Met: []? Not Met: []? Adjusted     Long Term Goals: To be achieved in: 5 weeks  1. Disability index score of 45% or less for the Quick DASH to assist with reaching prior level of function. []?  Progressing: []? as tolerated to promote increased strength and functional mobility. Treatment/Activity Tolerance:  [x] Patient able to complete tx  [] Patient limited by fatigue  [] Patient limited by pain  [x] Patient limited by other medical complications  [] Other:     Prognosis: [] Good [x] Fair  [] Poor    Patient Requires Follow-up: [x] Yes  [] No    Plan for next treatment session:    PLAN: See eval. PT 3x / week for 5 weeks. [x] Continue per plan of care [] Alter current plan (see comments)  [] Plan of care initiated [] Hold pending MD visit [] Discharge    Electronically signed by Cristopher Hidalgo PTA [id number]    Note: If patient does not return for scheduled/ recommended follow up visits, this note will serve as a discharge from care along with most recent update on progress.

## 2022-03-04 ENCOUNTER — HOSPITAL ENCOUNTER (OUTPATIENT)
Dept: PHYSICAL THERAPY | Age: 72
Setting detail: THERAPIES SERIES
Discharge: HOME | End: 2022-03-04
Payer: MEDICARE

## 2022-03-04 PROCEDURE — 97113 AQUATIC THERAPY/EXERCISES: CPT

## 2022-03-04 NOTE — FLOWSHEET NOTE
[address] [facility] Physical Therapy  Phone: [phone]   Fax: [phone]    Physical Therapy Daily Treatment Note  Date:  3/4/2022    Patient Name:  Ranulfo Sr    :  1950  MRN: [id number]  Medical/Treatment Diagnosis Information:  Diagnosis: R29.898 - Left arm weakness  Treatment Diagnosis: Decreased AROM and strength of L UE, decreased functional mobility due to R knee contracture and R LE weakness, decreased functional use of L UE causing difficulty with ADLs  Insurance/Certification information:  PT Insurance Information: Medicare  Physician Information:  Referring Practitioner: Steven Prado MD ( MD is retiring) - pt will follow up with Dr Parvez Lopez who is in the same office   Plan of care signed (Y/N): [x]  Yes []  No     Date of Patient follow up with Physician: 2/10/22     Progress Report: []  Yes  [x]  No     Date Range for reporting period:  Beginnin/22  PN:   POC: 22  Ending:     Progress report due (10 Rx/or 30 days whichever is less): visit #58 or 79/05    Recertification due (POC duration/ or 90 days whichever is less): visit #15 or 22     Visit # Insurance Allowable Auth required? Date Range   15/15 +  + 7/10 mn   []  Yes  [x]  No n/a       Latex Allergy:  [x]NO      []YES  Preferred Language for Healthcare:   [x]English       []other:    Functional Scale:        Date assessed:  QuickDASH: raw score = 40; dysfunction = 66%  21    Pain level:  R knee 10; L knee 2/10    SUBJECTIVE:    Pt reports yesterday her R knee pain was so bad she had to take an oxycodone which she hasn't had to do for several months.      OBJECTIVE: : AAROM L renetta flex with pulleys: 124 deg   :    PROM AROM  SEATED AROM SUPINE     L R L R L   Shoulder Flexion  150   95 140 120   Shoulder Abduction  90   70 160 80   Shoulder External Rotation      Able to reach shouder (mostly elbow flexion) Top of R shoulder  26 (GHJ ~35 deg abd) Shoulder Internal Rotation      Sacrum Upper lumbar spine     AAROM L renetta flex with pulleys: 124 deg     1/5:    AROM  SEATED AROM SUPINE     L R (from eval) L  (measured 12/22)   Shoulder Flexion  95 86 140 120   Shoulder Abduction  72 160 80   Shoulder External Rotation  Able to reach ipsilateral side of head  (mostly elbow flexion) Top of R shoulder  26 (GHJ ~35 deg abd)   Shoulder Internal Rotation  Sacrum Upper lumbar spine     AAROM L renetta flex with pulleys: 122 deg       RESTRICTIONS/PRECAUTIONS: Uses electric scooter for primary mode of transportation, R knee contracture (about 90 deg flexion), able to transfer with SBA    Exercises/Interventions:     Therapeutic Exercises (74320) Resistance / level Sets/sec Reps Notes   Scifit StepOne 1 R LE needs yellow strap, done for endurance as well as stretching for L UE and R knee    Pulleys    Done in electric scooter          UE [place] on L  - Flexion  - Abduction   - ER      , cued to push to end range of motion and then pause    Seated EOB wand lift overhead        Reaching with L UE  - supine, ~40 deg flexion, ~75 deg flex, ~      Supine with 2 pillows,    Seated EOB scap squeezes      Sitting in scooter - Bicep curls on L arm [place] TB            Assessment of ROM for POC, discussion of continuing POC and adding aquatic visits              Therapeutic Activities ()              Ballet barre - partial stand from scooter  - requires use of forearms on bar to stand, then once in partial stand can get forearms off of bar.                    Sitting in scooter - using wash cloth along bar   -elbow ext and horiz abd   - flexion/ext     Facing bar  // to bar       Stair rail flexion slides   Manual assist from PT to reach end range and prevent shoulder hiking   Seated EOB - pushing wand away with resistance from PT    Pushing wand away and up     1/10: + 10 reps w/o resistance for warm up          Sitting EOB at dry erase easel - working on L shoulder ROM  - tracing shapes/connecting dots  - reaching high with controlled lowering  - word search puzzle             Intermittent assist from PT, yuri at end range           Transfers to/from electric scooter   X 1 CGA   Sitting in scooter with bedside table:  - folding pillow cases with L arm               Reaching up to cones placed on stair railing, bringing them down and stacking on stair platform    Min to Mod A    Bean bag toss into bucket  Pt reached slightly OH to get bags from PT before throwing          Neuromuscular Re-ed (21498)                                                 Manual Intervention ([phone])       PROM L shoulder - all directions with distraction    Done while assessing ROM                                          AquaticTherapy Dates of Service: 1/17, 1/21, 2/10, 2/10, 2/15, 2/16, 2/23, 2/25, 3/4  Aquatic Visits Exercises/Activities:transfer from scooter to chair lift   Transfers:  [] Stairs   [] Ramp  [x] Chair Lift (Sup to/from electric scooter to chair lift)   % Immersion:            Ambulation/ Warm up:   UE Exercises:   All performed seated on bench with SBA for trunk balance intermittent    Forward    Shoulder Shrugs      Lateral    Shoulder Circles  x 15 both ways    Retro    Scapular Retraction   x 15 with P   [place]    Push Downs   -bent elbows  -straight arms    x15 w/N  x15 w/N   Heel/Toe Walking    Punching X 20 with P      Rowing X 20 with N      Elbow Flex/Ext X15 with P      Shldr Flex/Ext X15 with P      Shldr aBd/aDd X15 with P   LE Exercises:  Shldr Horiz aBd/aDd x15 B with P   HR/TR  Shldr IR/ER X 15 with P, PT assist on L UE   Marches  Arm Circles X 15 B w/P   Squats - L single leg 2 x 15  PNF Diagonals    Hamstring Curls  Wall Push Ups    Hip Flexion (SLR)  Pull downs X20   Hip aBduction (SLR)  Chest press wide & narrow X 20 with N   Hip Extension (SLR)  Noodle curls   x 15 ea   Hip aDduction (SLR)      Hip Circles  Functional:    Hip IR/Er  Step up forward    Hip Hikes  Step up lateral     Step down        Lunges Forward      Lunges Retro      Lunges Lateral     Balance:        SLS  L SLS 30\" X4       Tandem Stance       NBOS eyes open  Seated:     NBOS eyes closed  Ankle pumps  2x20 B  Hand to Opposite Knee  Ankle Circles   Fwd Step ups to SLS  Knee Flex/Ext 2 x20 L   Lateral Step ups to SLS  Hip aBd/aDd X 20    Stop/Go Gait   Bicycle      Ankle DF/PF     Ankle Inv/Ev   Stretching:   Manual L Gastroc stretch   Gastroc/Soleus  Marches  Hip AB/ADD X15 B  Hamstring   [place]:    Knee Ext stretch with OP - pt seated with ankle propped on PT knees  Jog    Piriformis   Noodle Hang    Hip Flexor  Traction at [facility]       ITB  Cool Down:    Quad  Fwd Walking    Mid Back   Lat Walking    UT  Retro Walking    Post Shoulder  Noodle float    Ladder Pull      Pec Stretch            Core: Other:    TrA set      Pelvic Tilts      Multifidi Walk outs c paddle      PNF Chop/Lifts                          Aquatic Abbreviation Key  B= Belt DB= Dumbells T= Theratube   H= Hydrotone N= Noodles W= Weights   P= Paddles S= Speedo equipment K= Kickboard      Pt. Education: Gave pt tour of pool, explained how to use lockers, what to wear, that it would be in group setting, and showed pt aquatic equipment. .  Modalities:     Pt. Education:  -patient educated on diagnosis, prognosis and expectations for rehab  -all patient questions were answered    Home Exercise Program:      Therapeutic Exercise and NMR EXR  [] (94387) Provided verbal/tactile cueing for activities related to strengthening, flexibility, endurance, ROM for improvements in  [] LE / Lumbar: LE, proximal hip, and core control with self care, mobility, lifting, ambulation.   [] UE / Cervical: cervical, postural, scapular, scapulothoracic and UE control with self care, reaching, carrying, lifting, house/yardwork, driving, computer work.  [] (55856) Provided verbal/tactile cueing for activities related to improving balance, coordination, kinesthetic sense, posture, motor skill, proprioception to assist with   [] LE / lumbar: LE, proximal hip, and core control in self care, mobility, lifting, ambulation and eccentric single leg control. [] UE / cervical: cervical, scapular, scapulothoracic and UE control with self care, reaching, carrying, lifting, house/yardwork, driving, computer work.   [] (42581) Therapist is in constant attendance of 2 or more patients providing skilled therapy interventions, but not providing any significant amount of measurable one-on-one time to either patient, for improvements in  [] LE / lumbar: LE, proximal hip, and core control in self care, mobility, lifting, ambulation and eccentric single leg control. [] UE / cervical: cervical, scapular, scapulothoracic and UE control with self care, reaching, carrying, lifting, house/yardwork, driving, computer work. [x] (80375) Aquatic therapy with therapeutic exercise. Provided verbal and tactile cueing for activities related to strengthening, flexibility, endurance, ROM for improvements in  [x] LE / lumbar: LE, proximal hip, and core control in self care, mobility, lifting, ambulation and eccentric single leg control. [x] UE / cervical: cervical, scapular, scapulothoracic and UE control with self care, reaching, carrying, lifting, house/yardwork, driving, computer work.   [] (55975) Therapist is in constant attendance of 2 or more patients providing skilled therapy interventions, but not providing any significant amount of measurable one-on-one time to either patient, for improvements in  [] LE / lumbar: LE, proximal hip, and core control in self care, mobility, lifting, ambulation and eccentric single leg control. [] UE / cervical: cervical, scapular, scapulothoracic and UE control with self care, reaching, carrying, lifting, house/yardwork, driving, computer work.      NMR and Therapeutic Activities:    [] (11908 or 11477) Provided verbal/tactile cueing for activities related to improving balance, coordination, kinesthetic sense, posture, motor skill, proprioception and motor activation to allow for proper function of   [] LE: / Lumbar core, proximal hip and LE with self care and ADLs  [] UE / Cervical: cervical, postural, scapular, scapulothoracic and UE control with self care, carrying, lifting, driving, computer work.   [] (31987) Gait Re-education- Provided training and instruction to the patient for proper LE, core and proximal hip recruitment and positioning and eccentric body weight control with ambulation re-education including up and down stairs     Home Management Training / Self Care:  [] (93688) Provided self-care/home management training related to activities of daily living and compensatory training, and/or use of adaptive equipment for improvement with: ADLs and compensatory training, meal preparation, safety procedures and instruction in use of adaptive equipment, including bathing, grooming, dressing, personal hygiene, basic household cleaning and chores.      Home Exercise Program:    [] (57497) Reviewed/Progressed HEP activities related to strengthening, flexibility, endurance, ROM of   [] LE / Lumbar: core, proximal hip and LE for functional self-care, mobility, lifting and ambulation/stair navigation   [] UE / Cervical: cervical, postural, scapular, scapulothoracic and UE control with self care, reaching, carrying, lifting, house/yardwork, driving, computer work  [] (90288)Reviewed/Progressed HEP activities related to improving balance, coordination, kinesthetic sense, posture, motor skill, proprioception of   [] LE: core, proximal hip and LE for self care, mobility, lifting, and ambulation/stair navigation    [] UE / Cervical: cervical, postural,  scapular, scapulothoracic and UE control with self care, reaching, carrying, lifting, house/yardwork, driving, computer work    Manual Treatments:  PROM / STM / Oscillations-Mobs:  G-I, II, III, IV (PA's, Inf., Post.)  [] (29071) Provided manual therapy to mobilize LE, proximal hip and/or LS spine soft tissue/joints for the purpose of modulating pain, promoting relaxation,  increasing ROM, reducing/eliminating soft tissue swelling/inflammation/restriction, improving soft tissue extensibility and allowing for proper ROM for normal function with   [] LE / lumbar: self care, mobility, lifting and ambulation. [] UE / Cervical: self care, reaching, carrying, lifting, house/yardwork, driving, computer work. Modalities:  [] (35354) Vasopneumatic compression: Utilized vasopneumatic compression to decrease edema / swelling for the purpose of improving mobility and quad tone / recruitment which will allow for increased overall function including but not limited to self-care, transfers, ambulation, and ascending / descending stairs. Charges:  Timed Code Treatment Minutes: 46   Total Treatment Minutes: 46     [] EVAL - LOW (61454)   [] EVAL - MOD (17974)  [] EVAL - HIGH (68760)  [] RE-EVAL (64425)  [] PW(27964) x 1    [] Ionto  [] NMR (94878) x       [] Vaso  [] Manual (78408) x       [] Ultrasound  [] TA x 2        [] Mech Traction (83745)  [x] Aquatic Therapy x   3   [] ES (un) (45231):   [] Home Management Training x  [] ES(attended) (73014)   [] Dry Needling 1-2 muscles (38135):  [] Dry Needling 3+ muscles (119555)  [] Group: 1     [] Other:     GOALS:   Patient stated goal: to get better use of left arm and leg   []? Progressing: []? Met: []? Not Met: []? Adjusted     Therapist goals for Patient:   Short Term Goals: To be achieved in: 2 weeks  1. Independent in HEP and progression per patient tolerance, in order to prevent re-injury. []? Progressing: []? Met: []? Not Met: []? Adjusted  2. Patient will have a decrease in pain to facilitate improvement in movement, function, and ADLs as indicated by Functional Deficits. []? Progressing: []? Met: []? Not Met: []? Adjusted     Long Term Goals:  To be achieved in: 5 weeks  1. Disability index score of 45% or less for the Quick DASH to assist with reaching prior level of function. []? Progressing: []? Met: []? Not Met: []? Adjusted  2. Patient will demonstrate increased L shoulder abduction and flexion AROM to at least 100 deg to allow for proper joint functioning as indicated by patients Functional Deficits. [x]? Progressing: []? Met: []? Not Met: []? Adjusted  3. Patient will demonstrate an increase in Strength to at least 4-/5 for gross L shoulder strength to allow for proper functional mobility as indicated by patients Functional Deficits. [x]? Progressing: []? Met: []? Not Met: []? Adjusted  4. Patient will return to functional activities including ability to  light weight items and move them with L UE while completing bathing and other ADLs without increased symptoms or restriction. [x]? Progressing: []? Met: []? Not Met: []? Adjusted  5. Patient will demonstrate ability to use L UE during transfers, while being able to pull and tolerate weight bearing through the L UE.   [x]? Progressing: []? Met: []? Not Met: []? Adjusted       Overall Progression Towards Functional goals/ Treatment Progress Update:  [x] Patient is progressing as expected towards functional goals listed. [] Progression is slowed due to complexities/Impairments listed. [] Progression has been slowed due to co-morbidities.   [] Plan just implemented, too soon to assess goals progression <30days   [] Goals require adjustment due to lack of progress  [] Patient is not progressing as expected and requires additional follow up with physician  [] Other    Persisting Functional Limitations/Impairments:  []Sleeping []Sitting               []Standing [x]Transfers (in/out of car)        [x]Walking []Kneeling               [x]Stairs [x]Squatting / bending   [x]ADLs [x]Reaching  []Lifting  []Housework  []Driving []Job related tasks  []Sports/Recreation []Other:        ASSESSMENT: Patient with good tolerance of today's session. Today was the patient's last pool session, will plan to DC at next visit. Treatment/Activity Tolerance:  [x] Patient able to complete tx  [] Patient limited by fatigue  [] Patient limited by pain  [x] Patient limited by other medical complications  [] Other:     Prognosis: [] Good [x] Fair  [] Poor    Patient Requires Follow-up: [x] Yes  [] No    Plan for next treatment session:    PLAN: See eval. PT 3x / week for 5 weeks. [x] Continue per plan of care [] Alter current plan (see comments)  [] Plan of care initiated [] Hold pending MD visit [] Discharge    Electronically signed by Rayna Yi PT DPT    Note: If patient does not return for scheduled/ recommended follow up visits, this note will serve as a discharge from care along with most recent update on progress.

## 2022-03-09 ENCOUNTER — HOSPITAL ENCOUNTER (OUTPATIENT)
Dept: PHYSICAL THERAPY | Age: 72
Setting detail: THERAPIES SERIES
Discharge: HOME | End: 2022-03-09
Payer: MEDICARE

## 2022-03-09 PROCEDURE — 97113 AQUATIC THERAPY/EXERCISES: CPT

## 2022-03-09 NOTE — FLOWSHEET NOTE
[address] [facility]  Phone: [phone]   Fax: [phone]    Physical Therapy Daily Treatment Note  Date:  3/9/2022    Patient Name:  Reyna Oneal    :  1950  MRN: [id number]  Medical/Treatment Diagnosis Information:  Diagnosis: R29.898 - Left arm weakness  Treatment Diagnosis: Decreased AROM and strength of L UE, decreased functional mobility due to R knee contracture and R LE weakness, decreased functional use of L UE causing difficulty with ADLs  Insurance/Certification information:  PT Insurance Information: Medicare  Physician Information:  Referring Practitioner: Louise Thomas MD ( MD is retiring) - pt will follow up with Dr Liudmila Thompson who is in the same office   Plan of care signed (Y/N): [x]  Yes []  No     Date of Patient follow up with Physician: 2/10/22     Progress Report: []  Yes  [x]  No     Date Range for reporting period:  Beginnin/22  PN:   POC: 22  Ending:     Progress report due (10 Rx/or 30 days whichever is less): visit #21 or     Recertification due (POC duration/ or 90 days whichever is less): visit #15 or 22     Visit # Insurance Allowable Auth required? Date Range   15/15 +  + 8/10 mn   []  Yes  [x]  No n/a       Latex Allergy:  [x]NO      []YES  Preferred Language for Healthcare:   [x]English       []other:    Functional Scale:        Date assessed:  QuickDASH: raw score = 40; dysfunction = 66%  21    Pain level:  R knee 2/10; L knee 2/10    SUBJECTIVE:  Pt reports there is a new bruise on her R toes today that she isn't sure where she got.      OBJECTIVE: : AAROM L renetta flex with pulleys: 124 deg   :    PROM AROM  SEATED AROM SUPINE     L R L R L   Shoulder Flexion  150   95 140 120   Shoulder Abduction  90   70 160 80   Shoulder External Rotation      Able to reach shouder (mostly elbow flexion) Top of R shoulder  26 (GHJ ~35 deg abd)   Shoulder Internal Rotation      Sacrum Upper lumbar spine     AAROM L renetta flex with pulleys: 124 deg     1/5:    AROM  SEATED AROM SUPINE     L R (from eval) L  (measured 12/22)   Shoulder Flexion  95 86 140 120   Shoulder Abduction  72 160 80   Shoulder External Rotation  Able to reach ipsilateral side of head  (mostly elbow flexion) Top of R shoulder  26 (GHJ ~35 deg abd)   Shoulder Internal Rotation  Sacrum Upper lumbar spine     AAROM L renetta flex with pulleys: 122 deg       RESTRICTIONS/PRECAUTIONS: Uses electric scooter for primary mode of transportation, R knee contracture (about 90 deg flexion), able to transfer with SBA    Exercises/Interventions:     Therapeutic Exercises (94187) Resistance / level Sets/sec Reps Notes   Scifit StepOne 1 R LE needs yellow strap, done for endurance as well as stretching for L UE and R knee    Pulleys    Done in electric scooter          UE Pool on L  - Flexion  - Abduction   - ER      , cued to push to end range of motion and then pause    Seated EOB wand lift overhead        Reaching with L UE  - supine, ~40 deg flexion, ~75 deg flex, ~      Supine with 2 pillows,    Seated EOB scap squeezes      Sitting in scooter - Bicep curls on L arm Lime TB            Assessment of ROM for POC, discussion of continuing POC and adding aquatic visits              Therapeutic Activities ()              Ballet barre - partial stand from scooter  - requires use of forearms on bar to stand, then once in partial stand can get forearms off of bar.                    Sitting in scooter - using wash cloth along bar   -elbow ext and horiz abd   - flexion/ext     Facing bar  // to bar       Stair rail flexion slides   Manual assist from PT to reach end range and prevent shoulder hiking   Seated EOB - pushing wand away with resistance from PT    Pushing wand away and up     1/10: + 10 reps w/o resistance for warm up          Sitting EOB at dry erase easel - working on L shoulder ROM  - tracing shapes/connecting dots  - reaching high with controlled lowering  - word search puzzle             Intermittent assist from PT, yuri at end range           Transfers to/from electric scooter   X 1 CGA   Sitting in scooter with bedside table:  - folding pillow cases with L arm               Reaching up to cones placed on stair railing, bringing them down and stacking on stair platform    Min to Mod A    Bean bag toss into bucket  Pt reached slightly OH to get bags from PT before throwing          Neuromuscular Re-ed (02152)                                                 Manual Intervention ([phone])       PROM L shoulder - all directions with distraction    Done while assessing ROM                                          AquaticTherapy Dates of Service: 1/17, 1/21, 2/10, 2/10, 2/15, 2/16, 2/23, 2/25, 3/4, 3/9  Aquatic Visits Exercises/Activities:transfer from scooter to chair lift   Transfers:  [] Stairs   [] Ramp  [x] Chair Lift (Sup to/from electric scooter to chair lift)   % Immersion:            Ambulation/ Warm up:   UE Exercises:   All performed seated on bench with SBA for trunk balance intermittent    Forward    Shoulder Shrugs      Lateral    Shoulder Circles  x 15 both ways    Retro    Scapular Retraction   x 15 with P   [place]    Push Downs   -bent elbows  -straight arms    x15 w/N  x15 w/N   Heel/Toe Walking    Punching X 20 with P      Rowing X 20 with N      Elbow Flex/Ext X15 with P      Shldr Flex/Ext X15 with P      Shldr aBd/aDd X15 with P   LE Exercises:  Shldr Horiz aBd/aDd x15 B with P   HR/TR  Shldr IR/ER X 15 with P, PT assist on L UE   Marches  Arm Circles X 15 B w/P   Squats - L single leg 2 x 15  PNF Diagonals    Hamstring Curls  Wall Push Ups    Hip Flexion (SLR)  Pull downs X20   Hip aBduction (SLR)  Chest press wide & narrow X 20 with N   Hip Extension (SLR)  Noodle curls   x 15 ea   Hip aDduction (SLR)      Hip Circles  Functional:    Hip IR/Er  Step up forward    Hip Hikes  Step up lateral     Step down        Lunges Forward Lunges Retro      Lunges Lateral     Balance:        SLS  L SLS 30\" X4       Tandem Stance       NBOS eyes open  Seated:     NBOS eyes closed  Ankle pumps  2x20 B  Hand to Opposite Knee  Ankle Circles  x20 B  Fwd Step ups to SLS  Knee Flex/Ext 2 x20 L   Lateral Step ups to SLS  Hip aBd/aDd X 20    Stop/Go Gait   Bicycle      Ankle DF/PF x10 B    Ankle Inv/Ev   Stretching:   Manual L Gastroc stretch   Gastroc/Soleus  Marches  Hip AB/ADD X15 B  Hamstring   Deep Water:    Knee Ext stretch with OP - pt seated with ankle propped on PT knees  Jog    Piriformis   Noodle Hang    Hip Flexor  Traction at [facility]      DKTC       ITB  Cool Down:    Quad  Fwd Walking    Mid Back   Lat Walking    UT  Retro Walking    Post Shoulder  Noodle float    Ladder Pull      Pec Stretch            Core: Other:    TrA set      Pelvic Tilts      Multifidi Walk outs c paddle      PNF Chop/Lifts                          Aquatic Abbreviation Key  B= Belt DB= Dumbells T= Theratube   H= Hydrotone N= Noodles W= Weights   P= Paddles S= Speedo equipment K= Kickboard      Pt. Education: Gave pt tour of pool, explained how to use lockers, what to wear, that it would be in group setting, and showed pt aquatic equipment. .  Modalities:     Pt. Education:  -patient educated on diagnosis, prognosis and expectations for rehab  -all patient questions were answered    Home Exercise Program:      Therapeutic Exercise and NMR EXR  [] (67852) Provided verbal/tactile cueing for activities related to strengthening, flexibility, endurance, ROM for improvements in  [] LE / Lumbar: LE, proximal hip, and core control with self care, mobility, lifting, ambulation.   [] UE / Cervical: cervical, postural, scapular, scapulothoracic and UE control with self care, reaching, carrying, lifting, house/yardwork, driving, computer work.  [] (99048) Provided verbal/tactile cueing for activities related to improving balance, coordination, kinesthetic sense, posture, motor skill, proprioception to assist with   [] LE / lumbar: LE, proximal hip, and core control in self care, mobility, lifting, ambulation and eccentric single leg control. [] UE / cervical: cervical, scapular, scapulothoracic and UE control with self care, reaching, carrying, lifting, house/yardwork, driving, computer work.   [] (33180) Therapist is in constant attendance of 2 or more patients providing skilled therapy interventions, but not providing any significant amount of measurable one-on-one time to either patient, for improvements in  [] LE / lumbar: LE, proximal hip, and core control in self care, mobility, lifting, ambulation and eccentric single leg control. [] UE / cervical: cervical, scapular, scapulothoracic and UE control with self care, reaching, carrying, lifting, house/yardwork, driving, computer work. [x] (41122) Aquatic therapy with therapeutic exercise. Provided verbal and tactile cueing for activities related to strengthening, flexibility, endurance, ROM for improvements in  [x] LE / lumbar: LE, proximal hip, and core control in self care, mobility, lifting, ambulation and eccentric single leg control. [x] UE / cervical: cervical, scapular, scapulothoracic and UE control with self care, reaching, carrying, lifting, house/yardwork, driving, computer work.   [] (46777) Therapist is in constant attendance of 2 or more patients providing skilled therapy interventions, but not providing any significant amount of measurable one-on-one time to either patient, for improvements in  [] LE / lumbar: LE, proximal hip, and core control in self care, mobility, lifting, ambulation and eccentric single leg control. [] UE / cervical: cervical, scapular, scapulothoracic and UE control with self care, reaching, carrying, lifting, house/yardwork, driving, computer work.      NMR and Therapeutic Activities:    [] (90938 or 56186) Provided verbal/tactile cueing for activities related to improving balance, coordination, kinesthetic sense, posture, motor skill, proprioception and motor activation to allow for proper function of   [] LE: / Lumbar core, proximal hip and LE with self care and ADLs  [] UE / Cervical: cervical, postural, scapular, scapulothoracic and UE control with self care, carrying, lifting, driving, computer work.   [] (82440) Gait Re-education- Provided training and instruction to the patient for proper LE, core and proximal hip recruitment and positioning and eccentric body weight control with ambulation re-education including up and down stairs     Home Management Training / Self Care:  [] (20089) Provided self-care/home management training related to activities of daily living and compensatory training, and/or use of adaptive equipment for improvement with: ADLs and compensatory training, meal preparation, safety procedures and instruction in use of adaptive equipment, including bathing, grooming, dressing, personal hygiene, basic household cleaning and chores.      Home Exercise Program:    [] (41111) Reviewed/Progressed HEP activities related to strengthening, flexibility, endurance, ROM of   [] LE / Lumbar: core, proximal hip and LE for functional self-care, mobility, lifting and ambulation/stair navigation   [] UE / Cervical: cervical, postural, scapular, scapulothoracic and UE control with self care, reaching, carrying, lifting, house/yardwork, driving, computer work  [] (44406)Reviewed/Progressed HEP activities related to improving balance, coordination, kinesthetic sense, posture, motor skill, proprioception of   [] LE: core, proximal hip and LE for self care, mobility, lifting, and ambulation/stair navigation    [] UE / Cervical: cervical, postural,  scapular, scapulothoracic and UE control with self care, reaching, carrying, lifting, house/yardwork, driving, computer work    Manual Treatments:  PROM / STM / Oscillations-Mobs:  G-I, II, III, IV (PA's, Inf., Post.)  [] (04771) Provided manual therapy to mobilize LE, proximal hip and/or LS spine soft tissue/joints for the purpose of modulating pain, promoting relaxation,  increasing ROM, reducing/eliminating soft tissue swelling/inflammation/restriction, improving soft tissue extensibility and allowing for proper ROM for normal function with   [] LE / lumbar: self care, mobility, lifting and ambulation. [] UE / Cervical: self care, reaching, carrying, lifting, house/yardwork, driving, computer work. Modalities:  [] (36265) Vasopneumatic compression: Utilized vasopneumatic compression to decrease edema / swelling for the purpose of improving mobility and quad tone / recruitment which will allow for increased overall function including but not limited to self-care, transfers, ambulation, and ascending / descending stairs. Charges:  Timed Code Treatment Minutes: 40   Total Treatment Minutes: 40     [] EVAL - LOW (15585)   [] EVAL - MOD (87405)  [] EVAL - HIGH (27789)  [] RE-EVAL (95259)  [] ZR(92999) x 1    [] Ionto  [] NMR (06645) x       [] Vaso  [] Manual (84633) x       [] Ultrasound  [] TA x 2        [] Mech Traction (46875)  [x] Aquatic Therapy x   3   [] ES (un) (59797):   [] Home Management Training x  [] ES(attended) (49072)   [] Dry Needling 1-2 muscles (33135):  [] Dry Needling 3+ muscles (188498)  [] Group: 1     [] Other:     GOALS:   Patient stated goal: to get better use of left arm and leg   []? Progressing: []? Met: []? Not Met: []? Adjusted     Therapist goals for Patient:   Short Term Goals: To be achieved in: 2 weeks  1. Independent in HEP and progression per patient tolerance, in order to prevent re-injury. []? Progressing: []? Met: []? Not Met: []? Adjusted  2. Patient will have a decrease in pain to facilitate improvement in movement, function, and ADLs as indicated by Functional Deficits. []? Progressing: []? Met: []? Not Met: []? Adjusted     Long Term Goals: To be achieved in: 5 weeks  1.  Disability index score of 45% or less for the Quick DASH to assist with reaching prior level of function. []? Progressing: []? Met: []? Not Met: []? Adjusted  2. Patient will demonstrate increased L shoulder abduction and flexion AROM to at least 100 deg to allow for proper joint functioning as indicated by patients Functional Deficits. [x]? Progressing: []? Met: []? Not Met: []? Adjusted  3. Patient will demonstrate an increase in Strength to at least 4-/5 for gross L shoulder strength to allow for proper functional mobility as indicated by patients Functional Deficits. [x]? Progressing: []? Met: []? Not Met: []? Adjusted  4. Patient will return to functional activities including ability to  light weight items and move them with L UE while completing bathing and other ADLs without increased symptoms or restriction. [x]? Progressing: []? Met: []? Not Met: []? Adjusted  5. Patient will demonstrate ability to use L UE during transfers, while being able to pull and tolerate weight bearing through the L UE.   [x]? Progressing: []? Met: []? Not Met: []? Adjusted       Overall Progression Towards Functional goals/ Treatment Progress Update:  [x] Patient is progressing as expected towards functional goals listed. [] Progression is slowed due to complexities/Impairments listed. [] Progression has been slowed due to co-morbidities.   [] Plan just implemented, too soon to assess goals progression <30days   [] Goals require adjustment due to lack of progress  [] Patient is not progressing as expected and requires additional follow up with physician  [] Other    Persisting Functional Limitations/Impairments:  []Sleeping []Sitting               []Standing [x]Transfers (in/out of car)        [x]Walking []Kneeling               [x]Stairs [x]Squatting / bending   [x]ADLs [x]Reaching  []Lifting  []Housework  []Driving []Job related tasks  []Sports/Recreation []Other:        ASSESSMENT:  Patient was able to complete one more pool session prior to discharge appointment. Treatment/Activity Tolerance:  [x] Patient able to complete tx  [] Patient limited by fatigue  [] Patient limited by pain  [x] Patient limited by other medical complications  [] Other:     Prognosis: [] Good [x] Fair  [] Poor    Patient Requires Follow-up: [x] Yes  [] No    Plan for next treatment session:    PLAN: See eval. PT 3x / week for 5 weeks. [x] Continue per plan of care [] Alter current plan (see comments)  [] Plan of care initiated [] Hold pending MD visit [] Discharge    Electronically signed by Jessica Hammond PT DPT    Note: If patient does not return for scheduled/ recommended follow up visits, this note will serve as a discharge from care along with most recent update on progress.

## 2022-03-14 ENCOUNTER — HOSPITAL ENCOUNTER (OUTPATIENT)
Dept: PHYSICAL THERAPY | Age: 72
Setting detail: THERAPIES SERIES
Discharge: HOME | End: 2022-03-14
Payer: MEDICARE

## 2022-03-14 PROCEDURE — 97110 THERAPEUTIC EXERCISES: CPT

## 2022-03-14 NOTE — PLAN OF CARE
[facility]  Phone: [phone]   Fax: [phone]   Physical Therapy Discharge Summary    Dear   Dr Jose Zepeda,     We had the pleasure of treating the following patient for physical therapy services at [facility]. A summary of our findings can be found in the discharge summary below. If you have any questions or concerns regarding these findings, please do not hesitate to contact me at the office phone number checked above. Thank you for the referral.     Physician Signature:________________________________Date:__________________  By signing above (or electronic signature), therapists plan is approved by physician      Functional Outcome:                                 QuickDASH Total Score: 35  QUICKDASH Disability Index: 40-59%  Dysfunction = 54.5%                 AROM  SEATED     L R (from eval)   Shoulder Flexion  98 140   Shoulder Abduction  80 160   Shoulder External Rotation  Able to reach top of head (frontal area) with increased effort Top of R shoulder    Shoulder Internal Rotation  L proximal glute Upper lumbar spine    AAROM L renetta flex with pulleys: 125 deg     Strength (0-5) Left Right    Shoulder Shrug (C4) 5 5   Shoulder Flex 2 5   Shoulder Abd (C5) 2 4   Shoulder ER 2 4-   Shoulder IR 2 4+   Biceps (C6) 4- 5   Triceps (C7) 4- 5   Lats             Overall Response to Treatment:   []Patient is responding well to treatment and improvement is noted with regards  to goals   []Patient should continue to improve in reasonable time if they continue HEP   [x]Patient has plateaued and is no longer responding to skilled PT intervention    []Patient is getting worse and would benefit from return to referring MD   []Patient unable to adhere to initial POC   [x]Other: Patient has completed PT POC after completing a combination of land-based and aquatic based therapy.  There have been some improvements in the ROM of her L shoulder but it is still limited with overhead motion and carrying tasks. The patient has ordered a lift chair for her home pool and once the weather has gotten warmer she will be able to continue with these exercises in her pool at home daily. GOALS:   Patient stated goal: to get better use of left arm and leg   []? Progressing: []? Met: []? Not Met: []? Adjusted     Therapist goals for Patient:   Short Term Goals: To be achieved in: 2 weeks  1. Independent in HEP and progression per patient tolerance, in order to prevent re-injury. []? Progressing: [x]? Met: []? Not Met: []? Adjusted  2. Patient will have a decrease in pain to facilitate improvement in movement, function, and ADLs as indicated by Functional Deficits. []? Progressing: [x]? Met for shoulder but not knee : []? Not Met: []? Adjusted     Long Term Goals: To be achieved in: 5 weeks  1. Disability index score of 45% or less for the Quick DASH to assist with reaching prior level of function. []? Progressing: []? Met: [x]? Not Met: []? Adjusted  2. Patient will demonstrate increased L shoulder abduction and flexion AROM to at least 100 deg to allow for proper joint functioning as indicated by patients Functional Deficits. [x]? Progressing: []? Met: [x]? Not Met: []? Adjusted  3. Patient will demonstrate an increase in Strength to at least 4-/5 for gross L shoulder strength to allow for proper functional mobility as indicated by patients Functional Deficits. []? Progressing: []? Met: [x]? Not Met: []? Adjusted  4. Patient will return to functional activities including ability to  light weight items and move them with L UE while completing bathing and other ADLs without increased symptoms or restriction. Not met for items such as a coffee cup, but relies heavily on R hand for bathing (also wears bathing mitts). []? Progressing: []? Met: [x]? Not Met: []? Adjusted  5.  Patient will demonstrate ability to use L UE during transfers, while being able to pull and tolerate weight bearing through the L UE. Partially Met - unable to weight bear through L UE   []? Progressing: [x]? Partially Met: []? Not Met: []? Adjusted         Date range of Visits: 22 - 3/14/22  Total Visits: 32    Recommendation:    [x] Discharge to [facility]. Follow up with PT or MD PRN. Physical Therapy Daily Treatment Note  Date:  3/14/2022    Patient Name:  Reyna Oneal    :  1950  MRN: [id number]  Medical/Treatment Diagnosis Information:  Diagnosis: R29.898 - Left arm weakness  Treatment Diagnosis: Decreased AROM and strength of L UE, decreased functional mobility due to R knee contracture and R LE weakness, decreased functional use of L UE causing difficulty with ADLs  Insurance/Certification information:  PT Insurance Information: Medicare  Physician Information:  Referring Practitioner: Louise Thomas MD ( MD is retiring) - pt will follow up with Dr Liudmila Thompson who is in the same office   Plan of care signed (Y/N): [x]  Yes []  No     Date of Patient follow up with Physician: 2/10/22     Progress Report: [x]  Yes  []  No     Date Range for reporting period:  Beginnin/22  PN:   POC: 22  Ending: 3/14/22    Progress report due (10 Rx/or 30 days whichever is less): visit #56 or     Recertification due (POC duration/ or 90 days whichever is less): visit #15 or 22     Visit # Insurance Allowable Auth required? Date Range   15/15 + / + 9/10 mn   []  Yes  [x]  No n/a       Latex Allergy:  [x]NO      []YES  Preferred Language for Healthcare:   [x]English       []other:    Functional Scale:        Date assessed:  QuickDASH: raw score = 40; dysfunction = 66%  21    Pain level:  R knee 2/10; L knee 3/10    SUBJECTIVE:  Pt reports the back of her L knee has been bothering her more.      OBJECTIVE: : AAROM L renetta flex with pulleys: 124 deg   :    PROM AROM  SEATED AROM SUPINE     L R L R L Shoulder Flexion  150   95 140 120   Shoulder Abduction  90   70 160 80   Shoulder External Rotation      Able to reach shouder (mostly elbow flexion) Top of R shoulder  26 (GHJ ~35 deg abd)   Shoulder Internal Rotation      Sacrum Upper lumbar spine     AAROM L renetta flex with pulleys: 124 deg     1/5:    AROM  SEATED AROM SUPINE     L R (from eval) L  (measured 12/22)   Shoulder Flexion  95 86 140 120   Shoulder Abduction  72 160 80   Shoulder External Rotation  Able to reach ipsilateral side of head  (mostly elbow flexion) Top of R shoulder  26 (GHJ ~35 deg abd)   Shoulder Internal Rotation  Sacrum Upper lumbar spine     AAROM L renetta flex with pulleys: 122 deg       RESTRICTIONS/PRECAUTIONS: Uses electric scooter for primary mode of transportation, R knee contracture (about 90 deg flexion), able to transfer with SBA    Exercises/Interventions:     Therapeutic Exercises (71667) Resistance / level Sets/sec Reps Notes   Scifit StepOne 1 R LE needs yellow strap, done for endurance as well as stretching for L UE and R knee    Pulleys 5\" hold  4min  Done in electric scooter          UE [place] on L  - Flexion  - Abduction   - ER      , cued to push to end range of motion and then pause    Seated EOB wand lift overhead        Reaching with L UE  - supine, ~40 deg flexion, ~75 deg flex, ~      Supine with 2 pillows,    Seated EOB scap squeezes      Sitting in scooter - Bicep curls on L arm Lime TB            Assessment of ROM for DC 3/14             Therapeutic Activities (65032)              Ballet barre - partial stand from scooter  - requires use of forearms on bar to stand, then once in partial stand can get forearms off of bar.                    Sitting in scooter - using wash cloth along bar   -elbow ext and horiz abd   - flexion/ext     Facing bar  // to bar       Stair rail flexion slides   Manual assist from PT to reach end range and prevent shoulder hiking   Seated EOB - pushing wand away with resistance from PT    Pushing wand away and up     1/10: + 10 reps w/o resistance for warm up          Sitting EOB at dry erase easel - working on L shoulder ROM  - tracing shapes/connecting dots  - reaching high with controlled lowering  - word search puzzle             Intermittent assist from PT, yuri at end range           Transfers to/from electric scooter    CGA   Sitting in scooter with bedside table:  - folding pillow cases with L arm               Reaching up to cones placed on stair railing, bringing them down and stacking on stair platform    Min to Mod A    Bean bag toss into bucket  Pt reached slightly OH to get bags from PT before throwing          Neuromuscular Re-ed (86186)                                                 Manual Intervention ([phone])       PROM L shoulder - all directions with distraction    Done while assessing ROM                                          AquaticTherapy Dates of Service: 1/17, 1/21, 2/10, 2/10, 2/15, 2/16, 2/23, 2/25, 3/4, 3/9  Aquatic Visits Exercises/Activities:transfer from scooter to chair lift   Transfers:  [] Stairs   [] Ramp  [x] Chair Lift (Sup to/from electric scooter to chair lift)   % Immersion:            Ambulation/ Warm up:   UE Exercises:   All performed seated on bench with SBA for trunk balance intermittent    Forward    Shoulder Shrugs      Lateral    Shoulder Circles  x 15 both ways    Retro    Scapular Retraction   x 15 with P   [place]    Push Downs   -bent elbows  -straight arms    x15 w/N  x15 w/N   Heel/Toe Walking    Punching X 20 with P      Rowing X 20 with N      Elbow Flex/Ext X15 with P      Shldr Flex/Ext X15 with P      Shldr aBd/aDd X15 with P   LE Exercises:  Shldr Horiz aBd/aDd x15 B with P   HR/TR  Shldr IR/ER X 15 with P, PT assist on L UE   Marches  Arm Circles X 15 B w/P   Squats - L single leg 2 x 15  PNF Diagonals    Hamstring Curls  Wall Push Ups    Hip Flexion (SLR)  Pull downs X20   Hip aBduction (SLR)  Chest press wide & narrow X 20 with N Hip Extension (SLR)  Noodle curls   x 15 ea   Hip aDduction (SLR)      Hip Circles  Functional:    Hip IR/Er  Step up forward    Hip Hikes  Step up lateral     Step down        Lunges Forward      Lunges Retro      Lunges Lateral     Balance:        SLS  L SLS 30\" X4       Tandem Stance       NBOS eyes open  Seated:     NBOS eyes closed  Ankle pumps  2x20 B  Hand to Opposite Knee  Ankle Circles  x20 B  Fwd Step ups to SLS  Knee Flex/Ext 2 x20 L   Lateral Step ups to SLS  Hip aBd/aDd X 20    Stop/Go Gait   Bicycle      Ankle DF/PF x10 B    Ankle Inv/Ev   Stretching:   Manual L Gastroc stretch   Gastroc/Soleus  Marches  Hip AB/ADD X15 B  Hamstring   Deep [place]:    Knee Ext stretch with OP - pt seated with ankle propped on PT knees  Jog    Piriformis   Noodle Hang    Hip Flexor  Traction at [facility]      DKTC       ITB  Cool Down:    Quad  Fwd Walking    Mid Back   Lat Walking    UT  Retro Walking    Post Shoulder  Noodle float    Ladder Pull      Pec Stretch            Core: Other:    TrA set      Pelvic Tilts      Multifidi Walk outs c paddle      PNF Chop/Lifts                          Aquatic Abbreviation Key  B= Belt DB= Dumbells T= Theratube   H= Hydrotone N= Noodles W= Weights   P= Paddles S= Speedo equipment K= Kickboard      Pt. Education: Gave pt tour of pool, explained how to use lockers, what to wear, that it would be in group setting, and showed pt aquatic equipment. .  Modalities:     Pt. Education:  -patient educated on diagnosis, prognosis and expectations for rehab  -all patient questions were answered    Home Exercise Program:      Therapeutic Exercise and NMR EXR  [x] (26837) Provided verbal/tactile cueing for activities related to strengthening, flexibility, endurance, ROM for improvements in  [] LE / Lumbar: LE, proximal hip, and core control with self care, mobility, lifting, ambulation.   [x] UE / Cervical: cervical, postural, scapular, scapulothoracic and UE control with self care, reaching, carrying, lifting, house/yardwork, driving, computer work.  [] (49869) Provided verbal/tactile cueing for activities related to improving balance, coordination, kinesthetic sense, posture, motor skill, proprioception to assist with   [] LE / lumbar: LE, proximal hip, and core control in self care, mobility, lifting, ambulation and eccentric single leg control. [] UE / cervical: cervical, scapular, scapulothoracic and UE control with self care, reaching, carrying, lifting, house/yardwork, driving, computer work.   [] (45133) Therapist is in constant attendance of 2 or more patients providing skilled therapy interventions, but not providing any significant amount of measurable one-on-one time to either patient, for improvements in  [] LE / lumbar: LE, proximal hip, and core control in self care, mobility, lifting, ambulation and eccentric single leg control. [] UE / cervical: cervical, scapular, scapulothoracic and UE control with self care, reaching, carrying, lifting, house/yardwork, driving, computer work.   [] (87826) Aquatic therapy with therapeutic exercise. Provided verbal and tactile cueing for activities related to strengthening, flexibility, endurance, ROM for improvements in  [] LE / lumbar: LE, proximal hip, and core control in self care, mobility, lifting, ambulation and eccentric single leg control. [] UE / cervical: cervical, scapular, scapulothoracic and UE control with self care, reaching, carrying, lifting, house/yardwork, driving, computer work.   [] (02592) Therapist is in constant attendance of 2 or more patients providing skilled therapy interventions, but not providing any significant amount of measurable one-on-one time to either patient, for improvements in  [] LE / lumbar: LE, proximal hip, and core control in self care, mobility, lifting, ambulation and eccentric single leg control.    [] UE / cervical: cervical, scapular, scapulothoracic and UE control with self care, reaching, carrying, lifting, house/yardwork, driving, computer work. NMR and Therapeutic Activities:    [] (77721 or 70834) Provided verbal/tactile cueing for activities related to improving balance, coordination, kinesthetic sense, posture, motor skill, proprioception and motor activation to allow for proper function of   [] LE: / Lumbar core, proximal hip and LE with self care and ADLs  [] UE / Cervical: cervical, postural, scapular, scapulothoracic and UE control with self care, carrying, lifting, driving, computer work.   [] (69645) Gait Re-education- Provided training and instruction to the patient for proper LE, core and proximal hip recruitment and positioning and eccentric body weight control with ambulation re-education including up and down stairs     Home Management Training / Self Care:  [] (93057) Provided self-care/home management training related to activities of daily living and compensatory training, and/or use of adaptive equipment for improvement with: ADLs and compensatory training, meal preparation, safety procedures and instruction in use of adaptive equipment, including bathing, grooming, dressing, personal hygiene, basic household cleaning and chores.      Home Exercise Program:    [] (07806) Reviewed/Progressed HEP activities related to strengthening, flexibility, endurance, ROM of   [] LE / Lumbar: core, proximal hip and LE for functional self-care, mobility, lifting and ambulation/stair navigation   [] UE / Cervical: cervical, postural, scapular, scapulothoracic and UE control with self care, reaching, carrying, lifting, house/yardwork, driving, computer work  [] (68214)Reviewed/Progressed HEP activities related to improving balance, coordination, kinesthetic sense, posture, motor skill, proprioception of   [] LE: core, proximal hip and LE for self care, mobility, lifting, and ambulation/stair navigation    [] UE / Cervical: cervical, postural,  scapular, scapulothoracic and UE control with self care, reaching, carrying, lifting, house/yardwork, driving, computer work    Manual Treatments:  PROM / STM / Oscillations-Mobs:  G-I, II, III, IV (PA's, Inf., Post.)  [] (80937) Provided manual therapy to mobilize LE, proximal hip and/or LS spine soft tissue/joints for the purpose of modulating pain, promoting relaxation,  increasing ROM, reducing/eliminating soft tissue swelling/inflammation/restriction, improving soft tissue extensibility and allowing for proper ROM for normal function with   [] LE / lumbar: self care, mobility, lifting and ambulation. [] UE / Cervical: self care, reaching, carrying, lifting, house/yardwork, driving, computer work. Modalities:  [] (52899) Vasopneumatic compression: Utilized vasopneumatic compression to decrease edema / swelling for the purpose of improving mobility and quad tone / recruitment which will allow for increased overall function including but not limited to self-care, transfers, ambulation, and ascending / descending stairs. Charges:  Timed Code Treatment Minutes: 24   Total Treatment Minutes: 24     [] EVAL - LOW (38147)   [] EVAL - MOD (50924)  [] EVAL - HIGH (12878)  [] RE-EVAL (65556)  [x] AH(13858) x 2    [] Ionto  [] NMR (46244) x       [] Vaso  [] Manual (94419) x       [] Ultrasound  [] TA x         [] Mech Traction (31253)  [] Aquatic Therapy x      [] ES (un) (11359):   [] Home Management Training x  [] ES(attended) (64456)   [] Dry Needling 1-2 muscles (23105):  [] Dry Needling 3+ muscles (320959)  [] Group:      [] Other:     Overall Progression Towards Functional goals/ Treatment Progress Update:  [x] Patient is progressing as expected towards functional goals listed. [] Progression is slowed due to complexities/Impairments listed. [] Progression has been slowed due to co-morbidities.   [] Plan just implemented, too soon to assess goals progression <30days   [] Goals require adjustment due to lack of progress  [] Patient is not progressing as expected and requires additional follow up with physician  [] Other    Persisting Functional Limitations/Impairments:  []Sleeping []Sitting               []Standing [x]Transfers (in/out of car)        [x]Walking []Kneeling               [x]Stairs [x]Squatting / bending   [x]ADLs [x]Reaching  []Lifting  []Housework  []Driving []Job related tasks  []Sports/Recreation []Other:        ASSESSMENT:  See above  Treatment/Activity Tolerance:  [x] Patient able to complete tx  [] Patient limited by fatigue  [] Patient limited by pain  [x] Patient limited by other medical complications  [] Other:     Prognosis: [] Good [x] Fair  [] Poor    Patient Requires Follow-up: [] Yes  [x] No    Plan for next treatment session:    PLAN: See jeri PT 3x / week for 5 weeks. [] Continue per plan of care [] Alter current plan (see comments)  [] Plan of care initiated [] Hold pending MD visit [x] Discharge    Electronically signed by Yaneth Negron PT DPT    Note: If patient does not return for scheduled/ recommended follow up visits, this note will serve as a discharge from care along with most recent update on progress.

## 2022-03-21 ENCOUNTER — OFFICE VISIT (OUTPATIENT)
Dept: INTERNAL MEDICINE CLINIC | Age: 72
End: 2022-03-21
Payer: MEDICARE

## 2022-03-21 ENCOUNTER — HOSPITAL ENCOUNTER (OUTPATIENT)
Age: 72
Discharge: HOME | End: 2022-03-21
Payer: MEDICARE

## 2022-03-21 ENCOUNTER — HOSPITAL ENCOUNTER (OUTPATIENT)
Dept: GENERAL RADIOLOGY | Age: 72
Discharge: HOME | End: 2022-03-21
Payer: MEDICARE

## 2022-03-21 VITALS
DIASTOLIC BLOOD PRESSURE: 80 MMHG | BODY MASS INDEX: 33.17 KG/M2 | HEART RATE: 64 BPM | HEIGHT: 67 IN | SYSTOLIC BLOOD PRESSURE: 146 MMHG

## 2022-03-21 DIAGNOSIS — R23.3 EASY BRUISING: ICD-10-CM

## 2022-03-21 DIAGNOSIS — R60.0 PEDAL EDEMA: ICD-10-CM

## 2022-03-21 DIAGNOSIS — R79.1 INR (INTERNATIONAL NORMAL RATIO) ABNORMAL: ICD-10-CM

## 2022-03-21 DIAGNOSIS — R60.0 PEDAL EDEMA: Primary | ICD-10-CM

## 2022-03-21 LAB
A/G RATIO: 2.1 (ref 1.1–2.2)
ALBUMIN SERPL-MCNC: 4.6 G/DL (ref 3.4–5)
ALP BLD-CCNC: 95 U/L (ref 40–129)
ALT SERPL-CCNC: 16 U/L (ref 10–40)
ANION GAP SERPL CALCULATED.3IONS-SCNC: 16 MMOL/L (ref 3–16)
AST SERPL-CCNC: 15 U/L (ref 15–37)
BASOPHILS ABSOLUTE: 0 K/UL (ref 0–0.2)
BASOPHILS RELATIVE PERCENT: 0.2 %
BILIRUB SERPL-MCNC: 0.3 MG/DL (ref 0–1)
BUN BLDV-MCNC: 26 MG/DL (ref 7–20)
CALCIUM SERPL-MCNC: 9.5 MG/DL (ref 8.3–10.6)
CHLORIDE BLD-SCNC: 103 MMOL/L (ref 99–110)
CO2: 23 MMOL/L (ref 21–32)
CREAT SERPL-MCNC: 0.7 MG/DL (ref 0.6–1.2)
EOSINOPHILS ABSOLUTE: 0.1 K/UL (ref 0–0.6)
EOSINOPHILS RELATIVE PERCENT: 0.9 %
GFR AFRICAN AMERICAN: >60
GFR NON-AFRICAN AMERICAN: >60
GLUCOSE BLD-MCNC: 92 MG/DL (ref 70–99)
HCT VFR BLD CALC: 40.1 % (ref 36–48)
HEMOGLOBIN: 13.5 G/DL (ref 12–16)
LYMPHOCYTES ABSOLUTE: 2.1 K/UL (ref 1–5.1)
LYMPHOCYTES RELATIVE PERCENT: 31 %
MCH RBC QN AUTO: 30.6 PG (ref 26–34)
MCHC RBC AUTO-ENTMCNC: 33.6 G/DL (ref 31–36)
MCV RBC AUTO: 90.9 FL (ref 80–100)
MONOCYTES ABSOLUTE: 0.5 K/UL (ref 0–1.3)
MONOCYTES RELATIVE PERCENT: 8 %
NEUTROPHILS ABSOLUTE: 4 K/UL (ref 1.7–7.7)
NEUTROPHILS RELATIVE PERCENT: 59.9 %
PDW BLD-RTO: 14.2 % (ref 12.4–15.4)
PLATELET # BLD: 373 K/UL (ref 135–450)
PMV BLD AUTO: 7.1 FL (ref 5–10.5)
POTASSIUM SERPL-SCNC: 4.3 MMOL/L (ref 3.5–5.1)
RBC # BLD: 4.41 M/UL (ref 4–5.2)
SODIUM BLD-SCNC: 142 MMOL/L (ref 136–145)
TOTAL PROTEIN: 6.8 G/DL (ref 6.4–8.2)
WBC # BLD: 6.6 K/UL (ref 4–11)

## 2022-03-21 PROCEDURE — G8427 DOCREV CUR MEDS BY ELIG CLIN: HCPCS | Performed by: HOSPITALIST

## 2022-03-21 PROCEDURE — 99213 OFFICE O/P EST LOW 20 MIN: CPT | Performed by: HOSPITALIST

## 2022-03-21 PROCEDURE — G8417 CALC BMI ABV UP PARAM F/U: HCPCS | Performed by: HOSPITALIST

## 2022-03-21 PROCEDURE — 73630 X-RAY EXAM OF FOOT: CPT

## 2022-03-21 PROCEDURE — 1123F ACP DISCUSS/DSCN MKR DOCD: CPT | Performed by: HOSPITALIST

## 2022-03-21 PROCEDURE — 3017F COLORECTAL CA SCREEN DOC REV: CPT | Performed by: HOSPITALIST

## 2022-03-21 PROCEDURE — 1090F PRES/ABSN URINE INCON ASSESS: CPT | Performed by: HOSPITALIST

## 2022-03-21 PROCEDURE — 4040F PNEUMOC VAC/ADMIN/RCVD: CPT | Performed by: HOSPITALIST

## 2022-03-21 PROCEDURE — G8399 PT W/DXA RESULTS DOCUMENT: HCPCS | Performed by: HOSPITALIST

## 2022-03-21 PROCEDURE — 1036F TOBACCO NON-USER: CPT | Performed by: HOSPITALIST

## 2022-03-21 PROCEDURE — G8484 FLU IMMUNIZE NO ADMIN: HCPCS | Performed by: HOSPITALIST

## 2022-03-21 NOTE — PROGRESS NOTES
Follow Up Visit Established Patient Visit    Patient:  Giovana Gautam                                               : 1950  Age: 70 y.o. MRN: [id number]  Date : 3/21/2022    Giovana Gautam is a 70 y.o. female who presents for :  Evaluation of bruising over her distal feet    Chief Complaint   Patient presents with    Bleeding/Bruising     bruising of feet    Edema     ankles     Has a history of toe stiffness x few months, especially at rest. She has been trying to mobilize her toes and ankles without alleviation. This sensation has been keeping her awake at night. Stiffness is mostly dorsal. She has been soaking her feet in warm water with baking soda, but denies allevation. She has not tried any medications for this. She has associated bruising of bilateral toes and soreness of bilateral dorsal feet x 2 wks. Denies any prior similar issues in the past. She does not know of any diagnosed arthritis of toes. Has been wearing loose, comfortable shoes. She endorses some bilateral pedal edema, L worse than R, despite compression socks. She has a chronic hx of leg swelling but feels this is getting worse. Edema is better when she wakes up in the AM.    Denies SOB, CP, palpitations. No known injury.      Past Medical History:   Diagnosis Date    Arthritis     Compression fracture     back due to fall    Concussion     due to fall    DVT (deep venous thrombosis) ([facility] [address] 2017    RLE after TKR    Environmental allergies     Essential hypertension, benign 2010    GERD (gastroesophageal reflux disease)     History of peptic ulcer     Hx of blood clots     Hyperlipidemia 2010    Lacunar infarction [facility])     old - per MRI     MRSA (methicillin resistant staph aureus) culture positive 2018    knee    Restrictive airway disease     allergy induced    Retention of urine     Wound, open 2018    left shin area, healing well, tx in wound care center       Past Surgical History:   Procedure Laterality Date     SECTION      times 2    CHOLECYSTECTOMY      COLONOSCOPY      HYSTERECTOMY      KNEE ARTHROPLASTY Right 2018     RIGHT REVISION TOTAL KNEE ARTHROPLASTY    KNEE ARTHROSCOPY Left     Dr. Gonzlaez Vázquez Right 2018    RIGHT REVISION ARTHROSCOPY FEMORAL COMPONENT, SYNOVECTOMY performed by Chirag Álvarez MD at [address] Right 2018    RIGHT KNEE QUADRICEP TENDON REPAIR WITH ALLOGRAFT AUGMENTATION performed by Chirag Álvarez MD at [facility] ARTHROSCOPY Right     TOTAL KNEE ARTHROPLASTY Right 2017       Current Outpatient Medications   Medication Sig Dispense Refill    chlorthalidone (HYGROTON) 25 MG tablet Take 1 tablet by mouth daily 90 tablet 3    clopidogrel (PLAVIX) 75 MG tablet TAKE 1 TABLET DAILY 30 tablet 2    escitalopram (LEXAPRO) 10 MG tablet TAKE 1 TABLET DAILY 90 tablet 1    omeprazole (PRILOSEC) 20 MG delayed release capsule TAKE ONE CAPSULE BY MOUTH EVERY MORNING BEFORE BREAKFAST 90 capsule 1    KLOR-CON M20 20 MEQ extended release tablet TAKE 1 TABLET DAILY 90 tablet 3    hydrALAZINE (APRESOLINE) 25 MG tablet Take 1 tablet by mouth every 8 hours 270 tablet 1    albuterol sulfate HFA (VENTOLIN HFA) 108 (90 Base) MCG/ACT inhaler Inhale 2 puffs into the lungs 4 times daily as needed for Wheezing 54 g 1    oxybutynin (DITROPAN-XL) 10 MG extended release tablet Take 1 tablet by mouth daily 90 tablet 1    BREO ELLIPTA 100-25 MCG/INH AEPB inhaler USE 1 INHALATION ORALLY    DAILY 1 each 3    eszopiclone (ESZOPICLONE) 3 MG TABS Take 1 tablet by mouth nightly as needed (sleep).  90 tablet 2    diclofenac (VOLTAREN) 75 MG EC tablet TAKE 1 TABLET TWICE A DAY  AFTER MEALS 180 tablet 3    atorvastatin (LIPITOR) 80 MG tablet Take 1 tablet by mouth nightly 90 tablet 3    NIFEdipine (ADALAT CC) 30 MG extended release tablet Take 1 tablet by mouth 2 times daily 180 tablet 3    oxyCODONE-acetaminophen (PERCOCET) 5-325 MG per tablet Take 1 tablet by mouth every 6 hours as needed for Pain. 28 tablet 0    ondansetron (ZOFRAN) 4 MG tablet Take 1 tablet by mouth 3 times daily as needed for Nausea or Vomiting 30 tablet 1     No current facility-administered medications for this visit. BP (!) 146/80   Pulse 64   Ht 5' 7\" (1.702 m)   BMI 33.17 kg/m²     Physical Exam   Physical Exam  Constitutional:       General: She is not in acute distress. Appearance: Normal appearance. HENT:      Head: Normocephalic. Eyes:      Extraocular Movements: Extraocular movements intact. Conjunctiva/sclera: Conjunctivae normal.   Cardiovascular:      Rate and Rhythm: Normal rate and regular rhythm. Pulses: Normal pulses. Heart sounds: Normal heart sounds. Pulmonary:      Effort: Pulmonary effort is normal.      Breath sounds: Normal breath sounds. Musculoskeletal:         General: Swelling present. Skin:     General: Skin is warm and dry. Findings: Bruising present. Comments: Ecchymoses at bilateral MTP joints  Pedal edema and LE edema bilaterally   Neurological:      General: No focal deficit present. Mental Status: She is alert and oriented to person, place, and time. Mental status is at baseline. Assessment/ plan:     Babatunde Ma was seen today for bleeding/bruising and edema. Diagnoses and all orders for this visit:    Pedal edema with ecchymoses  Likely 2/2 nifedipine plus local pressure. - XR L foot  - stop compression stockings x few days  - hold diclofenac, could worsen ecchymoses and cause swelling    Continue the other scheduled meds    Return if symptoms worsen or fail to improve.     Nidia Snowden MD

## 2022-03-24 ENCOUNTER — OFFICE VISIT (OUTPATIENT)
Dept: DERMATOLOGY | Age: 72
End: 2022-03-24
Payer: MEDICARE

## 2022-03-24 VITALS — TEMPERATURE: 97.4 F

## 2022-03-24 DIAGNOSIS — L57.0 ACTINIC KERATOSIS: Primary | ICD-10-CM

## 2022-03-24 DIAGNOSIS — L82.1 SK (SEBORRHEIC KERATOSIS): ICD-10-CM

## 2022-03-24 PROCEDURE — G8399 PT W/DXA RESULTS DOCUMENT: HCPCS | Performed by: DERMATOLOGY

## 2022-03-24 PROCEDURE — G8417 CALC BMI ABV UP PARAM F/U: HCPCS | Performed by: DERMATOLOGY

## 2022-03-24 PROCEDURE — 17003 DESTRUCT PREMALG LES 2-14: CPT | Performed by: DERMATOLOGY

## 2022-03-24 PROCEDURE — 1036F TOBACCO NON-USER: CPT | Performed by: DERMATOLOGY

## 2022-03-24 PROCEDURE — 1123F ACP DISCUSS/DSCN MKR DOCD: CPT | Performed by: DERMATOLOGY

## 2022-03-24 PROCEDURE — 17000 DESTRUCT PREMALG LESION: CPT | Performed by: DERMATOLOGY

## 2022-03-24 PROCEDURE — 4040F PNEUMOC VAC/ADMIN/RCVD: CPT | Performed by: DERMATOLOGY

## 2022-03-24 PROCEDURE — G8484 FLU IMMUNIZE NO ADMIN: HCPCS | Performed by: DERMATOLOGY

## 2022-03-24 PROCEDURE — 99212 OFFICE O/P EST SF 10 MIN: CPT | Performed by: DERMATOLOGY

## 2022-03-24 PROCEDURE — 3017F COLORECTAL CA SCREEN DOC REV: CPT | Performed by: DERMATOLOGY

## 2022-03-24 PROCEDURE — 1090F PRES/ABSN URINE INCON ASSESS: CPT | Performed by: DERMATOLOGY

## 2022-03-24 PROCEDURE — G8427 DOCREV CUR MEDS BY ELIG CLIN: HCPCS | Performed by: DERMATOLOGY

## 2022-03-24 NOTE — PROGRESS NOTES
[facility] Dermatology  Madison Sunshine MD  [address]  1950    70 y.o. female     Date of Visit: 3/24/2022    Chief Complaint: skin lesions    History of Present Illness:    1. Here today for few persistent scaly lesions on the face and upper extremities. 2.  She complains of several growths on the scalp and left forearm. Review of Systems:  Gen: Feels well, good sense of health. Skin: No new or changing moles. Past Medical History, Family History, Surgical History, Medications and Allergies reviewed.     Past Medical History:   Diagnosis Date    Arthritis     Compression fracture     back due to fall    Concussion     due to fall    DVT (deep venous thrombosis) ([facility] [address] 2017    RLE after TKR    Environmental allergies     Essential hypertension, benign 2010    GERD (gastroesophageal reflux disease)     History of peptic ulcer     Hx of blood clots     Hyperlipidemia 2010    Lacunar infarction [facility])     old - per MRI     MRSA (methicillin resistant staph aureus) culture positive 2018    knee    Restrictive airway disease     allergy induced    Retention of urine     Wound, open 2018    left shin area, healing well, tx in wound care center     Past Surgical History:   Procedure Laterality Date     SECTION      times 2    CHOLECYSTECTOMY      COLONOSCOPY      HYSTERECTOMY      KNEE ARTHROPLASTY Right 2018     RIGHT REVISION TOTAL KNEE ARTHROPLASTY    KNEE ARTHROSCOPY Left     Dr. Ching Swenson Right 2018    RIGHT REVISION ARTHROSCOPY FEMORAL COMPONENT, SYNOVECTOMY performed by Mendel Macleod, MD at [address] Right 2018    RIGHT KNEE QUADRICEP TENDON REPAIR WITH ALLOGRAFT AUGMENTATION performed by Mendel Macleod, MD at [facility] ARTHROSCOPY Right     TOTAL KNEE ARTHROPLASTY Right 2017       Allergies   Allergen Reactions  Codeine      Severe headaches    Demerol Other (See Comments)     Severe headache and over-sedation    Morphine Other (See Comments)     Makes her crazy    Tape Tramaine Sluder Tape] Other (See Comments)     Tears skin    Cabbage Nausea And Vomiting and Swelling    Erythromycin Nausea And Vomiting and Rash     Outpatient Medications Marked as Taking for the 3/24/22 encounter (Office Visit) with Kurtis Lundy MD   Medication Sig Dispense Refill    chlorthalidone (HYGROTON) 25 MG tablet Take 1 tablet by mouth daily 90 tablet 3    clopidogrel (PLAVIX) 75 MG tablet TAKE 1 TABLET DAILY 30 tablet 2    escitalopram (LEXAPRO) 10 MG tablet TAKE 1 TABLET DAILY 90 tablet 1    omeprazole (PRILOSEC) 20 MG delayed release capsule TAKE ONE CAPSULE BY MOUTH EVERY MORNING BEFORE BREAKFAST 90 capsule 1    KLOR-CON M20 20 MEQ extended release tablet TAKE 1 TABLET DAILY 90 tablet 3    hydrALAZINE (APRESOLINE) 25 MG tablet Take 1 tablet by mouth every 8 hours 270 tablet 1    albuterol sulfate HFA (VENTOLIN HFA) 108 (90 Base) MCG/ACT inhaler Inhale 2 puffs into the lungs 4 times daily as needed for Wheezing 54 g 1    oxybutynin (DITROPAN-XL) 10 MG extended release tablet Take 1 tablet by mouth daily 90 tablet 1    BREO ELLIPTA 100-25 MCG/INH AEPB inhaler USE 1 INHALATION ORALLY    DAILY 1 each 3    eszopiclone (ESZOPICLONE) 3 MG TABS Take 1 tablet by mouth nightly as needed (sleep). 90 tablet 2    diclofenac (VOLTAREN) 75 MG EC tablet TAKE 1 TABLET TWICE A DAY  AFTER MEALS 180 tablet 3    atorvastatin (LIPITOR) 80 MG tablet Take 1 tablet by mouth nightly 90 tablet 3    NIFEdipine (ADALAT CC) 30 MG extended release tablet Take 1 tablet by mouth 2 times daily 180 tablet 3    oxyCODONE-acetaminophen (PERCOCET) 5-325 MG per tablet Take 1 tablet by mouth every 6 hours as needed for Pain.  28 tablet 0    ondansetron (ZOFRAN) 4 MG tablet Take 1 tablet by mouth 3 times daily as needed for Nausea or Vomiting 30 tablet 1 Physical Examination       The following were examined and determined to be normal: Psych/Neuro, Scalp/hair, Conjunctivae/eyelids, Gums/teeth/lips, Neck, Breast/axilla/chest, Abdomen, Back, RLE and LLE. The following were examined and determined to be abnormal: Head/face, RUE and LUE. Well appearing. 1.  Right mid helix - 1, nasal dorsum/tip - 3, left forehead - 2, right lower cheek - 1, right forearm - 2, left forearm - 1: ill defined keratotic pink macules. 2.  Left forearm, scalp - several verrucous grey and waxy brown papules. Assessment and Plan     1. Actinic keratosis - several    2 cycles of liquid nitrogen applied to 10 AKs: Right mid helix - 1, nasal dorsum/tip - 3, left forehead - 2, right lower cheek - 1, right forearm - 2, left forearm - 1. Patient was educated regarding the potential risks of blister formation and discomfort. Wound care was discussed. 2. SK (seborrheic keratosis)     Reassurance. Return in about 1 year (around 3/24/2023).     --Florinda Moran MD

## 2022-03-26 ENCOUNTER — TELEPHONE (OUTPATIENT)
Dept: INTERNAL MEDICINE CLINIC | Age: 72
End: 2022-03-26

## 2022-05-02 RX ORDER — CLOPIDOGREL BISULFATE 75 MG/1
TABLET ORAL
Qty: 30 TABLET | Refills: 2 | Status: SHIPPED | OUTPATIENT
Start: 2022-05-02 | End: 2022-07-07 | Stop reason: CLARIF

## 2022-05-10 ENCOUNTER — OFFICE VISIT (OUTPATIENT)
Dept: INTERNAL MEDICINE CLINIC | Age: 72
End: 2022-05-10
Payer: MEDICARE

## 2022-05-10 VITALS
BODY MASS INDEX: 33.17 KG/M2 | SYSTOLIC BLOOD PRESSURE: 139 MMHG | HEART RATE: 67 BPM | HEIGHT: 67 IN | DIASTOLIC BLOOD PRESSURE: 80 MMHG

## 2022-05-10 DIAGNOSIS — R23.3 EASY BRUISING: ICD-10-CM

## 2022-05-10 DIAGNOSIS — R11.0 NAUSEA IN ADULT: ICD-10-CM

## 2022-05-10 DIAGNOSIS — Z23 NEED FOR PNEUMOCOCCAL VACCINE: ICD-10-CM

## 2022-05-10 DIAGNOSIS — F41.1 GENERALIZED ANXIETY DISORDER: ICD-10-CM

## 2022-05-10 DIAGNOSIS — I10 ESSENTIAL HYPERTENSION: ICD-10-CM

## 2022-05-10 DIAGNOSIS — N32.81 OVERACTIVE BLADDER: ICD-10-CM

## 2022-05-10 DIAGNOSIS — G81.94 LEFT HEMIPARESIS (HCC): ICD-10-CM

## 2022-05-10 DIAGNOSIS — Z00.00 MEDICARE ANNUAL WELLNESS VISIT, SUBSEQUENT: Primary | ICD-10-CM

## 2022-05-10 DIAGNOSIS — R60.0 BILATERAL LOWER EXTREMITY EDEMA: ICD-10-CM

## 2022-05-10 PROCEDURE — 1123F ACP DISCUSS/DSCN MKR DOCD: CPT | Performed by: HOSPITALIST

## 2022-05-10 PROCEDURE — 4040F PNEUMOC VAC/ADMIN/RCVD: CPT | Performed by: HOSPITALIST

## 2022-05-10 PROCEDURE — 90732 PPSV23 VACC 2 YRS+ SUBQ/IM: CPT | Performed by: HOSPITALIST

## 2022-05-10 PROCEDURE — 3017F COLORECTAL CA SCREEN DOC REV: CPT | Performed by: HOSPITALIST

## 2022-05-10 PROCEDURE — G0439 PPPS, SUBSEQ VISIT: HCPCS | Performed by: HOSPITALIST

## 2022-05-10 PROCEDURE — G0009 ADMIN PNEUMOCOCCAL VACCINE: HCPCS | Performed by: HOSPITALIST

## 2022-05-10 RX ORDER — ONDANSETRON 4 MG/1
4 TABLET, FILM COATED ORAL 3 TIMES DAILY PRN
Qty: 90 TABLET | Refills: 3 | Status: SHIPPED | OUTPATIENT
Start: 2022-05-10

## 2022-05-10 RX ORDER — OXYBUTYNIN CHLORIDE 10 MG/1
10 TABLET, EXTENDED RELEASE ORAL DAILY
Qty: 90 TABLET | Refills: 3 | Status: SHIPPED | OUTPATIENT
Start: 2022-05-10 | End: 2022-08-10 | Stop reason: CLARIF

## 2022-05-10 RX ORDER — BUSPIRONE HYDROCHLORIDE 10 MG/1
10 TABLET ORAL DAILY
Qty: 90 TABLET | Refills: 3 | Status: SHIPPED | OUTPATIENT
Start: 2022-05-10 | End: 2022-07-09

## 2022-05-10 ASSESSMENT — PATIENT HEALTH QUESTIONNAIRE - PHQ9
8. MOVING OR SPEAKING SO SLOWLY THAT OTHER PEOPLE COULD HAVE NOTICED. OR THE OPPOSITE, BEING SO FIGETY OR RESTLESS THAT YOU HAVE BEEN MOVING AROUND A LOT MORE THAN USUAL: 0
9. THOUGHTS THAT YOU WOULD BE BETTER OFF DEAD, OR OF HURTING YOURSELF: 0
SUM OF ALL RESPONSES TO PHQ QUESTIONS 1-9: 2
SUM OF ALL RESPONSES TO PHQ9 QUESTIONS 1 & 2: 2
5. POOR APPETITE OR OVEREATING: 0
SUM OF ALL RESPONSES TO PHQ QUESTIONS 1-9: 2
4. FEELING TIRED OR HAVING LITTLE ENERGY: 0
SUM OF ALL RESPONSES TO PHQ QUESTIONS 1-9: 2
10. IF YOU CHECKED OFF ANY PROBLEMS, HOW DIFFICULT HAVE THESE PROBLEMS MADE IT FOR YOU TO DO YOUR WORK, TAKE CARE OF THINGS AT HOME, OR GET ALONG WITH OTHER PEOPLE: 0
1. LITTLE INTEREST OR PLEASURE IN DOING THINGS: 1
3. TROUBLE FALLING OR STAYING ASLEEP: 0
2. FEELING DOWN, DEPRESSED OR HOPELESS: 1
6. FEELING BAD ABOUT YOURSELF - OR THAT YOU ARE A FAILURE OR HAVE LET YOURSELF OR YOUR FAMILY DOWN: 0
7. TROUBLE CONCENTRATING ON THINGS, SUCH AS READING THE NEWSPAPER OR WATCHING TELEVISION: 0
SUM OF ALL RESPONSES TO PHQ QUESTIONS 1-9: 2

## 2022-05-10 NOTE — PATIENT INSTRUCTIONS
Personalized Preventive Plan for Leslye Mckeon - 5/10/2022  Medicare offers a range of preventive health benefits. Some of the tests and screenings are paid in full while other may be subject to a deductible, co-insurance, and/or copay. Some of these benefits include a comprehensive review of your medical history including lifestyle, illnesses that may run in your family, and various assessments and screenings as appropriate. After reviewing your medical record and screening and assessments performed today your provider may have ordered immunizations, labs, imaging, and/or referrals for you. A list of these orders (if applicable) as well as your Preventive Care list are included within your After Visit Summary for your review. Other Preventive Recommendations:    · A preventive eye exam performed by an eye specialist is recommended every 1-2 years to screen for glaucoma; cataracts, macular degeneration, and other eye disorders. · A preventive dental visit is recommended every 6 months. · Try to get at least 150 minutes of exercise per week or 10,000 steps per day on a pedometer . · Order or download the FREE \"Exercise & Physical Activity: Your Everyday Guide\" from [employer]. Call 9-170.151.6301 or search [employer] online. · You need 9315-6999 mg of calcium and 5270-1379 IU of vitamin D per day. It is possible to meet your calcium requirement with diet alone, but a vitamin D supplement is usually necessary to meet this goal.  · When exposed to the sun, use a sunscreen that protects against both UVA and UVB radiation with an SPF of 30 or greater. Reapply every 2 to 3 hours or after sweating, drying off with a towel, or swimming. · Always wear a seat belt when traveling in a car. Always wear a helmet when riding a bicycle or motorcycle.

## 2022-05-10 NOTE — PROGRESS NOTES
Medicare Annual Wellness Visit    Laura Schmidt is here for Medicare AWV    Assessment & Plan   Medicare annual wellness visit, subsequent    Easy bruising  We will hold clopidogrel  Start aspirin in 3 days    Essential hypertension  Continue current medication    Left hemiparesis ([address]  Stable; safety precautions were provided  Aspirin 81 mg daily. Clopidogrel will be discontinued because of significant bruising    Bilateral lower extremity edema    Continue to wear compression stockings and keep feet elevated whenever possible  Generalized anxiety disorder  -     busPIRone (BUSPAR) 10 MG tablet; Take 1 tablet by mouth daily, Disp-90 tablet, R-3Normal    Overactive bladder  -     oxybutynin (DITROPAN-XL) 10 MG extended release tablet; Take 1 tablet by mouth daily, Disp-90 tablet, R-3Normal    Nausea in adult  -     ondansetron (ZOFRAN) 4 MG tablet; Take 1 tablet by mouth 3 times daily as needed for Nausea or Vomiting, Disp-90 tablet, R-3Normal    Need for pneumococcal vaccine  -     Pneumococcal polysaccharide vaccine 23-valent greater than or equal to 3yo subcutaneous/IM      Recommendations for Preventive Services Due: see orders and patient instructions/AVS.  Recommended screening schedule for the next 5-10 years is provided to the patient in written form: see Patient Instructions/AVS.     Return for Medicare Annual Wellness Visit in 1 year. Patient's complete Health Risk Assessment and screening values have been reviewed and are found in Flowsheets. The following problems were reviewed today and where indicated follow up appointments were made and/or referrals ordered.     Positive Risk Factor Screenings with Interventions:    Fall Risk:  Do you feel unsteady or are you worried about falling? : (!) yes  2 or more falls in past year?: no  Fall with injury in past year?: (!) yes     Fall Risk Interventions:    · Home safety tips provided             Health Habits/Nutrition:     Physical Activity: Inactive    Days of Exercise per Week: 0 days    Minutes of Exercise per Session: 0 min     Have you lost any weight without trying in the past 3 months?: No     Have you seen the dentist within the past year?: Yes    Health Habits/Nutrition Interventions:  · Inadequate physical activity:  patient agrees to exercise for at least 150 minutes/week      ADLs:  In the past 7 days, did you need help from others to perform any of the following everyday activities: Eating, dressing, grooming, bathing, toileting, or walking/balance?: (!) Yes  Select all that apply: (!) Bathing  In the past 7 days, did you need help from others to take care of any of the following: Laundry, housekeeping, banking/finances, shopping, telephone use, food preparation, transportation, or taking medications?: No    ADL Interventions:  · Patient declines any further evaluation/treatment for this issue          Objective   Vitals:    05/10/22 1040   BP: 139/80   Pulse: 67   Height: 5' 7\" (1.702 m)      Body mass index is 33.17 kg/m². General Appearance: alert and oriented to person, place and time, well developed and well- nourished, in no acute distress  Skin: warm and dry, no rash or erythema. Multiple superficial small bruises on the dorsal surface of both hands and forearm.   There is a large superficial hematoma over left inner breast  Head: normocephalic and atraumatic  Eyes: pupils equal, round, and reactive to light, extraocular eye movements intact, conjunctivae normal  ENT: tympanic membrane, external ear and ear canal normal bilaterally, nose without deformity, nasal mucosa and turbinates normal without polyps  Neck: supple and non-tender without mass, no thyromegaly or thyroid nodules, no cervical lymphadenopathy  Pulmonary/Chest: clear to auscultation bilaterally- no wheezes, rales or rhonchi, normal air movement, no respiratory distress  Cardiovascular: normal rate, regular rhythm, normal S1 and S2, no murmurs, rubs, clicks, or gallops, distal pulses intact, no carotid bruits  Abdomen: soft, non-tender, non-distended, normal bowel sounds, no masses or organomegaly  Extremities: no cyanosis, no clubbing. There is mild bilateral lower extremity edema  Musculoskeletal: normal range of motion, no joint swelling, deformity or tenderness  Neurologic: reflexes normal and symmetric, no cranial nerve deficit, gait, coordination and speech normal. + Mild left-sided hemiparesis      Allergies   Allergen Reactions    Codeine      Severe headaches    Demerol Other (See Comments)     Severe headache and over-sedation    Morphine Other (See Comments)     Makes her crazy    Tape Savanna Jose Tape] Other (See Comments)     Tears skin    Cabbage Nausea And Vomiting and Swelling    Erythromycin Nausea And Vomiting and Rash     Prior to Visit Medications    Medication Sig Taking?  Authorizing Provider   ondansetron (ZOFRAN) 4 MG tablet Take 1 tablet by mouth 3 times daily as needed for Nausea or Vomiting Yes Sd Farley MD   oxybutynin (DITROPAN-XL) 10 MG extended release tablet Take 1 tablet by mouth daily Yes Sd Farley MD   busPIRone (BUSPAR) 10 MG tablet Take 1 tablet by mouth daily Yes Sd Farley MD   clopidogrel (PLAVIX) 75 MG tablet TAKE 1 TABLET DAILY Yes Sd Farley MD   chlorthalidone (HYGROTON) 25 MG tablet Take 1 tablet by mouth daily Yes Sd Farley MD   escitalopram (LEXAPRO) 10 MG tablet TAKE 1 TABLET DAILY Yes Sd Farley MD   omeprazole (PRILOSEC) 20 MG delayed release capsule TAKE ONE CAPSULE BY MOUTH EVERY MORNING BEFORE BREAKFAST Yes Sd Farley MD   KLOR-CON M20 20 MEQ extended release tablet TAKE 1 TABLET DAILY Yes Sd Farley MD   hydrALAZINE (APRESOLINE) 25 MG tablet Take 1 tablet by mouth every 8 hours Yes Sd Farley MD   albuterol sulfate HFA (VENTOLIN HFA) 108 (90 Base) MCG/ACT inhaler Inhale 2 puffs into the lungs 4 times daily as needed for Wheezing Yes Sd Farley MD   eszopiclone (ESZOPICLONE) 3 MG TABS Take 1 tablet by mouth nightly as needed (sleep). Yes Adelia Roman MD   atorvastatin (LIPITOR) 80 MG tablet Take 1 tablet by mouth nightly Yes Adelia Roman MD   NIFEdipine (ADALAT CC) 30 MG extended release tablet Take 1 tablet by mouth 2 times daily Yes Adelia Roman MD   oxyCODONE-acetaminophen (PERCOCET) 5-325 MG per tablet Take 1 tablet by mouth every 6 hours as needed for Pain.  Yes Adelia Roman MD       [facility] (Including outside providers/suppliers regularly involved in providing care):   Patient Care Team:  Angel Patel MD as PCP - General (Internal Medicine)  Angel Patel MD as PCP - REHABILITATION [facility] Empaneled Provider    Reviewed and updated this visit:  Tobacco  Allergies  Meds  Med Hx  Surg Hx  Soc Hx  Fam Hx         Follow up in 3 months    Angel Patel MD

## 2022-06-03 ENCOUNTER — HOSPITAL ENCOUNTER (OUTPATIENT)
Dept: VASCULAR LAB | Age: 72
Discharge: HOME | End: 2022-06-03
Payer: MEDICARE

## 2022-06-03 DIAGNOSIS — I73.9 PERIPHERAL VASCULAR DISEASE, UNSPECIFIED (HCC): ICD-10-CM

## 2022-06-03 PROCEDURE — 93925 LOWER EXTREMITY STUDY: CPT

## 2022-06-05 ENCOUNTER — APPOINTMENT (OUTPATIENT)
Dept: CT IMAGING | Age: 72
End: 2022-06-05
Payer: MEDICARE

## 2022-06-05 ENCOUNTER — APPOINTMENT (OUTPATIENT)
Dept: GENERAL RADIOLOGY | Age: 72
End: 2022-06-05
Payer: MEDICARE

## 2022-06-05 ENCOUNTER — HOSPITAL ENCOUNTER (EMERGENCY)
Age: 72
Discharge: HOME | End: 2022-06-05
Payer: MEDICARE

## 2022-06-05 VITALS
DIASTOLIC BLOOD PRESSURE: 118 MMHG | TEMPERATURE: 98.2 F | SYSTOLIC BLOOD PRESSURE: 132 MMHG | RESPIRATION RATE: 14 BRPM | HEART RATE: 68 BPM | OXYGEN SATURATION: 94 %

## 2022-06-05 DIAGNOSIS — S09.90XA CLOSED HEAD INJURY, INITIAL ENCOUNTER: Primary | ICD-10-CM

## 2022-06-05 DIAGNOSIS — R11.0 NAUSEA IN ADULT: ICD-10-CM
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
## 2022-06-05 LAB
A/G RATIO: 1.7 (ref 1.1–2.2)
ALBUMIN SERPL-MCNC: 4.3 G/DL (ref 3.4–5)
ALP BLD-CCNC: 81 U/L (ref 40–129)
ALT SERPL-CCNC: 16 U/L (ref 10–40)
ANION GAP SERPL CALCULATED.3IONS-SCNC: 14 MMOL/L (ref 3–16)
AST SERPL-CCNC: 16 U/L (ref 15–37)
BACTERIA: ABNORMAL /HPF
BASOPHILS ABSOLUTE: 0 K/UL (ref 0–0.2)
BASOPHILS RELATIVE PERCENT: 0.4 %
BILIRUB SERPL-MCNC: 0.5 MG/DL (ref 0–1)
BILIRUBIN URINE: NEGATIVE
BLOOD, URINE: NEGATIVE
BUN BLDV-MCNC: 28 MG/DL (ref 7–20)
CALCIUM SERPL-MCNC: 9.2 MG/DL (ref 8.3–10.6)
CHLORIDE BLD-SCNC: 102 MMOL/L (ref 99–110)
CLARITY: CLEAR
CO2: 24 MMOL/L (ref 21–32)
COLOR: YELLOW
CREAT SERPL-MCNC: 0.8 MG/DL (ref 0.6–1.2)
EOSINOPHILS ABSOLUTE: 0.1 K/UL (ref 0–0.6)
EOSINOPHILS RELATIVE PERCENT: 1.2 %
EPITHELIAL CELLS, UA: 4 /HPF (ref 0–5)
GFR AFRICAN AMERICAN: >60
GFR NON-AFRICAN AMERICAN: >60
GLUCOSE BLD-MCNC: 104 MG/DL (ref 70–99)
GLUCOSE URINE: NEGATIVE MG/DL
HCT VFR BLD CALC: 39.3 % (ref 36–48)
HEMOGLOBIN: 13.1 G/DL (ref 12–16)
HYALINE CASTS: 0 /LPF (ref 0–8)
KETONES, URINE: NEGATIVE MG/DL
LACTIC ACID, SEPSIS: 0.8 MMOL/L (ref 0.4–1.9)
LEUKOCYTE ESTERASE, URINE: ABNORMAL
LIPASE: 24 U/L (ref 13–60)
LYMPHOCYTES ABSOLUTE: 2.4 K/UL (ref 1–5.1)
LYMPHOCYTES RELATIVE PERCENT: 38.9 %
MCH RBC QN AUTO: 30.2 PG (ref 26–34)
MCHC RBC AUTO-ENTMCNC: 33.4 G/DL (ref 31–36)
MCV RBC AUTO: 90.3 FL (ref 80–100)
MICROSCOPIC EXAMINATION: YES
MONOCYTES ABSOLUTE: 0.7 K/UL (ref 0–1.3)
MONOCYTES RELATIVE PERCENT: 10.8 %
NEUTROPHILS ABSOLUTE: 3 K/UL (ref 1.7–7.7)
NEUTROPHILS RELATIVE PERCENT: 48.7 %
NITRITE, URINE: POSITIVE
PDW BLD-RTO: 13.8 % (ref 12.4–15.4)
PH UA: 7 (ref 5–8)
PLATELET # BLD: 339 K/UL (ref 135–450)
PMV BLD AUTO: 7.4 FL (ref 5–10.5)
POTASSIUM REFLEX MAGNESIUM: 3.6 MMOL/L (ref 3.5–5.1)
PRO-BNP: 115 PG/ML (ref 0–124)
PROTEIN UA: NEGATIVE MG/DL
RBC # BLD: 4.36 M/UL (ref 4–5.2)
RBC UA: 0 /HPF (ref 0–4)
SODIUM BLD-SCNC: 140 MMOL/L (ref 136–145)
SPECIFIC GRAVITY UA: 1.01 (ref 1–1.03)
TOTAL PROTEIN: 6.9 G/DL (ref 6.4–8.2)
TROPONIN: <0.01 NG/ML
URINE REFLEX TO CULTURE: YES
URINE TYPE: ABNORMAL
UROBILINOGEN, URINE: 0.2 E.U./DL
WBC # BLD: 6.2 K/UL (ref 4–11)
WBC UA: 13 /HPF (ref 0–5)

## 2022-06-05 PROCEDURE — 87186 SC STD MICRODIL/AGAR DIL: CPT

## 2022-06-05 PROCEDURE — 83880 ASSAY OF NATRIURETIC PEPTIDE: CPT

## 2022-06-05 PROCEDURE — 81001 URINALYSIS AUTO W/SCOPE: CPT

## 2022-06-05 PROCEDURE — 83605 ASSAY OF LACTIC ACID: CPT

## 2022-06-05 PROCEDURE — 87077 CULTURE AEROBIC IDENTIFY: CPT

## 2022-06-05 PROCEDURE — 84484 ASSAY OF TROPONIN QUANT: CPT

## 2022-06-05 PROCEDURE — 83690 ASSAY OF LIPASE: CPT

## 2022-06-05 PROCEDURE — 93005 ELECTROCARDIOGRAM TRACING: CPT | Performed by: PHYSICIAN ASSISTANT

## 2022-06-05 PROCEDURE — 71045 X-RAY EXAM CHEST 1 VIEW: CPT

## 2022-06-05 PROCEDURE — 87086 URINE CULTURE/COLONY COUNT: CPT

## 2022-06-05 PROCEDURE — 96374 THER/PROPH/DIAG INJ IV PUSH: CPT

## 2022-06-05 PROCEDURE — 99285 EMERGENCY DEPT VISIT HI MDM: CPT

## 2022-06-05 PROCEDURE — 80053 COMPREHEN METABOLIC PANEL: CPT

## 2022-06-05 PROCEDURE — 70450 CT HEAD/BRAIN W/O DYE: CPT

## 2022-06-05 PROCEDURE — 85025 COMPLETE CBC W/AUTO DIFF WBC: CPT

## 2022-06-05 PROCEDURE — 36415 COLL VENOUS BLD VENIPUNCTURE: CPT

## 2022-06-05 PROCEDURE — 6360000002 HC RX W HCPCS

## 2022-06-05 PROCEDURE — 72125 CT NECK SPINE W/O DYE: CPT

## 2022-06-05 RX ORDER — ONDANSETRON 4 MG/1
4 TABLET, FILM COATED ORAL EVERY 8 HOURS PRN
Qty: 20 TABLET | Refills: 0 | Status: SHIPPED | OUTPATIENT
Start: 2022-06-05 | End: 2022-07-07 | Stop reason: CLARIF

## 2022-06-05 RX ORDER — ONDANSETRON 2 MG/ML
4 INJECTION INTRAMUSCULAR; INTRAVENOUS EVERY 6 HOURS PRN
Status: DISCONTINUED | OUTPATIENT
Start: 2022-06-05 | End: 2022-06-05 | Stop reason: HOSPADM

## 2022-06-05 RX ORDER — ONDANSETRON 2 MG/ML
INJECTION INTRAMUSCULAR; INTRAVENOUS
Status: COMPLETED
Start: 2022-06-05 | End: 2022-06-05

## 2022-06-05 RX ADMIN — ONDANSETRON 4 MG: 2 INJECTION INTRAMUSCULAR; INTRAVENOUS at 12:30

## 2022-06-05 ASSESSMENT — PAIN - FUNCTIONAL ASSESSMENT: PAIN_FUNCTIONAL_ASSESSMENT: 0-10

## 2022-06-05 ASSESSMENT — PAIN SCALES - GENERAL: PAINLEVEL_OUTOF10: 6

## 2022-06-05 ASSESSMENT — PAIN DESCRIPTION - LOCATION: LOCATION: HEAD

## 2022-06-05 NOTE — ED PROVIDER NOTES
BRITANY. I have evaluated this patient. My supervising physician was available for consultation. Chief Complaint:   Chief Complaint   Patient presents with    Fall     at home from [place]. fell back on wheelchair and hit head. Alejandro Verma        ED Course & Medical Decision Making  70 y.o. female presenting with fall from wheelchair.    -4mg zofran    -CT head: No acute pathology   -CT Cspine: No fractures  -Chest xr: Lungs clear    -CBC/chem: Not concerning  -Lactate 0.8  -UA: asymptomatic bacteruria     -EKG:   NSR    MDM: This 66-year-old female fell out of her wheelchair and hit her head. She denied Zofran for control of nausea in the ED. Her CT head and C-spine are without acute pathology. Her chest x-ray shows clear lungs. Her laboratory evaluation is unremarkable. She does have asymptomatic bacteriuria, which I have elected not to treat, as this is a mechanical fall and not related to syncope. Final Clinical Impression & Plan:  Closed head injury    - f/u with PCP  - ED return precautions given and reviewed, questions answered. ---------------------------------------------------------------------------------------------------------------  HPI:  PMH significant for CVA, DVT    Presenting with fall from wheelchair. Patient was rolling on a deck, when she had a bump and tipped over. She hit her head, reported immediate nausea. She denies loss of consciousness. In the emergency department she reports nausea, head pain. She denies fevers, chills, belly pain, back pain. She denies urinary symptoms. She has no shortness of breath, leg swelling. She does have a history of DVT. She is on anticoagulation. Is this patient to be included in the SEP-1 Core Measure due to severe sepsis or septic shock? No   Exclusion criteria - the patient is NOT to be included for SEP-1 Core Measure due to:   Infection is not suspected      Medical Hx: Past medical history reviewed, and pertinent for: Past Medical History:   Diagnosis Date    Arthritis     Cerebral artery occlusion with cerebral infarction ([address]     Compression fracture     back due to fall    Concussion     due to fall    DVT (deep venous thrombosis) (HCC) 2017    RLE after TKR    Environmental allergies     Essential hypertension, benign 2010    GERD (gastroesophageal reflux disease)     History of peptic ulcer     Hx of blood clots     Hyperlipidemia 2010    Lacunar infarction [facility])     old - per MRI     MRSA (methicillin resistant staph aureus) culture positive 2018    knee    Restrictive airway disease     allergy induced    Retention of urine     Wound, open 2018    left shin area, healing well, tx in wound care center       Patient Active Problem List   Diagnosis    Hyperlipidemia    HTN (hypertension), benign    Edema    CTS (carpal tunnel syndrome)    Reactive airway disease    Pulmonary nodule    Anxiety and depression    Anemia    Gastroesophageal reflux disease without esophagitis    Quadriceps tendon rupture, right, sequela    Nocturnal foot cramps    Osteoporosis    Multiple fractures of pelvis with stable disruption of pelvic ring, subsequent encounter for fracture with delayed healing    Lumbar disc disease    Cerebrovascular accident (CVA) ([address]    Left hemiparesis ([address]    Sleep difficulties    Left arm weakness    Skin lesion of right arm    Major depressive disorder with single episode, in partial remission ([address]         Surgical Hx:   Past surgical history reviewed, and pertinent for:      Past Surgical History:   Procedure Laterality Date     SECTION      times 2    CHOLECYSTECTOMY      COLONOSCOPY      HYSTERECTOMY      KNEE ARTHROPLASTY Right 2018     RIGHT REVISION TOTAL KNEE ARTHROPLASTY    KNEE ARTHROSCOPY Left     Dr. Lobo Burden Right 2018    RIGHT REVISION ARTHROSCOPY FEMORAL COMPONENT, SYNOVECTOMY performed by Jorge Luis Hassan MD at [facility] Right 2018    RIGHT KNEE QUADRICEP TENDON REPAIR WITH ALLOGRAFT AUGMENTATION performed by Jorge Luis Hassan MD at [facility] ARTHROSCOPY Right     TOTAL KNEE ARTHROPLASTY Right 2017       Social Hx:       Social History     Socioeconomic History    Marital status:      Spouse name: Not on file    Number of children: Not on file    Years of education: Not on file    Highest education level: Not on file   Occupational History    Not on file   Tobacco Use    Smoking status: Former Smoker     Packs/day: 1.00     Years: 35.00     Pack years: 35.00     Types: Cigarettes     Quit date: 3/28/2005     Years since quittin.2    Smokeless tobacco: Never Used   Vaping Use    Vaping Use: Never used   Substance and Sexual Activity    Alcohol use: Yes     Comment: occasional    Drug use: No    Sexual activity: Not on file   Other Topics Concern    Not on file   Social History Narrative    Not on file     Social Determinants of Health     Financial Resource Strain:     Difficulty of Paying Living Expenses: Not on file   Food Insecurity:     Worried About [facility] in the Last Year: Not on file    Deneen of Food in the Last Year: Not on file   Transportation Needs:     Lack of Transportation (Medical): Not on file    Lack of Transportation (Non-Medical):  Not on file   Physical Activity: Inactive    Days of Exercise per Week: 0 days    Minutes of Exercise per Session: 0 min   Stress:     Feeling of Stress : Not on file   Social Connections:     Frequency of Communication with Friends and Family: Not on file    Frequency of Social Gatherings with Friends and Family: Not on file    Attends Christianity Services: Not on file    Active Member of Clubs or Organizations: Not on file    Attends Club or Organization Meetings: Not on file    Marital Status: Not on file   Intimate Partner Violence:     Fear of Current or Ex-Partner: Not on file    Emotionally Abused: Not on file    Physically Abused: Not on file    Sexually Abused: Not on file   Housing Stability:     Unable to Pay for Housing in the Last Year: Not on file    Number of [place] in the Last Year: Not on file    Unstable Housing in the Last Year: Not on file         Medications:  Previous Medications    ALBUTEROL SULFATE HFA (VENTOLIN HFA) 108 (90 BASE) MCG/ACT INHALER    Inhale 2 puffs into the lungs 4 times daily as needed for Wheezing    ATORVASTATIN (LIPITOR) 80 MG TABLET    Take 1 tablet by mouth nightly    BUSPIRONE (BUSPAR) 10 MG TABLET    Take 1 tablet by mouth daily    CHLORTHALIDONE (HYGROTON) 25 MG TABLET    Take 1 tablet by mouth daily    CLOPIDOGREL (PLAVIX) 75 MG TABLET    TAKE 1 TABLET DAILY    ESCITALOPRAM (LEXAPRO) 10 MG TABLET    TAKE 1 TABLET DAILY    ESZOPICLONE (ESZOPICLONE) 3 MG TABS    Take 1 tablet by mouth nightly as needed (sleep). HYDRALAZINE (APRESOLINE) 25 MG TABLET    Take 1 tablet by mouth every 8 hours    KLOR-CON M20 20 MEQ EXTENDED RELEASE TABLET    TAKE 1 TABLET DAILY    NIFEDIPINE (ADALAT CC) 30 MG EXTENDED RELEASE TABLET    Take 1 tablet by mouth 2 times daily    OMEPRAZOLE (PRILOSEC) 20 MG DELAYED RELEASE CAPSULE    TAKE ONE CAPSULE BY MOUTH EVERY MORNING BEFORE BREAKFAST    ONDANSETRON (ZOFRAN) 4 MG TABLET    Take 1 tablet by mouth 3 times daily as needed for Nausea or Vomiting    OXYBUTYNIN (DITROPAN-XL) 10 MG EXTENDED RELEASE TABLET    Take 1 tablet by mouth daily       Allergies:  Codeine, Demerol, Morphine, Tape [adhesive tape], Cabbage, and Erythromycin    ROS:  10pt review of systems was performed and was negative except as noted in HPI     Imaging:  No orders to display       Labs:  No results found for this visit on 06/05/22.     Screenings:     Yanni Coma Scale  Eye Opening: Spontaneous  Best Verbal Response: Oriented  Best Motor Response: Obeys commands  Yanni Coma Scale Score: 15 Physical Exam:  Vitals: /89   Pulse 65   Temp 98.2 °F (36.8 °C) (Axillary)   Resp 20   SpO2 95%    General: awake, alert, no apparent distress  Pupils: equal, reactive  Eyes: EOM intact, conjunctiva clear, no discharge  Head: Non-traumatic  Neck: Supple  Mouth: Moist, no oral lesions, no tonsillar enlargement  Heart: Rate as noted, regular rhythm, no murmur or rubs. Chest/Lungs: CTAB, no wheezes or crackles  Abdomen: soft, nondistended, no tenderness to palpation   Back: No midline tenderness. No CVA tenderness  Extremities:  2+ distal pulses bilateral UE and LE, no tenderness of calves, no edema. RLE knee contracture. Neuro: Moving all extremities, no facial droop, no slurred speech, answers questions appropriately. Cranial nerves II to XII intact. Skin: Warm.  No visible rash, lesions, or bruising           NAWAF Cuellar        [place], [place]  06/05/22 [id number]

## 2022-06-07 LAB
EKG ATRIAL RATE: 66 BPM
EKG DIAGNOSIS: NORMAL
EKG P AXIS: 47 DEGREES
EKG P-R INTERVAL: 150 MS
EKG Q-T INTERVAL: 456 MS
EKG QRS DURATION: 104 MS
EKG QTC CALCULATION (BAZETT): 478 MS
EKG R AXIS: -47 DEGREES
EKG T AXIS: 6 DEGREES
EKG VENTRICULAR RATE: 66 BPM
ORGANISM: ABNORMAL
URINE CULTURE, ROUTINE: ABNORMAL

## 2022-06-07 PROCEDURE — 93010 ELECTROCARDIOGRAM REPORT: CPT | Performed by: INTERNAL MEDICINE

## 2022-07-01 DIAGNOSIS — I10 ESSENTIAL HYPERTENSION: ICD-10-CM

## 2022-07-01 DIAGNOSIS — G47.9 SLEEP DIFFICULTIES: ICD-10-CM

## 2022-07-01 RX ORDER — HYDRALAZINE HYDROCHLORIDE 25 MG/1
25 TABLET, FILM COATED ORAL EVERY 8 HOURS SCHEDULED
Qty: 270 TABLET | Refills: 3 | Status: SHIPPED | OUTPATIENT
Start: 2022-07-01

## 2022-07-01 RX ORDER — ESZOPICLONE 3 MG/1
3 TABLET, FILM COATED ORAL NIGHTLY PRN
Qty: 90 TABLET | Refills: 3 | Status: SHIPPED | OUTPATIENT
Start: 2022-07-01 | End: 2022-07-13

## 2022-07-06 ENCOUNTER — TELEPHONE (OUTPATIENT)
Dept: INTERNAL MEDICINE CLINIC | Age: 72
End: 2022-07-06

## 2022-07-06 NOTE — TELEPHONE ENCOUNTER
Patient called, she got a head concussion on 06/05/22. Went to the ER then. They told her it was a bad concussion and to take it easy for a week. She did this, but is still having headaches whenever she moves around a lot. Sunday she had a very bad headache. Pain all in the back where she fell. Is this normal?  Should she see the doctor? What should she do??     Please call and advise

## 2022-07-07 ENCOUNTER — OFFICE VISIT (OUTPATIENT)
Dept: INTERNAL MEDICINE CLINIC | Age: 72
End: 2022-07-07
Payer: MEDICARE

## 2022-07-07 VITALS
HEIGHT: 67 IN | DIASTOLIC BLOOD PRESSURE: 80 MMHG | SYSTOLIC BLOOD PRESSURE: 140 MMHG | BODY MASS INDEX: 33.17 KG/M2 | HEART RATE: 88 BPM

## 2022-07-07 DIAGNOSIS — M54.2 CERVICALGIA: ICD-10-CM

## 2022-07-07 DIAGNOSIS — G47.9 SLEEP DIFFICULTIES: ICD-10-CM

## 2022-07-07 DIAGNOSIS — R51.9 SEVERE HEADACHE: Primary | ICD-10-CM

## 2022-07-07 DIAGNOSIS — E78.5 HYPERLIPIDEMIA, UNSPECIFIED HYPERLIPIDEMIA TYPE: ICD-10-CM

## 2022-07-07 PROCEDURE — 96372 THER/PROPH/DIAG INJ SC/IM: CPT | Performed by: HOSPITALIST

## 2022-07-07 PROCEDURE — 1123F ACP DISCUSS/DSCN MKR DOCD: CPT | Performed by: HOSPITALIST

## 2022-07-07 PROCEDURE — G8399 PT W/DXA RESULTS DOCUMENT: HCPCS | Performed by: HOSPITALIST

## 2022-07-07 PROCEDURE — 1036F TOBACCO NON-USER: CPT | Performed by: HOSPITALIST

## 2022-07-07 PROCEDURE — G8427 DOCREV CUR MEDS BY ELIG CLIN: HCPCS | Performed by: HOSPITALIST

## 2022-07-07 PROCEDURE — G8417 CALC BMI ABV UP PARAM F/U: HCPCS | Performed by: HOSPITALIST

## 2022-07-07 PROCEDURE — 99213 OFFICE O/P EST LOW 20 MIN: CPT | Performed by: HOSPITALIST

## 2022-07-07 PROCEDURE — 3017F COLORECTAL CA SCREEN DOC REV: CPT | Performed by: HOSPITALIST

## 2022-07-07 PROCEDURE — 1090F PRES/ABSN URINE INCON ASSESS: CPT | Performed by: HOSPITALIST

## 2022-07-07 RX ORDER — ASPIRIN 81 MG/1
81 TABLET ORAL DAILY
COMMUNITY

## 2022-07-07 RX ORDER — ATORVASTATIN CALCIUM 80 MG/1
80 TABLET, FILM COATED ORAL NIGHTLY
Qty: 90 TABLET | Refills: 3 | Status: SHIPPED | OUTPATIENT
Start: 2022-07-07

## 2022-07-07 RX ORDER — METHYLPREDNISOLONE ACETATE 80 MG/ML
80 INJECTION, SUSPENSION INTRA-ARTICULAR; INTRALESIONAL; INTRAMUSCULAR; SOFT TISSUE ONCE
Status: COMPLETED | OUTPATIENT
Start: 2022-07-07 | End: 2022-07-07

## 2022-07-07 RX ORDER — OXYCODONE HYDROCHLORIDE AND ACETAMINOPHEN 5; 325 MG/1; MG/1
1 TABLET ORAL 2 TIMES DAILY PRN
Qty: 28 TABLET | Refills: 0 | Status: SHIPPED | OUTPATIENT
Start: 2022-07-07 | End: 2022-07-21

## 2022-07-07 RX ADMIN — METHYLPREDNISOLONE ACETATE 80 MG: 80 INJECTION, SUSPENSION INTRA-ARTICULAR; INTRALESIONAL; INTRAMUSCULAR; SOFT TISSUE at 12:40

## 2022-07-07 SDOH — ECONOMIC STABILITY: FOOD INSECURITY: WITHIN THE PAST 12 MONTHS, YOU WORRIED THAT YOUR FOOD WOULD RUN OUT BEFORE YOU GOT MONEY TO BUY MORE.: NEVER TRUE

## 2022-07-07 SDOH — ECONOMIC STABILITY: FOOD INSECURITY: WITHIN THE PAST 12 MONTHS, THE FOOD YOU BOUGHT JUST DIDN'T LAST AND YOU DIDN'T HAVE MONEY TO GET MORE.: NEVER TRUE

## 2022-07-07 ASSESSMENT — SOCIAL DETERMINANTS OF HEALTH (SDOH): HOW HARD IS IT FOR YOU TO PAY FOR THE VERY BASICS LIKE FOOD, HOUSING, MEDICAL CARE, AND HEATING?: NOT HARD AT ALL

## 2022-07-07 NOTE — PROGRESS NOTES
Follow Up Visit Established Patient Visit    Patient:  Saul Herr                                               : 1950  Age: 70 y.o. MRN: [id number]  Date : 2022    Saul Herr is a 70 y.o. female who presents for : Elevation of ongoing severe occipital area headache. Chief Complaint   Patient presents with    Concussion     with persistent headaches     Anusha Serge off her wheelchair on Bita/fifth/2022 and hit her occipital area of the skull. She was evaluated at the emergency department of the [facility]..  CT scan of the head and cervical spine was negative for acute intracranial/skull abnormality nor cervical spine fracture  Continues to have severe headache. She also reports some mild nausea. She grades her pain 78 out of 10 in intensity.   Percocet provides some mild symptom relief    Past Medical History:   Diagnosis Date    Arthritis     [facility]bral [facility] occlusion with [facility] infarction [facility]     Compression fracture     back due to fall    Concussion     due to fall    DVT (deep venous thrombosis) ([facility] 2017    RLE after TKR    Environmental allergies     Essential hypertension, benign 2010    GERD (gastroesophageal reflux disease)     History of peptic ulcer     Hx of blood clots     Hyperlipidemia 2010    Lacunar infarction [facility])     old - per MRI     MRSA (methicillin resistant staph aureus) culture positive 2018    knee    Restrictive airway disease     allergy induced    Retention of urine     Wound, open 2018    left shin area, healing well, tx in wound care center       Past Surgical History:   Procedure Laterality Date     SECTION      times 2    CHOLECYSTECTOMY      COLONOSCOPY      HYSTERECTOMY (CERVIX STATUS UNKNOWN)      KNEE ARTHROPLASTY Right 2018     RIGHT REVISION TOTAL KNEE ARTHROPLASTY    KNEE ARTHROSCOPY Left     Dr. Amando Traylor Right 2018    RIGHT REVISION ARTHROSCOPY FEMORAL COMPONENT, SYNOVECTOMY performed by Roland Roldan MD at [address] Right 12/7/2018    RIGHT KNEE QUADRICEP TENDON REPAIR WITH ALLOGRAFT AUGMENTATION performed by Roland Roldan MD at [facility] ARTHROSCOPY Right     TOTAL KNEE ARTHROPLASTY Right 01/2017       Current Outpatient Medications   Medication Sig Dispense Refill    aspirin 81 MG EC tablet Take 81 mg by mouth daily      oxyCODONE-acetaminophen (PERCOCET) 5-325 MG per tablet Take 1 tablet by mouth 2 times daily as needed for Pain for up to 14 days. Intended supply: 14 days. Take lowest dose possible to manage pain 28 tablet 0    atorvastatin (LIPITOR) 80 MG tablet Take 1 tablet by mouth nightly 90 tablet 3    hydrALAZINE (APRESOLINE) 25 MG tablet Take 1 tablet by mouth every 8 hours 270 tablet 3    eszopiclone (ESZOPICLONE) 3 MG TABS Take 1 tablet by mouth nightly as needed (sleep). 90 tablet 3    ondansetron (ZOFRAN) 4 MG tablet Take 1 tablet by mouth 3 times daily as needed for Nausea or Vomiting 90 tablet 3    oxybutynin (DITROPAN-XL) 10 MG extended release tablet Take 1 tablet by mouth daily 90 tablet 3    busPIRone (BUSPAR) 10 MG tablet Take 1 tablet by mouth daily 90 tablet 3    chlorthalidone (HYGROTON) 25 MG tablet Take 1 tablet by mouth daily 90 tablet 3    escitalopram (LEXAPRO) 10 MG tablet TAKE 1 TABLET DAILY 90 tablet 1    omeprazole (PRILOSEC) 20 MG delayed release capsule TAKE ONE CAPSULE BY MOUTH EVERY MORNING BEFORE BREAKFAST 90 capsule 1    KLOR-CON M20 20 MEQ extended release tablet TAKE 1 TABLET DAILY 90 tablet 3    albuterol sulfate HFA (VENTOLIN HFA) 108 (90 Base) MCG/ACT inhaler Inhale 2 puffs into the lungs 4 times daily as needed for Wheezing 54 g 1    NIFEdipine (ADALAT CC) 30 MG extended release tablet Take 1 tablet by mouth 2 times daily 180 tablet 3     No current facility-administered medications for this visit.        BP (!) 140/80   Pulse 88   Ht 5' 7\" (1.702 m)   BMI 33.17 kg/m²     Physical Exam   Physical Exam  Vitals and nursing note reviewed. Constitutional:       General: She is in acute distress (Mild distress due to severe headache). Appearance: Normal appearance. She is well-developed. HENT:      Head: Normocephalic and atraumatic. Comments: Tenderness to palpation of occipital area of her scalp     Mouth/Throat:      Mouth: Mucous membranes are moist.      Pharynx: Oropharynx is clear. No oropharyngeal exudate or posterior oropharyngeal erythema. Eyes:      General: No scleral icterus. Pupils: Pupils are equal, round, and reactive to light. Neck:      Vascular: No carotid bruit or JVD. Cardiovascular:      Rate and Rhythm: Normal rate and regular rhythm. Pulses: Normal pulses. Heart sounds: Normal heart sounds. No murmur heard. No friction rub. No gallop. Pulmonary:      Effort: Pulmonary effort is normal. No respiratory distress. Breath sounds: Normal breath sounds. No wheezing or rales. Abdominal:      General: Bowel sounds are normal. There is no distension. Palpations: Abdomen is soft. Tenderness: There is no abdominal tenderness. There is no right CVA tenderness or left CVA tenderness. Musculoskeletal:         General: Normal range of motion. Cervical back: Normal range of motion. Tenderness (Bilateral paraspinal tenderness to palpation of the lower portion of cervical spine. Pain is worse on forward flexion and turning head to the sides) present. Right lower leg: No edema. Left lower leg: No edema. Lymphadenopathy:      Cervical: No cervical adenopathy. Skin:     General: Skin is warm and dry. Capillary Refill: Capillary refill takes less than 2 seconds. Findings: No erythema or rash. Neurological:      General: No focal deficit present. Mental Status: She is alert and oriented to person, place, and time. Cranial Nerves: No cranial nerve deficit. Psychiatric:         Mood and Affect: Mood normal.     Ambulates in electric wheelchair    Assessment/ plan:     Juan Daniel Hansen was seen today for concussion. Diagnoses and all orders for this visit:    Severe headache, most probably secondary to postconcussive syndrome and whiplash neck injury  -     oxyCODONE-acetaminophen (PERCOCET) 5-325 MG per tablet; Take 1 tablet by mouth 2 times daily as needed for Pain for up to 14 days. Intended supply: 14 days. Take lowest dose possible to manage pain  -     methylPREDNISolone acetate (DEPO-MEDROL) injection 80 mg  -    Encouraged to use local applications of lidocaine patch and dry heat for pain in her neck    Cervicalgia  -     As above    Hyperlipidemia, unspecified hyperlipidemia type  -     atorvastatin (LIPITOR) 80 MG tablet; Take 1 tablet by mouth nightly        Return if symptoms worsen or fail to improve.     Ethan Soliman MD

## 2022-07-13 RX ORDER — ESZOPICLONE 3 MG/1
TABLET, FILM COATED ORAL
Qty: 90 TABLET | Refills: 0 | Status: SHIPPED
Start: 2022-07-13 | End: 2022-09-28

## 2022-07-18 RX ORDER — NIFEDIPINE 30 MG/1
30 TABLET, FILM COATED, EXTENDED RELEASE ORAL 2 TIMES DAILY
Qty: 180 TABLET | Refills: 3 | OUTPATIENT
Start: 2022-07-18

## 2022-07-18 RX ORDER — ESCITALOPRAM OXALATE 10 MG/1
TABLET ORAL
Qty: 90 TABLET | Refills: 1 | Status: SHIPPED | OUTPATIENT
Start: 2022-07-18

## 2022-07-18 NOTE — TELEPHONE ENCOUNTER
Refill    escitalopram (LEXAPRO) 10 MG tablet         Pharm  [facility] [address] - F [phone]

## 2022-07-18 NOTE — TELEPHONE ENCOUNTER
Pt calling for a 90 days supply NIFEdipine (ADALAT CC) 30 MG extended release tablet         Pharm  [facility], [address] [facility] [phone] - f [phone]          Please advise

## 2022-07-25 RX ORDER — NIFEDIPINE 30 MG/1
30 TABLET, FILM COATED, EXTENDED RELEASE ORAL 2 TIMES DAILY
Qty: 180 TABLET | Refills: 1 | Status: SHIPPED | OUTPATIENT
Start: 2022-07-25

## 2022-07-25 RX ORDER — OMEPRAZOLE 20 MG/1
CAPSULE, DELAYED RELEASE ORAL
Qty: 90 CAPSULE | Refills: 1 | Status: SHIPPED | OUTPATIENT
Start: 2022-07-25

## 2022-07-25 NOTE — TELEPHONE ENCOUNTER
Pt needs a refill on   omeprazole (PRILOSEC) 20 MG delayed release capsule     NIFEdipine (ADALAT CC) 30 MG extended release tablet         Pharm [id number], [place] - [address] - P [phone] - F [phone]      Please advise

## 2022-07-26 ENCOUNTER — OFFICE VISIT (OUTPATIENT)
Dept: CARDIOLOGY CLINIC | Age: 72
End: 2022-07-26
Payer: MEDICARE

## 2022-07-26 VITALS
HEIGHT: 67 IN | DIASTOLIC BLOOD PRESSURE: 66 MMHG | HEART RATE: 68 BPM | BODY MASS INDEX: 33.17 KG/M2 | SYSTOLIC BLOOD PRESSURE: 134 MMHG | OXYGEN SATURATION: 95 %

## 2022-07-26 DIAGNOSIS — I70.511: ICD-10-CM

## 2022-07-26 DIAGNOSIS — I10 HTN (HYPERTENSION), BENIGN: Primary | ICD-10-CM

## 2022-07-26 DIAGNOSIS — I73.9 PAD (PERIPHERAL ARTERY DISEASE) (HCC): ICD-10-CM

## 2022-07-26 DIAGNOSIS — I87.2 VENOUS INSUFFICIENCY OF BOTH LOWER EXTREMITIES: ICD-10-CM

## 2022-07-26 DIAGNOSIS — I73.9 CLAUDICATION (HCC): ICD-10-CM

## 2022-07-26 DIAGNOSIS — M79.89 SWELLING OF BOTH LOWER EXTREMITIES: ICD-10-CM

## 2022-07-26 DIAGNOSIS — I10 ESSENTIAL HYPERTENSION: ICD-10-CM

## 2022-07-26 PROCEDURE — 1123F ACP DISCUSS/DSCN MKR DOCD: CPT | Performed by: INTERNAL MEDICINE

## 2022-07-26 PROCEDURE — 93000 ELECTROCARDIOGRAM COMPLETE: CPT | Performed by: INTERNAL MEDICINE

## 2022-07-26 PROCEDURE — 99204 OFFICE O/P NEW MOD 45 MIN: CPT | Performed by: INTERNAL MEDICINE

## 2022-07-26 RX ORDER — M-VIT,TX,IRON,MINS/CALC/FOLIC 27MG-0.4MG
1 TABLET ORAL DAILY
COMMUNITY

## 2022-07-26 RX ORDER — OXYCODONE HYDROCHLORIDE AND ACETAMINOPHEN 5; 325 MG/1; MG/1
1 TABLET ORAL EVERY 8 HOURS PRN
COMMUNITY

## 2022-07-26 RX ORDER — CALCIUM CARBONATE 300MG(750)
1 TABLET,CHEWABLE ORAL DAILY
COMMUNITY

## 2022-07-26 RX ORDER — CILOSTAZOL 50 MG/1
50 TABLET ORAL 2 TIMES DAILY
Qty: 180 TABLET | Refills: 3 | Status: SHIPPED | OUTPATIENT
Start: 2022-07-26

## 2022-07-26 NOTE — PROGRESS NOTES
Cardiology Consultation     Josi Cordova  1950    PCP: Ashok Mancilla MD  Referring Physician: Dr. Beena Hugo  Reason for Referral: Abnormal DAMARIS  Chief Complaint:   Chief Complaint   Patient presents with    New Patient     New patient x abnormal arterial duplex study       Subjective:   History of Present Illness: The patient is 70 y.o. female with a past medical history significant for hypertension, hyperlipidemia, lymphedema, prior DVT and CVA. She began to experience pain in her toes that would present with laying down at night. This has worsened for her her and is now bilateral. She presents in a motorized scooter to aid in ambulation due to several right lower extremity surgeries. She has experienced episodes of discoloration in her feet that looked as though they were bruised. This has resolved and not recurred in several months. She will swim in the pool for exercise and denies exertional symptoms including chest pain or shortness of breath. She reports a long history of lower extremity swelling.      Past Medical History:   Diagnosis Date    Arthritis     Cerebral artery occlusion with cerebral infarction ([facility] [address]     Compression fracture     back due to fall    Concussion     due to fall    DVT (deep venous thrombosis) ([facility] [address] 2017    RLE after TKR    Environmental allergies     Essential hypertension, benign 2010    GERD (gastroesophageal reflux disease)     History of peptic ulcer     Hx of blood clots     Hyperlipidemia 2010    Lacunar infarction [facility])     old - per MRI     MRSA (methicillin resistant staph aureus) culture positive 2018    knee    Restrictive airway disease     allergy induced    Retention of urine     Wound, open 2018    left shin area, healing well, tx in wound care center     Past Surgical History:   Procedure Laterality Date     SECTION      times 2    CHOLECYSTECTOMY      COLONOSCOPY      HYSTERECTOMY (CERVIX STATUS UNKNOWN)      KNEE ARTHROPLASTY Right 2018     RIGHT REVISION TOTAL KNEE ARTHROPLASTY    KNEE ARTHROSCOPY Left     Dr. Carina Arevalo Right 2018    RIGHT REVISION ARTHROSCOPY FEMORAL COMPONENT, SYNOVECTOMY performed by Roland Roldan MD at [address] [place] [address] Right 2018    RIGHT KNEE QUADRICEP TENDON REPAIR WITH ALLOGRAFT AUGMENTATION performed by Roland Roldan MD at [facility] ARTHROSCOPY Right     TOTAL KNEE ARTHROPLASTY Right 2017     Family History   Problem Relation Age of Onset    Cancer Other     Hypertension Other     Kidney Disease Other      Social History     Tobacco Use    Smoking status: Former     Packs/day: 1.00     Years: 35.00     Pack years: 35.00     Types: Cigarettes     Quit date: 3/28/2005     Years since quittin.3    Smokeless tobacco: Never   Vaping Use    Vaping Use: Never used   Substance Use Topics    Alcohol use: Yes     Comment: occasional    Drug use: No       Allergies   Allergen Reactions    Codeine      Severe headaches    Demerol Other (See Comments)     Severe headache and over-sedation    Morphine Other (See Comments)     Makes her crazy    Tape Nancey Brine Tape] Other (See Comments)     Tears skin    Cabbage Nausea And Vomiting and Swelling    Erythromycin Nausea And Vomiting and Rash     Current Outpatient Medications   Medication Sig Dispense Refill    Multiple Vitamins-Minerals (THERAPEUTIC MULTIVITAMIN-MINERALS) tablet Take 1 tablet by mouth in the morning. Magnesium 400 MG TABS Take 1 tablet by mouth daily      omeprazole (PRILOSEC) 20 MG delayed release capsule TAKE ONE CAPSULE BY MOUTH EVERY MORNING BEFORE BREAKFAST 90 capsule 1    NIFEdipine (ADALAT CC) 30 MG extended release tablet Take 1 tablet by mouth in the morning and 1 tablet before bedtime.  180 tablet 1    escitalopram (LEXAPRO) 10 MG tablet TAKE 1 TABLET DAILY 90 tablet 1    eszopiclone (LUNESTA) 3 MG TABS TAKE ONE TABLET BY MOUTH ONCE NIGHTLY AS NEEDED FOR SLEEP 90 tablet 0    aspirin 81 MG EC tablet Take 81 mg by mouth daily      atorvastatin (LIPITOR) 80 MG tablet Take 1 tablet by mouth nightly 90 tablet 3    hydrALAZINE (APRESOLINE) 25 MG tablet Take 1 tablet by mouth every 8 hours 270 tablet 3    ondansetron (ZOFRAN) 4 MG tablet Take 1 tablet by mouth 3 times daily as needed for Nausea or Vomiting 90 tablet 3    oxybutynin (DITROPAN-XL) 10 MG extended release tablet Take 1 tablet by mouth daily 90 tablet 3    chlorthalidone (HYGROTON) 25 MG tablet Take 1 tablet by mouth daily 90 tablet 3    KLOR-CON M20 20 MEQ extended release tablet TAKE 1 TABLET DAILY 90 tablet 3    albuterol sulfate HFA (VENTOLIN HFA) 108 (90 Base) MCG/ACT inhaler Inhale 2 puffs into the lungs 4 times daily as needed for Wheezing 54 g 1    oxyCODONE-acetaminophen (PERCOCET) 5-325 MG per tablet Take 1 tablet by mouth every 8 hours as needed for Pain. No current facility-administered medications for this visit. Review of Systems:  Constitutional: No unanticipated weight loss. There's been no change in energy level, sleep pattern, or activity level. No fevers, chills. Eyes: No visual changes or diplopia. No scleral icterus. ENT: No Headaches, hearing loss or vertigo. No mouth sores or sore throat. Cardiovascular: as reviewed in HPI  Respiratory: No cough or wheezing, no sputum production. No hemoptysis. Gastrointestinal: No abdominal pain, appetite loss, blood in stools. No change in bowel or bladder habits. Genitourinary: No dysuria, trouble voiding, or hematuria. Musculoskeletal:  No gait disturbance, no joint complaints. Integumentary: No rash or pruritis. Neurological: No headache, diplopia, change in muscle strength, numbness or tingling. Psychiatric: No anxiety or depression. Endocrine: No temperature intolerance. No excessive thirst, fluid intake, or urination. No tremor.   Hematologic/Lymphatic: No abnormal bruising or bleeding, blood clots or swollen lymph nodes. Allergic/Immunologic: No nasal congestion or hives. Physical Exam:   /66   Pulse 68   Ht 5' 7\" (1.702 m)   SpO2 95%   BMI 33.17 kg/m²   Wt Readings from Last 3 Encounters:   11/23/20 211 lb 12.8 oz (96.1 kg)   04/16/20 214 lb (97.1 kg)   05/17/19 214 lb (97.1 kg)     Constitutional: She is oriented to person, place, and time. She appears well-developed and well-nourished. In no acute distress. Head: Normocephalic and atraumatic. Pupils equal and round. Neck: Neck supple. No JVP or carotid bruit appreciated. No mass and no thyromegaly present. No lymphadenopathy present. Cardiovascular: Normal rate. Normal heart sounds. Exam reveals no gallop and no friction rub. No murmur heard. Pulmonary/Chest: Effort normal and breath sounds normal. No respiratory distress. She has no wheezes, rhonchi or rales. Abdominal: Soft, non-tender. Bowel sounds are normal. She exhibits no organomegaly, mass or bruit. Extremities: No edema. No cyanosis or clubbing. Pulses are 2+ radial/carotid bilaterally. Neurological: No gross cranial nerve deficit. Coordination normal.   Skin: Skin is warm and dry. There is no rash or diaphoresis. Psychiatric: She has a normal mood and affect. Her speech is normal and behavior is normal.     Lab Review:   FLP:    Lab Results   Component Value Date/Time    TRIG 106 10/04/2021 03:25 PM    HDL 52 10/04/2021 03:25 PM    HDL 51 06/14/2010 08:01 AM    LDLCALC 83 10/04/2021 03:25 PM    LABVLDL 21 10/04/2021 03:25 PM     BUN/Creatinine:    Lab Results   Component Value Date/Time    BUN 28 06/05/2022 12:30 PM    CREATININE 0.8 06/05/2022 12:30 PM     PT/INR, TNI, HGB A1C:   Lab Results   Component Value Date/Time    TROPONINI <0.01 06/05/2022 12:30 PM    LABA1C 5.6 10/04/2021 03:25 PM      No results found for: CBCAUTODIF      Echo 11/21/20  A bubble study was performed and fails to show evidence of shunting.   Left ventricular systolic function is normal with ejection fraction  estimated at 60-65 %. No regional wall motion abnormalities are noted. There is mild concentric left ventricular hypertrophy. There is reversal of E/A inflow velocities across the mitral valve. The ascending aorta is mildly dilated. Aortic valve appears sclerotic but opens adequately. Systolic pulmonary artery pressure (SPAP) is normal and estimated at 19 mmHg  (right atrial pressure 3 mmHg). IVC is normal in size (< 2.1 cm) and collapses > 50% with respiration  consistent with normal RA pressure (3 mmHg). Lower extremity arterial duplex 6/3/22  Right Impression   Technically difficult and limited exam due to patient immobility (cannot   straighten right leg due to multiple knee surgeries) and calcific plaque. The right ankle/brachial index is 0.99 (the DP is 130 and the PT is 154 mmHg). The common femoral artery demonstrates multiphasic flow indicating no   significant inflow disease; multiphasic flow is noted throughout the right   lower extremity. Elevated velocities involving the distal superficial femoral artery consistent   with 50-70% stenosis per velocity criteria. Mild to moderate atherosclerotic plaque throughout. Left Impression   The left ankle/brachial index is 0.88 (the DP is 110, the PT is 138 mmHg). The common femoral artery demonstrates multiphasic flow indicating no   significant inflow disease; multiphasic flow is noted throughout the left   lower extremity. The mid anterior tibial artery demonstrates moderate plaque deposits with   subtle flow; no flow detected in the distal anterior tibial artery. Mild to moderate atherosclerotic plaque throughout.        All above diagnostic testing and laboratory data was independently visualized and reviewed by me (not simply review of report)       Assessment and Plan   1) Lower extremity swelling/pain   DAMARIS does show right SFA stenosis  Begin Pletal 50 mg BID   Complete CTA to further assess degree of stenosis    2) Lower extremity swelling  Complete reflux study     3) Essential hypertension   Well controlled today in the office   Continue current medical therapy     4) Hyperlipidemia   Continues statin therapy     Follow up in 3-4 months. Thank you very much for allowing me to participate in the care of your patient. Please do not hesitate to contact me if you have any questions. Lupillo Osman MD [address] [facility], Interventional Cardiology, and Peripheral Vascular Disease   [facility]   Ph: [phone]  Fax: [phone]    Physician Attestation:  The scribes documentation has been prepared under my direction and personally reviewed by me in its entirety. I confirm the note above accurately reflects all work, treatment, procedures, and medical decision making performed by me.     Electronically signed by Erin Jack MD on 8/14/2022 at 10:16 PM

## 2022-08-10 ENCOUNTER — OFFICE VISIT (OUTPATIENT)
Dept: INTERNAL MEDICINE CLINIC | Age: 72
End: 2022-08-10
Payer: MEDICARE

## 2022-08-10 VITALS
SYSTOLIC BLOOD PRESSURE: 123 MMHG | HEIGHT: 67 IN | BODY MASS INDEX: 33.17 KG/M2 | HEART RATE: 81 BPM | DIASTOLIC BLOOD PRESSURE: 73 MMHG

## 2022-08-10 DIAGNOSIS — R60.0 BILATERAL LOWER EXTREMITY EDEMA: ICD-10-CM

## 2022-08-10 DIAGNOSIS — I10 ESSENTIAL HYPERTENSION: ICD-10-CM

## 2022-08-10 DIAGNOSIS — F41.1 GENERALIZED ANXIETY DISORDER: ICD-10-CM

## 2022-08-10 DIAGNOSIS — R51.9 SEVERE HEADACHE: Primary | ICD-10-CM

## 2022-08-10 PROCEDURE — G8427 DOCREV CUR MEDS BY ELIG CLIN: HCPCS | Performed by: HOSPITALIST

## 2022-08-10 PROCEDURE — 99214 OFFICE O/P EST MOD 30 MIN: CPT | Performed by: HOSPITALIST

## 2022-08-10 PROCEDURE — 3017F COLORECTAL CA SCREEN DOC REV: CPT | Performed by: HOSPITALIST

## 2022-08-10 PROCEDURE — G8399 PT W/DXA RESULTS DOCUMENT: HCPCS | Performed by: HOSPITALIST

## 2022-08-10 PROCEDURE — 1090F PRES/ABSN URINE INCON ASSESS: CPT | Performed by: HOSPITALIST

## 2022-08-10 PROCEDURE — G8417 CALC BMI ABV UP PARAM F/U: HCPCS | Performed by: HOSPITALIST

## 2022-08-10 PROCEDURE — 1036F TOBACCO NON-USER: CPT | Performed by: HOSPITALIST

## 2022-08-10 PROCEDURE — 1123F ACP DISCUSS/DSCN MKR DOCD: CPT | Performed by: HOSPITALIST

## 2022-08-10 NOTE — PROGRESS NOTES
Follow Up Visit Established Patient Visit    Patient:  Lakhwinder Villa                                               : 1950  Age: 70 y.o. MRN: [id number]  Date : 8/10/2022    Lakhwinder Villa is a 70 y.o. female who presents for :  follow up appointment    Chief Complaint   Patient presents with    3 Month Follow-Up     Headaches are much better. Continues to have bilateral leg swelling. Exercises in her home pool daily for about 1 hour. Does not report chest pain/shortness of breath. Mood is well controlled with use of Lexapro 10 mg daily. Heartburn is controlled with regular use of Prilosec 20 mg daily. Checks blood pressure frequently. Was recently evaluated by Dr. Isaiah Leggett MD, cardiology; cilostazol 50 mg orally twice a day was added for treatment of peripheral arterial disease.   She will have CTA of abdominal aorta with bilateral runoff with and without contrast to evaluate for peripheral arterial disease at the end of August.  She also will have bilateral reflex vein insufficiency study on 2022    Past Medical History:   Diagnosis Date    Arthritis     Cerebral artery occlusion with cerebral infarction ([facility] [address]     Compression fracture     back due to fall    Concussion     due to fall    DVT (deep venous thrombosis) ([facility] [address] 2017    RLE after TKR    Environmental allergies     Essential hypertension, benign 2010    GERD (gastroesophageal reflux disease)     History of peptic ulcer     Hx of blood clots     Hyperlipidemia 2010    Lacunar infarction [facility])     old - per MRI     MRSA (methicillin resistant staph aureus) culture positive 2018    knee    Restrictive airway disease     allergy induced    Retention of urine     Wound, open 2018    left shin area, healing well, tx in wound care center       Past Surgical History:   Procedure Laterality Date     SECTION      times 2    CHOLECYSTECTOMY      COLONOSCOPY      HYSTERECTOMY (CERVIX STATUS UNKNOWN) KNEE ARTHROPLASTY Right 04/16/2018     RIGHT REVISION TOTAL KNEE ARTHROPLASTY    KNEE ARTHROSCOPY Left 2003    Dr. William Acuna Right 9/28/2018    RIGHT REVISION ARTHROSCOPY FEMORAL COMPONENT, SYNOVECTOMY performed by Willian Gan MD at [address] [place] [address] Right 12/7/2018    RIGHT KNEE QUADRICEP TENDON REPAIR WITH ALLOGRAFT AUGMENTATION performed by Willian Gan MD at [facility] ARTHROSCOPY Right     TOTAL KNEE ARTHROPLASTY Right 01/2017       Current Outpatient Medications   Medication Sig Dispense Refill    Multiple Vitamins-Minerals (THERAPEUTIC MULTIVITAMIN-MINERALS) tablet Take 1 tablet by mouth in the morning. Magnesium 400 MG TABS Take 1 tablet by mouth daily      oxyCODONE-acetaminophen (PERCOCET) 5-325 MG per tablet Take 1 tablet by mouth every 8 hours as needed for Pain. cilostazol (PLETAL) 50 MG tablet Take 1 tablet by mouth in the morning and 1 tablet before bedtime. . 180 tablet 3    omeprazole (PRILOSEC) 20 MG delayed release capsule TAKE ONE CAPSULE BY MOUTH EVERY MORNING BEFORE BREAKFAST 90 capsule 1    NIFEdipine (ADALAT CC) 30 MG extended release tablet Take 1 tablet by mouth in the morning and 1 tablet before bedtime.  180 tablet 1    escitalopram (LEXAPRO) 10 MG tablet TAKE 1 TABLET DAILY 90 tablet 1    eszopiclone (LUNESTA) 3 MG TABS TAKE ONE TABLET BY MOUTH ONCE NIGHTLY AS NEEDED FOR SLEEP 90 tablet 0    aspirin 81 MG EC tablet Take 81 mg by mouth daily      atorvastatin (LIPITOR) 80 MG tablet Take 1 tablet by mouth nightly 90 tablet 3    hydrALAZINE (APRESOLINE) 25 MG tablet Take 1 tablet by mouth every 8 hours 270 tablet 3    ondansetron (ZOFRAN) 4 MG tablet Take 1 tablet by mouth 3 times daily as needed for Nausea or Vomiting 90 tablet 3    chlorthalidone (HYGROTON) 25 MG tablet Take 1 tablet by mouth daily 90 tablet 3    KLOR-CON M20 20 MEQ extended release tablet TAKE 1 TABLET DAILY 90 tablet 3    albuterol sulfate HFA (VENTOLIN HFA) 108 (90 Base) MCG/ACT inhaler Inhale 2 puffs into the lungs 4 times daily as needed for Wheezing 54 g 1     No current facility-administered medications for this visit. /73   Pulse 81   Ht 5' 7\" (1.702 m)   BMI 33.17 kg/m²     Physical Exam  Vitals and nursing note reviewed. Constitutional:       General: She is not in acute distress. Appearance: Normal appearance. She is well-developed. HENT:      Head: Normocephalic and atraumatic. Right Ear: Tympanic membrane, ear canal and external ear normal. There is no impacted cerumen. Left Ear: Tympanic membrane, ear canal and external ear normal. There is no impacted cerumen. Nose:      Comments: Nasal mucosa is moderately swollen and mildly inflamed     Mouth/Throat:      Mouth: Mucous membranes are moist.      Pharynx: Oropharynx is clear. No oropharyngeal exudate or posterior oropharyngeal erythema. Eyes:      General: No scleral icterus. Pupils: Pupils are equal, round, and reactive to light. Neck:      Vascular: No carotid bruit or JVD. Cardiovascular:      Rate and Rhythm: Normal rate and regular rhythm. Heart sounds: Normal heart sounds. No murmur heard. No friction rub. No gallop. Pulmonary:      Effort: Pulmonary effort is normal. No respiratory distress. Breath sounds: Normal breath sounds. No wheezing or rales. Abdominal:      General: Bowel sounds are normal. There is no distension. Palpations: Abdomen is soft. Tenderness: There is no abdominal tenderness. There is no right CVA tenderness or left CVA tenderness. Musculoskeletal:         General: No tenderness. Normal range of motion. Cervical back: Normal range of motion and neck supple. Right lower leg: Edema present. Left lower leg: Edema present. Comments: 1+ bilateral below the knee lower extremity edema.   Decreased sensation over left foot   Lymphadenopathy:      Cervical: No cervical adenopathy. Skin:     General: Skin is warm and dry. Capillary Refill: Capillary refill takes less than 2 seconds. Findings: No erythema, lesion or rash. Neurological:      General: No focal deficit present. Mental Status: She is alert and oriented to person, place, and time. Cranial Nerves: No cranial nerve deficit. Sensory: No sensory deficit. Gait: Gait normal.      Deep Tendon Reflexes: Reflexes normal.   Psychiatric:         Mood and Affect: Mood normal.       Assessment/ plan:     Jamin Kirk was seen today for 3 month follow-up. Diagnoses and all orders for this visit:    Severe headache  Improved significantly    Essential hypertension  Controlled, continue current medications    Bilateral lower extremity edema  Multifactorial: Dependent edema, secondary to nifedipine for blood pressure control, and some degree of chronic venous insufficiency  Advised the patient to keep feet elevated whenever possible and start using knee-high compression stockings    Generalized anxiety disorder  Controlled, continue to take Lexapro    Return in about 3 months (around 11/10/2022) for HTN, anxiety, LE edema.     Rajwinder Reece MD

## 2022-08-31 ENCOUNTER — HOSPITAL ENCOUNTER (OUTPATIENT)
Dept: VASCULAR LAB | Age: 72
Discharge: HOME | End: 2022-08-31
Payer: MEDICARE

## 2022-08-31 ENCOUNTER — HOSPITAL ENCOUNTER (OUTPATIENT)
Dept: CT IMAGING | Age: 72
Discharge: HOME | End: 2022-08-31
Payer: MEDICARE

## 2022-08-31 DIAGNOSIS — I73.9 PAD (PERIPHERAL ARTERY DISEASE) (HCC): ICD-10-CM

## 2022-08-31 DIAGNOSIS — I73.9 CLAUDICATION (HCC): ICD-10-CM

## 2022-08-31 DIAGNOSIS — I70.511: ICD-10-CM

## 2022-08-31 PROCEDURE — 93970 EXTREMITY STUDY: CPT

## 2022-08-31 NOTE — PROGRESS NOTES
Patient presents for CTA runoff. The right leg is fixed in a flexed position at the knee. The patient explained she is unable to straighten the knee and this is a long-standing issue. Attempted to position the patient for CTA runoff, however much of the right leg sits outside the study field of view. Given this, the CT was deferred at this time. This was explained to the patient.     Jake Glass MD

## 2022-09-01 ENCOUNTER — TELEPHONE (OUTPATIENT)
Dept: CARDIOLOGY CLINIC | Age: 72
End: 2022-09-01

## 2022-09-01 NOTE — TELEPHONE ENCOUNTER
Discussed with . recommend proceeding with a peripheral angiogram with continued pain and unable to tolerate CTA. 9/15/22 at 730 patient to arrive at 6:45. Reviewed instructions with Laila Steen and tristan mendez/cristy. The morning of your procedure you will park in the hospital parking lot and report directly to the cath lab to check in.     Pre-Procedure Instructions   You will need to fast for at least 8 hours prior to procedure. No caffeine the morning of. All other medications can be taken in the morning with sips of water. You will need to take 325 mg aspirin the morning of. If you are currently taking 81 mg please take 4 tablets that morning. Do not use any lotions, creams or perfume the morning of procedure. Pre-procedure lab work will need to be completed 5-7 days prior to procedure. Please have a responsible adult to drive you home after procedure. We advise you have someone to stay with you for 24 hours following procedure for precautionary measures. Depending on procedure you may require an overnight stay. Cath lab will provide you with all post procedure instructions. If you have any questions regarding the procedure itself or medications, please call [phone] and ask to speak with a nurse. Published on [employer] and e-mail to Marky Gerber.

## 2022-09-01 NOTE — TELEPHONE ENCOUNTER
Iesha Rasmussen called in this morning, she states the reflux doppler she went to have done yesterday wasn't done because she cant completely extend her right leg. She would like a call concerning what needs to be done. She can be reached at [phone].

## 2022-09-08 DIAGNOSIS — I10 HTN (HYPERTENSION), BENIGN: ICD-10-CM

## 2022-09-08 DIAGNOSIS — I73.9 PAD (PERIPHERAL ARTERY DISEASE) (HCC): Primary | ICD-10-CM

## 2022-09-08 DIAGNOSIS — I73.9 CLAUDICATION (HCC): ICD-10-CM

## 2022-09-08 NOTE — TELEPHONE ENCOUNTER
I called and spoke to Sujey Mcmanus and reminded her to have her pre-procedure labs drawn, she stated she will get them drawn tomorrow, 9/9 at [facility].

## 2022-09-09 ENCOUNTER — HOSPITAL ENCOUNTER (OUTPATIENT)
Age: 72
Discharge: HOME | End: 2022-09-09
Payer: MEDICARE

## 2022-09-09 DIAGNOSIS — I10 HTN (HYPERTENSION), BENIGN: ICD-10-CM

## 2022-09-09 DIAGNOSIS — I73.9 CLAUDICATION (HCC): ICD-10-CM

## 2022-09-09 DIAGNOSIS — I73.9 PAD (PERIPHERAL ARTERY DISEASE) (HCC): ICD-10-CM

## 2022-09-09 LAB
ANION GAP SERPL CALCULATED.3IONS-SCNC: 13 MMOL/L (ref 3–16)
BUN BLDV-MCNC: 25 MG/DL (ref 7–20)
CALCIUM SERPL-MCNC: 9.1 MG/DL (ref 8.3–10.6)
CHLORIDE BLD-SCNC: 104 MMOL/L (ref 99–110)
CO2: 26 MMOL/L (ref 21–32)
CREAT SERPL-MCNC: 0.8 MG/DL (ref 0.6–1.2)
GFR AFRICAN AMERICAN: >60
GFR NON-AFRICAN AMERICAN: >60
GLUCOSE BLD-MCNC: 88 MG/DL (ref 70–99)
HCT VFR BLD CALC: 38.3 % (ref 36–48)
HEMOGLOBIN: 13.1 G/DL (ref 12–16)
MCH RBC QN AUTO: 31.4 PG (ref 26–34)
MCHC RBC AUTO-ENTMCNC: 34.3 G/DL (ref 31–36)
MCV RBC AUTO: 91.6 FL (ref 80–100)
PDW BLD-RTO: 14.3 % (ref 12.4–15.4)
PLATELET # BLD: 354 K/UL (ref 135–450)
PMV BLD AUTO: 7.2 FL (ref 5–10.5)
POTASSIUM SERPL-SCNC: 4.1 MMOL/L (ref 3.5–5.1)
RBC # BLD: 4.18 M/UL (ref 4–5.2)
SODIUM BLD-SCNC: 143 MMOL/L (ref 136–145)
WBC # BLD: 5.5 K/UL (ref 4–11)

## 2022-09-09 PROCEDURE — 85027 COMPLETE CBC AUTOMATED: CPT

## 2022-09-09 PROCEDURE — 80048 BASIC METABOLIC PNL TOTAL CA: CPT

## 2022-09-09 PROCEDURE — 36415 COLL VENOUS BLD VENIPUNCTURE: CPT

## 2022-09-12 NOTE — TELEPHONE ENCOUNTER
Dr. Lila Cm needs to move procedure to 2:30 pm with 1:00 pm arrival time. Spoke with patient and she is agreeable to move the time. Jase beck discussed with Forrest, cath lab manager who agreed to time change as well. Updated Magnolia Mccall.

## 2022-09-15 ENCOUNTER — HOSPITAL ENCOUNTER (OUTPATIENT)
Dept: CARDIAC CATH/INVASIVE PROCEDURES | Age: 72
Discharge: HOME | End: 2022-09-15
Payer: MEDICARE

## 2022-09-15 VITALS
SYSTOLIC BLOOD PRESSURE: 134 MMHG | OXYGEN SATURATION: 95 % | TEMPERATURE: 97.7 F | RESPIRATION RATE: 11 BRPM | DIASTOLIC BLOOD PRESSURE: 77 MMHG | HEART RATE: 61 BPM

## 2022-09-15 PROCEDURE — 36200 PLACE CATHETER IN AORTA: CPT

## 2022-09-15 PROCEDURE — 36200 PLACE CATHETER IN AORTA: CPT | Performed by: INTERNAL MEDICINE

## 2022-09-15 PROCEDURE — 75716 ARTERY X-RAYS ARMS/LEGS: CPT | Performed by: INTERNAL MEDICINE

## 2022-09-15 PROCEDURE — 2709999900 HC NON-CHARGEABLE SUPPLY

## 2022-09-15 PROCEDURE — 75716 ARTERY X-RAYS ARMS/LEGS: CPT

## 2022-09-15 PROCEDURE — 6360000004 HC RX CONTRAST MEDICATION: Performed by: INTERNAL MEDICINE

## 2022-09-15 PROCEDURE — C1894 INTRO/SHEATH, NON-LASER: HCPCS

## 2022-09-15 PROCEDURE — 2500000003 HC RX 250 WO HCPCS

## 2022-09-15 PROCEDURE — 75625 CONTRAST EXAM ABDOMINL AORTA: CPT

## 2022-09-15 PROCEDURE — 99152 MOD SED SAME PHYS/QHP 5/>YRS: CPT

## 2022-09-15 PROCEDURE — C1769 GUIDE WIRE: HCPCS

## 2022-09-15 PROCEDURE — 75625 CONTRAST EXAM ABDOMINL AORTA: CPT | Performed by: INTERNAL MEDICINE

## 2022-09-15 PROCEDURE — 99153 MOD SED SAME PHYS/QHP EA: CPT

## 2022-09-15 PROCEDURE — 6360000002 HC RX W HCPCS

## 2022-09-15 RX ORDER — GABAPENTIN 300 MG/1
300 CAPSULE ORAL 3 TIMES DAILY
Qty: 90 CAPSULE | Refills: 5 | Status: SHIPPED | OUTPATIENT
Start: 2022-09-16 | End: 2022-12-15

## 2022-09-15 RX ORDER — SODIUM CHLORIDE 0.9 % (FLUSH) 0.9 %
5-40 SYRINGE (ML) INJECTION PRN
Status: DISCONTINUED | OUTPATIENT
Start: 2022-09-15 | End: 2022-09-16 | Stop reason: HOSPADM

## 2022-09-15 RX ORDER — GABAPENTIN 300 MG/1
300 CAPSULE ORAL 3 TIMES DAILY
Status: DISCONTINUED | OUTPATIENT
Start: 2022-09-15 | End: 2022-09-16 | Stop reason: HOSPADM

## 2022-09-15 RX ORDER — SODIUM CHLORIDE 0.9 % (FLUSH) 0.9 %
5-40 SYRINGE (ML) INJECTION EVERY 12 HOURS SCHEDULED
Status: DISCONTINUED | OUTPATIENT
Start: 2022-09-15 | End: 2022-09-16 | Stop reason: HOSPADM

## 2022-09-15 RX ORDER — ACETAMINOPHEN 325 MG/1
650 TABLET ORAL EVERY 4 HOURS PRN
Status: DISCONTINUED | OUTPATIENT
Start: 2022-09-15 | End: 2022-09-16 | Stop reason: HOSPADM

## 2022-09-15 RX ORDER — SODIUM CHLORIDE 9 MG/ML
INJECTION, SOLUTION INTRAVENOUS PRN
Status: DISCONTINUED | OUTPATIENT
Start: 2022-09-15 | End: 2022-09-16 | Stop reason: HOSPADM

## 2022-09-15 RX ADMIN — IOPAMIDOL 90 ML: 755 INJECTION, SOLUTION INTRAVENOUS at 15:10

## 2022-09-15 NOTE — DISCHARGE INSTRUCTIONS
PERIPHERAL ANGIOGRAM- RADIAL ACCESS    Care of your puncture site:  Remove clear bandage 24 hours after the procedure. May shower in 24 hours  Inspect the site daily and gently clean using soap and water. Dry thoroughly and apply a Band-Aid. Normal Observations:  Soreness or tenderness which may last one week. Mild oozing from the incision site. Possible bruising that could last 2 weeks. Activity:  You may resume driving 24 hours following the procedure. You may resume normal activity in 3 days or after the wound heals. Avoid lifting more than 10 pounds for 3 days with affected arm. Do not make important / legal decisions within 24 hours after procedure. Nutrition:  Regular diet or as directed by your doctor. Drink at least 8 to 10 glasses of decaffeinated, non-alcoholic fluid for the next 24 hours to flush the x-ray dye used for your angiogram out of your body. Call your doctor immediately if your condition worsens, for any other concerns, for a follow-up appointment or if you experience any of the following:  Significant bleeding that does not stop after 10 minutes of applying firm pressure on the puncture site. Increased swelling of the wrist.  Unusual pain, numbness, or tingling of the wrist/arm. Any signs of infection such as: redness, yellow drainage at the site, swelling or pain. IF experiencing any recurrent chest pain, arm or jaw pain, shortness of breath,sweating,nausea & vomiting, lighteheadedness or sudden weak. Other Instructions:  Hold Metformin or Metformin containing drugs for 48 hours after procedure if applicable.

## 2022-09-19 DIAGNOSIS — N32.81 OVERACTIVE BLADDER: ICD-10-CM

## 2022-09-19 RX ORDER — OXYBUTYNIN CHLORIDE 10 MG/1
10 TABLET, EXTENDED RELEASE ORAL DAILY
Qty: 90 TABLET | Refills: 3 | Status: SHIPPED | OUTPATIENT
Start: 2022-09-19

## 2022-09-19 NOTE — TELEPHONE ENCOUNTER
Pt  needs a refill on   oxybutynin (DITROPAN-XL) 10 MG extended release tablet       [facility] [id number] Michael Rogers [place]      Please advise

## 2022-09-23 ENCOUNTER — TELEPHONE (OUTPATIENT)
Dept: CARDIOLOGY CLINIC | Age: 72
End: 2022-09-23

## 2022-09-23 NOTE — TELEPHONE ENCOUNTER
(compression/exercise/elevation):  [x] Yes [] No    Instructed on self-manual lymphatic drainage techniques (self-MLD): [x] Yes [] No  Instructed on appropriate skin/nail care practices: [] Yes [] No  Compression garments of at least 20-30mmHg: [] <4 weeks   [x] 1-5 months   [] 6-12 months  [] >1 year  Bandaging: [] <4 weeks   [x] 1-5 months   [] 6-12 months  [] >1 year  Elevation:   [] <4 weeks   [x] 1-5 months   [] 6-12 months  [] >1 year  Exercise:     [] <4 weeks   [x] 1-5 months   [] 6-12 months  [] >1 year      COMMENTS:

## 2022-09-26 ENCOUNTER — TELEPHONE (OUTPATIENT)
Dept: CARDIOLOGY CLINIC | Age: 72
End: 2022-09-26

## 2022-09-26 RX ORDER — GABAPENTIN 300 MG/1
300 CAPSULE ORAL 3 TIMES DAILY
Qty: 90 CAPSULE | Refills: 2 | Status: CANCELLED | OUTPATIENT
Start: 2022-09-26 | End: 2022-12-25

## 2022-09-26 NOTE — TELEPHONE ENCOUNTER
Dlmendoza Jaime called in this morning he said the pain is a lot less but her feet still feels like lead, she is wearing her compression hose and elevating her legs. She does not need a call back this is just an update       Medication Refill    Medication needing refilled:gabapentin (NEURONTIN)    Dosage of the medication: 300 MG capsule      How are you taking this medication (QD, BID, TID, QID, PRN): Take 1 capsule by mouth 3 times daily for 90 days.     30 or 90 day supply called in: 90     When will you run out of your medication:  She wanted togo a head and get it sent to mail order please     Which Pharmacy are we sending the medication to?:     Shahla Stafford, [address] [facility] [phone] - F [phone]   [place] [address]   Phone:  [phone]  Fax:  [phone]

## 2022-09-28 DIAGNOSIS — G47.9 SLEEP DIFFICULTIES: Primary | ICD-10-CM

## 2022-09-28 RX ORDER — ZOLPIDEM TARTRATE 5 MG/1
5 TABLET ORAL NIGHTLY PRN
Qty: 30 TABLET | Refills: 0 | Status: SHIPPED | OUTPATIENT
Start: 2022-09-28 | End: 2022-10-28

## 2022-09-28 NOTE — TELEPHONE ENCOUNTER
Patient called, medication she is currently taking for sleep is no longer covered by insurance. eszopiclone (LUNESTA) 3 MG TABS    Is there something else she can take to help her sleep?       [id number] Leeanna Comer, [address] [phone] - f [phone]    Please call and advise

## 2022-10-17 ENCOUNTER — TELEPHONE (OUTPATIENT)
Dept: CARDIOLOGY CLINIC | Age: 72
End: 2022-10-17

## 2022-10-17 NOTE — OP NOTE
[address]   Procedure Note    Chencho Gamboa  YOB: 1950  [id number]    Pre-procedure Diagnosis:   Bilateral lower extremity claudication    Post-procedure Diagnosis: Same    Procedure: 1) Percutaneous access to the Left radial artery. 2)  Abdominal aortogram with Bilateral lower extremity runoffs         Anesthesia: 1% Lidocaine solution with sedation    Surgeons/Assistants: Yajaira Hassan MD, MD [facility]    Estimated Blood Loss: Minimal    Complications: none    Specimens: none    Indications and consent: This is a 70y.o. year old female with Bilateral lower extremity claudication. Treatment options were discussed. Angiography was recommended to evaluate and possibly intervene. Risks, benefits, and alternatives were discussed prior to the procedure. Questions from the patient and family were answered and appropriate signed informed consent was obtained. Procedure: The patient was brought to the angiography suite and placed in a supine position. After appropriate identification of the patient and time out the patient was positioned, prepped, and draped in the usual sterile fashion. Local anesthetic was applied over the Left radial artery. The artery was accessed with a micro-punture needle kit and Seldinger technique was used to place a 5 Fr sheath. A pigtail catheter was positioned in the abdominal aorta at the level of the renal arteries. Abdominal angiogram displays:    Renal arteries: patent  Abdominal Aorta:  mild calcification.        Right Common Iliac:  0%  Stenosis  Right Internal Iliac:  is  patent  Right External Iliac:  is  patent  Left Common Iliac:  0% stenosis  Left Internal Iliac:  is  patent  Left External Iliac:  is  patent    The catheter was then pulled back to the level of the bifurcation and a bilateral lower extremity runoff was performed:    Right Leg  Common Femoral:   without disease   Profunda: patent without significant disease  Superficial femoral: mild disease. Popliteal: mild disease. Anterior Tibial: is  patent with mild disease  Posterior Tibial:  is  patent with mild disease  Peroneal: is  patent with mild disease      Left Leg  Common Femoral:   without disease   Profunda: patent without significant disease  Superficial femoral: mild disease. Popliteal: mild disease. Anterior Tibial: is  patent with mild disease  Posterior Tibial:  is  patent with mild disease  Peroneal: is  patent with mild disease      Plan:   Medical therapy for nonobstructive PAD   Supervised walking program     The patient tolerated the procedure well and was taken to recovery in stable condition.    A TR device was used with excellent hemostasis          Mert Millan MD [address] [facility], Interventional Cardiology, and Peripheral Vascular [address] [facility]   (C): [phone]  (O): [phone]

## 2022-10-17 NOTE — TELEPHONE ENCOUNTER
Vibha Thompson from [place] [address] called in stating that the following items were missing from the notes that were requested on 10/4/22: lymphedema diagnosis, compression, elevation and exercise time frame over a 4 week period. She would like for it to explain the reason for the reason as to why the following didn't work. Vibha Thompson wants to see if the doctor can addend the notes. Another fax is coming over with the needed information.

## 2022-10-17 NOTE — H&P
Interventional  Cardiology Consultation      [place]  1950     PCP: Irma Wright MD  Referring Physician: Dr. Rodrigo Chan  Reason for Referral: Abnormal DAMARIS  Chief Complaint:        Chief Complaint   Patient presents with    New Patient       New patient x abnormal arterial duplex study         Subjective:   History of Present Illness: The patient is 70 y.o. female with a past medical history significant for hypertension, hyperlipidemia, lymphedema, prior DVT and CVA. She began to experience pain in her toes that would present with laying down at night. This has worsened for her her and is now bilateral. She presents in a motorized scooter to aid in ambulation due to several right lower extremity surgeries. She has experienced episodes of discoloration in her feet that looked as though they were bruised. This has resolved and not recurred in several months. She will swim in the pool for exercise and denies exertional symptoms including chest pain or shortness of breath. She reports a long history of lower extremity swelling.       Past Medical History        Past Medical History:   Diagnosis Date    Arthritis      Cerebral artery occlusion with cerebral infarction ([facility] [address]      Compression fracture       back due to fall    Concussion       due to fall    DVT (deep venous thrombosis) ([facility] [address] 2017     RLE after TKR    Environmental allergies      Essential hypertension, benign 2010    GERD (gastroesophageal reflux disease)      History of peptic ulcer      Hx of blood clots      Hyperlipidemia 2010    Lacunar infarction [facility])       old - per MRI     MRSA (methicillin resistant staph aureus) culture positive 2018     knee    Restrictive airway disease       allergy induced    Retention of urine      Wound, open 2018     left shin area, healing well, tx in wound care center         Past Surgical History         Past Surgical History:   Procedure Laterality Date     SECTION         times 2    CHOLECYSTECTOMY        COLONOSCOPY        HYSTERECTOMY (CERVIX STATUS UNKNOWN)        KNEE ARTHROPLASTY Right 2018      RIGHT REVISION TOTAL KNEE ARTHROPLASTY    KNEE ARTHROSCOPY Left      Dr. Brian Moya Right 2018     RIGHT REVISION ARTHROSCOPY FEMORAL COMPONENT, SYNOVECTOMY performed by Tess Weeks MD at [address] [place] [address] Right 2018     RIGHT KNEE QUADRICEP TENDON REPAIR WITH ALLOGRAFT AUGMENTATION performed by Tess Weeks MD at [facility] ARTHROSCOPY Right      TOTAL KNEE ARTHROPLASTY Right 2017         Family History         Family History   Problem Relation Age of Onset    Cancer Other      Hypertension Other      Kidney Disease Other           Social History            Tobacco Use    Smoking status: Former       Packs/day: 1.00       Years: 35.00       Pack years: 35.00       Types: Cigarettes       Quit date: 3/28/2005       Years since quittin.3    Smokeless tobacco: Never   Vaping Use    Vaping Use: Never used   Substance Use Topics    Alcohol use: Yes       Comment: occasional    Drug use: No               Allergies   Allergen Reactions    Codeine         Severe headaches    Demerol Other (See Comments)       Severe headache and over-sedation    Morphine Other (See Comments)       Makes her crazy    Tape Ponce Aleksandra Tape] Other (See Comments)       Tears skin    Cabbage Nausea And Vomiting and Swelling    Erythromycin Nausea And Vomiting and Rash      Current Facility-Administered Medications          Current Outpatient Medications   Medication Sig Dispense Refill    Multiple Vitamins-Minerals (THERAPEUTIC MULTIVITAMIN-MINERALS) tablet Take 1 tablet by mouth in the morning.         Magnesium 400 MG TABS Take 1 tablet by mouth daily        omeprazole (PRILOSEC) 20 MG delayed release capsule TAKE ONE CAPSULE BY MOUTH EVERY MORNING BEFORE BREAKFAST 90 capsule 1    NIFEdipine (ADALAT CC) 30 MG extended release tablet Take 1 tablet by mouth in the morning and 1 tablet before bedtime. 180 tablet 1    escitalopram (LEXAPRO) 10 MG tablet TAKE 1 TABLET DAILY 90 tablet 1    eszopiclone (LUNESTA) 3 MG TABS TAKE ONE TABLET BY MOUTH ONCE NIGHTLY AS NEEDED FOR SLEEP 90 tablet 0    aspirin 81 MG EC tablet Take 81 mg by mouth daily        atorvastatin (LIPITOR) 80 MG tablet Take 1 tablet by mouth nightly 90 tablet 3    hydrALAZINE (APRESOLINE) 25 MG tablet Take 1 tablet by mouth every 8 hours 270 tablet 3    ondansetron (ZOFRAN) 4 MG tablet Take 1 tablet by mouth 3 times daily as needed for Nausea or Vomiting 90 tablet 3    oxybutynin (DITROPAN-XL) 10 MG extended release tablet Take 1 tablet by mouth daily 90 tablet 3    chlorthalidone (HYGROTON) 25 MG tablet Take 1 tablet by mouth daily 90 tablet 3    KLOR-CON M20 20 MEQ extended release tablet TAKE 1 TABLET DAILY 90 tablet 3    albuterol sulfate HFA (VENTOLIN HFA) 108 (90 Base) MCG/ACT inhaler Inhale 2 puffs into the lungs 4 times daily as needed for Wheezing 54 g 1    oxyCODONE-acetaminophen (PERCOCET) 5-325 MG per tablet Take 1 tablet by mouth every 8 hours as needed for Pain. No current facility-administered medications for this visit. Review of Systems:  Constitutional: No unanticipated weight loss. There's been no change in energy level, sleep pattern, or activity level. No fevers, chills. Eyes: No visual changes or diplopia. No scleral icterus. ENT: No Headaches, hearing loss or vertigo. No mouth sores or sore throat. Cardiovascular: as reviewed in HPI  Respiratory: No cough or wheezing, no sputum production. No hemoptysis. Gastrointestinal: No abdominal pain, appetite loss, blood in stools. No change in bowel or bladder habits. Genitourinary: No dysuria, trouble voiding, or hematuria. Musculoskeletal:  No gait disturbance, no joint complaints. Integumentary: No rash or pruritis.   Neurological: No headache, diplopia, change in muscle strength, numbness or tingling. Psychiatric: No anxiety or depression. Endocrine: No temperature intolerance. No excessive thirst, fluid intake, or urination. No tremor. Hematologic/Lymphatic: No abnormal bruising or bleeding, blood clots or swollen lymph nodes. Allergic/Immunologic: No nasal congestion or hives. Physical Exam:   /66   Pulse 68   Ht 5' 7\" (1.702 m)   SpO2 95%   BMI 33.17 kg/m²       Wt Readings from Last 3 Encounters:   11/23/20 211 lb 12.8 oz (96.1 kg)   04/16/20 214 lb (97.1 kg)   05/17/19 214 lb (97.1 kg)      Constitutional: She is oriented to person, place, and time. She appears well-developed and well-nourished. In no acute distress. Head: Normocephalic and atraumatic. Pupils equal and round. Neck: Neck supple. No JVP or carotid bruit appreciated. No mass and no thyromegaly present. No lymphadenopathy present. Cardiovascular: Normal rate. Normal heart sounds. Exam reveals no gallop and no friction rub. No murmur heard. Pulmonary/Chest: Effort normal and breath sounds normal. No respiratory distress. She has no wheezes, rhonchi or rales. Abdominal: Soft, non-tender. Bowel sounds are normal. She exhibits no organomegaly, mass or bruit. Extremities: No edema. No cyanosis or clubbing. Pulses are 2+ radial/carotid bilaterally. Neurological: No gross cranial nerve deficit. Coordination normal.   Skin: Skin is warm and dry. There is no rash or diaphoresis. Psychiatric: She has a normal mood and affect.  Her speech is normal and behavior is normal.      Lab Review:   FLP:          Lab Results   Component Value Date/Time     TRIG 106 10/04/2021 03:25 PM     HDL 52 10/04/2021 03:25 PM     HDL 51 06/14/2010 08:01 AM     LDLCALC 83 10/04/2021 03:25 PM     LABVLDL 21 10/04/2021 03:25 PM      BUN/Creatinine:          Lab Results   Component Value Date/Time     BUN 28 06/05/2022 12:30 PM     CREATININE 0.8 06/05/2022 12:30 PM      PT/INR, TNI, HGB A1C:         Lab Results Component Value Date/Time     TROPONINI <0.01 06/05/2022 12:30 PM     LABA1C 5.6 10/04/2021 03:25 PM      No results found for: CBCAUTODIF        Echo 11/21/20  A bubble study was performed and fails to show evidence of shunting. Left ventricular systolic function is normal with ejection fraction  estimated at 60-65 %. No regional wall motion abnormalities are noted. There is mild concentric left ventricular hypertrophy. There is reversal of E/A inflow velocities across the mitral valve. The ascending aorta is mildly dilated. Aortic valve appears sclerotic but opens adequately. Systolic pulmonary artery pressure (SPAP) is normal and estimated at 19 mmHg  (right atrial pressure 3 mmHg). IVC is normal in size (< 2.1 cm) and collapses > 50% with respiration  consistent with normal RA pressure (3 mmHg). Lower extremity arterial duplex 6/3/22  Right Impression   Technically difficult and limited exam due to patient immobility (cannot   straighten right leg due to multiple knee surgeries) and calcific plaque. The right ankle/brachial index is 0.99 (the DP is 130 and the PT is 154 mmHg). The common femoral artery demonstrates multiphasic flow indicating no   significant inflow disease; multiphasic flow is noted throughout the right   lower extremity. Elevated velocities involving the distal superficial femoral artery consistent   with 50-70% stenosis per velocity criteria. Mild to moderate atherosclerotic plaque throughout. Left Impression   The left ankle/brachial index is 0.88 (the DP is 110, the PT is 138 mmHg). The common femoral artery demonstrates multiphasic flow indicating no   significant inflow disease; multiphasic flow is noted throughout the left   lower extremity. The mid anterior tibial artery demonstrates moderate plaque deposits with   subtle flow; no flow detected in the distal anterior tibial artery. Mild to moderate atherosclerotic plaque throughout.          All above

## 2022-10-18 NOTE — TELEPHONE ENCOUNTER
Will complete documentation during office visit next Monday. Called and notified Pepper Conklin that once completed will fax the necessary information. She v/u.

## 2022-10-24 ENCOUNTER — OFFICE VISIT (OUTPATIENT)
Dept: CARDIOLOGY CLINIC | Age: 72
End: 2022-10-24
Payer: MEDICARE

## 2022-10-24 VITALS
OXYGEN SATURATION: 96 % | DIASTOLIC BLOOD PRESSURE: 74 MMHG | SYSTOLIC BLOOD PRESSURE: 128 MMHG | BODY MASS INDEX: 33.17 KG/M2 | HEART RATE: 56 BPM | HEIGHT: 67 IN

## 2022-10-24 DIAGNOSIS — I87.2 VENOUS INSUFFICIENCY OF BOTH LOWER EXTREMITIES: ICD-10-CM

## 2022-10-24 DIAGNOSIS — M79.89 SWELLING OF BOTH LOWER EXTREMITIES: ICD-10-CM

## 2022-10-24 DIAGNOSIS — I73.9 PAD (PERIPHERAL ARTERY DISEASE) (HCC): Primary | ICD-10-CM

## 2022-10-24 DIAGNOSIS — I10 ESSENTIAL HYPERTENSION: ICD-10-CM

## 2022-10-24 DIAGNOSIS — I10 HTN (HYPERTENSION), BENIGN: ICD-10-CM

## 2022-10-24 PROCEDURE — G8399 PT W/DXA RESULTS DOCUMENT: HCPCS | Performed by: INTERNAL MEDICINE

## 2022-10-24 PROCEDURE — G8417 CALC BMI ABV UP PARAM F/U: HCPCS | Performed by: INTERNAL MEDICINE

## 2022-10-24 PROCEDURE — G8484 FLU IMMUNIZE NO ADMIN: HCPCS | Performed by: INTERNAL MEDICINE

## 2022-10-24 PROCEDURE — 3074F SYST BP LT 130 MM HG: CPT | Performed by: INTERNAL MEDICINE

## 2022-10-24 PROCEDURE — 1123F ACP DISCUSS/DSCN MKR DOCD: CPT | Performed by: INTERNAL MEDICINE

## 2022-10-24 PROCEDURE — 3078F DIAST BP <80 MM HG: CPT | Performed by: INTERNAL MEDICINE

## 2022-10-24 PROCEDURE — 3017F COLORECTAL CA SCREEN DOC REV: CPT | Performed by: INTERNAL MEDICINE

## 2022-10-24 PROCEDURE — 1090F PRES/ABSN URINE INCON ASSESS: CPT | Performed by: INTERNAL MEDICINE

## 2022-10-24 PROCEDURE — G8427 DOCREV CUR MEDS BY ELIG CLIN: HCPCS | Performed by: INTERNAL MEDICINE

## 2022-10-24 PROCEDURE — 99214 OFFICE O/P EST MOD 30 MIN: CPT | Performed by: INTERNAL MEDICINE

## 2022-10-24 PROCEDURE — 1036F TOBACCO NON-USER: CPT | Performed by: INTERNAL MEDICINE

## 2022-10-24 NOTE — PROGRESS NOTES
Cardiology Consultation     Stefanie Schmidt  1950    PCP: Allen Cedillo MD  Referring Physician: Dr. Adan Vila  Reason for Referral: LE edema   Chief Complaint:   Chief Complaint   Patient presents with    Follow-up     Edema on both feet      Subjective:   History of Present Illness: The patient is 70 y.o. female with a past medical history significant for hypertension, hyperlipidemia, lymphedema, prior DVT and CVA. She reported recurrent pain, LE edema, and intermittent claudication symptoms. She underwent a peripheral angiogram that showed nonobstructive disease and referred for pneumatic compression boots. Today, she states overall she is doing well and presents on her motorized scooter. She denies any chest pains or worsening shortness of breath. She continues to report chronic LE edema. She reports compliance with her medications and tolerating. She denies any abnormal bleeding or bruising. Patient denies exertional chest pain/pressure, dyspnea at rest, JORGE, PND, orthopnea, palpitations, lightheadedness, weight changes, and syncope.     Past Medical History:   Diagnosis Date    Arthritis     Cerebral artery occlusion with cerebral infarction ([facility] [place] [address]     Compression fracture     back due to fall    Concussion     due to fall    DVT (deep venous thrombosis) ([facility] [place] [address] 2017    RLE after TKR    Environmental allergies     Essential hypertension, benign 2010    GERD (gastroesophageal reflux disease)     History of peptic ulcer     Hx of blood clots     Hyperlipidemia 2010    Lacunar infarction [facility])     old - per MRI     MRSA (methicillin resistant staph aureus) culture positive 2018    knee    Restrictive airway disease     allergy induced    Retention of urine     Wound, open 2018    left shin area, healing well, tx in wound care center     Past Surgical History:   Procedure Laterality Date     SECTION      times 2    CHOLECYSTECTOMY      COLONOSCOPY HYSTERECTOMY (CERVIX STATUS UNKNOWN)      KNEE ARTHROPLASTY Right 2018     RIGHT REVISION TOTAL KNEE ARTHROPLASTY    KNEE ARTHROSCOPY Left     Dr. Ryne Thompson Right 2018    RIGHT REVISION ARTHROSCOPY FEMORAL COMPONENT, SYNOVECTOMY performed by Evans Cline MD at [address] [place] [address] Right 2018    RIGHT KNEE QUADRICEP TENDON REPAIR WITH ALLOGRAFT AUGMENTATION performed by Evans Cline MD at [facility] ARTHROSCOPY Right     TOTAL KNEE ARTHROPLASTY Right 2017     Family History   Problem Relation Age of Onset    Cancer Other     Hypertension Other     Kidney Disease Other      Social History     Tobacco Use    Smoking status: Former     Packs/day: 1.00     Years: 35.00     Pack years: 35.00     Types: Cigarettes     Quit date: 3/28/2005     Years since quittin.5    Smokeless tobacco: Never   Vaping Use    Vaping Use: Never used   Substance Use Topics    Alcohol use: Yes     Comment: occasional    Drug use: No       Allergies   Allergen Reactions    Codeine      Severe headaches    Demerol Other (See Comments)     Severe headache and over-sedation    Morphine Other (See Comments)     Makes her crazy    Tape Bhavya An Tape] Other (See Comments)     Tears skin    Cabbage Nausea And Vomiting and Swelling    Erythromycin Nausea And Vomiting and Rash     Current Outpatient Medications   Medication Sig Dispense Refill    zolpidem (AMBIEN) 5 MG tablet Take 1 tablet by mouth nightly as needed for Sleep for up to 30 days. 30 tablet 0    oxybutynin (DITROPAN-XL) 10 MG extended release tablet Take 1 tablet by mouth daily 90 tablet 3    gabapentin (NEURONTIN) 300 MG capsule Take 1 capsule by mouth 3 times daily for 90 days. 90 capsule 5    Multiple Vitamins-Minerals (THERAPEUTIC MULTIVITAMIN-MINERALS) tablet Take 1 tablet by mouth in the morning.       Magnesium 400 MG TABS Take 1 tablet by mouth daily      oxyCODONE-acetaminophen (PERCOCET) 5-325 MG per tablet Take 1 tablet by mouth every 8 hours as needed for Pain. cilostazol (PLETAL) 50 MG tablet Take 1 tablet by mouth in the morning and 1 tablet before bedtime. . 180 tablet 3    omeprazole (PRILOSEC) 20 MG delayed release capsule TAKE ONE CAPSULE BY MOUTH EVERY MORNING BEFORE BREAKFAST 90 capsule 1    NIFEdipine (ADALAT CC) 30 MG extended release tablet Take 1 tablet by mouth in the morning and 1 tablet before bedtime. 180 tablet 1    escitalopram (LEXAPRO) 10 MG tablet TAKE 1 TABLET DAILY 90 tablet 1    aspirin 81 MG EC tablet Take 81 mg by mouth daily      atorvastatin (LIPITOR) 80 MG tablet Take 1 tablet by mouth nightly 90 tablet 3    hydrALAZINE (APRESOLINE) 25 MG tablet Take 1 tablet by mouth every 8 hours 270 tablet 3    ondansetron (ZOFRAN) 4 MG tablet Take 1 tablet by mouth 3 times daily as needed for Nausea or Vomiting 90 tablet 3    chlorthalidone (HYGROTON) 25 MG tablet Take 1 tablet by mouth daily 90 tablet 3    KLOR-CON M20 20 MEQ extended release tablet TAKE 1 TABLET DAILY 90 tablet 3    albuterol sulfate HFA (VENTOLIN HFA) 108 (90 Base) MCG/ACT inhaler Inhale 2 puffs into the lungs 4 times daily as needed for Wheezing 54 g 1     No current facility-administered medications for this visit. Review of Systems:  Constitutional: No unanticipated weight loss. There's been no change in energy level, sleep pattern, or activity level. No fevers, chills. Eyes: No visual changes or diplopia. No scleral icterus. ENT: No Headaches, hearing loss or vertigo. No mouth sores or sore throat. Cardiovascular: as reviewed in HPI  Respiratory: No cough or wheezing, no sputum production. No hemoptysis. Gastrointestinal: No abdominal pain, appetite loss, blood in stools. No change in bowel or bladder habits. Genitourinary: No dysuria, trouble voiding, or hematuria. Musculoskeletal:  No gait disturbance, no joint complaints. Integumentary: No rash or pruritis.   Neurological: No headache, diplopia, change in muscle strength, numbness or tingling. Psychiatric: No anxiety or depression. Endocrine: No temperature intolerance. No excessive thirst, fluid intake, or urination. No tremor. Hematologic/Lymphatic: No abnormal bruising or bleeding, blood clots or swollen lymph nodes. Allergic/Immunologic: No nasal congestion or hives. Physical Exam:   /74   Pulse 56   Ht 5' 7\" (1.702 m)   SpO2 96%   BMI 33.17 kg/m²   Wt Readings from Last 3 Encounters:   11/23/20 211 lb 12.8 oz (96.1 kg)   04/16/20 214 lb (97.1 kg)   05/17/19 214 lb (97.1 kg)     Constitutional: She is oriented to person, place, and time. She appears well-developed and well-nourished. In no acute distress. Head: Normocephalic and atraumatic. Pupils equal and round. Neck: Neck supple. No JVP or carotid bruit appreciated. No mass and no thyromegaly present. No lymphadenopathy present. Cardiovascular: Normal rate. Normal heart sounds. Exam reveals no gallop and no friction rub. No murmur heard. Pulmonary/Chest: Effort normal and breath sounds normal. No respiratory distress. She has no wheezes, rhonchi or rales. Abdominal: Soft, non-tender. Bowel sounds are normal. She exhibits no organomegaly, mass or bruit. Extremities: 2+ BLE edema. No cyanosis or clubbing. Pulses are 2+ radial/carotid bilaterally. Neurological: No gross cranial nerve deficit. Coordination normal.   Skin: Skin is warm and dry. There is no rash or diaphoresis. Psychiatric: She has a normal mood and affect.  Her speech is normal and behavior is normal.     Lab Review:   FLP:    Lab Results   Component Value Date/Time    TRIG 106 10/04/2021 03:25 PM    HDL 52 10/04/2021 03:25 PM    HDL 51 06/14/2010 08:01 AM    LDLCALC 83 10/04/2021 03:25 PM    LABVLDL 21 10/04/2021 03:25 PM     BUN/Creatinine:    Lab Results   Component Value Date/Time    BUN 25 09/09/2022 09:53 AM    CREATININE 0.8 09/09/2022 09:53 AM     PT/INR, TNI, HGB A1C:   Lab Results Component Value Date/Time    TROPONINI <0.01 06/05/2022 12:30 PM    LABA1C 5.6 10/04/2021 03:25 PM      No results found for: CBCAUTODIF    Echo 11/21/20  A bubble study was performed and fails to show evidence of shunting. Left ventricular systolic function is normal with ejection fraction  estimated at 60-65 %. No regional wall motion abnormalities are noted. There is mild concentric left ventricular hypertrophy. There is reversal of E/A inflow velocities across the mitral valve. The ascending aorta is mildly dilated. Aortic valve appears sclerotic but opens adequately. Systolic pulmonary artery pressure (SPAP) is normal and estimated at 19 mmHg  (right atrial pressure 3 mmHg). IVC is normal in size (< 2.1 cm) and collapses > 50% with respiration  consistent with normal RA pressure (3 mmHg). Lower extremity arterial duplex 6/3/22  Right Impression   Technically difficult and limited exam due to patient immobility (cannot   straighten right leg due to multiple knee surgeries) and calcific plaque. The right ankle/brachial index is 0.99 (the DP is 130 and the PT is 154 mmHg). The common femoral artery demonstrates multiphasic flow indicating no   significant inflow disease; multiphasic flow is noted throughout the right   lower extremity. Elevated velocities involving the distal superficial femoral artery consistent   with 50-70% stenosis per velocity criteria. Mild to moderate atherosclerotic plaque throughout. Left Impression   The left ankle/brachial index is 0.88 (the DP is 110, the PT is 138 mmHg). The common femoral artery demonstrates multiphasic flow indicating no   significant inflow disease; multiphasic flow is noted throughout the left   lower extremity. The mid anterior tibial artery demonstrates moderate plaque deposits with   subtle flow; no flow detected in the distal anterior tibial artery. Mild to moderate atherosclerotic plaque throughout.      Peripheral 9/15/22  Medical therapy for nonobstructive PAD   Supervised walking program     DIAGNOSIS ASSESSMENT:  Lymphedema is caused by:  Lymphedema, not elsewhere m classified [I89.0] (includes CVI-related lymphedema) [x] Yes [] No     SWELLING SEVERITY:   Patient exhibits the following swelling severity (International Society of Lymphology clinical classification):  [] Stage 0: Latent or subclinical condition where swelling is not yet evident despite impaired lymph transport, subtle alterations in tissue fluid/composition and changes in subjective symptoms. It may exist months or years before overt edema occurs (Stages I - III). [x] Stage I: Early accumulation of fluid relatively high in protein content (e.g., in comparison with ?enous edema) which subsides with limb elevation. Pitting may occur. An increase in various types of proliferating cells may also be seen.  [] Stage II: Limb elevation alone rarely reduces the tissue swelling and pitting is manifest.  Later in Stage II, the limb may not pit as excess subcutaneous fat and fibrosis develop. [] Stage III: Lymphostatic elephantiasis where pitting can be absent and trophic skin changes such as acanthosis, alterations in skin character and thickness, further deposition of fat and fibrosis, and warty overgrowths have developed.     SYMPTOMS DESPITE CONSERVATIVE THERAPY:  [] Hyperkeratosis  [] Hyperplasia  [] Hyperpigmentation  [] Skin breakdown with lymphorrhea (skin weeping)  [] Papillomatosis  [] Recurrent cellulitis  [] Fibrosis   [] Elephantiasis  [x] Progressive edema  [] Truncal/abdominal swelling  [] Chest/axillary swelling  [] Genital swelling  [x] Unable to control swelling  [x] Impaired ROM  [x] Impaired mobility  [x] Pain    CONSERVATIVE TREATMENT:  Patient has tried conservative treatments (compression/exercise/elevation):  [] Yes [x] No    Instructed on appropriate skin/nail care practices: [x] Yes [] No  Compression garments of at least 20-30mmHg: [x] <4 weeks   [] 1-5 months   [] 6-12 months  [] >1 year  Bandaging: [] <4 weeks   [x] 1-5 months   [] 6-12 months  [] >1 year  Elevation:   [] <4 weeks   [x] 1-5 months   [] 6-12 months  [] >1 year  Exercise:     [] <4 weeks   [x] 1-5 months   [] 6-12 months  [] >1 year    All above diagnostic testing and laboratory data was independently visualized and reviewed by me (not simply review of report)       Assessment and Plan   1) Lower extremity swelling/pain   -DAMARIS does show right SFA stenosis  -Peripheral showed nonobstructive PAD  -Continue ASA, statin and gabapentin   -Encouraged walking program     2) Lower extremity swelling /Lymphedema   -Normal reflux study   -Will arrange for pneumatic compression boots bilateral     3) Essential hypertension   -Well controlled today in the office   -Continue current medical therapy     4) Hyperlipidemia   -Continues statin therapy     Follow up in 6 months     Thank you very much for allowing me to participate in the care of your patient. Please do not hesitate to contact me if you have any questions. Rosa Rico MD [address] [facility], Interventional Cardiology, and Peripheral Vascular Disease   [facility]   Ph: [phone]  Fax: [phone]    This note was scribed in the presence of Dr Shavon Metzger MD by Lenny Peacock RN. Physician Attestation:  The scribes documentation has been prepared under my direction and personally reviewed by me in its entirety. I confirm the note above accurately reflects all work, treatment, procedures, and medical decision making performed by me.     Electronically signed by Jaskaran Cerda MD on 11/13/2022 at 3:24 PM

## 2022-11-08 DIAGNOSIS — G47.9 SLEEP DIFFICULTIES: ICD-10-CM

## 2022-11-08 RX ORDER — ZOLPIDEM TARTRATE 5 MG/1
TABLET ORAL
Qty: 30 TABLET | Refills: 2 | Status: SHIPPED | OUTPATIENT
Start: 2022-11-08 | End: 2022-12-08

## 2022-11-10 ENCOUNTER — OFFICE VISIT (OUTPATIENT)
Dept: INTERNAL MEDICINE CLINIC | Age: 72
End: 2022-11-10
Payer: MEDICARE

## 2022-11-10 VITALS
HEART RATE: 64 BPM | DIASTOLIC BLOOD PRESSURE: 66 MMHG | SYSTOLIC BLOOD PRESSURE: 124 MMHG | OXYGEN SATURATION: 98 % | TEMPERATURE: 98.2 F

## 2022-11-10 DIAGNOSIS — G81.94 LEFT HEMIPARESIS (HCC): ICD-10-CM

## 2022-11-10 DIAGNOSIS — R60.0 BILATERAL LOWER EXTREMITY EDEMA: ICD-10-CM

## 2022-11-10 DIAGNOSIS — N32.81 OVERACTIVE BLADDER: ICD-10-CM

## 2022-11-10 DIAGNOSIS — R51.9 SEVERE HEADACHE: ICD-10-CM

## 2022-11-10 DIAGNOSIS — I10 ESSENTIAL HYPERTENSION: Primary | ICD-10-CM

## 2022-11-10 PROCEDURE — G8484 FLU IMMUNIZE NO ADMIN: HCPCS | Performed by: HOSPITALIST

## 2022-11-10 PROCEDURE — G8427 DOCREV CUR MEDS BY ELIG CLIN: HCPCS | Performed by: HOSPITALIST

## 2022-11-10 PROCEDURE — 3078F DIAST BP <80 MM HG: CPT | Performed by: HOSPITALIST

## 2022-11-10 PROCEDURE — G8399 PT W/DXA RESULTS DOCUMENT: HCPCS | Performed by: HOSPITALIST

## 2022-11-10 PROCEDURE — 3074F SYST BP LT 130 MM HG: CPT | Performed by: HOSPITALIST

## 2022-11-10 PROCEDURE — G8417 CALC BMI ABV UP PARAM F/U: HCPCS | Performed by: HOSPITALIST

## 2022-11-10 PROCEDURE — 1036F TOBACCO NON-USER: CPT | Performed by: HOSPITALIST

## 2022-11-10 PROCEDURE — 1090F PRES/ABSN URINE INCON ASSESS: CPT | Performed by: HOSPITALIST

## 2022-11-10 PROCEDURE — 3017F COLORECTAL CA SCREEN DOC REV: CPT | Performed by: HOSPITALIST

## 2022-11-10 PROCEDURE — 1123F ACP DISCUSS/DSCN MKR DOCD: CPT | Performed by: HOSPITALIST

## 2022-11-10 PROCEDURE — 99214 OFFICE O/P EST MOD 30 MIN: CPT | Performed by: HOSPITALIST

## 2022-11-10 NOTE — PROGRESS NOTES
Follow Up Visit Established Patient Visit    Patient:  Stefanie Schmidt                                               : 1950  Age: 70 y.o. MRN: [id number]  Date : 11/10/2022    Stefanie Schmidt is a 70 y.o. female who presents for : Scheduled follow-up appointment    Chief Complaint   Patient presents with    Follow-up     Follows up with Dr Rosaura Hanley for PAD. Pneumatic compression boots were ordered for treatment of bilateral leg swelling. Checks her BP infrequently. BP is usually well controlled with current meds  Reports increased frequency of heartburn  Headaches are controlled with as needed use of Tylenol  Generalized anxiety is well controlled with use of Lexapro. Ambulates in motorized wheelchair. No recent falls. Does not feel depressed. Ambien 5 mg nightly as needed helps to relieve insomnia. Takes gabapentin for burning, numbness/tingling of her both feet.     Past Medical History:   Diagnosis Date    Arthritis     Cerebral artery occlusion with cerebral infarction ([address]     Compression fracture     back due to fall    Concussion     due to fall    DVT (deep venous thrombosis) ([facility] [address] 2017    RLE after TKR    Environmental allergies     Essential hypertension, benign 2010    GERD (gastroesophageal reflux disease)     History of peptic ulcer     Hx of blood clots     Hyperlipidemia 2010    Lacunar infarction [facility])     old - per MRI     MRSA (methicillin resistant staph aureus) culture positive 2018    knee    Restrictive airway disease     allergy induced    Retention of urine     Wound, open 2018    left shin area, healing well, tx in wound care center       Past Surgical History:   Procedure Laterality Date     SECTION      times 2    CHOLECYSTECTOMY      COLONOSCOPY      HYSTERECTOMY (CERVIX STATUS UNKNOWN)      KNEE ARTHROPLASTY Right 2018     RIGHT REVISION TOTAL KNEE ARTHROPLASTY    KNEE ARTHROSCOPY Left     Dr. Yoli Evans REPLACE,ALL PARTS Right 9/28/2018    RIGHT REVISION ARTHROSCOPY FEMORAL COMPONENT, SYNOVECTOMY performed by Renetta Lechuga MD at [address] [place] [address] Right 12/7/2018    RIGHT KNEE QUADRICEP TENDON REPAIR WITH ALLOGRAFT AUGMENTATION performed by Renetta Lechuga MD at [facility] ARTHROSCOPY Right     TOTAL KNEE ARTHROPLASTY Right 01/2017       Current Outpatient Medications   Medication Sig Dispense Refill    zolpidem (AMBIEN) 5 MG tablet TAKE ONE TABLET BY MOUTH ONCE NIGHTLY AS NEEDED FOR SLEEP 30 tablet 2    oxybutynin (DITROPAN-XL) 10 MG extended release tablet Take 1 tablet by mouth daily 90 tablet 3    gabapentin (NEURONTIN) 300 MG capsule Take 1 capsule by mouth 3 times daily for 90 days. 90 capsule 5    Multiple Vitamins-Minerals (THERAPEUTIC MULTIVITAMIN-MINERALS) tablet Take 1 tablet by mouth in the morning. Magnesium 400 MG TABS Take 1 tablet by mouth daily      oxyCODONE-acetaminophen (PERCOCET) 5-325 MG per tablet Take 1 tablet by mouth every 8 hours as needed for Pain. cilostazol (PLETAL) 50 MG tablet Take 1 tablet by mouth in the morning and 1 tablet before bedtime. . 180 tablet 3    omeprazole (PRILOSEC) 20 MG delayed release capsule TAKE ONE CAPSULE BY MOUTH EVERY MORNING BEFORE BREAKFAST 90 capsule 1    NIFEdipine (ADALAT CC) 30 MG extended release tablet Take 1 tablet by mouth in the morning and 1 tablet before bedtime.  180 tablet 1    escitalopram (LEXAPRO) 10 MG tablet TAKE 1 TABLET DAILY 90 tablet 1    aspirin 81 MG EC tablet Take 81 mg by mouth daily      atorvastatin (LIPITOR) 80 MG tablet Take 1 tablet by mouth nightly 90 tablet 3    hydrALAZINE (APRESOLINE) 25 MG tablet Take 1 tablet by mouth every 8 hours 270 tablet 3    ondansetron (ZOFRAN) 4 MG tablet Take 1 tablet by mouth 3 times daily as needed for Nausea or Vomiting 90 tablet 3    chlorthalidone (HYGROTON) 25 MG tablet Take 1 tablet by mouth daily 90 tablet 3    KLOR-CON M20 20 MEQ extended release tablet TAKE 1 TABLET DAILY 90 tablet 3    albuterol sulfate HFA (VENTOLIN HFA) 108 (90 Base) MCG/ACT inhaler Inhale 2 puffs into the lungs 4 times daily as needed for Wheezing 54 g 1     No current facility-administered medications for this visit. /66   Pulse 64   Temp 98.2 °F (36.8 °C)   SpO2 98%     Physical Exam   Physical Exam  Vitals and nursing note reviewed. Constitutional:       General: She is not in acute distress. Appearance: Normal appearance. She is well-developed. HENT:      Head: Normocephalic and atraumatic. Right Ear: Tympanic membrane, ear canal and external ear normal. There is no impacted cerumen. Left Ear: Tympanic membrane, ear canal and external ear normal. There is no impacted cerumen. Nose:      Comments: Nasal mucosa is moderately swollen and mildly inflamed     Mouth/Throat:      Mouth: Mucous membranes are moist.      Pharynx: Oropharynx is clear. No oropharyngeal exudate or posterior oropharyngeal erythema. Eyes:      General: No scleral icterus. Pupils: Pupils are equal, round, and reactive to light. Neck:      Vascular: No carotid bruit or JVD. Cardiovascular:      Rate and Rhythm: Normal rate and regular rhythm. Heart sounds: Normal heart sounds. No murmur heard. No friction rub. No gallop. Pulmonary:      Effort: Pulmonary effort is normal. No respiratory distress. Breath sounds: Normal breath sounds. No wheezing or rales. Abdominal:      General: Bowel sounds are normal. There is no distension. Palpations: Abdomen is soft. There is no mass. Tenderness: There is no abdominal tenderness. There is no right CVA tenderness or left CVA tenderness. Musculoskeletal:         General: No tenderness. Normal range of motion. Cervical back: Normal range of motion and neck supple. Right lower leg: No edema. Left lower leg: No edema. Lymphadenopathy:      Cervical: No cervical adenopathy.    Skin:     General: Skin is warm and dry. Findings: No erythema, lesion or rash. Neurological:      General: No focal deficit present. Mental Status: She is alert and oriented to person, place, and time. Cranial Nerves: No cranial nerve deficit. Motor: Weakness (Mild left-sided hemiparesis as a consequence of previous acute stroke) present. Gait: Gait normal.      Deep Tendon Reflexes: Reflexes normal.   Psychiatric:         Mood and Affect: Mood normal.       Assessment/ plan:     Johnny Black was seen today for follow-up. Diagnoses and all orders for this visit:    Essential hypertension  Controlled; continue current medication    Left hemiparesis (HCC)  Supportive care    Bilateral lower extremity edema  Continue to wear knee-high compression stockings  Keep scheduled follow-up appointments with Dr. Palmer All    Overactive bladder  Continue Ditropan XL 10 mg daily    Severe headache  Tylenol 1000 mg orally twice a day as needed  Continue gabapentin    Primary insomnia  Continue Ambien 5 mg nightly as needed  [place] was reviewed    Return in about 3 months (around 2/10/2023) for HTN, insomnia, overactive bloadder, headaches.     Amy Ann MD

## 2022-11-16 ENCOUNTER — TELEPHONE (OUTPATIENT)
Dept: CARDIOLOGY CLINIC | Age: 72
End: 2022-11-16

## 2022-11-16 NOTE — TELEPHONE ENCOUNTER
Marge Padgett from [employer] called in this morning, she needs the certificate of medical necessity modified, it is scanned in the chart on 11/8 under Media tab, on the line where it has estimated length of need it should have 99 there instead of a check calli for medicare to approve it. It can then be refaxed to [phone].

## 2022-12-16 DIAGNOSIS — N32.81 OVERACTIVE BLADDER: ICD-10-CM

## 2022-12-16 NOTE — TELEPHONE ENCOUNTER
Patient would like to know can she take this med 2x daily      oxybutynin (DITROPAN-XL) 10 MG extended release tablet

## 2022-12-19 ENCOUNTER — TELEPHONE (OUTPATIENT)
Dept: INTERNAL MEDICINE CLINIC | Age: 72
End: 2022-12-19

## 2022-12-19 NOTE — TELEPHONE ENCOUNTER
Yes, however there is higher potential for side effects like dry mouth and dizziness with the higher dose. Would defer to Dr. Mrabella Mckeon as to whether he would want her on a dose like that long term.

## 2022-12-19 NOTE — TELEPHONE ENCOUNTER
I spoke with  Lozano and she was notified that Dr. Jameel Concepcion was out of the office until 12/27/22.

## 2022-12-19 NOTE — TELEPHONE ENCOUNTER
Vicky Thompson MD 37 minutes ago (9:44 AM)     [facility]  Yes, however there is higher potential for side effects like dry mouth and dizziness with the higher dose. Would defer to Dr. Kartik Cifuentes as to whether he would want her on a dose like that long term. Note         Samm Alamo routed conversation to [facility] Practice Support 38 minutes ago (9:43 AM)     Samm Alamo 38 minutes ago (9:43 AM)     JF  Patient is following up on her request from 12/16/22 (see encounter). oxybutynin (DITROPAN-XL) 10 MG extended release tablet      She is requesting to take this medication twice a day. [address], [address] [place] [address],[address] [phone] - f [phone]     Please call and advise whether she can take two or not!!!      She is going to need a new presciption

## 2022-12-19 NOTE — TELEPHONE ENCOUNTER
Patient is following up on her request from 12/16/22 (see encounter). oxybutynin (DITROPAN-XL) 10 MG extended release tablet     She is requesting to take this medication twice a day. [address] [phone] - F [phone]    Please call and advise whether she can take two or not!!! She is going to need a new presciption. Pharmacy requesting refill: [facility]  Last OV: 7/3/19  Next OV: none  Last refill: 6/11/19 #90 3 refills   Last labs: 7/2/19 TSH Normal    Request from [facility]. Will transfer script per protocol.

## 2022-12-26 RX ORDER — OXYBUTYNIN CHLORIDE 10 MG/1
10 TABLET, EXTENDED RELEASE ORAL DAILY
Qty: 90 TABLET | Refills: 3 | Status: SHIPPED | OUTPATIENT
Start: 2022-12-26

## 2022-12-27 RX ORDER — ESCITALOPRAM OXALATE 10 MG/1
TABLET ORAL
Qty: 90 TABLET | Refills: 1 | Status: SHIPPED | OUTPATIENT
Start: 2022-12-27

## 2023-01-01 NOTE — ASSESSMENT & PLAN NOTE
Now on [employer] daily- at $3400 per month-she gets assistance. [Normal Growth] : growth [Normal Development] : developmental [No Elimination Concerns] : elimination [Continue Regimen] : feeding [No Skin Concerns] : skin [Normal Sleep Pattern] : sleep [None] : no known medical problems [Anticipatory Guidance Given] : Anticipatory guidance addressed as per the history of present illness section [ Transition] :  transition [ Care] :  care [Nutritional Adequacy] : nutritional adequacy [Parental Well-Being] : parental well-being [Safety] : safety [No Vaccines] : no vaccines needed [No Medications] : ~He/She~ is not on any medications [Parent/Guardian] : Parent/Guardian [FreeTextEntry1] : \par 3 days old M \par Recommend exclusive breastfeeding, 8 -12 feedings per day. Mother should continue prenatal vitamins and avoid alcohol. If formula is needed, recommend iron-fortified formulations every 2-3 hrs. When in car, patient should be in rear-facing car seat in back seat. Air dry umbilical stump. Put baby to sleep on back, in own crib with no loose or soft bedding. Limit baby's exposure to others, especially those with fever or unknown vaccine status. Advised vitamin D or Tri-Vi-Sol PO daily if nursing.\par \par Tcb at 14.5 today\par Advised to continue feeding adequately, supplement with Formula if breast milk is not enough\par Monitor for adequate urine output and stooling\par Can expose patient to indirect sunlight\par RTC or to ER if worsening jaundice, fever, AMS, lethargy, decreased feeding, decreased UOP, or SOB \par RTC in 2 days for f/u jaundice

## 2023-01-04 DIAGNOSIS — I10 ESSENTIAL HYPERTENSION: ICD-10-CM

## 2023-01-05 RX ORDER — OMEPRAZOLE 20 MG/1
CAPSULE, DELAYED RELEASE ORAL
Qty: 90 CAPSULE | Refills: 1 | Status: SHIPPED | OUTPATIENT
Start: 2023-01-05

## 2023-01-05 RX ORDER — CHLORTHALIDONE 25 MG/1
TABLET ORAL
Qty: 90 TABLET | Refills: 3 | Status: SHIPPED | OUTPATIENT
Start: 2023-01-05

## 2023-01-06 NOTE — TELEPHONE ENCOUNTER
[facility] Office closed. Orders Placed This Encounter   Medications    temazepam (RESTORIL) 7.5 MG capsule     Sig: Take 1 capsule by mouth nightly as needed for Sleep for up to 30 days. Dispense:  30 capsule     Refill:  0     Controlled Substance Monitoring:    Acute and Chronic Pain Monitoring:   RX Monitoring 6/25/2020   Attestation -   Acute Pain Prescriptions -   Periodic Controlled Substance Monitoring Possible medication side effects, risk of tolerance/dependence & alternative treatments discussed. ;No signs of potential drug abuse or diversion identified. ;Assessed functional status. ;Obtaining appropriate analgesic effect of treatment.    Chronic Pain > 50 MEDD -   Chronic Pain > 80 MEDD - no

## 2023-01-11 ENCOUNTER — TELEPHONE (OUTPATIENT)
Dept: CARDIOLOGY CLINIC | Age: 73
End: 2023-01-11

## 2023-01-11 NOTE — TELEPHONE ENCOUNTER
Joyce Pelaez called in this morning stating that Dr. Vy Garcia prescribed her pneumatic compression boots bilateral, and she states that wears them twice day for an hour each time and her legs are swelling more. She called [facility] and they said it would take 3 to 5 months before the swelling goes down, she is concerned and wants to know why they are continuing to swell? Is that normal? The bottom on her feet hurt at night to where they wake her up.      You can reach Joyce Pelaez at #[phone]

## 2023-01-13 RX ORDER — NIFEDIPINE 30 MG/1
30 TABLET, FILM COATED, EXTENDED RELEASE ORAL 2 TIMES DAILY
Qty: 180 TABLET | Refills: 1 | Status: SHIPPED | OUTPATIENT
Start: 2023-01-13

## 2023-02-01 DIAGNOSIS — G47.9 SLEEP DIFFICULTIES: ICD-10-CM

## 2023-02-01 RX ORDER — ZOLPIDEM TARTRATE 5 MG/1
TABLET ORAL
Qty: 90 TABLET | Refills: 0 | Status: SHIPPED | OUTPATIENT
Start: 2023-02-03 | End: 2023-05-03

## 2023-02-06 DIAGNOSIS — G47.9 SLEEP DIFFICULTIES: ICD-10-CM

## 2023-02-06 RX ORDER — ZOLPIDEM TARTRATE 5 MG/1
TABLET ORAL
Qty: 90 TABLET | Refills: 0 | Status: SHIPPED | OUTPATIENT
Start: 2023-02-06 | End: 2023-05-06

## 2023-02-06 NOTE — TELEPHONE ENCOUNTER
Patient called, the prescription for:    zolpidem (AMBIEN) 5 MG tablet    Was sent to the wrong pharmacy.   It should have been sent to:    [facility] [address] [facility], [address] [place] [address],[address] [phone] [facility] [phone]      She will be out completely tomorrow!!!

## 2023-02-13 DIAGNOSIS — F41.1 GENERALIZED ANXIETY DISORDER: ICD-10-CM

## 2023-02-14 RX ORDER — BUSPIRONE HYDROCHLORIDE 10 MG/1
TABLET ORAL
Qty: 90 TABLET | Refills: 3 | Status: SHIPPED | OUTPATIENT
Start: 2023-02-14

## 2023-02-14 RX ORDER — POTASSIUM CHLORIDE 1500 MG/1
TABLET, EXTENDED RELEASE ORAL
Qty: 90 TABLET | Refills: 3 | Status: SHIPPED | OUTPATIENT
Start: 2023-02-14

## 2023-03-14 ENCOUNTER — OFFICE VISIT (OUTPATIENT)
Dept: INTERNAL MEDICINE CLINIC | Age: 73
End: 2023-03-14
Payer: MEDICARE

## 2023-03-14 VITALS — HEIGHT: 67 IN | BODY MASS INDEX: 33.17 KG/M2 | DIASTOLIC BLOOD PRESSURE: 69 MMHG | SYSTOLIC BLOOD PRESSURE: 134 MMHG

## 2023-03-14 DIAGNOSIS — I10 ESSENTIAL HYPERTENSION: ICD-10-CM

## 2023-03-14 DIAGNOSIS — G81.94 LEFT HEMIPARESIS (HCC): ICD-10-CM

## 2023-03-14 DIAGNOSIS — Z23 NEED FOR SHINGLES VACCINE: ICD-10-CM

## 2023-03-14 DIAGNOSIS — I73.9 PAD (PERIPHERAL ARTERY DISEASE) (HCC): ICD-10-CM

## 2023-03-14 DIAGNOSIS — F32.4 MAJOR DEPRESSIVE DISORDER WITH SINGLE EPISODE, IN PARTIAL REMISSION (HCC): ICD-10-CM

## 2023-03-14 DIAGNOSIS — R60.0 BILATERAL LOWER EXTREMITY EDEMA: ICD-10-CM

## 2023-03-14 DIAGNOSIS — I10 ESSENTIAL HYPERTENSION: Primary | ICD-10-CM

## 2023-03-14 LAB
ALBUMIN SERPL-MCNC: 4.4 G/DL (ref 3.4–5)
ALBUMIN/GLOB SERPL: 1.9 {RATIO} (ref 1.1–2.2)
ALP SERPL-CCNC: 78 U/L (ref 40–129)
ALT SERPL-CCNC: 20 U/L (ref 10–40)
ANION GAP SERPL CALCULATED.3IONS-SCNC: 13 MMOL/L (ref 3–16)
AST SERPL-CCNC: 23 U/L (ref 15–37)
BASOPHILS # BLD: 0 K/UL (ref 0–0.2)
BASOPHILS NFR BLD: 0.4 %
BILIRUB SERPL-MCNC: 0.4 MG/DL (ref 0–1)
BUN SERPL-MCNC: 21 MG/DL (ref 7–20)
CALCIUM SERPL-MCNC: 9.4 MG/DL (ref 8.3–10.6)
CHLORIDE SERPL-SCNC: 101 MMOL/L (ref 99–110)
CHOLEST SERPL-MCNC: 137 MG/DL (ref 0–199)
CO2 SERPL-SCNC: 25 MMOL/L (ref 21–32)
CREAT SERPL-MCNC: 0.7 MG/DL (ref 0.6–1.2)
DEPRECATED RDW RBC AUTO: 14.1 % (ref 12.4–15.4)
EOSINOPHIL # BLD: 0 K/UL (ref 0–0.6)
EOSINOPHIL NFR BLD: 0.7 %
GFR SERPLBLD CREATININE-BSD FMLA CKD-EPI: >60 ML/MIN/{1.73_M2}
GLUCOSE SERPL-MCNC: 98 MG/DL (ref 70–99)
HCT VFR BLD AUTO: 40 % (ref 36–48)
HDLC SERPL-MCNC: 54 MG/DL (ref 40–60)
HGB BLD-MCNC: 13.7 G/DL (ref 12–16)
LDLC SERPL CALC-MCNC: 58 MG/DL
LYMPHOCYTES # BLD: 1.8 K/UL (ref 1–5.1)
LYMPHOCYTES NFR BLD: 30.3 %
MCH RBC QN AUTO: 31 PG (ref 26–34)
MCHC RBC AUTO-ENTMCNC: 34.2 G/DL (ref 31–36)
MCV RBC AUTO: 90.9 FL (ref 80–100)
MONOCYTES # BLD: 0.5 K/UL (ref 0–1.3)
MONOCYTES NFR BLD: 7.7 %
NEUTROPHILS # BLD: 3.6 K/UL (ref 1.7–7.7)
NEUTROPHILS NFR BLD: 60.9 %
PLATELET # BLD AUTO: 363 K/UL (ref 135–450)
PMV BLD AUTO: 7.8 FL (ref 5–10.5)
POTASSIUM SERPL-SCNC: 4.3 MMOL/L (ref 3.5–5.1)
PROT SERPL-MCNC: 6.7 G/DL (ref 6.4–8.2)
RBC # BLD AUTO: 4.4 M/UL (ref 4–5.2)
SODIUM SERPL-SCNC: 139 MMOL/L (ref 136–145)
TRIGL SERPL-MCNC: 123 MG/DL (ref 0–150)
TSH SERPL DL<=0.005 MIU/L-ACNC: 1.16 UIU/ML (ref 0.27–4.2)
VLDLC SERPL CALC-MCNC: 25 MG/DL
WBC # BLD AUTO: 5.9 K/UL (ref 4–11)

## 2023-03-14 PROCEDURE — 3075F SYST BP GE 130 - 139MM HG: CPT | Performed by: HOSPITALIST

## 2023-03-14 PROCEDURE — 1090F PRES/ABSN URINE INCON ASSESS: CPT | Performed by: HOSPITALIST

## 2023-03-14 PROCEDURE — 3017F COLORECTAL CA SCREEN DOC REV: CPT | Performed by: HOSPITALIST

## 2023-03-14 PROCEDURE — G8427 DOCREV CUR MEDS BY ELIG CLIN: HCPCS | Performed by: HOSPITALIST

## 2023-03-14 PROCEDURE — 1036F TOBACCO NON-USER: CPT | Performed by: HOSPITALIST

## 2023-03-14 PROCEDURE — G8399 PT W/DXA RESULTS DOCUMENT: HCPCS | Performed by: HOSPITALIST

## 2023-03-14 PROCEDURE — 99214 OFFICE O/P EST MOD 30 MIN: CPT | Performed by: HOSPITALIST

## 2023-03-14 PROCEDURE — G8484 FLU IMMUNIZE NO ADMIN: HCPCS | Performed by: HOSPITALIST

## 2023-03-14 PROCEDURE — 1123F ACP DISCUSS/DSCN MKR DOCD: CPT | Performed by: HOSPITALIST

## 2023-03-14 PROCEDURE — G8417 CALC BMI ABV UP PARAM F/U: HCPCS | Performed by: HOSPITALIST

## 2023-03-14 PROCEDURE — 3078F DIAST BP <80 MM HG: CPT | Performed by: HOSPITALIST

## 2023-03-14 RX ORDER — ZOSTER VACCINE RECOMBINANT, ADJUVANTED 50 MCG/0.5
0.5 KIT INTRAMUSCULAR SEE ADMIN INSTRUCTIONS
Qty: 0.5 ML | Refills: 0 | Status: SHIPPED | OUTPATIENT
Start: 2023-03-14 | End: 2023-09-10

## 2023-03-14 RX ORDER — VIBEGRON 75 MG/1
TABLET, FILM COATED ORAL DAILY
COMMUNITY

## 2023-03-14 SDOH — ECONOMIC STABILITY: INCOME INSECURITY: HOW HARD IS IT FOR YOU TO PAY FOR THE VERY BASICS LIKE FOOD, HOUSING, MEDICAL CARE, AND HEATING?: NOT HARD AT ALL

## 2023-03-14 SDOH — ECONOMIC STABILITY: FOOD INSECURITY: WITHIN THE PAST 12 MONTHS, THE FOOD YOU BOUGHT JUST DIDN'T LAST AND YOU DIDN'T HAVE MONEY TO GET MORE.: NEVER TRUE

## 2023-03-14 SDOH — ECONOMIC STABILITY: FOOD INSECURITY: WITHIN THE PAST 12 MONTHS, YOU WORRIED THAT YOUR FOOD WOULD RUN OUT BEFORE YOU GOT MONEY TO BUY MORE.: NEVER TRUE

## 2023-03-14 SDOH — ECONOMIC STABILITY: HOUSING INSECURITY
IN THE LAST 12 MONTHS, WAS THERE A TIME WHEN YOU DID NOT HAVE A STEADY PLACE TO SLEEP OR SLEPT IN A SHELTER (INCLUDING NOW)?: NO

## 2023-03-14 NOTE — PROGRESS NOTES
Follow Up Visit Established Patient Visit    Patient:  Meche Nolasco                                               : 1950  Age: 67 y.o. MRN: [id number]  Date : 3/14/2023    Meche Nolasco is a 67 y.o. female who presents for : Scheduled follow-up appointment    Chief Complaint   Patient presents with    Hypertension    Cholesterol Problem     Was evaluated by Courtney Lai, urology, for overactive bladder; was started on Gemteza with significant symptom improvement. Checks her BP infrequently. Adheres to low sodium diet. For blood pressure is usually well controlled. + bilateral leg swelling, more on the left side despite wearing thigh high compression stockings. Uses Ambien for insomnia. Have left-sided hemiparesis secondary to previous acute CVA. Ambulates in electric wheelchair. Reports some bilateral hand tremor. Uses wrist weights with meal intake and writing her checks. + chronic bilateral lower back pain.  + Occasional heartburn. Her mood is relatively stable. No recent falls.     Past Medical History:   Diagnosis Date    Arthritis     Cerebral artery occlusion with cerebral infarction ([facility] [address]     Compression fracture     back due to fall    Concussion     due to fall    DVT (deep venous thrombosis) ([facility] [address] 2017    RLE after TKR    Environmental allergies     Essential hypertension, benign 2010    GERD (gastroesophageal reflux disease)     History of peptic ulcer     Hx of blood clots     Hyperlipidemia 2010    Lacunar infarction [facility])     old - per MRI     MRSA (methicillin resistant staph aureus) culture positive 2018    knee    Restrictive airway disease     allergy induced    Retention of urine     Wound, open 2018    left shin area, healing well, tx in wound care center       Past Surgical History:   Procedure Laterality Date     SECTION      times 2    CHOLECYSTECTOMY      COLONOSCOPY      HYSTERECTOMY (CERVIX STATUS UNKNOWN)      KNEE ARTHROPLASTY Right 04/16/2018     RIGHT REVISION TOTAL KNEE ARTHROPLASTY    KNEE ARTHROSCOPY Left 2003    Dr. Courtney Pérez Right 9/28/2018    RIGHT REVISION ARTHROSCOPY FEMORAL COMPONENT, SYNOVECTOMY performed by London Mcardle, MD at [address] [place] [id number] Right 12/7/2018    RIGHT KNEE QUADRICEP TENDON REPAIR WITH ALLOGRAFT AUGMENTATION performed by London Mcardle, MD at [facility] ARTHROSCOPY Right     TOTAL KNEE ARTHROPLASTY Right 01/2017       Current Outpatient Medications   Medication Sig Dispense Refill    vibegron (GEMTESA) 75 MG TABS tablet Take by mouth daily      zoster recombinant adjuvanted vaccine (SHINGRIX) 50 MCG/0.5ML SUSR injection Inject 0.5 mLs into the muscle See Admin Instructions 1 dose now and repeat in 2-6 months 0.5 mL 0    busPIRone (BUSPAR) 10 MG tablet TAKE 1 TABLET DAILY 90 tablet 3    KLOR-CON M20 20 MEQ extended release tablet TAKE 1 TABLET DAILY 90 tablet 3    zolpidem (AMBIEN) 5 MG tablet TAKE ONE TABLET BY MOUTH ONCE NIGHTLY AS NEEDED FOR SLEEP 90 tablet 0    NIFEdipine (ADALAT CC) 30 MG extended release tablet TAKE 1 TABLET BY MOUTH IN THE MORNING AND 1 TABLET BEFORE BEDTIME. 180 tablet 1    omeprazole (PRILOSEC) 20 MG delayed release capsule TAKE 1 CAPSULE BY MOUTH EVERY DAY BEFORE BREAKFAST 90 capsule 1    chlorthalidone (HYGROTON) 25 MG tablet TAKE 1 TABLET DAILY 90 tablet 3    escitalopram (LEXAPRO) 10 MG tablet TAKE 1 TABLET DAILY 90 tablet 1    Multiple Vitamins-Minerals (THERAPEUTIC MULTIVITAMIN-MINERALS) tablet Take 1 tablet by mouth in the morning. Magnesium 400 MG TABS Take 1 tablet by mouth daily      oxyCODONE-acetaminophen (PERCOCET) 5-325 MG per tablet Take 1 tablet by mouth every 8 hours as needed for Pain. cilostazol (PLETAL) 50 MG tablet Take 1 tablet by mouth in the morning and 1 tablet before bedtime.  . 180 tablet 3    aspirin 81 MG EC tablet Take 81 mg by mouth daily      atorvastatin (LIPITOR) 80 MG tablet Take 1 tablet by mouth nightly 90 tablet 3    hydrALAZINE (APRESOLINE) 25 MG tablet Take 1 tablet by mouth every 8 hours 270 tablet 3    ondansetron (ZOFRAN) 4 MG tablet Take 1 tablet by mouth 3 times daily as needed for Nausea or Vomiting 90 tablet 3    albuterol sulfate HFA (VENTOLIN HFA) 108 (90 Base) MCG/ACT inhaler Inhale 2 puffs into the lungs 4 times daily as needed for Wheezing 54 g 1    gabapentin (NEURONTIN) 300 MG capsule Take 1 capsule by mouth 3 times daily for 90 days. 90 capsule 5     No current facility-administered medications for this visit. /69   Ht 5' 7\" (1.702 m)   BMI 33.17 kg/m²     Physical Exam   Physical Exam  Vitals and nursing note reviewed. Constitutional:       General: She is not in acute distress. Appearance: Normal appearance. She is well-developed. HENT:      Head: Normocephalic and atraumatic. Right Ear: Tympanic membrane, ear canal and external ear normal. There is no impacted cerumen. Left Ear: Tympanic membrane, ear canal and external ear normal. There is no impacted cerumen. Nose:      Comments: Nasal mucosa is moderately swollen and mildly inflamed     Mouth/Throat:      Mouth: Mucous membranes are moist.      Pharynx: Oropharynx is clear. No oropharyngeal exudate or posterior oropharyngeal erythema. Eyes:      General: No scleral icterus. Extraocular Movements: Extraocular movements intact. Pupils: Pupils are equal, round, and reactive to light. Neck:      Vascular: No carotid bruit or JVD. Cardiovascular:      Rate and Rhythm: Normal rate and regular rhythm. Heart sounds: Normal heart sounds. No murmur heard. No friction rub. No gallop. Pulmonary:      Effort: Pulmonary effort is normal. No respiratory distress. Breath sounds: Normal breath sounds. No wheezing or rales. Abdominal:      General: Bowel sounds are normal. There is no distension. Palpations: Abdomen is soft. Tenderness: There is no abdominal tenderness. There is no right CVA tenderness or left CVA tenderness. Musculoskeletal:         General: No tenderness. Normal range of motion. Cervical back: Normal range of motion and neck supple. Right lower leg: Edema present. Left lower leg: Edema present. Comments: There is mild left-sided hemiparesis. Wears compression stockings   Lymphadenopathy:      Cervical: No cervical adenopathy. Skin:     General: Skin is warm and dry. Findings: No erythema, lesion or rash. Neurological:      Mental Status: She is alert and oriented to person, place, and time. Cranial Nerves: No cranial nerve deficit. Gait: Gait normal.      Deep Tendon Reflexes: Reflexes normal.   Psychiatric:         Mood and Affect: Mood normal.       Assessment/ plan:     Juventino Carmona was seen today for hypertension and cholesterol problem. Diagnoses and all orders for this visit:    Essential hypertension  -     CBC with Auto Differential; Future  -     Comprehensive Metabolic Panel; Future  -     Lipid Panel; Future  -     TSH; Future  -     Continue current medication    PAD (peripheral artery disease) (HCC)        -     Stable; continue Pletal 50 mg orally twice a day    Left hemiparesis ([address]        -     stable        -     Emphasized importance of good blood pressure control; continue atorvastatin 80 mg daily    Major depressive disorder with single episode, in partial remission (HCC)        -     Mood is stable. Wants to be weaned off Lexapro and buspirone. We will proceed    Bilateral lower extremity edema        -     Continue regular use of compression stockings; keep feet elevated whenever possible    Need for shingles vaccine  -     zoster recombinant adjuvanted vaccine [facility]) 50 MCG/0.5ML SUSR injection;  Inject 0.5 mLs into the muscle See Admin Instructions 1 dose now and repeat in 2-6 months      Return in about 4 months (around 7/14/2023) for HTN, lower extremity edema, GERD, PAD, depression.     Amy Ann MD

## 2023-03-16 NOTE — TELEPHONE ENCOUNTER
Patient called stating she will be out of her medication on Saturday and if we can get this request sent over to the pharmacy sooner than later.

## 2023-03-17 RX ORDER — GABAPENTIN 300 MG/1
300 CAPSULE ORAL 3 TIMES DAILY
Qty: 90 CAPSULE | Refills: 1 | Status: SHIPPED | OUTPATIENT
Start: 2023-03-17 | End: 2023-06-15

## 2023-03-30 DIAGNOSIS — I77.1 ARTERIAL INSUFFICIENCY (HCC): Primary | ICD-10-CM

## 2023-04-03 ENCOUNTER — OFFICE VISIT (OUTPATIENT)
Dept: CARDIOLOGY CLINIC | Age: 73
End: 2023-04-03

## 2023-04-03 VITALS
BODY MASS INDEX: 33.12 KG/M2 | SYSTOLIC BLOOD PRESSURE: 119 MMHG | HEIGHT: 67 IN | DIASTOLIC BLOOD PRESSURE: 64 MMHG | WEIGHT: 211 LBS | HEART RATE: 64 BPM | OXYGEN SATURATION: 98 %

## 2023-04-03 DIAGNOSIS — I73.9 PAD (PERIPHERAL ARTERY DISEASE) (HCC): Primary | ICD-10-CM

## 2023-04-03 DIAGNOSIS — I87.2 VENOUS INSUFFICIENCY OF BOTH LOWER EXTREMITIES: ICD-10-CM

## 2023-04-03 DIAGNOSIS — I10 ESSENTIAL HYPERTENSION: ICD-10-CM

## 2023-04-03 DIAGNOSIS — M79.89 SWELLING OF BOTH LOWER EXTREMITIES: ICD-10-CM

## 2023-04-03 DIAGNOSIS — I10 HTN (HYPERTENSION), BENIGN: ICD-10-CM

## 2023-04-03 RX ORDER — GABAPENTIN 400 MG/1
400 CAPSULE ORAL 3 TIMES DAILY
Qty: 90 CAPSULE | Refills: 1 | Status: SHIPPED | OUTPATIENT
Start: 2023-04-03 | End: 2023-07-02

## 2023-04-03 RX ORDER — NITROGLYCERIN 2 %
OINTMENT (GRAM) TRANSDERMAL
Qty: 1 EACH | Refills: 3 | Status: SHIPPED | OUTPATIENT
Start: 2023-04-03

## 2023-04-05 RX ORDER — OMEPRAZOLE 20 MG/1
CAPSULE, DELAYED RELEASE ORAL
Qty: 90 CAPSULE | Refills: 1 | Status: SHIPPED | OUTPATIENT
Start: 2023-04-05

## 2023-04-05 NOTE — TELEPHONE ENCOUNTER
Pt needs a 3 month refill of    omeprazole (PRILOSEC) 20 MG delayed release capsule      [facility] [address]

## 2023-04-19 ENCOUNTER — HOSPITAL ENCOUNTER (OUTPATIENT)
Dept: VASCULAR LAB | Age: 73
Discharge: HOME | End: 2023-04-19
Payer: MEDICARE

## 2023-04-19 ENCOUNTER — TELEPHONE (OUTPATIENT)
Dept: INTERNAL MEDICINE CLINIC | Age: 73
End: 2023-04-19

## 2023-04-19 DIAGNOSIS — I77.1 ARTERIAL INSUFFICIENCY (HCC): ICD-10-CM

## 2023-04-19 PROCEDURE — 93925 LOWER EXTREMITY STUDY: CPT

## 2023-04-19 NOTE — TELEPHONE ENCOUNTER
Neo Flores from Dr. Adriana Porter office called in needing cardiac clearance for the patient. Would like the patient to stop Asprin a week prior to procedure that she is having in May. Pls call and advise.

## 2023-05-04 DIAGNOSIS — Z12.31 ENCOUNTER FOR SCREENING MAMMOGRAM FOR MALIGNANT NEOPLASM OF BREAST: Primary | ICD-10-CM

## 2023-05-07 DIAGNOSIS — G47.9 SLEEP DIFFICULTIES: ICD-10-CM

## 2023-05-08 RX ORDER — ZOLPIDEM TARTRATE 5 MG/1
TABLET ORAL
Qty: 90 TABLET | Refills: 0 | Status: SHIPPED | OUTPATIENT
Start: 2023-05-08 | End: 2023-05-11 | Stop reason: SDUPTHER

## 2023-05-10 DIAGNOSIS — G47.9 SLEEP DIFFICULTIES: ICD-10-CM

## 2023-05-10 NOTE — TELEPHONE ENCOUNTER
Patient called requesting refill of zolpidem on 05/08/23 to [facility]. zolpidem (AMBIEN) 5 MG tablet    [facility] is telling her they did not receive our PA and that we need to send it again. Patient is requesting that we send a new script to [facility] because she will be out of this medication on Saturday.     Chandrakant Gibbs [phone] [facility]

## 2023-05-11 ENCOUNTER — TELEPHONE (OUTPATIENT)
Dept: CARDIOLOGY CLINIC | Age: 73
End: 2023-05-11

## 2023-05-11 RX ORDER — ZOLPIDEM TARTRATE 5 MG/1
TABLET ORAL
Qty: 30 TABLET | Refills: 0 | Status: SHIPPED | OUTPATIENT
Start: 2023-05-11 | End: 2023-06-10

## 2023-05-24 ENCOUNTER — OFFICE VISIT (OUTPATIENT)
Dept: PAIN MANAGEMENT | Age: 73
End: 2023-05-24

## 2023-05-24 VITALS
BODY MASS INDEX: 29.03 KG/M2 | OXYGEN SATURATION: 96 % | WEIGHT: 185 LBS | HEART RATE: 67 BPM | DIASTOLIC BLOOD PRESSURE: 93 MMHG | SYSTOLIC BLOOD PRESSURE: 147 MMHG | HEIGHT: 67 IN

## 2023-05-24 DIAGNOSIS — R25.2 NOCTURNAL FOOT CRAMPS: ICD-10-CM

## 2023-05-24 DIAGNOSIS — G47.9 SLEEP DIFFICULTIES: ICD-10-CM

## 2023-05-24 DIAGNOSIS — M51.9 LUMBAR DISC DISEASE: ICD-10-CM

## 2023-05-24 DIAGNOSIS — G89.4 CHRONIC PAIN SYNDROME: Primary | ICD-10-CM

## 2023-05-24 DIAGNOSIS — I63.311 CEREBROVASCULAR ACCIDENT (CVA) DUE TO THROMBOSIS OF RIGHT MIDDLE CEREBRAL ARTERY (HCC): ICD-10-CM

## 2023-05-24 DIAGNOSIS — G81.94 LEFT HEMIPARESIS (HCC): ICD-10-CM

## 2023-05-24 DIAGNOSIS — G62.9 NEUROPATHY: ICD-10-CM

## 2023-05-24 DIAGNOSIS — I73.9 PAD (PERIPHERAL ARTERY DISEASE) (HCC): ICD-10-CM

## 2023-05-24 DIAGNOSIS — Z51.81 ENCOUNTER FOR THERAPEUTIC DRUG MONITORING: ICD-10-CM

## 2023-05-24 RX ORDER — PREGABALIN 75 MG/1
75 CAPSULE ORAL 2 TIMES DAILY
COMMUNITY

## 2023-06-08 PROBLEM — G89.4 CHRONIC PAIN SYNDROME: Status: ACTIVE | Noted: 2023-06-08

## 2023-06-08 PROBLEM — G62.9 NEUROPATHY: Status: ACTIVE | Noted: 2023-06-08

## 2023-06-08 PROBLEM — Z51.81 ENCOUNTER FOR THERAPEUTIC DRUG MONITORING: Status: ACTIVE | Noted: 2017-12-12

## 2023-06-08 ASSESSMENT — ENCOUNTER SYMPTOMS
SHORTNESS OF BREATH: 0
ABDOMINAL PAIN: 0
BACK PAIN: 1
WHEEZING: 0

## 2023-06-15 PROBLEM — I25.119 ATHEROSCLEROTIC HEART DISEASE OF NATIVE CORONARY ARTERY WITH UNSPECIFIED ANGINA PECTORIS (HCC): Status: ACTIVE | Noted: 2023-06-15

## 2023-06-15 PROBLEM — I20.9 ANGINA PECTORIS, UNSPECIFIED (HCC): Status: ACTIVE | Noted: 2023-06-15

## 2023-07-01 DIAGNOSIS — I10 ESSENTIAL HYPERTENSION: ICD-10-CM

## 2023-07-05 RX ORDER — HYDRALAZINE HYDROCHLORIDE 25 MG/1
TABLET, FILM COATED ORAL
Qty: 270 TABLET | Refills: 3 | Status: SHIPPED | OUTPATIENT
Start: 2023-07-05

## 2023-07-17 DIAGNOSIS — E78.5 HYPERLIPIDEMIA, UNSPECIFIED HYPERLIPIDEMIA TYPE: ICD-10-CM

## 2023-07-17 RX ORDER — ATORVASTATIN CALCIUM 80 MG/1
80 TABLET, FILM COATED ORAL NIGHTLY
Qty: 90 TABLET | Refills: 0 | Status: SHIPPED | OUTPATIENT
Start: 2023-07-17

## 2023-07-17 NOTE — TELEPHONE ENCOUNTER
Patient needs refills on:    atorvastatin (LIPITOR) 80 MG tablet    90 day refills    [facility] - [place], [place] - [facility] [address]ENZU[address]A DG.  Edgar Siegel [phone] - F [phone]

## 2023-07-18 ENCOUNTER — TELEPHONE (OUTPATIENT)
Dept: INTERNAL MEDICINE CLINIC | Age: 73
End: 2023-07-18

## 2023-07-18 DIAGNOSIS — G81.94 LEFT HEMIPARESIS (HCC): ICD-10-CM

## 2023-07-18 DIAGNOSIS — R29.898 LEFT ARM WEAKNESS: ICD-10-CM

## 2023-07-18 DIAGNOSIS — I63.311 CEREBROVASCULAR ACCIDENT (CVA) DUE TO THROMBOSIS OF RIGHT MIDDLE CEREBRAL ARTERY (HCC): Primary | ICD-10-CM

## 2023-07-18 NOTE — TELEPHONE ENCOUNTER
Pt is calling requesting a referral for PT and OT    Dx: Severe left side pain     Would also like a call from Dr. Clemencia Walker if possible    Please advise

## 2023-07-21 ENCOUNTER — TELEPHONE (OUTPATIENT)
Dept: INTERNAL MEDICINE CLINIC | Age: 73
End: 2023-07-21

## 2023-07-21 NOTE — TELEPHONE ENCOUNTER
Veronica Cox called to update Dr. Marlon Keith on PT for pt. She did an eval today and they are going to do PT twice a week for 4 weeks and then once a week for 4 weeks starting next week.

## 2023-07-27 ENCOUNTER — TELEPHONE (OUTPATIENT)
Dept: INTERNAL MEDICINE CLINIC | Age: 73
End: 2023-07-27

## 2023-07-27 NOTE — TELEPHONE ENCOUNTER
Ayo Quiles states she is just letting Dr. Kasey Ying know that the patient is being discharged from nursing next week due to her doing better. Also wanted to report a fall that happened on 7/25/23 during the transfer to or from the shower and EMS came to get her up.     Ayo Quiles [phone]

## 2023-08-16 ENCOUNTER — OFFICE VISIT (OUTPATIENT)
Dept: INTERNAL MEDICINE CLINIC | Age: 73
End: 2023-08-16

## 2023-08-16 VITALS
SYSTOLIC BLOOD PRESSURE: 138 MMHG | OXYGEN SATURATION: 95 % | TEMPERATURE: 97.9 F | DIASTOLIC BLOOD PRESSURE: 80 MMHG | HEART RATE: 68 BPM

## 2023-08-16 DIAGNOSIS — R60.0 BILATERAL LOWER EXTREMITY EDEMA: ICD-10-CM

## 2023-08-16 DIAGNOSIS — I10 ESSENTIAL HYPERTENSION: ICD-10-CM

## 2023-08-16 DIAGNOSIS — I73.9 PAD (PERIPHERAL ARTERY DISEASE) (HCC): ICD-10-CM

## 2023-08-16 DIAGNOSIS — G89.4 CHRONIC PAIN SYNDROME: ICD-10-CM

## 2023-08-16 DIAGNOSIS — G81.94 LEFT HEMIPARESIS (HCC): Primary | ICD-10-CM

## 2023-08-16 RX ORDER — CILOSTAZOL 50 MG/1
50 TABLET ORAL 2 TIMES DAILY
Qty: 180 TABLET | Refills: 1 | Status: SHIPPED | OUTPATIENT
Start: 2023-08-16

## 2023-08-16 RX ORDER — CILOSTAZOL 50 MG/1
TABLET ORAL
Qty: 180 TABLET | Refills: 3 | Status: SHIPPED | OUTPATIENT
Start: 2023-08-16

## 2023-08-16 RX ORDER — GABAPENTIN 400 MG/1
400 CAPSULE ORAL DAILY
Qty: 90 CAPSULE | Refills: 1 | Status: SHIPPED | OUTPATIENT
Start: 2023-08-16 | End: 2024-02-12

## 2023-08-16 NOTE — PROGRESS NOTES
Follow Up Visit Established Patient Visit    Patient:  Estefani Sanchez                                               : 1950  Age: 67 y.o. MRN: [id number]  Date : 2023    Estefani Sanchez is a 67 y.o. female who presents for :  follow up appointment    Chief Complaint   Patient presents with    Follow-up     Visit is to establish documentation for physical and occupational therapy provided by [facility]. Reports several falls in the last month on her R side ( upper and lower extremity. Reports Left sided weakness. + reduced range of motion in her left shoulder joint. + increased weakness in her left leg. Needs to use her R hand/ arm to lift the left ones. Previous PT/OT was helping to improve left sided body strength and range of motion. Ambulates in her wheelchair and scooter. BP is usually well controlled with current meds. Takes Ambien as needed for insomnia.       Past Medical History:   Diagnosis Date    Arthritis     Cerebral artery occlusion with cerebral infarction ([address])     Compression fracture     back due to fall    Concussion     due to fall    DVT (deep venous thrombosis) ([address]) 2017    RLE after TKR    Environmental allergies     Essential hypertension, benign 2010    GERD (gastroesophageal reflux disease)     History of peptic ulcer     Hx of blood clots     Hyperlipidemia 2010    Lacunar infarction [facility])     old - per MRI     MRSA (methicillin resistant staph aureus) culture positive 2018    knee    Restrictive airway disease     allergy induced    Retention of urine     Wound, open 2018    left shin area, healing well, tx in wound care center       Past Surgical History:   Procedure Laterality Date     SECTION      times 2    CHOLECYSTECTOMY      COLONOSCOPY      HYSTERECTOMY (CERVIX STATUS UNKNOWN)      KNEE ARTHROPLASTY Right 2018     RIGHT REVISION TOTAL KNEE ARTHROPLASTY    KNEE ARTHROSCOPY Left     Dr. Garth Maguire

## 2023-08-16 NOTE — TELEPHONE ENCOUNTER
Medication:   Requested Prescriptions     Pending Prescriptions Disp Refills    cilostazol (PLETAL) 50 MG tablet [Pharmacy Med Name: CILOSTAZOL TAB 50MG] 180 tablet 3     Sig: TAKE 1 TABLET EVERY MORNINGAND TAKE 1 TABLET BEFORE   BEDTIME     Last Filled:  # 180 with 3 refills on 7/26/22    Last appt: 4/3/2023   Next appt: Visit date not found    Last OARRS:   RX Monitoring 6/25/2020   Attestation -   Acute Pain Prescriptions -   Periodic Controlled Substance Monitoring Possible medication side effects, risk of tolerance/dependence & alternative treatments discussed. ;No signs of potential drug abuse or diversion identified. ;Assessed functional status. ;Obtaining appropriate analgesic effect of treatment.    Chronic Pain > 50 MEDD -   Chronic Pain > 80 MEDD -

## 2023-08-18 DIAGNOSIS — G47.9 SLEEP DIFFICULTIES: ICD-10-CM

## 2023-08-18 RX ORDER — ZOLPIDEM TARTRATE 5 MG/1
5 TABLET ORAL NIGHTLY PRN
Qty: 30 TABLET | Refills: 2 | Status: SHIPPED | OUTPATIENT
Start: 2023-08-18 | End: 2023-11-16

## 2023-08-22 ENCOUNTER — TELEPHONE (OUTPATIENT)
Dept: INTERNAL MEDICINE CLINIC | Age: 73
End: 2023-08-22

## 2023-08-22 NOTE — TELEPHONE ENCOUNTER
Submitted PA for Zolpidem Tartrate 5MG tablets  [facility]ia [facility] Key: [id number] STATUS: PENDING. Follow up done daily; if no response in three days we will refax for status check. If another three days goes by with no response we will call the insurance for status.

## 2023-08-23 NOTE — TELEPHONE ENCOUNTER
Received APPROVAL for Zolpidem Tartrate 5MG tablets from 01/01/23 - 08/21/24; approval letter attached. If this requires a response please respond to the pool ( [id number]). Thank you please advise patient.

## 2023-08-30 DIAGNOSIS — F32.4 MAJOR DEPRESSIVE DISORDER WITH SINGLE EPISODE, IN PARTIAL REMISSION (HCC): ICD-10-CM

## 2023-08-30 RX ORDER — DULOXETIN HYDROCHLORIDE 30 MG/1
30 CAPSULE, DELAYED RELEASE ORAL DAILY
Qty: 30 CAPSULE | Refills: 2 | Status: SHIPPED | OUTPATIENT
Start: 2023-08-30

## 2023-08-30 NOTE — TELEPHONE ENCOUNTER
REFILL:    DULoxetine (CYMBALTA) 30 MG extended release capsule    [facility] [phone] [address]

## 2023-09-05 ENCOUNTER — TELEPHONE (OUTPATIENT)
Dept: INTERNAL MEDICINE CLINIC | Age: 73
End: 2023-09-05

## 2023-09-05 NOTE — TELEPHONE ENCOUNTER
Duane called and wanted to let Dr. Fran Toscano know that pt fell of her scooter on Saturday. Soreness on right shoulder and foot. Pt tolerated PT well.

## 2023-09-14 ENCOUNTER — OFFICE VISIT (OUTPATIENT)
Dept: INTERNAL MEDICINE CLINIC | Age: 73
End: 2023-09-14

## 2023-09-14 ENCOUNTER — TELEPHONE (OUTPATIENT)
Dept: INTERNAL MEDICINE CLINIC | Age: 73
End: 2023-09-14

## 2023-09-14 VITALS
SYSTOLIC BLOOD PRESSURE: 132 MMHG | BODY MASS INDEX: 28.98 KG/M2 | HEART RATE: 63 BPM | HEIGHT: 67 IN | DIASTOLIC BLOOD PRESSURE: 66 MMHG

## 2023-09-14 DIAGNOSIS — I10 ESSENTIAL HYPERTENSION: Primary | ICD-10-CM

## 2023-09-14 DIAGNOSIS — I73.9 PAD (PERIPHERAL ARTERY DISEASE) (HCC): ICD-10-CM

## 2023-09-14 DIAGNOSIS — G47.9 SLEEP DIFFICULTIES: ICD-10-CM

## 2023-09-14 DIAGNOSIS — R60.0 BILATERAL LOWER EXTREMITY EDEMA: ICD-10-CM

## 2023-09-14 DIAGNOSIS — I69.354 HEMIPARESIS AFFECTING LEFT SIDE AS LATE EFFECT OF CEREBROVASCULAR ACCIDENT (HCC): ICD-10-CM

## 2023-09-14 NOTE — PROGRESS NOTES
Follow Up Visit Established Patient Visit    Patient:  Alva Navarrete                                               : 1950  Age: 67 y.o. MRN: [id number]  Date : 2023    Alva Navarrete is a 67 y.o. female who presents for :  3 month follow up     Chief Complaint   Patient presents with    Hypertension    Cholesterol Problem     Reports multiple falls, at least one every 2 weeks. Calls local fire department to help her up. Wears compression stockings during the day and compression boots at night to help with b/l leg swelling, Does not think it helps. Had nasal congestion in the past 2 weeks and took mucinex with symptom resolution. Checks BP at home, averages 130/80 mmHg. Undergoes PT ( now weekly) for left sided body weakness. No heartburn. Mood is stable; doesn't feel depressed.       Past Medical History:   Diagnosis Date    Arthritis     Cerebral artery occlusion with cerebral infarction ([address])     Compression fracture     back due to fall    Concussion     due to fall    DVT (deep venous thrombosis) ([address]) 2017    RLE after TKR    Environmental allergies     Essential hypertension, benign 2010    GERD (gastroesophageal reflux disease)     History of peptic ulcer     Hx of blood clots     Hyperlipidemia 2010    Lacunar infarction [facility])     old - per MRI     MRSA (methicillin resistant staph aureus) culture positive 2018    knee    Restrictive airway disease     allergy induced    Retention of urine     Wound, open 2018    left shin area, healing well, tx in wound care center       Past Surgical History:   Procedure Laterality Date     SECTION      times 2    CHOLECYSTECTOMY      COLONOSCOPY      HYSTERECTOMY (CERVIX STATUS UNKNOWN)      KNEE ARTHROPLASTY Right 2018     RIGHT REVISION TOTAL KNEE ARTHROPLASTY    KNEE ARTHROSCOPY Left     Dr. Socorro Thorne Right 2018    RIGHT REVISION

## 2023-09-26 ENCOUNTER — TELEPHONE (OUTPATIENT)
Dept: INTERNAL MEDICINE CLINIC | Age: 73
End: 2023-09-26

## 2023-09-26 NOTE — TELEPHONE ENCOUNTER
----- Message from Maria C Enrrique sent at 9/26/2023  1:11 PM EDT -----  Subject: Message to Provider    QUESTIONS  Information for Provider? Sandra Saenz from [facility] with medical supplies   called asking for the last 2-3 progress notes for pt to be able to get a   wheelchair. rep said she's unsure what wheelchair yet but progress notes   are needed. please fax too [phone]   ---------------------------------------------------------------------------  --------------  [facility]  [phone]; OK to leave message on voicemail  ---------------------------------------------------------------------------  --------------  SCRIPT ANSWERS  Relationship to Patient? Covered Entity  Covered Entity Type? Durable Medical Equipment? Representative Name?  Sandra Saenz

## 2023-09-28 NOTE — TELEPHONE ENCOUNTER
Patient discharged  to home in stable condition via private car. Discharge instructions and prescriptions reviewed with patient. Patient verbalized understanding. All belongings in tow including discharge paperwork.         Reji Field RN  09/28/23 [id number] Tech will cancel US Pelvis complete as US Urinary Bladder now ordered

## 2023-10-02 ENCOUNTER — TELEPHONE (OUTPATIENT)
Dept: INTERNAL MEDICINE CLINIC | Age: 73
End: 2023-10-02

## 2023-10-02 RX ORDER — NIFEDIPINE 30 MG/1
TABLET, FILM COATED, EXTENDED RELEASE ORAL
Qty: 165 TABLET | Refills: 2 | Status: SHIPPED | OUTPATIENT
Start: 2023-10-02

## 2023-10-02 NOTE — TELEPHONE ENCOUNTER
They are asking if Dr. Kirby Grimm would addend his office visit notes from 09/14/23 to include her need for a custom power wheelchair. They are trying to help her get the chair. .. Please call and advise.

## 2023-10-04 ENCOUNTER — TELEPHONE (OUTPATIENT)
Dept: INTERNAL MEDICINE CLINIC | Age: 73
End: 2023-10-04

## 2023-10-04 NOTE — TELEPHONE ENCOUNTER
Physical therapists called to get MD FLORES that pt fell yesterday going from bed to chair and she just slide on her butt. Stated pt did require EMS to help get her up but she is ok.

## 2023-10-21 DIAGNOSIS — E78.5 HYPERLIPIDEMIA, UNSPECIFIED HYPERLIPIDEMIA TYPE: ICD-10-CM

## 2023-10-25 RX ORDER — ATORVASTATIN CALCIUM 80 MG/1
80 TABLET, FILM COATED ORAL
Qty: 90 TABLET | Refills: 0 | Status: SHIPPED | OUTPATIENT
Start: 2023-10-25

## 2023-10-30 RX ORDER — CILOSTAZOL 50 MG/1
TABLET ORAL
Qty: 10 TABLET | Refills: 0 | Status: SHIPPED | OUTPATIENT
Start: 2023-10-30 | End: 2023-11-01

## 2023-10-30 NOTE — TELEPHONE ENCOUNTER
REFILL:    cilostazol (PLETAL) 50 MG tablet    Patient is completely out of this medication. They have been working with [employer] and they said they could get the refill to her by November 3rd. They are asking for a partial script to get her to Nov. 3 rd. Please advise if Dr. Natan Rivero would do this   for her.     [facility] [phone] Jg Arroyo, [address] [place] [address]

## 2023-11-01 RX ORDER — CILOSTAZOL 50 MG/1
TABLET ORAL
Qty: 10 TABLET | Refills: 0 | Status: SHIPPED | OUTPATIENT
Start: 2023-11-01

## 2023-11-04 DIAGNOSIS — I73.9 PAD (PERIPHERAL ARTERY DISEASE) (HCC): ICD-10-CM

## 2023-11-07 RX ORDER — CILOSTAZOL 50 MG/1
50 TABLET ORAL 2 TIMES DAILY
Qty: 180 TABLET | Refills: 1 | Status: SHIPPED | OUTPATIENT
Start: 2023-11-07

## 2023-11-10 ENCOUNTER — TELEPHONE (OUTPATIENT)
Dept: INTERNAL MEDICINE CLINIC | Age: 73
End: 2023-11-10

## 2023-11-10 NOTE — TELEPHONE ENCOUNTER
Tayo Pérez from [address] [place] [address] called in stating that the patient will continue home PT once a week or 9 weeks.        Pls advise

## 2023-11-16 NOTE — TELEPHONE ENCOUNTER
Mak Everett with [facility] states the patient has lots of swelling in her legs more so on her left leg. Patient is unable to get stockings on due to swelling being so bad and patient is unable to stand to get her weight. Mak Everett is requesting a water pill for as needed to help with the patient to get some of the fluid off her legs. Vials are:  BP- 135/69  HR- 95  Temp- 97.3  Ox- 97      Marilee [phone]    Patient does her own medication and we can call patient to update her on what Dr. Lalo Altamirano will do for her.

## 2023-11-20 RX ORDER — FUROSEMIDE 20 MG/1
20 TABLET ORAL DAILY
Qty: 15 TABLET | Refills: 0 | Status: SHIPPED | OUTPATIENT
Start: 2023-11-20

## 2023-11-21 ENCOUNTER — TELEPHONE (OUTPATIENT)
Dept: INTERNAL MEDICINE CLINIC | Age: 73
End: 2023-11-21

## 2023-11-21 NOTE — TELEPHONE ENCOUNTER
[facility] wanting to know if it Is ok for pt to stop ASA and Cilostazol 7 days prior to botox procedure scheduled for 12/19/2023.     Please fax written ok to [phone]

## 2023-11-26 DIAGNOSIS — F32.4 MAJOR DEPRESSIVE DISORDER WITH SINGLE EPISODE, IN PARTIAL REMISSION (HCC): ICD-10-CM

## 2023-11-27 RX ORDER — DULOXETIN HYDROCHLORIDE 30 MG/1
30 CAPSULE, DELAYED RELEASE ORAL DAILY
Qty: 30 CAPSULE | Refills: 3 | Status: SHIPPED | OUTPATIENT
Start: 2023-11-27

## 2023-12-04 RX ORDER — FUROSEMIDE 20 MG/1
20 TABLET ORAL DAILY
Qty: 15 TABLET | Refills: 0 | Status: SHIPPED | OUTPATIENT
Start: 2023-12-04

## 2023-12-15 ENCOUNTER — OFFICE VISIT (OUTPATIENT)
Dept: INTERNAL MEDICINE CLINIC | Age: 73
End: 2023-12-15

## 2023-12-15 VITALS
TEMPERATURE: 97.5 F | OXYGEN SATURATION: 92 % | DIASTOLIC BLOOD PRESSURE: 62 MMHG | SYSTOLIC BLOOD PRESSURE: 104 MMHG | HEART RATE: 60 BPM

## 2023-12-15 DIAGNOSIS — G81.94 LEFT HEMIPARESIS (HCC): ICD-10-CM

## 2023-12-15 DIAGNOSIS — R25.1 TREMOR OF RIGHT HAND: ICD-10-CM

## 2023-12-15 DIAGNOSIS — I10 ESSENTIAL HYPERTENSION: ICD-10-CM

## 2023-12-15 DIAGNOSIS — R04.0 EPISTAXIS, RECURRENT: ICD-10-CM

## 2023-12-15 DIAGNOSIS — F51.01 PRIMARY INSOMNIA: Primary | ICD-10-CM

## 2023-12-15 DIAGNOSIS — G89.4 CHRONIC PAIN SYNDROME: ICD-10-CM

## 2023-12-15 DIAGNOSIS — R60.0 BILATERAL LOWER EXTREMITY EDEMA: ICD-10-CM

## 2023-12-15 RX ORDER — ZOLPIDEM TARTRATE 5 MG/1
5 TABLET ORAL NIGHTLY PRN
Qty: 30 TABLET | Refills: 2 | Status: SHIPPED | OUTPATIENT
Start: 2023-12-20 | End: 2024-03-19

## 2023-12-15 NOTE — PROGRESS NOTES
Follow Up Visit Established Patient Visit    Patient:  Kathy Borjas                                               : 1950  Age: 68 y.o. MRN: [id number]  Date : 12/15/2023    Kathy Borjas is a 68 y.o. female who presents for :  follow up appointment    Chief Complaint   Patient presents with    Hypertension     Reports R hand tremor. Has difficulties holding objects with her R hand. Wears R wrist weight with some symptom relief.  + bilateral leg swelling. + left heel burning. Insomnia controled with use of Ambien. +nosebleeds. Will have Botox injection for overactive bladder next week.       Past Medical History:   Diagnosis Date    Arthritis     Cerebral artery occlusion with cerebral infarction ([address])     Compression fracture     back due to fall    Concussion     due to fall    DVT (deep venous thrombosis) ([address]) 2017    RLE after TKR    Environmental allergies     Essential hypertension, benign 2010    GERD (gastroesophageal reflux disease)     History of peptic ulcer     Hx of blood clots     Hyperlipidemia 2010    Lacunar infarction [facility])     old - per MRI     MRSA (methicillin resistant staph aureus) culture positive 2018    knee    Restrictive airway disease     allergy induced    Retention of urine     Wound, open 2018    left shin area, healing well, tx in wound care center       Past Surgical History:   Procedure Laterality Date     SECTION      times 2    CHOLECYSTECTOMY      COLONOSCOPY      HYSTERECTOMY (CERVIX STATUS UNKNOWN)      KNEE ARTHROPLASTY Right 2018     RIGHT REVISION TOTAL KNEE ARTHROPLASTY    KNEE ARTHROSCOPY Left     Dr. Alanis Power Right 2018    RIGHT REVISION ARTHROSCOPY FEMORAL COMPONENT, SYNOVECTOMY performed by Tangela Sena MD at [address] [facility] Right 2018    RIGHT KNEE QUADRICEP TENDON REPAIR WITH ALLOGRAFT AUGMENTATION performed by Steve Morales

## 2023-12-27 DIAGNOSIS — G89.4 CHRONIC PAIN SYNDROME: Primary | ICD-10-CM

## 2023-12-27 RX ORDER — PREGABALIN 75 MG/1
75 CAPSULE ORAL 2 TIMES DAILY
Qty: 60 CAPSULE | Refills: 1 | Status: SHIPPED | OUTPATIENT
Start: 2023-12-27 | End: 2024-02-25

## 2023-12-27 NOTE — TELEPHONE ENCOUNTER
Patient is requesting a refill on medication:                  pregabalin (LYRICA) 75 MG capsule [[id number]]       [facility] - [address] [phone] - F [phone]

## 2023-12-28 RX ORDER — OMEPRAZOLE 20 MG/1
CAPSULE, DELAYED RELEASE ORAL
Qty: 90 CAPSULE | Refills: 1 | Status: SHIPPED | OUTPATIENT
Start: 2023-12-28

## 2024-01-22 DIAGNOSIS — E78.5 HYPERLIPIDEMIA, UNSPECIFIED HYPERLIPIDEMIA TYPE: ICD-10-CM

## 2024-01-22 RX ORDER — ATORVASTATIN CALCIUM 80 MG/1
80 TABLET, FILM COATED ORAL DAILY
Qty: 90 TABLET | Refills: 1 | Status: SHIPPED | OUTPATIENT
Start: 2024-01-22

## 2024-01-22 NOTE — TELEPHONE ENCOUNTER
Pt is requesting a refill on medication below:        atorvastatin (LIPITOR) 80 MG tablet [[id number]      [facility] - [place], [place] - [address] [place] [address] [facility] - P [phone] - F [phone]

## 2024-01-29 DIAGNOSIS — I10 ESSENTIAL HYPERTENSION: ICD-10-CM

## 2024-01-29 RX ORDER — CHLORTHALIDONE 25 MG/1
25 TABLET ORAL DAILY
Qty: 90 TABLET | Refills: 3 | Status: SHIPPED | OUTPATIENT
Start: 2024-01-29

## 2024-01-29 RX ORDER — POTASSIUM CHLORIDE 20 MEQ/1
20 TABLET, EXTENDED RELEASE ORAL DAILY
Qty: 90 TABLET | Refills: 3 | Status: SHIPPED | OUTPATIENT
Start: 2024-01-29

## 2024-01-29 NOTE — TELEPHONE ENCOUNTER
REFILL    chlorthalidone (HYGROTON) 25 MG tablet   90 day refill (three refills of the 90 day)    KLOR-CON M20 20 MEQ extended release tablet - 90 day refill (three refills of the 90 day)    [facility] - [place] - [address] - P [phone] - F [phone]

## 2024-02-02 DIAGNOSIS — G89.4 CHRONIC PAIN SYNDROME: ICD-10-CM

## 2024-02-02 RX ORDER — GABAPENTIN 400 MG/1
CAPSULE ORAL
Qty: 90 CAPSULE | Refills: 1 | Status: SHIPPED | OUTPATIENT
Start: 2024-02-02 | End: 2024-07-31

## 2024-03-15 ENCOUNTER — OFFICE VISIT (OUTPATIENT)
Dept: INTERNAL MEDICINE CLINIC | Age: 74
End: 2024-03-15

## 2024-03-15 VITALS — DIASTOLIC BLOOD PRESSURE: 82 MMHG | SYSTOLIC BLOOD PRESSURE: 130 MMHG

## 2024-03-15 DIAGNOSIS — G89.4 CHRONIC PAIN SYNDROME: ICD-10-CM

## 2024-03-15 DIAGNOSIS — I10 ESSENTIAL HYPERTENSION: Primary | ICD-10-CM

## 2024-03-15 DIAGNOSIS — I73.9 PAD (PERIPHERAL ARTERY DISEASE) (HCC): ICD-10-CM

## 2024-03-15 DIAGNOSIS — R60.0 BILATERAL LOWER EXTREMITY EDEMA: ICD-10-CM

## 2024-03-15 DIAGNOSIS — G81.94 LEFT HEMIPARESIS (HCC): ICD-10-CM

## 2024-03-15 DIAGNOSIS — F32.4 MAJOR DEPRESSIVE DISORDER WITH SINGLE EPISODE, IN PARTIAL REMISSION (HCC): ICD-10-CM

## 2024-03-15 DIAGNOSIS — I25.119 ATHEROSCLEROSIS OF NATIVE CORONARY ARTERY OF NATIVE HEART WITH ANGINA PECTORIS (HCC): ICD-10-CM

## 2024-03-15 RX ORDER — HYDRALAZINE HYDROCHLORIDE 25 MG/1
TABLET, FILM COATED ORAL
Qty: 270 TABLET | Refills: 3 | Status: SHIPPED | OUTPATIENT
Start: 2024-03-15

## 2024-03-15 RX ORDER — DULOXETIN HYDROCHLORIDE 30 MG/1
30 CAPSULE, DELAYED RELEASE ORAL DAILY
Qty: 90 CAPSULE | Refills: 3 | Status: SHIPPED | OUTPATIENT
Start: 2024-03-15

## 2024-03-15 RX ORDER — NIFEDIPINE 30 MG
TABLET, EXTENDED RELEASE ORAL
Qty: 180 TABLET | Refills: 3 | Status: SHIPPED | OUTPATIENT
Start: 2024-03-15

## 2024-03-15 RX ORDER — GABAPENTIN 400 MG/1
CAPSULE ORAL
Qty: 90 CAPSULE | Refills: 1 | Status: SHIPPED | OUTPATIENT
Start: 2024-03-15 | End: 2024-06-13

## 2024-03-15 NOTE — PROGRESS NOTES
episode, in partial remission (HCC)  Appears to be stable, continue duloxetine 30 mg daily    PAD (peripheral artery disease) (HCC)  Keep scheduled follow-up appointments with Dr. 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    Rivas Cisneros MD

## 2024-03-23 DIAGNOSIS — F32.4 MAJOR DEPRESSIVE DISORDER WITH SINGLE EPISODE, IN PARTIAL REMISSION (HCC): ICD-10-CM

## 2024-03-25 RX ORDER — DULOXETIN HYDROCHLORIDE 30 MG/1
30 CAPSULE, DELAYED RELEASE ORAL DAILY
Qty: 90 CAPSULE | Refills: 1 | Status: SHIPPED | OUTPATIENT
Start: 2024-03-25

## 2024-03-30 DIAGNOSIS — I10 ESSENTIAL HYPERTENSION: ICD-10-CM

## 2024-04-01 RX ORDER — NIFEDIPINE 30 MG
TABLET, EXTENDED RELEASE ORAL
Qty: 180 TABLET | Refills: 2 | Status: SHIPPED | OUTPATIENT
Start: 2024-04-01

## 2024-04-04 DIAGNOSIS — F51.01 PRIMARY INSOMNIA: ICD-10-CM

## 2024-04-04 NOTE — TELEPHONE ENCOUNTER
Pt is requesting a refill on medication below and she has been out for to weeks now.    zolpidem (AMBIEN) 5 MG tablet [[id number]]  ENDED     [facility] - [place] - [address] - P [phone] - F [phone]

## 2024-04-05 RX ORDER — ZOLPIDEM TARTRATE 5 MG/1
5 TABLET ORAL NIGHTLY PRN
Qty: 90 TABLET | Refills: 0 | Status: SHIPPED | OUTPATIENT
Start: 2024-04-05 | End: 2024-07-04

## 2024-05-22 NOTE — TELEPHONE ENCOUNTER
Discussed with Dr. Noy Patton 1/12/2022 4:40 pm.  He advised to instruct patient to continue with use of pneumatic compression boots and it will take time for swelling to subside and to continue to have swelling is normal as well as the discomfort in the feet. Follow low sodium diet. Spoke with patient and advised of Dr Augusto Mclain response and recommendations. Verbalized understanding. Detail Level: Detailed Depth Of Biopsy: dermis Was A Bandage Applied: Yes Size Of Lesion In Cm: 0.2 X Size Of Lesion In Cm: 0 Biopsy Type: H and E Biopsy Method: Personna blade Anesthesia Type: 1% lidocaine with epinephrine Anesthesia Volume In Cc: 0.5 Hemostasis: Monsel's and Electrocautery Wound Care: Vaseline Dressing: Band-Aid Destruction After The Procedure: No Type Of Destruction Used: Curettage Curettage Text: The wound bed was treated with curettage after the biopsy was performed. Cryotherapy Text: The wound bed was treated with cryotherapy after the biopsy was performed. Electrodesiccation Text: The wound bed was treated with electrodesiccation after the biopsy was performed. Electrodesiccation And Curettage Text: The wound bed was treated with electrodesiccation and curettage after the biopsy was performed. Silver Nitrate Text: The wound bed was treated with silver nitrate after the biopsy was performed. Lab: -1700 Consent: Written consent was obtained and risks were reviewed including but not limited to scarring, infection, bleeding, scabbing, incomplete removal, nerve damage and allergy to anesthesia. Post-Care Instructions: I reviewed with the patient in detail post-care instructions. Patient is to keep the biopsy site dry overnight, and then apply Vaseline twice daily until healed. Patient may apply hydrogen peroxide soaks to remove any crusting. Notification Instructions: Patient will be notified of biopsy results. However, patient instructed to call the office if not contacted within 2 weeks. Billing Type: Third-Party Bill Information: Selecting Yes will display possible errors in your note based on the variables you have selected. This validation is only offered as a suggestion for you. PLEASE NOTE THAT THE VALIDATION TEXT WILL BE REMOVED WHEN YOU FINALIZE YOUR NOTE. IF YOU WANT TO FAX A PRELIMINARY NOTE YOU WILL NEED TO TOGGLE THIS TO 'NO' IF YOU DO NOT WANT IT IN YOUR FAXED NOTE.

## 2024-06-11 ENCOUNTER — TELEPHONE (OUTPATIENT)
Dept: INTERNAL MEDICINE CLINIC | Age: 74
End: 2024-06-11

## 2024-06-11 NOTE — TELEPHONE ENCOUNTER
Patient has an up coming procedure and they need to get confirmation that the patient can stop taking the following 7 days prior:    Baby asprin and  cilostazol (PLETAL) 50 MG tablet     Must be in writing, please.    Procedure is July 2nd - bladder botox injection.    Fax: [phone]

## 2024-06-12 RX ORDER — OMEPRAZOLE 20 MG/1
CAPSULE, DELAYED RELEASE ORAL
Qty: 90 CAPSULE | Refills: 1 | Status: SHIPPED | OUTPATIENT
Start: 2024-06-12

## 2024-06-19 DIAGNOSIS — I73.9 PAD (PERIPHERAL ARTERY DISEASE) (HCC): ICD-10-CM

## 2024-06-20 RX ORDER — CILOSTAZOL 50 MG/1
50 TABLET ORAL 2 TIMES DAILY
Qty: 180 TABLET | Refills: 1 | Status: SHIPPED | OUTPATIENT
Start: 2024-06-20

## 2024-06-21 ENCOUNTER — OFFICE VISIT (OUTPATIENT)
Dept: INTERNAL MEDICINE CLINIC | Age: 74
End: 2024-06-21

## 2024-06-21 VITALS
DIASTOLIC BLOOD PRESSURE: 84 MMHG | HEART RATE: 71 BPM | TEMPERATURE: 98.4 F | SYSTOLIC BLOOD PRESSURE: 130 MMHG | OXYGEN SATURATION: 92 %

## 2024-06-21 DIAGNOSIS — R60.0 BILATERAL LOWER EXTREMITY EDEMA: ICD-10-CM

## 2024-06-21 DIAGNOSIS — G81.94 LEFT HEMIPARESIS (HCC): ICD-10-CM

## 2024-06-21 DIAGNOSIS — R25.1 TREMOR OF RIGHT HAND: ICD-10-CM

## 2024-06-21 DIAGNOSIS — I10 ESSENTIAL HYPERTENSION: Primary | ICD-10-CM

## 2024-06-21 DIAGNOSIS — F51.01 PRIMARY INSOMNIA: ICD-10-CM

## 2024-06-21 DIAGNOSIS — I10 ESSENTIAL HYPERTENSION: ICD-10-CM

## 2024-06-21 DIAGNOSIS — G89.4 CHRONIC PAIN SYNDROME: ICD-10-CM

## 2024-06-21 LAB
BASOPHILS # BLD: 0 K/UL (ref 0–0.2)
BASOPHILS NFR BLD: 0.4 %
DEPRECATED RDW RBC AUTO: 14.2 % (ref 12.4–15.4)
EOSINOPHIL # BLD: 0.1 K/UL (ref 0–0.6)
EOSINOPHIL NFR BLD: 2.3 %
HCT VFR BLD AUTO: 41.7 % (ref 36–48)
HGB BLD-MCNC: 14.1 G/DL (ref 12–16)
LYMPHOCYTES # BLD: 1.9 K/UL (ref 1–5.1)
LYMPHOCYTES NFR BLD: 31 %
MCH RBC QN AUTO: 31.6 PG (ref 26–34)
MCHC RBC AUTO-ENTMCNC: 33.7 G/DL (ref 31–36)
MCV RBC AUTO: 93.6 FL (ref 80–100)
MONOCYTES # BLD: 0.4 K/UL (ref 0–1.3)
MONOCYTES NFR BLD: 6.8 %
NEUTROPHILS # BLD: 3.7 K/UL (ref 1.7–7.7)
NEUTROPHILS NFR BLD: 59.5 %
PLATELET # BLD AUTO: 366 K/UL (ref 135–450)
PMV BLD AUTO: 8 FL (ref 5–10.5)
RBC # BLD AUTO: 4.45 M/UL (ref 4–5.2)
WBC # BLD AUTO: 6.1 K/UL (ref 4–11)

## 2024-06-21 RX ORDER — PRIMIDONE 50 MG/1
50 TABLET ORAL 2 TIMES DAILY
Qty: 60 TABLET | Refills: 1 | Status: SHIPPED | OUTPATIENT
Start: 2024-06-21

## 2024-06-21 ASSESSMENT — PATIENT HEALTH QUESTIONNAIRE - PHQ9
8. MOVING OR SPEAKING SO SLOWLY THAT OTHER PEOPLE COULD HAVE NOTICED. OR THE OPPOSITE, BEING SO FIGETY OR RESTLESS THAT YOU HAVE BEEN MOVING AROUND A LOT MORE THAN USUAL: NOT AT ALL
10. IF YOU CHECKED OFF ANY PROBLEMS, HOW DIFFICULT HAVE THESE PROBLEMS MADE IT FOR YOU TO DO YOUR WORK, TAKE CARE OF THINGS AT HOME, OR GET ALONG WITH OTHER PEOPLE: NOT DIFFICULT AT ALL
1. LITTLE INTEREST OR PLEASURE IN DOING THINGS: NOT AT ALL
SUM OF ALL RESPONSES TO PHQ9 QUESTIONS 1 & 2: 3
2. FEELING DOWN, DEPRESSED OR HOPELESS: NEARLY EVERY DAY
7. TROUBLE CONCENTRATING ON THINGS, SUCH AS READING THE NEWSPAPER OR WATCHING TELEVISION: NOT AT ALL
5. POOR APPETITE OR OVEREATING: NEARLY EVERY DAY
3. TROUBLE FALLING OR STAYING ASLEEP: NOT AT ALL
SUM OF ALL RESPONSES TO PHQ QUESTIONS 1-9: 6
6. FEELING BAD ABOUT YOURSELF - OR THAT YOU ARE A FAILURE OR HAVE LET YOURSELF OR YOUR FAMILY DOWN: NOT AT ALL
SUM OF ALL RESPONSES TO PHQ QUESTIONS 1-9: 6
9. THOUGHTS THAT YOU WOULD BE BETTER OFF DEAD, OR OF HURTING YOURSELF: NOT AT ALL
SUM OF ALL RESPONSES TO PHQ QUESTIONS 1-9: 6
4. FEELING TIRED OR HAVING LITTLE ENERGY: NOT AT ALL
SUM OF ALL RESPONSES TO PHQ QUESTIONS 1-9: 6

## 2024-06-22 LAB
ALBUMIN SERPL-MCNC: 4.6 G/DL (ref 3.4–5)
ALBUMIN/GLOB SERPL: 2.1 {RATIO} (ref 1.1–2.2)
ALP SERPL-CCNC: 83 U/L (ref 40–129)
ALT SERPL-CCNC: 18 U/L (ref 10–40)
ANION GAP SERPL CALCULATED.3IONS-SCNC: 14 MMOL/L (ref 3–16)
AST SERPL-CCNC: 17 U/L (ref 15–37)
BILIRUB SERPL-MCNC: 0.4 MG/DL (ref 0–1)
BUN SERPL-MCNC: 22 MG/DL (ref 7–20)
CALCIUM SERPL-MCNC: 9.8 MG/DL (ref 8.3–10.6)
CHLORIDE SERPL-SCNC: 105 MMOL/L (ref 99–110)
CHOLEST SERPL-MCNC: 136 MG/DL (ref 0–199)
CO2 SERPL-SCNC: 25 MMOL/L (ref 21–32)
CREAT SERPL-MCNC: 0.6 MG/DL (ref 0.6–1.2)
GFR SERPLBLD CREATININE-BSD FMLA CKD-EPI: >90 ML/MIN/{1.73_M2}
GLUCOSE SERPL-MCNC: 107 MG/DL (ref 70–99)
HDLC SERPL-MCNC: 58 MG/DL (ref 40–60)
LDLC SERPL CALC-MCNC: 57 MG/DL
POTASSIUM SERPL-SCNC: 4.5 MMOL/L (ref 3.5–5.1)
PROT SERPL-MCNC: 6.8 G/DL (ref 6.4–8.2)
SODIUM SERPL-SCNC: 144 MMOL/L (ref 136–145)
TRIGL SERPL-MCNC: 107 MG/DL (ref 0–150)
TSH SERPL DL<=0.005 MIU/L-ACNC: 1.21 UIU/ML (ref 0.27–4.2)
VLDLC SERPL CALC-MCNC: 21 MG/DL

## 2024-06-29 DIAGNOSIS — F51.01 PRIMARY INSOMNIA: ICD-10-CM

## 2024-07-01 RX ORDER — ZOLPIDEM TARTRATE 5 MG/1
TABLET ORAL
Qty: 90 TABLET | Refills: 0 | Status: SHIPPED | OUTPATIENT
Start: 2024-07-01 | End: 2024-07-31

## 2024-07-08 ENCOUNTER — TELEPHONE (OUTPATIENT)
Dept: INTERNAL MEDICINE CLINIC | Age: 74
End: 2024-07-08

## 2024-07-08 DIAGNOSIS — R19.7 DIARRHEA, UNSPECIFIED TYPE: Primary | ICD-10-CM

## 2024-07-08 NOTE — TELEPHONE ENCOUNTER
Patient would like to speak to Alesia regarding the diarrhea she is still having.    She would like a referral to a gastrologist.    Please call to discuss.

## 2024-07-20 DIAGNOSIS — E78.5 HYPERLIPIDEMIA, UNSPECIFIED HYPERLIPIDEMIA TYPE: ICD-10-CM

## 2024-07-22 RX ORDER — ATORVASTATIN CALCIUM 80 MG/1
80 TABLET, FILM COATED ORAL NIGHTLY
Qty: 90 TABLET | Refills: 0 | Status: SHIPPED | OUTPATIENT
Start: 2024-07-22

## 2024-07-24 ENCOUNTER — HOSPITAL ENCOUNTER (OUTPATIENT)
Age: 74
Discharge: HOME | End: 2024-07-24
Payer: MEDICARE

## 2024-07-24 LAB
C DIFF TOX A+B STL QL IA: NORMAL
LACTOFERRIN STL QL IA: NORMAL

## 2024-07-24 PROCEDURE — 87336 ENTAMOEB HIST DISPR AG IA: CPT

## 2024-07-24 PROCEDURE — 83630 LACTOFERRIN FECAL (QUAL): CPT

## 2024-07-24 PROCEDURE — 83993 ASSAY FOR CALPROTECTIN FECAL: CPT

## 2024-07-24 PROCEDURE — 87449 NOS EACH ORGANISM AG IA: CPT

## 2024-07-24 PROCEDURE — 87328 CRYPTOSPORIDIUM AG IA: CPT

## 2024-07-24 PROCEDURE — 87506 IADNA-DNA/RNA PROBE TQ 6-11: CPT

## 2024-07-24 PROCEDURE — 87324 CLOSTRIDIUM AG IA: CPT

## 2024-07-24 PROCEDURE — 82653 EL-1 FECAL QUANTITATIVE: CPT

## 2024-07-25 LAB
CRYPTOSP AG STL QL IA: NORMAL
E HISTOLYT AG STL QL IA: NORMAL
G LAMBLIA AG STL QL IA: NORMAL
GI PATHOGENS PNL STL NAA+PROBE: NORMAL

## 2024-07-27 LAB — ELASTASE PANC STL-MCNT: 203 UG/G

## 2024-07-28 LAB — CALPROTECTIN STL-MCNT: 57 UG/G

## 2024-07-30 ENCOUNTER — HOSPITAL ENCOUNTER (OUTPATIENT)
Dept: CT IMAGING | Age: 74
Discharge: HOME | End: 2024-07-30
Payer: MEDICARE

## 2024-07-30 DIAGNOSIS — R10.31 RLQ ABDOMINAL PAIN: ICD-10-CM

## 2024-07-30 PROCEDURE — 6360000004 HC RX CONTRAST MEDICATION: Performed by: PHYSICIAN ASSISTANT

## 2024-07-30 PROCEDURE — 74177 CT ABD & PELVIS W/CONTRAST: CPT

## 2024-07-30 RX ADMIN — IOPAMIDOL 75 ML: 755 INJECTION, SOLUTION INTRAVENOUS at 11:46

## 2024-07-30 RX ADMIN — IOHEXOL 25 ML: 350 INJECTION, SOLUTION INTRAVENOUS at 11:46

## 2024-08-05 ENCOUNTER — TELEPHONE (OUTPATIENT)
Dept: INTERNAL MEDICINE CLINIC | Age: 74
End: 2024-08-05

## 2024-08-05 DIAGNOSIS — G81.94 LEFT HEMIPARESIS (HCC): ICD-10-CM

## 2024-08-05 DIAGNOSIS — G62.9 NEUROPATHY: ICD-10-CM

## 2024-08-05 DIAGNOSIS — I63.311 CEREBROVASCULAR ACCIDENT (CVA) DUE TO THROMBOSIS OF RIGHT MIDDLE CEREBRAL ARTERY (HCC): Primary | ICD-10-CM

## 2024-08-05 DIAGNOSIS — I25.119 ATHEROSCLEROSIS OF NATIVE CORONARY ARTERY OF NATIVE HEART WITH ANGINA PECTORIS (HCC): ICD-10-CM

## 2024-08-05 NOTE — TELEPHONE ENCOUNTER
Pt's daughter is calling stating even though pt is off of joão her hands are still shaking and the new med is not helping also they are interested in a home health aid and would like to discuss.    Please call and advise

## 2024-08-06 NOTE — TELEPHONE ENCOUNTER
DR Peter Limon    [address], [place] [address]  Hours: Open now ·   Add full hours  Phone: [phone]

## 2024-08-20 ENCOUNTER — TELEPHONE (OUTPATIENT)
Dept: INTERNAL MEDICINE CLINIC | Age: 74
End: 2024-08-20

## 2024-08-20 DIAGNOSIS — I73.9 PAD (PERIPHERAL ARTERY DISEASE) (HCC): ICD-10-CM

## 2024-08-20 DIAGNOSIS — I63.311 CEREBROVASCULAR ACCIDENT (CVA) DUE TO THROMBOSIS OF RIGHT MIDDLE CEREBRAL ARTERY (HCC): Primary | ICD-10-CM

## 2024-08-20 DIAGNOSIS — I25.119 ATHEROSCLEROSIS OF NATIVE CORONARY ARTERY OF NATIVE HEART WITH ANGINA PECTORIS (HCC): ICD-10-CM

## 2024-08-27 NOTE — PLAN OF CARE
August 27, 2024       Boni Jimenez MD  [address] [facility] [address]  Via In Basket      Patient: Stalin Finney   YOB: 2011   Date of Visit: 8/27/2024       Dear Dr. Jimenez:    Thank you for referring Stalin Finney to me for evaluation. Below are my notes for this visit with him.    If you have questions, please do not hesitate to call me. I look forward to following your patient along with you.      Sincerely,        Ban Parr MD        CC: No Recipients  Ban Parr MD  8/27/2024  2:36 PM  Signed  Reason For Visit  Recurrent ear infections      Referred By  Patient was referred by Boni Jimenez MD.  History of Present Illness  Stalin Finney is a(n) 13 year old male here today for a Pediatric ENT consultation for recurrent ear infections.     Per mother, patient failed a hearing test in 2023 and was recommended to see ENT but they did not until he started having ear infections. In the last 6 months 3 times, and about 6 in the last year. His symptoms include pain, headache, drainage, and fever. He is usually given an antibiotic. Per mom, she has noticed an increase in drainage and blood, so much so that he has had to come home from school. Mother has no concerns for hearing. He has speech delay and receives speech therapy as well as OT.     No snoring. No nasal congestion.       Review of Systems  10 systems were reviewed and are negative aside from those detailed above.  Past Medical History  History of hospitalization: No  History of intubation: No  History of NICU stay: No  Passed NBHS: Yes      Born Term, uncomplicated pregnancy and delivery: Yes        Past Medical History:   Diagnosis Date   • Autism (CMD)      Surgical History  History reviewed. No pertinent surgical history.  Family History  History reviewed. No pertinent family history.  Family history of childhood hearing loss: none  Family history of bleeding problems: none      Family history of anesthesia issues:  Problem: HEMODYNAMIC STATUS  Goal: Patient has stable vital signs and fluid balance  Outcome: Ongoing     Problem: ACTIVITY INTOLERANCE/IMPAIRED MOBILITY  Goal: Mobility/activity is maintained at optimum level for patient  Outcome: Ongoing  Note: States that her L leg is getting weaker. Problem: COMMUNICATION IMPAIRMENT  Goal: Ability to express needs and understand communication  Outcome: Ongoing  Note: Expresses need to use bed pan.       Problem: Falls - Risk of:  Goal: Will remain free from falls  Description: Will remain free from falls  Outcome: Ongoing     Problem: Skin Integrity:  Goal: Will show no infection signs and symptoms  Description: Will show no infection signs and symptoms  Outcome: Ongoing     Problem: Neurological  Goal: Maximum potential motor/sensory/cognitive function  Outcome: Ongoing     Problem: Musculor/Skeletal Functional Status  Goal: Highest potential functional level  Outcome: Ongoing     Problem: Pain:  Goal: Pain level will decrease  Description: Pain level will decrease  Outcome: Ongoing none  Social History  Secondhand smoke exposure: No  Custody status: Parents have full custody of the patient: Yes  Current Meds  Current Outpatient Medications   Medication Sig Dispense Refill   • fluticasone (FLONASE) 50 MCG/ACT nasal spray Spray 2 sprays in each nostril in the morning and 2 sprays in the evening. 16 g 1   • diphenhydrAMINE (BENADRYL) 12.5 MG/5ML liquid Take 10 mLs by mouth 4 times daily as needed for Itching or Allergies. (Patient not taking: Reported on 8/27/2024) 118 mL 0     No current facility-administered medications for this visit.     Physical Exam  Vitals: Visit Vitals  Temp 98.2 °F (36.8 °C)   Ht 5' 4.96\" (1.65 m)   Wt 75.4 kg (166 lb 3.6 oz)   BMI 27.69 kg/m²     General:  No distress. No stridor or stertor. Voice/cry is normal.  Head/Face:  Atraumatic, normocephalic. No dysmorphic features.  Eyes:  Pupils round, reactive to light. Extraocular movement intact. Conjunctiva normal. Gaze conjugate.   Ears:  Right: Normal pinna, external auditory meatus is normal. The external auditory canal clear. Tympanic membrane intact, retracted, serous effusion present.  Left: Normal pinna, external auditory meatus is normal. The external auditory canal clear. Tympanic membrane intact,retracted, no fluid.  Nose: Anterior rhinoscopy reveals normal external nasal valve. Nasal mucosa is normal appearing without lesions. Inferior turbinates visible and normal appearing. Anterior septum is straight. No visible septal vessels, no perforation.  Oral Cavity: The vestibule is normal appearing. Oral cavity mucosa is without lesion. The tongue is mobile and midline. Hard palate is intact and without lesions. No ankyloglossia.  Oropharynx: Soft palate elevates in the midline. Uvula normal. Tonsils are 1+, symmetric. Base of tongue soft without lesions. Posterior oropharyngeal wall clear.  Neck: Normal surface anatomy. Laryngeal landmarks are palpable. Thyroid normal to palpation. No masses. No cervical  lymphadenopathy. Range of motion normal.  Lungs: No tachypnea. No nasal flaring. No subcostal retractions. No substernal retractions.   Neuro: Alert, no focal deficits. Cranial nerves 2-12 intact bilaterally, symmetric.  Skin: No skin lesions.  Psych: Appropriate for age, interactive.    Tympanometry:  RIGHT EAR: Hypermobility of tympanic membrane with normal middle ear pressure and normal ear canal volume   LEFT EAR: Negative middle ear pressure with normal tympanic membrane mobility, and normal ear canal volume      Audiometry: Conditioned Play Audiometry (CPA) with headphones used today, with the utilization of a two-alicia paradigm.     RIGHT EAR:   Normal pure-tone hearing thresholds 500-4000 Hz with Conditioned Play techniques     LEFT EAR:  Normal pure-tone hearing thresholds 500-4000 Hz with Conditioned Play techniques       Assessment/Plan:    Stalin is a 13-year-old boy with history of Autism spectrum disorder who presents with recurrent ear infections. On exam today, there is fluid on the right side with tympanic membrane retraction. Hearing is normal. I suggest he use Flonase nasal sprays for 2 months with follow up thereafter to assess for improvement. I discussed the possibility of ear tubes if this does not improve.    Thank you for involving me in the care of this patient. Please do not hesitate to reach out with questions or concerns.    Ban Parr MD

## 2024-09-03 ENCOUNTER — OFFICE VISIT (OUTPATIENT)
Dept: INTERNAL MEDICINE CLINIC | Age: 74
End: 2024-09-03

## 2024-09-03 VITALS
OXYGEN SATURATION: 99 % | TEMPERATURE: 97.1 F | HEIGHT: 69 IN | BODY MASS INDEX: 27.32 KG/M2 | DIASTOLIC BLOOD PRESSURE: 62 MMHG | SYSTOLIC BLOOD PRESSURE: 118 MMHG | HEART RATE: 75 BPM

## 2024-09-03 DIAGNOSIS — R25.1 TREMOR OF RIGHT HAND: ICD-10-CM

## 2024-09-03 DIAGNOSIS — R19.7 DIARRHEA, UNSPECIFIED TYPE: ICD-10-CM

## 2024-09-03 DIAGNOSIS — F51.01 PRIMARY INSOMNIA: ICD-10-CM

## 2024-09-03 DIAGNOSIS — Z00.00 MEDICARE ANNUAL WELLNESS VISIT, SUBSEQUENT: Primary | ICD-10-CM

## 2024-09-03 DIAGNOSIS — I73.9 PAD (PERIPHERAL ARTERY DISEASE) (HCC): ICD-10-CM

## 2024-09-03 DIAGNOSIS — G89.4 CHRONIC PAIN SYNDROME: ICD-10-CM

## 2024-09-03 DIAGNOSIS — I10 ESSENTIAL HYPERTENSION: ICD-10-CM

## 2024-09-03 DIAGNOSIS — F32.4 MAJOR DEPRESSIVE DISORDER WITH SINGLE EPISODE, IN PARTIAL REMISSION (HCC): ICD-10-CM

## 2024-09-03 DIAGNOSIS — Z86.73 HISTORY OF STROKE: ICD-10-CM

## 2024-09-03 RX ORDER — MONTELUKAST SODIUM 4 MG/1
1 TABLET, CHEWABLE ORAL 2 TIMES DAILY
COMMUNITY
Start: 2024-08-28

## 2024-09-03 SDOH — ECONOMIC STABILITY: FOOD INSECURITY: WITHIN THE PAST 12 MONTHS, THE FOOD YOU BOUGHT JUST DIDN'T LAST AND YOU DIDN'T HAVE MONEY TO GET MORE.: NEVER TRUE

## 2024-09-03 SDOH — ECONOMIC STABILITY: INCOME INSECURITY: HOW HARD IS IT FOR YOU TO PAY FOR THE VERY BASICS LIKE FOOD, HOUSING, MEDICAL CARE, AND HEATING?: HARD

## 2024-09-03 SDOH — ECONOMIC STABILITY: FOOD INSECURITY: WITHIN THE PAST 12 MONTHS, YOU WORRIED THAT YOUR FOOD WOULD RUN OUT BEFORE YOU GOT MONEY TO BUY MORE.: NEVER TRUE

## 2024-09-03 ASSESSMENT — PATIENT HEALTH QUESTIONNAIRE - PHQ9
10. IF YOU CHECKED OFF ANY PROBLEMS, HOW DIFFICULT HAVE THESE PROBLEMS MADE IT FOR YOU TO DO YOUR WORK, TAKE CARE OF THINGS AT HOME, OR GET ALONG WITH OTHER PEOPLE: SOMEWHAT DIFFICULT
2. FEELING DOWN, DEPRESSED OR HOPELESS: MORE THAN HALF THE DAYS
6. FEELING BAD ABOUT YOURSELF - OR THAT YOU ARE A FAILURE OR HAVE LET YOURSELF OR YOUR FAMILY DOWN: SEVERAL DAYS
SUM OF ALL RESPONSES TO PHQ QUESTIONS 1-9: 13
1. LITTLE INTEREST OR PLEASURE IN DOING THINGS: MORE THAN HALF THE DAYS
7. TROUBLE CONCENTRATING ON THINGS, SUCH AS READING THE NEWSPAPER OR WATCHING TELEVISION: NOT AT ALL
5. POOR APPETITE OR OVEREATING: NOT AT ALL
9. THOUGHTS THAT YOU WOULD BE BETTER OFF DEAD, OR OF HURTING YOURSELF: SEVERAL DAYS
SUM OF ALL RESPONSES TO PHQ QUESTIONS 1-9: 14
SUM OF ALL RESPONSES TO PHQ9 QUESTIONS 1 & 2: 4
SUM OF ALL RESPONSES TO PHQ QUESTIONS 1-9: 14
8. MOVING OR SPEAKING SO SLOWLY THAT OTHER PEOPLE COULD HAVE NOTICED. OR THE OPPOSITE, BEING SO FIGETY OR RESTLESS THAT YOU HAVE BEEN MOVING AROUND A LOT MORE THAN USUAL: MORE THAN HALF THE DAYS
3. TROUBLE FALLING OR STAYING ASLEEP: NEARLY EVERY DAY
4. FEELING TIRED OR HAVING LITTLE ENERGY: NEARLY EVERY DAY
SUM OF ALL RESPONSES TO PHQ QUESTIONS 1-9: 14

## 2024-09-03 ASSESSMENT — COLUMBIA-SUICIDE SEVERITY RATING SCALE - C-SSRS
1. WITHIN THE PAST MONTH, HAVE YOU WISHED YOU WERE DEAD OR WISHED YOU COULD GO TO SLEEP AND NOT WAKE UP?: NO
6. HAVE YOU EVER DONE ANYTHING, STARTED TO DO ANYTHING, OR PREPARED TO DO ANYTHING TO END YOUR LIFE?: NO
2. HAVE YOU ACTUALLY HAD ANY THOUGHTS OF KILLING YOURSELF?: NO

## 2024-09-03 ASSESSMENT — LIFESTYLE VARIABLES
HOW MANY STANDARD DRINKS CONTAINING ALCOHOL DO YOU HAVE ON A TYPICAL DAY: 1 OR 2
HOW OFTEN DO YOU HAVE A DRINK CONTAINING ALCOHOL: MONTHLY OR LESS

## 2024-09-03 NOTE — PROGRESS NOTES
Medicare Annual Wellness Visit    Maryann Lozano is here for Medicare AWV    Assessment & Plan   Medicare annual wellness visit, subsequent  Encouraged the patient to receive annual flu shot and for COVID-19  Regular judicious exercise was encouraged  Continue to drink 10-12 ounces protein shake daily    Essential hypertension  Controlled; continue chlorthalidone 25 mg daily, hydralazine 25 mg orally every 8 hours, and Adalat cc 30 mg daily    History of stroke  Stable.  Has left-sided weakness.  Continue with physical therapy.  Atorvastatin 80 mg daily and aspirin 81 mg daily    Chronic pain syndrome  Stable.  Continue duloxetine 30 mg daily and Tylenol as needed    Tremor of right hand  Keep scheduled follow-up neurology appointments with Dr. Giovany Girard  Continue primidone 100 mg orally twice a day  Continue to use heavy silverware during meal intake    Major depressive disorder with single episode, in partial remission (HCC)  Appears to be stable; continue duloxetine 30 mg daily    Primary insomnia  Supportive care; melatonin    Diarrhea, unspecified type  Keep scheduled follow-up GI appointments with Dr. Evans  Will follow-up on the results of EGD/colonoscopy (scheduled for 9/27 Hello how are you I was signing checks on the 50 mL been very from he wants 130 morning contrast Toño was just the result of her hours she received there was broke and chercharito and Toño were his to receive the stretcher but just was not within his place 0.4 mL    PAD (peripheral artery disease) (HCC)  Continue Cilostazol    Recommendations for Preventive Services Due: see orders and patient instructions/AVS.  Recommended screening schedule for the next 5-10 years is provided to the patient in written form: see Patient Instructions/AVS.     No follow-ups on file.     Subjective   Reports episodes of abdominal distention and diarrhea.  Takes Beano 2 tab with meals and Colestipol 2 tab PO BID with meals with good symptom

## 2024-09-23 ENCOUNTER — TELEPHONE (OUTPATIENT)
Dept: INTERNAL MEDICINE CLINIC | Age: 74
End: 2024-09-23

## 2024-09-23 DIAGNOSIS — I63.311 CEREBROVASCULAR ACCIDENT (CVA) DUE TO THROMBOSIS OF RIGHT MIDDLE CEREBRAL ARTERY (HCC): ICD-10-CM

## 2024-09-23 DIAGNOSIS — G81.94 LEFT HEMIPARESIS (HCC): Primary | ICD-10-CM

## 2024-09-23 NOTE — TELEPHONE ENCOUNTER
Pts daughter Marilee is requesting an order for a Hospital bed that will go up and down to make transfers easier.  The head of the bed will need to move forward.      They are also looking for Home Health Care to assist with PT, OT     Please reach out to Marilee at   [phone]

## 2024-10-01 ENCOUNTER — TELEPHONE (OUTPATIENT)
Dept: INTERNAL MEDICINE CLINIC | Age: 74
End: 2024-10-01

## 2024-10-01 NOTE — TELEPHONE ENCOUNTER
Patient's daughter requests the bed order be sent to:    [facility] on [address]  [phone]   Fax: [phone]    If you have any questions please call the daughter: [phone].

## 2024-10-04 ENCOUNTER — TELEPHONE (OUTPATIENT)
Dept: INTERNAL MEDICINE CLINIC | Age: 74
End: 2024-10-04

## 2024-10-04 DIAGNOSIS — F51.01 PRIMARY INSOMNIA: ICD-10-CM

## 2024-10-04 NOTE — TELEPHONE ENCOUNTER
Jeanette from [employer] called to let us know that the patient wants to wait on home healthcare until after her colonoscopy due to her belly issues. After it is completed she will approve home care.    Please call Jeanette with any questions at [phone].

## 2024-10-04 NOTE — TELEPHONE ENCOUNTER
Patient called to get the following medication filled to help her sleep:    zolpidem (AMBIEN) 5 MG tablet [[id number]]  DISCONTINUED     Pharmacy:    [facility] - [place] [address] - P [phone] - F [phone]       If there are any issues please call: [phone]

## 2024-10-07 RX ORDER — ZOLPIDEM TARTRATE 5 MG/1
5 TABLET ORAL NIGHTLY
Qty: 90 TABLET | Refills: 0 | Status: SHIPPED | OUTPATIENT
Start: 2024-10-07 | End: 2025-01-05

## 2024-10-11 ENCOUNTER — HOSPITAL ENCOUNTER (OUTPATIENT)
Age: 74
Setting detail: OUTPATIENT SURGERY
Discharge: HOME | End: 2024-10-11
Attending: INTERNAL MEDICINE | Admitting: INTERNAL MEDICINE
Payer: MEDICARE

## 2024-10-11 ENCOUNTER — ANESTHESIA (OUTPATIENT)
Dept: ENDOSCOPY | Age: 74
End: 2024-10-11
Payer: MEDICARE

## 2024-10-11 ENCOUNTER — ANESTHESIA EVENT (OUTPATIENT)
Dept: ENDOSCOPY | Age: 74
End: 2024-10-11
Payer: MEDICARE

## 2024-10-11 VITALS
RESPIRATION RATE: 16 BRPM | TEMPERATURE: 97.4 F | OXYGEN SATURATION: 100 % | HEIGHT: 69 IN | HEART RATE: 55 BPM | BODY MASS INDEX: 27.4 KG/M2 | WEIGHT: 185 LBS | SYSTOLIC BLOOD PRESSURE: 146 MMHG | DIASTOLIC BLOOD PRESSURE: 64 MMHG

## 2024-10-11 DIAGNOSIS — R10.30 LOWER ABDOMINAL PAIN: ICD-10-CM

## 2024-10-11 DIAGNOSIS — R19.7 DIARRHEA, UNSPECIFIED TYPE: ICD-10-CM

## 2024-10-11 DIAGNOSIS — R14.0 ABDOMINAL BLOATING: ICD-10-CM

## 2024-10-11 DIAGNOSIS — R13.10 DYSPHAGIA, UNSPECIFIED TYPE: ICD-10-CM

## 2024-10-11 PROCEDURE — 3609010300 HC COLONOSCOPY W/BIOPSY SINGLE/MULTIPLE: Performed by: INTERNAL MEDICINE

## 2024-10-11 PROCEDURE — 7100000011 HC PHASE II RECOVERY - ADDTL 15 MIN: Performed by: INTERNAL MEDICINE

## 2024-10-11 PROCEDURE — 2709999900 HC NON-CHARGEABLE SUPPLY: Performed by: INTERNAL MEDICINE

## 2024-10-11 PROCEDURE — 3700000001 HC ADD 15 MINUTES (ANESTHESIA): Performed by: INTERNAL MEDICINE

## 2024-10-11 PROCEDURE — 2580000003 HC RX 258: Performed by: ANESTHESIOLOGY

## 2024-10-11 PROCEDURE — 3700000000 HC ANESTHESIA ATTENDED CARE: Performed by: INTERNAL MEDICINE

## 2024-10-11 PROCEDURE — 88342 IMHCHEM/IMCYTCHM 1ST ANTB: CPT

## 2024-10-11 PROCEDURE — 88305 TISSUE EXAM BY PATHOLOGIST: CPT

## 2024-10-11 PROCEDURE — 6360000002 HC RX W HCPCS: Performed by: NURSE ANESTHETIST, CERTIFIED REGISTERED

## 2024-10-11 PROCEDURE — 3609012400 HC EGD TRANSORAL BIOPSY SINGLE/MULTIPLE: Performed by: INTERNAL MEDICINE

## 2024-10-11 PROCEDURE — 7100000010 HC PHASE II RECOVERY - FIRST 15 MIN: Performed by: INTERNAL MEDICINE

## 2024-10-11 RX ORDER — PROPOFOL 10 MG/ML
INJECTION, EMULSION INTRAVENOUS
Status: DISCONTINUED | OUTPATIENT
Start: 2024-10-11 | End: 2024-10-11 | Stop reason: SDUPTHER

## 2024-10-11 RX ORDER — LIDOCAINE HCL/PF 100 MG/5ML
SYRINGE (ML) INJECTION
Status: DISCONTINUED | OUTPATIENT
Start: 2024-10-11 | End: 2024-10-11 | Stop reason: SDUPTHER

## 2024-10-11 RX ORDER — PANTOPRAZOLE SODIUM 40 MG/1
40 TABLET, DELAYED RELEASE ORAL
Qty: 90 TABLET | Refills: 3 | Status: SHIPPED | OUTPATIENT
Start: 2024-10-11

## 2024-10-11 RX ORDER — SODIUM CHLORIDE, SODIUM LACTATE, POTASSIUM CHLORIDE, CALCIUM CHLORIDE 600; 310; 30; 20 MG/100ML; MG/100ML; MG/100ML; MG/100ML
INJECTION, SOLUTION INTRAVENOUS CONTINUOUS
Status: DISCONTINUED | OUTPATIENT
Start: 2024-10-11 | End: 2024-10-11 | Stop reason: HOSPADM

## 2024-10-11 RX ADMIN — SODIUM CHLORIDE, POTASSIUM CHLORIDE, SODIUM LACTATE AND CALCIUM CHLORIDE: 600; 310; 30; 20 INJECTION, SOLUTION INTRAVENOUS at 11:07

## 2024-10-11 RX ADMIN — PROPOFOL 100 MG: 10 INJECTION, EMULSION INTRAVENOUS at 11:21

## 2024-10-11 RX ADMIN — Medication 100 MG: at 11:21

## 2024-10-11 RX ADMIN — PROPOFOL 140 MCG/KG/MIN: 10 INJECTION, EMULSION INTRAVENOUS at 11:21

## 2024-10-11 ASSESSMENT — LIFESTYLE VARIABLES: SMOKING_STATUS: 0

## 2024-10-11 ASSESSMENT — PAIN - FUNCTIONAL ASSESSMENT: PAIN_FUNCTIONAL_ASSESSMENT: 0-10

## 2024-10-11 NOTE — H&P
Gastroenterology Note                 Pre-operative History and Physical    Patient: Maryann Lozano  : 1950  CSN:     History Obtained From:   Patient or guardian.      HISTORY OF PRESENT ILLNESS:    The patient is a 73 y.o. female here for EGD and colonoscopy for symptoms of diarrhea, right lower quadrant abdominal pain, esophageal dysphagia.  Temporary improvement in symptoms after treatment of her SIBO    Past Medical History:    Past Medical History:   Diagnosis Date    Arthritis     Cerebral artery occlusion with cerebral infarction (HCC)     Compression fracture     back due to fall    Concussion     due to fall    DVT (deep venous thrombosis) (HCC) 2017    RLE after TKR    Environmental allergies     Essential hypertension, benign 2010    GERD (gastroesophageal reflux disease)     History of peptic ulcer     Hx of blood clots     Hyperlipidemia 2010    Lacunar infarction (HCC)     old - per MRI     MRSA (methicillin resistant staph aureus) culture positive 2018    knee    Restrictive airway disease     allergy induced    Retention of urine     Wound, open 2018    left shin area, healing well, tx in wound care center     Past Surgical History:    Past Surgical History:   Procedure Laterality Date     SECTION      times 2    CHOLECYSTECTOMY      COLONOSCOPY      HYSTERECTOMY (CERVIX STATUS UNKNOWN)      KNEE ARTHROPLASTY Right 2018     RIGHT REVISION TOTAL KNEE ARTHROPLASTY    KNEE ARTHROSCOPY Left     Dr. Wong    ME REVJ TOT KNEE ARTHRP FEM&ENTIRE TIBIAL COMPONE Right 2018    RIGHT REVISION ARTHROSCOPY FEMORAL COMPONENT, SYNOVECTOMY performed by Sarthak Gaviria MD at [facility] OR    QUADRICEPSPLASTY Right 2018    RIGHT KNEE QUADRICEP TENDON REPAIR WITH ALLOGRAFT AUGMENTATION performed by Sarthak Gaviria MD at [facility] OR    SHOULDER ARTHROSCOPY Right     TOTAL KNEE ARTHROPLASTY Right 2017     Medications Prior to Admission:   No  Vomiting 90 tablet 3        Allergies:  Codeine, Demerol, Morphine, Tape [adhesive tape], Cabbage, and Erythromycin      Social History:   Social History     Tobacco Use    Smoking status: Former     Current packs/day: 0.00     Average packs/day: 1 pack/day for 35.0 years (35.0 ttl pk-yrs)     Types: Cigarettes     Start date: 3/28/1970     Quit date: 3/28/2005     Years since quittin.5    Smokeless tobacco: Never   Substance Use Topics    Alcohol use: Yes     Comment: occasional     Family History:   Family History   Problem Relation Age of Onset    Cancer Other     Hypertension Other     Kidney Disease Other        PHYSICAL EXAM:      /81   Pulse 78   Temp 97 °F (36.1 °C) (Temporal)   Resp 14   Ht 1.753 m (5' 9\")   Wt 83.9 kg (185 lb)   SpO2 96%   BMI 27.32 kg/m²  I        Heart:  RRR, normal s1s2    Lungs:  CTA and normal effort    Abdomen:   Soft, nondistended, tender to palpation in the right lower quadrant.  No rebound        ASSESSMENT AND PLAN:    1.  Patient is a 73 y.o. female here for endoscopy with MAC sedation.    2.  Procedure options, risks and benefits reviewed with patient and/or guardian.  They express understanding.

## 2024-10-11 NOTE — PROCEDURES
[facility]  Endoscopy Note    Patient: Maryann Lozano  : 1950  Acct#:     Procedure: Esophagogastroduodenoscopy with biopsy    Date:  10/11/2024     Surgeon:  OPAL TIRADO MD      Anesthesia:    IV propofol, per anesthesia       EBL: <50 mL    Indications: Diarrhea, esophageal dysphagia    Description of Procedure:    Informed consent was obtained from the patient after explanation of indications, benefits and possible risks and complications of the procedure.      The patient was then taken to the endoscopy suite, placed in the left lateral decubitus position and the above IV sedation was administrered.    The Olympus videoendoscope (GIF-[id number]) was placed in the patient's mouth and under direct visualization passed into the esophagus.  The scope was then advanced into the stomach and to the second portion of the duodenum.  A retroflexed exam of the gastric cardia and fundus was performed. The scope was then withdrawn back into the stomach, it was decompressed, and the scope was completely withdrawn.    Findings:  Patchy erythema in the first and second portion of the duodenum.  Biopsies were obtained evaluate for celiac disease.  Multiple 1 to 5 mm ulcerations in the gastric antrum and body, many with overlying hematin.  Biopsies were obtained evaluate for H. pylori.  Small hiatal hernia.  Grade A reflux esophagitis.  Irregular Z-line.  Biopsies were obtained evaluate for short segment Werner's esophagus.       The patient tolerated the procedure well and was taken to the post anesthesia care unit in good condition.    Biopsies: Yes.     Impression:    Patchy erythema in the first and second portion of the duodenum.  Biopsies were obtained evaluate for celiac disease.  Multiple 1 to 5 mm ulcerations in the gastric antrum and body, many with overlying hematin.  Biopsies were obtained evaluate for H. pylori.  Small hiatal hernia.  Grade A reflux esophagitis.  Irregular Z-line.  Biopsies  were obtained evaluate for short segment Werner's esophagus.    Recommendations:   Await pathology results  PPI daily      Peter Evans MD  [facility]

## 2024-10-11 NOTE — ANESTHESIA PRE PROCEDURE
for input(s): \"POCGLU\", \"POCNA\", \"POCK\", \"POCCL\", \"POCBUN\", \"POCHEMO\", \"POCHCT\" in the last 72 hours.    Coags:   Lab Results   Component Value Date/Time    PROTIME 10.5 11/20/2020 09:54 AM    PROTIME 42.7 03/22/2017 08:34 AM    INR 0.91 11/20/2020 09:54 AM    APTT 29.6 05/12/2019 05:05 AM       HCG (If Applicable): No results found for: \"PREGTESTUR\", \"PREGSERUM\", \"HCG\", \"HCGQUANT\"     ABGs: No results found for: \"PHART\", \"PO2ART\", \"QTD0QPE\", \"CYH3SCH\", \"BEART\", \"A2STISLE\"     Type & Screen (If Applicable):  Lab Results   Component Value Date    ABORH A POS 12/07/2018    ABORH CANCELED 12/07/2018    LABANTI NEG 12/07/2018       Drug/Infectious Status (If Applicable):  No results found for: \"HIV\", \"HEPCAB\"    COVID-19 Screening (If Applicable):   Lab Results   Component Value Date/Time    COVID19 Not Detected 11/23/2020 10:43 AM    COVID19 Negative 11/23/2020 05:59 AM           Anesthesia Evaluation  Patient summary reviewed and Nursing notes reviewed   no history of anesthetic complications:   Airway: Mallampati: I  TM distance: >3 FB   Neck ROM: full  Mouth opening: > = 3 FB   Dental: normal exam         Pulmonary: breath sounds clear to auscultation      (-) not a current smoker                           Cardiovascular:  Exercise tolerance: good (>4 METS)  (+) hypertension:, hyperlipidemia        Rhythm: regular  Rate: normal                    Neuro/Psych:   (+) CVA:             ROS comment: L sided weakness; uses scooter GI/Hepatic/Renal:   (+) GERD:          Endo/Other:    (+) : arthritis: OA..                 Abdominal:             Vascular:          Other Findings:             Anesthesia Plan      MAC     ASA 3       Induction: intravenous.    MIPS: Prophylactic antiemetics administered.  Anesthetic plan and risks discussed with patient.      Plan discussed with CRNA.                    Charlotte Starkey DO   10/11/2024

## 2024-10-11 NOTE — DISCHARGE INSTRUCTIONS
ENDOSCOPY & COLONOSCOPY DISCHARGE INSTRUCTIONS:    Call the physician that did your procedure for any questions or concern:    [facility]: [phone]  DR. PETER EVANS      ACTIVITY:    There are potential side effects to the medications used for sedation and anesthesia during your procedure.  These include:  Dizziness or light-headedness, confusion or memory loss, delayed reaction times, loss of coordination, nausea and vomiting.  Because of your increased risk for injury, we ask that you observe the following precautions:  For the next 24 hours,  DO NOT operate an automobile, bicycle, motorcycle, , power tools or large equipment of any kind.  Do not drink alcohol, sign any legal documents or make any legal decisions for 24 hours.  Do not bend your head over lower than your heart.  DO sit on the side of bed/couch awhile before getting up.  Plan on bedrest or quiet relaxation today.  You may resume normal activities in 24 hours.    DIET:    Your first meal today should be light, avoiding spicy and fatty foods.  If you tolerate this first meal, then you may advance to your regular diet unless otherwise advised by your physician.    NORMAL SYMPTOMS:  -Mild sore throat if you’ve had an EGD   -Gaseous discomfort    NOTIFY YOUR PHYSICIAN IF THESE SYMPTOMS OCCUR:  1. Fever (greater than 100)  5. Increased abdominal bloating  2. Severe pain    6. Excessive bleeding  3. Nausea and vomiting  7. Chest pain                                                                    4. Chills    8. Shortness of breath    ADDITIONAL INSTRUCTIONS:    Biopsy results: Call [facility] for biopsy results in 1 week    Recommendations:  Await pathology results  Continue colestipol which she states has resolved her diarrhea     Peter Evans MD  [facility]         Please review these discharge instructions this evening or tomorrow for  information you may have forgotten.            We want to thank you for choosing the

## 2024-10-11 NOTE — PROCEDURES
[facility]/[facility]astro [facility]  Colonoscopy Note    Patient: Maryann Lozano  : 1950  Acct#:     Procedure: Colonoscopy with biopsy, polypectomy (snare cautery)    Date:  10/11/2024    Surgeon:  OPAL TIRADO MD      Anesthesia: IV propofol, per anesthesia    EBL: <50 mL    Indications: Diarrhea, right lower quadrant abdominal pain    Procedure:     An informed consent was obtained from the patient after explanation of indications, benefits, possible risks and complications of the procedure.  The patient was then taken to the endoscopy suite, placed in the left lateral decubitus position, and the above IV anesthesia was administered.    A digital rectal examination was performed and revealed negative without mass, lesions or tenderness.      The Olympus PCFQ-H190 video colonoscope was placed in the patient's rectum under digital direction and advanced to the cecum and terminal ileum. The cecum was identified by characteristic anatomy and ballottment.  The prep was adequate.      Findings:  Normal terminal ileum.  Extensive diverticulosis throughout the entire colon.  Normal colonic mucosa.  Biopsies were obtained throughout the colon evaluate for microscopic colitis.  A 6 mm semipedunculated polyp within the diverticulum in the sigmoid colon removed via hot snare polypectomy.      The scope was then withdrawn into the rectum and retroflexed.  The retroflexed view of the anal verge and rectum demonstrates small hemorrhoids.     The scope was straightened, the colon was decompressed and the scope was withdrawn from the patient.      The patient tolerated the procedure well and was taken to the PACU in good condition.    Biopsies:  yes      Impression:   Normal terminal ileum.  Extensive diverticulosis throughout the entire colon.  Normal colonic mucosa.  Biopsies were obtained throughout the colon evaluate for microscopic colitis.  A 6 mm semipedunculated polyp within the diverticulum in the  sigmoid colon removed via hot snare polypectomy.    Recommendations:  Await pathology results  Continue colestipol which she states has resolved her diarrhea    Peter Evans MD  [facility]

## 2024-10-11 NOTE — ANESTHESIA POSTPROCEDURE EVALUATION
Department of Anesthesiology  Postprocedure Note    Patient: Maryann Loazno  MRN: [id number]  YOB: 1950  Date of evaluation: 10/11/2024    Procedure Summary       Date: 10/11/24 Room / Location: [place] [address] / [facility]    Anesthesia Start: 1115 Anesthesia Stop: 1155    Procedures:       ESOPHAGOGASTRODUODENOSCOPY BIOPSY      COLONOSCOPY BIOPSY HOT SNARE POLYPECTOMY Diagnosis:       Diarrhea, unspecified type      Abdominal bloating      Lower abdominal pain      Dysphagia, unspecified type      (Diarrhea, unspecified type [R19.7])      (Abdominal bloating [R14.0])      (Lower abdominal pain [R10.30])      (Dysphagia, unspecified type [R13.10])    Surgeons: Peter Evans MD Responsible Provider: Charlotte Starkey DO    Anesthesia Type: MAC ASA Status: 3            Anesthesia Type: No value filed.    Daniel Phase I: Daniel Score: 9    Daniel Phase II:      Anesthesia Post Evaluation    Patient location during evaluation: PACU  Patient participation: complete - patient participated  Level of consciousness: awake and alert  Pain score: 0  Airway patency: patent  Nausea & Vomiting: no nausea and no vomiting  Cardiovascular status: blood pressure returned to baseline  Respiratory status: acceptable  Hydration status: euvolemic    No notable events documented.

## 2024-10-11 NOTE — PROGRESS NOTES
Ambulatory Surgery/Procedure Discharge Note    Vitals:    10/11/24 1210   BP: (!) 146/64   Pulse: 55   Resp: 16   Temp:    SpO2: 100%       No intake/output data recorded.    Restroom use offered before discharge.  Yes    Pain assessment:  none  Pain Level: 0    BP within 20% pre-op anju score.    Patient has been seen by doctor. Discharge order obtained, and discharge instructions reviewed. Patient educated, using the teach back method, about follow up instructions and discharge instructions. A completed copy of the AVS instructions given to patient and all questions answered. IV catheter removed without complaints, catheter intact, site WNL.   Patient discharged to home/self care. Patient discharged via wheel chair by transporter to waiting family/S.O.       10/11/2024 12:20 PM

## 2024-10-16 DIAGNOSIS — F51.01 PRIMARY INSOMNIA: ICD-10-CM

## 2024-10-18 ENCOUNTER — TELEPHONE (OUTPATIENT)
Dept: INTERNAL MEDICINE CLINIC | Age: 74
End: 2024-10-18

## 2024-10-18 DIAGNOSIS — J44.9 CHRONIC OBSTRUCTIVE PULMONARY DISEASE, UNSPECIFIED COPD TYPE (HCC): ICD-10-CM

## 2024-10-18 DIAGNOSIS — I63.311 CEREBROVASCULAR ACCIDENT (CVA) DUE TO THROMBOSIS OF RIGHT MIDDLE CEREBRAL ARTERY (HCC): Primary | ICD-10-CM

## 2024-10-18 DIAGNOSIS — G81.94 LEFT HEMIPARESIS (HCC): ICD-10-CM

## 2024-10-18 NOTE — TELEPHONE ENCOUNTER
Physical Therapist from  [facility] CAMPBELL is calling to let us know she had her EVAL today and wants to see her 1x a week for 9 weeks. Campbell is wanting to also know if and order for a dinasplint for her left knee is ok due to it being her stoke side and it abimbola.     Please call and advise   [phone]

## 2024-10-18 NOTE — TELEPHONE ENCOUNTER
Pt daughter called asking for prescription for Hospital Bed. In order for Medicare to pay for bed prescription needs to say the following    Due to COPD pt needs head elevated greater then 30 degrees. A wage has been considered but has been ruled out. Pt has limited bed mobility and is at risk for pressures ulcers. Pt requires frequent changes and body positioning that is not feasibly in a traditional bed. Pt will need a hospital bed.    FAX: [phone]  [facility]    [phone] (Marilee)  Pls call and advise

## 2024-10-21 DIAGNOSIS — E78.5 HYPERLIPIDEMIA, UNSPECIFIED HYPERLIPIDEMIA TYPE: ICD-10-CM

## 2024-10-21 RX ORDER — ATORVASTATIN CALCIUM 80 MG/1
80 TABLET, FILM COATED ORAL NIGHTLY
Qty: 90 TABLET | Refills: 1 | Status: SHIPPED | OUTPATIENT
Start: 2024-10-21

## 2024-12-03 ENCOUNTER — OFFICE VISIT (OUTPATIENT)
Dept: INTERNAL MEDICINE CLINIC | Age: 74
End: 2024-12-03

## 2024-12-03 VITALS — DIASTOLIC BLOOD PRESSURE: 72 MMHG | SYSTOLIC BLOOD PRESSURE: 110 MMHG | BODY MASS INDEX: 27.32 KG/M2 | HEIGHT: 69 IN

## 2024-12-03 DIAGNOSIS — R25.1 TREMOR OF RIGHT HAND: ICD-10-CM

## 2024-12-03 DIAGNOSIS — F51.01 PRIMARY INSOMNIA: ICD-10-CM

## 2024-12-03 DIAGNOSIS — I10 ESSENTIAL HYPERTENSION: Primary | ICD-10-CM

## 2024-12-03 DIAGNOSIS — G81.94 LEFT HEMIPARESIS (HCC): ICD-10-CM

## 2024-12-03 DIAGNOSIS — R60.0 BILATERAL LOWER EXTREMITY EDEMA: ICD-10-CM

## 2024-12-03 DIAGNOSIS — E55.9 VITAMIN D DEFICIENCY: ICD-10-CM

## 2024-12-03 RX ORDER — ZOLPIDEM TARTRATE 5 MG/1
5 TABLET ORAL NIGHTLY PRN
Qty: 30 TABLET | Refills: 2 | Status: SHIPPED | OUTPATIENT
Start: 2024-12-03 | End: 2025-03-03

## 2024-12-03 RX ORDER — SUCRALFATE 1 G/1
1 TABLET ORAL 2 TIMES DAILY
COMMUNITY

## 2024-12-03 NOTE — PROGRESS NOTES
(KLOR-CON M20) 20 MEQ extended release tablet Take 1 tablet by mouth daily 90 tablet 3    nitroglycerin (NITRO-BID) 2 % ointment Apply to the top of the foot for 30-60 minutes a day 1 each 3    Magnesium 400 MG TABS Take 1 tablet by mouth daily      aspirin 81 MG EC tablet Take 1 tablet by mouth daily      ondansetron (ZOFRAN) 4 MG tablet Take 1 tablet by mouth 3 times daily as needed for Nausea or Vomiting 90 tablet 3     No current facility-administered medications for this visit.       /72   Ht 1.753 m (5' 9\")   BMI 27.32 kg/m²     Physical Exam   Physical Exam  Vitals and nursing note reviewed.   Constitutional:       General: She is not in acute distress.     Appearance: She is well-developed.   HENT:      Head: Normocephalic and atraumatic.      Right Ear: Tympanic membrane, ear canal and external ear normal. There is no impacted cerumen.      Left Ear: Tympanic membrane, ear canal and external ear normal. There is no impacted cerumen.      Nose:      Comments: Nasal mucosa is moderately swollen and mildly inflamed     Mouth/Throat:      Mouth: Mucous membranes are moist.      Pharynx: Oropharynx is clear. No oropharyngeal exudate or posterior oropharyngeal erythema.   Eyes:      General: No scleral icterus.     Pupils: Pupils are equal, round, and reactive to light.   Neck:      Vascular: No carotid bruit or JVD.   Cardiovascular:      Rate and Rhythm: Normal rate and regular rhythm.      Heart sounds: Normal heart sounds. No murmur heard.     No friction rub. No gallop.   Pulmonary:      Effort: Pulmonary effort is normal. No respiratory distress.      Breath sounds: Normal breath sounds. No wheezing or rales.   Abdominal:      General: Bowel sounds are normal. There is no distension.      Palpations: Abdomen is soft.      Tenderness: There is no abdominal tenderness. There is no right CVA tenderness or left CVA tenderness.   Musculoskeletal:         General: No tenderness. Normal range of motion.

## 2024-12-04 ENCOUNTER — TELEPHONE (OUTPATIENT)
Dept: DERMATOLOGY | Age: 74
End: 2024-12-04

## 2024-12-04 NOTE — TELEPHONE ENCOUNTER
Patient LVM and has a wart  on right hand, been using wart removal treatment, nothing seems to work. The wart his bigger and hurts and getting worse.  Would like to come in and have it check out.    [phone]

## 2024-12-09 ENCOUNTER — OFFICE VISIT (OUTPATIENT)
Dept: DERMATOLOGY | Age: 74
End: 2024-12-09
Payer: MEDICARE

## 2024-12-09 DIAGNOSIS — D48.5 NEOPLASM OF UNCERTAIN BEHAVIOR OF SKIN: Primary | ICD-10-CM

## 2024-12-09 PROCEDURE — 11102 TANGNTL BX SKIN SINGLE LES: CPT | Performed by: DERMATOLOGY

## 2024-12-09 NOTE — PATIENT INSTRUCTIONS

## 2024-12-09 NOTE — PROGRESS NOTES
[facility] Dermatology  Mustapha Mcfarland MD  [phone]      Maryann Lozano  1950    74 y.o. female     Date of Visit: 2024    Chief Complaint: skin lesion    History of Present Illness:    She presents today for a persistent painful lesion on the right hand.     Has had a lot of health problems lately - gastric ulcers and a stroke.       Review of Systems:  Skin: no other concerning lesions.     Past Medical History, Family History, Surgical History, Medications and Allergies reviewed.    Past Medical History:   Diagnosis Date    Arthritis     Cerebral artery occlusion with cerebral infarction (HCC)     Compression fracture     back due to fall    Concussion     due to fall    DVT (deep venous thrombosis) (HCC) 2017    RLE after TKR    Environmental allergies     Essential hypertension, benign 2010    GERD (gastroesophageal reflux disease)     History of peptic ulcer     Hx of blood clots     Hyperlipidemia 2010    Lacunar infarction (HCC)     old - per MRI     MRSA (methicillin resistant staph aureus) culture positive 2018    knee    Restrictive airway disease     allergy induced    Retention of urine     Wound, open 2018    left shin area, healing well, tx in wound care center     Past Surgical History:   Procedure Laterality Date     SECTION      times 2    CHOLECYSTECTOMY      COLONOSCOPY      COLONOSCOPY N/A 10/11/2024    COLONOSCOPY BIOPSY HOT SNARE POLYPECTOMY performed by Peter Evans MD at [facility] ENDOSCOPY    HYSTERECTOMY (CERVIX STATUS UNKNOWN)      KNEE ARTHROPLASTY Right 2018     RIGHT REVISION TOTAL KNEE ARTHROPLASTY    KNEE ARTHROSCOPY Left     Dr. Wong    NC REVJ TOT KNEE ARTHRP FEM&ENTIRE TIBIAL COMPONE Right 2018    RIGHT REVISION ARTHROSCOPY FEMORAL COMPONENT, SYNOVECTOMY performed by Sarthak Gaviria MD at [facility] OR    QUADRICEPSPLASTY Right 2018    RIGHT KNEE QUADRICEP TENDON REPAIR WITH ALLOGRAFT AUGMENTATION

## 2024-12-12 LAB — DERMATOLOGY PATHOLOGY REPORT: ABNORMAL

## 2024-12-13 DIAGNOSIS — C44.622 SCC (SQUAMOUS CELL CARCINOMA), HAND, RIGHT: Primary | ICD-10-CM

## 2024-12-14 ENCOUNTER — PATIENT MESSAGE (OUTPATIENT)
Dept: INTERNAL MEDICINE CLINIC | Age: 74
End: 2024-12-14

## 2024-12-16 ENCOUNTER — TELEPHONE (OUTPATIENT)
Dept: INTERNAL MEDICINE CLINIC | Age: 74
End: 2024-12-16

## 2024-12-16 NOTE — TELEPHONE ENCOUNTER
Duloxetine  (Newest Message First)  View All Conversations on this Encounter  Maryann Lozano \"Octavia\"  P [place] [address] Practice Support (supporting Rivas Cisneros MD)2 days ago       This is Octavia's daughter Marilee. One of the medications my mom is on,     DULoxetine 30 MG extended release capsule     was recalled by the FDA over cancer risks. My mom stopped taking it over the weekend but we would like guidance on how to proceed. Can she stop taking it? Does she need something else? She is on a lot of medications so we are hoping not to cause negative interactions or additional side effects.     Please advise on how to proceed.     Thank you,     The Blake family.

## 2024-12-17 RX ORDER — VENLAFAXINE HYDROCHLORIDE 37.5 MG/1
37.5 CAPSULE, EXTENDED RELEASE ORAL DAILY
Qty: 30 CAPSULE | Refills: 3 | Status: SHIPPED | OUTPATIENT
Start: 2024-12-17

## 2024-12-17 NOTE — TELEPHONE ENCOUNTER
Patient's daughter called in again about getting the replacement medication. It isn't at the pharmacy yet and she is requesting it be sent right away please.

## 2024-12-20 ENCOUNTER — PROCEDURE VISIT (OUTPATIENT)
Dept: SURGERY | Age: 74
End: 2024-12-20

## 2024-12-20 VITALS — HEART RATE: 74 BPM | DIASTOLIC BLOOD PRESSURE: 88 MMHG | SYSTOLIC BLOOD PRESSURE: 133 MMHG

## 2024-12-20 DIAGNOSIS — C44.622 SCC (SQUAMOUS CELL CARCINOMA), HAND, RIGHT: Primary | ICD-10-CM

## 2024-12-20 NOTE — PROGRESS NOTES
PRE-PROCEDURE SCREENING    Pacemaker/ICD: No  Difficulty with numbing in the past: No  Local Anesthesia Reaction/passing out: No  Latex or adhesive allergy:  No  Any history of reaction to suture or skin glue:  no  Bleeding/Clotting Disorders: No  Anticoagulant Therapy: Yes, baby asa, prevention  Joint prosthesis: Yes, R TKR with multiple revisions, can't straighten this knee.  Artificial Heart Valve: No  Stroke or Seizures: Yes, 2020 - left-sided affect  Organ Transplant or Lymphoma: No  Immunosuppression: No  Respiratory Problems: No

## 2024-12-20 NOTE — PROGRESS NOTES
MOHS PROCEDURE NOTE    PHYSICIAN:  Jeanette Ritchie MD, Who operated in two distinct and integrated capacities as the surgeon removing the tissue and as the pathologist examining the tissue.    ASSISTANT: Russ Mendoza RN     REFERRING PROVIDER:   Mustapha Mcfarland MD    PREOPERATIVE DIAGNOSIS: Invasive moderately-differentiated Squamous Cell Carcinoma     SPECIFIC MOHS INDICATIONS:  size, location, and need for tissue conservation    AUC SCORIN/9    POSTOPERATIVE DIAGNOSIS: SAME    LOCATION: Dorsal Right Hand    OPERATIVE PROCEDURE:  MOHS MICROGRAPHIC SURGERY    RECONSTRUCTION OF DEFECT: Intermediate layered closure    PREOPERATIVE SIZE: 24 x 16 MM    DEFECT SIZE: 29 x 23 MM    LENGTH OF REPAIRED WOUND/SIZE OF FLAP/SIZE OF GRAFT:  64 MM    ANESTHESIA:  12 mL 1% lidocaine with epinephrine 1:100,000 buffered.     EBL:  MINIMAL    DURATION OF PROCEDURE:  1 HOUR    POSTOPERATIVE OBSERVATION: 38 MIN    SPECIMENS:  SEE MOHS MAP    COMPLICATIONS:  NONE    DESCRIPTION OF PROCEDURE:  The patient was given a mirror, as appropriate, and the biopsy site was identified, marked with a surgical marking pen, and verified by the patient.   Options for treatment were discussed and the patient was informed that Mohs surgery was the selected treatment based on its lower recurrence rate, given the features listed above, as compared to other treatment modalities such as excision, radiation, or curettage, and agreed with this treatment plan.  Risks and benefits including bruising, swelling, bleeding, infection, nerve injury, recurrence, and scarring were discussed with the patient prior to the procedure and a written consent detailing these and other risks was reviewed with the patient and signed.    There was a time out for person and procedure verification.  The surgical site was prepped with an antiseptic solution.  Application of an antiseptic solution was repeated before each surgical stage.      Stage I:  The

## 2024-12-20 NOTE — PATIENT INSTRUCTIONS
[facility] Mohs Surgery Office Hours:    Monday-Thursday  7:30 AM-4:30 PM    Friday  9:00 AM-1:00 PM     KEEP your hand elevated as much as possible. If you plan to continue using an ACE wrap, wrap from the finger tips up toward you elbow in a snail shell method as shown.  Change the wrap if it becomes soiled and you can reuse it if you wash and let it hang dry.    POST-OPERATIVE CARE FOR STITCHES  Bandage change after 48 hours    CARING FOR YOUR SURGICAL SITE  The bandage should remain on and completely dry for 48 hours. Do NOT get the bandage wet.  After the first 48 hours, gently remove the remaining part of the bandage. It can be helpful to moisten the bandage edges in the shower. Steri strips may still be on the wound. It is ok, they will fall off slowly with the daily bandage changes.  Gently clean the wound daily with mild soap and water. Try to clean off crust and debris.   Dry (pat) the area with a clean Q-tip or gauze.   Apply a layer of Vaseline/ Aquaphor (or Bacitracin if your doctor recommends) to the wound area only.  Cut a piece of Telfa (or any non-stick dressing) to fit just over the wound and secure it with paper tape. If the wound is small you may use a Band- Aid. Keep area covered for a total of 2 week(s).  If the dressing comes off or if you have questions, or concerns about the dressing, please call the office for instructions!    POST OPERATIVE INSTRUCTIONS    Activity: Do not lift anything heavier than a gallon of milk for 1 week. Also, avoid strenuous activity such as running, power walking or contact sports.  Eating and drinking: Do not drink alcohol for 48 hours after your procedure. Alcohol increases the chances of bleeding.  Medicines   -If you have discomfort, take Acetaminophen (Tylenol or Extra Strength Tylenol). Follow the instructions and warning on the bottle.  -If your doctor has prescribed you an Aspirin daily, please keep taking it. Do not take extra Aspirin or

## 2024-12-23 ENCOUNTER — TELEPHONE (OUTPATIENT)
Dept: SURGERY | Age: 74
End: 2024-12-23

## 2024-12-23 NOTE — TELEPHONE ENCOUNTER
The patient was in the office on 12.20.24 for MOHS procedure located on the dorsal right hand with ILC repair.  The patient tolerated the procedure well and left the office in good condition.    Pain level on post-operative day 3:  present - adequately treated with Tylenol.    Any bleeding episode that required pressure to be held, bandage change or a call to the office or MD?  no     Any other issues?:  no    A post-operative telephone call was placed at 4:25 p.m. in order to check on the patient's recovery process.  The patient reported doing well and had no complaints other than those listed above, if any.  All of the patient's questions were answered.

## 2025-01-03 ENCOUNTER — NURSE ONLY (OUTPATIENT)
Dept: SURGERY | Age: 75
End: 2025-01-03

## 2025-01-03 DIAGNOSIS — Z51.89 VISIT FOR WOUND CHECK: Primary | ICD-10-CM

## 2025-01-03 NOTE — PROGRESS NOTES
S:  The patient is here for suture removal s/p Mohs surgery on the dorsal right hand and Intermediate layered closure repair, 2 week(s) ago. The site appears mostly-healed without signs of infection (redness, pain or discharge). There was a small open area on the incision line.  The sutures were removed. Steri strips were applied followed by Vaseline and covered with Telfa and paper tape.    Wound care and activity instructions given.   The patient was instructed to  schedule a f/u with General Dermatology per their instructions.

## 2025-01-03 NOTE — PATIENT INSTRUCTIONS
[facility] Surgery Office Hours:    Monday-Thursday  7:30 AM-4:30 PM    Friday  9:00 AM-1:00 PM     WOUND CARE AFTER SUTURE REMOVAL    After your stiches have been removed, your scar is still very fragile. In fact, scars continue to change and evolve, what we call remodel, for about a year after your procedure.  Follow the following steps below to ensure that your scar heals well.    Instructions    1. If Steri-strips were applied, keep them on until they fall off on their own.  2. Protect your scar from the sun. Use a sunscreen or bandage to cover your scar. Sun exposure can cause your scar to become discolored and appear red or brown.  3. To help soften your scar more rapidly, it is helpful, but not necessary, for you to   massage the scar gently each night for twenty minutes.   4. “Spitting” suture. Occasionally, an inside suture (stitch) does not completely dissolve. When this happens, (generally 4-8 weeks after surgery), it causes a bump or “pimple” to form on the scar. This is easily removed and is not at all serious. It   does not mean the skin cancer has returned. Contact us if it happens, but do not be alarmed.      5. If you scar becomes tender, itchy or becomes very large, let Dr. Ritchie know.  There    are treatments that can improve the appearance of your scar or help make it more comfortable.

## 2025-01-05 DIAGNOSIS — I10 ESSENTIAL HYPERTENSION: ICD-10-CM

## 2025-01-06 NOTE — TELEPHONE ENCOUNTER
Last appointment: 12/3/2024  Next appointment: 3/5/2025  Last refill:   Requested Prescriptions     Pending Prescriptions Disp Refills    NIFEdipine (ADALAT CC) 30 MG extended release tablet [Pharmacy Med Name: NIFEDIPINE ER 30 MG TABLET] 180 tablet 2     Sig: TAKE 1 TABLET BY MOUTH IN THE MORNING AND BEFORE BEDTIME

## 2025-01-07 RX ORDER — NIFEDIPINE 30 MG
TABLET, EXTENDED RELEASE ORAL
Qty: 180 TABLET | Refills: 1 | Status: SHIPPED | OUTPATIENT
Start: 2025-01-07

## 2025-01-08 ENCOUNTER — TELEPHONE (OUTPATIENT)
Dept: INTERNAL MEDICINE CLINIC | Age: 75
End: 2025-01-08

## 2025-01-08 NOTE — TELEPHONE ENCOUNTER
Left message to call back   Patient needs cardiac clearance with cardiology    We can okay on medications hold

## 2025-01-08 NOTE — TELEPHONE ENCOUNTER
[facility] calling about the fax they sent over in early December and again today pt is having a bladder Botox injection on Friday and needs a clearance pt was last cleared of this in June.     Please call and advise if need be.   Ph. [phone]  Fax. [phone]

## 2025-01-22 ENCOUNTER — OFFICE VISIT (OUTPATIENT)
Age: 75
End: 2025-01-22
Payer: MEDICARE

## 2025-01-22 DIAGNOSIS — L82.1 SEBORRHEIC KERATOSIS: Primary | ICD-10-CM

## 2025-01-22 DIAGNOSIS — Z85.828 HISTORY OF SCC (SQUAMOUS CELL CARCINOMA) OF SKIN: ICD-10-CM

## 2025-01-22 PROCEDURE — G8427 DOCREV CUR MEDS BY ELIG CLIN: HCPCS | Performed by: DERMATOLOGY

## 2025-01-22 PROCEDURE — 99212 OFFICE O/P EST SF 10 MIN: CPT | Performed by: DERMATOLOGY

## 2025-01-22 PROCEDURE — G8417 CALC BMI ABV UP PARAM F/U: HCPCS | Performed by: DERMATOLOGY

## 2025-01-22 PROCEDURE — 1159F MED LIST DOCD IN RCRD: CPT | Performed by: DERMATOLOGY

## 2025-01-22 PROCEDURE — 3017F COLORECTAL CA SCREEN DOC REV: CPT | Performed by: DERMATOLOGY

## 2025-01-22 PROCEDURE — 1036F TOBACCO NON-USER: CPT | Performed by: DERMATOLOGY

## 2025-01-22 PROCEDURE — 1090F PRES/ABSN URINE INCON ASSESS: CPT | Performed by: DERMATOLOGY

## 2025-01-22 PROCEDURE — 99211 OFF/OP EST MAY X REQ PHY/QHP: CPT | Performed by: DERMATOLOGY

## 2025-01-22 PROCEDURE — G8399 PT W/DXA RESULTS DOCUMENT: HCPCS | Performed by: DERMATOLOGY

## 2025-01-22 PROCEDURE — 1123F ACP DISCUSS/DSCN MKR DOCD: CPT | Performed by: DERMATOLOGY

## 2025-01-22 NOTE — PROGRESS NOTES
tablets by mouth in the morning and at bedtime) 60 tablet 1    cilostazol (PLETAL) 50 MG tablet TAKE 1 TABLET TWICE A  tablet 1    DULoxetine (CYMBALTA) 30 MG extended release capsule TAKE 1 CAPSULE BY MOUTH DAILY 90 capsule 1    hydrALAZINE (APRESOLINE) 25 MG tablet TAKE ONE TABLET BY MOUTH EVERY 8 HOURS 270 tablet 3    chlorthalidone (HYGROTON) 25 MG tablet Take 1 tablet by mouth daily 90 tablet 3    potassium chloride (KLOR-CON M20) 20 MEQ extended release tablet Take 1 tablet by mouth daily 90 tablet 3    Magnesium 400 MG TABS Take 1 tablet by mouth daily      aspirin 81 MG EC tablet Take 1 tablet by mouth daily      ondansetron (ZOFRAN) 4 MG tablet Take 1 tablet by mouth 3 times daily as needed for Nausea or Vomiting 90 tablet 3         Physical Examination     She declines a full skin exam.   Physical Exam  On the right inframammary region, there is a 1.5 cm verrucous pink round plaque. On the right lower abdomen, there is a small stuck-on appearing verrucous brown papule. The dorsal surface of the right hand has a well healed linear pink surgical scar.      Results        Assessment and Plan     Assessment & Plan  1. Seborrheic keratosis.  A 1.5 cm verrucous pink round plaque was noted in the right inframammary region, identified as seborrheic keratosis. A small stuck-on appearing verrucous brown papule in the right lower abdomen was also identified as seborrheic keratosis. She was informed that this is an age-related, benign condition and does not require treatment unless it becomes inflamed or irritated. If it becomes problematic, cryotherapy can be considered. She was advised to monitor the lesion and report any changes.    2. Post-surgical scar.  A small linear pink surgical scar was observed on the dorsal right hand, following Mohs surgery for squamous cell carcinoma about a month ago. The scar appears to be healing well with no new lesions reported.    PROCEDURE  The patient underwent Mohs

## 2025-01-24 ENCOUNTER — TELEPHONE (OUTPATIENT)
Dept: INTERNAL MEDICINE CLINIC | Age: 75
End: 2025-01-24

## 2025-01-24 NOTE — TELEPHONE ENCOUNTER
----- Message from Wendy ZHENG sent at 1/24/2025 11:31 AM EST -----  Regarding: [facility] Appointment Request  [facility] Appointment Request    Patient needs appointment for [facility] Appointment Type: Existing Condition Follow Up.    Patient Requested Dates(s): Feb 6 to 7  Patient Requested Time:Late morning  Provider Name:Rivas Cisneros MD    Reason for Appointment Request: Established Patient - Available appointments did not meet patient need  --------------------------------------------------------------------------------------------------------------------------    Relationship to Patient: Self     Call Back Information: OK to leave message on voicemail  Preferred Call Back Number: Phone [phone] (home)

## 2025-01-24 NOTE — TELEPHONE ENCOUNTER
Called pt. No answer. [facility] for pt to call back and schedule on the following week Dr. Cisneros returns the week of the 10th.    [phone]

## 2025-02-08 DIAGNOSIS — I73.9 PAD (PERIPHERAL ARTERY DISEASE) (HCC): ICD-10-CM

## 2025-02-08 DIAGNOSIS — I10 ESSENTIAL HYPERTENSION: ICD-10-CM

## 2025-02-10 RX ORDER — CILOSTAZOL 50 MG/1
50 TABLET ORAL 2 TIMES DAILY
Qty: 180 TABLET | Refills: 1 | Status: SHIPPED | OUTPATIENT
Start: 2025-02-10

## 2025-02-10 RX ORDER — CHLORTHALIDONE 25 MG/1
25 TABLET ORAL DAILY
Qty: 90 TABLET | Refills: 1 | Status: SHIPPED | OUTPATIENT
Start: 2025-02-10

## 2025-02-11 ENCOUNTER — OFFICE VISIT (OUTPATIENT)
Dept: INTERNAL MEDICINE CLINIC | Age: 75
End: 2025-02-11
Payer: MEDICARE

## 2025-02-11 VITALS — HEIGHT: 69 IN | SYSTOLIC BLOOD PRESSURE: 130 MMHG | BODY MASS INDEX: 27.32 KG/M2 | DIASTOLIC BLOOD PRESSURE: 82 MMHG

## 2025-02-11 DIAGNOSIS — Z01.818 PRE-OP EXAMINATION: Primary | ICD-10-CM

## 2025-02-11 DIAGNOSIS — R25.1 TREMOR OF RIGHT HAND: ICD-10-CM

## 2025-02-11 DIAGNOSIS — G81.94 LEFT HEMIPARESIS (HCC): ICD-10-CM

## 2025-02-11 DIAGNOSIS — I10 ESSENTIAL HYPERTENSION: ICD-10-CM

## 2025-02-11 DIAGNOSIS — I73.9 PAD (PERIPHERAL ARTERY DISEASE) (HCC): ICD-10-CM

## 2025-02-11 DIAGNOSIS — E78.5 DYSLIPIDEMIA: ICD-10-CM

## 2025-02-11 DIAGNOSIS — N39.0 URINARY TRACT INFECTION WITHOUT HEMATURIA, SITE UNSPECIFIED: ICD-10-CM

## 2025-02-11 DIAGNOSIS — R60.0 BILATERAL LOWER EXTREMITY EDEMA: ICD-10-CM

## 2025-02-11 LAB
BILIRUBIN, POC: ABNORMAL
BLOOD URINE, POC: ABNORMAL
CLARITY, POC: ABNORMAL
COLOR, POC: YELLOW
GLUCOSE URINE, POC: ABNORMAL MG/DL
KETONES, POC: ABNORMAL MG/DL
LEUKOCYTE EST, POC: ABNORMAL
NITRITE, POC: POSITIVE
PH, POC: 5
PROTEIN, POC: ABNORMAL MG/DL
SPECIFIC GRAVITY, POC: 1.02
UROBILINOGEN, POC: 2 MG/DL

## 2025-02-11 PROCEDURE — 3079F DIAST BP 80-89 MM HG: CPT | Performed by: HOSPITALIST

## 2025-02-11 PROCEDURE — G8417 CALC BMI ABV UP PARAM F/U: HCPCS | Performed by: HOSPITALIST

## 2025-02-11 PROCEDURE — 99214 OFFICE O/P EST MOD 30 MIN: CPT | Performed by: HOSPITALIST

## 2025-02-11 PROCEDURE — G8399 PT W/DXA RESULTS DOCUMENT: HCPCS | Performed by: HOSPITALIST

## 2025-02-11 PROCEDURE — 1036F TOBACCO NON-USER: CPT | Performed by: HOSPITALIST

## 2025-02-11 PROCEDURE — 3017F COLORECTAL CA SCREEN DOC REV: CPT | Performed by: HOSPITALIST

## 2025-02-11 PROCEDURE — G8428 CUR MEDS NOT DOCUMENT: HCPCS | Performed by: HOSPITALIST

## 2025-02-11 PROCEDURE — 1123F ACP DISCUSS/DSCN MKR DOCD: CPT | Performed by: HOSPITALIST

## 2025-02-11 PROCEDURE — 1090F PRES/ABSN URINE INCON ASSESS: CPT | Performed by: HOSPITALIST

## 2025-02-11 PROCEDURE — 3075F SYST BP GE 130 - 139MM HG: CPT | Performed by: HOSPITALIST

## 2025-02-11 PROCEDURE — 81002 URINALYSIS NONAUTO W/O SCOPE: CPT | Performed by: HOSPITALIST

## 2025-02-11 SDOH — ECONOMIC STABILITY: FOOD INSECURITY: WITHIN THE PAST 12 MONTHS, THE FOOD YOU BOUGHT JUST DIDN'T LAST AND YOU DIDN'T HAVE MONEY TO GET MORE.: NEVER TRUE

## 2025-02-11 SDOH — ECONOMIC STABILITY: FOOD INSECURITY: WITHIN THE PAST 12 MONTHS, YOU WORRIED THAT YOUR FOOD WOULD RUN OUT BEFORE YOU GOT MONEY TO BUY MORE.: NEVER TRUE

## 2025-02-11 ASSESSMENT — ENCOUNTER SYMPTOMS
ABDOMINAL PAIN: 1
RESPIRATORY NEGATIVE: 1
ABDOMINAL DISTENTION: 1
EYES NEGATIVE: 1

## 2025-02-11 NOTE — PROGRESS NOTES
Pre-Op Examination    :  Maryann Lozano                                               : 1950  Age: 74 y.o.  MRN: [id number]  Date : 2025    Referring Physician: Stefanie Norris MD, urology    Procedure: Cystoscopy and Botox injection for treatment of overactive bladder scheduled for 2025    HISTORY OF PRESENT ILLNESS:   The patient is a 74 y.o. female who presents for preoperative examination.  Maryann has a long standing history of urinary frequency, urgency, and urge urinary incontinence.  Problem is exacerbated by lack of mobility.  She failed conservative treatment which included use of oral oxybutynin and Myrbetriq.  She previously underwent cystoscopy and Botox injection in 2023 and 2024    Planned anesthesia:  local/ MAC  Known anesthesia problems: no  Bleeding risk:  low  Personal or FH of DVT/PE:  R leg DVT after knee surgery  Patient objection toreceiving blood products: no      Past Medical History:        Diagnosis Date    Arthritis     Cerebral artery occlusion with cerebral infarction (HCC)     Compression fracture     back due to fall    Concussion     due to fall    DVT (deep venous thrombosis) (HCC) 2017    RLE after TKR    Environmental allergies     Essential hypertension, benign 2010    GERD (gastroesophageal reflux disease)     History of peptic ulcer     Hx of blood clots     Hyperlipidemia 2010    Lacunar infarction (HCC)     old - per MRI     MRSA (methicillin resistant staph aureus) culture positive 2018    knee    Restrictive airway disease     allergy induced    Retention of urine     Wound, open 2018    left shin area, healing well, tx in wound care center       Past Surgical History:        Procedure Laterality Date     SECTION      times 2    CHOLECYSTECTOMY      COLONOSCOPY      COLONOSCOPY N/A 10/11/2024    COLONOSCOPY BIOPSY HOT SNARE POLYPECTOMY performed by Peter Evans MD at [facility] ENDOSCOPY

## 2025-02-14 LAB
BACTERIA UR CULT: ABNORMAL
ORGANISM: ABNORMAL

## 2025-02-17 NOTE — TELEPHONE ENCOUNTER
Having a bladder botox on Friday and does not want to go there and get turned away due to having infection if she has one.

## 2025-02-18 RX ORDER — CIPROFLOXACIN 250 MG/1
250 TABLET, FILM COATED ORAL 2 TIMES DAILY
Qty: 14 TABLET | Refills: 0 | Status: SHIPPED | OUTPATIENT
Start: 2025-02-18 | End: 2025-02-25

## 2025-02-18 NOTE — TELEPHONE ENCOUNTER
Pt called requesting to speak with Alesia or DR. Cisneros in regards to her urine test from last week. Pt is needing to start medication if there is an infection or else she will not be able to get her bladder procedure done on Friday.    [phone]

## 2025-02-27 ENCOUNTER — HOSPITAL ENCOUNTER (OUTPATIENT)
Age: 75
Discharge: HOME | End: 2025-02-27
Payer: MEDICARE

## 2025-02-27 DIAGNOSIS — E55.9 VITAMIN D DEFICIENCY: ICD-10-CM

## 2025-02-27 DIAGNOSIS — I10 ESSENTIAL HYPERTENSION: ICD-10-CM

## 2025-02-27 LAB
25(OH)D3 SERPL-MCNC: 29.2 NG/ML
ALBUMIN SERPL-MCNC: 3.9 G/DL (ref 3.4–5)
ALBUMIN/GLOB SERPL: 1.4 {RATIO} (ref 1.1–2.2)
ALP SERPL-CCNC: 66 U/L (ref 40–129)
ALT SERPL-CCNC: NORMAL U/L (ref 10–40)
ANION GAP SERPL CALCULATED.3IONS-SCNC: 13 MMOL/L (ref 3–16)
AST SERPL-CCNC: 29 U/L (ref 15–37)
BASOPHILS # BLD: 0 K/UL (ref 0–0.2)
BASOPHILS NFR BLD: 0.3 %
BILIRUB SERPL-MCNC: 0.3 MG/DL (ref 0–1)
BUN SERPL-MCNC: 19 MG/DL (ref 7–20)
CALCIUM SERPL-MCNC: 9.4 MG/DL (ref 8.3–10.6)
CHLORIDE SERPL-SCNC: 103 MMOL/L (ref 99–110)
CHOLEST SERPL-MCNC: 139 MG/DL (ref 0–199)
CO2 SERPL-SCNC: 22 MMOL/L (ref 21–32)
CREAT SERPL-MCNC: 0.6 MG/DL (ref 0.6–1.2)
DEPRECATED RDW RBC AUTO: 14.3 % (ref 12.4–15.4)
EOSINOPHIL # BLD: 0.1 K/UL (ref 0–0.6)
EOSINOPHIL NFR BLD: 1.8 %
GFR SERPLBLD CREATININE-BSD FMLA CKD-EPI: >90 ML/MIN/{1.73_M2}
GLUCOSE SERPL-MCNC: 96 MG/DL (ref 70–99)
HCT VFR BLD AUTO: 41.3 % (ref 36–48)
HDLC SERPL-MCNC: 45 MG/DL (ref 40–60)
HGB BLD-MCNC: 13.7 G/DL (ref 12–16)
LDLC SERPL CALC-MCNC: 68 MG/DL
LYMPHOCYTES # BLD: 1.8 K/UL (ref 1–5.1)
LYMPHOCYTES NFR BLD: 38.3 %
MCH RBC QN AUTO: 31.9 PG (ref 26–34)
MCHC RBC AUTO-ENTMCNC: 33.1 G/DL (ref 31–36)
MCV RBC AUTO: 96.6 FL (ref 80–100)
MONOCYTES # BLD: 0.4 K/UL (ref 0–1.3)
MONOCYTES NFR BLD: 7.9 %
NEUTROPHILS # BLD: 2.4 K/UL (ref 1.7–7.7)
NEUTROPHILS NFR BLD: 51.7 %
PLATELET # BLD AUTO: 295 K/UL (ref 135–450)
PMV BLD AUTO: 7.9 FL (ref 5–10.5)
POTASSIUM SERPL-SCNC: NORMAL MMOL/L (ref 3.5–5.1)
PROT SERPL-MCNC: 6.6 G/DL (ref 6.4–8.2)
RBC # BLD AUTO: 4.27 M/UL (ref 4–5.2)
REASON FOR REJECTION: NORMAL
REJECTED TEST: NORMAL
SODIUM SERPL-SCNC: 138 MMOL/L (ref 136–145)
TRIGL SERPL-MCNC: 128 MG/DL (ref 0–150)
TSH SERPL DL<=0.005 MIU/L-ACNC: 1.67 UIU/ML (ref 0.27–4.2)
VLDLC SERPL CALC-MCNC: 26 MG/DL
WBC # BLD AUTO: 4.7 K/UL (ref 4–11)

## 2025-02-27 PROCEDURE — 85025 COMPLETE CBC W/AUTO DIFF WBC: CPT

## 2025-02-27 PROCEDURE — 82306 VITAMIN D 25 HYDROXY: CPT

## 2025-02-27 PROCEDURE — 80053 COMPREHEN METABOLIC PANEL: CPT

## 2025-02-27 PROCEDURE — 84443 ASSAY THYROID STIM HORMONE: CPT

## 2025-02-27 PROCEDURE — 80061 LIPID PANEL: CPT

## 2025-03-03 RX ORDER — POTASSIUM CHLORIDE 1500 MG/1
20 TABLET, EXTENDED RELEASE ORAL DAILY
Qty: 90 TABLET | Refills: 1 | Status: SHIPPED | OUTPATIENT
Start: 2025-03-03

## 2025-03-05 RX ORDER — VENLAFAXINE HYDROCHLORIDE 37.5 MG/1
37.5 CAPSULE, EXTENDED RELEASE ORAL DAILY
Qty: 30 CAPSULE | Refills: 3 | Status: SHIPPED | OUTPATIENT
Start: 2025-03-05

## 2025-03-05 RX ORDER — VENLAFAXINE HYDROCHLORIDE 37.5 MG/1
37.5 CAPSULE, EXTENDED RELEASE ORAL DAILY
Qty: 90 CAPSULE | Refills: 3 | Status: CANCELLED | OUTPATIENT
Start: 2025-03-05

## 2025-03-11 ENCOUNTER — TRANSCRIBE ORDERS (OUTPATIENT)
Dept: ADMINISTRATIVE | Age: 75
End: 2025-03-11

## 2025-03-11 DIAGNOSIS — R14.0 ABDOMINAL BLOATING: Primary | ICD-10-CM

## 2025-03-17 ENCOUNTER — HOSPITAL ENCOUNTER (OUTPATIENT)
Dept: NUCLEAR MEDICINE | Age: 75
Discharge: HOME | End: 2025-03-17
Attending: INTERNAL MEDICINE
Payer: MEDICARE

## 2025-03-17 DIAGNOSIS — R14.0 ABDOMINAL BLOATING: ICD-10-CM

## 2025-03-17 PROCEDURE — 78264 GASTRIC EMPTYING IMG STUDY: CPT

## 2025-03-17 PROCEDURE — A9541 TC99M SULFUR COLLOID: HCPCS | Performed by: INTERNAL MEDICINE

## 2025-03-17 PROCEDURE — 3430000000 HC RX DIAGNOSTIC RADIOPHARMACEUTICAL: Performed by: INTERNAL MEDICINE

## 2025-03-17 RX ADMIN — TECHNETIUM TC 99M SULFUR COLLOID 0.76 MILLICURIE: KIT at 09:23

## 2025-03-21 ENCOUNTER — TRANSCRIBE ORDERS (OUTPATIENT)
Dept: ADMINISTRATIVE | Age: 75
End: 2025-03-21

## 2025-03-21 DIAGNOSIS — K21.9 GASTROESOPHAGEAL REFLUX DISEASE, UNSPECIFIED WHETHER ESOPHAGITIS PRESENT: ICD-10-CM

## 2025-03-21 DIAGNOSIS — R63.4 WEIGHT LOSS: Primary | ICD-10-CM

## 2025-03-21 DIAGNOSIS — R19.8 ABDOMINAL FULLNESS: ICD-10-CM

## 2025-03-28 ENCOUNTER — APPOINTMENT (OUTPATIENT)
Dept: CT IMAGING | Age: 75
End: 2025-03-28
Payer: MEDICARE

## 2025-03-28 ENCOUNTER — APPOINTMENT (OUTPATIENT)
Dept: MRI IMAGING | Age: 75
End: 2025-03-28
Payer: MEDICARE

## 2025-03-28 ENCOUNTER — HOSPITAL ENCOUNTER (EMERGENCY)
Age: 75
Discharge: HOME | End: 2025-03-28
Attending: EMERGENCY MEDICINE
Payer: MEDICARE

## 2025-03-28 VITALS
HEIGHT: 69 IN | WEIGHT: 185 LBS | DIASTOLIC BLOOD PRESSURE: 88 MMHG | HEART RATE: 60 BPM | RESPIRATION RATE: 17 BRPM | BODY MASS INDEX: 27.4 KG/M2 | TEMPERATURE: 97.9 F | OXYGEN SATURATION: 98 % | SYSTOLIC BLOOD PRESSURE: 139 MMHG

## 2025-03-28 DIAGNOSIS — R53.1 LEFT-SIDED WEAKNESS: Primary | ICD-10-CM

## 2025-03-28 LAB
ALBUMIN SERPL-MCNC: 4.2 G/DL (ref 3.4–5)
ALBUMIN/GLOB SERPL: 1.8 {RATIO} (ref 1.1–2.2)
ALP SERPL-CCNC: 73 U/L (ref 40–129)
ALT SERPL-CCNC: 15 U/L (ref 10–40)
ANION GAP SERPL CALCULATED.3IONS-SCNC: 10 MMOL/L (ref 3–16)
AST SERPL-CCNC: 19 U/L (ref 15–37)
BASOPHILS # BLD: 0 K/UL (ref 0–0.2)
BASOPHILS NFR BLD: 0.5 %
BILIRUB SERPL-MCNC: 0.3 MG/DL (ref 0–1)
BUN SERPL-MCNC: 21 MG/DL (ref 7–20)
CALCIUM SERPL-MCNC: 9.4 MG/DL (ref 8.3–10.6)
CHLORIDE SERPL-SCNC: 102 MMOL/L (ref 99–110)
CO2 SERPL-SCNC: 29 MMOL/L (ref 21–32)
CREAT SERPL-MCNC: 0.6 MG/DL (ref 0.6–1.2)
DEPRECATED RDW RBC AUTO: 14.2 % (ref 12.4–15.4)
EKG ATRIAL RATE: 59 BPM
EKG DIAGNOSIS: NORMAL
EKG P-R INTERVAL: 160 MS
EKG Q-T INTERVAL: 440 MS
EKG QRS DURATION: 90 MS
EKG QTC CALCULATION (BAZETT): 435 MS
EKG R AXIS: -50 DEGREES
EKG T AXIS: 10 DEGREES
EKG VENTRICULAR RATE: 59 BPM
EOSINOPHIL # BLD: 0.1 K/UL (ref 0–0.6)
EOSINOPHIL NFR BLD: 1.2 %
GFR SERPLBLD CREATININE-BSD FMLA CKD-EPI: >90 ML/MIN/{1.73_M2}
GLUCOSE BLD-MCNC: 104 MG/DL (ref 70–99)
GLUCOSE SERPL-MCNC: 97 MG/DL (ref 70–99)
HCT VFR BLD AUTO: 41.4 % (ref 36–48)
HGB BLD-MCNC: 13.8 G/DL (ref 12–16)
INR PPP: 0.87 (ref 0.85–1.15)
LYMPHOCYTES # BLD: 1.8 K/UL (ref 1–5.1)
LYMPHOCYTES NFR BLD: 31 %
MCH RBC QN AUTO: 31.1 PG (ref 26–34)
MCHC RBC AUTO-ENTMCNC: 33.3 G/DL (ref 31–36)
MCV RBC AUTO: 93.2 FL (ref 80–100)
MONOCYTES # BLD: 0.4 K/UL (ref 0–1.3)
MONOCYTES NFR BLD: 7.2 %
NEUTROPHILS # BLD: 3.4 K/UL (ref 1.7–7.7)
NEUTROPHILS NFR BLD: 60.1 %
PERFORMED ON: ABNORMAL
PLATELET # BLD AUTO: 335 K/UL (ref 135–450)
PMV BLD AUTO: 6.8 FL (ref 5–10.5)
POTASSIUM SERPL-SCNC: 4 MMOL/L (ref 3.5–5.1)
PROT SERPL-MCNC: 6.6 G/DL (ref 6.4–8.2)
PROTHROMBIN TIME: 12 SEC (ref 11.9–14.9)
RBC # BLD AUTO: 4.45 M/UL (ref 4–5.2)
SODIUM SERPL-SCNC: 141 MMOL/L (ref 136–145)
TROPONIN, HIGH SENSITIVITY: 12 NG/L (ref 0–14)
WBC # BLD AUTO: 5.7 K/UL (ref 4–11)

## 2025-03-28 PROCEDURE — 70498 CT ANGIOGRAPHY NECK: CPT

## 2025-03-28 PROCEDURE — 6360000004 HC RX CONTRAST MEDICATION: Performed by: EMERGENCY MEDICINE

## 2025-03-28 PROCEDURE — 2580000003 HC RX 258: Performed by: PHYSICIAN ASSISTANT

## 2025-03-28 PROCEDURE — 85025 COMPLETE CBC W/AUTO DIFF WBC: CPT

## 2025-03-28 PROCEDURE — 96374 THER/PROPH/DIAG INJ IV PUSH: CPT

## 2025-03-28 PROCEDURE — 70450 CT HEAD/BRAIN W/O DYE: CPT

## 2025-03-28 PROCEDURE — 70551 MRI BRAIN STEM W/O DYE: CPT

## 2025-03-28 PROCEDURE — 80053 COMPREHEN METABOLIC PANEL: CPT

## 2025-03-28 PROCEDURE — 99285 EMERGENCY DEPT VISIT HI MDM: CPT

## 2025-03-28 PROCEDURE — 93010 ELECTROCARDIOGRAM REPORT: CPT | Performed by: INTERNAL MEDICINE

## 2025-03-28 PROCEDURE — 84484 ASSAY OF TROPONIN QUANT: CPT

## 2025-03-28 PROCEDURE — 85610 PROTHROMBIN TIME: CPT

## 2025-03-28 PROCEDURE — 93005 ELECTROCARDIOGRAM TRACING: CPT | Performed by: EMERGENCY MEDICINE

## 2025-03-28 PROCEDURE — 36415 COLL VENOUS BLD VENIPUNCTURE: CPT

## 2025-03-28 PROCEDURE — 6360000002 HC RX W HCPCS: Performed by: PHYSICIAN ASSISTANT

## 2025-03-28 RX ORDER — IOPAMIDOL 755 MG/ML
75 INJECTION, SOLUTION INTRAVASCULAR
Status: COMPLETED | OUTPATIENT
Start: 2025-03-28 | End: 2025-03-28

## 2025-03-28 RX ADMIN — IOPAMIDOL 75 ML: 755 INJECTION, SOLUTION INTRAVENOUS at 10:30

## 2025-03-28 RX ADMIN — SODIUM CHLORIDE, PRESERVATIVE FREE 1 MG: 5 INJECTION INTRAVENOUS at 11:50

## 2025-03-28 ASSESSMENT — ENCOUNTER SYMPTOMS
COUGH: 0
SHORTNESS OF BREATH: 0
ABDOMINAL PAIN: 0
WHEEZING: 0
NAUSEA: 0
DIARRHEA: 0
VOMITING: 0
RHINORRHEA: 0

## 2025-03-28 ASSESSMENT — PAIN - FUNCTIONAL ASSESSMENT: PAIN_FUNCTIONAL_ASSESSMENT: NONE - DENIES PAIN

## 2025-03-28 ASSESSMENT — LIFESTYLE VARIABLES
HOW OFTEN DO YOU HAVE A DRINK CONTAINING ALCOHOL: 2-4 TIMES A MONTH
HOW MANY STANDARD DRINKS CONTAINING ALCOHOL DO YOU HAVE ON A TYPICAL DAY: 1 OR 2

## 2025-03-28 NOTE — ED PROVIDER NOTES
Heart sounds: Normal heart sounds.   Pulmonary:      Effort: Pulmonary effort is normal. No respiratory distress.      Breath sounds: Normal breath sounds.   Chest:      Chest wall: No tenderness.   Abdominal:      General: Bowel sounds are normal. There is no distension.      Palpations: Abdomen is soft.      Tenderness: There is no abdominal tenderness.   Musculoskeletal:         General: Normal range of motion.      Cervical back: Normal range of motion and neck supple.   Skin:     General: Skin is warm and dry.   Neurological:      Mental Status: She is alert and oriented to person, place, and time. Mental status is at baseline.      GCS: GCS eye subscore is 4. GCS verbal subscore is 5. GCS motor subscore is 6.      Cranial Nerves: Facial asymmetry: ?minimal.      Sensory: Sensory deficit present.      Motor: Weakness present.   Psychiatric:         Behavior: Behavior normal.           DIAGNOSTIC RESULTS   LABS:    Labs Reviewed   COMPREHENSIVE METABOLIC PANEL W/ REFLEX TO MG FOR LOW K - Abnormal; Notable for the following components:       Result Value    BUN 21 (*)     All other components within normal limits   POCT GLUCOSE - Abnormal; Notable for the following components:    POC Glucose 104 (*)     All other components within normal limits   CBC WITH AUTO DIFFERENTIAL   TROPONIN   PROTIME-INR       When ordered only abnormal lab results are displayed. All other labs were within normal range or not returned as of this dictation.    EKG: When ordered, EKG's are interpreted by the Emergency Department Physician in the absence of a cardiologist.  Please see their note for interpretation of EKG.    RADIOLOGY:   Non-plain film images such as CT, Ultrasound and MRI are read by the radiologist.       Interpretation per the Radiologist below, if available at the time of this note:    MRI BRAIN WO CONTRAST WAKE UP STROKE   Final Result   1. No acute infarct or acute intracranial process identified.   2. Severe  136/71 139/88   Pulse: 60 68 60   Resp: 18 18 17   Temp: 97.9 °F (36.6 °C)     TempSrc: Oral     SpO2: 95% 97% 98%   Weight: 83.9 kg (185 lb)     Height: 1.753 m (5' 9\")         Is this patient to be included in the SEP-1 Core Measure due to severe sepsis or septic shock?   No   Exclusion criteria - the patient is NOT to be included for SEP-1 Core Measure due to:  Infection is not suspected    Patient was given the following medications:  Medications   iopamidol (ISOVUE-370) 76 % injection 75 mL (75 mLs IntraVENous Given 3/28/25 1030)   LORazepam (ATIVAN) 1 mg in sodium chloride (PF) 0.9 % 10 mL injection (1 mg IntraVENous Given 3/28/25 1150)             Chronic Conditions affecting care:    has a past medical history of Arthritis, Cerebral artery occlusion with cerebral infarction (HCC), Compression fracture, Concussion, DVT (deep venous thrombosis) (HCC) (01/2017), Environmental allergies, Essential hypertension, benign (6/28/2010), GERD (gastroesophageal reflux disease), History of peptic ulcer, blood clots, Hyperlipidemia (6/28/2010), Lacunar infarction (HCC), MRSA (methicillin resistant staph aureus) culture positive (12/07/2018), Restrictive airway disease, Retention of urine, and Wound, open (09/2018).    CONSULTS: (Who and What was discussed)  IP CONSULT TO STROKE TEAM      Social Determinants Significantly Affecting Health : None    Records Reviewed (External, Source and Summary) admission and imaging from previous CVA in 2020    CC/HPI Summary, DDx, ED Course, and Reassessment: Patient presents for evaluation of increasing left-sided deficits, last known normal 9 PM.  On exam, she is resting comfortably in bed no acute distress and nontoxic.  She is hypertensive but vitals otherwise within normal limits and she is afebrile.  Patient does have weakness and decreased sensation to the left upper and lower extremities that are reportedly increased from baseline.  Daughter also reports questionable minimal

## 2025-03-28 NOTE — ED PROVIDER NOTES
I have personally performed a face to face diagnostic evaluation on this patient. I have fully participated in the care of this patient I personally saw the patient and performed a substantive portion of the visit including all aspects of the medical decision making.  I have reviewed and agree with all pertinent clinical information including history, physical exam, diagnostic tests, and the plan.    Medical Decision Making  74-year-old female PMH including CVA with residual left-sided deficits, HTN, HLD,  PAD presents via private vehicle for evaluation of left-sided weakness noticed this morning after awakening.  States she went to bed last night around 9 PM without symptoms.  Does have a history of a CVA in 2020 with left-sided residual deficits but states that current symptoms are worse than her baseline.  States she did have some pain to the left arm and left leg last night but denies pain currently.  Denies any recent falls or injuries.  Family reports some concern for potential left-sided facial droop as well although do not appreciate this on exam.  Denies other symptoms including cough, recent illness, vomiting, chest pain, shortness of breath.  Denies recent falls or injuries.  Denies other neurologic symptoms, dizziness, changes to her vision.  Denies urinary symptoms.  States normally able to weight-bear with the left leg and transfer but is having increasing difficulty with these since yesterday.  On exam, patient has 4 out of 5 motor strength to the left upper and left lower extremities, 5 out of 5 to the right upper and right lower.  Sensation intact throughout.  CN II through XII intact.  PERRLA, EOMI, no nystagmus.  Exam is otherwise normal.  Initially hypertensive to 180s systolic, improved to 130s during course.  Vital signs are otherwise normal.  Afebrile.  Stroke alert was called following initial patient evaluation.  Labs within normal limits.  CT head without contrast is without acute

## 2025-04-03 ENCOUNTER — HOSPITAL ENCOUNTER (OUTPATIENT)
Dept: CT IMAGING | Age: 75
Discharge: HOME | End: 2025-04-03
Attending: INTERNAL MEDICINE
Payer: MEDICARE

## 2025-04-03 DIAGNOSIS — K21.9 GASTROESOPHAGEAL REFLUX DISEASE, UNSPECIFIED WHETHER ESOPHAGITIS PRESENT: ICD-10-CM

## 2025-04-03 DIAGNOSIS — R63.4 WEIGHT LOSS: ICD-10-CM

## 2025-04-03 DIAGNOSIS — R19.8 ABDOMINAL FULLNESS: ICD-10-CM

## 2025-04-03 PROCEDURE — 74177 CT ABD & PELVIS W/CONTRAST: CPT

## 2025-04-03 PROCEDURE — 6360000004 HC RX CONTRAST MEDICATION: Performed by: INTERNAL MEDICINE

## 2025-04-03 RX ORDER — IOPAMIDOL 755 MG/ML
75 INJECTION, SOLUTION INTRAVASCULAR
Status: COMPLETED | OUTPATIENT
Start: 2025-04-03 | End: 2025-04-03

## 2025-04-03 RX ADMIN — IOHEXOL 25 ML: 350 INJECTION, SOLUTION INTRAVENOUS at 15:00

## 2025-04-03 RX ADMIN — IOPAMIDOL 75 ML: 755 INJECTION, SOLUTION INTRAVENOUS at 15:01

## 2025-04-11 DIAGNOSIS — I10 ESSENTIAL HYPERTENSION: ICD-10-CM

## 2025-04-16 DIAGNOSIS — F51.01 PRIMARY INSOMNIA: Primary | ICD-10-CM

## 2025-04-16 RX ORDER — ZOLPIDEM TARTRATE 5 MG/1
TABLET ORAL
Qty: 30 TABLET | Refills: 0 | Status: SHIPPED | OUTPATIENT
Start: 2025-04-16 | End: 2025-05-16

## 2025-04-16 RX ORDER — HYDRALAZINE HYDROCHLORIDE 25 MG/1
TABLET, FILM COATED ORAL
Qty: 270 TABLET | Refills: 3 | Status: SHIPPED | OUTPATIENT
Start: 2025-04-16

## 2025-05-05 DIAGNOSIS — E78.5 HYPERLIPIDEMIA, UNSPECIFIED HYPERLIPIDEMIA TYPE: ICD-10-CM

## 2025-05-06 RX ORDER — ATORVASTATIN CALCIUM 80 MG/1
80 TABLET, FILM COATED ORAL NIGHTLY
Qty: 90 TABLET | Refills: 1 | Status: SHIPPED | OUTPATIENT
Start: 2025-05-06

## 2025-05-06 NOTE — TELEPHONE ENCOUNTER
Last appointment: 2/11/2025  Next appointment: 6/9/2025    Last refill: 10/21/2024) by Rivas Cisneros MD

## 2025-05-19 ENCOUNTER — TELEPHONE (OUTPATIENT)
Dept: INTERNAL MEDICINE CLINIC | Age: 75
End: 2025-05-19

## 2025-05-19 DIAGNOSIS — R53.81 PHYSICAL DECONDITIONING: ICD-10-CM

## 2025-05-19 DIAGNOSIS — R53.1 WEAKNESS GENERALIZED: Primary | ICD-10-CM

## 2025-05-20 ENCOUNTER — TELEPHONE (OUTPATIENT)
Dept: INTERNAL MEDICINE CLINIC | Age: 75
End: 2025-05-20

## 2025-05-20 NOTE — TELEPHONE ENCOUNTER
Marilee from [facility] is calling to let doctor know they did receive the home care referral.    Also is requesting a call back to discuss the note on the referral that stated pt needs home care for weakness and deep conditioning but Marilee needs a diagnosis to tie that into?    Marilee [phone]

## 2025-05-23 DIAGNOSIS — F51.01 PRIMARY INSOMNIA: ICD-10-CM

## 2025-05-23 RX ORDER — ZOLPIDEM TARTRATE 5 MG/1
TABLET ORAL
Qty: 5 TABLET | Refills: 0 | Status: SHIPPED | OUTPATIENT
Start: 2025-05-23 | End: 2025-05-30

## 2025-05-23 NOTE — TELEPHONE ENCOUNTER
Last appointment: 2/11/2025  Next appointment: 5/28/2025        Last refill: (4/16/2025) by Rivas Cisneros MD     Managing provider out of office. 7 day short term supply sent per covering provider to bridge to pcp rto.

## 2025-05-27 ENCOUNTER — TELEPHONE (OUTPATIENT)
Dept: INTERNAL MEDICINE CLINIC | Age: 75
End: 2025-05-27

## 2025-05-27 NOTE — TELEPHONE ENCOUNTER
Jeanette [facility] called saying patient started physical therapy, will see patient twice a week for 4 weeks, then once a week for 4 weeks, please call [phone] with any questions.

## 2025-05-28 ENCOUNTER — OFFICE VISIT (OUTPATIENT)
Dept: INTERNAL MEDICINE CLINIC | Age: 75
End: 2025-05-28
Payer: MEDICARE

## 2025-05-28 ENCOUNTER — TELEPHONE (OUTPATIENT)
Dept: INTERNAL MEDICINE CLINIC | Age: 75
End: 2025-05-28

## 2025-05-28 VITALS — SYSTOLIC BLOOD PRESSURE: 122 MMHG | DIASTOLIC BLOOD PRESSURE: 78 MMHG | TEMPERATURE: 98.1 F

## 2025-05-28 DIAGNOSIS — R53.81 PHYSICAL DECONDITIONING: ICD-10-CM

## 2025-05-28 DIAGNOSIS — I10 ESSENTIAL HYPERTENSION: ICD-10-CM

## 2025-05-28 DIAGNOSIS — R53.82 CHRONIC FATIGUE: ICD-10-CM

## 2025-05-28 DIAGNOSIS — G81.94 LEFT HEMIPARESIS (HCC): ICD-10-CM

## 2025-05-28 DIAGNOSIS — F51.01 PRIMARY INSOMNIA: ICD-10-CM

## 2025-05-28 DIAGNOSIS — R53.82 CHRONIC FATIGUE: Primary | ICD-10-CM

## 2025-05-28 LAB
ALBUMIN SERPL-MCNC: 4.7 G/DL (ref 3.4–5)
ALBUMIN/GLOB SERPL: 2.1 {RATIO} (ref 1.1–2.2)
ALP SERPL-CCNC: 78 U/L (ref 40–129)
ALT SERPL-CCNC: 16 U/L (ref 10–40)
ANION GAP SERPL CALCULATED.3IONS-SCNC: 11 MMOL/L (ref 3–16)
AST SERPL-CCNC: 17 U/L (ref 15–37)
BASOPHILS # BLD: 0 K/UL (ref 0–0.2)
BASOPHILS NFR BLD: 0.6 %
BILIRUB SERPL-MCNC: 0.3 MG/DL (ref 0–1)
BUN SERPL-MCNC: 17 MG/DL (ref 7–20)
CALCIUM SERPL-MCNC: 9.5 MG/DL (ref 8.3–10.6)
CHLORIDE SERPL-SCNC: 101 MMOL/L (ref 99–110)
CO2 SERPL-SCNC: 28 MMOL/L (ref 21–32)
CREAT SERPL-MCNC: 0.6 MG/DL (ref 0.6–1.2)
DEPRECATED RDW RBC AUTO: 13.6 % (ref 12.4–15.4)
EOSINOPHIL # BLD: 0 K/UL (ref 0–0.6)
EOSINOPHIL NFR BLD: 0.9 %
FOLATE SERPL-MCNC: 28.3 NG/ML (ref 4.78–24.2)
GFR SERPLBLD CREATININE-BSD FMLA CKD-EPI: >90 ML/MIN/{1.73_M2}
GLUCOSE SERPL-MCNC: 93 MG/DL (ref 70–99)
HCT VFR BLD AUTO: 41.8 % (ref 36–48)
HGB BLD-MCNC: 14.4 G/DL (ref 12–16)
LYMPHOCYTES # BLD: 2 K/UL (ref 1–5.1)
LYMPHOCYTES NFR BLD: 35.9 %
MCH RBC QN AUTO: 31.6 PG (ref 26–34)
MCHC RBC AUTO-ENTMCNC: 34.4 G/DL (ref 31–36)
MCV RBC AUTO: 91.8 FL (ref 80–100)
MONOCYTES # BLD: 0.4 K/UL (ref 0–1.3)
MONOCYTES NFR BLD: 6.7 %
NEUTROPHILS # BLD: 3.1 K/UL (ref 1.7–7.7)
NEUTROPHILS NFR BLD: 55.9 %
PLATELET # BLD AUTO: 341 K/UL (ref 135–450)
PMV BLD AUTO: 7.6 FL (ref 5–10.5)
POTASSIUM SERPL-SCNC: 4.1 MMOL/L (ref 3.5–5.1)
PROT SERPL-MCNC: 6.9 G/DL (ref 6.4–8.2)
RBC # BLD AUTO: 4.55 M/UL (ref 4–5.2)
SODIUM SERPL-SCNC: 140 MMOL/L (ref 136–145)
VIT B12 SERPL-MCNC: 596 PG/ML (ref 211–911)
WBC # BLD AUTO: 5.5 K/UL (ref 4–11)

## 2025-05-28 PROCEDURE — 1036F TOBACCO NON-USER: CPT | Performed by: HOSPITALIST

## 2025-05-28 PROCEDURE — 1090F PRES/ABSN URINE INCON ASSESS: CPT | Performed by: HOSPITALIST

## 2025-05-28 PROCEDURE — 3017F COLORECTAL CA SCREEN DOC REV: CPT | Performed by: HOSPITALIST

## 2025-05-28 PROCEDURE — G8399 PT W/DXA RESULTS DOCUMENT: HCPCS | Performed by: HOSPITALIST

## 2025-05-28 PROCEDURE — 99214 OFFICE O/P EST MOD 30 MIN: CPT | Performed by: HOSPITALIST

## 2025-05-28 PROCEDURE — 3074F SYST BP LT 130 MM HG: CPT | Performed by: HOSPITALIST

## 2025-05-28 PROCEDURE — G8417 CALC BMI ABV UP PARAM F/U: HCPCS | Performed by: HOSPITALIST

## 2025-05-28 PROCEDURE — G8428 CUR MEDS NOT DOCUMENT: HCPCS | Performed by: HOSPITALIST

## 2025-05-28 PROCEDURE — 1123F ACP DISCUSS/DSCN MKR DOCD: CPT | Performed by: HOSPITALIST

## 2025-05-28 PROCEDURE — 3078F DIAST BP <80 MM HG: CPT | Performed by: HOSPITALIST

## 2025-05-28 RX ORDER — ZOLPIDEM TARTRATE 5 MG/1
5 TABLET ORAL NIGHTLY PRN
Qty: 30 TABLET | Refills: 2 | Status: SHIPPED | OUTPATIENT
Start: 2025-05-28 | End: 2025-08-26

## 2025-05-28 ASSESSMENT — PATIENT HEALTH QUESTIONNAIRE - PHQ9
2. FEELING DOWN, DEPRESSED OR HOPELESS: NOT AT ALL
9. THOUGHTS THAT YOU WOULD BE BETTER OFF DEAD, OR OF HURTING YOURSELF: NOT AT ALL
3. TROUBLE FALLING OR STAYING ASLEEP: NEARLY EVERY DAY
10. IF YOU CHECKED OFF ANY PROBLEMS, HOW DIFFICULT HAVE THESE PROBLEMS MADE IT FOR YOU TO DO YOUR WORK, TAKE CARE OF THINGS AT HOME, OR GET ALONG WITH OTHER PEOPLE: SOMEWHAT DIFFICULT
5. POOR APPETITE OR OVEREATING: NOT AT ALL
SUM OF ALL RESPONSES TO PHQ QUESTIONS 1-9: 3
SUM OF ALL RESPONSES TO PHQ QUESTIONS 1-9: 3
1. LITTLE INTEREST OR PLEASURE IN DOING THINGS: NOT AT ALL
SUM OF ALL RESPONSES TO PHQ QUESTIONS 1-9: 3
SUM OF ALL RESPONSES TO PHQ QUESTIONS 1-9: 3

## 2025-05-28 NOTE — PROGRESS NOTES
Follow Up Visit Established Patient Visit    Patient:  Maryann Lozano                                               : 1950  Age: 74 y.o.  MRN: [id number]  Date : 2025    Maryann Lozano is a 74 y.o. female who presents for : Evaluation of severe fatigue and excessive daytime sleepiness.    Chief Complaint   Patient presents with    Fatigue    Extremity Weakness     Patient complains of being tired and having no energy, family states that she is getting significantly weaker. She is having a hard time transferring from her chair to bed. The fatigue is significantly impacting her quality of life. She spends a lot of time in bed sleeping. Patient states that she is losing weight.     Cystitis     Complains of an odor to her urine and dark.     Reports symptoms mentioned above.  No fever/chills.  Patient had previous stroke which caused left-sided hemiparesis.  She ambulates in the wheelchair.  She also reports frequent falls.  Systolic blood pressure ranging between 110 and 120 mmHg when checked at home.  She also reports reduced appetite and feels somewhat dehydrated.  Takes all her scheduled medications regularly.  Does not report chest pain/shortness of breath.  No nausea/vomiting.  Zolpidem 5 mg nightly helps to control insomnia.    Past Medical History:   Diagnosis Date    Arthritis     Cerebral artery occlusion with cerebral infarction (HCC)     Compression fracture     back due to fall    Concussion     due to fall    DVT (deep venous thrombosis) (HCC) 2017    RLE after TKR    Environmental allergies     Essential hypertension, benign 2010    GERD (gastroesophageal reflux disease)     History of peptic ulcer     Hx of blood clots     Hyperlipidemia 2010    Lacunar infarction (HCC)     old - per MRI     MRSA (methicillin resistant staph aureus) culture positive 2018    knee    Restrictive airway disease     allergy induced    Retention of urine     Wound, open 2018

## 2025-05-28 NOTE — TELEPHONE ENCOUNTER
Jeanette from [facility] states the medication are not matching up    Not taking anymore but is taking Nexium  pantoprazole (PROTONIX) 40 MG tablet [[id number]]     Haylie needs clarity on this medication    Is pt still continuing to take this medication  zolpidem (AMBIEN) 5 MG tablet [[id number]]  DISCONTINUED     [phone] (Jeanette)  [facility]

## 2025-05-29 ENCOUNTER — CLINICAL SUPPORT (OUTPATIENT)
Dept: INTERNAL MEDICINE CLINIC | Age: 75
End: 2025-05-29
Payer: MEDICARE

## 2025-05-29 ENCOUNTER — RESULTS FOLLOW-UP (OUTPATIENT)
Dept: INTERNAL MEDICINE CLINIC | Age: 75
End: 2025-05-29

## 2025-05-29 DIAGNOSIS — N39.0 RECURRENT UTI: Primary | ICD-10-CM

## 2025-05-29 DIAGNOSIS — R31.9 URINARY TRACT INFECTION WITH HEMATURIA, SITE UNSPECIFIED: Primary | ICD-10-CM

## 2025-05-29 DIAGNOSIS — N39.0 URINARY TRACT INFECTION WITH HEMATURIA, SITE UNSPECIFIED: Primary | ICD-10-CM

## 2025-05-29 LAB
BILIRUBIN, POC: NORMAL
BLOOD URINE, POC: NORMAL
CLARITY, POC: NORMAL
COLOR, POC: YELLOW
GLUCOSE URINE, POC: NORMAL MG/DL
KETONES, POC: NORMAL MG/DL
LEUKOCYTE EST, POC: POSITIVE
NITRITE, POC: NORMAL
PH, POC: 6.5
PROTEIN, POC: NORMAL MG/DL
SPECIFIC GRAVITY, POC: 1.02
UROBILINOGEN, POC: 0.2 MG/DL

## 2025-05-29 PROCEDURE — 81002 URINALYSIS NONAUTO W/O SCOPE: CPT | Performed by: HOSPITALIST

## 2025-05-29 RX ORDER — CIPROFLOXACIN 250 MG/1
250 TABLET, FILM COATED ORAL 2 TIMES DAILY
Qty: 14 TABLET | Refills: 0 | Status: SHIPPED | OUTPATIENT
Start: 2025-05-29 | End: 2025-06-05

## 2025-05-29 NOTE — PROGRESS NOTES
The patient submitted her urine today.  It appeared to be infected.  I will order ciprofloxacin 250 mg orally twice a day for 7 days

## 2025-06-01 LAB
BACTERIA UR CULT: ABNORMAL
ORGANISM: ABNORMAL
ORGANISM: ABNORMAL

## 2025-06-09 ENCOUNTER — OFFICE VISIT (OUTPATIENT)
Dept: INTERNAL MEDICINE CLINIC | Age: 75
End: 2025-06-09
Payer: MEDICARE

## 2025-06-09 VITALS
OXYGEN SATURATION: 93 % | HEART RATE: 63 BPM | SYSTOLIC BLOOD PRESSURE: 132 MMHG | TEMPERATURE: 97.1 F | DIASTOLIC BLOOD PRESSURE: 80 MMHG

## 2025-06-09 DIAGNOSIS — I10 ESSENTIAL HYPERTENSION: Primary | ICD-10-CM

## 2025-06-09 PROCEDURE — 1123F ACP DISCUSS/DSCN MKR DOCD: CPT | Performed by: HOSPITALIST

## 2025-06-09 PROCEDURE — 1090F PRES/ABSN URINE INCON ASSESS: CPT | Performed by: HOSPITALIST

## 2025-06-09 PROCEDURE — 1036F TOBACCO NON-USER: CPT | Performed by: HOSPITALIST

## 2025-06-09 PROCEDURE — 1159F MED LIST DOCD IN RCRD: CPT | Performed by: HOSPITALIST

## 2025-06-09 PROCEDURE — 3079F DIAST BP 80-89 MM HG: CPT | Performed by: HOSPITALIST

## 2025-06-09 PROCEDURE — G8399 PT W/DXA RESULTS DOCUMENT: HCPCS | Performed by: HOSPITALIST

## 2025-06-09 PROCEDURE — 99213 OFFICE O/P EST LOW 20 MIN: CPT | Performed by: HOSPITALIST

## 2025-06-09 PROCEDURE — 3017F COLORECTAL CA SCREEN DOC REV: CPT | Performed by: HOSPITALIST

## 2025-06-09 PROCEDURE — G8427 DOCREV CUR MEDS BY ELIG CLIN: HCPCS | Performed by: HOSPITALIST

## 2025-06-09 PROCEDURE — 3075F SYST BP GE 130 - 139MM HG: CPT | Performed by: HOSPITALIST

## 2025-06-09 PROCEDURE — G8417 CALC BMI ABV UP PARAM F/U: HCPCS | Performed by: HOSPITALIST

## 2025-06-09 NOTE — PROGRESS NOTES
Follow Up Visit Established Patient Visit    Patient:  Maryann Lozano                                               : 1950  Age: 74 y.o.  MRN: [id number]  Date : 2025    Maryann Lozano is a 74 y.o. female who presents for : Follow-up on blood pressure control and significant fatigue  Was evaluated by me on 2025 for chronic fatigue.  Was found to have hypotension.  Nifedipine was decreased to 30 mg nightly instead of twice a day.     Chief Complaint   Patient presents with    Hypertension    Fatigue     Follow-up  Still feeling tried   And not feeling good  Month (fatigued)      Continues to have significant fatigue. SBP ranges  mm Hg.  Appetite has been somewhat poor.  No fever/chills.  + Overactive bladder.  Receives Botox injections for treatment of overactive bladder every 6-months    Past Medical History:   Diagnosis Date    Arthritis     Cerebral artery occlusion with cerebral infarction (HCC)     Compression fracture     back due to fall    Concussion     due to fall    DVT (deep venous thrombosis) (HCC) 2017    RLE after TKR    Environmental allergies     Essential hypertension, benign 2010    GERD (gastroesophageal reflux disease)     History of peptic ulcer     Hx of blood clots     Hyperlipidemia 2010    Lacunar infarction (HCC)     old - per MRI     MRSA (methicillin resistant staph aureus) culture positive 2018    knee    Restrictive airway disease     allergy induced    Retention of urine     Wound, open 2018    left shin area, healing well, tx in wound care center       Past Surgical History:   Procedure Laterality Date     SECTION      times 2    CHOLECYSTECTOMY      COLONOSCOPY      COLONOSCOPY N/A 10/11/2024    COLONOSCOPY BIOPSY HOT SNARE POLYPECTOMY performed by Peter Evans MD at [facility] ENDOSCOPY    HYSTERECTOMY (CERVIX STATUS UNKNOWN)      KNEE ARTHROPLASTY Right 2018     RIGHT REVISION TOTAL KNEE ARTHROPLASTY    KNEE

## 2025-06-17 ENCOUNTER — TELEPHONE (OUTPATIENT)
Dept: INTERNAL MEDICINE CLINIC | Age: 75
End: 2025-06-17

## 2025-06-18 ENCOUNTER — TELEPHONE (OUTPATIENT)
Dept: INTERNAL MEDICINE CLINIC | Age: 75
End: 2025-06-18

## 2025-06-18 NOTE — TELEPHONE ENCOUNTER
Pt is call to alert  her bp is running 120-130 over 50-65 with the new medication and pt is reporting being extremely tired and has been getting dizzy

## 2025-07-01 RX ORDER — VENLAFAXINE HYDROCHLORIDE 37.5 MG/1
37.5 CAPSULE, EXTENDED RELEASE ORAL DAILY
Qty: 90 CAPSULE | Refills: 1 | Status: SHIPPED | OUTPATIENT
Start: 2025-07-01

## 2025-07-01 NOTE — TELEPHONE ENCOUNTER
Last appointment: 6/9/2025  Next appointment: 9/10/2025        Last refill: (3/5/2025) by Rivas Cisneros MD

## 2025-07-14 ENCOUNTER — TELEPHONE (OUTPATIENT)
Dept: INTERNAL MEDICINE CLINIC | Age: 75
End: 2025-07-14

## 2025-07-14 DIAGNOSIS — I25.119 ATHEROSCLEROSIS OF NATIVE CORONARY ARTERY OF NATIVE HEART WITH ANGINA PECTORIS: ICD-10-CM

## 2025-07-14 DIAGNOSIS — I10 HTN (HYPERTENSION), BENIGN: Primary | ICD-10-CM

## 2025-07-14 DIAGNOSIS — G81.94 LEFT HEMIPARESIS (HCC): ICD-10-CM

## 2025-07-14 DIAGNOSIS — I73.9 PAD (PERIPHERAL ARTERY DISEASE): ICD-10-CM

## 2025-07-14 DIAGNOSIS — I63.311 CEREBROVASCULAR ACCIDENT (CVA) DUE TO THROMBOSIS OF RIGHT MIDDLE CEREBRAL ARTERY (HCC): ICD-10-CM

## 2025-07-14 RX ORDER — NITROFURANTOIN 25; 75 MG/1; MG/1
100 CAPSULE ORAL 2 TIMES DAILY
Qty: 10 CAPSULE | Refills: 0 | Status: SHIPPED | OUTPATIENT
Start: 2025-07-14 | End: 2025-07-19

## 2025-07-14 RX ORDER — NITROFURANTOIN 25; 75 MG/1; MG/1
100 CAPSULE ORAL 2 TIMES DAILY
Qty: 10 CAPSULE | Refills: 0 | Status: CANCELLED | OUTPATIENT
Start: 2025-07-14 | End: 2025-07-19

## 2025-07-14 NOTE — TELEPHONE ENCOUNTER
Macrobid x 5 days check b/p if low HCT  Contacting [facility] for blood pressure readings and patients weakness    Macrobid sent to Dr Camara in a refill encounter

## 2025-07-14 NOTE — TELEPHONE ENCOUNTER
Marilee is calling back to update Alesia of pts blood pressure. Pts blood pressure today is running 99/59.    [phone]

## 2025-07-14 NOTE — TELEPHONE ENCOUNTER
Spoke with elfego given instructions on what patient should currently be on  Patient has been taking nifedipine 30 mg BID , chlorthalidone 25 mg daily and hydralazine 25 mg TID    Should only be taking    chlorthalidone 25 mg daily and nifedipine 30 mg nightly  Use hydralazine 25 mg orally every 8 hours for systolic blood pressure above 140 mmHg per Dr Cisneros    Referral placed for skilled nursing for medication safety and compliance

## 2025-07-14 NOTE — TELEPHONE ENCOUNTER
Pts daughter Marilee is concerned she is getting another UTI with the frequency she is getting them now. Marilee is concerned she is getting another UTI and would like to discuss how she can prevent her from getting them so often.    Also Marilee would like to discuss the BP medications being reduced. Pt is still very fatigued and sleeping most of the day with the medications being reduced and Marilee feels like the pt is going down hill quickly.    [phone]

## 2025-07-22 DIAGNOSIS — F51.01 PRIMARY INSOMNIA: ICD-10-CM

## 2025-07-22 RX ORDER — ZOLPIDEM TARTRATE 5 MG/1
TABLET ORAL
Qty: 30 TABLET | Refills: 0 | Status: SHIPPED | OUTPATIENT
Start: 2025-07-22 | End: 2025-08-21

## 2025-07-22 NOTE — TELEPHONE ENCOUNTER
Last appointment: 6/9/2025  Next appointment: 9/10/2025        Last refill: (5/28/2025) by Rivas Cisneros MD

## 2025-07-28 ENCOUNTER — TELEPHONE (OUTPATIENT)
Dept: INTERNAL MEDICINE CLINIC | Age: 75
End: 2025-07-28

## 2025-07-28 NOTE — TELEPHONE ENCOUNTER
update  (Newest Message First)             Maryann IBARRA Blake \"Octavia\" [facility] [place] [facility] (supporting Rivas Cisneros MD) (Selected Message)        7/26/25 11:22 AM  Good morning,     Per your instructions, my mom has reduced her cholesterol medicine significantly and still has zero energy. We have not seen any changes despite the reduced BP meds. She spends a large portion of the day in bed doesn't do the things she previously enjoyed (going out to lunch, getting out of the house to go to the store, etc.)     Do you have any suggestions on what can be done to increase her energy level. We push her to be more \"active\" but she is exhausted.     thank you,     Marilee (Octavia's daughter)

## 2025-07-29 PROBLEM — I63.9 CEREBROVASCULAR ACCIDENT (CVA) (HCC): Status: RESOLVED | Noted: 2020-11-20 | Resolved: 2025-07-29

## 2025-08-22 ENCOUNTER — APPOINTMENT (OUTPATIENT)
Dept: CT IMAGING | Age: 75
DRG: 552 | End: 2025-08-22
Payer: MEDICARE

## 2025-08-22 ENCOUNTER — HOSPITAL ENCOUNTER (INPATIENT)
Age: 75
LOS: 5 days | Discharge: NURSING, REHAB OR LONG-TERM CARE | DRG: 552 | End: 2025-08-27
Attending: EMERGENCY MEDICINE | Admitting: INTERNAL MEDICINE
Payer: MEDICARE

## 2025-08-22 ENCOUNTER — APPOINTMENT (OUTPATIENT)
Dept: GENERAL RADIOLOGY | Age: 75
DRG: 552 | End: 2025-08-22
Payer: MEDICARE

## 2025-08-22 ENCOUNTER — TELEPHONE (OUTPATIENT)
Dept: INTERNAL MEDICINE CLINIC | Age: 75
End: 2025-08-22

## 2025-08-22 DIAGNOSIS — I63.9 CEREBROVASCULAR ACCIDENT (CVA), UNSPECIFIED MECHANISM (HCC): Primary | ICD-10-CM

## 2025-08-22 LAB
ALBUMIN SERPL-MCNC: 4.2 G/DL (ref 3.4–5)
ALP SERPL-CCNC: 74 U/L (ref 40–129)
ALT SERPL-CCNC: 16 U/L (ref 10–40)
ANION GAP SERPL CALCULATED.3IONS-SCNC: 11 MMOL/L (ref 3–16)
AST SERPL-CCNC: 26 U/L (ref 15–37)
BACTERIA URNS QL MICRO: ABNORMAL /HPF
BASOPHILS # BLD: 0 K/UL (ref 0–0.2)
BASOPHILS NFR BLD: 0.4 %
BILIRUB DIRECT SERPL-MCNC: <0.1 MG/DL (ref 0–0.3)
BILIRUB INDIRECT SERPL-MCNC: 0.2 MG/DL (ref 0–1)
BILIRUB SERPL-MCNC: 0.3 MG/DL (ref 0–1)
BILIRUB UR QL STRIP.AUTO: NEGATIVE
BUN SERPL-MCNC: 28 MG/DL (ref 7–20)
CALCIUM SERPL-MCNC: 9.4 MG/DL (ref 8.3–10.6)
CHLORIDE SERPL-SCNC: 104 MMOL/L (ref 99–110)
CLARITY UR: ABNORMAL
CO2 SERPL-SCNC: 26 MMOL/L (ref 21–32)
COLOR UR: YELLOW
CREAT SERPL-MCNC: 0.8 MG/DL (ref 0.6–1.2)
DEPRECATED RDW RBC AUTO: 14 % (ref 12.4–15.4)
EKG ATRIAL RATE: 57 BPM
EKG DIAGNOSIS: NORMAL
EKG P AXIS: 36 DEGREES
EKG P-R INTERVAL: 160 MS
EKG Q-T INTERVAL: 456 MS
EKG QRS DURATION: 98 MS
EKG QTC CALCULATION (BAZETT): 443 MS
EKG R AXIS: -50 DEGREES
EKG T AXIS: 50 DEGREES
EKG VENTRICULAR RATE: 57 BPM
EOSINOPHIL # BLD: 0.1 K/UL (ref 0–0.6)
EOSINOPHIL NFR BLD: 1.8 %
EPI CELLS #/AREA URNS AUTO: 4 /HPF (ref 0–5)
GFR SERPLBLD CREATININE-BSD FMLA CKD-EPI: 77 ML/MIN/{1.73_M2}
GLUCOSE BLD-MCNC: 101 MG/DL (ref 70–99)
GLUCOSE SERPL-MCNC: 106 MG/DL (ref 70–99)
GLUCOSE UR STRIP.AUTO-MCNC: NEGATIVE MG/DL
HCT VFR BLD AUTO: 41.2 % (ref 36–48)
HGB BLD-MCNC: 14.3 G/DL (ref 12–16)
HGB UR QL STRIP.AUTO: NEGATIVE
HYALINE CASTS #/AREA URNS AUTO: 1 /LPF (ref 0–8)
KETONES UR STRIP.AUTO-MCNC: NEGATIVE MG/DL
LEUKOCYTE ESTERASE UR QL STRIP.AUTO: NEGATIVE
LYMPHOCYTES # BLD: 2.9 K/UL (ref 1–5.1)
LYMPHOCYTES NFR BLD: 41.8 %
MAGNESIUM SERPL-MCNC: 2.22 MG/DL (ref 1.8–2.4)
MCH RBC QN AUTO: 31.7 PG (ref 26–34)
MCHC RBC AUTO-ENTMCNC: 34.6 G/DL (ref 31–36)
MCV RBC AUTO: 91.7 FL (ref 80–100)
MONOCYTES # BLD: 0.6 K/UL (ref 0–1.3)
MONOCYTES NFR BLD: 8.3 %
NEUTROPHILS # BLD: 3.3 K/UL (ref 1.7–7.7)
NEUTROPHILS NFR BLD: 47.7 %
NITRITE UR QL STRIP.AUTO: NEGATIVE
PERFORMED ON: ABNORMAL
PH UR STRIP.AUTO: 8.5 [PH] (ref 5–8)
PLATELET # BLD AUTO: 320 K/UL (ref 135–450)
PLATELET BLD QL SMEAR: ADEQUATE
PMV BLD AUTO: 7.7 FL (ref 5–10.5)
POTASSIUM SERPL-SCNC: 4.1 MMOL/L (ref 3.5–5.1)
PROT SERPL-MCNC: 7 G/DL (ref 6.4–8.2)
PROT UR STRIP.AUTO-MCNC: NEGATIVE MG/DL
RBC # BLD AUTO: 4.5 M/UL (ref 4–5.2)
RBC CLUMPS #/AREA URNS AUTO: 2 /HPF (ref 0–4)
SLIDE REVIEW: NORMAL
SODIUM SERPL-SCNC: 141 MMOL/L (ref 136–145)
SP GR UR STRIP.AUTO: >=1.03 (ref 1–1.03)
TROPONIN, HIGH SENSITIVITY: 11 NG/L (ref 0–14)
UA COMPLETE W REFLEX CULTURE PNL UR: ABNORMAL
UA DIPSTICK W REFLEX MICRO PNL UR: YES
URN SPEC COLLECT METH UR: ABNORMAL
UROBILINOGEN UR STRIP-ACNC: 0.2 E.U./DL
WBC # BLD AUTO: 7 K/UL (ref 4–11)
WBC #/AREA URNS AUTO: 2 /HPF (ref 0–5)

## 2025-08-22 PROCEDURE — 6370000000 HC RX 637 (ALT 250 FOR IP): Performed by: INTERNAL MEDICINE

## 2025-08-22 PROCEDURE — 93005 ELECTROCARDIOGRAM TRACING: CPT | Performed by: EMERGENCY MEDICINE

## 2025-08-22 PROCEDURE — 70498 CT ANGIOGRAPHY NECK: CPT

## 2025-08-22 PROCEDURE — 93010 ELECTROCARDIOGRAM REPORT: CPT | Performed by: INTERNAL MEDICINE

## 2025-08-22 PROCEDURE — 81001 URINALYSIS AUTO W/SCOPE: CPT

## 2025-08-22 PROCEDURE — 2500000003 HC RX 250 WO HCPCS: Performed by: INTERNAL MEDICINE

## 2025-08-22 PROCEDURE — 80048 BASIC METABOLIC PNL TOTAL CA: CPT

## 2025-08-22 PROCEDURE — 36415 COLL VENOUS BLD VENIPUNCTURE: CPT

## 2025-08-22 PROCEDURE — 71045 X-RAY EXAM CHEST 1 VIEW: CPT

## 2025-08-22 PROCEDURE — 2060000000 HC ICU INTERMEDIATE R&B

## 2025-08-22 PROCEDURE — 99285 EMERGENCY DEPT VISIT HI MDM: CPT

## 2025-08-22 PROCEDURE — 85025 COMPLETE CBC W/AUTO DIFF WBC: CPT

## 2025-08-22 PROCEDURE — 84484 ASSAY OF TROPONIN QUANT: CPT

## 2025-08-22 PROCEDURE — 70450 CT HEAD/BRAIN W/O DYE: CPT

## 2025-08-22 PROCEDURE — 6360000004 HC RX CONTRAST MEDICATION: Performed by: EMERGENCY MEDICINE

## 2025-08-22 PROCEDURE — 83735 ASSAY OF MAGNESIUM: CPT

## 2025-08-22 PROCEDURE — 80076 HEPATIC FUNCTION PANEL: CPT

## 2025-08-22 RX ORDER — PANTOPRAZOLE SODIUM 40 MG/1
40 TABLET, DELAYED RELEASE ORAL
Status: DISCONTINUED | OUTPATIENT
Start: 2025-08-23 | End: 2025-08-22

## 2025-08-22 RX ORDER — ONDANSETRON 4 MG/1
4 TABLET, ORALLY DISINTEGRATING ORAL EVERY 8 HOURS PRN
Status: DISCONTINUED | OUTPATIENT
Start: 2025-08-22 | End: 2025-08-27 | Stop reason: HOSPADM

## 2025-08-22 RX ORDER — HYDRALAZINE HYDROCHLORIDE 25 MG/1
25 TABLET, FILM COATED ORAL EVERY 8 HOURS SCHEDULED
Status: DISCONTINUED | OUTPATIENT
Start: 2025-08-22 | End: 2025-08-27 | Stop reason: HOSPADM

## 2025-08-22 RX ORDER — CILOSTAZOL 100 MG/1
50 TABLET ORAL 2 TIMES DAILY
Status: DISCONTINUED | OUTPATIENT
Start: 2025-08-22 | End: 2025-08-27 | Stop reason: HOSPADM

## 2025-08-22 RX ORDER — ONDANSETRON 2 MG/ML
4 INJECTION INTRAMUSCULAR; INTRAVENOUS EVERY 6 HOURS PRN
Status: DISCONTINUED | OUTPATIENT
Start: 2025-08-22 | End: 2025-08-27 | Stop reason: HOSPADM

## 2025-08-22 RX ORDER — SODIUM CHLORIDE 0.9 % (FLUSH) 0.9 %
5-40 SYRINGE (ML) INJECTION PRN
Status: DISCONTINUED | OUTPATIENT
Start: 2025-08-22 | End: 2025-08-27 | Stop reason: HOSPADM

## 2025-08-22 RX ORDER — NIFEDIPINE 30 MG
30 TABLET, EXTENDED RELEASE ORAL 2 TIMES DAILY
Status: DISCONTINUED | OUTPATIENT
Start: 2025-08-22 | End: 2025-08-27 | Stop reason: HOSPADM

## 2025-08-22 RX ORDER — VENLAFAXINE HYDROCHLORIDE 37.5 MG/1
37.5 CAPSULE, EXTENDED RELEASE ORAL DAILY
Status: DISCONTINUED | OUTPATIENT
Start: 2025-08-22 | End: 2025-08-27 | Stop reason: HOSPADM

## 2025-08-22 RX ORDER — SUCRALFATE 1 G/1
1 TABLET ORAL 2 TIMES DAILY
Status: DISCONTINUED | OUTPATIENT
Start: 2025-08-22 | End: 2025-08-27 | Stop reason: HOSPADM

## 2025-08-22 RX ORDER — SODIUM CHLORIDE 9 MG/ML
INJECTION, SOLUTION INTRAVENOUS PRN
Status: DISCONTINUED | OUTPATIENT
Start: 2025-08-22 | End: 2025-08-27 | Stop reason: HOSPADM

## 2025-08-22 RX ORDER — POLYETHYLENE GLYCOL 3350 17 G/17G
17 POWDER, FOR SOLUTION ORAL DAILY PRN
Status: DISCONTINUED | OUTPATIENT
Start: 2025-08-22 | End: 2025-08-27 | Stop reason: HOSPADM

## 2025-08-22 RX ORDER — ENOXAPARIN SODIUM 100 MG/ML
40 INJECTION SUBCUTANEOUS DAILY
Status: DISCONTINUED | OUTPATIENT
Start: 2025-08-23 | End: 2025-08-27 | Stop reason: HOSPADM

## 2025-08-22 RX ORDER — PRIMIDONE 50 MG/1
50 TABLET ORAL 2 TIMES DAILY
Status: DISCONTINUED | OUTPATIENT
Start: 2025-08-22 | End: 2025-08-22

## 2025-08-22 RX ORDER — ASPIRIN 300 MG/1
300 SUPPOSITORY RECTAL DAILY
Status: DISCONTINUED | OUTPATIENT
Start: 2025-08-22 | End: 2025-08-27 | Stop reason: HOSPADM

## 2025-08-22 RX ORDER — IOPAMIDOL 755 MG/ML
75 INJECTION, SOLUTION INTRAVASCULAR
Status: COMPLETED | OUTPATIENT
Start: 2025-08-22 | End: 2025-08-22

## 2025-08-22 RX ORDER — CHOLESTYRAMINE LIGHT 4 G/5.7G
4 POWDER, FOR SUSPENSION ORAL DAILY
Status: DISCONTINUED | OUTPATIENT
Start: 2025-08-23 | End: 2025-08-27 | Stop reason: HOSPADM

## 2025-08-22 RX ORDER — CHLORTHALIDONE 25 MG/1
25 TABLET ORAL DAILY
Status: DISCONTINUED | OUTPATIENT
Start: 2025-08-22 | End: 2025-08-27 | Stop reason: HOSPADM

## 2025-08-22 RX ORDER — LABETALOL HYDROCHLORIDE 5 MG/ML
10 INJECTION, SOLUTION INTRAVENOUS EVERY 10 MIN PRN
Status: DISCONTINUED | OUTPATIENT
Start: 2025-08-22 | End: 2025-08-27 | Stop reason: HOSPADM

## 2025-08-22 RX ORDER — ROSUVASTATIN CALCIUM 40 MG/1
40 TABLET, COATED ORAL NIGHTLY
Status: DISCONTINUED | OUTPATIENT
Start: 2025-08-22 | End: 2025-08-27 | Stop reason: HOSPADM

## 2025-08-22 RX ORDER — SODIUM CHLORIDE 0.9 % (FLUSH) 0.9 %
5-40 SYRINGE (ML) INJECTION EVERY 12 HOURS SCHEDULED
Status: DISCONTINUED | OUTPATIENT
Start: 2025-08-22 | End: 2025-08-27 | Stop reason: HOSPADM

## 2025-08-22 RX ORDER — ASPIRIN 81 MG/1
81 TABLET, CHEWABLE ORAL DAILY
Status: DISCONTINUED | OUTPATIENT
Start: 2025-08-22 | End: 2025-08-27 | Stop reason: HOSPADM

## 2025-08-22 RX ADMIN — CHLORTHALIDONE 25 MG: 25 TABLET ORAL at 22:52

## 2025-08-22 RX ADMIN — NIFEDIPINE 30 MG: 30 TABLET, EXTENDED RELEASE ORAL at 23:04

## 2025-08-22 RX ADMIN — CILOSTAZOL 50 MG: 100 TABLET ORAL at 23:02

## 2025-08-22 RX ADMIN — SUCRALFATE 1 G: 1 TABLET ORAL at 22:52

## 2025-08-22 RX ADMIN — ROSUVASTATIN 40 MG: 40 TABLET, FILM COATED ORAL at 22:53

## 2025-08-22 RX ADMIN — IOPAMIDOL 75 ML: 755 INJECTION, SOLUTION INTRAVENOUS at 13:15

## 2025-08-22 RX ADMIN — HYDRALAZINE HYDROCHLORIDE 25 MG: 25 TABLET ORAL at 22:53

## 2025-08-22 RX ADMIN — SODIUM CHLORIDE, PRESERVATIVE FREE 10 ML: 5 INJECTION INTRAVENOUS at 22:52

## 2025-08-22 RX ADMIN — VENLAFAXINE HYDROCHLORIDE 37.5 MG: 37.5 CAPSULE, EXTENDED RELEASE ORAL at 22:53

## 2025-08-22 ASSESSMENT — LIFESTYLE VARIABLES
HOW OFTEN DO YOU HAVE A DRINK CONTAINING ALCOHOL: NEVER
HOW MANY STANDARD DRINKS CONTAINING ALCOHOL DO YOU HAVE ON A TYPICAL DAY: PATIENT DOES NOT DRINK

## 2025-08-22 ASSESSMENT — PAIN SCALES - GENERAL
PAINLEVEL_OUTOF10: 0
PAINLEVEL_OUTOF10: 0

## 2025-08-22 ASSESSMENT — PAIN - FUNCTIONAL ASSESSMENT: PAIN_FUNCTIONAL_ASSESSMENT: 0-10

## 2025-08-23 ENCOUNTER — APPOINTMENT (OUTPATIENT)
Dept: MRI IMAGING | Age: 75
DRG: 552 | End: 2025-08-23
Payer: MEDICARE

## 2025-08-23 PROBLEM — G95.9 MYELOPATHY (HCC): Status: ACTIVE | Noted: 2025-08-23

## 2025-08-23 LAB
ANION GAP SERPL CALCULATED.3IONS-SCNC: 12 MMOL/L (ref 3–16)
BUN SERPL-MCNC: 21 MG/DL (ref 7–20)
CALCIUM SERPL-MCNC: 9.3 MG/DL (ref 8.3–10.6)
CHLORIDE SERPL-SCNC: 103 MMOL/L (ref 99–110)
CHOLEST SERPL-MCNC: 151 MG/DL (ref 0–199)
CO2 SERPL-SCNC: 25 MMOL/L (ref 21–32)
CREAT SERPL-MCNC: 0.7 MG/DL (ref 0.6–1.2)
DEPRECATED RDW RBC AUTO: 13.6 % (ref 12.4–15.4)
GFR SERPLBLD CREATININE-BSD FMLA CKD-EPI: >90 ML/MIN/{1.73_M2}
GLUCOSE BLD-MCNC: 100 MG/DL (ref 70–99)
GLUCOSE SERPL-MCNC: 109 MG/DL (ref 70–99)
HCT VFR BLD AUTO: 41.4 % (ref 36–48)
HDLC SERPL-MCNC: 54 MG/DL (ref 40–60)
HGB BLD-MCNC: 14.1 G/DL (ref 12–16)
LDLC SERPL CALC-MCNC: 73 MG/DL
MCH RBC QN AUTO: 31.2 PG (ref 26–34)
MCHC RBC AUTO-ENTMCNC: 34.1 G/DL (ref 31–36)
MCV RBC AUTO: 91.5 FL (ref 80–100)
PERFORMED ON: ABNORMAL
PLATELET # BLD AUTO: 326 K/UL (ref 135–450)
PMV BLD AUTO: 6.5 FL (ref 5–10.5)
POTASSIUM SERPL-SCNC: 3.8 MMOL/L (ref 3.5–5.1)
RBC # BLD AUTO: 4.52 M/UL (ref 4–5.2)
SODIUM SERPL-SCNC: 140 MMOL/L (ref 136–145)
TRIGL SERPL-MCNC: 119 MG/DL (ref 0–150)
VLDLC SERPL CALC-MCNC: 24 MG/DL
WBC # BLD AUTO: 8.3 K/UL (ref 4–11)

## 2025-08-23 PROCEDURE — 92610 EVALUATE SWALLOWING FUNCTION: CPT

## 2025-08-23 PROCEDURE — 2500000003 HC RX 250 WO HCPCS: Performed by: INTERNAL MEDICINE

## 2025-08-23 PROCEDURE — 97162 PT EVAL MOD COMPLEX 30 MIN: CPT

## 2025-08-23 PROCEDURE — 85027 COMPLETE CBC AUTOMATED: CPT

## 2025-08-23 PROCEDURE — 83036 HEMOGLOBIN GLYCOSYLATED A1C: CPT

## 2025-08-23 PROCEDURE — 36415 COLL VENOUS BLD VENIPUNCTURE: CPT

## 2025-08-23 PROCEDURE — 6370000000 HC RX 637 (ALT 250 FOR IP): Performed by: INTERNAL MEDICINE

## 2025-08-23 PROCEDURE — 6370000000 HC RX 637 (ALT 250 FOR IP): Performed by: NURSE PRACTITIONER

## 2025-08-23 PROCEDURE — 80061 LIPID PANEL: CPT

## 2025-08-23 PROCEDURE — 97535 SELF CARE MNGMENT TRAINING: CPT

## 2025-08-23 PROCEDURE — 92523 SPEECH SOUND LANG COMPREHEN: CPT

## 2025-08-23 PROCEDURE — 6360000002 HC RX W HCPCS: Performed by: INTERNAL MEDICINE

## 2025-08-23 PROCEDURE — 97530 THERAPEUTIC ACTIVITIES: CPT

## 2025-08-23 PROCEDURE — APPNB30 APP NON BILLABLE TIME 0-30 MINS

## 2025-08-23 PROCEDURE — 80048 BASIC METABOLIC PNL TOTAL CA: CPT

## 2025-08-23 PROCEDURE — 97166 OT EVAL MOD COMPLEX 45 MIN: CPT

## 2025-08-23 PROCEDURE — 70551 MRI BRAIN STEM W/O DYE: CPT

## 2025-08-23 PROCEDURE — 94760 N-INVAS EAR/PLS OXIMETRY 1: CPT

## 2025-08-23 PROCEDURE — 97165 OT EVAL LOW COMPLEX 30 MIN: CPT

## 2025-08-23 PROCEDURE — APPSS60 APP SPLIT SHARED TIME 46-60 MINUTES

## 2025-08-23 PROCEDURE — 2060000000 HC ICU INTERMEDIATE R&B

## 2025-08-23 RX ORDER — ACETAMINOPHEN 325 MG/1
650 TABLET ORAL EVERY 6 HOURS PRN
Status: DISCONTINUED | OUTPATIENT
Start: 2025-08-23 | End: 2025-08-27 | Stop reason: HOSPADM

## 2025-08-23 RX ADMIN — ACETAMINOPHEN 650 MG: 325 TABLET ORAL at 20:09

## 2025-08-23 RX ADMIN — ROSUVASTATIN 40 MG: 40 TABLET, FILM COATED ORAL at 20:09

## 2025-08-23 RX ADMIN — HYDRALAZINE HYDROCHLORIDE 25 MG: 25 TABLET ORAL at 21:52

## 2025-08-23 RX ADMIN — SODIUM CHLORIDE, PRESERVATIVE FREE 10 ML: 5 INJECTION INTRAVENOUS at 09:52

## 2025-08-23 RX ADMIN — ASPIRIN 81 MG: 81 TABLET, CHEWABLE ORAL at 09:53

## 2025-08-23 RX ADMIN — ACETAMINOPHEN 650 MG: 325 TABLET ORAL at 01:41

## 2025-08-23 RX ADMIN — NIFEDIPINE 30 MG: 30 TABLET, EXTENDED RELEASE ORAL at 09:53

## 2025-08-23 RX ADMIN — CILOSTAZOL 50 MG: 100 TABLET ORAL at 20:09

## 2025-08-23 RX ADMIN — SODIUM CHLORIDE, PRESERVATIVE FREE 10 ML: 5 INJECTION INTRAVENOUS at 20:16

## 2025-08-23 RX ADMIN — ENOXAPARIN SODIUM 40 MG: 100 INJECTION SUBCUTANEOUS at 09:52

## 2025-08-23 RX ADMIN — SUCRALFATE 1 G: 1 TABLET ORAL at 09:53

## 2025-08-23 RX ADMIN — ACETAMINOPHEN 650 MG: 325 TABLET ORAL at 12:04

## 2025-08-23 RX ADMIN — NIFEDIPINE 30 MG: 30 TABLET, EXTENDED RELEASE ORAL at 20:09

## 2025-08-23 RX ADMIN — VENLAFAXINE HYDROCHLORIDE 37.5 MG: 37.5 CAPSULE, EXTENDED RELEASE ORAL at 09:53

## 2025-08-23 RX ADMIN — CILOSTAZOL 50 MG: 100 TABLET ORAL at 09:54

## 2025-08-23 RX ADMIN — CHOLESTYRAMINE 4 G: 4 POWDER, FOR SUSPENSION ORAL at 09:53

## 2025-08-23 RX ADMIN — LORAZEPAM 0.5 MG: 2 INJECTION INTRAMUSCULAR; INTRAVENOUS at 07:29

## 2025-08-23 RX ADMIN — SUCRALFATE 1 G: 1 TABLET ORAL at 20:09

## 2025-08-23 RX ADMIN — CHLORTHALIDONE 25 MG: 25 TABLET ORAL at 09:57

## 2025-08-23 ASSESSMENT — PAIN - FUNCTIONAL ASSESSMENT
PAIN_FUNCTIONAL_ASSESSMENT: 0-10
PAIN_FUNCTIONAL_ASSESSMENT: 0-10
PAIN_FUNCTIONAL_ASSESSMENT: ACTIVITIES ARE NOT PREVENTED
PAIN_FUNCTIONAL_ASSESSMENT: ACTIVITIES ARE NOT PREVENTED
PAIN_FUNCTIONAL_ASSESSMENT: WONG-BAKER FACES
PAIN_FUNCTIONAL_ASSESSMENT: 0-10

## 2025-08-23 ASSESSMENT — PAIN DESCRIPTION - DESCRIPTORS
DESCRIPTORS: ACHING
DESCRIPTORS: SHARP

## 2025-08-23 ASSESSMENT — PAIN DESCRIPTION - LOCATION
LOCATION: HEAD
LOCATION: HEAD;NECK
LOCATION: HEAD
LOCATION: HEAD;NECK

## 2025-08-23 ASSESSMENT — PAIN SCALES - GENERAL
PAINLEVEL_OUTOF10: 7
PAINLEVEL_OUTOF10: 0
PAINLEVEL_OUTOF10: 2
PAINLEVEL_OUTOF10: 3
PAINLEVEL_OUTOF10: 4
PAINLEVEL_OUTOF10: 4
PAINLEVEL_OUTOF10: 3

## 2025-08-23 ASSESSMENT — PAIN SCALES - WONG BAKER
WONGBAKER_NUMERICALRESPONSE: NO HURT
WONGBAKER_NUMERICALRESPONSE: NO HURT

## 2025-08-23 ASSESSMENT — PAIN DESCRIPTION - ORIENTATION: ORIENTATION: POSTERIOR

## 2025-08-24 LAB
ANION GAP SERPL CALCULATED.3IONS-SCNC: 14 MMOL/L (ref 3–16)
BACTERIA URNS QL MICRO: ABNORMAL /HPF
BILIRUB UR QL STRIP.AUTO: NEGATIVE
BUN SERPL-MCNC: 18 MG/DL (ref 7–20)
CALCIUM SERPL-MCNC: 9 MG/DL (ref 8.3–10.6)
CHLORIDE SERPL-SCNC: 103 MMOL/L (ref 99–110)
CLARITY UR: CLEAR
CO2 SERPL-SCNC: 22 MMOL/L (ref 21–32)
COLOR UR: YELLOW
CREAT SERPL-MCNC: 0.7 MG/DL (ref 0.6–1.2)
EPI CELLS #/AREA URNS AUTO: 0 /HPF (ref 0–5)
EST. AVERAGE GLUCOSE BLD GHB EST-MCNC: 111.2 MG/DL
GFR SERPLBLD CREATININE-BSD FMLA CKD-EPI: >90 ML/MIN/{1.73_M2}
GLUCOSE SERPL-MCNC: 144 MG/DL (ref 70–99)
GLUCOSE UR STRIP.AUTO-MCNC: NEGATIVE MG/DL
HBA1C MFR BLD: 5.5 %
HGB UR QL STRIP.AUTO: NEGATIVE
HYALINE CASTS #/AREA URNS AUTO: 1 /LPF (ref 0–8)
KETONES UR STRIP.AUTO-MCNC: NEGATIVE MG/DL
LEUKOCYTE ESTERASE UR QL STRIP.AUTO: ABNORMAL
MAGNESIUM SERPL-MCNC: 1.93 MG/DL (ref 1.8–2.4)
NITRITE UR QL STRIP.AUTO: NEGATIVE
PH UR STRIP.AUTO: 6 [PH] (ref 5–8)
POTASSIUM SERPL-SCNC: 3.4 MMOL/L (ref 3.5–5.1)
PROT UR STRIP.AUTO-MCNC: NEGATIVE MG/DL
RBC CLUMPS #/AREA URNS AUTO: 4 /HPF (ref 0–4)
SODIUM SERPL-SCNC: 139 MMOL/L (ref 136–145)
SP GR UR STRIP.AUTO: 1.01 (ref 1–1.03)
UA COMPLETE W REFLEX CULTURE PNL UR: ABNORMAL
UA DIPSTICK W REFLEX MICRO PNL UR: YES
URN SPEC COLLECT METH UR: ABNORMAL
UROBILINOGEN UR STRIP-ACNC: 0.2 E.U./DL
WBC #/AREA URNS AUTO: 0 /HPF (ref 0–5)

## 2025-08-24 PROCEDURE — 6360000002 HC RX W HCPCS: Performed by: INTERNAL MEDICINE

## 2025-08-24 PROCEDURE — 94760 N-INVAS EAR/PLS OXIMETRY 1: CPT

## 2025-08-24 PROCEDURE — 6370000000 HC RX 637 (ALT 250 FOR IP): Performed by: NURSE PRACTITIONER

## 2025-08-24 PROCEDURE — 81001 URINALYSIS AUTO W/SCOPE: CPT

## 2025-08-24 PROCEDURE — 80048 BASIC METABOLIC PNL TOTAL CA: CPT

## 2025-08-24 PROCEDURE — 6370000000 HC RX 637 (ALT 250 FOR IP): Performed by: INTERNAL MEDICINE

## 2025-08-24 PROCEDURE — 36415 COLL VENOUS BLD VENIPUNCTURE: CPT

## 2025-08-24 PROCEDURE — 2060000000 HC ICU INTERMEDIATE R&B

## 2025-08-24 PROCEDURE — 2500000003 HC RX 250 WO HCPCS: Performed by: INTERNAL MEDICINE

## 2025-08-24 PROCEDURE — 83735 ASSAY OF MAGNESIUM: CPT

## 2025-08-24 RX ADMIN — SUCRALFATE 1 G: 1 TABLET ORAL at 20:41

## 2025-08-24 RX ADMIN — HYDRALAZINE HYDROCHLORIDE 25 MG: 25 TABLET ORAL at 14:57

## 2025-08-24 RX ADMIN — SODIUM CHLORIDE, PRESERVATIVE FREE 10 ML: 5 INJECTION INTRAVENOUS at 08:42

## 2025-08-24 RX ADMIN — ENOXAPARIN SODIUM 40 MG: 100 INJECTION SUBCUTANEOUS at 08:42

## 2025-08-24 RX ADMIN — VENLAFAXINE HYDROCHLORIDE 37.5 MG: 37.5 CAPSULE, EXTENDED RELEASE ORAL at 08:41

## 2025-08-24 RX ADMIN — ACETAMINOPHEN 650 MG: 325 TABLET ORAL at 20:45

## 2025-08-24 RX ADMIN — CILOSTAZOL 50 MG: 100 TABLET ORAL at 08:41

## 2025-08-24 RX ADMIN — SODIUM CHLORIDE, PRESERVATIVE FREE 10 ML: 5 INJECTION INTRAVENOUS at 20:41

## 2025-08-24 RX ADMIN — NIFEDIPINE 30 MG: 30 TABLET, EXTENDED RELEASE ORAL at 08:41

## 2025-08-24 RX ADMIN — CHLORTHALIDONE 25 MG: 25 TABLET ORAL at 08:41

## 2025-08-24 RX ADMIN — CILOSTAZOL 50 MG: 100 TABLET ORAL at 20:41

## 2025-08-24 RX ADMIN — SUCRALFATE 1 G: 1 TABLET ORAL at 08:41

## 2025-08-24 RX ADMIN — ROSUVASTATIN 40 MG: 40 TABLET, FILM COATED ORAL at 20:41

## 2025-08-24 RX ADMIN — NIFEDIPINE 30 MG: 30 TABLET, EXTENDED RELEASE ORAL at 20:41

## 2025-08-24 RX ADMIN — HYDRALAZINE HYDROCHLORIDE 25 MG: 25 TABLET ORAL at 20:43

## 2025-08-24 RX ADMIN — ASPIRIN 81 MG: 81 TABLET, CHEWABLE ORAL at 08:42

## 2025-08-24 RX ADMIN — HYDRALAZINE HYDROCHLORIDE 25 MG: 25 TABLET ORAL at 07:46

## 2025-08-24 RX ADMIN — CHOLESTYRAMINE 4 G: 4 POWDER, FOR SUSPENSION ORAL at 08:38

## 2025-08-24 ASSESSMENT — PAIN SCALES - GENERAL
PAINLEVEL_OUTOF10: 2
PAINLEVEL_OUTOF10: 0
PAINLEVEL_OUTOF10: 0
PAINLEVEL_OUTOF10: 2
PAINLEVEL_OUTOF10: 0
PAINLEVEL_OUTOF10: 2
PAINLEVEL_OUTOF10: 2
PAINLEVEL_OUTOF10: 3

## 2025-08-24 ASSESSMENT — PAIN DESCRIPTION - ORIENTATION: ORIENTATION: POSTERIOR

## 2025-08-24 ASSESSMENT — PAIN - FUNCTIONAL ASSESSMENT
PAIN_FUNCTIONAL_ASSESSMENT: 0-10
PAIN_FUNCTIONAL_ASSESSMENT: ACTIVITIES ARE NOT PREVENTED
PAIN_FUNCTIONAL_ASSESSMENT: 0-10

## 2025-08-24 ASSESSMENT — PAIN DESCRIPTION - LOCATION
LOCATION: HEAD
LOCATION: GENERALIZED

## 2025-08-24 ASSESSMENT — PAIN DESCRIPTION - DESCRIPTORS
DESCRIPTORS: ACHING
DESCRIPTORS: ACHING

## 2025-08-24 ASSESSMENT — PAIN DESCRIPTION - PAIN TYPE: TYPE: ACUTE PAIN

## 2025-08-25 ENCOUNTER — APPOINTMENT (OUTPATIENT)
Age: 75
DRG: 552 | End: 2025-08-25
Attending: INTERNAL MEDICINE
Payer: MEDICARE

## 2025-08-25 ENCOUNTER — APPOINTMENT (OUTPATIENT)
Dept: MRI IMAGING | Age: 75
DRG: 552 | End: 2025-08-25
Payer: MEDICARE

## 2025-08-25 PROBLEM — G45.1 TIA INVOLVING RIGHT INTERNAL CAROTID ARTERY: Status: ACTIVE | Noted: 2025-08-25

## 2025-08-25 PROBLEM — I67.1 CEREBRAL ANEURYSM, NONRUPTURED: Status: ACTIVE | Noted: 2025-08-25

## 2025-08-25 PROBLEM — I65.22 STENOSIS OF LEFT CAROTID ARTERY: Status: ACTIVE | Noted: 2025-08-25

## 2025-08-25 PROBLEM — I63.9 CEREBROVASCULAR ACCIDENT (CVA) (HCC): Status: ACTIVE | Noted: 2025-08-25

## 2025-08-25 LAB
BASOPHILS # BLD: 0 K/UL (ref 0–0.2)
BASOPHILS NFR BLD: 0.3 %
DEPRECATED RDW RBC AUTO: 14.1 % (ref 12.4–15.4)
ECHO AO ROOT DIAM: 3.5 CM
ECHO AO ROOT INDEX: 1.85 CM/M2
ECHO AV AREA PEAK VELOCITY: 2.4 CM2
ECHO AV AREA VTI: 2.4 CM2
ECHO AV AREA/BSA PEAK VELOCITY: 1.3 CM2/M2
ECHO AV AREA/BSA VTI: 1.3 CM2/M2
ECHO AV MEAN GRADIENT: 4 MMHG
ECHO AV MEAN VELOCITY: 1 M/S
ECHO AV PEAK GRADIENT: 8 MMHG
ECHO AV PEAK VELOCITY: 1.4 M/S
ECHO AV VELOCITY RATIO: 0.86
ECHO AV VTI: 29.3 CM
ECHO BSA: 1.9 M2
ECHO EST RA PRESSURE: 3 MMHG
ECHO IVC INSP: 0.3 CM
ECHO IVC PROX: 0.7 CM
ECHO LA AREA 2C: 13.4 CM2
ECHO LA AREA 4C: 11.7 CM2
ECHO LA MAJOR AXIS: 4.6 CM
ECHO LA MINOR AXIS: 4.9 CM
ECHO LA VOL BP: 27 ML (ref 22–52)
ECHO LA VOL MOD A2C: 30 ML (ref 22–52)
ECHO LA VOL MOD A4C: 23 ML (ref 22–52)
ECHO LA VOL/BSA BIPLANE: 14 ML/M2 (ref 16–34)
ECHO LA VOLUME INDEX MOD A2C: 16 ML/M2 (ref 16–34)
ECHO LA VOLUME INDEX MOD A4C: 12 ML/M2 (ref 16–34)
ECHO LV E' LATERAL VELOCITY: 7.9 CM/S
ECHO LV E' SEPTAL VELOCITY: 6.05 CM/S
ECHO LV EDV A2C: 88 ML
ECHO LV EDV A4C: 102 ML
ECHO LV EDV INDEX A4C: 54 ML/M2
ECHO LV EDV NDEX A2C: 47 ML/M2
ECHO LV EF PHYSICIAN: 63 %
ECHO LV EJECTION FRACTION A2C: 62 %
ECHO LV EJECTION FRACTION A4C: 65 %
ECHO LV EJECTION FRACTION BIPLANE: 63 % (ref 55–100)
ECHO LV ESV A2C: 34 ML
ECHO LV ESV A4C: 36 ML
ECHO LV ESV INDEX A2C: 18 ML/M2
ECHO LV ESV INDEX A4C: 19 ML/M2
ECHO LVOT AREA: 2.8 CM2
ECHO LVOT AV VTI INDEX: 0.85
ECHO LVOT DIAM: 1.9 CM
ECHO LVOT MEAN GRADIENT: 3 MMHG
ECHO LVOT PEAK GRADIENT: 6 MMHG
ECHO LVOT PEAK VELOCITY: 1.2 M/S
ECHO LVOT STROKE VOLUME INDEX: 37.5 ML/M2
ECHO LVOT SV: 70.8 ML
ECHO LVOT VTI: 25 CM
ECHO MV A VELOCITY: 0.83 M/S
ECHO MV E VELOCITY: 0.71 M/S
ECHO MV E/A RATIO: 0.86
ECHO MV E/E' LATERAL: 8.99
ECHO MV E/E' RATIO (AVERAGED): 10.36
ECHO MV E/E' SEPTAL: 11.74
ECHO PV MAX VELOCITY: 0.9 M/S
ECHO PV PEAK GRADIENT: 3 MMHG
ECHO RA AREA 4C: 13 CM2
ECHO RA END SYSTOLIC VOLUME APICAL 4 CHAMBER INDEX BSA: 19 ML/M2
ECHO RA VOLUME: 36 ML
ECHO RV BASAL DIMENSION: 3.5 CM
ECHO RV FREE WALL PEAK S': 11 CM/S
ECHO RV LONGITUDINAL DIMENSION: 6.8 CM
ECHO RV MID DIMENSION: 2.8 CM
ECHO RV TAPSE: 2.4 CM (ref 1.7–?)
EOSINOPHIL # BLD: 0.1 K/UL (ref 0–0.6)
EOSINOPHIL NFR BLD: 1.5 %
HCT VFR BLD AUTO: 42.2 % (ref 36–48)
HGB BLD-MCNC: 14.3 G/DL (ref 12–16)
LYMPHOCYTES # BLD: 2.1 K/UL (ref 1–5.1)
LYMPHOCYTES NFR BLD: 35.2 %
MCH RBC QN AUTO: 31.1 PG (ref 26–34)
MCHC RBC AUTO-ENTMCNC: 33.9 G/DL (ref 31–36)
MCV RBC AUTO: 91.9 FL (ref 80–100)
MONOCYTES # BLD: 0.5 K/UL (ref 0–1.3)
MONOCYTES NFR BLD: 8.3 %
NEUTROPHILS # BLD: 3.2 K/UL (ref 1.7–7.7)
NEUTROPHILS NFR BLD: 54.7 %
PLATELET # BLD AUTO: 314 K/UL (ref 135–450)
PMV BLD AUTO: 6.4 FL (ref 5–10.5)
RBC # BLD AUTO: 4.6 M/UL (ref 4–5.2)
WBC # BLD AUTO: 5.9 K/UL (ref 4–11)

## 2025-08-25 PROCEDURE — 2060000000 HC ICU INTERMEDIATE R&B

## 2025-08-25 PROCEDURE — 85025 COMPLETE CBC W/AUTO DIFF WBC: CPT

## 2025-08-25 PROCEDURE — 94760 N-INVAS EAR/PLS OXIMETRY 1: CPT

## 2025-08-25 PROCEDURE — 6360000004 HC RX CONTRAST MEDICATION: Performed by: INTERNAL MEDICINE

## 2025-08-25 PROCEDURE — 99223 1ST HOSP IP/OBS HIGH 75: CPT | Performed by: PSYCHIATRY & NEUROLOGY

## 2025-08-25 PROCEDURE — C8929 TTE W OR WO FOL WCON,DOPPLER: HCPCS

## 2025-08-25 PROCEDURE — APPSS30 APP SPLIT SHARED TIME 16-30 MINUTES

## 2025-08-25 PROCEDURE — 2500000003 HC RX 250 WO HCPCS: Performed by: INTERNAL MEDICINE

## 2025-08-25 PROCEDURE — 6360000002 HC RX W HCPCS: Performed by: INTERNAL MEDICINE

## 2025-08-25 PROCEDURE — 93306 TTE W/DOPPLER COMPLETE: CPT | Performed by: INTERNAL MEDICINE

## 2025-08-25 PROCEDURE — 6370000000 HC RX 637 (ALT 250 FOR IP): Performed by: NURSE PRACTITIONER

## 2025-08-25 PROCEDURE — APPNB30 APP NON BILLABLE TIME 0-30 MINS

## 2025-08-25 PROCEDURE — 6370000000 HC RX 637 (ALT 250 FOR IP): Performed by: INTERNAL MEDICINE

## 2025-08-25 PROCEDURE — 36415 COLL VENOUS BLD VENIPUNCTURE: CPT

## 2025-08-25 PROCEDURE — 72141 MRI NECK SPINE W/O DYE: CPT

## 2025-08-25 RX ORDER — CALCIUM CARBONATE 500 MG/1
750 TABLET, CHEWABLE ORAL
Status: DISCONTINUED | OUTPATIENT
Start: 2025-08-25 | End: 2025-08-27 | Stop reason: HOSPADM

## 2025-08-25 RX ORDER — ROSUVASTATIN CALCIUM 40 MG/1
40 TABLET, COATED ORAL NIGHTLY
Qty: 30 TABLET | Refills: 3 | Status: SHIPPED | OUTPATIENT
Start: 2025-08-25

## 2025-08-25 RX ADMIN — HYDRALAZINE HYDROCHLORIDE 25 MG: 25 TABLET ORAL at 13:42

## 2025-08-25 RX ADMIN — NIFEDIPINE 30 MG: 30 TABLET, EXTENDED RELEASE ORAL at 20:27

## 2025-08-25 RX ADMIN — ACETAMINOPHEN 650 MG: 325 TABLET ORAL at 20:27

## 2025-08-25 RX ADMIN — SODIUM CHLORIDE, PRESERVATIVE FREE 10 ML: 5 INJECTION INTRAVENOUS at 14:04

## 2025-08-25 RX ADMIN — VENLAFAXINE HYDROCHLORIDE 37.5 MG: 37.5 CAPSULE, EXTENDED RELEASE ORAL at 09:04

## 2025-08-25 RX ADMIN — CILOSTAZOL 50 MG: 100 TABLET ORAL at 09:03

## 2025-08-25 RX ADMIN — LORAZEPAM 0.5 MG: 2 INJECTION INTRAMUSCULAR; INTRAVENOUS at 14:03

## 2025-08-25 RX ADMIN — ASPIRIN 81 MG: 81 TABLET, CHEWABLE ORAL at 09:04

## 2025-08-25 RX ADMIN — SUCRALFATE 1 G: 1 TABLET ORAL at 20:26

## 2025-08-25 RX ADMIN — HYDRALAZINE HYDROCHLORIDE 25 MG: 25 TABLET ORAL at 06:17

## 2025-08-25 RX ADMIN — CHLORTHALIDONE 25 MG: 25 TABLET ORAL at 09:03

## 2025-08-25 RX ADMIN — CILOSTAZOL 50 MG: 100 TABLET ORAL at 20:27

## 2025-08-25 RX ADMIN — ROSUVASTATIN 40 MG: 40 TABLET, FILM COATED ORAL at 20:26

## 2025-08-25 RX ADMIN — SODIUM CHLORIDE, PRESERVATIVE FREE 10 ML: 5 INJECTION INTRAVENOUS at 09:20

## 2025-08-25 RX ADMIN — SULFUR HEXAFLUORIDE 2 ML: 60.7; .19; .19 INJECTION, POWDER, LYOPHILIZED, FOR SUSPENSION INTRAVENOUS; INTRAVESICAL at 11:16

## 2025-08-25 RX ADMIN — ENOXAPARIN SODIUM 40 MG: 100 INJECTION SUBCUTANEOUS at 09:08

## 2025-08-25 RX ADMIN — SUCRALFATE 1 G: 1 TABLET ORAL at 09:04

## 2025-08-25 RX ADMIN — NIFEDIPINE 30 MG: 30 TABLET, EXTENDED RELEASE ORAL at 09:03

## 2025-08-25 RX ADMIN — CHOLESTYRAMINE 4 G: 4 POWDER, FOR SUSPENSION ORAL at 09:03

## 2025-08-25 RX ADMIN — HYDRALAZINE HYDROCHLORIDE 25 MG: 25 TABLET ORAL at 20:26

## 2025-08-25 ASSESSMENT — PAIN SCALES - GENERAL
PAINLEVEL_OUTOF10: 3
PAINLEVEL_OUTOF10: 4

## 2025-08-25 ASSESSMENT — PAIN DESCRIPTION - DESCRIPTORS
DESCRIPTORS: ACHING

## 2025-08-25 ASSESSMENT — PAIN DESCRIPTION - LOCATION
LOCATION: HEAD
LOCATION: KNEE
LOCATION: HEAD
LOCATION: HEAD

## 2025-08-25 ASSESSMENT — PAIN DESCRIPTION - ORIENTATION
ORIENTATION: POSTERIOR
ORIENTATION: RIGHT;LEFT
ORIENTATION: POSTERIOR
ORIENTATION: POSTERIOR

## 2025-08-25 ASSESSMENT — PAIN DESCRIPTION - PAIN TYPE
TYPE: ACUTE PAIN

## 2025-08-25 ASSESSMENT — PAIN - FUNCTIONAL ASSESSMENT: PAIN_FUNCTIONAL_ASSESSMENT: WONG-BAKER FACES

## 2025-08-25 ASSESSMENT — PAIN SCALES - WONG BAKER: WONGBAKER_NUMERICALRESPONSE: NO HURT

## 2025-08-26 PROCEDURE — 6360000002 HC RX W HCPCS: Performed by: INTERNAL MEDICINE

## 2025-08-26 PROCEDURE — 99233 SBSQ HOSP IP/OBS HIGH 50: CPT | Performed by: PSYCHIATRY & NEUROLOGY

## 2025-08-26 PROCEDURE — 97530 THERAPEUTIC ACTIVITIES: CPT

## 2025-08-26 PROCEDURE — 6370000000 HC RX 637 (ALT 250 FOR IP): Performed by: NURSE PRACTITIONER

## 2025-08-26 PROCEDURE — 94760 N-INVAS EAR/PLS OXIMETRY 1: CPT

## 2025-08-26 PROCEDURE — 6370000000 HC RX 637 (ALT 250 FOR IP): Performed by: INTERNAL MEDICINE

## 2025-08-26 PROCEDURE — APPNB30 APP NON BILLABLE TIME 0-30 MINS

## 2025-08-26 PROCEDURE — APPSS30 APP SPLIT SHARED TIME 16-30 MINUTES

## 2025-08-26 PROCEDURE — 2060000000 HC ICU INTERMEDIATE R&B

## 2025-08-26 RX ADMIN — HYDRALAZINE HYDROCHLORIDE 25 MG: 25 TABLET ORAL at 14:06

## 2025-08-26 RX ADMIN — CILOSTAZOL 50 MG: 100 TABLET ORAL at 20:21

## 2025-08-26 RX ADMIN — CHOLESTYRAMINE 4 G: 4 POWDER, FOR SUSPENSION ORAL at 10:14

## 2025-08-26 RX ADMIN — VENLAFAXINE HYDROCHLORIDE 37.5 MG: 37.5 CAPSULE, EXTENDED RELEASE ORAL at 10:14

## 2025-08-26 RX ADMIN — HYDRALAZINE HYDROCHLORIDE 25 MG: 25 TABLET ORAL at 04:33

## 2025-08-26 RX ADMIN — ENOXAPARIN SODIUM 40 MG: 100 INJECTION SUBCUTANEOUS at 10:16

## 2025-08-26 RX ADMIN — NIFEDIPINE 30 MG: 30 TABLET, EXTENDED RELEASE ORAL at 10:14

## 2025-08-26 RX ADMIN — ACETAMINOPHEN 650 MG: 325 TABLET ORAL at 10:25

## 2025-08-26 RX ADMIN — SUCRALFATE 1 G: 1 TABLET ORAL at 20:21

## 2025-08-26 RX ADMIN — ASPIRIN 81 MG: 81 TABLET, CHEWABLE ORAL at 10:14

## 2025-08-26 RX ADMIN — ACETAMINOPHEN 650 MG: 325 TABLET ORAL at 20:21

## 2025-08-26 RX ADMIN — CHLORTHALIDONE 25 MG: 25 TABLET ORAL at 10:14

## 2025-08-26 RX ADMIN — HYDRALAZINE HYDROCHLORIDE 25 MG: 25 TABLET ORAL at 20:21

## 2025-08-26 RX ADMIN — CILOSTAZOL 50 MG: 100 TABLET ORAL at 10:15

## 2025-08-26 RX ADMIN — SUCRALFATE 1 G: 1 TABLET ORAL at 10:14

## 2025-08-26 RX ADMIN — ROSUVASTATIN 40 MG: 40 TABLET, FILM COATED ORAL at 20:21

## 2025-08-26 RX ADMIN — NIFEDIPINE 30 MG: 30 TABLET, EXTENDED RELEASE ORAL at 20:21

## 2025-08-26 ASSESSMENT — PAIN - FUNCTIONAL ASSESSMENT
PAIN_FUNCTIONAL_ASSESSMENT: 0-10
PAIN_FUNCTIONAL_ASSESSMENT: ACTIVITIES ARE NOT PREVENTED

## 2025-08-26 ASSESSMENT — PAIN SCALES - GENERAL
PAINLEVEL_OUTOF10: 3
PAINLEVEL_OUTOF10: 3
PAINLEVEL_OUTOF10: 5
PAINLEVEL_OUTOF10: 2
PAINLEVEL_OUTOF10: 3
PAINLEVEL_OUTOF10: 2
PAINLEVEL_OUTOF10: 4

## 2025-08-26 ASSESSMENT — PAIN DESCRIPTION - DESCRIPTORS
DESCRIPTORS: ACHING;BURNING
DESCRIPTORS: ACHING;BURNING;SHARP
DESCRIPTORS: SHARP;TIGHTNESS;ACHING
DESCRIPTORS: ACHING;BURNING

## 2025-08-26 ASSESSMENT — PAIN DESCRIPTION - ORIENTATION
ORIENTATION: RIGHT;LEFT
ORIENTATION: POSTERIOR

## 2025-08-26 ASSESSMENT — PAIN DESCRIPTION - LOCATION
LOCATION: KNEE;NECK
LOCATION: NECK
LOCATION: KNEE
LOCATION: KNEE
LOCATION: NECK

## 2025-08-27 VITALS
TEMPERATURE: 97.8 F | OXYGEN SATURATION: 93 % | BODY MASS INDEX: 25.18 KG/M2 | HEART RATE: 80 BPM | DIASTOLIC BLOOD PRESSURE: 73 MMHG | HEIGHT: 69 IN | RESPIRATION RATE: 18 BRPM | SYSTOLIC BLOOD PRESSURE: 121 MMHG | WEIGHT: 170 LBS

## 2025-08-27 DIAGNOSIS — F51.01 PRIMARY INSOMNIA: ICD-10-CM

## 2025-08-27 PROCEDURE — 6370000000 HC RX 637 (ALT 250 FOR IP): Performed by: NURSE PRACTITIONER

## 2025-08-27 PROCEDURE — 6370000000 HC RX 637 (ALT 250 FOR IP): Performed by: INTERNAL MEDICINE

## 2025-08-27 PROCEDURE — 6360000002 HC RX W HCPCS: Performed by: INTERNAL MEDICINE

## 2025-08-27 RX ADMIN — CHLORTHALIDONE 25 MG: 25 TABLET ORAL at 09:21

## 2025-08-27 RX ADMIN — CHOLESTYRAMINE 4 G: 4 POWDER, FOR SUSPENSION ORAL at 09:21

## 2025-08-27 RX ADMIN — ACETAMINOPHEN 650 MG: 325 TABLET ORAL at 04:13

## 2025-08-27 RX ADMIN — ASPIRIN 81 MG: 81 TABLET, CHEWABLE ORAL at 09:20

## 2025-08-27 RX ADMIN — VENLAFAXINE HYDROCHLORIDE 37.5 MG: 37.5 CAPSULE, EXTENDED RELEASE ORAL at 09:20

## 2025-08-27 RX ADMIN — SUCRALFATE 1 G: 1 TABLET ORAL at 09:20

## 2025-08-27 RX ADMIN — NIFEDIPINE 30 MG: 30 TABLET, EXTENDED RELEASE ORAL at 09:20

## 2025-08-27 RX ADMIN — HYDRALAZINE HYDROCHLORIDE 25 MG: 25 TABLET ORAL at 04:13

## 2025-08-27 RX ADMIN — CILOSTAZOL 50 MG: 100 TABLET ORAL at 09:21

## 2025-08-27 RX ADMIN — ENOXAPARIN SODIUM 40 MG: 100 INJECTION SUBCUTANEOUS at 09:21

## 2025-08-27 ASSESSMENT — PAIN SCALES - WONG BAKER: WONGBAKER_NUMERICALRESPONSE: NO HURT

## 2025-08-27 ASSESSMENT — PAIN - FUNCTIONAL ASSESSMENT: PAIN_FUNCTIONAL_ASSESSMENT: WONG-BAKER FACES

## 2025-08-28 RX ORDER — ZOLPIDEM TARTRATE 5 MG/1
TABLET ORAL
Qty: 30 TABLET | Refills: 2 | Status: SHIPPED | OUTPATIENT
Start: 2025-08-28 | End: 2025-11-26

## 2025-08-30 ENCOUNTER — PATIENT MESSAGE (OUTPATIENT)
Dept: INTERNAL MEDICINE CLINIC | Age: 75
End: 2025-08-30

## 2025-08-30 DIAGNOSIS — R93.89 ABNORMAL MRI: Primary | ICD-10-CM

## (undated) DEVICE — GLOVE SURG SZ 75 L12IN FNGR THK87MIL DK GRN LTX FREE ISOLEX

## (undated) DEVICE — ERBE NESSY®PLATE 170 SPLIT; 168CM²; CABLE 3M: Brand: ERBE

## (undated) DEVICE — GOWN,SIRUS,POLYRNF,BRTHSLV,XLN/XXL,18/CS: Brand: MEDLINE

## (undated) DEVICE — TURNOVER KIT RM INF CTRL TECH

## (undated) DEVICE — CANNULA NSL AD TBNG L7FT PVC STR NONFLARED PRNG O2 DEL W STD

## (undated) DEVICE — FORCEPS BX L240CM WRK CHN 2.8MM STD CAP W/ NDL MIC MESH

## (undated) DEVICE — COVER LT HNDL BLU PLAS

## (undated) DEVICE — BLADE ES L2.75IN ELASTOMERIC COAT DURABLE BEND UPTO 90DEG

## (undated) DEVICE — SOLUTION IV 1000ML 0.9% SOD CHL

## (undated) DEVICE — SURGICAL SET UP - SURE SET: Brand: MEDLINE INDUSTRIES, INC.

## (undated) DEVICE — 3M™ WARMING BLANKET, UPPER BODY, 10 PER CASE, 42268: Brand: BAIR HUGGER™

## (undated) DEVICE — 2108 SERIES SAGITTAL BLADE FAN, OFFSET  (29.0 X 0.89 X 73.0MM)

## (undated) DEVICE — SUTURE VCRL SZ 0 L18IN ABSRB UD L36MM CT-1 1/2 CIR J840D

## (undated) DEVICE — TRAP SPEC RETRV CLR PLAS POLYP IN LN SUCT QUIK CTCH

## (undated) DEVICE — 3M™ STERI-DRAPE™ INSTRUMENT POUCH 1018: Brand: STERI-DRAPE™

## (undated) DEVICE — GLOVE SURG SZ 75 L12IN FNGR THK87MIL WHT LTX FREE

## (undated) DEVICE — ELECTRODE PT RET AD L9FT HI MOIST COND ADH HYDRGEL CORDED

## (undated) DEVICE — HANDPIECE SUCTION TUBING INTERPULSE 10FT

## (undated) DEVICE — BLADE SAG  NARROW THIN  SHORT

## (undated) DEVICE — BLADE SAW W12.5XL70MM THK0.8MM CUT THK1.12MM S STL RECIP

## (undated) DEVICE — YANKAUER SUCTION INSTRUMENT WITHOUT CONTROL VENT, OPEN TIP, CLEAR: Brand: YANKAUER

## (undated) DEVICE — SYRINGE IRRIG 60ML SFT PLIABLE BLB EZ TO GRP 1 HND USE W/

## (undated) DEVICE — STERILE PVP: Brand: MEDLINE INDUSTRIES, INC.

## (undated) DEVICE — SUTURE MCRYL + SZ 4-0 L18IN ABSRB UD L19MM PS-2 3/8 CIR MCP496G

## (undated) DEVICE — PRE OP PACK: Brand: MEDLINE INDUSTRIES, INC.

## (undated) DEVICE — PACK PROCEDURE SURG TOTAL KNEE

## (undated) DEVICE — Z CONVERTED USE 2271043 CONTAINER SPEC COLL 4OZ SCR ON LID PEEL PCH

## (undated) DEVICE — SPLINT ORTH 22IN KNEE BASIC

## (undated) DEVICE — APPLICATOR PREP 26ML 0.7% IOD POVACRYLEX 74% ISO ALC ST

## (undated) DEVICE — NEEDLE HYPO 18GA L1.5IN PNK POLYPR HUB S STL REG BVL STR

## (undated) DEVICE — GLOVE SURG SZ 85 L12IN FNGR THK13MIL BRN LTX SYN POLYMER W

## (undated) DEVICE — PACK,BASIC: Brand: MEDLINE

## (undated) DEVICE — BIPOLAR SEALER 23-112-1 AQM 6.0: Brand: AQUAMANTYS ®

## (undated) DEVICE — GARMENT,MEDLINE,DVT,INT,CALF,MED, GEN2: Brand: MEDLINE

## (undated) DEVICE — 3M™ COBAN™ NL STERILE NON-LATEX SELF-ADHERENT WRAP, 2086S, 6 IN X 5 YD (15 CM X 4,5 M), 12 ROLLS/CASE: Brand: 3M™ COBAN™

## (undated) DEVICE — SUTURE VCRL SZ 2-0 L18IN ABSRB UD CT-1 L36MM 1/2 CIR J839D

## (undated) DEVICE — HIGH FLOW TIP

## (undated) DEVICE — BLADE SAW RECIP HVY DUTY LNG 27796327] STRYKER CORP]

## (undated) DEVICE — PADDING CAST N ADH 12X6 IN CRIMPED FINISH 100% COTTON WBRLII

## (undated) DEVICE — GLOVE SURG SZ 85 L12IN FNGR THK87MIL DK GRN LTX FREE ISOLEX

## (undated) DEVICE — KNEE HOLDER DISPOSABLE LINER: Brand: ALVARADO®  KNEE SUPPORT

## (undated) DEVICE — 3M™ STERI-DRAPE™ U-DRAPE 1015: Brand: STERI-DRAPE™

## (undated) DEVICE — SST TWIST DRILL, STANDARD, 3.2MM DIA. X 127MM: Brand: MICROAIRE®

## (undated) DEVICE — SUTURE STRATAFIX SPRL SZ 1 L14IN ABSRB VLT L48CM CTX 1/2 SXPD2B405

## (undated) DEVICE — GOWN,SIRUS,POLYRNF,SETINSLV,XL,20/CS: Brand: MEDLINE

## (undated) DEVICE — BOWL AND CEMENT CARTRIDGE WITH BREAKAWAY FEMORAL NOZZLE AND MEDIUM PRESSURIZER: Brand: ACM

## (undated) DEVICE — DUAL CUT SAGITTAL BLADE

## (undated) DEVICE — GOWN,SIRUS,POLYRNF,SETINSLV,L,20/CS: Brand: MEDLINE

## (undated) DEVICE — SYRINGE MED 30ML STD CLR PLAS LUERLOCK TIP N CTRL DISP

## (undated) DEVICE — COAXIAL HIGH FLOW TIP WITH SOFT SHIELD

## (undated) DEVICE — PREVENA INCISION MANAGEMENT SYSTEM- PEEL & PLACE DRESSING: Brand: PREVENA™ PEEL & PLACE™

## (undated) DEVICE — COVER,TABLE,HEAVY DUTY,77"X90",STRL: Brand: MEDLINE

## (undated) DEVICE — NEEDLE SPNL L3.5IN PNK HUB S STL REG WALL FIT STYL W/ QNCKE

## (undated) DEVICE — 2108 SERIES SAGITTAL BLADE (9.1 X 0.64 X 35.2MM)

## (undated) DEVICE — DRESSING THERABOND 3D ANTIMIC CNTCT SYS 15INCHX10INCH

## (undated) DEVICE — SURE SET-DOUBLE BASIN-LF: Brand: MEDLINE INDUSTRIES, INC.

## (undated) DEVICE — T4 HOOD

## (undated) DEVICE — 3M™ IOBAN™ 2 ANTIMICROBIAL INCISE DRAPE 6648EZ: Brand: IOBAN™ 2

## (undated) DEVICE — SST BUR, WIRE PASS DRILL, 2 FLUTES, MED., 2MM DIA.: Brand: MICROAIRE®

## (undated) DEVICE — SUTURE MCRYL SZ 4-0 L27IN ABSRB UD L19MM PS-2 1/2 CIR PRIM Y426H

## (undated) DEVICE — SYSTEM SKIN CLSR 22CM DERMBND PRINEO

## (undated) DEVICE — INTENDED FOR TISSUE SEPARATION, AND OTHER PROCEDURES THAT REQUIRE A SHARP SURGICAL BLADE TO PUNCTURE OR CUT.: Brand: BARD-PARKER ® CARBON RIB-BACK BLADES

## (undated) DEVICE — 3M™ TEGADERM™ TRANSPARENT FILM DRESSING FRAME STYLE, 1627, 4 IN X 10 IN (10 CM X 25 CM), 20/CT 4CT/CASE: Brand: 3M™ TEGADERM™

## (undated) DEVICE — SWAB CULTURET AMIES DBL

## (undated) DEVICE — BLADE SAW RECIP HVY DUTY LNG 0277096327] STRYKER CORP]

## (undated) DEVICE — ZIMMER® STERILE DISPOSABLE TOURNIQUET CUFF WITH PLC, SINGLE PORT, SINGLE BLADDER, 34 IN. (86 CM)

## (undated) DEVICE — COVER,MAYO STAND,XL,STERILE: Brand: MEDLINE

## (undated) DEVICE — SNARE ENDOSCP 11 MM BRAIDED WIRE CAPTFLX DISP